# Patient Record
Sex: FEMALE | Race: WHITE | Employment: OTHER | URBAN - METROPOLITAN AREA
[De-identification: names, ages, dates, MRNs, and addresses within clinical notes are randomized per-mention and may not be internally consistent; named-entity substitution may affect disease eponyms.]

---

## 2017-01-04 ENCOUNTER — OFFICE VISIT (OUTPATIENT)
Dept: FAMILY MEDICINE CLINIC | Age: 72
End: 2017-01-04

## 2017-01-04 VITALS
WEIGHT: 127.1 LBS | TEMPERATURE: 97.9 F | HEART RATE: 58 BPM | OXYGEN SATURATION: 95 % | HEIGHT: 59 IN | BODY MASS INDEX: 25.62 KG/M2 | RESPIRATION RATE: 16 BRPM | DIASTOLIC BLOOD PRESSURE: 63 MMHG | SYSTOLIC BLOOD PRESSURE: 128 MMHG

## 2017-01-04 DIAGNOSIS — Z79.01 LONG-TERM (CURRENT) USE OF ANTICOAGULANTS, INR GOAL 2.5-3.5: ICD-10-CM

## 2017-01-04 DIAGNOSIS — G89.29 CHRONIC BACK PAIN, UNSPECIFIED BACK LOCATION, UNSPECIFIED BACK PAIN LATERALITY: ICD-10-CM

## 2017-01-04 DIAGNOSIS — N18.30 CKD (CHRONIC KIDNEY DISEASE) STAGE 3, GFR 30-59 ML/MIN (HCC): ICD-10-CM

## 2017-01-04 DIAGNOSIS — M79.604 CHRONIC PAIN OF BOTH LOWER EXTREMITIES: ICD-10-CM

## 2017-01-04 DIAGNOSIS — K21.9 GASTROESOPHAGEAL REFLUX DISEASE WITHOUT ESOPHAGITIS: ICD-10-CM

## 2017-01-04 DIAGNOSIS — E11.22 CONTROLLED TYPE 2 DIABETES MELLITUS WITH STAGE 3 CHRONIC KIDNEY DISEASE, WITHOUT LONG-TERM CURRENT USE OF INSULIN (HCC): ICD-10-CM

## 2017-01-04 DIAGNOSIS — C85.90 LYMPHOMA, UNSPECIFIED BODY REGION, UNSPECIFIED LYMPHOMA TYPE (HCC): ICD-10-CM

## 2017-01-04 DIAGNOSIS — G89.29 CHRONIC PAIN OF BOTH LOWER EXTREMITIES: ICD-10-CM

## 2017-01-04 DIAGNOSIS — I10 ESSENTIAL HYPERTENSION, BENIGN: Primary | ICD-10-CM

## 2017-01-04 DIAGNOSIS — M54.9 CHRONIC BACK PAIN, UNSPECIFIED BACK LOCATION, UNSPECIFIED BACK PAIN LATERALITY: ICD-10-CM

## 2017-01-04 DIAGNOSIS — M79.605 CHRONIC PAIN OF BOTH LOWER EXTREMITIES: ICD-10-CM

## 2017-01-04 DIAGNOSIS — N18.30 CONTROLLED TYPE 2 DIABETES MELLITUS WITH STAGE 3 CHRONIC KIDNEY DISEASE, WITHOUT LONG-TERM CURRENT USE OF INSULIN (HCC): ICD-10-CM

## 2017-01-04 DIAGNOSIS — G25.81 RLS (RESTLESS LEGS SYNDROME): ICD-10-CM

## 2017-01-04 DIAGNOSIS — F41.1 GENERALIZED ANXIETY DISORDER: ICD-10-CM

## 2017-01-04 DIAGNOSIS — D50.8 IRON DEFICIENCY ANEMIA SECONDARY TO INADEQUATE DIETARY IRON INTAKE: ICD-10-CM

## 2017-01-04 DIAGNOSIS — F51.01 PRIMARY INSOMNIA: ICD-10-CM

## 2017-01-04 DIAGNOSIS — E78.5 HYPERLIPIDEMIA WITH TARGET LDL LESS THAN 100: ICD-10-CM

## 2017-01-04 DIAGNOSIS — J43.8 OTHER EMPHYSEMA (HCC): ICD-10-CM

## 2017-01-04 PROCEDURE — 99214 OFFICE O/P EST MOD 30 MIN: CPT | Performed by: FAMILY MEDICINE

## 2017-01-04 RX ORDER — ACETAMINOPHEN AND CODEINE PHOSPHATE 300; 30 MG/1; MG/1
TABLET ORAL
Qty: 42 TABLET | Refills: 0 | Status: SHIPPED | OUTPATIENT
Start: 2017-01-04 | End: 2017-02-08 | Stop reason: SDUPTHER

## 2017-01-04 RX ORDER — ALPRAZOLAM 0.5 MG/1
TABLET ORAL
Qty: 90 TABLET | Refills: 0 | Status: CANCELLED | OUTPATIENT
Start: 2017-01-04

## 2017-01-04 ASSESSMENT — ENCOUNTER SYMPTOMS
ABDOMINAL PAIN: 0
CHEST TIGHTNESS: 0
SHORTNESS OF BREATH: 0
COUGH: 0
DIARRHEA: 0
CONSTIPATION: 0
ABDOMINAL DISTENTION: 0
BLOOD IN STOOL: 0
CHOKING: 0
BACK PAIN: 1
VOMITING: 0
RECTAL PAIN: 0
APNEA: 0
STRIDOR: 0
WHEEZING: 0
ANAL BLEEDING: 0
NAUSEA: 0

## 2017-01-06 LAB — INR BLD: 3.5

## 2017-01-09 ENCOUNTER — ANTI-COAG VISIT (OUTPATIENT)
Dept: FAMILY MEDICINE CLINIC | Age: 72
End: 2017-01-09

## 2017-01-09 DIAGNOSIS — Z95.2 AORTIC VALVE REPLACED: ICD-10-CM

## 2017-01-20 DIAGNOSIS — F41.9 ANXIETY DISORDER, UNSPECIFIED TYPE: ICD-10-CM

## 2017-01-20 RX ORDER — ALPRAZOLAM 0.5 MG/1
TABLET ORAL
Qty: 90 TABLET | Refills: 0 | Status: SHIPPED | OUTPATIENT
Start: 2017-01-20 | End: 2017-03-06 | Stop reason: SDUPTHER

## 2017-02-01 RX ORDER — WARFARIN SODIUM 1 MG/1
TABLET ORAL
Qty: 60 TABLET | Refills: 0 | Status: SHIPPED | OUTPATIENT
Start: 2017-02-01 | End: 2017-04-05

## 2017-02-06 ENCOUNTER — ANTI-COAG VISIT (OUTPATIENT)
Dept: FAMILY MEDICINE CLINIC | Age: 72
End: 2017-02-06

## 2017-02-06 DIAGNOSIS — Z95.2 AORTIC VALVE REPLACED: ICD-10-CM

## 2017-02-06 LAB — INR BLD: 2.7

## 2017-02-08 DIAGNOSIS — M79.605 CHRONIC PAIN OF BOTH LOWER EXTREMITIES: ICD-10-CM

## 2017-02-08 DIAGNOSIS — M54.9 CHRONIC BACK PAIN, UNSPECIFIED BACK LOCATION, UNSPECIFIED BACK PAIN LATERALITY: ICD-10-CM

## 2017-02-08 DIAGNOSIS — G89.29 CHRONIC BACK PAIN, UNSPECIFIED BACK LOCATION, UNSPECIFIED BACK PAIN LATERALITY: ICD-10-CM

## 2017-02-08 DIAGNOSIS — G89.29 CHRONIC PAIN OF BOTH LOWER EXTREMITIES: ICD-10-CM

## 2017-02-08 DIAGNOSIS — M79.604 CHRONIC PAIN OF BOTH LOWER EXTREMITIES: ICD-10-CM

## 2017-02-08 RX ORDER — ACETAMINOPHEN AND CODEINE PHOSPHATE 300; 30 MG/1; MG/1
TABLET ORAL
Qty: 42 TABLET | Refills: 0 | Status: SHIPPED | OUTPATIENT
Start: 2017-02-08 | End: 2017-03-29 | Stop reason: SDUPTHER

## 2017-02-13 ENCOUNTER — TELEPHONE (OUTPATIENT)
Dept: FAMILY MEDICINE CLINIC | Age: 72
End: 2017-02-13

## 2017-02-14 ENCOUNTER — TELEPHONE (OUTPATIENT)
Dept: FAMILY MEDICINE CLINIC | Age: 72
End: 2017-02-14

## 2017-02-14 ENCOUNTER — ANTI-COAG VISIT (OUTPATIENT)
Dept: FAMILY MEDICINE CLINIC | Age: 72
End: 2017-02-14

## 2017-02-14 DIAGNOSIS — Z95.2 AORTIC VALVE REPLACED: ICD-10-CM

## 2017-02-14 LAB — INR BLD: 8

## 2017-02-17 ENCOUNTER — TELEPHONE (OUTPATIENT)
Dept: FAMILY MEDICINE CLINIC | Age: 72
End: 2017-02-17

## 2017-02-17 ENCOUNTER — ANTI-COAG VISIT (OUTPATIENT)
Dept: FAMILY MEDICINE CLINIC | Age: 72
End: 2017-02-17

## 2017-02-17 DIAGNOSIS — Z95.2 AORTIC VALVE REPLACED: ICD-10-CM

## 2017-02-17 LAB — INR BLD: 2

## 2017-02-20 ENCOUNTER — ANTI-COAG VISIT (OUTPATIENT)
Dept: FAMILY MEDICINE CLINIC | Age: 72
End: 2017-02-20

## 2017-02-20 DIAGNOSIS — Z95.2 AORTIC VALVE REPLACED: ICD-10-CM

## 2017-02-20 LAB — INR BLD: 2.1

## 2017-02-20 RX ORDER — WARFARIN SODIUM 3 MG/1
TABLET ORAL
Qty: 30 TABLET | Refills: 0 | Status: SHIPPED | OUTPATIENT
Start: 2017-02-20 | End: 2017-04-05

## 2017-02-23 LAB
INR BLD: 2.5
INR BLD: 2.6

## 2017-02-27 ENCOUNTER — ANTI-COAG VISIT (OUTPATIENT)
Dept: FAMILY MEDICINE CLINIC | Age: 72
End: 2017-02-27

## 2017-02-27 ENCOUNTER — TELEPHONE (OUTPATIENT)
Dept: CARDIOLOGY CLINIC | Age: 72
End: 2017-02-27

## 2017-02-27 DIAGNOSIS — Z95.2 AORTIC VALVE REPLACED: ICD-10-CM

## 2017-02-28 RX ORDER — WARFARIN SODIUM 1 MG/1
TABLET ORAL
Qty: 60 TABLET | Refills: 0 | Status: SHIPPED | OUTPATIENT
Start: 2017-02-28 | End: 2018-01-04 | Stop reason: SDUPTHER

## 2017-03-06 DIAGNOSIS — F41.9 ANXIETY DISORDER, UNSPECIFIED TYPE: ICD-10-CM

## 2017-03-06 RX ORDER — ALPRAZOLAM 0.5 MG/1
TABLET ORAL
Qty: 90 TABLET | Refills: 0 | Status: SHIPPED | OUTPATIENT
Start: 2017-03-06 | End: 2017-04-05 | Stop reason: SDUPTHER

## 2017-03-13 RX ORDER — LISINOPRIL 40 MG/1
TABLET ORAL
Qty: 90 TABLET | Refills: 0 | Status: SHIPPED | OUTPATIENT
Start: 2017-03-13 | End: 2017-06-17 | Stop reason: SDUPTHER

## 2017-03-24 ENCOUNTER — ANTI-COAG VISIT (OUTPATIENT)
Dept: FAMILY MEDICINE CLINIC | Age: 72
End: 2017-03-24

## 2017-03-24 DIAGNOSIS — Z95.2 AORTIC VALVE REPLACED: ICD-10-CM

## 2017-03-24 LAB — INR BLD: 2.9

## 2017-03-27 ENCOUNTER — TELEPHONE (OUTPATIENT)
Dept: CARDIOLOGY CLINIC | Age: 72
End: 2017-03-27

## 2017-03-29 DIAGNOSIS — M79.605 CHRONIC PAIN OF BOTH LOWER EXTREMITIES: ICD-10-CM

## 2017-03-29 DIAGNOSIS — G89.29 CHRONIC PAIN OF BOTH LOWER EXTREMITIES: ICD-10-CM

## 2017-03-29 DIAGNOSIS — M54.9 CHRONIC BACK PAIN, UNSPECIFIED BACK LOCATION, UNSPECIFIED BACK PAIN LATERALITY: ICD-10-CM

## 2017-03-29 DIAGNOSIS — G89.29 CHRONIC BACK PAIN, UNSPECIFIED BACK LOCATION, UNSPECIFIED BACK PAIN LATERALITY: ICD-10-CM

## 2017-03-29 DIAGNOSIS — M79.604 CHRONIC PAIN OF BOTH LOWER EXTREMITIES: ICD-10-CM

## 2017-03-29 RX ORDER — ACETAMINOPHEN AND CODEINE PHOSPHATE 300; 30 MG/1; MG/1
TABLET ORAL
Qty: 42 TABLET | Refills: 0 | Status: SHIPPED | OUTPATIENT
Start: 2017-03-29 | End: 2017-04-05 | Stop reason: SDUPTHER

## 2017-04-03 DIAGNOSIS — I10 ESSENTIAL HYPERTENSION, BENIGN: ICD-10-CM

## 2017-04-03 RX ORDER — CHLORTHALIDONE 25 MG/1
TABLET ORAL
Qty: 90 TABLET | Refills: 0 | Status: SHIPPED | OUTPATIENT
Start: 2017-04-03 | End: 2017-07-06 | Stop reason: SDUPTHER

## 2017-04-05 ENCOUNTER — OFFICE VISIT (OUTPATIENT)
Dept: FAMILY MEDICINE CLINIC | Age: 72
End: 2017-04-05

## 2017-04-05 VITALS
BODY MASS INDEX: 25.18 KG/M2 | HEART RATE: 71 BPM | TEMPERATURE: 98.6 F | RESPIRATION RATE: 16 BRPM | DIASTOLIC BLOOD PRESSURE: 70 MMHG | OXYGEN SATURATION: 98 % | SYSTOLIC BLOOD PRESSURE: 133 MMHG | WEIGHT: 124.9 LBS | HEIGHT: 59 IN

## 2017-04-05 DIAGNOSIS — E11.22 CONTROLLED TYPE 2 DIABETES MELLITUS WITH STAGE 3 CHRONIC KIDNEY DISEASE, WITHOUT LONG-TERM CURRENT USE OF INSULIN (HCC): Primary | ICD-10-CM

## 2017-04-05 DIAGNOSIS — Z79.01 LONG-TERM (CURRENT) USE OF ANTICOAGULANTS, INR GOAL 2.5-3.5: ICD-10-CM

## 2017-04-05 DIAGNOSIS — N63.10 BREAST MASS, RIGHT: ICD-10-CM

## 2017-04-05 DIAGNOSIS — M54.9 CHRONIC BACK PAIN, UNSPECIFIED BACK LOCATION, UNSPECIFIED BACK PAIN LATERALITY: ICD-10-CM

## 2017-04-05 DIAGNOSIS — M79.605 CHRONIC PAIN OF BOTH LOWER EXTREMITIES: ICD-10-CM

## 2017-04-05 DIAGNOSIS — E78.5 HYPERLIPIDEMIA WITH TARGET LDL LESS THAN 100: ICD-10-CM

## 2017-04-05 DIAGNOSIS — I10 ESSENTIAL HYPERTENSION, BENIGN: ICD-10-CM

## 2017-04-05 DIAGNOSIS — J43.8 OTHER EMPHYSEMA (HCC): ICD-10-CM

## 2017-04-05 DIAGNOSIS — N18.30 CONTROLLED TYPE 2 DIABETES MELLITUS WITH STAGE 3 CHRONIC KIDNEY DISEASE, WITHOUT LONG-TERM CURRENT USE OF INSULIN (HCC): Primary | ICD-10-CM

## 2017-04-05 DIAGNOSIS — G89.29 CHRONIC PAIN OF BOTH LOWER EXTREMITIES: ICD-10-CM

## 2017-04-05 DIAGNOSIS — G89.29 CHRONIC BACK PAIN, UNSPECIFIED BACK LOCATION, UNSPECIFIED BACK PAIN LATERALITY: ICD-10-CM

## 2017-04-05 DIAGNOSIS — F51.01 PRIMARY INSOMNIA: ICD-10-CM

## 2017-04-05 DIAGNOSIS — M79.604 CHRONIC PAIN OF BOTH LOWER EXTREMITIES: ICD-10-CM

## 2017-04-05 DIAGNOSIS — D50.8 OTHER IRON DEFICIENCY ANEMIA: ICD-10-CM

## 2017-04-05 DIAGNOSIS — F41.9 ANXIETY: ICD-10-CM

## 2017-04-05 DIAGNOSIS — C82.80 OTHER TYPE OF FOLLICULAR LYMPHOMA: ICD-10-CM

## 2017-04-05 DIAGNOSIS — K21.9 GASTROESOPHAGEAL REFLUX DISEASE WITHOUT ESOPHAGITIS: ICD-10-CM

## 2017-04-05 DIAGNOSIS — G25.81 RLS (RESTLESS LEGS SYNDROME): ICD-10-CM

## 2017-04-05 PROCEDURE — 1036F TOBACCO NON-USER: CPT | Performed by: FAMILY MEDICINE

## 2017-04-05 PROCEDURE — G8400 PT W/DXA NO RESULTS DOC: HCPCS | Performed by: FAMILY MEDICINE

## 2017-04-05 PROCEDURE — 1123F ACP DISCUSS/DSCN MKR DOCD: CPT | Performed by: FAMILY MEDICINE

## 2017-04-05 PROCEDURE — 4040F PNEUMOC VAC/ADMIN/RCVD: CPT | Performed by: FAMILY MEDICINE

## 2017-04-05 PROCEDURE — 3023F SPIROM DOC REV: CPT | Performed by: FAMILY MEDICINE

## 2017-04-05 PROCEDURE — 1090F PRES/ABSN URINE INCON ASSESS: CPT | Performed by: FAMILY MEDICINE

## 2017-04-05 PROCEDURE — G8926 SPIRO NO PERF OR DOC: HCPCS | Performed by: FAMILY MEDICINE

## 2017-04-05 PROCEDURE — 3017F COLORECTAL CA SCREEN DOC REV: CPT | Performed by: FAMILY MEDICINE

## 2017-04-05 PROCEDURE — G8420 CALC BMI NORM PARAMETERS: HCPCS | Performed by: FAMILY MEDICINE

## 2017-04-05 PROCEDURE — G8427 DOCREV CUR MEDS BY ELIG CLIN: HCPCS | Performed by: FAMILY MEDICINE

## 2017-04-05 PROCEDURE — 99214 OFFICE O/P EST MOD 30 MIN: CPT | Performed by: FAMILY MEDICINE

## 2017-04-05 PROCEDURE — 3014F SCREEN MAMMO DOC REV: CPT | Performed by: FAMILY MEDICINE

## 2017-04-05 PROCEDURE — 3046F HEMOGLOBIN A1C LEVEL >9.0%: CPT | Performed by: FAMILY MEDICINE

## 2017-04-05 RX ORDER — ACETAMINOPHEN AND CODEINE PHOSPHATE 300; 30 MG/1; MG/1
TABLET ORAL
Qty: 42 TABLET | Refills: 0 | Status: SHIPPED | OUTPATIENT
Start: 2017-04-05 | End: 2017-05-15 | Stop reason: SDUPTHER

## 2017-04-05 RX ORDER — ALPRAZOLAM 0.5 MG/1
TABLET ORAL
Qty: 90 TABLET | Refills: 0 | Status: SHIPPED | OUTPATIENT
Start: 2017-04-05 | End: 2017-05-19 | Stop reason: SDUPTHER

## 2017-04-05 RX ORDER — WARFARIN SODIUM 3 MG/1
TABLET ORAL
Qty: 30 TABLET | Refills: 2 | Status: SHIPPED | OUTPATIENT
Start: 2017-04-05 | End: 2017-07-11 | Stop reason: SDUPTHER

## 2017-04-05 ASSESSMENT — ENCOUNTER SYMPTOMS
STRIDOR: 0
COUGH: 0
SHORTNESS OF BREATH: 0
BLOOD IN STOOL: 0
ABDOMINAL PAIN: 0
VOMITING: 0
ABDOMINAL DISTENTION: 0
RECTAL PAIN: 0
DIARRHEA: 0
BACK PAIN: 1
CONSTIPATION: 0
CHOKING: 0
WHEEZING: 0
CHEST TIGHTNESS: 0
NAUSEA: 0
APNEA: 0
ANAL BLEEDING: 0

## 2017-04-07 RX ORDER — WARFARIN SODIUM 1 MG/1
TABLET ORAL
Qty: 60 TABLET | Refills: 0 | Status: SHIPPED | OUTPATIENT
Start: 2017-04-07 | End: 2018-01-04 | Stop reason: SDUPTHER

## 2017-04-24 ENCOUNTER — ANTI-COAG VISIT (OUTPATIENT)
Dept: FAMILY MEDICINE CLINIC | Age: 72
End: 2017-04-24

## 2017-04-24 DIAGNOSIS — Z95.2 AORTIC VALVE REPLACED: ICD-10-CM

## 2017-04-24 LAB — INR BLD: 2.5

## 2017-05-04 ENCOUNTER — PROCEDURE VISIT (OUTPATIENT)
Dept: CARDIOLOGY CLINIC | Age: 72
End: 2017-05-04

## 2017-05-04 DIAGNOSIS — Z95.0 CARDIAC PACEMAKER IN SITU: ICD-10-CM

## 2017-05-04 DIAGNOSIS — R00.1 BRADYCARDIA: ICD-10-CM

## 2017-05-04 PROCEDURE — 93280 PM DEVICE PROGR EVAL DUAL: CPT | Performed by: INTERNAL MEDICINE

## 2017-05-15 DIAGNOSIS — M54.9 CHRONIC BACK PAIN, UNSPECIFIED BACK LOCATION, UNSPECIFIED BACK PAIN LATERALITY: ICD-10-CM

## 2017-05-15 DIAGNOSIS — M79.604 CHRONIC PAIN OF BOTH LOWER EXTREMITIES: ICD-10-CM

## 2017-05-15 DIAGNOSIS — F41.9 ANXIETY: ICD-10-CM

## 2017-05-15 DIAGNOSIS — G89.29 CHRONIC BACK PAIN, UNSPECIFIED BACK LOCATION, UNSPECIFIED BACK PAIN LATERALITY: ICD-10-CM

## 2017-05-15 DIAGNOSIS — M79.605 CHRONIC PAIN OF BOTH LOWER EXTREMITIES: ICD-10-CM

## 2017-05-15 DIAGNOSIS — G89.29 CHRONIC PAIN OF BOTH LOWER EXTREMITIES: ICD-10-CM

## 2017-05-15 RX ORDER — WARFARIN SODIUM 1 MG/1
TABLET ORAL
Qty: 60 TABLET | Refills: 0 | Status: SHIPPED | OUTPATIENT
Start: 2017-05-15 | End: 2018-01-04 | Stop reason: SDUPTHER

## 2017-05-15 RX ORDER — ACETAMINOPHEN AND CODEINE PHOSPHATE 300; 30 MG/1; MG/1
TABLET ORAL
Qty: 42 TABLET | Refills: 0 | Status: SHIPPED | OUTPATIENT
Start: 2017-05-15 | End: 2017-06-26 | Stop reason: SDUPTHER

## 2017-05-19 RX ORDER — ALPRAZOLAM 0.5 MG/1
TABLET ORAL
Qty: 90 TABLET | Refills: 0 | Status: SHIPPED | OUTPATIENT
Start: 2017-05-19 | End: 2017-06-26 | Stop reason: SDUPTHER

## 2017-05-22 LAB — INR BLD: 3.4

## 2017-05-23 ENCOUNTER — ANTI-COAG VISIT (OUTPATIENT)
Dept: FAMILY MEDICINE CLINIC | Age: 72
End: 2017-05-23

## 2017-05-23 DIAGNOSIS — Z95.2 AORTIC VALVE REPLACED: ICD-10-CM

## 2017-06-18 RX ORDER — LISINOPRIL 40 MG/1
TABLET ORAL
Qty: 90 TABLET | Refills: 0 | Status: SHIPPED | OUTPATIENT
Start: 2017-06-18 | End: 2017-09-12 | Stop reason: SDUPTHER

## 2017-06-19 RX ORDER — WARFARIN SODIUM 1 MG/1
TABLET ORAL
Qty: 60 TABLET | Refills: 0 | Status: SHIPPED | OUTPATIENT
Start: 2017-06-19 | End: 2018-01-04 | Stop reason: SDUPTHER

## 2017-06-26 DIAGNOSIS — F41.9 ANXIETY: ICD-10-CM

## 2017-06-26 DIAGNOSIS — M79.604 CHRONIC PAIN OF BOTH LOWER EXTREMITIES: ICD-10-CM

## 2017-06-26 DIAGNOSIS — M79.605 CHRONIC PAIN OF BOTH LOWER EXTREMITIES: ICD-10-CM

## 2017-06-26 DIAGNOSIS — G89.29 CHRONIC BACK PAIN, UNSPECIFIED BACK LOCATION, UNSPECIFIED BACK PAIN LATERALITY: ICD-10-CM

## 2017-06-26 DIAGNOSIS — M54.9 CHRONIC BACK PAIN, UNSPECIFIED BACK LOCATION, UNSPECIFIED BACK PAIN LATERALITY: ICD-10-CM

## 2017-06-26 DIAGNOSIS — G89.29 CHRONIC PAIN OF BOTH LOWER EXTREMITIES: ICD-10-CM

## 2017-06-26 LAB — INR BLD: 3.4

## 2017-06-26 RX ORDER — ACETAMINOPHEN AND CODEINE PHOSPHATE 300; 30 MG/1; MG/1
TABLET ORAL
Qty: 42 TABLET | Refills: 0 | Status: SHIPPED | OUTPATIENT
Start: 2017-06-26 | End: 2017-08-14 | Stop reason: SDUPTHER

## 2017-06-26 RX ORDER — ALPRAZOLAM 0.5 MG/1
TABLET ORAL
Qty: 90 TABLET | Refills: 0 | Status: SHIPPED | OUTPATIENT
Start: 2017-06-26 | End: 2017-08-14 | Stop reason: SDUPTHER

## 2017-06-27 ENCOUNTER — ANTI-COAG VISIT (OUTPATIENT)
Dept: FAMILY MEDICINE CLINIC | Age: 72
End: 2017-06-27

## 2017-06-27 DIAGNOSIS — Z95.2 AORTIC VALVE REPLACED: ICD-10-CM

## 2017-07-06 DIAGNOSIS — I10 ESSENTIAL HYPERTENSION, BENIGN: ICD-10-CM

## 2017-07-06 RX ORDER — CHLORTHALIDONE 25 MG/1
TABLET ORAL
Qty: 90 TABLET | Refills: 0 | Status: SHIPPED | OUTPATIENT
Start: 2017-07-06 | End: 2017-10-01 | Stop reason: SDUPTHER

## 2017-07-11 DIAGNOSIS — Z79.01 LONG-TERM (CURRENT) USE OF ANTICOAGULANTS, INR GOAL 2.5-3.5: ICD-10-CM

## 2017-07-11 RX ORDER — WARFARIN SODIUM 1 MG/1
TABLET ORAL
Qty: 60 TABLET | Refills: 0 | Status: SHIPPED | OUTPATIENT
Start: 2017-07-11 | End: 2017-07-25 | Stop reason: SDUPTHER

## 2017-07-11 RX ORDER — WARFARIN SODIUM 3 MG/1
TABLET ORAL
Qty: 30 TABLET | Refills: 0 | Status: SHIPPED | OUTPATIENT
Start: 2017-07-11 | End: 2017-07-25 | Stop reason: SDUPTHER

## 2017-07-24 ENCOUNTER — ANTI-COAG VISIT (OUTPATIENT)
Dept: FAMILY MEDICINE CLINIC | Age: 72
End: 2017-07-24

## 2017-07-24 DIAGNOSIS — Z79.01 LONG-TERM (CURRENT) USE OF ANTICOAGULANTS, INR GOAL 2.5-3.5: ICD-10-CM

## 2017-07-24 DIAGNOSIS — Z95.2 AORTIC VALVE REPLACED: ICD-10-CM

## 2017-07-24 LAB — INR BLD: 2.7

## 2017-07-25 RX ORDER — WARFARIN SODIUM 1 MG/1
TABLET ORAL
Qty: 60 TABLET | Refills: 2 | Status: SHIPPED | OUTPATIENT
Start: 2017-07-25 | End: 2018-05-11 | Stop reason: SDUPTHER

## 2017-07-25 RX ORDER — WARFARIN SODIUM 3 MG/1
TABLET ORAL
Qty: 30 TABLET | Refills: 2 | Status: SHIPPED | OUTPATIENT
Start: 2017-07-25 | End: 2017-08-14 | Stop reason: SDUPTHER

## 2017-08-01 RX ORDER — PANTOPRAZOLE SODIUM 40 MG/1
TABLET, DELAYED RELEASE ORAL
Qty: 30 TABLET | Refills: 1 | Status: SHIPPED | OUTPATIENT
Start: 2017-08-01 | End: 2017-10-05 | Stop reason: SDUPTHER

## 2017-08-07 DIAGNOSIS — M79.605 CHRONIC PAIN OF BOTH LOWER EXTREMITIES: ICD-10-CM

## 2017-08-07 DIAGNOSIS — F41.9 ANXIETY: ICD-10-CM

## 2017-08-07 DIAGNOSIS — G89.29 CHRONIC BACK PAIN, UNSPECIFIED BACK LOCATION, UNSPECIFIED BACK PAIN LATERALITY: ICD-10-CM

## 2017-08-07 DIAGNOSIS — M54.9 CHRONIC BACK PAIN, UNSPECIFIED BACK LOCATION, UNSPECIFIED BACK PAIN LATERALITY: ICD-10-CM

## 2017-08-07 DIAGNOSIS — G89.29 CHRONIC PAIN OF BOTH LOWER EXTREMITIES: ICD-10-CM

## 2017-08-07 DIAGNOSIS — M79.604 CHRONIC PAIN OF BOTH LOWER EXTREMITIES: ICD-10-CM

## 2017-08-07 RX ORDER — ACETAMINOPHEN AND CODEINE PHOSPHATE 300; 30 MG/1; MG/1
TABLET ORAL
Qty: 42 TABLET | Refills: 0 | OUTPATIENT
Start: 2017-08-07

## 2017-08-07 RX ORDER — ALPRAZOLAM 0.5 MG/1
TABLET ORAL
Qty: 90 TABLET | Refills: 0 | OUTPATIENT
Start: 2017-08-07

## 2017-08-08 ENCOUNTER — TELEPHONE (OUTPATIENT)
Dept: FAMILY MEDICINE CLINIC | Age: 72
End: 2017-08-08

## 2017-08-14 ENCOUNTER — OFFICE VISIT (OUTPATIENT)
Dept: FAMILY MEDICINE CLINIC | Age: 72
End: 2017-08-14

## 2017-08-14 VITALS
DIASTOLIC BLOOD PRESSURE: 80 MMHG | TEMPERATURE: 97.7 F | BODY MASS INDEX: 25.74 KG/M2 | HEIGHT: 59 IN | HEART RATE: 69 BPM | RESPIRATION RATE: 16 BRPM | OXYGEN SATURATION: 99 % | WEIGHT: 127.7 LBS | SYSTOLIC BLOOD PRESSURE: 132 MMHG

## 2017-08-14 DIAGNOSIS — M79.604 CHRONIC PAIN OF BOTH LOWER EXTREMITIES: ICD-10-CM

## 2017-08-14 DIAGNOSIS — E11.22 CONTROLLED TYPE 2 DIABETES MELLITUS WITH STAGE 3 CHRONIC KIDNEY DISEASE, WITHOUT LONG-TERM CURRENT USE OF INSULIN (HCC): ICD-10-CM

## 2017-08-14 DIAGNOSIS — K21.9 GASTROESOPHAGEAL REFLUX DISEASE WITHOUT ESOPHAGITIS: ICD-10-CM

## 2017-08-14 DIAGNOSIS — E78.5 HYPERLIPIDEMIA WITH TARGET LDL LESS THAN 100: ICD-10-CM

## 2017-08-14 DIAGNOSIS — D50.8 IRON DEFICIENCY ANEMIA SECONDARY TO INADEQUATE DIETARY IRON INTAKE: ICD-10-CM

## 2017-08-14 DIAGNOSIS — I10 ESSENTIAL HYPERTENSION, BENIGN: Primary | ICD-10-CM

## 2017-08-14 DIAGNOSIS — C85.90 LYMPHOMA, UNSPECIFIED BODY REGION, UNSPECIFIED LYMPHOMA TYPE (HCC): ICD-10-CM

## 2017-08-14 DIAGNOSIS — J43.8 OTHER EMPHYSEMA (HCC): ICD-10-CM

## 2017-08-14 DIAGNOSIS — F41.9 ANXIETY: ICD-10-CM

## 2017-08-14 DIAGNOSIS — N63.10 BREAST MASS, RIGHT: ICD-10-CM

## 2017-08-14 DIAGNOSIS — M54.9 CHRONIC BACK PAIN, UNSPECIFIED BACK LOCATION, UNSPECIFIED BACK PAIN LATERALITY: ICD-10-CM

## 2017-08-14 DIAGNOSIS — G25.81 RLS (RESTLESS LEGS SYNDROME): ICD-10-CM

## 2017-08-14 DIAGNOSIS — Z79.01 LONG-TERM (CURRENT) USE OF ANTICOAGULANTS, INR GOAL 2.5-3.5: ICD-10-CM

## 2017-08-14 DIAGNOSIS — N18.30 CONTROLLED TYPE 2 DIABETES MELLITUS WITH STAGE 3 CHRONIC KIDNEY DISEASE, WITHOUT LONG-TERM CURRENT USE OF INSULIN (HCC): ICD-10-CM

## 2017-08-14 DIAGNOSIS — N18.30 CKD (CHRONIC KIDNEY DISEASE) STAGE 3, GFR 30-59 ML/MIN (HCC): ICD-10-CM

## 2017-08-14 DIAGNOSIS — G89.29 CHRONIC PAIN OF BOTH LOWER EXTREMITIES: ICD-10-CM

## 2017-08-14 DIAGNOSIS — G89.29 CHRONIC BACK PAIN, UNSPECIFIED BACK LOCATION, UNSPECIFIED BACK PAIN LATERALITY: ICD-10-CM

## 2017-08-14 DIAGNOSIS — F51.01 PRIMARY INSOMNIA: ICD-10-CM

## 2017-08-14 DIAGNOSIS — M79.605 CHRONIC PAIN OF BOTH LOWER EXTREMITIES: ICD-10-CM

## 2017-08-14 PROCEDURE — G8926 SPIRO NO PERF OR DOC: HCPCS | Performed by: FAMILY MEDICINE

## 2017-08-14 PROCEDURE — 4040F PNEUMOC VAC/ADMIN/RCVD: CPT | Performed by: FAMILY MEDICINE

## 2017-08-14 PROCEDURE — G8419 CALC BMI OUT NRM PARAM NOF/U: HCPCS | Performed by: FAMILY MEDICINE

## 2017-08-14 PROCEDURE — 3014F SCREEN MAMMO DOC REV: CPT | Performed by: FAMILY MEDICINE

## 2017-08-14 PROCEDURE — 1036F TOBACCO NON-USER: CPT | Performed by: FAMILY MEDICINE

## 2017-08-14 PROCEDURE — 1090F PRES/ABSN URINE INCON ASSESS: CPT | Performed by: FAMILY MEDICINE

## 2017-08-14 PROCEDURE — 3017F COLORECTAL CA SCREEN DOC REV: CPT | Performed by: FAMILY MEDICINE

## 2017-08-14 PROCEDURE — 1123F ACP DISCUSS/DSCN MKR DOCD: CPT | Performed by: FAMILY MEDICINE

## 2017-08-14 PROCEDURE — 3023F SPIROM DOC REV: CPT | Performed by: FAMILY MEDICINE

## 2017-08-14 PROCEDURE — 3046F HEMOGLOBIN A1C LEVEL >9.0%: CPT | Performed by: FAMILY MEDICINE

## 2017-08-14 PROCEDURE — 99214 OFFICE O/P EST MOD 30 MIN: CPT | Performed by: FAMILY MEDICINE

## 2017-08-14 PROCEDURE — G8427 DOCREV CUR MEDS BY ELIG CLIN: HCPCS | Performed by: FAMILY MEDICINE

## 2017-08-14 PROCEDURE — G8400 PT W/DXA NO RESULTS DOC: HCPCS | Performed by: FAMILY MEDICINE

## 2017-08-14 RX ORDER — ACETAMINOPHEN AND CODEINE PHOSPHATE 300; 30 MG/1; MG/1
TABLET ORAL
Qty: 42 TABLET | Refills: 0 | Status: SHIPPED | OUTPATIENT
Start: 2017-08-14 | End: 2017-09-20 | Stop reason: SDUPTHER

## 2017-08-14 RX ORDER — WARFARIN SODIUM 3 MG/1
TABLET ORAL
Qty: 30 TABLET | Refills: 2 | Status: SHIPPED | OUTPATIENT
Start: 2017-08-14 | End: 2018-03-26 | Stop reason: CLARIF

## 2017-08-14 RX ORDER — ALPRAZOLAM 0.5 MG/1
TABLET ORAL
Qty: 90 TABLET | Refills: 0 | Status: SHIPPED | OUTPATIENT
Start: 2017-08-14 | End: 2017-09-20 | Stop reason: SDUPTHER

## 2017-08-14 ASSESSMENT — ENCOUNTER SYMPTOMS
ABDOMINAL PAIN: 0
VOMITING: 0
COLOR CHANGE: 0
DIARRHEA: 0
CONSTIPATION: 0
CHOKING: 0
APNEA: 0
BACK PAIN: 1
COUGH: 0
ABDOMINAL DISTENTION: 0
RECTAL PAIN: 0
SHORTNESS OF BREATH: 0
WHEEZING: 0
STRIDOR: 0
ANAL BLEEDING: 0
NAUSEA: 0
BLOOD IN STOOL: 0
CHEST TIGHTNESS: 0

## 2017-08-24 ENCOUNTER — ANTI-COAG VISIT (OUTPATIENT)
Dept: FAMILY MEDICINE CLINIC | Age: 72
End: 2017-08-24

## 2017-08-24 DIAGNOSIS — Z95.2 AORTIC VALVE REPLACED: ICD-10-CM

## 2017-08-24 LAB — INR BLD: 2.9

## 2017-09-12 RX ORDER — LISINOPRIL 40 MG/1
TABLET ORAL
Qty: 90 TABLET | Refills: 0 | Status: SHIPPED | OUTPATIENT
Start: 2017-09-12 | End: 2017-12-10 | Stop reason: SDUPTHER

## 2017-09-20 DIAGNOSIS — G89.29 CHRONIC BACK PAIN, UNSPECIFIED BACK LOCATION, UNSPECIFIED BACK PAIN LATERALITY: ICD-10-CM

## 2017-09-20 DIAGNOSIS — F41.9 ANXIETY: ICD-10-CM

## 2017-09-20 DIAGNOSIS — M79.604 CHRONIC PAIN OF BOTH LOWER EXTREMITIES: ICD-10-CM

## 2017-09-20 DIAGNOSIS — G89.29 CHRONIC PAIN OF BOTH LOWER EXTREMITIES: ICD-10-CM

## 2017-09-20 DIAGNOSIS — M54.9 CHRONIC BACK PAIN, UNSPECIFIED BACK LOCATION, UNSPECIFIED BACK PAIN LATERALITY: ICD-10-CM

## 2017-09-20 DIAGNOSIS — M79.605 CHRONIC PAIN OF BOTH LOWER EXTREMITIES: ICD-10-CM

## 2017-09-20 RX ORDER — ACETAMINOPHEN AND CODEINE PHOSPHATE 300; 30 MG/1; MG/1
TABLET ORAL
Qty: 42 TABLET | Refills: 0 | Status: SHIPPED | OUTPATIENT
Start: 2017-09-20 | End: 2017-11-02 | Stop reason: SDUPTHER

## 2017-09-20 RX ORDER — ALPRAZOLAM 0.5 MG/1
TABLET ORAL
Qty: 90 TABLET | Refills: 0 | Status: SHIPPED | OUTPATIENT
Start: 2017-09-20 | End: 2017-11-02 | Stop reason: SDUPTHER

## 2017-09-28 ENCOUNTER — ANTI-COAG VISIT (OUTPATIENT)
Dept: FAMILY MEDICINE CLINIC | Age: 72
End: 2017-09-28

## 2017-09-28 DIAGNOSIS — Z95.2 AORTIC VALVE REPLACED: ICD-10-CM

## 2017-09-28 LAB — INR BLD: 2.9

## 2017-09-28 NOTE — PROGRESS NOTES
Notify pt':  INR is at goal.  Continue same med dose. Next INR in 4weeks. Ok to call in refill if needed.

## 2017-10-01 DIAGNOSIS — I10 ESSENTIAL HYPERTENSION, BENIGN: ICD-10-CM

## 2017-10-01 RX ORDER — CHLORTHALIDONE 25 MG/1
TABLET ORAL
Qty: 90 TABLET | Refills: 0 | Status: SHIPPED | OUTPATIENT
Start: 2017-10-01 | End: 2017-12-30 | Stop reason: SDUPTHER

## 2017-10-13 ENCOUNTER — TELEPHONE (OUTPATIENT)
Dept: FAMILY MEDICINE CLINIC | Age: 72
End: 2017-10-13

## 2017-10-13 NOTE — TELEPHONE ENCOUNTER
Patient is calling stating that her other doctor put her on Tumeric and she wanted to make sure she is allowed to take this with her other meds.   Please advise  Donte Loge [phone]

## 2017-10-30 ENCOUNTER — ANTI-COAG VISIT (OUTPATIENT)
Dept: FAMILY MEDICINE CLINIC | Age: 72
End: 2017-10-30

## 2017-10-30 DIAGNOSIS — Z95.2 AORTIC VALVE REPLACED: ICD-10-CM

## 2017-10-30 LAB — INR BLD: 3.5

## 2017-11-02 DIAGNOSIS — M79.604 CHRONIC PAIN OF BOTH LOWER EXTREMITIES: ICD-10-CM

## 2017-11-02 DIAGNOSIS — G89.29 CHRONIC PAIN OF BOTH LOWER EXTREMITIES: ICD-10-CM

## 2017-11-02 DIAGNOSIS — M79.605 CHRONIC PAIN OF BOTH LOWER EXTREMITIES: ICD-10-CM

## 2017-11-02 DIAGNOSIS — M54.9 CHRONIC BACK PAIN, UNSPECIFIED BACK LOCATION, UNSPECIFIED BACK PAIN LATERALITY: ICD-10-CM

## 2017-11-02 DIAGNOSIS — F41.9 ANXIETY: ICD-10-CM

## 2017-11-02 DIAGNOSIS — G89.29 CHRONIC BACK PAIN, UNSPECIFIED BACK LOCATION, UNSPECIFIED BACK PAIN LATERALITY: ICD-10-CM

## 2017-11-02 RX ORDER — ALPRAZOLAM 0.5 MG/1
TABLET ORAL
Qty: 90 TABLET | Refills: 0 | Status: SHIPPED | OUTPATIENT
Start: 2017-11-02 | End: 2017-12-18 | Stop reason: SDUPTHER

## 2017-11-02 RX ORDER — ACETAMINOPHEN AND CODEINE PHOSPHATE 300; 30 MG/1; MG/1
TABLET ORAL
Qty: 42 TABLET | Refills: 0 | Status: SHIPPED | OUTPATIENT
Start: 2017-11-02 | End: 2017-12-18 | Stop reason: SDUPTHER

## 2017-11-16 ENCOUNTER — OFFICE VISIT (OUTPATIENT)
Dept: FAMILY MEDICINE CLINIC | Age: 72
End: 2017-11-16

## 2017-11-16 VITALS
TEMPERATURE: 98.1 F | OXYGEN SATURATION: 97 % | WEIGHT: 128 LBS | SYSTOLIC BLOOD PRESSURE: 131 MMHG | RESPIRATION RATE: 16 BRPM | HEIGHT: 59 IN | BODY MASS INDEX: 25.8 KG/M2 | DIASTOLIC BLOOD PRESSURE: 73 MMHG | HEART RATE: 70 BPM

## 2017-11-16 DIAGNOSIS — D50.8 IRON DEFICIENCY ANEMIA SECONDARY TO INADEQUATE DIETARY IRON INTAKE: ICD-10-CM

## 2017-11-16 DIAGNOSIS — G89.29 CHRONIC BILATERAL LOW BACK PAIN WITHOUT SCIATICA: ICD-10-CM

## 2017-11-16 DIAGNOSIS — E11.22 CONTROLLED TYPE 2 DIABETES MELLITUS WITH STAGE 3 CHRONIC KIDNEY DISEASE, WITHOUT LONG-TERM CURRENT USE OF INSULIN (HCC): ICD-10-CM

## 2017-11-16 DIAGNOSIS — N18.30 CKD (CHRONIC KIDNEY DISEASE) STAGE 3, GFR 30-59 ML/MIN (HCC): ICD-10-CM

## 2017-11-16 DIAGNOSIS — K21.9 GASTROESOPHAGEAL REFLUX DISEASE WITHOUT ESOPHAGITIS: ICD-10-CM

## 2017-11-16 DIAGNOSIS — F51.01 PRIMARY INSOMNIA: ICD-10-CM

## 2017-11-16 DIAGNOSIS — G25.81 RLS (RESTLESS LEGS SYNDROME): ICD-10-CM

## 2017-11-16 DIAGNOSIS — G89.4 CHRONIC PAIN SYNDROME: ICD-10-CM

## 2017-11-16 DIAGNOSIS — E78.5 HYPERLIPIDEMIA WITH TARGET LDL LESS THAN 100: ICD-10-CM

## 2017-11-16 DIAGNOSIS — M54.50 CHRONIC BILATERAL LOW BACK PAIN WITHOUT SCIATICA: ICD-10-CM

## 2017-11-16 DIAGNOSIS — Z79.01 LONG-TERM (CURRENT) USE OF ANTICOAGULANTS, INR GOAL 2.5-3.5: ICD-10-CM

## 2017-11-16 DIAGNOSIS — J43.8 OTHER EMPHYSEMA (HCC): ICD-10-CM

## 2017-11-16 DIAGNOSIS — I10 ESSENTIAL HYPERTENSION, BENIGN: Primary | ICD-10-CM

## 2017-11-16 DIAGNOSIS — C85.90 LYMPHOMA, UNSPECIFIED BODY REGION, UNSPECIFIED LYMPHOMA TYPE (HCC): ICD-10-CM

## 2017-11-16 DIAGNOSIS — F41.9 ANXIETY: ICD-10-CM

## 2017-11-16 DIAGNOSIS — N18.30 CONTROLLED TYPE 2 DIABETES MELLITUS WITH STAGE 3 CHRONIC KIDNEY DISEASE, WITHOUT LONG-TERM CURRENT USE OF INSULIN (HCC): ICD-10-CM

## 2017-11-16 PROCEDURE — G8482 FLU IMMUNIZE ORDER/ADMIN: HCPCS | Performed by: FAMILY MEDICINE

## 2017-11-16 PROCEDURE — 4040F PNEUMOC VAC/ADMIN/RCVD: CPT | Performed by: FAMILY MEDICINE

## 2017-11-16 PROCEDURE — 1036F TOBACCO NON-USER: CPT | Performed by: FAMILY MEDICINE

## 2017-11-16 PROCEDURE — 3014F SCREEN MAMMO DOC REV: CPT | Performed by: FAMILY MEDICINE

## 2017-11-16 PROCEDURE — 1090F PRES/ABSN URINE INCON ASSESS: CPT | Performed by: FAMILY MEDICINE

## 2017-11-16 PROCEDURE — 3017F COLORECTAL CA SCREEN DOC REV: CPT | Performed by: FAMILY MEDICINE

## 2017-11-16 PROCEDURE — 3023F SPIROM DOC REV: CPT | Performed by: FAMILY MEDICINE

## 2017-11-16 PROCEDURE — G8419 CALC BMI OUT NRM PARAM NOF/U: HCPCS | Performed by: FAMILY MEDICINE

## 2017-11-16 PROCEDURE — 3044F HG A1C LEVEL LT 7.0%: CPT | Performed by: FAMILY MEDICINE

## 2017-11-16 PROCEDURE — G8926 SPIRO NO PERF OR DOC: HCPCS | Performed by: FAMILY MEDICINE

## 2017-11-16 PROCEDURE — G8400 PT W/DXA NO RESULTS DOC: HCPCS | Performed by: FAMILY MEDICINE

## 2017-11-16 PROCEDURE — G8427 DOCREV CUR MEDS BY ELIG CLIN: HCPCS | Performed by: FAMILY MEDICINE

## 2017-11-16 PROCEDURE — 1123F ACP DISCUSS/DSCN MKR DOCD: CPT | Performed by: FAMILY MEDICINE

## 2017-11-16 PROCEDURE — 99214 OFFICE O/P EST MOD 30 MIN: CPT | Performed by: FAMILY MEDICINE

## 2017-11-16 RX ORDER — ATORVASTATIN CALCIUM 10 MG/1
10 TABLET, FILM COATED ORAL DAILY
Qty: 90 TABLET | Refills: 0 | Status: SHIPPED | OUTPATIENT
Start: 2017-11-16 | End: 2018-10-24 | Stop reason: SDUPTHER

## 2017-11-16 ASSESSMENT — ENCOUNTER SYMPTOMS
STRIDOR: 0
WHEEZING: 0
ABDOMINAL PAIN: 0
BLOOD IN STOOL: 0
CHOKING: 0
RECTAL PAIN: 0
NAUSEA: 0
CONSTIPATION: 0
BACK PAIN: 1
ANAL BLEEDING: 0
COUGH: 0
APNEA: 0
CHEST TIGHTNESS: 0
DIARRHEA: 0
SHORTNESS OF BREATH: 0
VOMITING: 0
ABDOMINAL DISTENTION: 0

## 2017-11-16 ASSESSMENT — COPD QUESTIONNAIRES: COPD: 1

## 2017-11-16 NOTE — PATIENT INSTRUCTIONS
Patient Education        High Cholesterol: Care Instructions  Your Care Instructions  Cholesterol is a type of fat in your blood. It is needed for many body functions, such as making new cells. Cholesterol is made by your body. It also comes from food you eat. High cholesterol means that you have too much of the fat in your blood. This raises your risk of a heart attack and stroke. LDL and HDL are part of your total cholesterol. LDL is the \"bad\" cholesterol. High LDL can raise your risk for heart disease, heart attack, and stroke. HDL is the \"good\" cholesterol. It helps clear bad cholesterol from the body. High HDL is linked with a lower risk of heart disease, heart attack, and stroke. Your cholesterol levels help your doctor find out your risk for having a heart attack or stroke. You and your doctor can talk about whether you need to lower your risk and what treatment is best for you. A heart-healthy lifestyle along with medicines can help lower your cholesterol and your risk. The way you choose to lower your risk will depend on how high your risk is for heart attack and stroke. It will also depend on how you feel about taking medicines. Follow-up care is a key part of your treatment and safety. Be sure to make and go to all appointments, and call your doctor if you are having problems. It's also a good idea to know your test results and keep a list of the medicines you take. How can you care for yourself at home? · Eat a variety of foods every day. Good choices include fruits, vegetables, whole grains (like oatmeal), dried beans and peas, nuts and seeds, soy products (like tofu), and fat-free or low-fat dairy products. · Replace butter, margarine, and hydrogenated or partially hydrogenated oils with olive and canola oils. (Canola oil margarine without trans fat is fine.)  · Replace red meat with fish, poultry, and soy protein (like tofu).   · Limit processed and packaged foods like chips, crackers, and

## 2017-11-16 NOTE — PROGRESS NOTES
Subjective:      Patient ID: Walter Whitman is a 71 y.o. female. Hypertension   Pertinent negatives include no chest pain, palpitations or shortness of breath. Diabetes   Hypoglycemia symptoms include nervousness/anxiousness. Pertinent negatives for hypoglycemia include no confusion, dizziness or pallor. Pertinent negatives for diabetes include no chest pain, no fatigue, no polydipsia, no polyphagia, no polyuria and no weakness. COPD   There is no cough, shortness of breath or wheezing. Pertinent negatives include no appetite change, chest pain or fever. Results for Ashly Culp (MRN [id number]) as of 11/16/2017 13:32   Ref.  Range 11/13/2017 11:43   Sodium Latest Ref Range: 135 - 146 mmol/L 137   Potassium Latest Ref Range: 3.5 - 5.3 mmol/L 4.9   Chloride Latest Ref Range: 98 - 110 mmol/L 100   CO2 Latest Ref Range: 20 - 31 mmol/L 26   BUN Latest Ref Range: 7 - 25 mg/dL 18   Creatinine Latest Ref Range: 0.60 - 0.93 mg/dL 1.23 (H)   BUN/Creatinine Ratio Latest Ref Range: 6 - 22 (calc) 15   Est, Glom Filt Rate Latest Ref Range: > OR = 60 mL/min/1.73m2 44 (L)   GFR  Latest Ref Range: > OR = 60 mL/min/1.73m2 51 (L)   Glucose Latest Ref Range: 65 - 99 mg/dL 157 (H)   Calcium Latest Ref Range: 8.6 - 10.4 mg/dL 8.9   Total Protein Latest Ref Range: 6.1 - 8.1 g/dL 6.6   Chol/HDL Ratio Latest Ref Range: <5.0 (calc) 4.2   Cholesterol Latest Ref Range: <130 mg/dL (calc) 128   Cholesterol, Total Latest Ref Range: <200 mg/dL 168   HDL Cholesterol Latest Ref Range: >50 mg/dL 40 (L)   LDL Calculated Latest Units: mg/dL (calc) 102 (H)   Triglycerides Latest Ref Range: <150 mg/dL 157 (H)   Albumin Latest Ref Range: 3.6 - 5.1 g/dL 4.8   Globulin Latest Ref Range: 1.9 - 3.7 g/dL (calc) 1.8 (L)   Albumin/Globulin Ratio Latest Ref Range: 1.0 - 2.5 (calc) 2.7 (H)   Alk Phos Latest Ref Range: 33 - 130 U/L 78   ALT Latest Ref Range: 6 - 29 U/L 12   AST Latest Ref Range: 10 - 35 U/L 30   Bilirubin Latest Ref Range: 0.2 - 1.2 mg/dL 0.8   Hemoglobin A1C Latest Ref Range: <5.7 % of total Hgb 6.0 (H)   Creatinine, Ur Latest Ref Range: 20 - 320 mg/dL 60   Microalb Creat Ratio Latest Ref Range: <30 mcg/mg creat 227 (H)   MICROALBUMIN, RANDOM URINE Latest Ref Range: See Note: mg/dL 13.6     Hyperlipidemia:see labs above. Diet managed. States she has made significant changes with LDL still not at goal.    Diabetes f/u:  Doing well. Preprandial-fasting MO=[id number]. 2hr postprandial BS=has not checked recently. HBA1c:6.0 on 11/13/17. RUY=749 on 11/13/17. No other associated or worsening factors. Eye check:<12mos ago. Denies polyuria/polyphagia/polydipsia/BLE skin lesions/BLE paresthesia. HTN: doing well. Takes medication w/o side effects. No other associated factors. Denies cp/sob/pnd/ankle edema/dizziness    Anxiety/insomnia:doing well. Sleeps well per baseline. Med helps:xanax. Pt' reveals no aberrant drug use behavior. OARRS reports are consistent. Denies SI/HI/hallucinations/illicit drugs/etoh abuse/cold intolerance. Back Pain f/u:  Chronic back pain doing well:controlled well with tylenol 3 prn. Pt' reveals no aberrant drug use behavior. No other associated or worsening factors. Denies BLE bombwmrk-pylqyuyesya-ndqqbgpgk/urinary or bowel control loss or change/saddle anesthesia/gait abnormality. INR management:doing well. Takes coumadin 4mg w/o side effects. Home monitoring system:yes. Lymphoma/anemia:doing well:per specialist:Dr. Brijesh Beltrán. No other new associated concerns. COPD:breathing well. .  Albuterol inhaler use:last used approx 10mos ago. Denies chills/sob/cp/weakness/n-v/abdo pain/HA/dizziness/rash/neck pain-stiffness/photophobia. GERD: doing well. No other new associated/worsening or other improving factors. Denies abdo pain/n-v/diarrhea/melena-blood in stool.     Allergies   Allergen Reactions    Diclofenac Other (See Comments)     Bleed/colitis       Current Outpatient <100). Counseled at this visit on the following: diabetes complication prevention, foot care. Hemoglobin A1C    Microalbumin / Creatinine Urine Ratio    Comprehensive Metabolic Panel   3. Hyperlipidemia with target LDL less than 100  LDL not at goal.  Failed diet management. Start statin. Possible med side effects reviewed. Pt' wishes to proceed w/med. Labs in 2-3mos. Comprehensive Metabolic Panel    Lipid Panel    atorvastatin (LIPITOR) 10 MG tablet   4. Chronic back pain  Stable. DJD related pain. Pt' reveals no aberrant drug use behavior. Continue pain med prn.     5. Primary insomnia  Stable. 6. Anxiety  Stable. Xanax prn. Pt' reveals no aberrant drug use behavior. Controlled substance contract/aggreement completed(see scanned document). 7. Gastroesophageal reflux disease without esophagitis  Stable. 8. Chronic pain syndrome  Stable. 9. CKD (chronic kidney disease) stage 3, GFR 30-59 ml/min  Stable. Comprehensive Metabolic Panel   10. Lymphoma  Stable. Per specialist.     11. Long-term (current) use of anticoagulants, INR goal 2.5-3.5  Stable     12. Anemia:multifactorial  Stable  Per specialist.     13. RLS (restless legs syndrome)  Stable. 14. COPD:stable. Plan:          Obtain labs/diagnostic tests as discussed today & call back for results within 2-7days. Pt' left office in good condition. Advised to go to local ER or call 911 for any worrisome signs/sx.

## 2017-11-29 ENCOUNTER — ANTI-COAG VISIT (OUTPATIENT)
Dept: FAMILY MEDICINE CLINIC | Age: 72
End: 2017-11-29

## 2017-11-29 DIAGNOSIS — Z95.2 AORTIC VALVE REPLACED: ICD-10-CM

## 2017-11-29 LAB — INR BLD: 3.8

## 2017-12-01 ENCOUNTER — OFFICE VISIT (OUTPATIENT)
Dept: FAMILY MEDICINE CLINIC | Age: 72
End: 2017-12-01

## 2017-12-01 VITALS
TEMPERATURE: 98.3 F | HEIGHT: 59 IN | OXYGEN SATURATION: 99 % | DIASTOLIC BLOOD PRESSURE: 80 MMHG | BODY MASS INDEX: 25.44 KG/M2 | RESPIRATION RATE: 16 BRPM | HEART RATE: 61 BPM | SYSTOLIC BLOOD PRESSURE: 131 MMHG | WEIGHT: 126.2 LBS

## 2017-12-01 DIAGNOSIS — J01.00 ACUTE NON-RECURRENT MAXILLARY SINUSITIS: Primary | ICD-10-CM

## 2017-12-01 PROCEDURE — 99214 OFFICE O/P EST MOD 30 MIN: CPT | Performed by: FAMILY MEDICINE

## 2017-12-01 PROCEDURE — 4040F PNEUMOC VAC/ADMIN/RCVD: CPT | Performed by: FAMILY MEDICINE

## 2017-12-01 PROCEDURE — G8400 PT W/DXA NO RESULTS DOC: HCPCS | Performed by: FAMILY MEDICINE

## 2017-12-01 PROCEDURE — 3014F SCREEN MAMMO DOC REV: CPT | Performed by: FAMILY MEDICINE

## 2017-12-01 PROCEDURE — G8427 DOCREV CUR MEDS BY ELIG CLIN: HCPCS | Performed by: FAMILY MEDICINE

## 2017-12-01 PROCEDURE — G8482 FLU IMMUNIZE ORDER/ADMIN: HCPCS | Performed by: FAMILY MEDICINE

## 2017-12-01 PROCEDURE — 1090F PRES/ABSN URINE INCON ASSESS: CPT | Performed by: FAMILY MEDICINE

## 2017-12-01 PROCEDURE — G8419 CALC BMI OUT NRM PARAM NOF/U: HCPCS | Performed by: FAMILY MEDICINE

## 2017-12-01 PROCEDURE — 1123F ACP DISCUSS/DSCN MKR DOCD: CPT | Performed by: FAMILY MEDICINE

## 2017-12-01 PROCEDURE — 3017F COLORECTAL CA SCREEN DOC REV: CPT | Performed by: FAMILY MEDICINE

## 2017-12-01 PROCEDURE — 1036F TOBACCO NON-USER: CPT | Performed by: FAMILY MEDICINE

## 2017-12-01 RX ORDER — AMOXICILLIN AND CLAVULANATE POTASSIUM 875; 125 MG/1; MG/1
1 TABLET, FILM COATED ORAL 2 TIMES DAILY
Qty: 20 TABLET | Refills: 0 | Status: SHIPPED | OUTPATIENT
Start: 2017-12-01 | End: 2017-12-11

## 2017-12-01 ASSESSMENT — ENCOUNTER SYMPTOMS
EYE ITCHING: 0
BLOOD IN STOOL: 0
DIARRHEA: 0
SORE THROAT: 0
WHEEZING: 0
EYE PAIN: 0
RHINORRHEA: 1
COUGH: 1
ABDOMINAL DISTENTION: 0
CONSTIPATION: 0
TROUBLE SWALLOWING: 0
VOICE CHANGE: 0
FACIAL SWELLING: 0
PHOTOPHOBIA: 0
EYE REDNESS: 0
SHORTNESS OF BREATH: 0
ANAL BLEEDING: 0
RECTAL PAIN: 0
VOMITING: 0
APNEA: 0
CHOKING: 0
SINUS PRESSURE: 1
EYE DISCHARGE: 0
SINUS PAIN: 1
ABDOMINAL PAIN: 0
STRIDOR: 0
NAUSEA: 0
CHEST TIGHTNESS: 0

## 2017-12-01 NOTE — PATIENT INSTRUCTIONS
Patient Education        Sinusitis: Care Instructions  Your Care Instructions    Sinusitis is an infection of the lining of the sinus cavities in your head. Sinusitis often follows a cold. It causes pain and pressure in your head and face. In most cases, sinusitis gets better on its own in 1 to 2 weeks. But some mild symptoms may last for several weeks. Sometimes antibiotics are needed. Follow-up care is a key part of your treatment and safety. Be sure to make and go to all appointments, and call your doctor if you are having problems. It's also a good idea to know your test results and keep a list of the medicines you take. How can you care for yourself at home? · Take an over-the-counter pain medicine, such as acetaminophen (Tylenol), ibuprofen (Advil, Motrin), or naproxen (Aleve). Read and follow all instructions on the label. · If the doctor prescribed antibiotics, take them as directed. Do not stop taking them just because you feel better. You need to take the full course of antibiotics. · Be careful when taking over-the-counter cold or flu medicines and Tylenol at the same time. Many of these medicines have acetaminophen, which is Tylenol. Read the labels to make sure that you are not taking more than the recommended dose. Too much acetaminophen (Tylenol) can be harmful. · Breathe warm, moist air from a steamy shower, a hot bath, or a sink filled with hot water. Avoid cold, dry air. Using a humidifier in your home may help. Follow the directions for cleaning the machine. · Use saline (saltwater) nasal washes to help keep your nasal passages open and wash out mucus and bacteria. You can buy saline nose drops at a grocery store or drugstore. Or you can make your own at home by adding 1 teaspoon of salt and 1 teaspoon of baking soda to 2 cups of distilled water. If you make your own, fill a bulb syringe with the solution, insert the tip into your nostril, and squeeze gently. Isadora Wily your nose.   · Put a hot, wet towel or a warm gel pack on your face 3 or 4 times a day for 5 to 10 minutes each time. · Try a decongestant nasal spray like oxymetazoline (Afrin). Do not use it for more than 3 days in a row. Using it for more than 3 days can make your congestion worse. When should you call for help? Call your doctor now or seek immediate medical care if:  · You have new or worse swelling or redness in your face or around your eyes. · You have a new or higher fever. Watch closely for changes in your health, and be sure to contact your doctor if:  · You have new or worse facial pain. · The mucus from your nose becomes thicker (like pus) or has new blood in it. · You are not getting better as expected. Where can you learn more? Go to [email or web address] org and sign in to your [employer] account. Enter B861 in the [employer] box to learn more about \"Sinusitis: Care Instructions. \"     If you do not have an account, please click on the \"Sign Up Now\" link. Current as of: July 29, 2016  Content Version: 11.3  © 6814-0074 [email or web address], "[employer]". Care instructions adapted under license by [facility] ([facility]). If you have questions about a medical condition or this instruction, always ask your healthcare professional. [address]  any warranty or liability for your use of this information.

## 2017-12-01 NOTE — PROGRESS NOTES
Subjective:      Patient ID: Pb Cormier is a 71 y.o. female. HPI  New problem:  Presenting w/mild to moderate sinus pressure x 6days. Associated w/yellow nasal drainage/dry cough secondary to postnasal drainage. COPD:no increased use of albuterol inhaler from baseline. Improves w/otc mucinex. Worsens sinus pressure w/leaning forward. Sick contacts:none recalled. No fever reducing meds today. Denies neuro deficits/rash/neck pain-stiffness-swelling/photophobia/myalgias/dizziness. Denies rash/syncope/pre-syncope/HA. Allergies   Allergen Reactions    Diclofenac Other (See Comments)     Bleed/colitis       Current Outpatient Prescriptions on File Prior to Visit   Medication Sig Dispense Refill    atorvastatin (LIPITOR) 10 MG tablet Take 1 tablet by mouth daily For cholesterol:take in the evening.  90 tablet 0    acetaminophen-codeine (TYLENOL #3) 300-30 MG per tablet TAKE 1 TABLET BY MOUTH THREE TIMES DAILY AS NEEDED FOR PAIN 42 tablet 0    ALPRAZolam (XANAX) 0.5 MG tablet TAKE 1 TABLET BY MOUTH THREE TIMES DAILY AS NEEDED 90 tablet 0    metFORMIN (GLUCOPHAGE) 1000 MG tablet TAKE 1 TABLET BY MOUTH TWICE DAILY 180 tablet 0    pantoprazole (PROTONIX) 40 MG tablet TAKE 1 TABLET BY MOUTH EVERY DAY 30 tablet 1    chlorthalidone (HYGROTON) 25 MG tablet TAKE 1 TABLET BY MOUTH EVERY DAY 90 tablet 0    lisinopril (PRINIVIL;ZESTRIL) 40 MG tablet TAKE 1 TABLET BY MOUTH EVERY DAY 90 tablet 0    warfarin (COUMADIN) 3 MG tablet TAKE AS DIRECTED 30 tablet 2    warfarin (COUMADIN) 1 MG tablet TAKE BY MOUTH AS DIRECTED 60 tablet 2    warfarin (COUMADIN) 1 MG tablet TAKE BY MOUTH AS DIRECTED 60 tablet 0    warfarin (COUMADIN) 1 MG tablet TAKE BY MOUTH AS DIRECTED 60 tablet 0    warfarin (COUMADIN) 1 MG tablet TAKE BY MOUTH AS DIRECTED 60 tablet 0    promethazine (PHENERGAN) 12.5 MG tablet TAKE ONE TABLET BY MOUTH EVERY 6 HOURS AS NEEDED 28 tablet 0    albuterol (PROAIR HFA) 108 (90 BASE) MCG/ACT inhaler Inhale 1-2 puffs into the lungs every 6 hours as needed for Wheezing 1 Inhaler 1    warfarin (COUMADIN) 4 MG tablet TAKE 1 TABLET BY MOUTH DAILY 30 tablet 0    PROAIR  (90 BASE) MCG/ACT inhaler INHALE 2 PUFFS BY MOUTH EVERY 4 HOURS AS NEEDED FOR WHEEZING/ SHORTNESS OF BREATH 8.5 g 0    hydroxychloroquine (PLAQUENIL) 200 MG tablet Take  by mouth daily.  ferrous sulfate 325 (65 FE) MG EC tablet Take 325 mg by mouth 2 times daily.  warfarin (COUMADIN) 1 MG tablet TAKE BY MOUTH AS DIRECTED 60 tablet 0     No current facility-administered medications on file prior to visit. Past Medical History:   Diagnosis Date    [facility]-fib ([facility] [address]     Anemia     multifactorial:under care of heme-onc:Dr. Luis Antonio Rudd    Anemia:multifactorial 2011    as per heme-onc    Anxiety     Cataract     bilateral    Chronic back pain     Under care of ortho spine:Dr. Sophy Milner    CKD (chronic kidney disease) stage 3, GFR 30-59 ml/min 1/2012    Colitis, ischemic ([facility] [address] 6/2012    Under care of Dr. Jeff Edge COPD     Diabetes     Diabetic eye exam ([facility] [address] 1/28/15    CEI    GERD (gastroesophageal reflux disease)     Hyperlipidaemia LDL goal <100     Hypertension     Insomnia     Long-term (current) use of anticoagulants, INR goal 2.5-3.5     Lymphoma:monoclonal B cell 1/2012    Under care of Dr. Enedina Solo abnormal 7/30/15    Right breast mass:benign(?lipoma)per US:repeat testing in 6mos=1/30/16    Nonspecific abnormal results of kidney function study 1/25/2012    Osteoarthritis     Renal cysts, right 1/2/2014    RLS (restless legs syndrome) 10/19/11    Sciatica     Screening colonoscopy 2010;6/19/13    Nml. Next 10yrs:6/2023:Dr. Sourav Ivy.  9/22/16(Dr. Waddell):nml EGD & colonoscopy except mild diverticulosis    Therapeutic drug monitoring 04/10/2015    OARRS report consistent on 4/10/15;7/6/15;10/23/15;2/7/16;5/16/16;8/19/16;11/4/16;3/6/17;6/26/17;9/20/17    Valvular heart disease     s/p aortic and mitral valve repair. Under care of cards:Dr. Nilton Dodd.  Visit for screening mammogram 04/10/2017    negative:see scanned report. Social History   Substance Use Topics    Smoking status: Former Smoker     Packs/day: 1.00     Years: 40.00     Types: Cigarettes     Quit date: 8/2/2007    Smokeless tobacco: Never Used    Alcohol use Yes      Comment: occ     History   Drug Use No           Review of Systems   Constitutional: Negative for activity change, appetite change, chills, diaphoresis, fatigue, fever and unexpected weight change. HENT: Positive for congestion, postnasal drip, rhinorrhea, sinus pain, sinus pressure and sneezing. Negative for dental problem, drooling, ear discharge, ear pain, facial swelling, hearing loss, mouth sores, nosebleeds, sore throat, tinnitus, trouble swallowing and voice change. Eyes: Negative for photophobia, pain, discharge, redness, itching and visual disturbance. Respiratory: Positive for cough. Negative for apnea, choking, chest tightness, shortness of breath, wheezing and stridor. Cardiovascular: Negative for chest pain, palpitations and leg swelling. Gastrointestinal: Negative for abdominal distention, abdominal pain, anal bleeding, blood in stool, constipation, diarrhea, nausea, rectal pain and vomiting. Musculoskeletal: Negative for neck stiffness. Skin: Negative for rash and wound. Neurological: Negative for dizziness, weakness, light-headedness and headaches. Hematological: Negative for adenopathy. Objective:   Physical Exam   Constitutional: She is oriented to person, place, and time. Vital signs are normal. She appears well-developed and well-nourished. She is cooperative. She does not have a sickly appearance. No distress.    HENT:   Right Ear: Hearing, tympanic membrane, external ear and ear canal normal.   Left Ear: Hearing, tympanic membrane, external ear and ear canal normal.   Nose: Right sinus cyanosis. No pallor. Good skin turgor. Capillary refill=2-3 secs. Psychiatric: She has a normal mood and affect. Assessment:      1. Acute non-recurrent maxillary sinusitis  VSS/well appearing. Supportive tx & abx:Warm compresses/steam bowl inhalation/anthistamine prn. .  Nml lung exam:otc mucinex prn cough. amoxicillin-clavulanate (AUGMENTIN) 875-125 MG per tablet           Plan:      Call or return to clinic prn if these symptoms worsen or fail to improve as anticipated. Pt' left office in good condition. Advised to go to local ER or call 911 for any worrisome signs/sx including but not limited to worsening of current complaint or development of resp distress/dysphagia/odynophagia/sob/dizziness/appetite loss/high fever/rash.

## 2017-12-06 ENCOUNTER — TELEPHONE (OUTPATIENT)
Dept: CARDIOLOGY CLINIC | Age: 72
End: 2017-12-06

## 2017-12-07 NOTE — TELEPHONE ENCOUNTER
Dr. Connie Jewell, would you like Demarco Sanches to have an echocardiogram?  It has been some time since she has had an echo.   Thanks

## 2017-12-08 LAB — INR BLD: 2.5

## 2017-12-10 RX ORDER — LISINOPRIL 40 MG/1
TABLET ORAL
Qty: 90 TABLET | Refills: 0 | Status: SHIPPED | OUTPATIENT
Start: 2017-12-10 | End: 2018-03-10 | Stop reason: SDUPTHER

## 2017-12-11 ENCOUNTER — ANTI-COAG VISIT (OUTPATIENT)
Dept: FAMILY MEDICINE CLINIC | Age: 72
End: 2017-12-11

## 2017-12-11 DIAGNOSIS — Z95.2 AORTIC VALVE REPLACED: ICD-10-CM

## 2017-12-13 RX ORDER — PANTOPRAZOLE SODIUM 40 MG/1
TABLET, DELAYED RELEASE ORAL
Qty: 30 TABLET | Refills: 2 | Status: SHIPPED | OUTPATIENT
Start: 2017-12-13 | End: 2018-03-10 | Stop reason: SDUPTHER

## 2017-12-18 DIAGNOSIS — M54.9 CHRONIC BACK PAIN, UNSPECIFIED BACK LOCATION, UNSPECIFIED BACK PAIN LATERALITY: ICD-10-CM

## 2017-12-18 DIAGNOSIS — G89.29 CHRONIC PAIN OF BOTH LOWER EXTREMITIES: ICD-10-CM

## 2017-12-18 DIAGNOSIS — F41.9 ANXIETY: ICD-10-CM

## 2017-12-18 DIAGNOSIS — M79.605 CHRONIC PAIN OF BOTH LOWER EXTREMITIES: ICD-10-CM

## 2017-12-18 DIAGNOSIS — G89.29 CHRONIC BACK PAIN, UNSPECIFIED BACK LOCATION, UNSPECIFIED BACK PAIN LATERALITY: ICD-10-CM

## 2017-12-18 DIAGNOSIS — M79.604 CHRONIC PAIN OF BOTH LOWER EXTREMITIES: ICD-10-CM

## 2017-12-18 RX ORDER — ALPRAZOLAM 0.5 MG/1
TABLET ORAL
Qty: 90 TABLET | Refills: 0 | Status: SHIPPED | OUTPATIENT
Start: 2017-12-18 | End: 2018-01-26 | Stop reason: SDUPTHER

## 2017-12-18 RX ORDER — ACETAMINOPHEN AND CODEINE PHOSPHATE 300; 30 MG/1; MG/1
TABLET ORAL
Qty: 42 TABLET | Refills: 0 | Status: SHIPPED | OUTPATIENT
Start: 2017-12-18 | End: 2018-01-26 | Stop reason: SDUPTHER

## 2017-12-18 NOTE — TELEPHONE ENCOUNTER
Ok to provide scripts. Clinical note:  OARRS report completed today(see scanned report). No inconsistencies noted on report.

## 2017-12-28 LAB — INR BLD: 2.3

## 2017-12-29 ENCOUNTER — ANTI-COAG VISIT (OUTPATIENT)
Dept: FAMILY MEDICINE CLINIC | Age: 72
End: 2017-12-29

## 2017-12-29 DIAGNOSIS — Z95.2 AORTIC VALVE REPLACED: ICD-10-CM

## 2017-12-30 DIAGNOSIS — I10 ESSENTIAL HYPERTENSION, BENIGN: ICD-10-CM

## 2017-12-31 RX ORDER — CHLORTHALIDONE 25 MG/1
TABLET ORAL
Qty: 90 TABLET | Refills: 0 | Status: SHIPPED | OUTPATIENT
Start: 2017-12-31 | End: 2018-04-03 | Stop reason: SDUPTHER

## 2018-01-04 ENCOUNTER — OFFICE VISIT (OUTPATIENT)
Dept: CARDIOLOGY CLINIC | Age: 73
End: 2018-01-04

## 2018-01-04 ENCOUNTER — PROCEDURE VISIT (OUTPATIENT)
Dept: CARDIOLOGY CLINIC | Age: 73
End: 2018-01-04

## 2018-01-04 VITALS
SYSTOLIC BLOOD PRESSURE: 146 MMHG | BODY MASS INDEX: 25.85 KG/M2 | HEART RATE: 64 BPM | WEIGHT: 128 LBS | DIASTOLIC BLOOD PRESSURE: 70 MMHG

## 2018-01-04 DIAGNOSIS — Z95.0 CARDIAC PACEMAKER IN SITU: ICD-10-CM

## 2018-01-04 DIAGNOSIS — I10 ESSENTIAL HYPERTENSION, BENIGN: ICD-10-CM

## 2018-01-04 DIAGNOSIS — R00.1 BRADYCARDIA: ICD-10-CM

## 2018-01-04 DIAGNOSIS — Z95.2 AORTIC VALVE REPLACED: Primary | ICD-10-CM

## 2018-01-04 PROCEDURE — 93280 PM DEVICE PROGR EVAL DUAL: CPT | Performed by: INTERNAL MEDICINE

## 2018-01-04 PROCEDURE — 3014F SCREEN MAMMO DOC REV: CPT | Performed by: INTERNAL MEDICINE

## 2018-01-04 PROCEDURE — G8400 PT W/DXA NO RESULTS DOC: HCPCS | Performed by: INTERNAL MEDICINE

## 2018-01-04 PROCEDURE — G8419 CALC BMI OUT NRM PARAM NOF/U: HCPCS | Performed by: INTERNAL MEDICINE

## 2018-01-04 PROCEDURE — 3017F COLORECTAL CA SCREEN DOC REV: CPT | Performed by: INTERNAL MEDICINE

## 2018-01-04 PROCEDURE — 4040F PNEUMOC VAC/ADMIN/RCVD: CPT | Performed by: INTERNAL MEDICINE

## 2018-01-04 PROCEDURE — G8482 FLU IMMUNIZE ORDER/ADMIN: HCPCS | Performed by: INTERNAL MEDICINE

## 2018-01-04 PROCEDURE — 99213 OFFICE O/P EST LOW 20 MIN: CPT | Performed by: INTERNAL MEDICINE

## 2018-01-04 PROCEDURE — 1123F ACP DISCUSS/DSCN MKR DOCD: CPT | Performed by: INTERNAL MEDICINE

## 2018-01-04 PROCEDURE — 1036F TOBACCO NON-USER: CPT | Performed by: INTERNAL MEDICINE

## 2018-01-04 PROCEDURE — G8427 DOCREV CUR MEDS BY ELIG CLIN: HCPCS | Performed by: INTERNAL MEDICINE

## 2018-01-04 PROCEDURE — 1090F PRES/ABSN URINE INCON ASSESS: CPT | Performed by: INTERNAL MEDICINE

## 2018-01-04 NOTE — PROGRESS NOTES
Interrogation and programming evaluation of the device shows normal function, with stable sensing and pacing thresholds. Battery stable with 2 years remaining. See interrogation for details. Recheck 6 mos at pt request. Told her we would switch to 3 mos between next checks.

## 2018-01-04 NOTE — PROGRESS NOTES
 Former Smoker    Packs/day: 1.00    Years: 40.00    Types: Cigarettes    Quit date: 8/2/2007   Smokeless Tobacco    Never Used     Current Medications:  Current Outpatient Prescriptions on File Prior to Visit   Medication Sig Dispense Refill    chlorthalidone (HYGROTON) 25 MG tablet TAKE 1 TABLET BY MOUTH EVERY DAY 90 tablet 0    ALPRAZolam (XANAX) 0.5 MG tablet TAKE 1 TABLET BY MOUTH THREE TIMES DAILY AS NEEDED 90 tablet 0    acetaminophen-codeine (TYLENOL #3) 300-30 MG per tablet TAKE 1 TABLET BY MOUTH THREE TIMES DAILY AS NEEDED FOR PAIN 42 tablet 0    pantoprazole (PROTONIX) 40 MG tablet TAKE 1 TABLET BY MOUTH EVERY DAY 30 tablet 2    lisinopril (PRINIVIL;ZESTRIL) 40 MG tablet TAKE 1 TABLET BY MOUTH EVERY DAY 90 tablet 0    atorvastatin (LIPITOR) 10 MG tablet Take 1 tablet by mouth daily For cholesterol:take in the evening. 90 tablet 0    metFORMIN (GLUCOPHAGE) 1000 MG tablet TAKE 1 TABLET BY MOUTH TWICE DAILY 180 tablet 0    warfarin (COUMADIN) 3 MG tablet TAKE AS DIRECTED 30 tablet 2    warfarin (COUMADIN) 1 MG tablet TAKE BY MOUTH AS DIRECTED 60 tablet 2    promethazine (PHENERGAN) 12.5 MG tablet TAKE ONE TABLET BY MOUTH EVERY 6 HOURS AS NEEDED 28 tablet 0    albuterol (PROAIR HFA) 108 (90 BASE) MCG/ACT inhaler Inhale 1-2 puffs into the lungs every 6 hours as needed for Wheezing 1 Inhaler 1    warfarin (COUMADIN) 4 MG tablet TAKE 1 TABLET BY MOUTH DAILY 30 tablet 0    PROAIR  (90 BASE) MCG/ACT inhaler INHALE 2 PUFFS BY MOUTH EVERY 4 HOURS AS NEEDED FOR WHEEZING/ SHORTNESS OF BREATH 8.5 g 0    hydroxychloroquine (PLAQUENIL) 200 MG tablet Take  by mouth daily.  ferrous sulfate 325 (65 FE) MG EC tablet Take 325 mg by mouth 2 times daily. No current facility-administered medications on file prior to visit. REVIEW OF SYSTEMS:    CONSTITUTIONAL: No major weight gain or loss, fatigue, weakness, night sweats or fever.   HEENT: No new vision difficulties or ringing in the ears.  RESPIRATORY: No new SOB, PND, orthopnea or cough. CARDIOVASCULAR: See HPI  GI: No nausea, vomiting, diarrhea, constipation, abdominal pain or changes in bowel habits. : No urinary frequency, urgency, incontinence hematuria or dysuria. SKIN: No cyanosis or skin lesions. MUSCULOSKELETAL: No new muscle or joint pain. NEUROLOGICAL: No syncope or TIA-like symptoms. PSYCHIATRIC: No anxiety, pain, insomnia or depression    Objective:   PHYSICAL EXAM:        VITALS:    BP (!) 146/70   Pulse 64   Wt 128 lb (58.1 kg)   BMI 25.85 kg/m²   CONSTITUTIONAL: Cooperative, no apparent distress, and appears well nourished / developed  NEUROLOGIC:  Awake and orientated to person, place and time. PSYCH: Calm affect. SKIN: Warm and dry. HEENT: Sclera non-icteric, normocephalic, neck supple, no elevation of JVP, normal carotid pulses with no bruits and thyroid normal size. LUNGS:  No increased work of breathing and clear to auscultation, no crackles or wheezing  CARDIOVASCULAR:  Regular rate and rhythm with no murmurs, gallops, rubs, or abnormal heart sounds Mechanical sounds are crisp  The carotid upstroke is normal in amplitude and contour without delay or bruit  JVP is not elevated  ABDOMEN:  Normal bowel sounds, non-distended and non-tender to palpation  EXT: No edema, no calf tenderness. Pulses are present bilaterally.     DATA:    Lab Results   Component Value Date    ALT 12 11/13/2017    AST 30 11/13/2017    ALKPHOS 78 11/13/2017    BILITOT 0.8 11/13/2017     Lab Results   Component Value Date    CREATININE 1.23 (H) 11/13/2017    BUN 18 11/13/2017     11/13/2017    K 4.9 11/13/2017     11/13/2017    CO2 26 11/13/2017     No results found for: TSH  Lab Results   Component Value Date    WBC 8.4 10/03/2014    HGB 10.3 (L) 10/03/2014    HCT 29.3 (L) 10/03/2014    MCV 89.9 10/03/2014     10/03/2014     No components found for: CHLPL  Lab Results   Component Value Date    TRIG 157 (H) 11/13/2017

## 2018-01-05 DIAGNOSIS — Z95.2 AORTIC VALVE REPLACED: ICD-10-CM

## 2018-01-05 RX ORDER — AMLODIPINE BESYLATE 5 MG/1
5 TABLET ORAL DAILY
Qty: 30 TABLET | Refills: 5 | Status: SHIPPED | OUTPATIENT
Start: 2018-01-05 | End: 2018-01-05 | Stop reason: SDUPTHER

## 2018-01-05 RX ORDER — AMLODIPINE BESYLATE 5 MG/1
5 TABLET ORAL DAILY
Qty: 90 TABLET | Refills: 3 | Status: SHIPPED | OUTPATIENT
Start: 2018-01-05 | End: 2019-05-01 | Stop reason: SDUPTHER

## 2018-01-08 LAB — INR BLD: 2.8

## 2018-01-09 ENCOUNTER — ANTI-COAG VISIT (OUTPATIENT)
Dept: FAMILY MEDICINE CLINIC | Age: 73
End: 2018-01-09

## 2018-01-09 DIAGNOSIS — Z95.2 AORTIC VALVE REPLACED: ICD-10-CM

## 2018-01-10 ENCOUNTER — HOSPITAL ENCOUNTER (OUTPATIENT)
Dept: NON INVASIVE DIAGNOSTICS | Age: 73
Discharge: HOME | End: 2018-01-10
Attending: INTERNAL MEDICINE | Admitting: INTERNAL MEDICINE

## 2018-01-10 DIAGNOSIS — Z95.2 PRESENCE OF PROSTHETIC HEART VALVE: ICD-10-CM

## 2018-01-10 LAB
LV EF: 58 %
LVEF MODALITY: NORMAL

## 2018-01-26 DIAGNOSIS — M79.605 CHRONIC PAIN OF BOTH LOWER EXTREMITIES: ICD-10-CM

## 2018-01-26 DIAGNOSIS — G89.29 CHRONIC PAIN OF BOTH LOWER EXTREMITIES: ICD-10-CM

## 2018-01-26 DIAGNOSIS — M79.604 CHRONIC PAIN OF BOTH LOWER EXTREMITIES: ICD-10-CM

## 2018-01-26 DIAGNOSIS — G89.29 CHRONIC BACK PAIN, UNSPECIFIED BACK LOCATION, UNSPECIFIED BACK PAIN LATERALITY: ICD-10-CM

## 2018-01-26 DIAGNOSIS — M54.9 CHRONIC BACK PAIN, UNSPECIFIED BACK LOCATION, UNSPECIFIED BACK PAIN LATERALITY: ICD-10-CM

## 2018-01-26 DIAGNOSIS — F41.9 ANXIETY: ICD-10-CM

## 2018-01-26 RX ORDER — ACETAMINOPHEN AND CODEINE PHOSPHATE 300; 30 MG/1; MG/1
TABLET ORAL
Qty: 42 TABLET | Refills: 0 | Status: SHIPPED | OUTPATIENT
Start: 2018-01-26 | End: 2018-03-12 | Stop reason: SDUPTHER

## 2018-01-26 RX ORDER — ALPRAZOLAM 0.5 MG/1
TABLET ORAL
Qty: 90 TABLET | Refills: 0 | Status: SHIPPED | OUTPATIENT
Start: 2018-01-26 | End: 2018-03-12 | Stop reason: SDUPTHER

## 2018-02-24 DIAGNOSIS — Z79.01 LONG-TERM (CURRENT) USE OF ANTICOAGULANTS, INR GOAL 2.5-3.5: ICD-10-CM

## 2018-02-25 RX ORDER — WARFARIN SODIUM 3 MG/1
TABLET ORAL
Qty: 30 TABLET | Refills: 0 | Status: SHIPPED | OUTPATIENT
Start: 2018-02-25 | End: 2018-04-03 | Stop reason: SDUPTHER

## 2018-02-26 ENCOUNTER — ANTI-COAG VISIT (OUTPATIENT)
Dept: FAMILY MEDICINE CLINIC | Age: 73
End: 2018-02-26

## 2018-02-26 DIAGNOSIS — Z95.2 AORTIC VALVE REPLACED: ICD-10-CM

## 2018-02-27 ENCOUNTER — TELEPHONE (OUTPATIENT)
Dept: FAMILY MEDICINE CLINIC | Age: 73
End: 2018-02-27

## 2018-02-27 NOTE — TELEPHONE ENCOUNTER
Patient is requesting another call from [place] only.  Will not go into specific or general idea on reason for call  Tad Hou [phone]

## 2018-03-05 ENCOUNTER — TELEPHONE (OUTPATIENT)
Dept: FAMILY MEDICINE CLINIC | Age: 73
End: 2018-03-05

## 2018-03-07 ENCOUNTER — TELEPHONE (OUTPATIENT)
Dept: FAMILY MEDICINE CLINIC | Age: 73
End: 2018-03-07

## 2018-03-08 ENCOUNTER — TELEPHONE (OUTPATIENT)
Dept: CARDIOLOGY CLINIC | Age: 73
End: 2018-03-08

## 2018-03-08 NOTE — TELEPHONE ENCOUNTER
Patient completed and returned YORDY form. Records will be transferred to [address] [place] [address] in accordance with [facility].

## 2018-03-11 RX ORDER — LISINOPRIL 40 MG/1
TABLET ORAL
Qty: 90 TABLET | Refills: 0 | Status: SHIPPED | OUTPATIENT
Start: 2018-03-11 | End: 2018-06-07 | Stop reason: SDUPTHER

## 2018-03-11 RX ORDER — PANTOPRAZOLE SODIUM 40 MG/1
TABLET, DELAYED RELEASE ORAL
Qty: 30 TABLET | Refills: 0 | Status: SHIPPED | OUTPATIENT
Start: 2018-03-11 | End: 2018-04-08 | Stop reason: SDUPTHER

## 2018-03-12 ENCOUNTER — OFFICE VISIT (OUTPATIENT)
Dept: FAMILY MEDICINE CLINIC | Age: 73
End: 2018-03-12

## 2018-03-12 VITALS
HEIGHT: 59 IN | OXYGEN SATURATION: 96 % | WEIGHT: 124.4 LBS | TEMPERATURE: 97.4 F | BODY MASS INDEX: 25.08 KG/M2 | DIASTOLIC BLOOD PRESSURE: 69 MMHG | HEART RATE: 65 BPM | RESPIRATION RATE: 16 BRPM | SYSTOLIC BLOOD PRESSURE: 131 MMHG

## 2018-03-12 DIAGNOSIS — D64.9 LOW HEMOGLOBIN: ICD-10-CM

## 2018-03-12 DIAGNOSIS — Z79.01 LONG-TERM (CURRENT) USE OF ANTICOAGULANTS, INR GOAL 2.5-3.5: ICD-10-CM

## 2018-03-12 DIAGNOSIS — M79.604 CHRONIC PAIN OF BOTH LOWER EXTREMITIES: ICD-10-CM

## 2018-03-12 DIAGNOSIS — J43.8 OTHER EMPHYSEMA (HCC): ICD-10-CM

## 2018-03-12 DIAGNOSIS — G89.29 CHRONIC PAIN OF BOTH LOWER EXTREMITIES: ICD-10-CM

## 2018-03-12 DIAGNOSIS — I10 ESSENTIAL HYPERTENSION: ICD-10-CM

## 2018-03-12 DIAGNOSIS — M79.605 CHRONIC PAIN OF BOTH LOWER EXTREMITIES: ICD-10-CM

## 2018-03-12 DIAGNOSIS — G89.29 CHRONIC BACK PAIN, UNSPECIFIED BACK LOCATION, UNSPECIFIED BACK PAIN LATERALITY: ICD-10-CM

## 2018-03-12 DIAGNOSIS — E78.5 HYPERLIPIDEMIA WITH TARGET LDL LESS THAN 100: ICD-10-CM

## 2018-03-12 DIAGNOSIS — F41.9 ANXIETY: ICD-10-CM

## 2018-03-12 DIAGNOSIS — R73.9 HYPERGLYCEMIA: ICD-10-CM

## 2018-03-12 DIAGNOSIS — M54.9 CHRONIC BACK PAIN, UNSPECIFIED BACK LOCATION, UNSPECIFIED BACK PAIN LATERALITY: ICD-10-CM

## 2018-03-12 DIAGNOSIS — I10 ESSENTIAL HYPERTENSION, BENIGN: ICD-10-CM

## 2018-03-12 PROCEDURE — 1036F TOBACCO NON-USER: CPT | Performed by: FAMILY MEDICINE

## 2018-03-12 PROCEDURE — G8926 SPIRO NO PERF OR DOC: HCPCS | Performed by: FAMILY MEDICINE

## 2018-03-12 PROCEDURE — 3017F COLORECTAL CA SCREEN DOC REV: CPT | Performed by: FAMILY MEDICINE

## 2018-03-12 PROCEDURE — 99214 OFFICE O/P EST MOD 30 MIN: CPT | Performed by: FAMILY MEDICINE

## 2018-03-12 PROCEDURE — 3014F SCREEN MAMMO DOC REV: CPT | Performed by: FAMILY MEDICINE

## 2018-03-12 PROCEDURE — G8400 PT W/DXA NO RESULTS DOC: HCPCS | Performed by: FAMILY MEDICINE

## 2018-03-12 PROCEDURE — 3023F SPIROM DOC REV: CPT | Performed by: FAMILY MEDICINE

## 2018-03-12 PROCEDURE — G8482 FLU IMMUNIZE ORDER/ADMIN: HCPCS | Performed by: FAMILY MEDICINE

## 2018-03-12 PROCEDURE — G8428 CUR MEDS NOT DOCUMENT: HCPCS | Performed by: FAMILY MEDICINE

## 2018-03-12 PROCEDURE — 3046F HEMOGLOBIN A1C LEVEL >9.0%: CPT | Performed by: FAMILY MEDICINE

## 2018-03-12 PROCEDURE — G8419 CALC BMI OUT NRM PARAM NOF/U: HCPCS | Performed by: FAMILY MEDICINE

## 2018-03-12 PROCEDURE — 4040F PNEUMOC VAC/ADMIN/RCVD: CPT | Performed by: FAMILY MEDICINE

## 2018-03-12 PROCEDURE — 1090F PRES/ABSN URINE INCON ASSESS: CPT | Performed by: FAMILY MEDICINE

## 2018-03-12 PROCEDURE — 1123F ACP DISCUSS/DSCN MKR DOCD: CPT | Performed by: FAMILY MEDICINE

## 2018-03-12 RX ORDER — ALPRAZOLAM 0.5 MG/1
TABLET ORAL
Qty: 90 TABLET | Refills: 0 | Status: SHIPPED | OUTPATIENT
Start: 2018-03-12 | End: 2018-05-28 | Stop reason: SDUPTHER

## 2018-03-12 RX ORDER — GLIPIZIDE 5 MG/1
TABLET ORAL
Qty: 30 TABLET | Refills: 0 | Status: SHIPPED | OUTPATIENT
Start: 2018-03-12 | End: 2018-05-08 | Stop reason: SDUPTHER

## 2018-03-12 RX ORDER — GLIPIZIDE 5 MG/1
5 TABLET ORAL DAILY
Qty: 60 TABLET | Refills: 0 | Status: SHIPPED | OUTPATIENT
Start: 2018-03-12 | End: 2018-03-12 | Stop reason: SDUPTHER

## 2018-03-12 RX ORDER — ACETAMINOPHEN AND CODEINE PHOSPHATE 300; 30 MG/1; MG/1
TABLET ORAL
Qty: 42 TABLET | Refills: 0 | Status: SHIPPED | OUTPATIENT
Start: 2018-03-12 | End: 2018-04-12

## 2018-03-12 ASSESSMENT — ENCOUNTER SYMPTOMS
COUGH: 0
CHOKING: 0
DIARRHEA: 0
ABDOMINAL DISTENTION: 0
RECTAL PAIN: 0
STRIDOR: 0
BLOOD IN STOOL: 0
WHEEZING: 0
CHEST TIGHTNESS: 0
CONSTIPATION: 0
BACK PAIN: 1
VOMITING: 0
ANAL BLEEDING: 0
SHORTNESS OF BREATH: 0
APNEA: 0
NAUSEA: 0
COLOR CHANGE: 0
ABDOMINAL PAIN: 0

## 2018-03-12 ASSESSMENT — COPD QUESTIONNAIRES: COPD: 1

## 2018-03-12 NOTE — PATIENT INSTRUCTIONS
Version: 11.5  © 2961-6760 [employer], [employer]. Care instructions adapted under license by [facility] [place] [place] If you have questions about a medical condition or this instruction, always ask your healthcare professional. [address] any warranty or liability for your use of this information. Patient Education        Learning About Diabetes Food Guidelines  Your Care Instructions    Meal planning is important to manage diabetes. It helps keep your blood sugar at a target level (which you set with your doctor). You don't have to eat special foods. You can eat what your family eats, including sweets once in a while. But you do have to pay attention to how often you eat and how much you eat of certain foods. You may want to work with a dietitian or a certified diabetes educator (CDE) to help you plan meals and snacks. A dietitian or CDE can also help you lose weight if that is one of your goals. What should you know about eating carbs? Managing the amount of carbohydrate (carbs) you eat is an important part of healthy meals when you have diabetes. Carbohydrate is found in many foods. · Learn which foods have carbs. And learn the amounts of carbs in different foods. ¨ Bread, cereal, pasta, and rice have about 15 grams of carbs in a serving. A serving is 1 slice of bread (1 ounce), ½ cup of cooked cereal, or 1/3 cup of cooked pasta or rice. ¨ Fruits have 15 grams of carbs in a serving. A serving is 1 small fresh fruit, such as an apple or orange; ½ of a banana; ½ cup of cooked or canned fruit; ½ cup of fruit juice; 1 cup of melon or raspberries; or 2 tablespoons of dried fruit. ¨ Milk and no-sugar-added yogurt have 15 grams of carbs in a serving. A serving is 1 cup of milk or 2/3 cup of no-sugar-added yogurt. ¨ Starchy vegetables have 15 grams of carbs in a serving.  A serving is ½ cup of mashed potatoes or sweet potato; 1 cup winter squash; ½ of a small baked potato; ½ cup of cooked

## 2018-03-12 NOTE — PROGRESS NOTES
Subjective:      Patient ID: Stas Frost is a 68 y.o. . Hypertension   Pertinent negatives include no chest pain, palpitations or shortness of breath. Diabetes   Hypoglycemia symptoms include nervousness/anxiousness. Pertinent negatives for hypoglycemia include no confusion, dizziness or pallor. Pertinent negatives for diabetes include no chest pain, no fatigue, no polydipsia, no polyphagia, no polyuria and no weakness. COPD   There is no cough, shortness of breath or wheezing. Pertinent negatives include no appetite change, chest pain or fever. Results for Caitlin Jackson (MRN [id number]) as of 3/12/2018 11:40   Ref.  Range 2/26/2018 08:54 3/2/2018 12:34 3/6/2018 17:32 3/6/2018 18:23 3/6/2018 18:24 3/6/2018 18:25 3/6/2018 18:25   Sodium Latest Ref Range: 135 - 146 mmol/L 132 (L)         Potassium Latest Ref Range: 3.5 - 5.3 mmol/L 4.4         Chloride Latest Ref Range: 98 - 110 mmol/L 92 (L)         CO2 Latest Ref Range: 21 - 32 mmol/L 28     26    BUN Latest Ref Range: 7 - 25 mg/dL 22         Creatinine Latest Ref Range: 0.60 - 0.93 mg/dL 1.37 (H)         BUN/Creatinine Ratio Latest Ref Range: 6 - 22 (calc) 16         Calcium, Ion Latest Ref Range: 1.12 - 1.32 mmol/L      1.11 (L)    Est, Glom Filt Rate Latest Ref Range: > OR = 60 mL/min/1.73m2 38 (L)         GFR Non- Latest Ref Range: >60       54 (A)    GFR  Unknown 45 (L)     >60    Lactate, ser/plas Latest Ref Range: 0.40 - 2.00 mmol/L       1.80   Glucose Latest Ref Range: 65 - 99 mg/dL 515 (H)         POC Glucose Latest Ref Range: 70 - 99 mg/dl   393 (H)   349 (H)    Calcium Latest Ref Range: 8.6 - 10.4 mg/dL 9.5         Total Protein Latest Ref Range: 6.1 - 8.1 g/dL 6.7         POC Sodium Latest Ref Range: 136 - 145 mmol/L      129 (L)    POC Potassium Latest Ref Range: 3.5 - 5.1 mmol/L      4.3    POC Anion Gap Latest Ref Range: 10 - 20       12    POC BUN Latest Ref Range: 7 - 18 mg/dL      21 (H)    POC Chloride Latest Ref Range: 99 - 110 mmol/L      91 (L)    POC Creatinine Latest Ref Range: 0.6 - 1.2 mg/dL      1.0    Chol/HDL Ratio Latest Ref Range: <5.0 (calc) 4.6         Cholesterol Latest Ref Range: <130 mg/dL (calc) 133 (H)         Cholesterol, Total Latest Ref Range: <200 mg/dL 170         HDL Cholesterol Latest Ref Range: >50 mg/dL 37 (L)         LDL Calculated Latest Units: mg/dL (calc) 89         Triglycerides Latest Ref Range: <150 mg/dL 328 (H)         Albumin Latest Ref Range: 3.6 - 5.1 g/dL 4.9         Globulin Latest Ref Range: 1.9 - 3.7 g/dL (calc) 1.8 (L)         Albumin/Globulin Ratio Latest Ref Range: 1.0 - 2.5 (calc) 2.7 (H)         Alk Phos Latest Ref Range: 33 - 130 U/L 135 (H)         ALT Latest Ref Range: 6 - 29 U/L 19         AST Latest Ref Range: 10 - 35 U/L 31         Bilirubin Latest Ref Range: 0.2 - 1.2 mg/dL 0.9         Hemoglobin A1C Latest Ref Range: <5.7 % of total Hgb  10.3 (H)        WBC Latest Ref Range: 4.0 - 11.0 K/uL    11.5 (H)      RBC Latest Ref Range: 4.00 - 5.20 M/uL    3.17 (L)      Hemoglobin Quant Latest Ref Range: 12.0 - 16.0 g/dL    10.0 (L)      Hematocrit Latest Ref Range: 36.0 - 48.0 %    28.2 (L)      MCV Latest Ref Range: 80.0 - 100.0 fL    89.1      MCH Latest Ref Range: 26.0 - 34.0 pg    31.5      MCHC Latest Ref Range: 31.0 - 36.0 g/dL    35.4      MPV Latest Ref Range: 5.0 - 10.5 fL    9.9      RDW Latest Ref Range: 12.4 - 15.4 %    13.6      Platelet Count Latest Ref Range: 135 - 450 K/uL    156      Neutrophils % Latest Units: %    48.6      Lymphocyte % Latest Units: %    44.3      Monocytes % Latest Units: %    6.2      Eosinophils % Latest Units: %    0.6      Basophils % Latest Units: %    0.3      Neutrophils # Latest Ref Range: 1.7 - 7.7 K/uL    5.6      Lymphocytes # Latest Ref Range: 1.0 - 5.1 K/uL    5.1      Monocytes # Latest Ref Range: 0.0 - 1.3 K/uL    0.7      Eosinophils # Latest Ref Range: 0.0 - 0.6 K/uL    0.1      Basophils # Latest Ref Range:

## 2018-03-19 ENCOUNTER — TELEPHONE (OUTPATIENT)
Dept: FAMILY MEDICINE CLINIC | Age: 73
End: 2018-03-19

## 2018-03-26 ENCOUNTER — OFFICE VISIT (OUTPATIENT)
Dept: FAMILY MEDICINE CLINIC | Age: 73
End: 2018-03-26

## 2018-03-26 VITALS
RESPIRATION RATE: 16 BRPM | BODY MASS INDEX: 25.6 KG/M2 | WEIGHT: 127 LBS | SYSTOLIC BLOOD PRESSURE: 130 MMHG | TEMPERATURE: 98.6 F | HEIGHT: 59 IN | DIASTOLIC BLOOD PRESSURE: 81 MMHG | HEART RATE: 84 BPM | OXYGEN SATURATION: 97 %

## 2018-03-26 DIAGNOSIS — G89.29 CHRONIC PAIN OF BOTH LOWER EXTREMITIES: ICD-10-CM

## 2018-03-26 DIAGNOSIS — F41.9 ANXIETY: ICD-10-CM

## 2018-03-26 DIAGNOSIS — I10 ESSENTIAL HYPERTENSION, BENIGN: ICD-10-CM

## 2018-03-26 DIAGNOSIS — M79.604 CHRONIC PAIN OF BOTH LOWER EXTREMITIES: ICD-10-CM

## 2018-03-26 DIAGNOSIS — I48.91 A-FIB (HCC): ICD-10-CM

## 2018-03-26 DIAGNOSIS — M54.9 CHRONIC BACK PAIN, UNSPECIFIED BACK LOCATION, UNSPECIFIED BACK PAIN LATERALITY: ICD-10-CM

## 2018-03-26 DIAGNOSIS — K21.9 GASTROESOPHAGEAL REFLUX DISEASE WITHOUT ESOPHAGITIS: ICD-10-CM

## 2018-03-26 DIAGNOSIS — J43.8 OTHER EMPHYSEMA (HCC): ICD-10-CM

## 2018-03-26 DIAGNOSIS — M79.605 CHRONIC PAIN OF BOTH LOWER EXTREMITIES: ICD-10-CM

## 2018-03-26 DIAGNOSIS — Z79.01 LONG-TERM (CURRENT) USE OF ANTICOAGULANTS, INR GOAL 2.5-3.5: ICD-10-CM

## 2018-03-26 DIAGNOSIS — Z79.01 LONG-TERM (CURRENT) USE OF ANTICOAGULANTS, INR GOAL 2.5-3.5: Primary | ICD-10-CM

## 2018-03-26 DIAGNOSIS — E78.5 HYPERLIPIDEMIA WITH TARGET LDL LESS THAN 100: ICD-10-CM

## 2018-03-26 DIAGNOSIS — F51.01 PRIMARY INSOMNIA: ICD-10-CM

## 2018-03-26 DIAGNOSIS — G89.29 CHRONIC BACK PAIN, UNSPECIFIED BACK LOCATION, UNSPECIFIED BACK PAIN LATERALITY: ICD-10-CM

## 2018-03-26 LAB
INR BLD: 2.1 (ref 0.85–1.15)
PROTHROMBIN TIME: 23.7 SEC (ref 9.6–13)

## 2018-03-26 PROCEDURE — G8400 PT W/DXA NO RESULTS DOC: HCPCS | Performed by: FAMILY MEDICINE

## 2018-03-26 PROCEDURE — 1036F TOBACCO NON-USER: CPT | Performed by: FAMILY MEDICINE

## 2018-03-26 PROCEDURE — G8482 FLU IMMUNIZE ORDER/ADMIN: HCPCS | Performed by: FAMILY MEDICINE

## 2018-03-26 PROCEDURE — 3023F SPIROM DOC REV: CPT | Performed by: FAMILY MEDICINE

## 2018-03-26 PROCEDURE — G8926 SPIRO NO PERF OR DOC: HCPCS | Performed by: FAMILY MEDICINE

## 2018-03-26 PROCEDURE — 4040F PNEUMOC VAC/ADMIN/RCVD: CPT | Performed by: FAMILY MEDICINE

## 2018-03-26 PROCEDURE — 3046F HEMOGLOBIN A1C LEVEL >9.0%: CPT | Performed by: FAMILY MEDICINE

## 2018-03-26 PROCEDURE — 3017F COLORECTAL CA SCREEN DOC REV: CPT | Performed by: FAMILY MEDICINE

## 2018-03-26 PROCEDURE — 99214 OFFICE O/P EST MOD 30 MIN: CPT | Performed by: FAMILY MEDICINE

## 2018-03-26 PROCEDURE — 3014F SCREEN MAMMO DOC REV: CPT | Performed by: FAMILY MEDICINE

## 2018-03-26 PROCEDURE — G8427 DOCREV CUR MEDS BY ELIG CLIN: HCPCS | Performed by: FAMILY MEDICINE

## 2018-03-26 PROCEDURE — 1123F ACP DISCUSS/DSCN MKR DOCD: CPT | Performed by: FAMILY MEDICINE

## 2018-03-26 PROCEDURE — 1090F PRES/ABSN URINE INCON ASSESS: CPT | Performed by: FAMILY MEDICINE

## 2018-03-26 PROCEDURE — G8419 CALC BMI OUT NRM PARAM NOF/U: HCPCS | Performed by: FAMILY MEDICINE

## 2018-03-26 ASSESSMENT — ENCOUNTER SYMPTOMS
CHEST TIGHTNESS: 0
WHEEZING: 0
VOMITING: 0
COUGH: 0
CHOKING: 0
SHORTNESS OF BREATH: 0
RECTAL PAIN: 0
NAUSEA: 0
APNEA: 0
ABDOMINAL PAIN: 0
CONSTIPATION: 0
DIARRHEA: 0
ABDOMINAL DISTENTION: 0
STRIDOR: 0
ANAL BLEEDING: 0
BACK PAIN: 1
BLOOD IN STOOL: 0

## 2018-03-26 ASSESSMENT — COPD QUESTIONNAIRES: COPD: 1

## 2018-03-26 NOTE — PROGRESS NOTES
Subjective:      Patient ID: Tamiko Galo is a 68 y.o. . Hypertension   Pertinent negatives include no chest pain, palpitations or shortness of breath. Diabetes   Hypoglycemia symptoms include nervousness/anxiousness. Pertinent negatives for hypoglycemia include no confusion, dizziness or pallor. Pertinent negatives for diabetes include no chest pain, no fatigue, no polydipsia, no polyphagia and no polyuria. COPD   There is no cough, shortness of breath or wheezing. Pertinent negatives include no appetite change, chest pain or fever. HTN: is doing well. Taking medication w/o side effects. No other new associated factors. Denies cp/sob/pnd/ankle edema/dizziness      INR:taking coumadin 4mg w/o side effects. Per pt' INR done 3/23/18 at home monitoring was 4.4 &then started taking 2mg per her own decision. Diabetes f/u:  Doing well. Taking metformin bid w/o side effects. Glipizide 5mg at 1/2 tab qd w/o side effects. Preprandial-fasting TQ=151-738m  2hr postprandial WJ=896-298f. 1x 224. HBA1c:6.0 on 11/13/17. 10.3 on 3/2/18. RJD=064 on 11/13/17. 89 on 2/26/18. No other new associated or worsening factors. Eye check:<12mos ago. Denies polyuria/polyphagia/polydipsia/BLE skin lesions/BLE paresthesia. Hyperlipidemia:   Diet managed. Doing well. No new associated concerns. GERD f/u:Doing well. No new associated/worsening or other improving factors. Denies abdo pain/n-v/diarrhea/melena-blood in stool. Anxiety/insomnia:reports doing well. Sleeps approx 7hrs. Med helps namely,xanax. Pt' reveals no aberrant drug use behavior. OARRS reports is consistent. Denies SI/HI/hallucinations/illicit drugs/etoh abuse/cold intolerance. Back Pain f/u:doing well per baseline. Chronic back pain is controlled well with tylenol 3 prn. Pt' reveals no aberrant drug use behavior. No other new associated or worsening factors.   Denies BLE amfupyvt-jmrnyrkskyb-jfmvwfqal/urinary or bowel Objective:   Physical Exam   Constitutional: She is oriented to person, place, and time. Vital signs are normal. She appears well-developed and well-nourished. She is cooperative. She does not have a sickly appearance. No distress. HENT:   Nose: Nose normal.   Mouth/Throat: Uvula is midline, oropharynx is clear and moist and mucous membranes are normal.   Hearing intact to nml conversation   Eyes: Conjunctivae, EOM and lids are normal. Pupils are equal, round, and reactive to light. No scleral icterus. Neck: Trachea normal and normal range of motion. Neck supple. No JVD present. Carotid bruit is not present. Cardiovascular: Normal rate, regular rhythm, normal heart sounds, intact distal pulses and normal pulses. No bilateral leg-ankle edema. Pulmonary/Chest: Effort normal and breath sounds normal. No respiratory distress. CTAB,good AE bilaterally   Abdominal: Soft. Normal appearance and bowel sounds are normal. She exhibits no distension and no mass. There is no hepatomegaly. There is no tenderness. Musculoskeletal:   Stable back examination:no acute change from prior visit on 3/12/18. Lymphadenopathy:     She has no cervical adenopathy. Neurological: She is alert and oriented to person, place, and time. Skin: Skin is warm, dry and intact. No abrasion and no rash noted. She is not diaphoretic. No cyanosis. No pallor. .Capillary refill=2-3secs. Good skin turgor. Psychiatric: She has a normal mood and affect. Her speech is normal and behavior is normal. Judgment and thought content normal. Her mood appears not anxious. Her affect is not angry, not blunt, not labile and not inappropriate. She is not agitated, not aggressive, not hyperactive, not slowed and not combative. Thought content is not paranoid and not delusional. Cognition and memory are normal. She does not exhibit a depressed mood. She expresses no homicidal and no suicidal ideation. Good eye contact.         Assessment: 1. Uncontrolled type 2 diabetes mellitus with stage 3 chronic kidney disease, without long-term current use of insulin ([address]  VSS/well appearing. Improving home sugars. Continue same med plan. A1c in 6weeks. Recheck in office in 4weeks. Diabetes:Check feet daily. Yrly eye checks advised. Education: Reviewed ABCs of diabetes management (respective goals in parentheses):  A1C (<7), blood pressure (<130/80), and cholesterol (LDL <100). Counseled at this visit on the following: diabetes complication prevention, foot care. 2. Essential hypertension, benign  Stable. 3. Anxiety  Stable. 4. Gastroesophageal reflux disease without esophagitis  Stable. 5. Primary insomnia  Stable. 6. Chronic leg/back pain  Stable. 7. Long-term (current) use of anticoagulants, INR goal 2.5-3.5  4.4 INR:no bleeding. Decrease coumadin from 4mg to 3.5mg. Pt' states she has been taking 2mg based on this result for the past 3days. Recheck INR today. Advised pt' to not adjust coumadin w/o consulting with physician. 8. Chronic back pain  Stable. 9. Hyperlipidemia with target LDL less than 100  Stable. 10. COPD:stable. Plan:          Obtain labs/diagnostic tests as discussed today & call back for results within 2-7days. Pt' left office in good condition. Advised to go to local ER or call 911 for any worrisome signs/sx.

## 2018-03-27 ENCOUNTER — ANTI-COAG VISIT (OUTPATIENT)
Dept: FAMILY MEDICINE CLINIC | Age: 73
End: 2018-03-27

## 2018-03-27 DIAGNOSIS — Z95.2 AORTIC VALVE REPLACED: ICD-10-CM

## 2018-04-02 ENCOUNTER — ANTI-COAG VISIT (OUTPATIENT)
Dept: FAMILY MEDICINE CLINIC | Age: 73
End: 2018-04-02

## 2018-04-02 DIAGNOSIS — Z95.2 AORTIC VALVE REPLACED: ICD-10-CM

## 2018-04-02 LAB — INR BLD: 1.8

## 2018-04-03 DIAGNOSIS — Z79.01 LONG-TERM (CURRENT) USE OF ANTICOAGULANTS, INR GOAL 2.5-3.5: ICD-10-CM

## 2018-04-03 DIAGNOSIS — I10 ESSENTIAL HYPERTENSION, BENIGN: ICD-10-CM

## 2018-04-03 RX ORDER — CHLORTHALIDONE 25 MG/1
TABLET ORAL
Qty: 90 TABLET | Refills: 0 | Status: SHIPPED | OUTPATIENT
Start: 2018-04-03 | End: 2018-07-02 | Stop reason: SDUPTHER

## 2018-04-03 RX ORDER — WARFARIN SODIUM 3 MG/1
TABLET ORAL
Qty: 30 TABLET | Refills: 0 | Status: SHIPPED | OUTPATIENT
Start: 2018-04-03 | End: 2018-05-06 | Stop reason: SDUPTHER

## 2018-04-06 ENCOUNTER — TELEPHONE (OUTPATIENT)
Dept: FAMILY MEDICINE CLINIC | Age: 73
End: 2018-04-06

## 2018-04-06 ENCOUNTER — ANTI-COAG VISIT (OUTPATIENT)
Dept: FAMILY MEDICINE CLINIC | Age: 73
End: 2018-04-06

## 2018-04-06 DIAGNOSIS — Z95.2 AORTIC VALVE REPLACED: ICD-10-CM

## 2018-04-06 LAB — INR BLD: 1.8

## 2018-04-11 ENCOUNTER — TELEPHONE (OUTPATIENT)
Dept: FAMILY MEDICINE CLINIC | Age: 73
End: 2018-04-11

## 2018-04-11 ENCOUNTER — ANTI-COAG VISIT (OUTPATIENT)
Dept: FAMILY MEDICINE CLINIC | Age: 73
End: 2018-04-11

## 2018-04-11 DIAGNOSIS — Z95.2 AORTIC VALVE REPLACED: ICD-10-CM

## 2018-04-11 LAB — INR BLD: 2.5

## 2018-04-24 ENCOUNTER — TELEPHONE (OUTPATIENT)
Dept: FAMILY MEDICINE CLINIC | Age: 73
End: 2018-04-24

## 2018-04-24 DIAGNOSIS — G89.29 CHRONIC PAIN OF BOTH LOWER EXTREMITIES: ICD-10-CM

## 2018-04-24 DIAGNOSIS — M54.9 CHRONIC BACK PAIN, UNSPECIFIED BACK LOCATION, UNSPECIFIED BACK PAIN LATERALITY: ICD-10-CM

## 2018-04-24 DIAGNOSIS — G89.29 CHRONIC BACK PAIN, UNSPECIFIED BACK LOCATION, UNSPECIFIED BACK PAIN LATERALITY: ICD-10-CM

## 2018-04-24 DIAGNOSIS — M79.604 CHRONIC PAIN OF BOTH LOWER EXTREMITIES: ICD-10-CM

## 2018-04-24 DIAGNOSIS — M79.605 CHRONIC PAIN OF BOTH LOWER EXTREMITIES: ICD-10-CM

## 2018-04-24 DIAGNOSIS — F41.9 ANXIETY: ICD-10-CM

## 2018-04-24 RX ORDER — ACETAMINOPHEN AND CODEINE PHOSPHATE 300; 30 MG/1; MG/1
TABLET ORAL
Qty: 42 TABLET | Refills: 0 | Status: SHIPPED | OUTPATIENT
Start: 2018-04-24 | End: 2018-06-07 | Stop reason: SDUPTHER

## 2018-04-25 ENCOUNTER — OFFICE VISIT (OUTPATIENT)
Dept: FAMILY MEDICINE CLINIC | Age: 73
End: 2018-04-25

## 2018-04-25 VITALS
RESPIRATION RATE: 16 BRPM | WEIGHT: 121.2 LBS | BODY MASS INDEX: 24.44 KG/M2 | HEART RATE: 79 BPM | DIASTOLIC BLOOD PRESSURE: 72 MMHG | TEMPERATURE: 98.4 F | SYSTOLIC BLOOD PRESSURE: 131 MMHG | HEIGHT: 59 IN | OXYGEN SATURATION: 96 %

## 2018-04-25 DIAGNOSIS — G89.29 CHRONIC BACK PAIN, UNSPECIFIED BACK LOCATION, UNSPECIFIED BACK PAIN LATERALITY: ICD-10-CM

## 2018-04-25 DIAGNOSIS — F51.01 PRIMARY INSOMNIA: ICD-10-CM

## 2018-04-25 DIAGNOSIS — Z79.01 LONG-TERM (CURRENT) USE OF ANTICOAGULANTS, INR GOAL 2.5-3.5: ICD-10-CM

## 2018-04-25 DIAGNOSIS — M54.9 CHRONIC BACK PAIN, UNSPECIFIED BACK LOCATION, UNSPECIFIED BACK PAIN LATERALITY: ICD-10-CM

## 2018-04-25 DIAGNOSIS — M79.605 CHRONIC PAIN OF BOTH LOWER EXTREMITIES: ICD-10-CM

## 2018-04-25 DIAGNOSIS — E78.5 HYPERLIPIDEMIA WITH TARGET LDL LESS THAN 100: ICD-10-CM

## 2018-04-25 DIAGNOSIS — M79.604 CHRONIC PAIN OF BOTH LOWER EXTREMITIES: ICD-10-CM

## 2018-04-25 DIAGNOSIS — I10 ESSENTIAL HYPERTENSION, BENIGN: Primary | ICD-10-CM

## 2018-04-25 DIAGNOSIS — G89.29 CHRONIC PAIN OF BOTH LOWER EXTREMITIES: ICD-10-CM

## 2018-04-25 DIAGNOSIS — F41.9 ANXIETY: ICD-10-CM

## 2018-04-25 DIAGNOSIS — K21.9 GASTROESOPHAGEAL REFLUX DISEASE WITHOUT ESOPHAGITIS: ICD-10-CM

## 2018-04-25 DIAGNOSIS — J43.8 OTHER EMPHYSEMA (HCC): ICD-10-CM

## 2018-04-25 PROCEDURE — 1123F ACP DISCUSS/DSCN MKR DOCD: CPT | Performed by: FAMILY MEDICINE

## 2018-04-25 PROCEDURE — 4040F PNEUMOC VAC/ADMIN/RCVD: CPT | Performed by: FAMILY MEDICINE

## 2018-04-25 PROCEDURE — 3023F SPIROM DOC REV: CPT | Performed by: FAMILY MEDICINE

## 2018-04-25 PROCEDURE — G8420 CALC BMI NORM PARAMETERS: HCPCS | Performed by: FAMILY MEDICINE

## 2018-04-25 PROCEDURE — 2022F DILAT RTA XM EVC RTNOPTHY: CPT | Performed by: FAMILY MEDICINE

## 2018-04-25 PROCEDURE — 1090F PRES/ABSN URINE INCON ASSESS: CPT | Performed by: FAMILY MEDICINE

## 2018-04-25 PROCEDURE — G8400 PT W/DXA NO RESULTS DOC: HCPCS | Performed by: FAMILY MEDICINE

## 2018-04-25 PROCEDURE — 99214 OFFICE O/P EST MOD 30 MIN: CPT | Performed by: FAMILY MEDICINE

## 2018-04-25 PROCEDURE — G8926 SPIRO NO PERF OR DOC: HCPCS | Performed by: FAMILY MEDICINE

## 2018-04-25 PROCEDURE — 3046F HEMOGLOBIN A1C LEVEL >9.0%: CPT | Performed by: FAMILY MEDICINE

## 2018-04-25 PROCEDURE — G8427 DOCREV CUR MEDS BY ELIG CLIN: HCPCS | Performed by: FAMILY MEDICINE

## 2018-04-25 PROCEDURE — 1036F TOBACCO NON-USER: CPT | Performed by: FAMILY MEDICINE

## 2018-04-25 PROCEDURE — 3017F COLORECTAL CA SCREEN DOC REV: CPT | Performed by: FAMILY MEDICINE

## 2018-04-25 RX ORDER — GLUCOSAMINE HCL/CHONDROITIN SU 500-400 MG
CAPSULE ORAL
Qty: 100 STRIP | Refills: 0 | Status: SHIPPED | OUTPATIENT
Start: 2018-04-25 | End: 2018-05-08 | Stop reason: SDUPTHER

## 2018-04-25 ASSESSMENT — ENCOUNTER SYMPTOMS
ANAL BLEEDING: 0
BACK PAIN: 1
RECTAL PAIN: 0
VOMITING: 0
ABDOMINAL DISTENTION: 0
CONSTIPATION: 0
BLOOD IN STOOL: 0
WHEEZING: 0
SHORTNESS OF BREATH: 0
NAUSEA: 0
CHEST TIGHTNESS: 0
STRIDOR: 0
APNEA: 0
DIARRHEA: 0
ABDOMINAL PAIN: 0
COUGH: 0
CHOKING: 0

## 2018-04-25 ASSESSMENT — COPD QUESTIONNAIRES: COPD: 1

## 2018-04-26 ENCOUNTER — HOSPITAL ENCOUNTER (OUTPATIENT)
Dept: DIABETES SERVICES | Age: 73
Discharge: HOME | End: 2018-04-30
Admitting: FAMILY MEDICINE

## 2018-04-26 DIAGNOSIS — E11.22 TYPE 2 DIABETES MELLITUS WITH DIABETIC CHRONIC KIDNEY DISEASE (HCC): ICD-10-CM

## 2018-04-26 ASSESSMENT — PATIENT HEALTH QUESTIONNAIRE - PHQ9
SUM OF ALL RESPONSES TO PHQ9 QUESTIONS 1 & 2: 0
1. LITTLE INTEREST OR PLEASURE IN DOING THINGS: 0
SUM OF ALL RESPONSES TO PHQ QUESTIONS 1-9: 0
2. FEELING DOWN, DEPRESSED OR HOPELESS: 0

## 2018-05-01 ENCOUNTER — HOSPITAL ENCOUNTER (OUTPATIENT)
Dept: OTHER | Age: 73
Discharge: HOME | End: 2018-05-31
Attending: FAMILY MEDICINE | Admitting: FAMILY MEDICINE

## 2018-05-06 DIAGNOSIS — Z79.01 LONG-TERM (CURRENT) USE OF ANTICOAGULANTS, INR GOAL 2.5-3.5: ICD-10-CM

## 2018-05-06 RX ORDER — WARFARIN SODIUM 3 MG/1
TABLET ORAL
Qty: 90 TABLET | Refills: 0 | Status: SHIPPED | OUTPATIENT
Start: 2018-05-06 | End: 2018-08-01 | Stop reason: SDUPTHER

## 2018-05-08 LAB — INR BLD: 2.5

## 2018-05-08 RX ORDER — CALCIUM CITRATE/VITAMIN D3 200MG-6.25
TABLET ORAL
Qty: 150 STRIP | Refills: 0 | Status: SHIPPED | OUTPATIENT
Start: 2018-05-08 | End: 2018-11-12 | Stop reason: SDUPTHER

## 2018-05-08 RX ORDER — GLIPIZIDE 5 MG/1
TABLET ORAL
Qty: 45 TABLET | Refills: 0 | Status: SHIPPED | OUTPATIENT
Start: 2018-05-08 | End: 2018-08-06 | Stop reason: SDUPTHER

## 2018-05-09 RX ORDER — CALCIUM CITRATE/VITAMIN D3 200MG-6.25
TABLET ORAL
Qty: 150 STRIP | Refills: 0 | Status: SHIPPED | OUTPATIENT
Start: 2018-05-09 | End: 2018-11-08 | Stop reason: SDUPTHER

## 2018-05-10 ENCOUNTER — ANTI-COAG VISIT (OUTPATIENT)
Dept: FAMILY MEDICINE CLINIC | Age: 73
End: 2018-05-10

## 2018-05-10 DIAGNOSIS — Z95.2 AORTIC VALVE REPLACED: ICD-10-CM

## 2018-05-11 ENCOUNTER — TELEPHONE (OUTPATIENT)
Dept: FAMILY MEDICINE CLINIC | Age: 73
End: 2018-05-11

## 2018-05-28 DIAGNOSIS — F41.9 ANXIETY: ICD-10-CM

## 2018-05-29 LAB — INR BLD: 4

## 2018-05-29 RX ORDER — ALPRAZOLAM 0.5 MG/1
TABLET ORAL
Qty: 90 TABLET | Refills: 0 | Status: SHIPPED | OUTPATIENT
Start: 2018-05-29 | End: 2018-08-06 | Stop reason: SDUPTHER

## 2018-05-30 ENCOUNTER — ANTI-COAG VISIT (OUTPATIENT)
Dept: FAMILY MEDICINE CLINIC | Age: 73
End: 2018-05-30

## 2018-05-30 DIAGNOSIS — Z95.2 AORTIC VALVE REPLACED: ICD-10-CM

## 2018-06-02 LAB
A/G RATIO: 3 (CALC) (ref 1–2.5)
ALBUMIN SERPL-MCNC: 4.8 G/DL (ref 3.6–5.1)
ALP BLD-CCNC: 59 U/L (ref 33–130)
ALT SERPL-CCNC: 12 U/L (ref 6–29)
AST SERPL-CCNC: 28 U/L (ref 10–35)
ATYPICAL LYMPHOCYTE RELATIVE PERCENT: ABNORMAL % (ref 0–10)
BAND NEUTROPHILS: ABNORMAL %
BANDED NEUTROPHILS ABSOLUTE COUNT: ABNORMAL CELLS/UL (ref 0–750)
BASOPHILS ABSOLUTE: 29 CELLS/UL (ref 0–200)
BASOPHILS RELATIVE PERCENT: 0.3 %
BILIRUB SERPL-MCNC: 0.7 MG/DL (ref 0.2–1.2)
BLASTS ABSOLUTE COUNT: ABNORMAL CELLS/UL
BLASTS RELATIVE PERCENT: ABNORMAL %
BUN / CREAT RATIO: 20 (CALC) (ref 6–22)
BUN BLDV-MCNC: 21 MG/DL (ref 7–25)
CALCIUM SERPL-MCNC: 9.4 MG/DL (ref 8.6–10.4)
CHLORIDE BLD-SCNC: 94 MMOL/L (ref 98–110)
CHOLESTEROL, TOTAL: 159 MG/DL
CHOLESTEROL/HDL RATIO: 3.7 (CALC)
CHOLESTEROL: 116 MG/DL (CALC)
CO2: 31 MMOL/L (ref 20–31)
COMMENT: ABNORMAL
CREAT SERPL-MCNC: 1.05 MG/DL (ref 0.6–0.93)
EOSINOPHILS ABSOLUTE: 49 CELLS/UL (ref 15–500)
EOSINOPHILS RELATIVE PERCENT: 0.5 %
GFR AFRICAN AMERICAN: 61 ML/MIN/1.73M2
GFR SERPL CREATININE-BSD FRML MDRD: 53 ML/MIN/1.73M2
GLOBULIN: 1.6 G/DL (CALC) (ref 1.9–3.7)
GLUCOSE BLD-MCNC: 122 MG/DL (ref 65–99)
HBA1C MFR BLD: 4.9 % OF TOTAL HGB
HCT VFR BLD CALC: 26.6 % (ref 35–45)
HDLC SERPL-MCNC: 43 MG/DL
HEMOGLOBIN: 9.1 G/DL (ref 11.7–15.5)
LDL CHOLESTEROL CALCULATED: 89 MG/DL (CALC)
LYMPHOCYTES ABSOLUTE: 3298 CELLS/UL (ref 850–3900)
LYMPHOCYTES RELATIVE PERCENT: 34 %
MCH RBC QN AUTO: 31.5 PG (ref 27–33)
MCHC RBC AUTO-ENTMCNC: 34.2 G/DL (ref 32–36)
MCV RBC AUTO: 92 FL (ref 80–100)
METAMYELOCYTES ABSOLUTE COUNT: ABNORMAL CELLS/UL
METAMYELOCYTES RELATIVE PERCENT: ABNORMAL %
MONOCYTES ABSOLUTE: 825 CELLS/UL (ref 200–950)
MONOCYTES RELATIVE PERCENT: 8.5 %
MYELOCYTE PERCENT: ABNORMAL %
MYELOCYTES ABSOLUTE COUNT: ABNORMAL CELLS/UL
NEUTROPHILS ABSOLUTE: 5500 CELLS/UL (ref 1500–7800)
NUCLEATED RED BLOOD CELLS: ABNORMAL /100 WBC
NUCLEATED RED BLOOD CELLS: ABNORMAL CELLS/UL
PDW BLD-RTO: 12.3 % (ref 11–15)
PLATELET # BLD: 198 THOUSAND/UL (ref 140–400)
PMV BLD AUTO: 10.9 FL (ref 7.5–12.5)
POTASSIUM SERPL-SCNC: 4.5 MMOL/L (ref 3.5–5.3)
PROMYELOCYTES ABSOLUTE COUNT: ABNORMAL CELLS/UL
PROMYELOCYTES PERCENT: ABNORMAL %
RBC # BLD: 2.89 MILLION/UL (ref 3.8–5.1)
SEGMENTED NEUTROPHILS RELATIVE PERCENT: 56.7 %
SODIUM BLD-SCNC: 131 MMOL/L (ref 135–146)
TOTAL PROTEIN: 6.4 G/DL (ref 6.1–8.1)
TRIGL SERPL-MCNC: 168 MG/DL
WBC # BLD: 9.7 THOUSAND/UL (ref 3.8–10.8)

## 2018-06-03 DIAGNOSIS — E87.1 HYPONATREMIA: Primary | ICD-10-CM

## 2018-06-04 LAB — INR BLD: 2.1

## 2018-06-05 ENCOUNTER — ANTI-COAG VISIT (OUTPATIENT)
Dept: FAMILY MEDICINE CLINIC | Age: 73
End: 2018-06-05

## 2018-06-05 DIAGNOSIS — Z95.2 AORTIC VALVE REPLACED: ICD-10-CM

## 2018-06-07 ENCOUNTER — TELEPHONE (OUTPATIENT)
Dept: FAMILY MEDICINE CLINIC | Age: 73
End: 2018-06-07

## 2018-06-07 DIAGNOSIS — G89.29 CHRONIC PAIN OF BOTH LOWER EXTREMITIES: ICD-10-CM

## 2018-06-07 DIAGNOSIS — M79.605 CHRONIC PAIN OF BOTH LOWER EXTREMITIES: ICD-10-CM

## 2018-06-07 DIAGNOSIS — M54.9 CHRONIC BACK PAIN, UNSPECIFIED BACK LOCATION, UNSPECIFIED BACK PAIN LATERALITY: ICD-10-CM

## 2018-06-07 DIAGNOSIS — M79.604 CHRONIC PAIN OF BOTH LOWER EXTREMITIES: ICD-10-CM

## 2018-06-07 DIAGNOSIS — G89.29 CHRONIC BACK PAIN, UNSPECIFIED BACK LOCATION, UNSPECIFIED BACK PAIN LATERALITY: ICD-10-CM

## 2018-06-07 DIAGNOSIS — F41.9 ANXIETY: ICD-10-CM

## 2018-06-07 RX ORDER — ACETAMINOPHEN AND CODEINE PHOSPHATE 300; 30 MG/1; MG/1
TABLET ORAL
Qty: 42 TABLET | Refills: 0 | Status: SHIPPED | OUTPATIENT
Start: 2018-06-07 | End: 2018-07-07

## 2018-06-07 RX ORDER — LISINOPRIL 40 MG/1
TABLET ORAL
Qty: 90 TABLET | Refills: 0 | Status: SHIPPED | OUTPATIENT
Start: 2018-06-07 | End: 2018-09-08 | Stop reason: SDUPTHER

## 2018-06-10 LAB — INR BLD: 2.4

## 2018-06-11 ENCOUNTER — ANTI-COAG VISIT (OUTPATIENT)
Dept: FAMILY MEDICINE CLINIC | Age: 73
End: 2018-06-11

## 2018-06-11 DIAGNOSIS — Z95.2 AORTIC VALVE REPLACED: ICD-10-CM

## 2018-07-02 DIAGNOSIS — I10 ESSENTIAL HYPERTENSION, BENIGN: ICD-10-CM

## 2018-07-02 RX ORDER — CHLORTHALIDONE 25 MG/1
TABLET ORAL
Qty: 90 TABLET | Refills: 0 | Status: SHIPPED | OUTPATIENT
Start: 2018-07-02 | End: 2018-10-02 | Stop reason: SDUPTHER

## 2018-07-03 LAB — INR BLD: 2

## 2018-07-05 ENCOUNTER — ANTI-COAG VISIT (OUTPATIENT)
Dept: FAMILY MEDICINE CLINIC | Age: 73
End: 2018-07-05

## 2018-07-05 DIAGNOSIS — Z95.2 AORTIC VALVE REPLACED: ICD-10-CM

## 2018-07-13 ENCOUNTER — ANTI-COAG VISIT (OUTPATIENT)
Dept: FAMILY MEDICINE CLINIC | Age: 73
End: 2018-07-13

## 2018-07-13 ENCOUNTER — TELEPHONE (OUTPATIENT)
Dept: FAMILY MEDICINE CLINIC | Age: 73
End: 2018-07-13

## 2018-07-13 DIAGNOSIS — Z95.2 AORTIC VALVE REPLACED: ICD-10-CM

## 2018-07-13 LAB — INR BLD: 2.5

## 2018-07-17 DIAGNOSIS — M54.50 CHRONIC BILATERAL LOW BACK PAIN WITHOUT SCIATICA: Primary | ICD-10-CM

## 2018-07-17 DIAGNOSIS — G89.29 CHRONIC PAIN OF BOTH LOWER EXTREMITIES: ICD-10-CM

## 2018-07-17 DIAGNOSIS — M79.605 CHRONIC PAIN OF BOTH LOWER EXTREMITIES: ICD-10-CM

## 2018-07-17 DIAGNOSIS — G89.29 CHRONIC BILATERAL LOW BACK PAIN WITHOUT SCIATICA: Primary | ICD-10-CM

## 2018-07-17 DIAGNOSIS — M79.604 CHRONIC PAIN OF BOTH LOWER EXTREMITIES: ICD-10-CM

## 2018-07-17 RX ORDER — ACETAMINOPHEN AND CODEINE PHOSPHATE 300; 30 MG/1; MG/1
TABLET ORAL
Qty: 42 TABLET | Refills: 0 | Status: SHIPPED | OUTPATIENT
Start: 2018-07-17 | End: 2018-08-28 | Stop reason: SDUPTHER

## 2018-07-25 ENCOUNTER — OFFICE VISIT (OUTPATIENT)
Dept: FAMILY MEDICINE CLINIC | Age: 73
End: 2018-07-25

## 2018-07-25 VITALS
HEART RATE: 78 BPM | BODY MASS INDEX: 23.69 KG/M2 | WEIGHT: 117.5 LBS | RESPIRATION RATE: 16 BRPM | SYSTOLIC BLOOD PRESSURE: 129 MMHG | DIASTOLIC BLOOD PRESSURE: 72 MMHG | OXYGEN SATURATION: 98 % | HEIGHT: 59 IN | TEMPERATURE: 98.7 F

## 2018-07-25 DIAGNOSIS — Z12.31 VISIT FOR SCREENING MAMMOGRAM: ICD-10-CM

## 2018-07-25 DIAGNOSIS — E11.22 CONTROLLED TYPE 2 DIABETES MELLITUS WITH STAGE 3 CHRONIC KIDNEY DISEASE, WITHOUT LONG-TERM CURRENT USE OF INSULIN (HCC): Primary | ICD-10-CM

## 2018-07-25 DIAGNOSIS — D50.8 IRON DEFICIENCY ANEMIA SECONDARY TO INADEQUATE DIETARY IRON INTAKE: ICD-10-CM

## 2018-07-25 DIAGNOSIS — E78.5 HYPERLIPIDEMIA WITH TARGET LDL LESS THAN 100: ICD-10-CM

## 2018-07-25 DIAGNOSIS — M79.605 CHRONIC PAIN OF BOTH LOWER EXTREMITIES: ICD-10-CM

## 2018-07-25 DIAGNOSIS — N18.30 STAGE 3 CHRONIC KIDNEY DISEASE (HCC): ICD-10-CM

## 2018-07-25 DIAGNOSIS — M79.604 CHRONIC PAIN OF BOTH LOWER EXTREMITIES: ICD-10-CM

## 2018-07-25 DIAGNOSIS — F41.9 ANXIETY: ICD-10-CM

## 2018-07-25 DIAGNOSIS — G89.29 CHRONIC PAIN OF BOTH LOWER EXTREMITIES: ICD-10-CM

## 2018-07-25 DIAGNOSIS — K21.9 GASTROESOPHAGEAL REFLUX DISEASE WITHOUT ESOPHAGITIS: ICD-10-CM

## 2018-07-25 DIAGNOSIS — Z79.01 LONG-TERM (CURRENT) USE OF ANTICOAGULANTS, INR GOAL 2.5-3.5: ICD-10-CM

## 2018-07-25 DIAGNOSIS — F51.01 PRIMARY INSOMNIA: ICD-10-CM

## 2018-07-25 DIAGNOSIS — N18.30 CONTROLLED TYPE 2 DIABETES MELLITUS WITH STAGE 3 CHRONIC KIDNEY DISEASE, WITHOUT LONG-TERM CURRENT USE OF INSULIN (HCC): Primary | ICD-10-CM

## 2018-07-25 DIAGNOSIS — G89.29 CHRONIC BILATERAL LOW BACK PAIN WITHOUT SCIATICA: ICD-10-CM

## 2018-07-25 DIAGNOSIS — M54.50 CHRONIC BILATERAL LOW BACK PAIN WITHOUT SCIATICA: ICD-10-CM

## 2018-07-25 DIAGNOSIS — I10 ESSENTIAL HYPERTENSION, BENIGN: ICD-10-CM

## 2018-07-25 PROCEDURE — 1123F ACP DISCUSS/DSCN MKR DOCD: CPT | Performed by: FAMILY MEDICINE

## 2018-07-25 PROCEDURE — 1090F PRES/ABSN URINE INCON ASSESS: CPT | Performed by: FAMILY MEDICINE

## 2018-07-25 PROCEDURE — 99214 OFFICE O/P EST MOD 30 MIN: CPT | Performed by: FAMILY MEDICINE

## 2018-07-25 PROCEDURE — 2022F DILAT RTA XM EVC RTNOPTHY: CPT | Performed by: FAMILY MEDICINE

## 2018-07-25 PROCEDURE — G8420 CALC BMI NORM PARAMETERS: HCPCS | Performed by: FAMILY MEDICINE

## 2018-07-25 PROCEDURE — G8427 DOCREV CUR MEDS BY ELIG CLIN: HCPCS | Performed by: FAMILY MEDICINE

## 2018-07-25 PROCEDURE — G8400 PT W/DXA NO RESULTS DOC: HCPCS | Performed by: FAMILY MEDICINE

## 2018-07-25 PROCEDURE — 3017F COLORECTAL CA SCREEN DOC REV: CPT | Performed by: FAMILY MEDICINE

## 2018-07-25 PROCEDURE — 4040F PNEUMOC VAC/ADMIN/RCVD: CPT | Performed by: FAMILY MEDICINE

## 2018-07-25 PROCEDURE — 1036F TOBACCO NON-USER: CPT | Performed by: FAMILY MEDICINE

## 2018-07-25 PROCEDURE — 3044F HG A1C LEVEL LT 7.0%: CPT | Performed by: FAMILY MEDICINE

## 2018-07-25 PROCEDURE — 1101F PT FALLS ASSESS-DOCD LE1/YR: CPT | Performed by: FAMILY MEDICINE

## 2018-07-25 ASSESSMENT — ENCOUNTER SYMPTOMS
SHORTNESS OF BREATH: 0
ABDOMINAL DISTENTION: 0
NAUSEA: 0
ANAL BLEEDING: 0
RECTAL PAIN: 0
CHEST TIGHTNESS: 0
CHOKING: 0
BLOOD IN STOOL: 0
APNEA: 0
BACK PAIN: 1
COUGH: 0
WHEEZING: 0
ABDOMINAL PAIN: 0
CONSTIPATION: 0
DIARRHEA: 0
STRIDOR: 0
VOMITING: 0

## 2018-07-25 ASSESSMENT — PATIENT HEALTH QUESTIONNAIRE - PHQ9
1. LITTLE INTEREST OR PLEASURE IN DOING THINGS: 0
SUM OF ALL RESPONSES TO PHQ QUESTIONS 1-9: 0
SUM OF ALL RESPONSES TO PHQ9 QUESTIONS 1 & 2: 0
2. FEELING DOWN, DEPRESSED OR HOPELESS: 0

## 2018-07-25 ASSESSMENT — COPD QUESTIONNAIRES: COPD: 1

## 2018-07-30 ENCOUNTER — TELEPHONE (OUTPATIENT)
Dept: FAMILY MEDICINE CLINIC | Age: 73
End: 2018-07-30

## 2018-07-30 DIAGNOSIS — Z79.01 LONG-TERM (CURRENT) USE OF ANTICOAGULANTS, INR GOAL 2.5-3.5: Primary | ICD-10-CM

## 2018-08-01 DIAGNOSIS — Z79.01 LONG-TERM (CURRENT) USE OF ANTICOAGULANTS, INR GOAL 2.5-3.5: ICD-10-CM

## 2018-08-01 RX ORDER — WARFARIN SODIUM 3 MG/1
TABLET ORAL
Qty: 90 TABLET | Refills: 0 | Status: SHIPPED | OUTPATIENT
Start: 2018-08-01 | End: 2018-10-28 | Stop reason: SDUPTHER

## 2018-08-01 RX ORDER — WARFARIN SODIUM 1 MG/1
TABLET ORAL
Qty: 60 TABLET | Refills: 0 | Status: SHIPPED | OUTPATIENT
Start: 2018-08-01 | End: 2019-05-02 | Stop reason: SDUPTHER

## 2018-08-06 DIAGNOSIS — F41.9 ANXIETY: ICD-10-CM

## 2018-08-06 RX ORDER — GLIPIZIDE 5 MG/1
TABLET ORAL
Qty: 45 TABLET | Refills: 0 | Status: SHIPPED | OUTPATIENT
Start: 2018-08-06 | End: 2018-11-06 | Stop reason: SDUPTHER

## 2018-08-06 RX ORDER — ALPRAZOLAM 0.5 MG/1
TABLET ORAL
Qty: 90 TABLET | Refills: 0 | Status: SHIPPED | OUTPATIENT
Start: 2018-08-06 | End: 2018-08-09 | Stop reason: SDUPTHER

## 2018-08-08 ENCOUNTER — ANTI-COAG VISIT (OUTPATIENT)
Dept: FAMILY MEDICINE CLINIC | Age: 73
End: 2018-08-08

## 2018-08-08 DIAGNOSIS — Z95.2 AORTIC VALVE REPLACED: ICD-10-CM

## 2018-08-09 ENCOUNTER — TELEPHONE (OUTPATIENT)
Dept: FAMILY MEDICINE CLINIC | Age: 73
End: 2018-08-09

## 2018-08-09 DIAGNOSIS — F41.9 ANXIETY: ICD-10-CM

## 2018-08-09 RX ORDER — ALPRAZOLAM 0.5 MG/1
TABLET ORAL
Qty: 90 TABLET | Refills: 0 | Status: SHIPPED | OUTPATIENT
Start: 2018-08-09 | End: 2018-09-17 | Stop reason: SDUPTHER

## 2018-08-09 NOTE — TELEPHONE ENCOUNTER
Patient is calling to get a script called into her pharmacy         [facility] [address] [place] [address] -  [phone]      Patient is needing   ALPRAZolam (XANAX) 0.5 MG tablet  TAKE 1 TABLET BY MOUTH THREE TIMES DAILY AS NEEDED, Disp-90 tablet, R-0  Print Last Dose:

## 2018-08-10 RX ORDER — PANTOPRAZOLE SODIUM 40 MG/1
TABLET, DELAYED RELEASE ORAL
Qty: 90 TABLET | Refills: 0 | Status: SHIPPED | OUTPATIENT
Start: 2018-08-10 | End: 2018-11-12 | Stop reason: SDUPTHER

## 2018-08-14 ENCOUNTER — ANTI-COAG VISIT (OUTPATIENT)
Dept: FAMILY MEDICINE CLINIC | Age: 73
End: 2018-08-14

## 2018-08-14 DIAGNOSIS — Z95.2 AORTIC VALVE REPLACED: ICD-10-CM

## 2018-08-14 LAB — INR BLD: 3.8

## 2018-08-15 ENCOUNTER — TELEPHONE (OUTPATIENT)
Dept: FAMILY MEDICINE CLINIC | Age: 73
End: 2018-08-15

## 2018-08-16 ENCOUNTER — TELEPHONE (OUTPATIENT)
Dept: FAMILY MEDICINE CLINIC | Age: 73
End: 2018-08-16

## 2018-08-20 ENCOUNTER — TELEPHONE (OUTPATIENT)
Dept: FAMILY MEDICINE CLINIC | Age: 73
End: 2018-08-20

## 2018-08-20 ENCOUNTER — ANTI-COAG VISIT (OUTPATIENT)
Dept: FAMILY MEDICINE CLINIC | Age: 73
End: 2018-08-20

## 2018-08-20 DIAGNOSIS — Z95.2 AORTIC VALVE REPLACED: ICD-10-CM

## 2018-08-20 LAB — INR BLD: 2.8

## 2018-08-28 ENCOUNTER — TELEPHONE (OUTPATIENT)
Dept: FAMILY MEDICINE CLINIC | Age: 73
End: 2018-08-28

## 2018-08-28 DIAGNOSIS — M54.50 CHRONIC BILATERAL LOW BACK PAIN WITHOUT SCIATICA: ICD-10-CM

## 2018-08-28 DIAGNOSIS — G89.29 CHRONIC BILATERAL LOW BACK PAIN WITHOUT SCIATICA: ICD-10-CM

## 2018-08-28 DIAGNOSIS — G89.29 CHRONIC PAIN OF BOTH LOWER EXTREMITIES: ICD-10-CM

## 2018-08-28 DIAGNOSIS — M79.605 CHRONIC PAIN OF BOTH LOWER EXTREMITIES: ICD-10-CM

## 2018-08-28 DIAGNOSIS — M79.604 CHRONIC PAIN OF BOTH LOWER EXTREMITIES: ICD-10-CM

## 2018-08-28 RX ORDER — ACETAMINOPHEN AND CODEINE PHOSPHATE 300; 30 MG/1; MG/1
TABLET ORAL
Qty: 42 TABLET | Refills: 0 | Status: SHIPPED | OUTPATIENT
Start: 2018-08-28 | End: 2018-10-09 | Stop reason: SDUPTHER

## 2018-08-28 NOTE — TELEPHONE ENCOUNTER
Patient is calling requesting a refill of  acetaminophen-codeine (TYLENOL #3) 300-30 MG per tablet  Last ov 7/25/2018  Please advise leave a detailed message on phone  Rickey Beal [phone] (home) [phone] (work)

## 2018-09-09 RX ORDER — LISINOPRIL 40 MG/1
TABLET ORAL
Qty: 90 TABLET | Refills: 0 | Status: SHIPPED | OUTPATIENT
Start: 2018-09-09 | End: 2018-12-03 | Stop reason: SDUPTHER

## 2018-09-11 LAB
A/G RATIO: 2.5 (CALC) (ref 1–2.5)
ALBUMIN SERPL-MCNC: 4.7 G/DL (ref 3.6–5.1)
ALP BLD-CCNC: 67 U/L (ref 33–130)
ALT SERPL-CCNC: 13 U/L (ref 6–29)
AST SERPL-CCNC: 27 U/L (ref 10–35)
BILIRUB SERPL-MCNC: 0.7 MG/DL (ref 0.2–1.2)
BUN / CREAT RATIO: 21 (CALC) (ref 6–22)
BUN BLDV-MCNC: 22 MG/DL (ref 7–25)
CALCIUM SERPL-MCNC: 9.6 MG/DL (ref 8.6–10.4)
CHLORIDE BLD-SCNC: 98 MMOL/L (ref 98–110)
CHOLESTEROL, TOTAL: 202 MG/DL
CHOLESTEROL/HDL RATIO: 4 (CALC)
CHOLESTEROL: 151 MG/DL (CALC)
CO2: 31 MMOL/L (ref 20–32)
CREAT SERPL-MCNC: 1.05 MG/DL (ref 0.6–0.93)
GFR AFRICAN AMERICAN: 61 ML/MIN/1.73M2
GFR SERPL CREATININE-BSD FRML MDRD: 53 ML/MIN/1.73M2
GLOBULIN: 1.9 G/DL (CALC) (ref 1.9–3.7)
GLUCOSE BLD-MCNC: 188 MG/DL (ref 65–99)
HDLC SERPL-MCNC: 51 MG/DL
LDL CHOLESTEROL CALCULATED: 118 MG/DL (CALC)
POTASSIUM SERPL-SCNC: 4.4 MMOL/L (ref 3.5–5.3)
SODIUM BLD-SCNC: 135 MMOL/L (ref 135–146)
TOTAL PROTEIN: 6.6 G/DL (ref 6.1–8.1)
TRIGL SERPL-MCNC: 209 MG/DL

## 2018-09-17 DIAGNOSIS — F41.9 ANXIETY: ICD-10-CM

## 2018-09-17 RX ORDER — ALPRAZOLAM 0.5 MG/1
TABLET ORAL
Qty: 90 TABLET | Refills: 0 | Status: SHIPPED | OUTPATIENT
Start: 2018-09-17 | End: 2018-10-24 | Stop reason: SDUPTHER

## 2018-09-20 ENCOUNTER — TELEPHONE (OUTPATIENT)
Dept: FAMILY MEDICINE CLINIC | Age: 73
End: 2018-09-20

## 2018-09-20 ENCOUNTER — ANTI-COAG VISIT (OUTPATIENT)
Dept: FAMILY MEDICINE CLINIC | Age: 73
End: 2018-09-20

## 2018-09-20 DIAGNOSIS — Z95.2 AORTIC VALVE REPLACED: ICD-10-CM

## 2018-09-20 LAB — INR BLD: 2

## 2018-09-20 NOTE — TELEPHONE ENCOUNTER
Patient is calling to fariha morris INR was 2.0 jordyn states she can leave a detailed message on her answer machine.        Please advice

## 2018-09-28 LAB — INR BLD: 3.3

## 2018-10-01 ENCOUNTER — ANTI-COAG VISIT (OUTPATIENT)
Dept: FAMILY MEDICINE CLINIC | Age: 73
End: 2018-10-01

## 2018-10-01 DIAGNOSIS — Z95.2 AORTIC VALVE REPLACED: ICD-10-CM

## 2018-10-02 DIAGNOSIS — I10 ESSENTIAL HYPERTENSION, BENIGN: ICD-10-CM

## 2018-10-02 RX ORDER — CHLORTHALIDONE 25 MG/1
TABLET ORAL
Qty: 90 TABLET | Refills: 0 | Status: SHIPPED | OUTPATIENT
Start: 2018-10-02 | End: 2018-12-27 | Stop reason: SDUPTHER

## 2018-10-09 ENCOUNTER — TELEPHONE (OUTPATIENT)
Dept: FAMILY MEDICINE CLINIC | Age: 73
End: 2018-10-09

## 2018-10-09 DIAGNOSIS — M54.50 CHRONIC BILATERAL LOW BACK PAIN WITHOUT SCIATICA: ICD-10-CM

## 2018-10-09 DIAGNOSIS — G89.29 CHRONIC BILATERAL LOW BACK PAIN WITHOUT SCIATICA: ICD-10-CM

## 2018-10-09 DIAGNOSIS — M79.604 CHRONIC PAIN OF BOTH LOWER EXTREMITIES: ICD-10-CM

## 2018-10-09 DIAGNOSIS — G89.29 CHRONIC PAIN OF BOTH LOWER EXTREMITIES: ICD-10-CM

## 2018-10-09 DIAGNOSIS — M79.605 CHRONIC PAIN OF BOTH LOWER EXTREMITIES: ICD-10-CM

## 2018-10-09 RX ORDER — ACETAMINOPHEN AND CODEINE PHOSPHATE 300; 30 MG/1; MG/1
1 TABLET ORAL 3 TIMES DAILY PRN
Qty: 42 TABLET | Refills: 0 | Status: SHIPPED | OUTPATIENT
Start: 2018-10-09 | End: 2018-11-19 | Stop reason: SDUPTHER

## 2018-10-24 ENCOUNTER — OFFICE VISIT (OUTPATIENT)
Dept: FAMILY MEDICINE CLINIC | Age: 73
End: 2018-10-24
Payer: MEDICARE

## 2018-10-24 VITALS
TEMPERATURE: 97.7 F | RESPIRATION RATE: 16 BRPM | DIASTOLIC BLOOD PRESSURE: 70 MMHG | HEIGHT: 59 IN | SYSTOLIC BLOOD PRESSURE: 127 MMHG | OXYGEN SATURATION: 98 % | BODY MASS INDEX: 24.52 KG/M2 | WEIGHT: 121.6 LBS | HEART RATE: 64 BPM

## 2018-10-24 DIAGNOSIS — G89.29 CHRONIC BILATERAL LOW BACK PAIN WITHOUT SCIATICA: ICD-10-CM

## 2018-10-24 DIAGNOSIS — M54.50 CHRONIC BILATERAL LOW BACK PAIN WITHOUT SCIATICA: ICD-10-CM

## 2018-10-24 DIAGNOSIS — Z12.31 VISIT FOR SCREENING MAMMOGRAM: ICD-10-CM

## 2018-10-24 DIAGNOSIS — N18.30 CONTROLLED TYPE 2 DIABETES MELLITUS WITH STAGE 3 CHRONIC KIDNEY DISEASE, WITHOUT LONG-TERM CURRENT USE OF INSULIN (HCC): ICD-10-CM

## 2018-10-24 DIAGNOSIS — M79.604 CHRONIC PAIN OF BOTH LOWER EXTREMITIES: ICD-10-CM

## 2018-10-24 DIAGNOSIS — G89.29 CHRONIC PAIN OF BOTH LOWER EXTREMITIES: ICD-10-CM

## 2018-10-24 DIAGNOSIS — D50.8 IRON DEFICIENCY ANEMIA SECONDARY TO INADEQUATE DIETARY IRON INTAKE: ICD-10-CM

## 2018-10-24 DIAGNOSIS — F41.9 ANXIETY: ICD-10-CM

## 2018-10-24 DIAGNOSIS — K21.9 GASTROESOPHAGEAL REFLUX DISEASE WITHOUT ESOPHAGITIS: ICD-10-CM

## 2018-10-24 DIAGNOSIS — E11.22 CONTROLLED TYPE 2 DIABETES MELLITUS WITH STAGE 3 CHRONIC KIDNEY DISEASE, WITHOUT LONG-TERM CURRENT USE OF INSULIN (HCC): ICD-10-CM

## 2018-10-24 DIAGNOSIS — N18.2 CKD (CHRONIC KIDNEY DISEASE), STAGE II: ICD-10-CM

## 2018-10-24 DIAGNOSIS — E78.5 HYPERLIPIDEMIA WITH TARGET LDL LESS THAN 100: ICD-10-CM

## 2018-10-24 DIAGNOSIS — M79.605 CHRONIC PAIN OF BOTH LOWER EXTREMITIES: ICD-10-CM

## 2018-10-24 DIAGNOSIS — I10 ESSENTIAL HYPERTENSION, BENIGN: Primary | ICD-10-CM

## 2018-10-24 DIAGNOSIS — Z79.01 LONG-TERM (CURRENT) USE OF ANTICOAGULANTS, INR GOAL 2.5-3.5: ICD-10-CM

## 2018-10-24 PROCEDURE — 99214 OFFICE O/P EST MOD 30 MIN: CPT | Performed by: FAMILY MEDICINE

## 2018-10-24 RX ORDER — ALENDRONATE SODIUM 35 MG/1
TABLET ORAL
COMMUNITY
Start: 2018-10-08 | End: 2020-09-16

## 2018-10-24 RX ORDER — ACETAMINOPHEN AND CODEINE PHOSPHATE 300; 30 MG/1; MG/1
1 TABLET ORAL 3 TIMES DAILY PRN
Qty: 42 TABLET | Refills: 0 | Status: CANCELLED | OUTPATIENT
Start: 2018-10-24 | End: 2018-11-23

## 2018-10-24 RX ORDER — ALPRAZOLAM 0.5 MG/1
TABLET ORAL
Qty: 90 TABLET | Refills: 0 | Status: SHIPPED | OUTPATIENT
Start: 2018-10-24 | End: 2018-12-12 | Stop reason: SDUPTHER

## 2018-10-24 RX ORDER — ATORVASTATIN CALCIUM 10 MG/1
10 TABLET, FILM COATED ORAL DAILY
Qty: 90 TABLET | Refills: 0 | Status: SHIPPED | OUTPATIENT
Start: 2018-10-24 | End: 2019-01-20 | Stop reason: SDUPTHER

## 2018-10-24 RX ORDER — CEFDINIR 300 MG/1
300 CAPSULE ORAL 2 TIMES DAILY
COMMUNITY
End: 2020-01-29

## 2018-10-24 ASSESSMENT — ENCOUNTER SYMPTOMS
ABDOMINAL PAIN: 0
ABDOMINAL DISTENTION: 0
CHEST TIGHTNESS: 0
BACK PAIN: 1
COUGH: 0
CONSTIPATION: 0
NAUSEA: 0
DIARRHEA: 0
STRIDOR: 0
WHEEZING: 0
SHORTNESS OF BREATH: 0
ANAL BLEEDING: 0
CHOKING: 0
RECTAL PAIN: 0
APNEA: 0
VOMITING: 0
BLOOD IN STOOL: 0

## 2018-10-24 ASSESSMENT — PATIENT HEALTH QUESTIONNAIRE - PHQ9
2. FEELING DOWN, DEPRESSED OR HOPELESS: 0
SUM OF ALL RESPONSES TO PHQ QUESTIONS 1-9: 0
SUM OF ALL RESPONSES TO PHQ9 QUESTIONS 1 & 2: 0
1. LITTLE INTEREST OR PLEASURE IN DOING THINGS: 0
SUM OF ALL RESPONSES TO PHQ QUESTIONS 1-9: 0

## 2018-10-24 ASSESSMENT — COPD QUESTIONNAIRES: COPD: 1

## 2018-10-24 NOTE — PROGRESS NOTES
Subjective:      Patient ID: Uri Dolan is a 68 y.o. . Diabetes   Hypoglycemia symptoms include nervousness/anxiousness. Pertinent negatives for hypoglycemia include no confusion, dizziness, headaches or pallor. Pertinent negatives for diabetes include no chest pain, no fatigue, no polydipsia, no polyphagia and no polyuria. Hypertension   Pertinent negatives include no chest pain, headaches, palpitations or shortness of breath. COPD   There is no cough, shortness of breath or wheezing. Pertinent negatives include no appetite change, chest pain, fever or headaches. Results for Vianey Pugh (MRN [id number]) as of 10/24/2018 13:42   Ref.  Range 9/10/2018 09:13   Sodium Latest Ref Range: 135 - 146 mmol/L 135   Potassium Latest Ref Range: 3.5 - 5.3 mmol/L 4.4   Chloride Latest Ref Range: 98 - 110 mmol/L 98   CO2 Latest Ref Range: 20 - 32 mmol/L 31   BUN Latest Ref Range: 7 - 25 mg/dL 22   Creatinine Latest Ref Range: 0.60 - 1.3mg/dL 1.05    BUN/Creatinine Ratio Latest Ref Range: 6 - 22 (calc) 21   Est, Glom Filt Rate Latest Ref Range: > OR = 60 mL/min/1.73m2 53 (L)   GFR  Latest Ref Range: > OR = 60 mL/min/1.73m2 61   Glucose Latest Ref Range: 65 - 99 mg/dL 188 (H)   Calcium Latest Ref Range: 8.6 - 10.4 mg/dL 9.6   Total Protein Latest Ref Range: 6.1 - 8.1 g/dL 6.6   Chol/HDL Ratio Latest Ref Range: <5.0 (calc) 4.0   Cholesterol Latest Ref Range: <130 mg/dL (calc) 151 (H)   Cholesterol, Total Latest Ref Range: <200 mg/dL 202 (H)   HDL Cholesterol Latest Ref Range: >50 mg/dL 51   LDL Calculated Latest Units: mg/dL (calc) 118 (H)   Triglycerides Latest Ref Range: <150 mg/dL 209 (H)   Albumin Latest Ref Range: 3.6 - 5.1 g/dL 4.7   Globulin Latest Ref Range: 1.9 - 3.7 g/dL (calc) 1.9   Albumin/Globulin Ratio Latest Ref Range: 1.0 - 2.5 (calc) 2.5   Alk Phos Latest Ref Range: 33 - 130 U/L 67   ALT Latest Ref Range: 6 - 29 U/L 13   AST Latest Ref Range: 10 - 35 U/L 27   Bilirubin Latest Ref Range: 0.2 108 (90 BASE) MCG/ACT inhaler Inhale 1-2 puffs into the lungs every 6 hours as needed for Wheezing 1 Inhaler 1    warfarin (COUMADIN) 4 MG tablet TAKE 1 TABLET BY MOUTH DAILY 30 tablet 0    PROAIR  (90 BASE) MCG/ACT inhaler INHALE 2 PUFFS BY MOUTH EVERY 4 HOURS AS NEEDED FOR WHEEZING/ SHORTNESS OF BREATH 8.5 g 0    hydroxychloroquine (PLAQUENIL) 200 MG tablet Take  by mouth daily.  ferrous sulfate 325 (65 FE) MG EC tablet Take 325 mg by mouth 2 times daily. No current facility-administered medications on file prior to visit. Past Medical History:   Diagnosis Date    A-fib ([facility]     Anemia     multifactorial:under care of heme-onc:Dr. Ynes Singleton    Anemia:multifactorial 2011    as per heme-onc    Anxiety     Cataract     bilateral    Chronic back pain     Under care of ortho spine:Dr. Leroy Wang    CKD (chronic kidney disease) stage 3, GFR 30-59 ml/min 1/2012    Colitis, ischemic ([facility] 6/2012    Under care of Dr. Lisa Noriega COPD     Diabetes     Diabetic eye exam ([facility] 1/28/15    CEI    GERD (gastroesophageal reflux disease)     Hyperlipidaemia LDL goal <100     Hypertension     Insomnia     Long-term (current) use of anticoagulants, INR goal 2.5-3.5     Lymphoma:monoclonal B cell 1/2012    Under care of Dr. Mica Durham abnormal 7/30/15    Right breast mass:benign(?lipoma)per US:repeat testing in 6mos=1/30/16    Nonspecific abnormal results of kidney function study 1/25/2012    Osteoarthritis     Renal cysts, right 1/2/2014    RLS (restless legs syndrome) 10/19/11    Sciatica     Screening colonoscopy 2010;6/19/13    Nml. Next 10yrs:6/2023:Dr. Isabel Hansen.  9/22/16(Dr. Waddell):nml EGD & colonoscopy except mild diverticulosis    Therapeutic drug monitoring 04/10/2015    OARRS report consistent on 4/10/15;7/6/15;10/23/15;2/7/16;5/16/16;8/19/16;11/4/16;3/6/17;6/26/17;9/20/17;12/18/17; 12/18/17;3/12/18;5/29/18    Valvular heart

## 2018-10-27 LAB
INR BLD: 5.4
INR BLD: 5.9

## 2018-10-28 ENCOUNTER — ANTI-COAG VISIT (OUTPATIENT)
Dept: FAMILY MEDICINE CLINIC | Age: 73
End: 2018-10-28

## 2018-10-28 DIAGNOSIS — Z79.01 LONG-TERM (CURRENT) USE OF ANTICOAGULANTS, INR GOAL 2.5-3.5: ICD-10-CM

## 2018-10-28 DIAGNOSIS — Z95.2 AORTIC VALVE REPLACED: ICD-10-CM

## 2018-10-28 RX ORDER — WARFARIN SODIUM 3 MG/1
TABLET ORAL
Qty: 90 TABLET | Refills: 0 | Status: SHIPPED | OUTPATIENT
Start: 2018-10-28 | End: 2019-01-24 | Stop reason: SDUPTHER

## 2018-10-29 ENCOUNTER — ANTI-COAG VISIT (OUTPATIENT)
Dept: FAMILY MEDICINE CLINIC | Age: 73
End: 2018-10-29

## 2018-10-29 DIAGNOSIS — Z95.2 AORTIC VALVE REPLACED: ICD-10-CM

## 2018-11-06 RX ORDER — GLIPIZIDE 5 MG/1
TABLET ORAL
Qty: 45 TABLET | Refills: 0 | Status: SHIPPED | OUTPATIENT
Start: 2018-11-06 | End: 2019-02-01 | Stop reason: SDUPTHER

## 2018-11-08 RX ORDER — CALCIUM CITRATE/VITAMIN D3 200MG-6.25
TABLET ORAL
Qty: 150 STRIP | Refills: 0 | Status: SHIPPED | OUTPATIENT
Start: 2018-11-08 | End: 2021-07-29

## 2018-11-10 LAB — INR BLD: 2

## 2018-11-12 ENCOUNTER — ANTI-COAG VISIT (OUTPATIENT)
Dept: FAMILY MEDICINE CLINIC | Age: 73
End: 2018-11-12

## 2018-11-12 ENCOUNTER — TELEPHONE (OUTPATIENT)
Dept: FAMILY MEDICINE CLINIC | Age: 73
End: 2018-11-12

## 2018-11-12 DIAGNOSIS — E11.22 CONTROLLED TYPE 2 DIABETES MELLITUS WITH STAGE 3 CHRONIC KIDNEY DISEASE, WITHOUT LONG-TERM CURRENT USE OF INSULIN (HCC): Primary | ICD-10-CM

## 2018-11-12 DIAGNOSIS — N18.30 CONTROLLED TYPE 2 DIABETES MELLITUS WITH STAGE 3 CHRONIC KIDNEY DISEASE, WITHOUT LONG-TERM CURRENT USE OF INSULIN (HCC): Primary | ICD-10-CM

## 2018-11-12 DIAGNOSIS — Z95.2 AORTIC VALVE REPLACED: ICD-10-CM

## 2018-11-12 RX ORDER — PANTOPRAZOLE SODIUM 40 MG/1
TABLET, DELAYED RELEASE ORAL
Qty: 90 TABLET | Refills: 0 | Status: SHIPPED | OUTPATIENT
Start: 2018-11-12 | End: 2019-02-12 | Stop reason: SDUPTHER

## 2018-11-19 ENCOUNTER — ANTI-COAG VISIT (OUTPATIENT)
Dept: FAMILY MEDICINE CLINIC | Age: 73
End: 2018-11-19

## 2018-11-19 ENCOUNTER — TELEPHONE (OUTPATIENT)
Dept: FAMILY MEDICINE CLINIC | Age: 73
End: 2018-11-19

## 2018-11-19 DIAGNOSIS — G89.29 CHRONIC BILATERAL LOW BACK PAIN WITHOUT SCIATICA: ICD-10-CM

## 2018-11-19 DIAGNOSIS — Z95.2 AORTIC VALVE REPLACED: ICD-10-CM

## 2018-11-19 DIAGNOSIS — G89.29 CHRONIC PAIN OF BOTH LOWER EXTREMITIES: ICD-10-CM

## 2018-11-19 DIAGNOSIS — M79.604 CHRONIC PAIN OF BOTH LOWER EXTREMITIES: ICD-10-CM

## 2018-11-19 DIAGNOSIS — M79.605 CHRONIC PAIN OF BOTH LOWER EXTREMITIES: ICD-10-CM

## 2018-11-19 DIAGNOSIS — M54.50 CHRONIC BILATERAL LOW BACK PAIN WITHOUT SCIATICA: ICD-10-CM

## 2018-11-19 LAB — INR BLD: 3.4

## 2018-11-19 NOTE — TELEPHONE ENCOUNTER
Patient called requesting a call back from [facility]. All patient would say is it involves her INR.    Please advise  Jennie Jarrett [phone] (home) [phone] (work)

## 2018-11-20 RX ORDER — ACETAMINOPHEN AND CODEINE PHOSPHATE 300; 30 MG/1; MG/1
1 TABLET ORAL 3 TIMES DAILY PRN
Qty: 42 TABLET | Refills: 0 | Status: SHIPPED | OUTPATIENT
Start: 2018-11-20 | End: 2019-01-10 | Stop reason: SDUPTHER

## 2018-12-03 RX ORDER — LISINOPRIL 40 MG/1
TABLET ORAL
Qty: 90 TABLET | Refills: 0 | Status: SHIPPED | OUTPATIENT
Start: 2018-12-03 | End: 2019-03-10 | Stop reason: SDUPTHER

## 2018-12-12 DIAGNOSIS — F41.9 ANXIETY: ICD-10-CM

## 2018-12-12 RX ORDER — ALPRAZOLAM 0.5 MG/1
TABLET ORAL
Qty: 90 TABLET | Refills: 0 | Status: SHIPPED | OUTPATIENT
Start: 2018-12-12 | End: 2019-01-16 | Stop reason: SDUPTHER

## 2018-12-20 LAB — INR BLD: 3.2

## 2018-12-24 ENCOUNTER — ANTI-COAG VISIT (OUTPATIENT)
Dept: FAMILY MEDICINE CLINIC | Age: 73
End: 2018-12-24

## 2018-12-24 DIAGNOSIS — Z95.2 AORTIC VALVE REPLACED: ICD-10-CM

## 2018-12-27 DIAGNOSIS — I10 ESSENTIAL HYPERTENSION, BENIGN: ICD-10-CM

## 2018-12-27 RX ORDER — CHLORTHALIDONE 25 MG/1
TABLET ORAL
Qty: 90 TABLET | Refills: 0 | Status: SHIPPED | OUTPATIENT
Start: 2018-12-27 | End: 2019-04-06 | Stop reason: SDUPTHER

## 2019-01-10 DIAGNOSIS — G89.29 CHRONIC PAIN OF BOTH LOWER EXTREMITIES: ICD-10-CM

## 2019-01-10 DIAGNOSIS — M79.604 CHRONIC PAIN OF BOTH LOWER EXTREMITIES: ICD-10-CM

## 2019-01-10 DIAGNOSIS — M54.50 CHRONIC BILATERAL LOW BACK PAIN WITHOUT SCIATICA: ICD-10-CM

## 2019-01-10 DIAGNOSIS — G89.29 CHRONIC BILATERAL LOW BACK PAIN WITHOUT SCIATICA: ICD-10-CM

## 2019-01-10 DIAGNOSIS — M79.605 CHRONIC PAIN OF BOTH LOWER EXTREMITIES: ICD-10-CM

## 2019-01-10 RX ORDER — ACETAMINOPHEN AND CODEINE PHOSPHATE 300; 30 MG/1; MG/1
1 TABLET ORAL 3 TIMES DAILY PRN
Qty: 42 TABLET | Refills: 0 | Status: SHIPPED | OUTPATIENT
Start: 2019-01-10 | End: 2019-02-21 | Stop reason: SDUPTHER

## 2019-01-16 ENCOUNTER — OFFICE VISIT (OUTPATIENT)
Dept: FAMILY MEDICINE CLINIC | Age: 74
End: 2019-01-16
Payer: MEDICARE

## 2019-01-16 VITALS
DIASTOLIC BLOOD PRESSURE: 66 MMHG | WEIGHT: 122 LBS | SYSTOLIC BLOOD PRESSURE: 123 MMHG | HEIGHT: 59 IN | OXYGEN SATURATION: 96 % | TEMPERATURE: 97.7 F | HEART RATE: 84 BPM | RESPIRATION RATE: 16 BRPM | BODY MASS INDEX: 24.6 KG/M2

## 2019-01-16 DIAGNOSIS — M79.604 CHRONIC PAIN OF BOTH LOWER EXTREMITIES: ICD-10-CM

## 2019-01-16 DIAGNOSIS — I10 ESSENTIAL HYPERTENSION, BENIGN: Primary | ICD-10-CM

## 2019-01-16 DIAGNOSIS — J43.8 OTHER EMPHYSEMA (HCC): ICD-10-CM

## 2019-01-16 DIAGNOSIS — F51.01 PRIMARY INSOMNIA: ICD-10-CM

## 2019-01-16 DIAGNOSIS — K21.9 GASTROESOPHAGEAL REFLUX DISEASE WITHOUT ESOPHAGITIS: ICD-10-CM

## 2019-01-16 DIAGNOSIS — G89.29 CHRONIC PAIN OF BOTH LOWER EXTREMITIES: ICD-10-CM

## 2019-01-16 DIAGNOSIS — N18.30 CONTROLLED TYPE 2 DIABETES MELLITUS WITH STAGE 3 CHRONIC KIDNEY DISEASE, WITHOUT LONG-TERM CURRENT USE OF INSULIN (HCC): ICD-10-CM

## 2019-01-16 DIAGNOSIS — F41.9 ANXIETY: ICD-10-CM

## 2019-01-16 DIAGNOSIS — D50.8 IRON DEFICIENCY ANEMIA SECONDARY TO INADEQUATE DIETARY IRON INTAKE: ICD-10-CM

## 2019-01-16 DIAGNOSIS — M54.50 CHRONIC BILATERAL LOW BACK PAIN WITHOUT SCIATICA: ICD-10-CM

## 2019-01-16 DIAGNOSIS — G89.29 CHRONIC BILATERAL LOW BACK PAIN WITHOUT SCIATICA: ICD-10-CM

## 2019-01-16 DIAGNOSIS — M79.605 CHRONIC PAIN OF BOTH LOWER EXTREMITIES: ICD-10-CM

## 2019-01-16 DIAGNOSIS — E11.22 CONTROLLED TYPE 2 DIABETES MELLITUS WITH STAGE 3 CHRONIC KIDNEY DISEASE, WITHOUT LONG-TERM CURRENT USE OF INSULIN (HCC): ICD-10-CM

## 2019-01-16 DIAGNOSIS — E78.5 HYPERLIPIDEMIA WITH TARGET LDL LESS THAN 100: ICD-10-CM

## 2019-01-16 DIAGNOSIS — N18.30 STAGE 3 CHRONIC KIDNEY DISEASE (HCC): ICD-10-CM

## 2019-01-16 PROCEDURE — 3017F COLORECTAL CA SCREEN DOC REV: CPT | Performed by: FAMILY MEDICINE

## 2019-01-16 PROCEDURE — 1101F PT FALLS ASSESS-DOCD LE1/YR: CPT | Performed by: FAMILY MEDICINE

## 2019-01-16 PROCEDURE — G8420 CALC BMI NORM PARAMETERS: HCPCS | Performed by: FAMILY MEDICINE

## 2019-01-16 PROCEDURE — 3023F SPIROM DOC REV: CPT | Performed by: FAMILY MEDICINE

## 2019-01-16 PROCEDURE — 4040F PNEUMOC VAC/ADMIN/RCVD: CPT | Performed by: FAMILY MEDICINE

## 2019-01-16 PROCEDURE — G8427 DOCREV CUR MEDS BY ELIG CLIN: HCPCS | Performed by: FAMILY MEDICINE

## 2019-01-16 PROCEDURE — 1090F PRES/ABSN URINE INCON ASSESS: CPT | Performed by: FAMILY MEDICINE

## 2019-01-16 PROCEDURE — 1123F ACP DISCUSS/DSCN MKR DOCD: CPT | Performed by: FAMILY MEDICINE

## 2019-01-16 PROCEDURE — G8400 PT W/DXA NO RESULTS DOC: HCPCS | Performed by: FAMILY MEDICINE

## 2019-01-16 PROCEDURE — G8926 SPIRO NO PERF OR DOC: HCPCS | Performed by: FAMILY MEDICINE

## 2019-01-16 PROCEDURE — 1036F TOBACCO NON-USER: CPT | Performed by: FAMILY MEDICINE

## 2019-01-16 PROCEDURE — 3044F HG A1C LEVEL LT 7.0%: CPT | Performed by: FAMILY MEDICINE

## 2019-01-16 PROCEDURE — 2022F DILAT RTA XM EVC RTNOPTHY: CPT | Performed by: FAMILY MEDICINE

## 2019-01-16 PROCEDURE — 99214 OFFICE O/P EST MOD 30 MIN: CPT | Performed by: FAMILY MEDICINE

## 2019-01-16 PROCEDURE — G8484 FLU IMMUNIZE NO ADMIN: HCPCS | Performed by: FAMILY MEDICINE

## 2019-01-16 RX ORDER — ALPRAZOLAM 0.5 MG/1
TABLET ORAL
Qty: 90 TABLET | Refills: 0 | Status: SHIPPED | OUTPATIENT
Start: 2019-01-16 | End: 2019-03-10 | Stop reason: SDUPTHER

## 2019-01-16 ASSESSMENT — COPD QUESTIONNAIRES: COPD: 1

## 2019-01-16 ASSESSMENT — ENCOUNTER SYMPTOMS
BLOOD IN STOOL: 0
CONSTIPATION: 0
STRIDOR: 0
RECTAL PAIN: 0
NAUSEA: 0
COUGH: 0
SHORTNESS OF BREATH: 0
ABDOMINAL PAIN: 0
WHEEZING: 0
BACK PAIN: 1
COLOR CHANGE: 0
VOMITING: 0
CHOKING: 0
CHEST TIGHTNESS: 0
ANAL BLEEDING: 0
ABDOMINAL DISTENTION: 0
APNEA: 0
DIARRHEA: 0

## 2019-01-16 ASSESSMENT — PATIENT HEALTH QUESTIONNAIRE - PHQ9
2. FEELING DOWN, DEPRESSED OR HOPELESS: 0
SUM OF ALL RESPONSES TO PHQ9 QUESTIONS 1 & 2: 0
SUM OF ALL RESPONSES TO PHQ QUESTIONS 1-9: 0
1. LITTLE INTEREST OR PLEASURE IN DOING THINGS: 0
SUM OF ALL RESPONSES TO PHQ QUESTIONS 1-9: 0

## 2019-01-20 DIAGNOSIS — E78.5 HYPERLIPIDEMIA WITH TARGET LDL LESS THAN 100: ICD-10-CM

## 2019-01-20 LAB — INR BLD: 3.8

## 2019-01-20 RX ORDER — ATORVASTATIN CALCIUM 10 MG/1
TABLET, FILM COATED ORAL
Qty: 90 TABLET | Refills: 0 | Status: SHIPPED | OUTPATIENT
Start: 2019-01-20 | End: 2019-04-22 | Stop reason: SDUPTHER

## 2019-01-21 ENCOUNTER — ANTI-COAG VISIT (OUTPATIENT)
Dept: FAMILY MEDICINE CLINIC | Age: 74
End: 2019-01-21

## 2019-01-21 DIAGNOSIS — Z95.2 AORTIC VALVE REPLACED: ICD-10-CM

## 2019-01-22 ENCOUNTER — TELEPHONE (OUTPATIENT)
Dept: FAMILY MEDICINE CLINIC | Age: 74
End: 2019-01-22

## 2019-01-24 DIAGNOSIS — Z79.01 LONG-TERM (CURRENT) USE OF ANTICOAGULANTS, INR GOAL 2.5-3.5: ICD-10-CM

## 2019-01-24 RX ORDER — WARFARIN SODIUM 3 MG/1
TABLET ORAL
Qty: 90 TABLET | Refills: 0 | Status: SHIPPED | OUTPATIENT
Start: 2019-01-24 | End: 2019-04-29 | Stop reason: SDUPTHER

## 2019-02-01 ENCOUNTER — TELEPHONE (OUTPATIENT)
Dept: FAMILY MEDICINE CLINIC | Age: 74
End: 2019-02-01

## 2019-02-01 ENCOUNTER — ANTI-COAG VISIT (OUTPATIENT)
Dept: FAMILY MEDICINE CLINIC | Age: 74
End: 2019-02-01

## 2019-02-01 DIAGNOSIS — Z95.2 AORTIC VALVE REPLACED: ICD-10-CM

## 2019-02-01 LAB — INR BLD: 2.3

## 2019-02-01 RX ORDER — GLIPIZIDE 5 MG/1
TABLET ORAL
Qty: 45 TABLET | Refills: 0 | Status: SHIPPED | OUTPATIENT
Start: 2019-02-01 | End: 2019-04-25 | Stop reason: SDUPTHER

## 2019-02-11 ENCOUNTER — ANTI-COAG VISIT (OUTPATIENT)
Dept: FAMILY MEDICINE CLINIC | Age: 74
End: 2019-02-11

## 2019-02-11 DIAGNOSIS — Z95.2 AORTIC VALVE REPLACED: ICD-10-CM

## 2019-02-11 LAB — INR BLD: 2.6

## 2019-02-12 RX ORDER — PANTOPRAZOLE SODIUM 40 MG/1
TABLET, DELAYED RELEASE ORAL
Qty: 90 TABLET | Refills: 0 | Status: SHIPPED | OUTPATIENT
Start: 2019-02-12 | End: 2019-05-23 | Stop reason: SDUPTHER

## 2019-02-21 DIAGNOSIS — M79.605 CHRONIC PAIN OF BOTH LOWER EXTREMITIES: ICD-10-CM

## 2019-02-21 DIAGNOSIS — G89.29 CHRONIC PAIN OF BOTH LOWER EXTREMITIES: ICD-10-CM

## 2019-02-21 DIAGNOSIS — M79.604 CHRONIC PAIN OF BOTH LOWER EXTREMITIES: ICD-10-CM

## 2019-02-21 DIAGNOSIS — M54.50 CHRONIC BILATERAL LOW BACK PAIN WITHOUT SCIATICA: ICD-10-CM

## 2019-02-21 DIAGNOSIS — G89.29 CHRONIC BILATERAL LOW BACK PAIN WITHOUT SCIATICA: ICD-10-CM

## 2019-02-21 RX ORDER — ACETAMINOPHEN AND CODEINE PHOSPHATE 300; 30 MG/1; MG/1
1 TABLET ORAL 3 TIMES DAILY PRN
Qty: 42 TABLET | Refills: 0 | Status: SHIPPED | OUTPATIENT
Start: 2019-02-21 | End: 2019-04-09 | Stop reason: SDUPTHER

## 2019-03-10 DIAGNOSIS — F41.9 ANXIETY: ICD-10-CM

## 2019-03-10 RX ORDER — LISINOPRIL 40 MG/1
TABLET ORAL
Qty: 90 TABLET | Refills: 0 | Status: SHIPPED | OUTPATIENT
Start: 2019-03-10 | End: 2019-06-05 | Stop reason: SDUPTHER

## 2019-03-11 RX ORDER — ALPRAZOLAM 0.5 MG/1
TABLET ORAL
Qty: 90 TABLET | Refills: 0 | Status: SHIPPED | OUTPATIENT
Start: 2019-03-11 | End: 2019-04-17 | Stop reason: SDUPTHER

## 2019-03-18 LAB — INR BLD: 2.7

## 2019-03-19 ENCOUNTER — ANTI-COAG VISIT (OUTPATIENT)
Dept: FAMILY MEDICINE CLINIC | Age: 74
End: 2019-03-19

## 2019-03-19 DIAGNOSIS — Z95.2 AORTIC VALVE REPLACED: ICD-10-CM

## 2019-04-06 DIAGNOSIS — I10 ESSENTIAL HYPERTENSION, BENIGN: ICD-10-CM

## 2019-04-07 RX ORDER — CHLORTHALIDONE 25 MG/1
TABLET ORAL
Qty: 90 TABLET | Refills: 0 | Status: SHIPPED | OUTPATIENT
Start: 2019-04-07 | End: 2019-04-08 | Stop reason: SDUPTHER

## 2019-04-08 ENCOUNTER — TELEPHONE (OUTPATIENT)
Dept: FAMILY MEDICINE CLINIC | Age: 74
End: 2019-04-08

## 2019-04-08 DIAGNOSIS — I10 ESSENTIAL HYPERTENSION, BENIGN: ICD-10-CM

## 2019-04-08 RX ORDER — CHLORTHALIDONE 25 MG/1
TABLET ORAL
Qty: 90 TABLET | Refills: 0 | Status: SHIPPED | OUTPATIENT
Start: 2019-04-08 | End: 2019-09-20 | Stop reason: SDUPTHER

## 2019-04-09 ENCOUNTER — TELEPHONE (OUTPATIENT)
Dept: FAMILY MEDICINE CLINIC | Age: 74
End: 2019-04-09

## 2019-04-09 DIAGNOSIS — M54.50 CHRONIC BILATERAL LOW BACK PAIN WITHOUT SCIATICA: ICD-10-CM

## 2019-04-09 DIAGNOSIS — G89.29 CHRONIC BILATERAL LOW BACK PAIN WITHOUT SCIATICA: ICD-10-CM

## 2019-04-09 DIAGNOSIS — M79.605 CHRONIC PAIN OF BOTH LOWER EXTREMITIES: ICD-10-CM

## 2019-04-09 DIAGNOSIS — G89.29 CHRONIC PAIN OF BOTH LOWER EXTREMITIES: ICD-10-CM

## 2019-04-09 DIAGNOSIS — M79.604 CHRONIC PAIN OF BOTH LOWER EXTREMITIES: ICD-10-CM

## 2019-04-09 RX ORDER — ACETAMINOPHEN AND CODEINE PHOSPHATE 300; 30 MG/1; MG/1
1 TABLET ORAL 3 TIMES DAILY PRN
Qty: 42 TABLET | Refills: 0 | Status: SHIPPED | OUTPATIENT
Start: 2019-04-09 | End: 2019-05-20 | Stop reason: SDUPTHER

## 2019-04-09 NOTE — TELEPHONE ENCOUNTER
Controlled Medication Prescription is ready for pt  at the  [facility] Ronnell informing pt.   Close Encounter

## 2019-04-17 ENCOUNTER — OFFICE VISIT (OUTPATIENT)
Dept: FAMILY MEDICINE CLINIC | Age: 74
End: 2019-04-17
Payer: MEDICARE

## 2019-04-17 VITALS
HEART RATE: 85 BPM | WEIGHT: 122.6 LBS | OXYGEN SATURATION: 97 % | HEIGHT: 59 IN | DIASTOLIC BLOOD PRESSURE: 63 MMHG | RESPIRATION RATE: 16 BRPM | TEMPERATURE: 97.4 F | SYSTOLIC BLOOD PRESSURE: 130 MMHG | BODY MASS INDEX: 24.72 KG/M2

## 2019-04-17 DIAGNOSIS — E11.22 CONTROLLED TYPE 2 DIABETES MELLITUS WITH STAGE 3 CHRONIC KIDNEY DISEASE, WITHOUT LONG-TERM CURRENT USE OF INSULIN (HCC): ICD-10-CM

## 2019-04-17 DIAGNOSIS — M54.50 CHRONIC BILATERAL LOW BACK PAIN WITHOUT SCIATICA: ICD-10-CM

## 2019-04-17 DIAGNOSIS — Z12.31 VISIT FOR SCREENING MAMMOGRAM: ICD-10-CM

## 2019-04-17 DIAGNOSIS — F41.9 ANXIETY: ICD-10-CM

## 2019-04-17 DIAGNOSIS — C85.90 LYMPHOMA, UNSPECIFIED BODY REGION, UNSPECIFIED LYMPHOMA TYPE (HCC): ICD-10-CM

## 2019-04-17 DIAGNOSIS — E78.5 HYPERLIPIDEMIA WITH TARGET LDL LESS THAN 100: ICD-10-CM

## 2019-04-17 DIAGNOSIS — K21.9 GASTROESOPHAGEAL REFLUX DISEASE WITHOUT ESOPHAGITIS: ICD-10-CM

## 2019-04-17 DIAGNOSIS — I10 ESSENTIAL HYPERTENSION, BENIGN: Primary | ICD-10-CM

## 2019-04-17 DIAGNOSIS — Z79.01 LONG-TERM (CURRENT) USE OF ANTICOAGULANTS, INR GOAL 2.5-3.5: ICD-10-CM

## 2019-04-17 DIAGNOSIS — N18.30 CONTROLLED TYPE 2 DIABETES MELLITUS WITH STAGE 3 CHRONIC KIDNEY DISEASE, WITHOUT LONG-TERM CURRENT USE OF INSULIN (HCC): ICD-10-CM

## 2019-04-17 DIAGNOSIS — J43.8 OTHER EMPHYSEMA (HCC): ICD-10-CM

## 2019-04-17 DIAGNOSIS — N18.2 CKD (CHRONIC KIDNEY DISEASE), STAGE II: ICD-10-CM

## 2019-04-17 DIAGNOSIS — G89.29 CHRONIC BILATERAL LOW BACK PAIN WITHOUT SCIATICA: ICD-10-CM

## 2019-04-17 DIAGNOSIS — F51.01 PRIMARY INSOMNIA: ICD-10-CM

## 2019-04-17 PROCEDURE — G8427 DOCREV CUR MEDS BY ELIG CLIN: HCPCS | Performed by: FAMILY MEDICINE

## 2019-04-17 PROCEDURE — 3017F COLORECTAL CA SCREEN DOC REV: CPT | Performed by: FAMILY MEDICINE

## 2019-04-17 PROCEDURE — 4040F PNEUMOC VAC/ADMIN/RCVD: CPT | Performed by: FAMILY MEDICINE

## 2019-04-17 PROCEDURE — 99214 OFFICE O/P EST MOD 30 MIN: CPT | Performed by: FAMILY MEDICINE

## 2019-04-17 PROCEDURE — 2022F DILAT RTA XM EVC RTNOPTHY: CPT | Performed by: FAMILY MEDICINE

## 2019-04-17 PROCEDURE — G8420 CALC BMI NORM PARAMETERS: HCPCS | Performed by: FAMILY MEDICINE

## 2019-04-17 PROCEDURE — 1090F PRES/ABSN URINE INCON ASSESS: CPT | Performed by: FAMILY MEDICINE

## 2019-04-17 PROCEDURE — 3023F SPIROM DOC REV: CPT | Performed by: FAMILY MEDICINE

## 2019-04-17 PROCEDURE — G8926 SPIRO NO PERF OR DOC: HCPCS | Performed by: FAMILY MEDICINE

## 2019-04-17 PROCEDURE — 3044F HG A1C LEVEL LT 7.0%: CPT | Performed by: FAMILY MEDICINE

## 2019-04-17 PROCEDURE — G8400 PT W/DXA NO RESULTS DOC: HCPCS | Performed by: FAMILY MEDICINE

## 2019-04-17 PROCEDURE — 1036F TOBACCO NON-USER: CPT | Performed by: FAMILY MEDICINE

## 2019-04-17 PROCEDURE — 1123F ACP DISCUSS/DSCN MKR DOCD: CPT | Performed by: FAMILY MEDICINE

## 2019-04-17 RX ORDER — ALPRAZOLAM 0.5 MG/1
TABLET ORAL
Qty: 90 TABLET | Refills: 0 | Status: SHIPPED | OUTPATIENT
Start: 2019-04-17 | End: 2019-06-05 | Stop reason: SDUPTHER

## 2019-04-17 ASSESSMENT — ENCOUNTER SYMPTOMS
CONSTIPATION: 0
COLOR CHANGE: 0
STRIDOR: 0
NAUSEA: 0
RECTAL PAIN: 0
ABDOMINAL PAIN: 0
COUGH: 0
ANAL BLEEDING: 0
BLOOD IN STOOL: 0
WHEEZING: 0
VOMITING: 0
SHORTNESS OF BREATH: 0
CHEST TIGHTNESS: 0
BACK PAIN: 1
ABDOMINAL DISTENTION: 0
CHOKING: 0
APNEA: 0
DIARRHEA: 0

## 2019-04-17 ASSESSMENT — PATIENT HEALTH QUESTIONNAIRE - PHQ9
SUM OF ALL RESPONSES TO PHQ QUESTIONS 1-9: 0
1. LITTLE INTEREST OR PLEASURE IN DOING THINGS: 0
2. FEELING DOWN, DEPRESSED OR HOPELESS: 0
SUM OF ALL RESPONSES TO PHQ9 QUESTIONS 1 & 2: 0
SUM OF ALL RESPONSES TO PHQ QUESTIONS 1-9: 0

## 2019-04-17 ASSESSMENT — COPD QUESTIONNAIRES: COPD: 1

## 2019-04-17 NOTE — PROGRESS NOTES
compSubjective:      Patient ID: Luisana Livingston is a 69 y.o. . Diabetes   Hypoglycemia symptoms include nervousness/anxiousness. Pertinent negatives for hypoglycemia include no confusion, dizziness, headaches or pallor. Pertinent negatives for diabetes include no chest pain, no fatigue, no polydipsia, no polyphagia and no polyuria. Hypertension   Pertinent negatives include no chest pain, headaches, palpitations or shortness of breath. COPD   There is no cough, shortness of breath or wheezing. Pertinent negatives include no appetite change, chest pain, fever or headaches. Results for Trey Catherine (MRN [id number]) as of 4/17/2019 13:50   Ref.  Range 3/18/2019 09:25   Sodium Latest Ref Range: 135 - 146 mmol/L 133 (L)   Potassium Latest Ref Range: 3.5 - 5.3 mmol/L 4.5   Chloride Latest Ref Range: 98 - 110 mmol/L 96 (L)   CO2 Latest Ref Range: 20 - 32 mmol/L 27   BUN Latest Ref Range: 7 - 25 mg/dL 26 (H)   Creatinine Latest Ref Range: 0.60 - 0.93 mg/dL 1.23 (H)   BUN/Creatinine Ratio Latest Ref Range: 6 - 22 (calc) 21   GFR Est Latest Ref Range: > OR = 60 mL/min/1.73m2 43 (L)   GFR  Latest Ref Range: > OR = 60 mL/min/1.73m2 50 (L)   Glucose Latest Ref Range: 65 - 99 mg/dL 139 (H)   Calcium Latest Ref Range: 8.6 - 10.4 mg/dL 9.3   Total Protein Latest Ref Range: 6.1 - 8.1 g/dL 6.4   Chol/HDL Ratio Latest Ref Range: <5.0 (calc) 2.6   Cholesterol Latest Ref Range: <130 mg/dL (calc) 83   Cholesterol, Total Latest Ref Range: <200 mg/dL 135   HDL Cholesterol Latest Ref Range: >50 mg/dL 52   LDL Calculated Latest Units: mg/dL (calc) 63   Triglycerides Latest Ref Range: <150 mg/dL 121   Albumin Latest Ref Range: 3.6 - 5.1 g/dL 4.7   Globulin Latest Ref Range: 1.9 - 3.7 g/dL (calc) 1.7 (L)   Albumin/Globulin Ratio Latest Ref Range: 1.0 - 2.5 (calc) 2.8 (H)   Alk Phos Latest Ref Range: 33 - 130 U/L 60   ALT Latest Ref Range: 6 - 29 U/L 15   AST Latest Ref Range: 10 - 35 U/L 30   Bilirubin Latest Ref Allergen Reactions    Diclofenac Other (See Comments)     Bleed/colitis    Nsaids Other (See Comments)     CANNOT TAKE D/T GI BLEEDING AND USE OF COUMADIN    Hydrocodone-Acetaminophen Anxiety       Current Outpatient Medications on File Prior to Visit   Medication Sig Dispense Refill    acetaminophen-codeine (TYLENOL #3) 300-30 MG per tablet Take 1 tablet by mouth 3 times daily as needed for Pain for up to 30 days. TAKE 1 TABLET BY MOUTH THREE TIMES DAILY AS NEEDED FOR PAIN. May cause drowsiness. May impair ability to operate vehicles or machinery.  Do not use in combination with alcohol 42 tablet 0    chlorthalidone (HYGROTON) 25 MG tablet TAKE 1 TABLET BY MOUTH EVERY DAY 90 tablet 0    metFORMIN (GLUCOPHAGE) 1000 MG tablet TAKE 1 TABLET BY MOUTH TWICE DAILY 180 tablet 0    ALPRAZolam (XANAX) 0.5 MG tablet TAKE 1 TABLET BY MOUTH THREE TIMES DAILY AS NEEDED 90 tablet 0    lisinopril (PRINIVIL;ZESTRIL) 40 MG tablet TAKE 1 TABLET BY MOUTH EVERY DAY 90 tablet 0    pantoprazole (PROTONIX) 40 MG tablet TAKE 1 TABLET BY MOUTH EVERY DAY 90 tablet 0    glipiZIDE (GLUCOTROL) 5 MG tablet TAKE 1/2(ONE-HALF) TABLET BY MOUTH EVERY DAY WITH FOOD 45 tablet 0    warfarin (COUMADIN) 3 MG tablet TAKE BY MOUTH AS DIRECTED 90 tablet 0    atorvastatin (LIPITOR) 10 MG tablet TAKE 1 TABLET BY MOUTH DAILY IN THE EVENING FOR CHOLESTEROL 90 tablet 0    blood glucose test strips (TRUE METRIX BLOOD GLUCOSE TEST) strip USE TO TEST TWICE DAILY AS DIRECTED 180 strip 0    TRUE METRIX BLOOD GLUCOSE TEST strip USE TO TEST TWICE DAILY AS DIRECTED 150 strip 0    alendronate (FOSAMAX) 35 MG tablet TAKE ONE TABLET BY MOUTH ONCE WEEKLY( EVERY 7 DAYS)      Fcsqwmkls-Veriqaap-NC (NPOZLQCD-FZ-LTJRAJCLG PO) Take by mouth daily      cefdinir (OMNICEF) 300 MG capsule Take 300 mg by mouth 2 times daily      warfarin (COUMADIN) 1 MG tablet TAKE BY MOUTH AS DIRECTED 60 tablet 0    amLODIPine (NORVASC) 5 MG tablet Take 1 tablet by mouth daily 90 tablet 3    promethazine (PHENERGAN) 12.5 MG tablet TAKE ONE TABLET BY MOUTH EVERY 6 HOURS AS NEEDED 28 tablet 0    albuterol (PROAIR HFA) 108 (90 BASE) MCG/ACT inhaler Inhale 1-2 puffs into the lungs every 6 hours as needed for Wheezing 1 Inhaler 1    warfarin (COUMADIN) 4 MG tablet TAKE 1 TABLET BY MOUTH DAILY 30 tablet 0    PROAIR  (90 BASE) MCG/ACT inhaler INHALE 2 PUFFS BY MOUTH EVERY 4 HOURS AS NEEDED FOR WHEEZING/ SHORTNESS OF BREATH 8.5 g 0    hydroxychloroquine (PLAQUENIL) 200 MG tablet Take  by mouth daily.  ferrous sulfate 325 (65 FE) MG EC tablet Take 325 mg by mouth 2 times daily. No current facility-administered medications on file prior to visit. Past Medical History:   Diagnosis Date    A-fib ([facility] [address]     Anemia     multifactorial:under care of heme-onc:Dr. Martir Jiang Anemia:multifactorial 2011    as per heme-onc    Anxiety     Cataract     bilateral    Chronic back pain     Under care of ortho spine:Dr. Stuart Gonzalez    CKD (chronic kidney disease) stage 3, GFR 30-59 ml/min ([facility] [address] 1/2012    Colitis, ischemic ([facility] [address] 6/2012    Under care of Dr. Terri Fried COPD     Diabetes     Diabetic eye exam ([facility] [address] 1/28/15    CEI    GERD (gastroesophageal reflux disease)     Hx of mammogram 05/23/2018    negative:see scanned report.  Hyperlipidaemia LDL goal <100     Hypertension     Insomnia     Long-term (current) use of anticoagulants, INR goal 2.5-3.5     Lymphoma:monoclonal B cell 1/2012    Under care of Dr. Dayne Qiu abnormal 7/30/15    Right breast mass:benign(?lipoma)per US:repeat testing in 6mos=1/30/16    Nonspecific abnormal results of kidney function study 1/25/2012    Osteoarthritis     Renal cysts, right 1/2/2014    RLS (restless legs syndrome) 10/19/11    Sciatica     Screening colonoscopy 2010;6/19/13    Nml. Next 10yrs:6/2023:Dr. Pena Chol.  9/22/16(Dr. Waddell):nml EGD & colonoscopy except mild diverticulosis    Therapeutic drug monitoring 04/10/2015    [facility] report consistent on 4/10/15;7/6/15;10/23/15;16;16;16;16;3/6/17;17;17;17; 17;3/12/18;18    Valvular heart disease     s/p aortic and mitral valve repair. Under care of cards:Dr. Elvia Olivarez.  Visit for screening mammogram 04/10/2017    negative:see scanned report. Social History     Tobacco Use    Smoking status: Former Smoker     Packs/day: 1.00     Years: 40.00     Pack years: 40.00     Types: Cigarettes     Last attempt to quit: 2007     Years since quittin.7    Smokeless tobacco: Never Used   Substance Use Topics    Alcohol use: Yes     Alcohol/week: 0.6 oz     Types: 1 Cans of beer per week     Comment: occ    Drug use: No     Social History     Substance and Sexual Activity   Drug Use No                       Review of Systems   Constitutional: Negative for activity change, appetite change, chills, diaphoresis, fatigue, fever and unexpected weight change. Eyes: Negative for visual disturbance. Respiratory: Negative for apnea, cough, choking, chest tightness, shortness of breath, wheezing and stridor. Cardiovascular: Negative for chest pain, palpitations and leg swelling. Gastrointestinal: Negative for abdominal distention, abdominal pain, anal bleeding, blood in stool, constipation, diarrhea, nausea, rectal pain and vomiting. Endocrine: Negative for cold intolerance, heat intolerance, polydipsia, polyphagia and polyuria. Genitourinary: Negative for difficulty urinating. Musculoskeletal: Positive for back pain. Skin: Negative for color change, pallor, rash and wound. Neurological: Negative for dizziness, light-headedness and headaches. Hematological: Negative for adenopathy. Psychiatric/Behavioral: Positive for sleep disturbance.  Negative for agitation, behavioral problems, confusion, decreased concentration, dysphoric mood, hallucinations, self-injury and suicidal ideas. The patient is nervous/anxious. The patient is not hyperactive. Objective:   Physical Exam   Constitutional: She is oriented to person, place, and time. Vital signs are normal. She appears well-developed and well-nourished. She is cooperative. She does not have a sickly appearance. No distress. HENT:   Nose: Nose normal.   Mouth/Throat: Uvula is midline, oropharynx is clear and moist and mucous membranes are normal.   Hearing intact to nml conversation   Eyes: Pupils are equal, round, and reactive to light. Conjunctivae, EOM and lids are normal. No scleral icterus. Neck: Trachea normal and normal range of motion. Neck supple. No JVD present. Carotid bruit is not present. Cardiovascular: Normal rate, regular rhythm, normal heart sounds, intact distal pulses and normal pulses. No bilateral leg-ankle edema noted. Pulmonary/Chest: Effort normal and breath sounds normal. No respiratory distress. CTAB,good AE bilaterally   Abdominal: Soft. Normal appearance and bowel sounds are normal. She exhibits no distension and no mass. There is no hepatomegaly. There is no tenderness. Musculoskeletal:   Stable/baseline back exam:no acute changes from previous exam on 1/16/19. Lymphadenopathy:     She has no cervical adenopathy. Neurological: She is alert and oriented to person, place, and time. Skin: Skin is warm, dry and intact. No abrasion and no rash noted. She is not diaphoretic. No cyanosis. No pallor. .Capillary refill=2-3secs. Good skin turgor. Psychiatric: She has a normal mood and affect. Her behavior is normal. Judgment and thought content normal. Her mood appears not anxious. Her affect is not angry, not blunt, not labile and not inappropriate. Her speech is not rapid and/or pressured. She is not agitated, not aggressive, not hyperactive, not slowed, not actively hallucinating and not combative.  Thought content is not paranoid and not delusional. Cognition and memory are normal. She does not exhibit a depressed mood. She expresses no homicidal and no suicidal ideation. Good eye contact. She is attentive. Assessment:      Diagnosis Orders   1. Essential hypertension, benign  VSS/well appearing. Stable. Comprehensive Metabolic Panel   2. Controlled type 2 diabetes mellitus with stage 3 chronic kidney disease, without long-term current use of insulin (HCC)  Stable. A1c due 7/2019. Diabetes:Check feet daily. Yrly eye checks advised. Education: Reviewed ABCs of diabetes management (respective goals in parentheses):  A1C (<7), blood pressure (<130/80), and cholesterol (LDL <100). Counseled at this visit on the following: diabetes complication prevention, foot care. Comprehensive Metabolic Panel   3. Anxiety  Stable. Pt' reveals no aberrant drug use behavior. Ok to continue [employer] refills w/q3mos office appts. ALPRAZolam (XANAX) 0.5 MG tablet   4. Hyperlipidemia with target LDL less than 100  Stable/controlled. Labs in 4-5mos. Comprehensive Metabolic Panel    Lipid Panel   5. Gastroesophageal reflux disease without esophagitis  Stable. 6. Primary insomnia  Stable. 7. COPD  Stable. 8. CKD (chronic kidney disease), stage II  Stable. 9. Chronic back pain   Stable. Pt' reveals no aberrant drug use behavior. Controlled with pain medication. 10. Lymphoma  Per specialist.     11. Long-term (current) use of anticoagulants, INR goal 2.5-3.5  INR monthly checks. 12. Visit for screening mammogram  SAUL DIGITAL SCREEN W OR WO CAD BILATERAL         Plan:          Obtain labs/diagnostic tests as discussed today & call back for results within 2-7days. Advised to go to local ER or call 911 for any worrisome signs/sx. Pt' left office in good condition.

## 2019-04-19 ENCOUNTER — ANTI-COAG VISIT (OUTPATIENT)
Dept: FAMILY MEDICINE CLINIC | Age: 74
End: 2019-04-19

## 2019-04-19 ENCOUNTER — TELEPHONE (OUTPATIENT)
Dept: FAMILY MEDICINE CLINIC | Age: 74
End: 2019-04-19

## 2019-04-19 DIAGNOSIS — Z95.2 AORTIC VALVE REPLACED: ICD-10-CM

## 2019-04-19 LAB
INR BLD: 2
INR BLD: 2

## 2019-04-22 DIAGNOSIS — E78.5 HYPERLIPIDEMIA WITH TARGET LDL LESS THAN 100: ICD-10-CM

## 2019-04-22 RX ORDER — ATORVASTATIN CALCIUM 10 MG/1
TABLET, FILM COATED ORAL
Qty: 90 TABLET | Refills: 0 | Status: SHIPPED | OUTPATIENT
Start: 2019-04-22 | End: 2019-07-21 | Stop reason: SDUPTHER

## 2019-04-25 RX ORDER — GLIPIZIDE 5 MG/1
TABLET ORAL
Qty: 45 TABLET | Refills: 0 | Status: SHIPPED | OUTPATIENT
Start: 2019-04-25 | End: 2019-07-21 | Stop reason: SDUPTHER

## 2019-04-26 ENCOUNTER — ANTI-COAG VISIT (OUTPATIENT)
Dept: FAMILY MEDICINE CLINIC | Age: 74
End: 2019-04-26

## 2019-04-26 DIAGNOSIS — Z95.2 AORTIC VALVE REPLACED: ICD-10-CM

## 2019-04-26 LAB — INR BLD: 2.2

## 2019-04-29 DIAGNOSIS — Z79.01 LONG-TERM (CURRENT) USE OF ANTICOAGULANTS, INR GOAL 2.5-3.5: ICD-10-CM

## 2019-04-29 RX ORDER — WARFARIN SODIUM 3 MG/1
TABLET ORAL
Qty: 90 TABLET | Refills: 0 | Status: SHIPPED | OUTPATIENT
Start: 2019-04-29 | End: 2019-05-02 | Stop reason: SDUPTHER

## 2019-05-01 DIAGNOSIS — Z95.2 AORTIC VALVE REPLACED: ICD-10-CM

## 2019-05-01 RX ORDER — AMLODIPINE BESYLATE 5 MG/1
5 TABLET ORAL DAILY
Qty: 90 TABLET | Refills: 0 | Status: SHIPPED | OUTPATIENT
Start: 2019-05-01 | End: 2019-08-11 | Stop reason: SDUPTHER

## 2019-05-02 DIAGNOSIS — Z79.01 LONG-TERM (CURRENT) USE OF ANTICOAGULANTS, INR GOAL 2.5-3.5: ICD-10-CM

## 2019-05-02 RX ORDER — WARFARIN SODIUM 1 MG/1
TABLET ORAL
Qty: 60 TABLET | Refills: 0 | Status: SHIPPED | OUTPATIENT
Start: 2019-05-02 | End: 2019-06-05 | Stop reason: SDUPTHER

## 2019-05-02 RX ORDER — WARFARIN SODIUM 3 MG/1
TABLET ORAL
Qty: 90 TABLET | Refills: 0 | Status: SHIPPED | OUTPATIENT
Start: 2019-05-02 | End: 2020-01-12

## 2019-05-03 ENCOUNTER — ANTI-COAG VISIT (OUTPATIENT)
Dept: FAMILY MEDICINE CLINIC | Age: 74
End: 2019-05-03

## 2019-05-03 DIAGNOSIS — Z95.2 AORTIC VALVE REPLACED: ICD-10-CM

## 2019-05-03 LAB — INR BLD: 3.9

## 2019-05-17 LAB — INR BLD: 3.9

## 2019-05-20 ENCOUNTER — ANTI-COAG VISIT (OUTPATIENT)
Dept: FAMILY MEDICINE CLINIC | Age: 74
End: 2019-05-20

## 2019-05-20 DIAGNOSIS — G89.29 CHRONIC PAIN OF BOTH LOWER EXTREMITIES: ICD-10-CM

## 2019-05-20 DIAGNOSIS — Z95.2 AORTIC VALVE REPLACED: ICD-10-CM

## 2019-05-20 DIAGNOSIS — M79.605 CHRONIC PAIN OF BOTH LOWER EXTREMITIES: ICD-10-CM

## 2019-05-20 DIAGNOSIS — M54.50 CHRONIC BILATERAL LOW BACK PAIN WITHOUT SCIATICA: ICD-10-CM

## 2019-05-20 DIAGNOSIS — M79.604 CHRONIC PAIN OF BOTH LOWER EXTREMITIES: ICD-10-CM

## 2019-05-20 DIAGNOSIS — G89.29 CHRONIC BILATERAL LOW BACK PAIN WITHOUT SCIATICA: ICD-10-CM

## 2019-05-20 RX ORDER — ACETAMINOPHEN AND CODEINE PHOSPHATE 300; 30 MG/1; MG/1
1 TABLET ORAL 3 TIMES DAILY PRN
Qty: 42 TABLET | Refills: 0 | Status: SHIPPED | OUTPATIENT
Start: 2019-05-20 | End: 2019-07-01 | Stop reason: SDUPTHER

## 2019-05-20 NOTE — PROGRESS NOTES
Pt states that she is unsure what happened with the dose, but she has been taking 4mg daily.    Please advise

## 2019-05-20 NOTE — TELEPHONE ENCOUNTER
Pt aware. Pt will  rx on Wed. Controlled Medication Prescription is ready for pt  at the  [facility].    Close Encounter

## 2019-05-23 RX ORDER — PANTOPRAZOLE SODIUM 40 MG/1
TABLET, DELAYED RELEASE ORAL
Qty: 90 TABLET | Refills: 0 | Status: SHIPPED | OUTPATIENT
Start: 2019-05-23 | End: 2019-09-01 | Stop reason: SDUPTHER

## 2019-06-03 ENCOUNTER — TELEPHONE (OUTPATIENT)
Dept: FAMILY MEDICINE CLINIC | Age: 74
End: 2019-06-03

## 2019-06-03 ENCOUNTER — ANTI-COAG VISIT (OUTPATIENT)
Dept: FAMILY MEDICINE CLINIC | Age: 74
End: 2019-06-03

## 2019-06-03 DIAGNOSIS — Z95.2 AORTIC VALVE REPLACED: ICD-10-CM

## 2019-06-03 LAB — INR BLD: 3.4

## 2019-06-05 DIAGNOSIS — F41.9 ANXIETY: ICD-10-CM

## 2019-06-05 RX ORDER — WARFARIN SODIUM 1 MG/1
TABLET ORAL
Qty: 60 TABLET | Refills: 0 | Status: SHIPPED | OUTPATIENT
Start: 2019-06-05 | End: 2019-07-05 | Stop reason: SDUPTHER

## 2019-06-05 RX ORDER — LISINOPRIL 40 MG/1
TABLET ORAL
Qty: 90 TABLET | Refills: 0 | Status: SHIPPED | OUTPATIENT
Start: 2019-06-05 | End: 2019-09-01 | Stop reason: SDUPTHER

## 2019-06-05 RX ORDER — ALPRAZOLAM 0.5 MG/1
TABLET ORAL
Qty: 90 TABLET | Refills: 0 | Status: SHIPPED | OUTPATIENT
Start: 2019-06-05 | End: 2019-07-17 | Stop reason: SDUPTHER

## 2019-06-19 NOTE — PROGRESS NOTES
Discussed with patient [place] eMERGENCY dEPARTMENT encounter:    [facility]  [address]  [place] [address]  [phone]    CHIEF COMPLAINT:    Chief Complaint   Patient presents with   • Rash       HPI:    This is a 23 year old female who presented to the ED with the complaint of rash to her fingers that she noticed 2-3days ago.  Pt has had a separate rash to her left palm over the thumb x 3months which she recently saw her PMD for and was dx'ed with tinea.  She was started on ketoconazole cream for this. However, she hasn't used the ketoconazole cream since the new rash developed.  She also notes itching to her fingers.  Pt is a  and wears gloves quite often to change diapers.    ALLERGIES:    ALLERGIES:   Allergen Reactions   • Codeine ANAPHYLAXIS       CURRENT MEDICATIONS:    No current facility-administered medications for this encounter.      Current Outpatient Medications   Medication Sig Dispense Refill   • triamcinolone (KENALOG) 0.5 % ointment Apply topically 2 times daily. 30 g 0   • methylPREDNISolone (MEDROL DOSEPAK) 4 MG tablet follow package directions 21 tablet 0   • norethindrone-ethinyl estradiol-iron (ESTROSTEP FE) 1-20/1-30/1-35 MG-MCG Tab Take by mouth daily.     • diphenhydrAMINE (BENADRYL) 25 MG capsule Take 1 capsule by mouth every 6 hours as needed for Allergies. 30 capsule 0       PAST MEDICAL HISTORY:    Past Medical History:   Diagnosis Date   • Anxiety    • Substance abuse (CMS/[facility])        SURGICAL HISTORY:    History reviewed. No pertinent surgical history.    SOCIAL HISTORY:    Social History     Tobacco Use   • Smoking status: Current Every Day Smoker     Packs/day: 0.50     Years: 5.00     Pack years: 2.50     Types: Cigarettes   • Smokeless tobacco: Never Used   Substance Use Topics   • Alcohol use: Yes   • Drug use: No     Comment: [place]       FAMILY HISTORY:    No family history on file.    REVIEW OF SYSTEMS:    As above per the  HPI.  All other systems reviewed and otherwise negative.  Constitutional: Negative for fever and chills.   Skin: As per history of present illness  HEENT: Negative for ear pain or sore throat.  Respiratory: Negative cough or shortness of breath.    Cardiovascular: Negative for chest pain or chest pressure.   Gastrointestinal: Negative for nausea, vomiting, diarrhea or abdominal pain.   Extremities:  Negative for joint swelling or joint pain.  CNS: Negative for headache, dizziness, blurry vision.      PHYSICAL EXAM:   ED Triage Vitals [06/19/19 0936]   /90   Pulse 83   Resp 16   Temp 98.6 °F (37 °C)   SpO2 100 %      Pulse Ox Interpretation: Within normal limits.  General: Alert, cooperative, conversive. No acute distress.  Skin:  Warm and dry. There are small skin colored papules to the distal fingertips, dorsal aspect.  Skin to the fingertips is starting to slough. No vesicles.  There is an annular scaly patch noted to dorsal surface of left hand extending into the left thumb.  Head:  Normocephalic-atraumatic.   Neck:  Trachea is midline. No adenopathy.     Eye:  Normal conjunctiva and sclera.     EENT: Mucous membranes are moist.  Cardiovascular: Symmetrical pulses.  RRR without murmur.  Respiratory:  Normal respiratory effort. CTA.  Musculoskeletal:  No deformity or edema.   Neuro: Orientated x 4.   Psych: Normal affect      ED Course/ MDM:  This pt presents with rash to fingers x couple days.  Exam c/w dyshidrotic eczema. Patient advised to continue previously prescribed ketoconazole cream on her long-standing rash. I am going to give her prescription for triamcinolone ointment to use on the fingertips. She is advised to follow-up with dermatology as she was referred to by her PMD.    Diagnosis  The encounter diagnosis was Dyshidrotic eczema.    Follow Up:  Dermatology    Schedule an appointment as soon as possible for a visit  for recheck of symptoms         ED Medications   ED Medication Orders (From  admission, onward)    None            Discharge Medication List as of 6/19/2019  9:58 AM      New Prescriptions    Details   triamcinolone (KENALOG) 0.5 % ointment Apply topically 2 times daily.Disp-30 g, R-0, Eprescribe             Disposition:  Discharge 6/19/2019  9:55 AM  Gayathri Ayala discharge to home/self care.         Bonita Granados PA-C  06/19/19 1014       Bonita KWASI Granados PA-C  06/19/19 1014

## 2019-06-26 ENCOUNTER — TELEPHONE (OUTPATIENT)
Dept: FAMILY MEDICINE CLINIC | Age: 74
End: 2019-06-26

## 2019-06-26 DIAGNOSIS — Z01.818 PREOP EXAMINATION: ICD-10-CM

## 2019-06-26 DIAGNOSIS — Z95.2 AORTIC VALVE REPLACED: ICD-10-CM

## 2019-06-26 DIAGNOSIS — Z79.01 LONG-TERM (CURRENT) USE OF ANTICOAGULANTS, INR GOAL 2.5-3.5: ICD-10-CM

## 2019-06-26 NOTE — TELEPHONE ENCOUNTER
Spoke with pt. Pt states that Dr. Nano Novoa is the one who is requesting for this to be done and coumadin to be held 5 days prior to the bx. Lm for triage nurse Tiffany Young at Dr. Shahram Adorno office. Requesting for verbal or written request from their office and how long to hold coumadin for. Phone number left and back fax number.

## 2019-06-26 NOTE — TELEPHONE ENCOUNTER
[address]    PT is requesting script for directions for Lovenox shots. She needs to stop taking her warfarin 5 days before her bone biopsy.     [facility] [address] [place] [phone]    Last seen 4/17/2019

## 2019-06-27 ENCOUNTER — TELEPHONE (OUTPATIENT)
Dept: FAMILY MEDICINE CLINIC | Age: 74
End: 2019-06-27

## 2019-06-27 ENCOUNTER — ANTI-COAG VISIT (OUTPATIENT)
Dept: FAMILY MEDICINE CLINIC | Age: 74
End: 2019-06-27

## 2019-06-27 DIAGNOSIS — Z95.2 AORTIC VALVE REPLACED: ICD-10-CM

## 2019-06-27 LAB — INR BLD: 4

## 2019-06-27 NOTE — TELEPHONE ENCOUNTER
Attempted to call pt on cell phone. Her  answered, then phone disconnected. Attempted to call home phone number and went to  but unable to leave a message due to phone cutting out.

## 2019-06-27 NOTE — TELEPHONE ENCOUNTER
Notify pt':  Med e-scribed. Follow instructions for coumadin/lovenox management per her hematologist:pls scan to chart.

## 2019-06-28 NOTE — PROGRESS NOTES
Pt called back. She states that she was currently taking 3.5 mg.  Per Dr. Geri Aceves: ok to take 3 mg and recheck in 7 days. Pt informed and expressed understanding. Note changed in anti-coag encounter that pt is taking 3 mg.   Close Encounter

## 2019-07-01 ENCOUNTER — TELEPHONE (OUTPATIENT)
Dept: FAMILY MEDICINE CLINIC | Age: 74
End: 2019-07-01

## 2019-07-01 DIAGNOSIS — M79.604 CHRONIC PAIN OF BOTH LOWER EXTREMITIES: ICD-10-CM

## 2019-07-01 DIAGNOSIS — M54.50 CHRONIC BILATERAL LOW BACK PAIN WITHOUT SCIATICA: ICD-10-CM

## 2019-07-01 DIAGNOSIS — M79.605 CHRONIC PAIN OF BOTH LOWER EXTREMITIES: ICD-10-CM

## 2019-07-01 DIAGNOSIS — G89.29 CHRONIC PAIN OF BOTH LOWER EXTREMITIES: ICD-10-CM

## 2019-07-01 DIAGNOSIS — G89.29 CHRONIC BILATERAL LOW BACK PAIN WITHOUT SCIATICA: ICD-10-CM

## 2019-07-01 RX ORDER — ACETAMINOPHEN AND CODEINE PHOSPHATE 300; 30 MG/1; MG/1
1 TABLET ORAL 3 TIMES DAILY PRN
Qty: 42 TABLET | Refills: 0 | Status: SHIPPED | OUTPATIENT
Start: 2019-07-01 | End: 2019-08-12 | Stop reason: SDUPTHER

## 2019-07-01 NOTE — TELEPHONE ENCOUNTER
Controlled Medication Prescription is ready for pt  at the  [facility].    Pt informed and agreed to keep her appt on 7/17  Close Encounter

## 2019-07-05 LAB — INR BLD: 1.8

## 2019-07-06 RX ORDER — WARFARIN SODIUM 1 MG/1
TABLET ORAL
Qty: 60 TABLET | Refills: 0 | Status: SHIPPED | OUTPATIENT
Start: 2019-07-06 | End: 2019-09-19 | Stop reason: SDUPTHER

## 2019-07-08 ENCOUNTER — TELEPHONE (OUTPATIENT)
Dept: FAMILY MEDICINE CLINIC | Age: 74
End: 2019-07-08

## 2019-07-08 ENCOUNTER — ANTI-COAG VISIT (OUTPATIENT)
Dept: FAMILY MEDICINE CLINIC | Age: 74
End: 2019-07-08

## 2019-07-08 DIAGNOSIS — Z95.2 AORTIC VALVE REPLACED: ICD-10-CM

## 2019-07-15 ENCOUNTER — ANTI-COAG VISIT (OUTPATIENT)
Dept: FAMILY MEDICINE CLINIC | Age: 74
End: 2019-07-15

## 2019-07-15 DIAGNOSIS — Z95.2 AORTIC VALVE REPLACED: ICD-10-CM

## 2019-07-15 LAB — INR BLD: 1.2

## 2019-07-17 ENCOUNTER — OFFICE VISIT (OUTPATIENT)
Dept: FAMILY MEDICINE CLINIC | Age: 74
End: 2019-07-17
Payer: MEDICARE

## 2019-07-17 VITALS
OXYGEN SATURATION: 97 % | DIASTOLIC BLOOD PRESSURE: 65 MMHG | TEMPERATURE: 97.9 F | BODY MASS INDEX: 25 KG/M2 | HEART RATE: 82 BPM | SYSTOLIC BLOOD PRESSURE: 126 MMHG | HEIGHT: 59 IN | WEIGHT: 124 LBS | RESPIRATION RATE: 16 BRPM

## 2019-07-17 DIAGNOSIS — K21.9 GASTROESOPHAGEAL REFLUX DISEASE WITHOUT ESOPHAGITIS: ICD-10-CM

## 2019-07-17 DIAGNOSIS — G89.29 CHRONIC BILATERAL LOW BACK PAIN WITHOUT SCIATICA: ICD-10-CM

## 2019-07-17 DIAGNOSIS — F41.9 ANXIETY: ICD-10-CM

## 2019-07-17 DIAGNOSIS — I10 ESSENTIAL HYPERTENSION, BENIGN: Primary | ICD-10-CM

## 2019-07-17 DIAGNOSIS — E11.22 CONTROLLED TYPE 2 DIABETES MELLITUS WITH STAGE 3 CHRONIC KIDNEY DISEASE, WITHOUT LONG-TERM CURRENT USE OF INSULIN (HCC): ICD-10-CM

## 2019-07-17 DIAGNOSIS — D50.8 IRON DEFICIENCY ANEMIA SECONDARY TO INADEQUATE DIETARY IRON INTAKE: ICD-10-CM

## 2019-07-17 DIAGNOSIS — M54.50 CHRONIC BILATERAL LOW BACK PAIN WITHOUT SCIATICA: ICD-10-CM

## 2019-07-17 DIAGNOSIS — N18.30 CONTROLLED TYPE 2 DIABETES MELLITUS WITH STAGE 3 CHRONIC KIDNEY DISEASE, WITHOUT LONG-TERM CURRENT USE OF INSULIN (HCC): ICD-10-CM

## 2019-07-17 DIAGNOSIS — F51.01 PRIMARY INSOMNIA: ICD-10-CM

## 2019-07-17 DIAGNOSIS — J43.8 OTHER EMPHYSEMA (HCC): ICD-10-CM

## 2019-07-17 DIAGNOSIS — Z79.01 LONG-TERM (CURRENT) USE OF ANTICOAGULANTS, INR GOAL 2.5-3.5: ICD-10-CM

## 2019-07-17 DIAGNOSIS — N18.2 CKD (CHRONIC KIDNEY DISEASE), STAGE II: ICD-10-CM

## 2019-07-17 DIAGNOSIS — C85.90 LYMPHOMA, UNSPECIFIED BODY REGION, UNSPECIFIED LYMPHOMA TYPE (HCC): ICD-10-CM

## 2019-07-17 PROCEDURE — 1090F PRES/ABSN URINE INCON ASSESS: CPT | Performed by: FAMILY MEDICINE

## 2019-07-17 PROCEDURE — 2022F DILAT RTA XM EVC RTNOPTHY: CPT | Performed by: FAMILY MEDICINE

## 2019-07-17 PROCEDURE — G8926 SPIRO NO PERF OR DOC: HCPCS | Performed by: FAMILY MEDICINE

## 2019-07-17 PROCEDURE — 99214 OFFICE O/P EST MOD 30 MIN: CPT | Performed by: FAMILY MEDICINE

## 2019-07-17 PROCEDURE — 3044F HG A1C LEVEL LT 7.0%: CPT | Performed by: FAMILY MEDICINE

## 2019-07-17 PROCEDURE — 4040F PNEUMOC VAC/ADMIN/RCVD: CPT | Performed by: FAMILY MEDICINE

## 2019-07-17 PROCEDURE — 3017F COLORECTAL CA SCREEN DOC REV: CPT | Performed by: FAMILY MEDICINE

## 2019-07-17 PROCEDURE — 1123F ACP DISCUSS/DSCN MKR DOCD: CPT | Performed by: FAMILY MEDICINE

## 2019-07-17 PROCEDURE — G8400 PT W/DXA NO RESULTS DOC: HCPCS | Performed by: FAMILY MEDICINE

## 2019-07-17 PROCEDURE — G8427 DOCREV CUR MEDS BY ELIG CLIN: HCPCS | Performed by: FAMILY MEDICINE

## 2019-07-17 PROCEDURE — 3023F SPIROM DOC REV: CPT | Performed by: FAMILY MEDICINE

## 2019-07-17 PROCEDURE — 1036F TOBACCO NON-USER: CPT | Performed by: FAMILY MEDICINE

## 2019-07-17 PROCEDURE — G8419 CALC BMI OUT NRM PARAM NOF/U: HCPCS | Performed by: FAMILY MEDICINE

## 2019-07-17 RX ORDER — ALPRAZOLAM 0.5 MG/1
TABLET ORAL
Qty: 90 TABLET | Refills: 0 | Status: SHIPPED | OUTPATIENT
Start: 2019-07-17 | End: 2019-08-22 | Stop reason: SDUPTHER

## 2019-07-17 ASSESSMENT — ENCOUNTER SYMPTOMS
APNEA: 0
STRIDOR: 0
BLOOD IN STOOL: 0
WHEEZING: 0
COUGH: 0
NAUSEA: 0
DIARRHEA: 0
RECTAL PAIN: 0
ABDOMINAL PAIN: 0
ANAL BLEEDING: 0
ABDOMINAL DISTENTION: 0
SHORTNESS OF BREATH: 0
VOMITING: 0
CHOKING: 0
CHEST TIGHTNESS: 0
CONSTIPATION: 0
BACK PAIN: 1

## 2019-07-17 ASSESSMENT — PATIENT HEALTH QUESTIONNAIRE - PHQ9
SUM OF ALL RESPONSES TO PHQ9 QUESTIONS 1 & 2: 0
1. LITTLE INTEREST OR PLEASURE IN DOING THINGS: 0
2. FEELING DOWN, DEPRESSED OR HOPELESS: 0
SUM OF ALL RESPONSES TO PHQ QUESTIONS 1-9: 0
SUM OF ALL RESPONSES TO PHQ QUESTIONS 1-9: 0

## 2019-07-17 ASSESSMENT — COPD QUESTIONNAIRES: COPD: 1

## 2019-07-17 NOTE — PROGRESS NOTES
disturbance. Respiratory: Negative for apnea, cough, choking, chest tightness, shortness of breath, wheezing and stridor. Cardiovascular: Negative for chest pain, palpitations and leg swelling. Gastrointestinal: Negative for abdominal distention, abdominal pain, anal bleeding, blood in stool, constipation, diarrhea, nausea, rectal pain and vomiting. Endocrine: Negative for cold intolerance, heat intolerance, polydipsia, polyphagia and polyuria. Musculoskeletal: Positive for back pain. Skin: Negative for pallor, rash and wound. Neurological: Negative for dizziness, light-headedness and headaches. Hematological: Negative for adenopathy. Psychiatric/Behavioral: Positive for sleep disturbance. Negative for agitation, behavioral problems, confusion, decreased concentration, dysphoric mood, hallucinations, self-injury and suicidal ideas. The patient is nervous/anxious. The patient is not hyperactive. Objective:   Physical Exam   Constitutional: She is oriented to person, place, and time. Vital signs are normal. She appears well-developed and well-nourished. She is cooperative. She does not have a sickly appearance. No distress. HENT:   Nose: Nose normal.   Mouth/Throat: Uvula is midline, oropharynx is clear and moist and mucous membranes are normal.   Hearing intact to nml conversation   Eyes: Pupils are equal, round, and reactive to light. Conjunctivae, EOM and lids are normal. No scleral icterus. Neck: Trachea normal and normal range of motion. Neck supple. No JVD present. Carotid bruit is not present. Cardiovascular: Normal rate, regular rhythm, normal heart sounds, intact distal pulses and normal pulses. No bilateral leg-ankle edema. Pulmonary/Chest: Effort normal and breath sounds normal. No respiratory distress. CTAB,good AE bilaterally   Abdominal: Soft. Normal appearance and bowel sounds are normal. She exhibits no distension and no mass. There is no hepatomegaly.  There is no tenderness. Musculoskeletal:   Stable/baseline back exam:no acute change since prior examination on 4/17/19. Lymphadenopathy:     She has no cervical adenopathy. Neurological: She is alert and oriented to person, place, and time. Skin: Skin is warm, dry and intact. Capillary refill takes less than 2 seconds. No abrasion and no rash noted. She is not diaphoretic. No cyanosis. No pallor. Good skin turgor. Psychiatric: She has a normal mood and affect. Her speech is normal and behavior is normal. Judgment and thought content normal. Her mood appears not anxious. Her affect is not angry, not blunt, not labile and not inappropriate. She is not agitated, not aggressive, not hyperactive, not slowed, not actively hallucinating and not combative. Thought content is not paranoid and not delusional. Cognition and memory are normal. She does not exhibit a depressed mood. She expresses no homicidal and no suicidal ideation. Good eye contact. Polite. She is attentive. Assessment:      Diagnosis Orders   1. Essential hypertension, benign  VSS/well appearing. Stable. 2. Controlled type 2 diabetes mellitus with stage 3 chronic kidney disease, without long-term current use of insulin (HCC)  Stable. A1c in 6mos. Diabetes:Check feet daily. Yrly eye checks advised. Education: Reviewed ABCs of diabetes management (respective goals in parentheses):  A1C (<7), blood pressure (<130/80), and cholesterol (LDL <100). Counseled at this visit on the following: diabetes complication prevention, foot care. Hemoglobin A1C   3. Anxiety  Stable. Pt' reveals no aberrant drug use behavior. Ok to continue [employer] refills w/q3mos office appts. ALPRAZolam (XANAX) 0.5 MG tablet   4. Chronic back pain   Stable;controlled well with pain medication. Pt' reveals no aberrant drug use behavior. Ok to continue [employer] refills w/q3mos office appts. 5. Gastroesophageal reflux disease without esophagitis  Stable.      6. COPD  Stable. 7. Primary insomnia  Stable. 8. Lymphoma  Per heme-onc. 9. CKD (chronic kidney disease), stage II  Stable. 10. Anemia:multifactorial  Stable. Per specialist.          11. Long-term (current) use of anticoagulants, INR goal 2.5-3.5  F/u INR in 1day. Plan:          Obtain labs/diagnostic tests as discussed today & call back for results within 2-7days. Pt' left office in good condition. Advised to go to local ER or call 911 for any worrisome signs/sx.

## 2019-07-18 ENCOUNTER — ANTI-COAG VISIT (OUTPATIENT)
Dept: FAMILY MEDICINE CLINIC | Age: 74
End: 2019-07-18

## 2019-07-18 ENCOUNTER — TELEPHONE (OUTPATIENT)
Dept: FAMILY MEDICINE CLINIC | Age: 74
End: 2019-07-18

## 2019-07-18 DIAGNOSIS — Z95.2 AORTIC VALVE REPLACED: ICD-10-CM

## 2019-07-18 LAB — INR BLD: 2.1

## 2019-07-21 DIAGNOSIS — E78.5 HYPERLIPIDEMIA WITH TARGET LDL LESS THAN 100: ICD-10-CM

## 2019-07-21 RX ORDER — GLIPIZIDE 5 MG/1
TABLET ORAL
Qty: 45 TABLET | Refills: 0 | Status: SHIPPED | OUTPATIENT
Start: 2019-07-21 | End: 2019-10-08 | Stop reason: SDUPTHER

## 2019-07-21 RX ORDER — ATORVASTATIN CALCIUM 10 MG/1
TABLET, FILM COATED ORAL
Qty: 90 TABLET | Refills: 0 | Status: SHIPPED | OUTPATIENT
Start: 2019-07-21 | End: 2019-10-30 | Stop reason: SDUPTHER

## 2019-07-29 ENCOUNTER — ANTI-COAG VISIT (OUTPATIENT)
Dept: FAMILY MEDICINE CLINIC | Age: 74
End: 2019-07-29

## 2019-07-29 ENCOUNTER — TELEPHONE (OUTPATIENT)
Dept: FAMILY MEDICINE CLINIC | Age: 74
End: 2019-07-29

## 2019-07-29 DIAGNOSIS — Z95.2 AORTIC VALVE REPLACED: ICD-10-CM

## 2019-07-29 LAB — INR BLD: 3.7

## 2019-08-07 ENCOUNTER — ANTI-COAG VISIT (OUTPATIENT)
Dept: FAMILY MEDICINE CLINIC | Age: 74
End: 2019-08-07

## 2019-08-07 ENCOUNTER — TELEPHONE (OUTPATIENT)
Dept: FAMILY MEDICINE CLINIC | Age: 74
End: 2019-08-07

## 2019-08-07 DIAGNOSIS — Z95.2 AORTIC VALVE REPLACED: ICD-10-CM

## 2019-08-07 LAB — INR BLD: 3.3

## 2019-08-08 ENCOUNTER — TELEPHONE (OUTPATIENT)
Dept: FAMILY MEDICINE CLINIC | Age: 74
End: 2019-08-08

## 2019-08-08 DIAGNOSIS — G89.29 CHRONIC BILATERAL LOW BACK PAIN WITHOUT SCIATICA: ICD-10-CM

## 2019-08-08 DIAGNOSIS — G89.29 CHRONIC PAIN OF BOTH LOWER EXTREMITIES: ICD-10-CM

## 2019-08-08 DIAGNOSIS — M79.605 CHRONIC PAIN OF BOTH LOWER EXTREMITIES: ICD-10-CM

## 2019-08-08 DIAGNOSIS — M54.50 CHRONIC BILATERAL LOW BACK PAIN WITHOUT SCIATICA: ICD-10-CM

## 2019-08-08 DIAGNOSIS — M79.604 CHRONIC PAIN OF BOTH LOWER EXTREMITIES: ICD-10-CM

## 2019-08-08 RX ORDER — ACETAMINOPHEN AND CODEINE PHOSPHATE 300; 30 MG/1; MG/1
1 TABLET ORAL 3 TIMES DAILY PRN
Qty: 42 TABLET | Refills: 0 | Status: CANCELLED | OUTPATIENT
Start: 2019-08-08 | End: 2019-09-07

## 2019-08-11 DIAGNOSIS — Z95.2 AORTIC VALVE REPLACED: ICD-10-CM

## 2019-08-11 RX ORDER — AMLODIPINE BESYLATE 5 MG/1
5 TABLET ORAL DAILY
Qty: 90 TABLET | Refills: 0 | Status: SHIPPED | OUTPATIENT
Start: 2019-08-11 | End: 2019-08-22 | Stop reason: SDUPTHER

## 2019-08-12 RX ORDER — ACETAMINOPHEN AND CODEINE PHOSPHATE 300; 30 MG/1; MG/1
1 TABLET ORAL 3 TIMES DAILY PRN
Qty: 42 TABLET | Refills: 0 | Status: SHIPPED | OUTPATIENT
Start: 2019-08-12 | End: 2019-09-23 | Stop reason: SDUPTHER

## 2019-08-15 ENCOUNTER — TELEPHONE (OUTPATIENT)
Dept: FAMILY MEDICINE CLINIC | Age: 74
End: 2019-08-15

## 2019-08-22 ENCOUNTER — OFFICE VISIT (OUTPATIENT)
Dept: FAMILY MEDICINE CLINIC | Age: 74
End: 2019-08-22
Payer: MEDICARE

## 2019-08-22 VITALS
DIASTOLIC BLOOD PRESSURE: 65 MMHG | BODY MASS INDEX: 25.36 KG/M2 | SYSTOLIC BLOOD PRESSURE: 150 MMHG | WEIGHT: 125.8 LBS | TEMPERATURE: 97.8 F | HEART RATE: 74 BPM | RESPIRATION RATE: 16 BRPM | HEIGHT: 59 IN | OXYGEN SATURATION: 98 %

## 2019-08-22 DIAGNOSIS — C85.90 LYMPHOMA, UNSPECIFIED BODY REGION, UNSPECIFIED LYMPHOMA TYPE (HCC): ICD-10-CM

## 2019-08-22 DIAGNOSIS — K21.9 GASTROESOPHAGEAL REFLUX DISEASE WITHOUT ESOPHAGITIS: ICD-10-CM

## 2019-08-22 DIAGNOSIS — J43.8 OTHER EMPHYSEMA (HCC): ICD-10-CM

## 2019-08-22 DIAGNOSIS — E11.22 CONTROLLED TYPE 2 DIABETES MELLITUS WITH STAGE 3 CHRONIC KIDNEY DISEASE, WITHOUT LONG-TERM CURRENT USE OF INSULIN (HCC): ICD-10-CM

## 2019-08-22 DIAGNOSIS — E78.5 HYPERLIPIDEMIA WITH TARGET LDL LESS THAN 100: ICD-10-CM

## 2019-08-22 DIAGNOSIS — I10 ESSENTIAL HYPERTENSION, BENIGN: Primary | ICD-10-CM

## 2019-08-22 DIAGNOSIS — G89.29 CHRONIC BILATERAL LOW BACK PAIN WITHOUT SCIATICA: ICD-10-CM

## 2019-08-22 DIAGNOSIS — Z79.01 LONG-TERM (CURRENT) USE OF ANTICOAGULANTS, INR GOAL 2.5-3.5: ICD-10-CM

## 2019-08-22 DIAGNOSIS — M54.50 CHRONIC BILATERAL LOW BACK PAIN WITHOUT SCIATICA: ICD-10-CM

## 2019-08-22 DIAGNOSIS — N18.30 CONTROLLED TYPE 2 DIABETES MELLITUS WITH STAGE 3 CHRONIC KIDNEY DISEASE, WITHOUT LONG-TERM CURRENT USE OF INSULIN (HCC): ICD-10-CM

## 2019-08-22 DIAGNOSIS — F51.01 PRIMARY INSOMNIA: ICD-10-CM

## 2019-08-22 DIAGNOSIS — F41.9 ANXIETY: ICD-10-CM

## 2019-08-22 DIAGNOSIS — D50.8 IRON DEFICIENCY ANEMIA SECONDARY TO INADEQUATE DIETARY IRON INTAKE: ICD-10-CM

## 2019-08-22 DIAGNOSIS — Z95.2 AORTIC VALVE REPLACED: ICD-10-CM

## 2019-08-22 PROCEDURE — 3044F HG A1C LEVEL LT 7.0%: CPT | Performed by: FAMILY MEDICINE

## 2019-08-22 PROCEDURE — 1123F ACP DISCUSS/DSCN MKR DOCD: CPT | Performed by: FAMILY MEDICINE

## 2019-08-22 PROCEDURE — 1090F PRES/ABSN URINE INCON ASSESS: CPT | Performed by: FAMILY MEDICINE

## 2019-08-22 PROCEDURE — 99214 OFFICE O/P EST MOD 30 MIN: CPT | Performed by: FAMILY MEDICINE

## 2019-08-22 PROCEDURE — 1036F TOBACCO NON-USER: CPT | Performed by: FAMILY MEDICINE

## 2019-08-22 PROCEDURE — G8926 SPIRO NO PERF OR DOC: HCPCS | Performed by: FAMILY MEDICINE

## 2019-08-22 PROCEDURE — G8419 CALC BMI OUT NRM PARAM NOF/U: HCPCS | Performed by: FAMILY MEDICINE

## 2019-08-22 PROCEDURE — G8427 DOCREV CUR MEDS BY ELIG CLIN: HCPCS | Performed by: FAMILY MEDICINE

## 2019-08-22 PROCEDURE — 2022F DILAT RTA XM EVC RTNOPTHY: CPT | Performed by: FAMILY MEDICINE

## 2019-08-22 PROCEDURE — G8400 PT W/DXA NO RESULTS DOC: HCPCS | Performed by: FAMILY MEDICINE

## 2019-08-22 PROCEDURE — 3023F SPIROM DOC REV: CPT | Performed by: FAMILY MEDICINE

## 2019-08-22 PROCEDURE — 4040F PNEUMOC VAC/ADMIN/RCVD: CPT | Performed by: FAMILY MEDICINE

## 2019-08-22 PROCEDURE — 3017F COLORECTAL CA SCREEN DOC REV: CPT | Performed by: FAMILY MEDICINE

## 2019-08-22 RX ORDER — ALPRAZOLAM 0.5 MG/1
TABLET ORAL
Qty: 90 TABLET | Refills: 0 | Status: SHIPPED | OUTPATIENT
Start: 2019-08-26 | End: 2019-09-23 | Stop reason: SDUPTHER

## 2019-08-22 RX ORDER — AMLODIPINE BESYLATE 10 MG/1
10 TABLET ORAL DAILY
Qty: 90 TABLET | Refills: 0 | Status: SHIPPED | OUTPATIENT
Start: 2019-08-22 | End: 2020-01-03

## 2019-08-22 ASSESSMENT — ENCOUNTER SYMPTOMS
WHEEZING: 0
BLOOD IN STOOL: 0
NAUSEA: 0
STRIDOR: 0
DIARRHEA: 0
CHEST TIGHTNESS: 0
BACK PAIN: 1
COUGH: 0
RECTAL PAIN: 0
SHORTNESS OF BREATH: 0
ANAL BLEEDING: 0
VOMITING: 0
CHOKING: 0
CONSTIPATION: 0
APNEA: 0
ABDOMINAL PAIN: 0
ABDOMINAL DISTENTION: 0

## 2019-08-22 ASSESSMENT — PATIENT HEALTH QUESTIONNAIRE - PHQ9
1. LITTLE INTEREST OR PLEASURE IN DOING THINGS: 0
SUM OF ALL RESPONSES TO PHQ QUESTIONS 1-9: 0
2. FEELING DOWN, DEPRESSED OR HOPELESS: 0
SUM OF ALL RESPONSES TO PHQ QUESTIONS 1-9: 0
SUM OF ALL RESPONSES TO PHQ9 QUESTIONS 1 & 2: 0

## 2019-08-22 ASSESSMENT — COPD QUESTIONNAIRES: COPD: 1

## 2019-08-22 NOTE — PROGRESS NOTES
tablet TAKE BY MOUTH AS DIRECTED 90 tablet 0    chlorthalidone (HYGROTON) 25 MG tablet TAKE 1 TABLET BY MOUTH EVERY DAY 90 tablet 0    blood glucose test strips (TRUE METRIX BLOOD GLUCOSE TEST) strip USE TO TEST TWICE DAILY AS DIRECTED 180 strip 0    TRUE METRIX BLOOD GLUCOSE TEST strip USE TO TEST TWICE DAILY AS DIRECTED 150 strip 0    alendronate (FOSAMAX) 35 MG tablet TAKE ONE TABLET BY MOUTH ONCE WEEKLY( EVERY 7 DAYS)      Vbdzpjqyv-Fbqdhbum-ZV (YDXKCEQI-LC-SLGAQEMUK PO) Take by mouth daily      cefdinir (OMNICEF) 300 MG capsule Take 300 mg by mouth 2 times daily      promethazine (PHENERGAN) 12.5 MG tablet TAKE ONE TABLET BY MOUTH EVERY 6 HOURS AS NEEDED 28 tablet 0    albuterol (PROAIR HFA) 108 (90 BASE) MCG/ACT inhaler Inhale 1-2 puffs into the lungs every 6 hours as needed for Wheezing 1 Inhaler 1    warfarin (COUMADIN) 4 MG tablet TAKE 1 TABLET BY MOUTH DAILY 30 tablet 0    PROAIR  (90 BASE) MCG/ACT inhaler INHALE 2 PUFFS BY MOUTH EVERY 4 HOURS AS NEEDED FOR WHEEZING/ SHORTNESS OF BREATH 8.5 g 0    hydroxychloroquine (PLAQUENIL) 200 MG tablet Take  by mouth daily.  ferrous sulfate 325 (65 FE) MG EC tablet Take 325 mg by mouth 2 times daily. No current facility-administered medications on file prior to visit. Past Medical History:   Diagnosis Date    [facility]     Anemia     multifactorial:under care of heme-onc:Dr. Carol Shannon Anemia:multifactorial 2011    as per heme-onc    Anxiety     Cataract     bilateral    Chronic back pain     Under care of ortho spine:Dr. Davidson Dc    CKD (chronic kidney disease) stage 3, GFR 30-59 ml/min ([facility] 1/2012    Colitis, ischemic ([facility] 6/2012    Under care of Dr. Elias Ross COPD     Diabetes     Di[facility] ([facility] 1/28/15    CEI    GERD (gastroesophageal reflux disease)     Hx of mammogram 05/23/2018    negative:see scanned report.     Hyperlipidaemia LDL goal <100     Hypertension     Insomnia     Long-term

## 2019-09-02 RX ORDER — PANTOPRAZOLE SODIUM 40 MG/1
TABLET, DELAYED RELEASE ORAL
Qty: 90 TABLET | Refills: 0 | Status: SHIPPED | OUTPATIENT
Start: 2019-09-02 | End: 2019-12-02 | Stop reason: SDUPTHER

## 2019-09-02 RX ORDER — LISINOPRIL 40 MG/1
TABLET ORAL
Qty: 90 TABLET | Refills: 0 | Status: SHIPPED | OUTPATIENT
Start: 2019-09-02 | End: 2019-12-02 | Stop reason: SDUPTHER

## 2019-09-09 ENCOUNTER — TELEPHONE (OUTPATIENT)
Dept: FAMILY MEDICINE CLINIC | Age: 74
End: 2019-09-09

## 2019-09-09 ENCOUNTER — ANTI-COAG VISIT (OUTPATIENT)
Dept: FAMILY MEDICINE CLINIC | Age: 74
End: 2019-09-09

## 2019-09-09 DIAGNOSIS — Z95.2 AORTIC VALVE REPLACED: ICD-10-CM

## 2019-09-09 LAB — INR BLD: 6.7

## 2019-09-11 ENCOUNTER — ANTI-COAG VISIT (OUTPATIENT)
Dept: FAMILY MEDICINE CLINIC | Age: 74
End: 2019-09-11

## 2019-09-11 ENCOUNTER — TELEPHONE (OUTPATIENT)
Dept: FAMILY MEDICINE CLINIC | Age: 74
End: 2019-09-11

## 2019-09-11 DIAGNOSIS — Z95.2 AORTIC VALVE REPLACED: ICD-10-CM

## 2019-09-11 LAB — INR BLD: 4

## 2019-09-11 NOTE — PROGRESS NOTES
Notify pt':  INR is improving but still elevated. Decrease coumadin from 3.5mg to 3mg daily. INR check in 6days:Monday:9/16.

## 2019-09-11 NOTE — TELEPHONE ENCOUNTER
Please return call for the patient to confirm medication that is needed in regards to the patient's INR    ph: [phone]  ov: 8/22/19    Please advise.

## 2019-09-16 ENCOUNTER — ANTI-COAG VISIT (OUTPATIENT)
Dept: FAMILY MEDICINE CLINIC | Age: 74
End: 2019-09-16

## 2019-09-16 ENCOUNTER — TELEPHONE (OUTPATIENT)
Dept: FAMILY MEDICINE CLINIC | Age: 74
End: 2019-09-16

## 2019-09-16 DIAGNOSIS — Z95.2 AORTIC VALVE REPLACED: ICD-10-CM

## 2019-09-16 LAB — INR BLD: 5

## 2019-09-16 NOTE — PROGRESS NOTES
Notify pt':  INR is too high at 5.0. Hold coumadin dose tonight. Verify she has been taking 3mg coumadin. Restart coumadin tomorrow at lower dose 2.5mg:next INR in 3days. Let me know if she has any bleeding/severe bruising.

## 2019-09-19 ENCOUNTER — TELEPHONE (OUTPATIENT)
Dept: FAMILY MEDICINE CLINIC | Age: 74
End: 2019-09-19

## 2019-09-19 ENCOUNTER — ANTI-COAG VISIT (OUTPATIENT)
Dept: FAMILY MEDICINE CLINIC | Age: 74
End: 2019-09-19

## 2019-09-19 DIAGNOSIS — Z95.2 AORTIC VALVE REPLACED: ICD-10-CM

## 2019-09-19 LAB — INR BLD: 2.8

## 2019-09-19 RX ORDER — WARFARIN SODIUM 1 MG/1
TABLET ORAL
Qty: 60 TABLET | Refills: 2 | Status: SHIPPED | OUTPATIENT
Start: 2019-09-19 | End: 2019-09-20 | Stop reason: SDUPTHER

## 2019-09-20 DIAGNOSIS — I10 ESSENTIAL HYPERTENSION, BENIGN: ICD-10-CM

## 2019-09-20 RX ORDER — WARFARIN SODIUM 1 MG/1
TABLET ORAL
Qty: 90 TABLET | Refills: 0 | Status: SHIPPED | OUTPATIENT
Start: 2019-09-20 | End: 2019-10-16 | Stop reason: SDUPTHER

## 2019-09-20 RX ORDER — CHLORTHALIDONE 25 MG/1
TABLET ORAL
Qty: 90 TABLET | Refills: 0 | Status: SHIPPED | OUTPATIENT
Start: 2019-09-20 | End: 2019-09-23 | Stop reason: SDUPTHER

## 2019-09-23 ENCOUNTER — OFFICE VISIT (OUTPATIENT)
Dept: FAMILY MEDICINE CLINIC | Age: 74
End: 2019-09-23
Payer: MEDICARE

## 2019-09-23 VITALS
SYSTOLIC BLOOD PRESSURE: 128 MMHG | TEMPERATURE: 98.4 F | HEART RATE: 80 BPM | OXYGEN SATURATION: 97 % | RESPIRATION RATE: 16 BRPM | HEIGHT: 59 IN | WEIGHT: 126 LBS | DIASTOLIC BLOOD PRESSURE: 72 MMHG | BODY MASS INDEX: 25.4 KG/M2

## 2019-09-23 DIAGNOSIS — E78.5 HYPERLIPIDEMIA WITH TARGET LDL LESS THAN 100: ICD-10-CM

## 2019-09-23 DIAGNOSIS — E11.22 CONTROLLED TYPE 2 DIABETES MELLITUS WITH STAGE 3 CHRONIC KIDNEY DISEASE, WITHOUT LONG-TERM CURRENT USE OF INSULIN (HCC): ICD-10-CM

## 2019-09-23 DIAGNOSIS — Z79.01 LONG-TERM (CURRENT) USE OF ANTICOAGULANTS, INR GOAL 2.5-3.5: ICD-10-CM

## 2019-09-23 DIAGNOSIS — C85.90 LYMPHOMA, UNSPECIFIED BODY REGION, UNSPECIFIED LYMPHOMA TYPE (HCC): ICD-10-CM

## 2019-09-23 DIAGNOSIS — K21.9 GASTROESOPHAGEAL REFLUX DISEASE WITHOUT ESOPHAGITIS: ICD-10-CM

## 2019-09-23 DIAGNOSIS — N18.30 STAGE 3 CHRONIC KIDNEY DISEASE (HCC): ICD-10-CM

## 2019-09-23 DIAGNOSIS — G89.29 CHRONIC PAIN OF BOTH LOWER EXTREMITIES: ICD-10-CM

## 2019-09-23 DIAGNOSIS — M79.605 CHRONIC PAIN OF BOTH LOWER EXTREMITIES: ICD-10-CM

## 2019-09-23 DIAGNOSIS — M79.604 CHRONIC PAIN OF BOTH LOWER EXTREMITIES: ICD-10-CM

## 2019-09-23 DIAGNOSIS — I10 ESSENTIAL HYPERTENSION, BENIGN: ICD-10-CM

## 2019-09-23 DIAGNOSIS — G89.29 CHRONIC BILATERAL LOW BACK PAIN WITHOUT SCIATICA: ICD-10-CM

## 2019-09-23 DIAGNOSIS — F41.9 ANXIETY: Primary | ICD-10-CM

## 2019-09-23 DIAGNOSIS — J43.8 OTHER EMPHYSEMA (HCC): ICD-10-CM

## 2019-09-23 DIAGNOSIS — F51.01 PRIMARY INSOMNIA: ICD-10-CM

## 2019-09-23 DIAGNOSIS — M54.50 CHRONIC BILATERAL LOW BACK PAIN WITHOUT SCIATICA: ICD-10-CM

## 2019-09-23 DIAGNOSIS — N18.30 CONTROLLED TYPE 2 DIABETES MELLITUS WITH STAGE 3 CHRONIC KIDNEY DISEASE, WITHOUT LONG-TERM CURRENT USE OF INSULIN (HCC): ICD-10-CM

## 2019-09-23 DIAGNOSIS — D50.8 IRON DEFICIENCY ANEMIA SECONDARY TO INADEQUATE DIETARY IRON INTAKE: ICD-10-CM

## 2019-09-23 PROCEDURE — 99214 OFFICE O/P EST MOD 30 MIN: CPT | Performed by: FAMILY MEDICINE

## 2019-09-23 PROCEDURE — G8400 PT W/DXA NO RESULTS DOC: HCPCS | Performed by: FAMILY MEDICINE

## 2019-09-23 PROCEDURE — 3017F COLORECTAL CA SCREEN DOC REV: CPT | Performed by: FAMILY MEDICINE

## 2019-09-23 PROCEDURE — 4040F PNEUMOC VAC/ADMIN/RCVD: CPT | Performed by: FAMILY MEDICINE

## 2019-09-23 PROCEDURE — 1036F TOBACCO NON-USER: CPT | Performed by: FAMILY MEDICINE

## 2019-09-23 PROCEDURE — 3044F HG A1C LEVEL LT 7.0%: CPT | Performed by: FAMILY MEDICINE

## 2019-09-23 PROCEDURE — G8926 SPIRO NO PERF OR DOC: HCPCS | Performed by: FAMILY MEDICINE

## 2019-09-23 PROCEDURE — 3023F SPIROM DOC REV: CPT | Performed by: FAMILY MEDICINE

## 2019-09-23 PROCEDURE — G8419 CALC BMI OUT NRM PARAM NOF/U: HCPCS | Performed by: FAMILY MEDICINE

## 2019-09-23 PROCEDURE — 1090F PRES/ABSN URINE INCON ASSESS: CPT | Performed by: FAMILY MEDICINE

## 2019-09-23 PROCEDURE — G8427 DOCREV CUR MEDS BY ELIG CLIN: HCPCS | Performed by: FAMILY MEDICINE

## 2019-09-23 PROCEDURE — 2022F DILAT RTA XM EVC RTNOPTHY: CPT | Performed by: FAMILY MEDICINE

## 2019-09-23 PROCEDURE — 1123F ACP DISCUSS/DSCN MKR DOCD: CPT | Performed by: FAMILY MEDICINE

## 2019-09-23 RX ORDER — ACETAMINOPHEN AND CODEINE PHOSPHATE 300; 30 MG/1; MG/1
1 TABLET ORAL 3 TIMES DAILY PRN
Qty: 42 TABLET | Refills: 0 | Status: SHIPPED | OUTPATIENT
Start: 2019-09-23 | End: 2019-11-01 | Stop reason: SDUPTHER

## 2019-09-23 RX ORDER — ALPRAZOLAM 0.5 MG/1
TABLET ORAL
Qty: 90 TABLET | Refills: 0 | Status: SHIPPED | OUTPATIENT
Start: 2019-09-25 | End: 2019-11-22 | Stop reason: SDUPTHER

## 2019-09-23 RX ORDER — METHYLPREDNISOLONE 4 MG/1
TABLET ORAL
COMMUNITY
Start: 2019-09-13 | End: 2020-01-29

## 2019-09-23 RX ORDER — CHLORTHALIDONE 50 MG/1
TABLET ORAL
Qty: 90 TABLET | Refills: 0 | Status: SHIPPED | OUTPATIENT
Start: 2019-09-23 | End: 2019-12-28

## 2019-09-23 ASSESSMENT — ENCOUNTER SYMPTOMS
CHEST TIGHTNESS: 0
CHOKING: 0
COUGH: 0
VOMITING: 0
COLOR CHANGE: 0
NAUSEA: 0
SHORTNESS OF BREATH: 0
APNEA: 0
WHEEZING: 0
BACK PAIN: 1
ABDOMINAL DISTENTION: 0
BLOOD IN STOOL: 0
ANAL BLEEDING: 0
ABDOMINAL PAIN: 0
CONSTIPATION: 0
RECTAL PAIN: 0
DIARRHEA: 0
STRIDOR: 0

## 2019-09-23 ASSESSMENT — COPD QUESTIONNAIRES: COPD: 1

## 2019-09-23 ASSESSMENT — PATIENT HEALTH QUESTIONNAIRE - PHQ9
SUM OF ALL RESPONSES TO PHQ QUESTIONS 1-9: 0
1. LITTLE INTEREST OR PLEASURE IN DOING THINGS: 0
SUM OF ALL RESPONSES TO PHQ QUESTIONS 1-9: 0
SUM OF ALL RESPONSES TO PHQ9 QUESTIONS 1 & 2: 0
2. FEELING DOWN, DEPRESSED OR HOPELESS: 0

## 2019-09-23 NOTE — PROGRESS NOTES
results of kidney function study 2012    Osteoarthritis     Renal cysts, right 2014    RLS (restless legs syndrome) 10/19/11    Sciatica     Screening colonoscopy ;13    Nml. Next 10yrs:2023:Dr. Ricardo Garrison. 16(Dr. Waddell):nml EGD & colonoscopy except mild diverticulosis    Therapeutic drug monitoring 04/10/2015    OARRS report consistent on 4/10/15;7/6/15;10/23/15;16;16;16;16;3/6/17;17;17;17; 17;3/12/18;18    Valvular heart disease     s/p aortic and mitral valve repair. Under care of cards:Dr. Ruddy Hess.  Visit for screening mammogram 04/10/2017    negative:see scanned report. Social History     Tobacco Use    Smoking status: Former Smoker     Packs/day: 1.00     Years: 40.00     Pack years: 40.00     Types: Cigarettes     Last attempt to quit: 2007     Years since quittin.1    Smokeless tobacco: Never Used   Substance Use Topics    Alcohol use: Yes     Alcohol/week: 1.0 standard drinks     Types: 1 Cans of beer per week     Comment: occ    Drug use: No     Social History     Substance and Sexual Activity   Drug Use No                       Review of Systems   Constitutional: Negative for activity change, appetite change, chills, diaphoresis, fatigue, fever and unexpected weight change. Eyes: Negative for visual disturbance. Respiratory: Negative for apnea, cough, choking, chest tightness, shortness of breath, wheezing and stridor. Cardiovascular: Negative for chest pain, palpitations and leg swelling. Gastrointestinal: Negative for abdominal distention, abdominal pain, anal bleeding, blood in stool, constipation, diarrhea, nausea, rectal pain and vomiting. Endocrine: Negative for cold intolerance, heat intolerance, polydipsia, polyphagia and polyuria. Musculoskeletal: Positive for back pain. Skin: Negative for color change, pallor, rash and wound.    Neurological: Negative for dizziness, facial not anxious. Her affect is not angry, not blunt, not labile and not inappropriate. She is not agitated, not aggressive, not hyperactive, not slowed, not withdrawn, not actively hallucinating and not combative. Thought content is not paranoid and not delusional. Cognition and memory are normal. She does not exhibit a depressed mood. She expresses no homicidal and no suicidal ideation. Good eye contact. Polite. She is attentive. Assessment:      Diagnosis Orders   1. Essential hypertension, benign  VSS/well appearing. Stable/ctontrolled. Meds continued. chlorthalidone (HYGROTEN) 50 MG tablet   2. Anxiety  Stable/controlled. Pt' reveals no aberrant drug use behavior. Ok to continue [employer] refills w/q3mos office appts. ALPRAZolam (XANAX) 0.5 MG tablet   3. Controlled type 2 diabetes mellitus with stage 3 chronic kidney disease, without long-term current use of insulin (HCC)  Stable. Ok to take 5mg of glipizide if sugars increase to >250s;pt' aware. Currently, takes 1/2tab glipizide. Diabetes:Check feet daily. Yrly eye checks advised. Education: Reviewed ABCs of diabetes management (respective goals in parentheses):  A1C (<7), blood pressure (<130/80), and cholesterol (LDL <100). Counseled at this visit on the following: diabetes complication prevention, foot care. 4. Chronic back pain   Stable. Pt' reveals no aberrant drug use behavior. Ok to continue [employer] refills w/q3mos office appts. 5. COPD  Stable. 6. Gastroesophageal reflux disease without esophagitis  Stable. 7. Primary insomnia  Stable. 8. Anemia:multifactorial  Stable. Per specialist     9. Lymphoma  Per specialist.     10. Hyperlipidemia with target LDL less than 100  Stable. 11. Long-term (current) use of anticoagulants, INR goal 2.5-3.5  Stable. 12. Stage 3 chronic kidney disease (HCC)  Stable. Plan:      Advised to go to local ER or call 911 for any worrisome signs/sx.       Obtain labs/diagnostic tests as discussed today & call back for results within 2-7days. Pt' left office in good condition.

## 2019-10-08 RX ORDER — GLIPIZIDE 5 MG/1
TABLET ORAL
Qty: 45 TABLET | Refills: 2 | Status: SHIPPED | OUTPATIENT
Start: 2019-10-08 | End: 2020-06-08

## 2019-10-09 ENCOUNTER — TELEPHONE (OUTPATIENT)
Dept: FAMILY MEDICINE CLINIC | Age: 74
End: 2019-10-09

## 2019-10-16 ENCOUNTER — OFFICE VISIT (OUTPATIENT)
Dept: FAMILY MEDICINE CLINIC | Age: 74
End: 2019-10-16
Payer: MEDICARE

## 2019-10-16 VITALS
BODY MASS INDEX: 24.84 KG/M2 | RESPIRATION RATE: 16 BRPM | SYSTOLIC BLOOD PRESSURE: 126 MMHG | HEART RATE: 78 BPM | OXYGEN SATURATION: 97 % | DIASTOLIC BLOOD PRESSURE: 70 MMHG | WEIGHT: 123.2 LBS | HEIGHT: 59 IN | TEMPERATURE: 97.3 F

## 2019-10-16 DIAGNOSIS — Z13.820 SCREENING FOR OSTEOPOROSIS: ICD-10-CM

## 2019-10-16 DIAGNOSIS — D50.8 IRON DEFICIENCY ANEMIA SECONDARY TO INADEQUATE DIETARY IRON INTAKE: ICD-10-CM

## 2019-10-16 DIAGNOSIS — E78.5 HYPERLIPIDEMIA WITH TARGET LDL LESS THAN 100: ICD-10-CM

## 2019-10-16 DIAGNOSIS — Z87.891 PERSONAL HISTORY OF TOBACCO USE: ICD-10-CM

## 2019-10-16 DIAGNOSIS — Z12.11 COLON CANCER SCREENING: ICD-10-CM

## 2019-10-16 DIAGNOSIS — J43.8 OTHER EMPHYSEMA (HCC): ICD-10-CM

## 2019-10-16 DIAGNOSIS — I10 ESSENTIAL HYPERTENSION, BENIGN: ICD-10-CM

## 2019-10-16 DIAGNOSIS — Z72.89 OTHER PROBLEMS RELATED TO LIFESTYLE: ICD-10-CM

## 2019-10-16 DIAGNOSIS — Z12.31 VISIT FOR SCREENING MAMMOGRAM: ICD-10-CM

## 2019-10-16 DIAGNOSIS — J01.00 ACUTE NON-RECURRENT MAXILLARY SINUSITIS: ICD-10-CM

## 2019-10-16 DIAGNOSIS — Z87.891 FORMER SMOKER: ICD-10-CM

## 2019-10-16 DIAGNOSIS — C85.90 LYMPHOMA, UNSPECIFIED BODY REGION, UNSPECIFIED LYMPHOMA TYPE (HCC): ICD-10-CM

## 2019-10-16 DIAGNOSIS — Z79.01 LONG-TERM (CURRENT) USE OF ANTICOAGULANTS, INR GOAL 2.5-3.5: ICD-10-CM

## 2019-10-16 DIAGNOSIS — E11.22 CONTROLLED TYPE 2 DIABETES MELLITUS WITH STAGE 3 CHRONIC KIDNEY DISEASE, WITHOUT LONG-TERM CURRENT USE OF INSULIN (HCC): ICD-10-CM

## 2019-10-16 DIAGNOSIS — K21.9 GASTROESOPHAGEAL REFLUX DISEASE WITHOUT ESOPHAGITIS: ICD-10-CM

## 2019-10-16 DIAGNOSIS — N18.30 STAGE 3 CHRONIC KIDNEY DISEASE (HCC): ICD-10-CM

## 2019-10-16 DIAGNOSIS — F41.9 ANXIETY: ICD-10-CM

## 2019-10-16 DIAGNOSIS — Z12.2 ENCOUNTER FOR SCREENING FOR LUNG CANCER: ICD-10-CM

## 2019-10-16 DIAGNOSIS — Z11.59 NEED FOR HEPATITIS C SCREENING TEST: ICD-10-CM

## 2019-10-16 DIAGNOSIS — F51.01 PRIMARY INSOMNIA: ICD-10-CM

## 2019-10-16 DIAGNOSIS — M54.50 CHRONIC BILATERAL LOW BACK PAIN WITHOUT SCIATICA: ICD-10-CM

## 2019-10-16 DIAGNOSIS — N18.30 CONTROLLED TYPE 2 DIABETES MELLITUS WITH STAGE 3 CHRONIC KIDNEY DISEASE, WITHOUT LONG-TERM CURRENT USE OF INSULIN (HCC): ICD-10-CM

## 2019-10-16 DIAGNOSIS — G89.29 CHRONIC BILATERAL LOW BACK PAIN WITHOUT SCIATICA: ICD-10-CM

## 2019-10-16 DIAGNOSIS — E66.3 OVERWEIGHT (BMI 25.0-29.9): ICD-10-CM

## 2019-10-16 DIAGNOSIS — Z00.00 ENCOUNTER FOR MEDICARE ANNUAL WELLNESS EXAM: Primary | ICD-10-CM

## 2019-10-16 PROCEDURE — G0439 PPPS, SUBSEQ VISIT: HCPCS | Performed by: FAMILY MEDICINE

## 2019-10-16 PROCEDURE — 1090F PRES/ABSN URINE INCON ASSESS: CPT | Performed by: FAMILY MEDICINE

## 2019-10-16 PROCEDURE — G8420 CALC BMI NORM PARAMETERS: HCPCS | Performed by: FAMILY MEDICINE

## 2019-10-16 PROCEDURE — G8482 FLU IMMUNIZE ORDER/ADMIN: HCPCS | Performed by: FAMILY MEDICINE

## 2019-10-16 PROCEDURE — G0296 VISIT TO DETERM LDCT ELIG: HCPCS | Performed by: FAMILY MEDICINE

## 2019-10-16 PROCEDURE — 1123F ACP DISCUSS/DSCN MKR DOCD: CPT | Performed by: FAMILY MEDICINE

## 2019-10-16 PROCEDURE — G8926 SPIRO NO PERF OR DOC: HCPCS | Performed by: FAMILY MEDICINE

## 2019-10-16 PROCEDURE — G8427 DOCREV CUR MEDS BY ELIG CLIN: HCPCS | Performed by: FAMILY MEDICINE

## 2019-10-16 PROCEDURE — 2022F DILAT RTA XM EVC RTNOPTHY: CPT | Performed by: FAMILY MEDICINE

## 2019-10-16 PROCEDURE — 4040F PNEUMOC VAC/ADMIN/RCVD: CPT | Performed by: FAMILY MEDICINE

## 2019-10-16 PROCEDURE — 3044F HG A1C LEVEL LT 7.0%: CPT | Performed by: FAMILY MEDICINE

## 2019-10-16 PROCEDURE — G8400 PT W/DXA NO RESULTS DOC: HCPCS | Performed by: FAMILY MEDICINE

## 2019-10-16 PROCEDURE — 3017F COLORECTAL CA SCREEN DOC REV: CPT | Performed by: FAMILY MEDICINE

## 2019-10-16 PROCEDURE — 3023F SPIROM DOC REV: CPT | Performed by: FAMILY MEDICINE

## 2019-10-16 PROCEDURE — 1036F TOBACCO NON-USER: CPT | Performed by: FAMILY MEDICINE

## 2019-10-16 PROCEDURE — 99214 OFFICE O/P EST MOD 30 MIN: CPT | Performed by: FAMILY MEDICINE

## 2019-10-16 RX ORDER — SULFASALAZINE 500 MG/1
500 TABLET ORAL 4 TIMES DAILY
COMMUNITY
End: 2020-09-16

## 2019-10-16 RX ORDER — ALBUTEROL SULFATE 90 UG/1
1 AEROSOL, METERED RESPIRATORY (INHALATION) EVERY 6 HOURS PRN
Qty: 1 INHALER | Refills: 1 | Status: SHIPPED | OUTPATIENT
Start: 2019-10-16 | End: 2020-01-29

## 2019-10-16 RX ORDER — AMOXICILLIN AND CLAVULANATE POTASSIUM 875; 125 MG/1; MG/1
1 TABLET, FILM COATED ORAL 2 TIMES DAILY
Qty: 20 TABLET | Refills: 0 | Status: SHIPPED | OUTPATIENT
Start: 2019-10-16 | End: 2019-10-26

## 2019-10-16 RX ORDER — WARFARIN SODIUM 1 MG/1
TABLET ORAL
Qty: 90 TABLET | Refills: 0 | Status: SHIPPED | OUTPATIENT
Start: 2019-10-16 | End: 2020-04-12

## 2019-10-16 ASSESSMENT — PATIENT HEALTH QUESTIONNAIRE - PHQ9
1. LITTLE INTEREST OR PLEASURE IN DOING THINGS: 0
SUM OF ALL RESPONSES TO PHQ QUESTIONS 1-9: 0
SUM OF ALL RESPONSES TO PHQ QUESTIONS 1-9: 0
SUM OF ALL RESPONSES TO PHQ9 QUESTIONS 1 & 2: 0
2. FEELING DOWN, DEPRESSED OR HOPELESS: 0

## 2019-10-16 ASSESSMENT — ANXIETY QUESTIONNAIRES: GAD7 TOTAL SCORE: 0

## 2019-10-28 ENCOUNTER — TELEPHONE (OUTPATIENT)
Dept: FAMILY MEDICINE CLINIC | Age: 74
End: 2019-10-28

## 2019-10-28 ENCOUNTER — ANTI-COAG VISIT (OUTPATIENT)
Dept: FAMILY MEDICINE CLINIC | Age: 74
End: 2019-10-28

## 2019-10-28 DIAGNOSIS — Z95.2 AORTIC VALVE REPLACED: ICD-10-CM

## 2019-10-28 LAB — INR BLD: 1.1

## 2019-10-30 DIAGNOSIS — E78.5 HYPERLIPIDEMIA WITH TARGET LDL LESS THAN 100: ICD-10-CM

## 2019-10-30 RX ORDER — ATORVASTATIN CALCIUM 10 MG/1
TABLET, FILM COATED ORAL
Qty: 90 TABLET | Refills: 0 | Status: SHIPPED | OUTPATIENT
Start: 2019-10-30 | End: 2020-01-29

## 2019-11-01 ENCOUNTER — TELEPHONE (OUTPATIENT)
Dept: FAMILY MEDICINE CLINIC | Age: 74
End: 2019-11-01

## 2019-11-01 ENCOUNTER — ANTI-COAG VISIT (OUTPATIENT)
Dept: FAMILY MEDICINE CLINIC | Age: 74
End: 2019-11-01

## 2019-11-01 DIAGNOSIS — M79.605 CHRONIC PAIN OF BOTH LOWER EXTREMITIES: ICD-10-CM

## 2019-11-01 DIAGNOSIS — M54.50 CHRONIC BILATERAL LOW BACK PAIN WITHOUT SCIATICA: ICD-10-CM

## 2019-11-01 DIAGNOSIS — Z95.2 AORTIC VALVE REPLACED: ICD-10-CM

## 2019-11-01 DIAGNOSIS — M79.604 CHRONIC PAIN OF BOTH LOWER EXTREMITIES: ICD-10-CM

## 2019-11-01 DIAGNOSIS — G89.29 CHRONIC PAIN OF BOTH LOWER EXTREMITIES: ICD-10-CM

## 2019-11-01 DIAGNOSIS — G89.29 CHRONIC BILATERAL LOW BACK PAIN WITHOUT SCIATICA: ICD-10-CM

## 2019-11-01 LAB — INR BLD: 1.8

## 2019-11-01 RX ORDER — ACETAMINOPHEN AND CODEINE PHOSPHATE 300; 30 MG/1; MG/1
1 TABLET ORAL 3 TIMES DAILY PRN
Qty: 42 TABLET | Refills: 0 | Status: SHIPPED | OUTPATIENT
Start: 2019-11-01 | End: 2019-12-11 | Stop reason: SDUPTHER

## 2019-11-08 ENCOUNTER — TELEPHONE (OUTPATIENT)
Dept: FAMILY MEDICINE CLINIC | Age: 74
End: 2019-11-08

## 2019-11-08 ENCOUNTER — ANTI-COAG VISIT (OUTPATIENT)
Dept: FAMILY MEDICINE CLINIC | Age: 74
End: 2019-11-08

## 2019-11-08 DIAGNOSIS — Z95.2 AORTIC VALVE REPLACED: ICD-10-CM

## 2019-11-08 LAB — INR BLD: 4.5

## 2019-11-11 ENCOUNTER — ANTI-COAG VISIT (OUTPATIENT)
Dept: FAMILY MEDICINE CLINIC | Age: 74
End: 2019-11-11

## 2019-11-11 ENCOUNTER — TELEPHONE (OUTPATIENT)
Dept: FAMILY MEDICINE CLINIC | Age: 74
End: 2019-11-11

## 2019-11-11 DIAGNOSIS — Z95.2 AORTIC VALVE REPLACED: ICD-10-CM

## 2019-11-11 LAB — INR BLD: 2.9

## 2019-11-19 LAB
A/G RATIO: 2.8 (CALC) (ref 1–2.5)
ALBUMIN SERPL-MCNC: 4.7 G/DL (ref 3.6–5.1)
ALP BLD-CCNC: 66 U/L (ref 33–130)
ALT SERPL-CCNC: 28 U/L (ref 6–29)
AST SERPL-CCNC: 39 U/L (ref 10–35)
BILIRUB SERPL-MCNC: 0.6 MG/DL (ref 0.2–1.2)
BUN / CREAT RATIO: 14 (CALC) (ref 6–22)
BUN BLDV-MCNC: 17 MG/DL (ref 7–25)
CALCIUM SERPL-MCNC: 8.8 MG/DL (ref 8.6–10.4)
CHLORIDE BLD-SCNC: 95 MMOL/L (ref 98–110)
CHOLESTEROL, TOTAL: 154 MG/DL
CHOLESTEROL/HDL RATIO: 3.7 (CALC)
CHOLESTEROL: 112 MG/DL (CALC)
CO2: 27 MMOL/L (ref 20–32)
CREAT SERPL-MCNC: 1.21 MG/DL (ref 0.6–0.93)
GFR AFRICAN AMERICAN: 51 ML/MIN/1.73M2
GFR, ESTIMATED: 44 ML/MIN/1.73M2
GLOBULIN: 1.7 G/DL (CALC) (ref 1.9–3.7)
GLUCOSE BLD-MCNC: 125 MG/DL (ref 65–99)
HDLC SERPL-MCNC: 42 MG/DL
LDL CHOLESTEROL CALCULATED: 85 MG/DL (CALC)
POTASSIUM SERPL-SCNC: 4.3 MMOL/L (ref 3.5–5.3)
SODIUM BLD-SCNC: 133 MMOL/L (ref 135–146)
TOTAL PROTEIN: 6.4 G/DL (ref 6.1–8.1)
TRIGL SERPL-MCNC: 178 MG/DL

## 2019-11-20 ENCOUNTER — ANTI-COAG VISIT (OUTPATIENT)
Dept: FAMILY MEDICINE CLINIC | Age: 74
End: 2019-11-20

## 2019-11-20 ENCOUNTER — TELEPHONE (OUTPATIENT)
Dept: FAMILY MEDICINE CLINIC | Age: 74
End: 2019-11-20

## 2019-11-20 DIAGNOSIS — Z95.2 AORTIC VALVE REPLACED: ICD-10-CM

## 2019-11-20 LAB — INR BLD: 1.8

## 2019-11-22 DIAGNOSIS — F41.9 ANXIETY: ICD-10-CM

## 2019-11-22 RX ORDER — ALPRAZOLAM 0.5 MG/1
TABLET ORAL
Qty: 90 TABLET | Refills: 0 | Status: SHIPPED | OUTPATIENT
Start: 2019-11-22 | End: 2020-01-03

## 2019-11-22 RX ORDER — ALPRAZOLAM 0.5 MG/1
TABLET ORAL
Qty: 90 TABLET | Refills: 0 | Status: SHIPPED | OUTPATIENT
Start: 2019-11-22 | End: 2020-01-29

## 2019-11-27 ENCOUNTER — ANTI-COAG VISIT (OUTPATIENT)
Dept: FAMILY MEDICINE CLINIC | Age: 74
End: 2019-11-27

## 2019-11-27 ENCOUNTER — TELEPHONE (OUTPATIENT)
Dept: FAMILY MEDICINE CLINIC | Age: 74
End: 2019-11-27

## 2019-11-27 DIAGNOSIS — Z95.2 AORTIC VALVE REPLACED: ICD-10-CM

## 2019-11-27 LAB — INR BLD: 2.9

## 2019-12-02 RX ORDER — LISINOPRIL 40 MG/1
TABLET ORAL
Qty: 90 TABLET | Refills: 0 | Status: SHIPPED | OUTPATIENT
Start: 2019-12-02 | End: 2020-02-26

## 2019-12-02 RX ORDER — PANTOPRAZOLE SODIUM 40 MG/1
TABLET, DELAYED RELEASE ORAL
Qty: 90 TABLET | Refills: 0 | Status: SHIPPED | OUTPATIENT
Start: 2019-12-02 | End: 2020-02-26

## 2019-12-11 ENCOUNTER — TELEPHONE (OUTPATIENT)
Dept: FAMILY MEDICINE CLINIC | Age: 74
End: 2019-12-11

## 2019-12-11 DIAGNOSIS — M54.50 CHRONIC BILATERAL LOW BACK PAIN WITHOUT SCIATICA: ICD-10-CM

## 2019-12-11 DIAGNOSIS — G89.29 CHRONIC PAIN OF BOTH LOWER EXTREMITIES: ICD-10-CM

## 2019-12-11 DIAGNOSIS — M79.604 CHRONIC PAIN OF BOTH LOWER EXTREMITIES: ICD-10-CM

## 2019-12-11 DIAGNOSIS — G89.29 CHRONIC BILATERAL LOW BACK PAIN WITHOUT SCIATICA: ICD-10-CM

## 2019-12-11 DIAGNOSIS — M79.605 CHRONIC PAIN OF BOTH LOWER EXTREMITIES: ICD-10-CM

## 2019-12-11 RX ORDER — ACETAMINOPHEN AND CODEINE PHOSPHATE 300; 30 MG/1; MG/1
1 TABLET ORAL 3 TIMES DAILY PRN
Qty: 42 TABLET | Refills: 0 | Status: SHIPPED | OUTPATIENT
Start: 2019-12-11 | End: 2020-01-29 | Stop reason: SDUPTHER

## 2019-12-27 ENCOUNTER — ANTI-COAG VISIT (OUTPATIENT)
Dept: FAMILY MEDICINE CLINIC | Age: 74
End: 2019-12-27

## 2019-12-27 DIAGNOSIS — I10 ESSENTIAL HYPERTENSION, BENIGN: ICD-10-CM

## 2019-12-27 DIAGNOSIS — Z95.2 AORTIC VALVE REPLACED: ICD-10-CM

## 2019-12-27 LAB — INR BLD: 4

## 2019-12-28 RX ORDER — CHLORTHALIDONE 50 MG/1
TABLET ORAL
Qty: 90 TABLET | Refills: 0 | Status: SHIPPED | OUTPATIENT
Start: 2019-12-28 | End: 2020-03-16

## 2020-01-01 LAB — INR BLD: 4.8

## 2020-01-03 RX ORDER — AMLODIPINE BESYLATE 10 MG/1
10 TABLET ORAL DAILY
Qty: 90 TABLET | Refills: 0 | Status: SHIPPED | OUTPATIENT
Start: 2020-01-03 | End: 2020-03-30

## 2020-01-03 RX ORDER — ALPRAZOLAM 0.5 MG/1
TABLET ORAL
Qty: 90 TABLET | Refills: 0 | Status: SHIPPED | OUTPATIENT
Start: 2020-01-03 | End: 2020-02-06

## 2020-01-03 NOTE — TELEPHONE ENCOUNTER
Script completed & placed on your desk. Pls notify pt' & is due for med check appt in approx 3weeks.

## 2020-01-12 RX ORDER — WARFARIN SODIUM 3 MG/1
TABLET ORAL
Qty: 90 TABLET | Refills: 0 | Status: SHIPPED | OUTPATIENT
Start: 2020-01-12 | End: 2020-04-12

## 2020-01-28 ENCOUNTER — TELEPHONE (OUTPATIENT)
Dept: FAMILY MEDICINE CLINIC | Age: 75
End: 2020-01-28

## 2020-01-28 NOTE — TELEPHONE ENCOUNTER
[phone] (M)  OV: 12/27/2019    Patient has an appt scheduled tomorrow and would also like to  some Tylenol 3 while she is here. Please advise.

## 2020-01-29 ENCOUNTER — OFFICE VISIT (OUTPATIENT)
Dept: FAMILY MEDICINE CLINIC | Age: 75
End: 2020-01-29
Payer: MEDICARE

## 2020-01-29 VITALS
WEIGHT: 117.9 LBS | DIASTOLIC BLOOD PRESSURE: 55 MMHG | TEMPERATURE: 98.3 F | HEIGHT: 59 IN | BODY MASS INDEX: 23.77 KG/M2 | HEART RATE: 80 BPM | SYSTOLIC BLOOD PRESSURE: 132 MMHG | RESPIRATION RATE: 16 BRPM | OXYGEN SATURATION: 96 %

## 2020-01-29 PROCEDURE — G8420 CALC BMI NORM PARAMETERS: HCPCS | Performed by: FAMILY MEDICINE

## 2020-01-29 PROCEDURE — 1090F PRES/ABSN URINE INCON ASSESS: CPT | Performed by: FAMILY MEDICINE

## 2020-01-29 PROCEDURE — G8482 FLU IMMUNIZE ORDER/ADMIN: HCPCS | Performed by: FAMILY MEDICINE

## 2020-01-29 PROCEDURE — 1123F ACP DISCUSS/DSCN MKR DOCD: CPT | Performed by: FAMILY MEDICINE

## 2020-01-29 PROCEDURE — 3046F HEMOGLOBIN A1C LEVEL >9.0%: CPT | Performed by: FAMILY MEDICINE

## 2020-01-29 PROCEDURE — 4040F PNEUMOC VAC/ADMIN/RCVD: CPT | Performed by: FAMILY MEDICINE

## 2020-01-29 PROCEDURE — 2022F DILAT RTA XM EVC RTNOPTHY: CPT | Performed by: FAMILY MEDICINE

## 2020-01-29 PROCEDURE — 3017F COLORECTAL CA SCREEN DOC REV: CPT | Performed by: FAMILY MEDICINE

## 2020-01-29 PROCEDURE — 3023F SPIROM DOC REV: CPT | Performed by: FAMILY MEDICINE

## 2020-01-29 PROCEDURE — G8400 PT W/DXA NO RESULTS DOC: HCPCS | Performed by: FAMILY MEDICINE

## 2020-01-29 PROCEDURE — 1036F TOBACCO NON-USER: CPT | Performed by: FAMILY MEDICINE

## 2020-01-29 PROCEDURE — 99214 OFFICE O/P EST MOD 30 MIN: CPT | Performed by: FAMILY MEDICINE

## 2020-01-29 PROCEDURE — G8926 SPIRO NO PERF OR DOC: HCPCS | Performed by: FAMILY MEDICINE

## 2020-01-29 PROCEDURE — G8427 DOCREV CUR MEDS BY ELIG CLIN: HCPCS | Performed by: FAMILY MEDICINE

## 2020-01-29 RX ORDER — ATORVASTATIN CALCIUM 10 MG/1
TABLET, FILM COATED ORAL
Qty: 90 TABLET | Refills: 0 | Status: SHIPPED | OUTPATIENT
Start: 2020-01-29 | End: 2020-06-11

## 2020-01-29 RX ORDER — ACETAMINOPHEN AND CODEINE PHOSPHATE 300; 30 MG/1; MG/1
1 TABLET ORAL 3 TIMES DAILY PRN
Qty: 42 TABLET | Refills: 0 | Status: SHIPPED | OUTPATIENT
Start: 2020-01-29 | End: 2020-03-09 | Stop reason: SDUPTHER

## 2020-01-29 ASSESSMENT — ENCOUNTER SYMPTOMS
CHEST TIGHTNESS: 0
STRIDOR: 0
CONSTIPATION: 0
SHORTNESS OF BREATH: 0
COUGH: 0
APNEA: 0
BACK PAIN: 1
BLOOD IN STOOL: 0
ABDOMINAL DISTENTION: 0
NAUSEA: 0
ANAL BLEEDING: 0
CHOKING: 0
VOMITING: 0
WHEEZING: 0
DIARRHEA: 0
RECTAL PAIN: 0
ABDOMINAL PAIN: 0

## 2020-01-29 ASSESSMENT — PATIENT HEALTH QUESTIONNAIRE - PHQ9
SUM OF ALL RESPONSES TO PHQ QUESTIONS 1-9: 0
1. LITTLE INTEREST OR PLEASURE IN DOING THINGS: 0
2. FEELING DOWN, DEPRESSED OR HOPELESS: 0
SUM OF ALL RESPONSES TO PHQ QUESTIONS 1-9: 0
SUM OF ALL RESPONSES TO PHQ9 QUESTIONS 1 & 2: 0

## 2020-01-29 NOTE — PROGRESS NOTES
compSubjective:      Patient ID: Pancho Garrett is a 77 y.o. . Results for Akanksha Sherman (MRN [id number]) as of 1/29/2020 14:23   Ref. Range 11/18/2019 10:31   Sodium Latest Ref Range: 135 - 146 mmol/L 133 (L)   Potassium Latest Ref Range: 3.5 - 5.3 mmol/L 4.3   Chloride Latest Ref Range: 98 - 110 mmol/L 95 (L)   CO2 Latest Ref Range: 20 - 32 mmol/L 27   BUN Latest Ref Range: 7 - 25 mg/dL 17   Creatinine Latest Ref Range: 0.60 - 0.93 mg/dL 1.21 (H)   BUN/Creatinine Ratio Latest Ref Range: 6 - 22 (calc) 14   GFR Est Latest Ref Range: > OR = 60 mL/min/1.73m2 44 (L)   GFR  Latest Ref Range: > OR = 60 mL/min/1.73m2 51 (L)   Glucose Latest Ref Range: 65 - 99 mg/dL 125 (H)   Calcium Latest Ref Range: 8.6 - 10.4 mg/dL 8.8   Total Protein Latest Ref Range: 6.1 - 8.1 g/dL 6.4   Chol/HDL Ratio Latest Ref Range: <5.0 (calc) 3.7   Cholesterol Latest Ref Range: <130 mg/dL (calc) 112   Cholesterol, Total Latest Ref Range: <200 mg/dL 154   HDL Cholesterol Latest Ref Range: >50 mg/dL 42 (L)   LDL Calculated Latest Units: mg/dL (calc) 85   Triglycerides Latest Ref Range: <150 mg/dL 178 (H)   Albumin Latest Ref Range: 3.6 - 5.1 g/dL 4.7   Globulin Latest Ref Range: 1.9 - 3.7 g/dL (calc) 1.7 (L)   Albumin/Globulin Ratio Latest Ref Range: 1.0 - 2.5 (calc) 2.8 (H)   Alk Phos Latest Ref Range: 33 - 130 U/L 66   ALT Latest Ref Range: 6 - 29 U/L 28   AST Latest Ref Range: 10 - 35 U/L 39 (H)   Bilirubin Latest Ref Range: 0.2 - 1.2 mg/dL 0.6       Diabetes:doing well. Takes medication w/o side effects. Preprandial-fasting KQ=[id number]. 2hr postprandial GH=[id number]. HBA1c:6.0 on 11/13/17. 10.3 on 3/2/18. 4.9 on 6/1/18. 5.2 on 1/4/19. 6.0 on 7/15/19. A1c due. EBF=079 on 11/13/17. 89 on 2/26/18. 89 on 6/1/18. 118 on 9/10/18. 73 on 1/4/19. 63 on 3/18/19. 86 on 11/18/19. No other new associated or worsening factors. Eye check:yes:<12mos ago. Denies polyuria/polyphagia/polydipsia/BLE skin lesions/BLE paresthesia.      HTN f/u:doing well. Takes medications w/o side effects. No other associated factors. Additional table salt added to food:no. Denies cp/sob/pnd/ankle edema/dizziness      INR:takes 3.5mg coumadin w/o side effects. INR=4 on 12/27/19. Next INR is due. Anemia/lymphoma:managed by Hematology/oncology. Reports doing well. No other associated concerns. Psoriatric arthritis/seronegative arthritis:per rheumatology. Seen by specialist on 12/27/19:office note via EPIC reviewed today. Hyperlipidemia: doing well. Lipitor & diet managed. Labs see above. No other associated concerns. Back Pain:doing well per her baseline. No changes since previous visit  Chronic back pain:is well controlled with tylenol#3 prn. Needs refill. Pt' reveals no aberrant drug use behavior. No other associated or worsening factors. Denies BLE fwgiufwn-vcrwonzlayy-layufpdfa/urinary or bowel control loss or change/saddle anesthesia/gait abnormality. Anxiety/insomnia: reports doing well. Sleeping approx 7hrs. Medication helps:xanax. Pt' reveals no aberrant drug use behavior. OARRS reports:are consistent. Denies SI/HI/hallucinations/illicit drugs/etoh abuse/cold intolerance. COPD:doing well per her baseline. No other other associated concerns. Albuterol inhaler use:last used >15mos ago. Former smoker. Denies chills/sob/cp/weakness/n-v/abdo pain/HA/dizziness/rash/neck pain-stiffness/photophobia. [address] well. No other new associated/worsening or other improving factors. Denies abdo pain/n-v/diarrhea/melena-blood in stool.             Allergies   Allergen Reactions    Diclofenac Other (See Comments)     Bleed/colitis    Nsaids Other (See Comments)     CANNOT TAKE D/T GI BLEEDING AND USE OF COUMADIN    Hydrocodone-Acetaminophen Anxiety       Current Outpatient Medications on File Prior to Visit   Medication Sig Dispense Refill    warfarin (COUMADIN) 3 MG tablet TAKE BY MOUTH AS DIRECTED 90 tablet 0    ALPRAZolam (XANAX) 0.5 MG tablet TAKE 1 TABLET BY MOUTH THREE TIMES DAILY AS NEEDED 90 tablet 0    amLODIPine (NORVASC) 10 MG tablet TAKE 1 TABLET BY MOUTH DAILY 90 tablet 0    chlorthalidone (HYGROTEN) 50 MG tablet TAKE 1 TABLET BY MOUTH EVERY DAY 90 tablet 0    metFORMIN (GLUCOPHAGE) 1000 MG tablet TAKE 1 TABLET BY MOUTH TWICE DAILY 180 tablet 0    lisinopril (PRINIVIL;ZESTRIL) 40 MG tablet TAKE 1 TABLET BY MOUTH EVERY DAY 90 tablet 0    pantoprazole (PROTONIX) 40 MG tablet TAKE 1 TABLET BY MOUTH EVERY DAY 90 tablet 0    atorvastatin (LIPITOR) 10 MG tablet TAKE 1 TABLET BY MOUTH DAILY IN THE EVENING FOR CHOLESTEROL 90 tablet 0    sulfaSALAzine (AZULFIDINE) 500 MG tablet Take 500 mg by mouth 4 times daily      warfarin (COUMADIN) 1 MG tablet TAKE BY MOUTH AS DIRECTED 90 tablet 0    albuterol sulfate  (90 Base) MCG/ACT inhaler Inhale 1 puff into the lungs every 6 hours as needed for Wheezing 1 Inhaler 1    glipiZIDE (GLUCOTROL) 5 MG tablet TAKE 1/2(ONE-HALF) TABLET BY MOUTH EVERY DAY WITH FOOD 45 tablet 2    blood glucose test strips (TRUE METRIX BLOOD GLUCOSE TEST) strip USE TO TEST TWICE DAILY AS DIRECTED 180 strip 0    TRUE METRIX BLOOD GLUCOSE TEST strip USE TO TEST TWICE DAILY AS DIRECTED 150 strip 0    alendronate (FOSAMAX) 35 MG tablet TAKE ONE TABLET BY MOUTH ONCE WEEKLY( EVERY 7 DAYS)      Fqbuqjyvm-Sqrnyonj-NR (GQHWKYDV-QV-XJDKROFYJ PO) Take by mouth daily      cefdinir (OMNICEF) 300 MG capsule Take 300 mg by mouth 2 times daily      promethazine (PHENERGAN) 12.5 MG tablet TAKE ONE TABLET BY MOUTH EVERY 6 HOURS AS NEEDED 28 tablet 0    warfarin (COUMADIN) 4 MG tablet TAKE 1 TABLET BY MOUTH DAILY 30 tablet 0    PROAIR  (90 BASE) MCG/ACT inhaler INHALE 2 PUFFS BY MOUTH EVERY 4 HOURS AS NEEDED FOR WHEEZING/ SHORTNESS OF BREATH 8.5 g 0    hydroxychloroquine (PLAQUENIL) 200 MG tablet Take  by mouth daily.       ferrous sulfate 325 (65 FE) MG EC tablet Take 325 mg by mouth Comments: Good eye contact. Assessment:      Diagnosis Orders   1. Essential hypertension, benign  VSS/well appearing. Stable. Low salt diet advised. Comprehensive Metabolic Panel   2. Hyperlipidemia with target LDL less than 100  Stable/at goal except TG:The patient is asked to make an attempt to improve diet and exercise patterns to aid in medical management of this problem. Labs in 3mos. Comprehensive Metabolic Panel    Lipid Panel   3. Chronic back pain   See plan. acetaminophen-codeine (TYLENOL #3) 300-30 MG per tablet   4. Controlled type 2 diabetes mellitus with stage 3 chronic kidney disease, without long-term current use of insulin (HCC)  Stable. A1c due. Comprehensive Metabolic Panel   5. COPD  Stable. 6. Gastroesophageal reflux disease without esophagitis  Stable. 7. Primary insomnia  Stable. 8. Anemia:multifactorial  Stable. Per hematologist.     9. Lymphoma  Stable. Per specialist.     10. Stage 3 chronic kidney disease ([address]  Stable. Comprehensive Metabolic Panel   11. Chronic pain of both lower extremities  acetaminophen-codeine (TYLENOL #3) 300-30 MG per tablet   12. Visit for screening mammogram  Next due 5/2020. 13. Seronegative arthritis  Per specialist.           Plan:      Back pain:Stable. Pain is controlled with medication. Pt' reveals no aberrant drug use behavior. Ok to continue [employer] refills w/q3mos office appts. Advised to go to local ER or call 911 for any worrisome signs/sx. Pt' left office in good condition. Obtain labs/diagnostic tests as discussed today & call back for results within 2-7days.

## 2020-01-30 LAB
A/G RATIO: 2.7 (ref 1.1–2.2)
ALBUMIN SERPL-MCNC: 4.9 G/DL (ref 3.4–5)
ALP BLD-CCNC: 64 U/L (ref 40–129)
ALT SERPL-CCNC: 10 U/L (ref 10–40)
ANION GAP SERPL CALCULATED.3IONS-SCNC: 19 MMOL/L (ref 3–16)
AST SERPL-CCNC: 31 U/L (ref 15–37)
BILIRUB SERPL-MCNC: 0.6 MG/DL (ref 0–1)
BUN BLDV-MCNC: 12 MG/DL (ref 7–20)
CALCIUM SERPL-MCNC: 9.5 MG/DL (ref 8.3–10.6)
CHLORIDE BLD-SCNC: 94 MMOL/L (ref 99–110)
CHOLESTEROL, TOTAL: 142 MG/DL (ref 0–199)
CO2: 23 MMOL/L (ref 21–32)
CREAT SERPL-MCNC: 1 MG/DL (ref 0.6–1.2)
GFR AFRICAN AMERICAN: >60
GFR NON-AFRICAN AMERICAN: 54
GLOBULIN: 1.8 G/DL
GLUCOSE BLD-MCNC: 131 MG/DL (ref 70–99)
HDLC SERPL-MCNC: 46 MG/DL (ref 40–60)
LDL CHOLESTEROL CALCULATED: 44 MG/DL
POTASSIUM SERPL-SCNC: 4.9 MMOL/L (ref 3.5–5.1)
SODIUM BLD-SCNC: 136 MMOL/L (ref 136–145)
TOTAL PROTEIN: 6.7 G/DL (ref 6.4–8.2)
TRIGL SERPL-MCNC: 259 MG/DL (ref 0–150)
VLDLC SERPL CALC-MCNC: 52 MG/DL

## 2020-01-31 ENCOUNTER — ANTI-COAG VISIT (OUTPATIENT)
Dept: FAMILY MEDICINE CLINIC | Age: 75
End: 2020-01-31

## 2020-01-31 ENCOUNTER — TELEPHONE (OUTPATIENT)
Dept: FAMILY MEDICINE CLINIC | Age: 75
End: 2020-01-31

## 2020-01-31 LAB — INR BLD: 4.2

## 2020-01-31 NOTE — PROGRESS NOTES
Notify pt':  INR is too high. Change from current dosing to 3mg coumadin daily. INR recheck in 7days.

## 2020-02-10 ENCOUNTER — TELEPHONE (OUTPATIENT)
Dept: FAMILY MEDICINE CLINIC | Age: 75
End: 2020-02-10

## 2020-02-10 ENCOUNTER — ANTI-COAG VISIT (OUTPATIENT)
Dept: FAMILY MEDICINE CLINIC | Age: 75
End: 2020-02-10

## 2020-02-10 LAB — INR BLD: 1.8

## 2020-02-17 ENCOUNTER — TELEPHONE (OUTPATIENT)
Dept: FAMILY MEDICINE CLINIC | Age: 75
End: 2020-02-17

## 2020-02-17 ENCOUNTER — ANTI-COAG VISIT (OUTPATIENT)
Dept: FAMILY MEDICINE CLINIC | Age: 75
End: 2020-02-17

## 2020-02-17 LAB — INR BLD: 1.6

## 2020-02-24 ENCOUNTER — TELEPHONE (OUTPATIENT)
Dept: FAMILY MEDICINE CLINIC | Age: 75
End: 2020-02-24

## 2020-02-24 ENCOUNTER — ANTI-COAG VISIT (OUTPATIENT)
Dept: FAMILY MEDICINE CLINIC | Age: 75
End: 2020-02-24

## 2020-02-24 LAB — INR BLD: 1.9

## 2020-02-26 RX ORDER — PANTOPRAZOLE SODIUM 40 MG/1
TABLET, DELAYED RELEASE ORAL
Qty: 90 TABLET | Refills: 0 | Status: SHIPPED | OUTPATIENT
Start: 2020-02-26 | End: 2020-03-16

## 2020-02-26 RX ORDER — LISINOPRIL 40 MG/1
TABLET ORAL
Qty: 90 TABLET | Refills: 0 | Status: SHIPPED | OUTPATIENT
Start: 2020-02-26 | End: 2020-05-29

## 2020-03-09 ENCOUNTER — ANTI-COAG VISIT (OUTPATIENT)
Dept: FAMILY MEDICINE CLINIC | Age: 75
End: 2020-03-09

## 2020-03-09 ENCOUNTER — TELEPHONE (OUTPATIENT)
Dept: FAMILY MEDICINE CLINIC | Age: 75
End: 2020-03-09

## 2020-03-09 LAB — INR BLD: 1.9

## 2020-03-09 RX ORDER — ACETAMINOPHEN AND CODEINE PHOSPHATE 300; 30 MG/1; MG/1
1 TABLET ORAL 3 TIMES DAILY PRN
Qty: 42 TABLET | Refills: 0 | Status: SHIPPED | OUTPATIENT
Start: 2020-03-09 | End: 2020-04-22 | Stop reason: SDUPTHER

## 2020-03-09 NOTE — PROGRESS NOTES
Notify pt':  INR is not at goal.  New dosing as follows: Take coumadin 3.5mg daily. Recheck INR in 7days.

## 2020-03-16 RX ORDER — PANTOPRAZOLE SODIUM 40 MG/1
TABLET, DELAYED RELEASE ORAL
Qty: 90 TABLET | Refills: 0 | Status: SHIPPED | OUTPATIENT
Start: 2020-03-16 | End: 2020-09-08

## 2020-03-16 RX ORDER — CHLORTHALIDONE 50 MG/1
TABLET ORAL
Qty: 90 TABLET | Refills: 0 | Status: SHIPPED | OUTPATIENT
Start: 2020-03-16 | End: 2020-08-31

## 2020-03-16 RX ORDER — ALPRAZOLAM 0.5 MG/1
TABLET ORAL
Qty: 90 TABLET | Refills: 0 | Status: SHIPPED | OUTPATIENT
Start: 2020-03-16 | End: 2020-05-27

## 2020-03-23 RX ORDER — CHLORTHALIDONE 25 MG/1
TABLET ORAL
Qty: 90 TABLET | Refills: 0 | Status: SHIPPED | OUTPATIENT
Start: 2020-03-23 | End: 2020-09-22

## 2020-03-30 RX ORDER — AMLODIPINE BESYLATE 10 MG/1
10 TABLET ORAL DAILY
Qty: 90 TABLET | Refills: 0 | Status: SHIPPED | OUTPATIENT
Start: 2020-03-30 | End: 2020-06-29

## 2020-03-31 ENCOUNTER — ANTI-COAG VISIT (OUTPATIENT)
Dept: FAMILY MEDICINE CLINIC | Age: 75
End: 2020-03-31

## 2020-03-31 LAB — INR BLD: 2.7

## 2020-04-12 RX ORDER — WARFARIN SODIUM 3 MG/1
TABLET ORAL
Qty: 90 TABLET | Refills: 0 | Status: SHIPPED | OUTPATIENT
Start: 2020-04-12 | End: 2020-07-20

## 2020-04-12 RX ORDER — WARFARIN SODIUM 1 MG/1
TABLET ORAL
Qty: 90 TABLET | Refills: 0 | Status: SHIPPED | OUTPATIENT
Start: 2020-04-12 | End: 2020-07-20

## 2020-04-22 RX ORDER — ACETAMINOPHEN AND CODEINE PHOSPHATE 300; 30 MG/1; MG/1
1 TABLET ORAL 3 TIMES DAILY PRN
Qty: 10 TABLET | Refills: 0 | Status: SHIPPED | OUTPATIENT
Start: 2020-04-22 | End: 2020-04-29 | Stop reason: SDUPTHER

## 2020-04-22 NOTE — TELEPHONE ENCOUNTER
Script completed for 10tabs only & placed on your desk. Pls notify pt' who is currently outside office for script . Advise to keep 4/29 appt via VV for med check & further refills.

## 2020-04-28 RX ORDER — ALPRAZOLAM 0.5 MG/1
TABLET ORAL
Qty: 90 TABLET | OUTPATIENT
Start: 2020-04-28

## 2020-04-29 ENCOUNTER — VIRTUAL VISIT (OUTPATIENT)
Dept: FAMILY MEDICINE CLINIC | Age: 75
End: 2020-04-29
Payer: MEDICARE

## 2020-04-29 VITALS — WEIGHT: 117 LBS | HEIGHT: 59 IN | BODY MASS INDEX: 23.59 KG/M2

## 2020-04-29 PROCEDURE — 2022F DILAT RTA XM EVC RTNOPTHY: CPT | Performed by: FAMILY MEDICINE

## 2020-04-29 PROCEDURE — 4040F PNEUMOC VAC/ADMIN/RCVD: CPT | Performed by: FAMILY MEDICINE

## 2020-04-29 PROCEDURE — G8427 DOCREV CUR MEDS BY ELIG CLIN: HCPCS | Performed by: FAMILY MEDICINE

## 2020-04-29 PROCEDURE — 1090F PRES/ABSN URINE INCON ASSESS: CPT | Performed by: FAMILY MEDICINE

## 2020-04-29 PROCEDURE — 3017F COLORECTAL CA SCREEN DOC REV: CPT | Performed by: FAMILY MEDICINE

## 2020-04-29 PROCEDURE — 3046F HEMOGLOBIN A1C LEVEL >9.0%: CPT | Performed by: FAMILY MEDICINE

## 2020-04-29 PROCEDURE — G8400 PT W/DXA NO RESULTS DOC: HCPCS | Performed by: FAMILY MEDICINE

## 2020-04-29 PROCEDURE — 99214 OFFICE O/P EST MOD 30 MIN: CPT | Performed by: FAMILY MEDICINE

## 2020-04-29 PROCEDURE — 1123F ACP DISCUSS/DSCN MKR DOCD: CPT | Performed by: FAMILY MEDICINE

## 2020-04-29 RX ORDER — WARFARIN SODIUM 3 MG/1
TABLET ORAL
Qty: 90 TABLET | Refills: 0 | Status: CANCELLED | OUTPATIENT
Start: 2020-04-29

## 2020-04-29 RX ORDER — ACETAMINOPHEN AND CODEINE PHOSPHATE 300; 30 MG/1; MG/1
1 TABLET ORAL 3 TIMES DAILY PRN
Qty: 42 TABLET | Refills: 0 | Status: SHIPPED | OUTPATIENT
Start: 2020-04-29 | End: 2020-06-15 | Stop reason: SDUPTHER

## 2020-04-29 RX ORDER — ALPRAZOLAM 0.5 MG/1
TABLET ORAL
Qty: 90 TABLET | Refills: 0 | Status: SHIPPED | OUTPATIENT
Start: 2020-04-29 | End: 2020-06-08

## 2020-04-29 ASSESSMENT — ENCOUNTER SYMPTOMS
BLOOD IN STOOL: 0
BACK PAIN: 1
COUGH: 0
ABDOMINAL PAIN: 0
SHORTNESS OF BREATH: 0
ANAL BLEEDING: 0
WHEEZING: 0
ABDOMINAL DISTENTION: 0
STRIDOR: 0
VOMITING: 0
CHOKING: 0
APNEA: 0
CHEST TIGHTNESS: 0
DIARRHEA: 0
CONSTIPATION: 0
RECTAL PAIN: 0
NAUSEA: 0

## 2020-04-29 NOTE — PROGRESS NOTES
effects. INR=2.70 on 3/31/20. Anemia/lymphoma:managed by Hematology/oncology. Is doing well. No other new associated concerns. Psoriatric arthritis/seronegative arthritis:under care of rheumatology. No new associated concerns. Hyperlipidemia: doing well. Lipitor & diet managed. Labs are due. No other new associated concerns. Back Pain:doing well per her baseline. No changes since previous visit  Chronic back pain:is well controlled with tylenol#3 prn. Needs refill. Pt' reveals no aberrant drug use behavior. No other associated or worsening factors. Denies BLE texczkrd-iqbmbelxzey-dhejgvakg/urinary or bowel control loss or change/saddle anesthesia/gait abnormality. Anxiety/insomnia: reports doing well. Sleeping approx 7hrs. Medication helps:xanax. Pt' reveals no aberrant drug use behavior. OARRS reports:are consistent. Denies SI/HI/hallucinations/illicit drugs/etoh abuse/cold intolerance. COPD:doing well per baseline. No other other associated concerns. Albuterol inhaler use:last used >16mos ago. Former smoker:yes. Denies chills/sob/cp/weakness/n-v/abdo pain/HA/dizziness/rash/neck pain-stiffness/photophobia. [address] well. No other new associated/worsening or other improving factors. Denies abdo pain/n-v/diarrhea/melena-blood in stool.         Allergies   Allergen Reactions    Diclofenac Other (See Comments)     Bleed/colitis    Nsaids Other (See Comments)     CANNOT TAKE D/T GI BLEEDING AND USE OF COUMADIN    Hydrocodone-Acetaminophen Anxiety       Current Outpatient Medications on File Prior to Visit   Medication Sig Dispense Refill    warfarin (COUMADIN) 3 MG tablet TAKE 1 TABLET BY MOUTH AS DIRECTED 90 tablet 0    warfarin (COUMADIN) 1 MG tablet TAKE AS DIRECTED 90 tablet 0    amLODIPine (NORVASC) 10 MG tablet TAKE 1 TABLET BY MOUTH DAILY 90 tablet 0    metFORMIN (GLUCOPHAGE) 1000 MG tablet TAKE 1 TABLET BY MOUTH TWICE DAILY 180 tablet 0     GERD (gastroesophageal reflux disease)     Hx of mammogram 2018    negative:see scanned report.  Hyperlipidaemia LDL goal <100     Hypertension     Insomnia     Long-term (current) use of anticoagulants, INR goal 2.5-3.5     Lymphoma:monoclonal B cell 2012    Under care of Dr. Radha Waddell abnormal 7/30/15    Right breast mass:benign(?lipoma)per US:repeat testing in 6mos=16    Nonspecific abnormal results of kidney function study 2012    Osteoarthritis     Renal cysts, right 2014    RLS (restless legs syndrome) 10/19/11    Sciatica     Screening colonoscopy ;13    Nml. Next 10yrs:2023:Dr. Evan Canas. 16(Dr. Waddell):nml EGD & colonoscopy except mild diverticulosis    Therapeutic drug monitoring 04/10/2015    OARRS report consistent on 4/10/15;7/6/15;10/23/15;16;16;16;16;3/6/17;17;17;17; 17;3/12/18;18    Valvular heart disease     s/p aortic and mitral valve repair. Under care of cards:Dr. Jose Flores.  Visit for screening mammogram 04/10/2017    negative:see scanned report. Social History     Tobacco Use    Smoking status: Former Smoker     Packs/day: 1.00     Years: 40.00     Pack years: 40.00     Types: Cigarettes     Last attempt to quit: 2007     Years since quittin.7    Smokeless tobacco: Never Used   Substance Use Topics    Alcohol use: Yes     Alcohol/week: 1.0 standard drinks     Types: 1 Cans of beer per week     Comment: occ    Drug use: No     Social History     Substance and Sexual Activity   Drug Use No           Review of Systems   Constitutional: Negative for activity change, appetite change, chills, diaphoresis, fatigue, fever and unexpected weight change. Eyes: Negative for visual disturbance. Respiratory: Negative for apnea, cough, choking, chest tightness, shortness of breath, wheezing and stridor.     Cardiovascular: Negative for chest

## 2020-05-04 ENCOUNTER — ANTI-COAG VISIT (OUTPATIENT)
Dept: FAMILY MEDICINE CLINIC | Age: 75
End: 2020-05-04

## 2020-05-04 ENCOUNTER — TELEPHONE (OUTPATIENT)
Dept: FAMILY MEDICINE CLINIC | Age: 75
End: 2020-05-04

## 2020-05-04 LAB
INR BLD: 3.7
INR BLD: 3.8

## 2020-05-04 NOTE — PROGRESS NOTES
Notify pt':  INR is very slightly elevated:advise recheck INR today & verify she has no severe bruising/bleeding.

## 2020-05-12 ENCOUNTER — ANTI-COAG VISIT (OUTPATIENT)
Dept: FAMILY MEDICINE CLINIC | Age: 75
End: 2020-05-12

## 2020-05-12 ENCOUNTER — TELEPHONE (OUTPATIENT)
Dept: FAMILY MEDICINE CLINIC | Age: 75
End: 2020-05-12

## 2020-05-12 LAB — INR BLD: 2

## 2020-05-18 ENCOUNTER — ANTI-COAG VISIT (OUTPATIENT)
Dept: FAMILY MEDICINE CLINIC | Age: 75
End: 2020-05-18

## 2020-05-18 LAB — INR BLD: 3.9

## 2020-05-18 NOTE — PROGRESS NOTES
Notify pt':  INR is too high. New dosing as follows for coumadin:  Ujygyn-Gnjagarij-Reianb-Saturday=3mg. Remaining days=3.5mg. Recheck INR in 4days.

## 2020-05-22 ENCOUNTER — TELEPHONE (OUTPATIENT)
Dept: FAMILY MEDICINE CLINIC | Age: 75
End: 2020-05-22

## 2020-05-22 ENCOUNTER — ANTI-COAG VISIT (OUTPATIENT)
Dept: FAMILY MEDICINE CLINIC | Age: 75
End: 2020-05-22

## 2020-05-22 LAB — INR BLD: 2

## 2020-05-22 NOTE — TELEPHONE ENCOUNTER
Patient is calling stating that she just did her onr and it was 2.0. please advise patient  Josey Led [phone]

## 2020-05-27 ENCOUNTER — VIRTUAL VISIT (OUTPATIENT)
Dept: FAMILY MEDICINE CLINIC | Age: 75
End: 2020-05-27
Payer: MEDICARE

## 2020-05-27 VITALS — BODY MASS INDEX: 23.18 KG/M2 | WEIGHT: 115 LBS | HEIGHT: 59 IN | RESPIRATION RATE: 16 BRPM

## 2020-05-27 PROCEDURE — G8427 DOCREV CUR MEDS BY ELIG CLIN: HCPCS | Performed by: FAMILY MEDICINE

## 2020-05-27 PROCEDURE — G8400 PT W/DXA NO RESULTS DOC: HCPCS | Performed by: FAMILY MEDICINE

## 2020-05-27 PROCEDURE — 1090F PRES/ABSN URINE INCON ASSESS: CPT | Performed by: FAMILY MEDICINE

## 2020-05-27 PROCEDURE — 1123F ACP DISCUSS/DSCN MKR DOCD: CPT | Performed by: FAMILY MEDICINE

## 2020-05-27 PROCEDURE — 4040F PNEUMOC VAC/ADMIN/RCVD: CPT | Performed by: FAMILY MEDICINE

## 2020-05-27 PROCEDURE — 99214 OFFICE O/P EST MOD 30 MIN: CPT | Performed by: FAMILY MEDICINE

## 2020-05-27 PROCEDURE — 3046F HEMOGLOBIN A1C LEVEL >9.0%: CPT | Performed by: FAMILY MEDICINE

## 2020-05-27 PROCEDURE — 2022F DILAT RTA XM EVC RTNOPTHY: CPT | Performed by: FAMILY MEDICINE

## 2020-05-27 PROCEDURE — 3017F COLORECTAL CA SCREEN DOC REV: CPT | Performed by: FAMILY MEDICINE

## 2020-05-27 ASSESSMENT — ENCOUNTER SYMPTOMS
BACK PAIN: 1
CHEST TIGHTNESS: 0
COUGH: 0
SHORTNESS OF BREATH: 0
NAUSEA: 0
STRIDOR: 0
APNEA: 0
DIARRHEA: 0
WHEEZING: 0
ANAL BLEEDING: 0
CHOKING: 0
BLOOD IN STOOL: 0
ABDOMINAL PAIN: 0
CONSTIPATION: 0
VOMITING: 0
ABDOMINAL DISTENTION: 0
RECTAL PAIN: 0

## 2020-05-27 NOTE — PROGRESS NOTES
Subjective:      Patient ID: Vashti Chacon is a 77 y.o. female. HPI  Patient is  being evaluated by a Virtual Visit (video visit) encounter to address concerns as mentioned above. A caregiver was present when appropriate. Due to this being a TeleHealth encounter (During [facility]- public health emergency), evaluation of the following organ systems was limited: Vitals/Constitutional/EENT/Resp/CV/GI//MS/Neuro/Skin/Heme-Lymph-Imm. Pursuant to the emergency declaration under the [address] [place] [address], [address] [place] [address] authority and the [employer] Act, this Virtual Visit was conducted with patient's (and/or legal guardian's) consent, to reduce the patient's risk of exposure to COVID-19 and provide necessary medical care. The patient (and/or legal guardian) has also been advised to contact this office for worsening conditions or problems, and seek emergency medical treatment and/or call 911 if deemed necessary. Services were provided through a video synchronous discussion virtually to substitute for in-person clinic visit. Patient and provider were located at their individual homes. \"THIS VISIT WAS COMPLETED VIRTUALLY USING [employer] ME\"    Allergies   Allergen Reactions    Diclofenac Other (See Comments)     Bleed/colitis    Nsaids Other (See Comments)     CANNOT TAKE D/T GI BLEEDING AND USE OF COUMADIN    Hydrocodone-Acetaminophen Anxiety       Current Outpatient Medications on File Prior to Visit   Medication Sig Dispense Refill    acetaminophen-codeine (TYLENOL #3) 300-30 MG per tablet Take 1 tablet by mouth 3 times daily as needed for Pain for up to 30 days. TAKE 1 TABLET BY MOUTH THREE TIMES DAILY AS NEEDED FOR PAIN. May cause drowsiness. May impair ability to operate vehicles or machinery.  Do not use in combination with alcohol 42 tablet 0    ALPRAZolam (XANAX) 0.5 MG tablet TAKE 1 TABLET BY MOUTH THREE TIMES DAILY AS NEEDED 90 Review of Systems    Objective:   Physical Exam    Assessment:      ***      Plan:      ***        Belén Ingram MD

## 2020-05-27 NOTE — PATIENT INSTRUCTIONS
if:  · You have signs of infection, such as:  ? Increased pain, swelling, warmth, or redness. ? Red streaks leading from the site. ? Pus draining from the site. ? A fever. · You have new or increased toe pain. Watch closely for changes in your health, and be sure to contact your doctor if:  · You do not get better as expected. Where can you learn more? Go to [email or web address] and sign in to your [employer] account. Enter D202 in the [employer] box to learn more about \"Toenail Fungus: Care Instructions. \"     If you do not have an account, please click on the \"Sign Up Now\" link. Current as of: October 31, 2019               Content Version: 12.5  © 0419-6246 [employer]. Care instructions adapted under license by [facility] ([facility]). If you have questions about a medical condition or this instruction, always ask your healthcare professional. [place] [address] any warranty or liability for your use of this information.

## 2020-05-27 NOTE — PROGRESS NOTES
Subjective:      Patient ID: Cathy Sunshine is a 77 y.o. female. HPI    Patient is  being evaluated by a Virtual Visit (video visit) encounter to address concerns as mentioned above. A caregiver was present when appropriate. Due to this being a TeleHealth encounter (During [facility] public health emergency), evaluation of the following organ systems was limited: Vitals/Constitutional/EENT/Resp/CV/GI//MS/Neuro/Skin/Heme-Lymph-Imm. Pursuant to the emergency declaration under the [address] [place] [address], [address] [place] [address] authority and the [employer] Act, this Virtual Visit was conducted with patient's (and/or legal guardian's) consent, to reduce the patient's risk of exposure to COVID-19 and provide necessary medical care. The patient (and/or legal guardian) has also been advised to contact this office for worsening conditions or problems, and seek emergency medical treatment and/or call 911 if deemed necessary. Services were provided through a video synchronous discussion virtually to substitute for in-person clinic visit. Patient and provider were located at their individual homes. \"THIS VISIT WAS COMPLETED VIRTUALLY USING DOXY. ME\"    New problem:  Presenting w/mild left big toe nail & left 2nd toe toe nail discolored(yellow-light green) x 3months. Not worsening or improving. Preceding injury:none recalled. Associated w/\"crumbling\"/thick nails. Antalgic Gait:no. Worsened by nothing. Improves w/nothing. Hx of gout:no. No hx of tinea pedis. Denies foot pain or other skin lesions/BLE piwzvdzg-hjylaafgh-ovzestormgh/ulcers/fever/chills/gait abnormality/open wounds          Diabetes:doing well. Takes medication w/o side effects. Preprandial-fasting DH=[id number]. .   2hr postprandial LM=[id number]. HBA1c:6.0 on 11/13/17. 10.3 on 3/2/18. 4.9 on 6/1/18. 5.2 on 1/4/19. Was 6.0 on 7/15/19. A1c overdue.   JYU=[id number] on 11/13/17. 89 on

## 2020-05-29 RX ORDER — LISINOPRIL 40 MG/1
TABLET ORAL
Qty: 90 TABLET | Refills: 0 | Status: SHIPPED | OUTPATIENT
Start: 2020-05-29 | End: 2020-08-24

## 2020-06-02 ENCOUNTER — ANTI-COAG VISIT (OUTPATIENT)
Dept: FAMILY MEDICINE CLINIC | Age: 75
End: 2020-06-02

## 2020-06-02 ENCOUNTER — TELEPHONE (OUTPATIENT)
Dept: FAMILY MEDICINE CLINIC | Age: 75
End: 2020-06-02

## 2020-06-02 LAB — INR BLD: 2.4

## 2020-06-08 RX ORDER — GLIPIZIDE 5 MG/1
TABLET ORAL
Qty: 45 TABLET | Refills: 2 | Status: SHIPPED | OUTPATIENT
Start: 2020-06-08 | End: 2021-01-28

## 2020-06-08 RX ORDER — ALPRAZOLAM 0.5 MG/1
TABLET ORAL
Qty: 90 TABLET | Refills: 0 | Status: SHIPPED | OUTPATIENT
Start: 2020-06-08 | End: 2020-07-20

## 2020-06-11 RX ORDER — ATORVASTATIN CALCIUM 10 MG/1
TABLET, FILM COATED ORAL
Qty: 90 TABLET | Refills: 0 | Status: SHIPPED | OUTPATIENT
Start: 2020-06-11 | End: 2020-09-18

## 2020-06-12 ENCOUNTER — TELEPHONE (OUTPATIENT)
Dept: FAMILY MEDICINE CLINIC | Age: 75
End: 2020-06-12

## 2020-06-12 ENCOUNTER — ANTI-COAG VISIT (OUTPATIENT)
Dept: FAMILY MEDICINE CLINIC | Age: 75
End: 2020-06-12

## 2020-06-12 LAB — INR BLD: 3.3

## 2020-06-15 ENCOUNTER — TELEPHONE (OUTPATIENT)
Dept: FAMILY MEDICINE CLINIC | Age: 75
End: 2020-06-15

## 2020-06-15 RX ORDER — ACETAMINOPHEN AND CODEINE PHOSPHATE 300; 30 MG/1; MG/1
1 TABLET ORAL 3 TIMES DAILY PRN
Qty: 42 TABLET | Refills: 0 | Status: SHIPPED | OUTPATIENT
Start: 2020-06-15 | End: 2020-07-30 | Stop reason: SDUPTHER

## 2020-06-19 ENCOUNTER — ANTI-COAG VISIT (OUTPATIENT)
Dept: FAMILY MEDICINE CLINIC | Age: 75
End: 2020-06-19

## 2020-06-19 ENCOUNTER — TELEPHONE (OUTPATIENT)
Dept: FAMILY MEDICINE CLINIC | Age: 75
End: 2020-06-19

## 2020-06-19 LAB — INR BLD: 3.8

## 2020-06-20 ENCOUNTER — TELEPHONE (OUTPATIENT)
Dept: FAMILY MEDICINE CLINIC | Age: 75
End: 2020-06-20

## 2020-06-20 LAB — INR BLD: 3.9

## 2020-06-22 ENCOUNTER — ANTI-COAG VISIT (OUTPATIENT)
Dept: FAMILY MEDICINE CLINIC | Age: 75
End: 2020-06-22

## 2020-06-22 NOTE — PROGRESS NOTES
Pls verify details from Dr. Phillip Valenzuela with pt'. Reported INR is too high at 3.9. Coumadin 3mg daily. Recheck in 6-7days from day of new dosing.

## 2020-06-25 ENCOUNTER — ANTI-COAG VISIT (OUTPATIENT)
Dept: FAMILY MEDICINE CLINIC | Age: 75
End: 2020-06-25
Payer: MEDICARE

## 2020-06-25 LAB — INTERNATIONAL NORMALIZATION RATIO, POC: 3

## 2020-06-25 PROCEDURE — 85610 PROTHROMBIN TIME: CPT | Performed by: FAMILY MEDICINE

## 2020-06-29 RX ORDER — AMLODIPINE BESYLATE 10 MG/1
10 TABLET ORAL DAILY
Qty: 90 TABLET | Refills: 0 | Status: SHIPPED | OUTPATIENT
Start: 2020-06-29 | End: 2021-08-24 | Stop reason: SDUPTHER

## 2020-07-20 RX ORDER — ALPRAZOLAM 0.5 MG/1
TABLET ORAL
Qty: 90 TABLET | Refills: 0 | Status: SHIPPED | OUTPATIENT
Start: 2020-07-20 | End: 2020-08-27 | Stop reason: SDUPTHER

## 2020-07-20 RX ORDER — WARFARIN SODIUM 1 MG/1
TABLET ORAL
Qty: 90 TABLET | Refills: 0 | Status: SHIPPED | OUTPATIENT
Start: 2020-07-20 | End: 2021-05-28

## 2020-07-20 RX ORDER — WARFARIN SODIUM 3 MG/1
TABLET ORAL
Qty: 90 TABLET | Refills: 0 | Status: SHIPPED | OUTPATIENT
Start: 2020-07-20 | End: 2020-12-17

## 2020-07-24 ENCOUNTER — ANTI-COAG VISIT (OUTPATIENT)
Dept: FAMILY MEDICINE CLINIC | Age: 75
End: 2020-07-24

## 2020-07-24 LAB — INR BLD: 3.4

## 2020-07-30 RX ORDER — ACETAMINOPHEN AND CODEINE PHOSPHATE 300; 30 MG/1; MG/1
1 TABLET ORAL 3 TIMES DAILY PRN
Qty: 42 TABLET | Refills: 0 | Status: SHIPPED | OUTPATIENT
Start: 2020-07-30 | End: 2020-09-08 | Stop reason: SDUPTHER

## 2020-07-30 NOTE — TELEPHONE ENCOUNTER
Pt called needing rx refilled. Med pended. Pt's  will be in the area around noon to .   Please advise

## 2020-08-11 ENCOUNTER — TELEPHONE (OUTPATIENT)
Dept: FAMILY MEDICINE CLINIC | Age: 75
End: 2020-08-11

## 2020-08-11 ENCOUNTER — ANTI-COAG VISIT (OUTPATIENT)
Dept: FAMILY MEDICINE CLINIC | Age: 75
End: 2020-08-11

## 2020-08-11 LAB — INR BLD: 4.4

## 2020-08-11 NOTE — TELEPHONE ENCOUNTER
Patient is calling her INR was 4.4  Please advise  Jerod Bell [phone] (home) [phone] (work)

## 2020-08-24 ENCOUNTER — ANTI-COAG VISIT (OUTPATIENT)
Dept: FAMILY MEDICINE CLINIC | Age: 75
End: 2020-08-24

## 2020-08-24 LAB — INR BLD: 3.2

## 2020-08-24 RX ORDER — LISINOPRIL 40 MG/1
TABLET ORAL
Qty: 90 TABLET | Refills: 0 | Status: SHIPPED | OUTPATIENT
Start: 2020-08-24 | End: 2020-11-18

## 2020-08-25 RX ORDER — ALPRAZOLAM 0.5 MG/1
TABLET ORAL
Qty: 90 TABLET | OUTPATIENT
Start: 2020-08-25

## 2020-08-27 RX ORDER — ALPRAZOLAM 0.5 MG/1
TABLET ORAL
Qty: 9 TABLET | Refills: 0 | Status: SHIPPED | OUTPATIENT
Start: 2020-08-27 | End: 2020-08-31 | Stop reason: SDUPTHER

## 2020-08-27 NOTE — TELEPHONE ENCOUNTER
Script completed for 3days & placed on your desk. Pls notify pt' & keep f/u appt  For med check next week.

## 2020-08-27 NOTE — TELEPHONE ENCOUNTER
Spoke with pt. Pt states she will need 3 days of medication sent in until her appt on 8/31. Pt will not be home and unable to do VV tomorrow.   Please advise

## 2020-08-31 ENCOUNTER — VIRTUAL VISIT (OUTPATIENT)
Dept: FAMILY MEDICINE CLINIC | Age: 75
End: 2020-08-31
Payer: MEDICARE

## 2020-08-31 PROCEDURE — 3044F HG A1C LEVEL LT 7.0%: CPT | Performed by: FAMILY MEDICINE

## 2020-08-31 PROCEDURE — 2022F DILAT RTA XM EVC RTNOPTHY: CPT | Performed by: FAMILY MEDICINE

## 2020-08-31 PROCEDURE — G8427 DOCREV CUR MEDS BY ELIG CLIN: HCPCS | Performed by: FAMILY MEDICINE

## 2020-08-31 PROCEDURE — 1123F ACP DISCUSS/DSCN MKR DOCD: CPT | Performed by: FAMILY MEDICINE

## 2020-08-31 PROCEDURE — 1090F PRES/ABSN URINE INCON ASSESS: CPT | Performed by: FAMILY MEDICINE

## 2020-08-31 PROCEDURE — 4040F PNEUMOC VAC/ADMIN/RCVD: CPT | Performed by: FAMILY MEDICINE

## 2020-08-31 PROCEDURE — 99214 OFFICE O/P EST MOD 30 MIN: CPT | Performed by: FAMILY MEDICINE

## 2020-08-31 PROCEDURE — 3017F COLORECTAL CA SCREEN DOC REV: CPT | Performed by: FAMILY MEDICINE

## 2020-08-31 PROCEDURE — G8400 PT W/DXA NO RESULTS DOC: HCPCS | Performed by: FAMILY MEDICINE

## 2020-08-31 RX ORDER — ALPRAZOLAM 0.5 MG/1
TABLET ORAL
Qty: 90 TABLET | Refills: 0 | Status: SHIPPED | OUTPATIENT
Start: 2020-08-31 | End: 2020-10-05

## 2020-08-31 ASSESSMENT — ENCOUNTER SYMPTOMS
ANAL BLEEDING: 0
NAUSEA: 0
SHORTNESS OF BREATH: 0
ABDOMINAL PAIN: 0
BACK PAIN: 1
WHEEZING: 0
APNEA: 0
RECTAL PAIN: 0
CHEST TIGHTNESS: 0
VOMITING: 0
STRIDOR: 0
ABDOMINAL DISTENTION: 0
CHOKING: 0
BLOOD IN STOOL: 0
DIARRHEA: 0
CONSTIPATION: 0
COUGH: 0

## 2020-08-31 NOTE — PROGRESS NOTES
8/24/20::office notes reviewed today. INR:takes coumadin w/o side effects. INR=3.2 on 8/24/20. Anemia/lymphoma:sees Hematology/oncology. Reports doing well. No other new associated concerns. Psoriatric arthritis/seronegative arthritis:under care of rheumatology. No new associated concerns. Hyperlipidemia: doing well. Lipitor & diet managed. No other associated concerns. Anxiety/insomnia:  doing well. Sleeping around 7hrs. Med helps:xanax. Needs med refill. Pt' reveals no aberrant drug use behavior. OARRS reports:are consistent. Denies SI/HI/hallucinations/illicit drugs/etoh abuse/cold intolerance. COPD:doing well. No other new associated concerns. Albuterol inhaler use:last used >17mos ago. Former smoker:yes. Denies chills/sob/cp/weakness/n-v/abdo pain/HA/dizziness/rash/neck pain-stiffness/photophobia. [address] well. No other new associated/worsening or other improving factors. Denies abdo pain/n-v/diarrhea/melena-blood in stool. Back Pain:doing well per  checkbaseline. No changes since prior visit per pt'  Chronic back pain:is well controlled with tylenol#3 prn. Pt' reveals no aberrant drug use behavior. No other associated or worsening factors. Denies BLE twsszzaa-hxqrzfwfrtz-eligiecur/urinary or bowel control loss or change/saddle anesthesia/gait abnormality.       Allergies   Allergen Reactions    Diclofenac Other (See Comments)     Bleed/colitis    Nsaids Other (See Comments)     CANNOT TAKE D/T GI BLEEDING AND USE OF COUMADIN    Hydrocodone-Acetaminophen Anxiety       Current Outpatient Medications on File Prior to Visit   Medication Sig Dispense Refill    ALPRAZolam (XANAX) 0.5 MG tablet TAKE 1 TABLET BY MOUTH THREE TIMES DAILY AS NEEDED 9 tablet 0    lisinopril (PRINIVIL;ZESTRIL) 40 MG tablet TAKE 1 TABLET BY MOUTH EVERY DAY 90 tablet 0    warfarin (COUMADIN) 3 MG tablet TAKE 1 TABLET BY MOUTH AS DIRECTED 90 tablet 0    warfarin (COUMADIN) 1 MG tablet TAKE AS DIRECTED 90 tablet 0    amLODIPine (NORVASC) 10 MG tablet TAKE 1 TABLET BY MOUTH DAILY 90 tablet 0    metFORMIN (GLUCOPHAGE) 1000 MG tablet TAKE 1 TABLET BY MOUTH TWICE DAILY 180 tablet 0    atorvastatin (LIPITOR) 10 MG tablet TAKE 1 TABLET BY MOUTH DAILY IN THE EVENING FOR CHOLESTEROL 90 tablet 0    glipiZIDE (GLUCOTROL) 5 MG tablet TAKE 1/2(ONE-HALF) TABLET BY MOUTH EVERY DAY WITH FOOD 45 tablet 2    ciclopirox (PENLAC) 8 % solution Apply topically nightly. 1 Bottle 1    chlorthalidone (HYGROTON) 25 MG tablet TAKE 1 TABLET BY MOUTH EVERY DAY (Patient not taking: Reported on 5/27/2020) 90 tablet 0    chlorthalidone (HYGROTEN) 50 MG tablet TAKE 1 TABLET BY MOUTH EVERY DAY 90 tablet 0    pantoprazole (PROTONIX) 40 MG tablet TAKE 1 TABLET BY MOUTH EVERY DAY 90 tablet 0    sulfaSALAzine (AZULFIDINE) 500 MG tablet Take 500 mg by mouth 4 times daily      blood glucose test strips (TRUE METRIX BLOOD GLUCOSE TEST) strip USE TO TEST TWICE DAILY AS DIRECTED 180 strip 0    TRUE METRIX BLOOD GLUCOSE TEST strip USE TO TEST TWICE DAILY AS DIRECTED 150 strip 0    alendronate (FOSAMAX) 35 MG tablet TAKE ONE TABLET BY MOUTH ONCE WEEKLY( EVERY 7 DAYS)      promethazine (PHENERGAN) 12.5 MG tablet TAKE ONE TABLET BY MOUTH EVERY 6 HOURS AS NEEDED 28 tablet 0    warfarin (COUMADIN) 4 MG tablet TAKE 1 TABLET BY MOUTH DAILY 30 tablet 0    hydroxychloroquine (PLAQUENIL) 200 MG tablet Take  by mouth daily.  ferrous sulfate 325 (65 FE) MG EC tablet Take 325 mg by mouth 2 times daily. No current facility-administered medications on file prior to visit.         Past Medical History:   Diagnosis Date    A-fib ([facility]     Anemia     multifactorial:under care of heme-onc:Dr. Elisha Bledsoe Anemia:multifactorial 2011    as per heme-onc    Anxiety     Cataract     bilateral    Chronic back pain     Under care of ortho spine:Dr. Rosana White    CKD (chronic kidney disease) stage 3, GFR 30-59 ml/min ([facility] 2012    Colitis, ischemic ([facility] [facility] 2012    Under care of Dr. Guerita Santos    COPD     Diabetes     Diabetic eye exam ([facility] [facility] 1/28/15    CEI    GERD (gastroesophageal reflux disease)     Hx of mammogram 2018    negative:see scanned report.  Hyperlipidaemia LDL goal <100     Hypertension     Insomnia     Long-term (current) use of anticoagulants, INR goal 2.5-3.5     Lymphoma:monoclonal B cell 2012    Under care of Dr. Meet Cabrera abnormal 7/30/15    Right breast mass:benign(?lipoma)per US:repeat testing in 6mos=16    Nonspecific abnormal results of kidney function study 2012    Osteoarthritis     Renal cysts, right 2014    RLS (restless legs syndrome) 10/19/11    Sciatica     Screening colonoscopy ;13    Nml. Next 10yrs:2023:Dr. Esvin Nelson. 16(Dr. Waddell):nml EGD & colonoscopy except mild diverticulosis    Therapeutic drug monitoring 04/10/2015    OARRS report consistent on 4/10/15;7/6/15;10/23/15;16;16;16;16;3/6/17;17;17;17; 17;3/12/18;18    Valvular heart disease     s/p aortic and mitral valve repair. Under care of cards:Dr. Ottoniel Zimmer.  Visit for screening mammogram 04/10/2017    negative:see scanned report. Social History     Tobacco Use    Smoking status: Former Smoker     Packs/day: 1.00     Years: 40.00     Pack years: 40.00     Types: Cigarettes     Last attempt to quit: 2007     Years since quittin.0    Smokeless tobacco: Never Used   Substance Use Topics    Alcohol use: Yes     Alcohol/week: 1.0 standard drinks     Types: 1 Cans of beer per week     Comment: occ    Drug use: No     Social History     Substance and Sexual Activity   Drug Use No           Review of Systems   Constitutional: Negative for activity change, appetite change, chills, diaphoresis, fatigue, fever and unexpected weight change.    Eyes: Negative for visual disturbance. Respiratory: Negative for apnea, cough, choking, chest tightness, shortness of breath, wheezing and stridor. Cardiovascular: Negative for chest pain, palpitations and leg swelling. Gastrointestinal: Negative for abdominal distention, abdominal pain, anal bleeding, blood in stool, constipation, diarrhea, nausea, rectal pain and vomiting. Endocrine: Negative for cold intolerance, heat intolerance, polydipsia, polyphagia and polyuria. Genitourinary: Negative for difficulty urinating. Musculoskeletal: Positive for arthralgias and back pain. Skin: Negative for rash and wound. Neurological: Negative for dizziness and light-headedness. Hematological: Negative for adenopathy. Psychiatric/Behavioral: Negative for agitation, behavioral problems, confusion, decreased concentration, dysphoric mood, hallucinations, self-injury, sleep disturbance and suicidal ideas. The patient is not nervous/anxious and is not hyperactive. Objective:HR=84;RR=17. Physical Exam  Constitutional:       Appearance: She is well-developed. She is not toxic-appearing. Comments: Note:exam was conducted with pt' either self-palpating or visually indicating via their device camera. HENT:      Head:      Comments: Nml external ear & nose exams. Mouth/Throat:      Mouth: Mucous membranes are moist.      Pharynx: Oropharynx is clear. Uvula midline. Eyes:      General: No scleral icterus. Conjunctiva/sclera: Conjunctivae normal.   Neck:      Comments: No neck LAD per self-palpation. Cardiovascular:      Comments: No ankle edema. Pulmonary:      Effort: Pulmonary effort is normal.      Comments: No audible wheezing/cough/sob. Abdominal:      Comments: Abdo exam:S,Non-tender per pt' self-palpation. Musculoskeletal:      Comments: No back pain per pt' self-palpation. Skin:     Coloration: Skin is not cyanotic. Neurological:      Mental Status: She is alert.    Psychiatric:         Attention and Perception: Attention and perception normal. She is attentive. She does not perceive auditory or visual hallucinations. Mood and Affect: Mood and affect normal. Mood is not anxious, depressed or elated. Affect is not labile, blunt, flat, angry, tearful or inappropriate. Speech: Speech normal.         Behavior: Behavior normal. Behavior is not agitated, slowed, aggressive, withdrawn, hyperactive or combative. Behavior is cooperative. Thought Content: Thought content normal. Thought content is not paranoid or delusional. Thought content does not include homicidal or suicidal ideation. Cognition and Memory: Cognition and memory normal.      Comments: Good eye contact. Assessment:       Diagnosis Orders   1. Controlled type 2 diabetes mellitus with stage 3 chronic kidney disease, without long-term current use of insulin (HCC)  VSS per limited vitals obtainable via virtual visit(VV)/well appearing. Stable/controlled. A1c in 12/2020  Hemoglobin A1C    Comprehensive Metabolic Panel   2. Anxiety  Stable. Pt' reveals no aberrant drug use behavior. Ok to continue [employer] refills w/q3mos office appts. ALPRAZolam (XANAX) 0.5 MG tablet   3. Essential hypertension, benign  Stable. Comprehensive Metabolic Panel   4. Hyperlipidemia with target LDL less than 100  Stable. Labs due next month. Lipid Panel    Comprehensive Metabolic Panel   5. COPD  Stable. 6. Gastroesophageal reflux disease without esophagitis  Stable. 7. Primary insomnia  Stable. 8. Chronic back pain   Stable. 9. Long-term (current) use of anticoagulants, INR goal 2.5-3.5  Stable. 10. Stage 3 chronic kidney disease ([address]  Per specialist.  Comprehensive Metabolic Panel   11. Anemia:multifactorial  Per specialist.     12. Lymphoma  Per specialist.     13. Lung nodules  F/u CT 3mos from 6/2020. CT CHEST WO CONTRAST    scattered bilateral pulmonary nodules, the largest within the left upper lobe. Current   guidelines suggest CT follow-up in 3-6 months to evaluate stability. 15. Former smoker  CT CHEST WO CONTRAST             Plan:        Pt' ended call in good condition. Obtain labs/diagnostic tests as discussed today & call back for results within 2-7days. Advised to go to local ER or call 911 for any worrisome signs/sx.           Hermann Borjas MD

## 2020-09-08 ENCOUNTER — TELEPHONE (OUTPATIENT)
Dept: FAMILY MEDICINE CLINIC | Age: 75
End: 2020-09-08

## 2020-09-08 RX ORDER — ACETAMINOPHEN AND CODEINE PHOSPHATE 300; 30 MG/1; MG/1
1 TABLET ORAL 3 TIMES DAILY PRN
Qty: 42 TABLET | Refills: 0 | Status: SHIPPED | OUTPATIENT
Start: 2020-09-08 | End: 2020-10-20 | Stop reason: SDUPTHER

## 2020-09-08 RX ORDER — PANTOPRAZOLE SODIUM 40 MG/1
TABLET, DELAYED RELEASE ORAL
Qty: 90 TABLET | Refills: 0 | Status: SHIPPED | OUTPATIENT
Start: 2020-09-08 | End: 2020-12-04

## 2020-09-08 NOTE — TELEPHONE ENCOUNTER
[phone] (H)  OV: 8/31/2020    Patient called to ask if her  could come and  her Tylenol 3 tomorrow. Patient states to call her back and let her know. Please advise.

## 2020-09-15 ENCOUNTER — TELEPHONE (OUTPATIENT)
Dept: FAMILY MEDICINE CLINIC | Age: 75
End: 2020-09-15

## 2020-09-16 ENCOUNTER — VIRTUAL VISIT (OUTPATIENT)
Dept: FAMILY MEDICINE CLINIC | Age: 75
End: 2020-09-16
Payer: MEDICARE

## 2020-09-16 PROCEDURE — 2022F DILAT RTA XM EVC RTNOPTHY: CPT | Performed by: FAMILY MEDICINE

## 2020-09-16 PROCEDURE — 1090F PRES/ABSN URINE INCON ASSESS: CPT | Performed by: FAMILY MEDICINE

## 2020-09-16 PROCEDURE — 4040F PNEUMOC VAC/ADMIN/RCVD: CPT | Performed by: FAMILY MEDICINE

## 2020-09-16 PROCEDURE — G8427 DOCREV CUR MEDS BY ELIG CLIN: HCPCS | Performed by: FAMILY MEDICINE

## 2020-09-16 PROCEDURE — 99214 OFFICE O/P EST MOD 30 MIN: CPT | Performed by: FAMILY MEDICINE

## 2020-09-16 PROCEDURE — 3017F COLORECTAL CA SCREEN DOC REV: CPT | Performed by: FAMILY MEDICINE

## 2020-09-16 PROCEDURE — 1123F ACP DISCUSS/DSCN MKR DOCD: CPT | Performed by: FAMILY MEDICINE

## 2020-09-16 PROCEDURE — 3044F HG A1C LEVEL LT 7.0%: CPT | Performed by: FAMILY MEDICINE

## 2020-09-16 PROCEDURE — G8400 PT W/DXA NO RESULTS DOC: HCPCS | Performed by: FAMILY MEDICINE

## 2020-09-16 RX ORDER — BLOOD PRESSURE TEST KIT
KIT MISCELLANEOUS
Qty: 1 KIT | Refills: 0 | Status: SHIPPED | OUTPATIENT
Start: 2020-09-16 | End: 2021-11-01

## 2020-09-16 ASSESSMENT — ENCOUNTER SYMPTOMS
BACK PAIN: 1
STRIDOR: 0
CHOKING: 0
WHEEZING: 0
COUGH: 0
ANAL BLEEDING: 0
ABDOMINAL DISTENTION: 0
NAUSEA: 0
DIARRHEA: 0
SHORTNESS OF BREATH: 0
RECTAL PAIN: 0
APNEA: 0
CONSTIPATION: 0
CHEST TIGHTNESS: 0
VOMITING: 0
BLOOD IN STOOL: 0
ABDOMINAL PAIN: 0

## 2020-09-16 NOTE — PROGRESS NOTES
Subjective:      Patient ID: Geraldine Jolley is a 77 y.o. female. HPI    Patient is  being evaluated by a Virtual Visit (video visit) encounter to address concerns as mentioned above. A caregiver was present when appropriate. Due to this being a TeleHealth encounter (During FICKU-26 public health emergency), evaluation of the following organ systems was limited: Vitals/Constitutional/EENT/Resp/CV/GI//MS/Neuro/Skin/Heme-Lymph-Imm. Pursuant to the emergency declaration under the [address] [place] [address] authority and the [employer] Act, this Virtual Visit was conducted with patient's (and/or legal guardian's) consent, to reduce the patient's risk of exposure to COVID-19 and provide necessary medical care. The patient (and/or legal guardian) has also been advised to contact this office for worsening conditions or problems, and seek emergency medical treatment and/or call 911 if deemed necessary. Services were provided through a video synchronous discussion virtually to substitute for in-person clinic visit. Patient and provider were located at their individual homes. \"THIS VISIT WAS COMPLETED VIRTUALLY TODAY USING DOXY. ME\"    Presenting w/new complaint of bilateral mild to moderate ankle edema x 1month:is intermittent & not worsening. Seen by rheumatology on 9/14/20 regarding seronegative arthritis. Cortisone injection was advised but postponed due to feet swelling concern & possible fluid in lungs per pt'. Office notes from Dr. Cleo Child reviewed:see scanned notes. Feet-ankle swelling possibly related to uncontrolled HTN & nephropathy. Risk of pulm edema per notes. BP at office visit there on 9/14/20=140/56. Associated w/nothing. Worsened(aggravated) by standing. Worse by the end of the day. Pt' states swelling this AM is better but develops more through the day. Improves by elevating legs.    Self tx:None. Denies cp/pnd/sob/dizziness/cough/no prolonged bed rest or travel. Diabetes check:doing well. Taking medication w/o side effects. Preprandial-fasting BT=[id number]. .   2hr postprandial JS=[phone]. HBA1c:6.0 on 11/13/17. 10.3 on 3/2/18. 4.9 on 6/1/18. 5.2 on 1/4/19. 6.0 on 7/15/19. 5.1 on 6/16/20. OUB=558 on 11/13/17. 89 on 2/26/18. 89 on 6/1/18. 118 on 9/10/18. 73 on 1/4/19. 63 on 3/18/19. 86 on 11/18/19. 44 on 1/30/20. No other new associated or worsening factors. Eye check:yes:<12mos ago. Denies polyuria/polyphagia/polydipsia/BLE skin lesions/BLE paresthesia. HTN:doing well. Takes medications w/o side effects. No other associated factors. Additional table salt added to food:no    Denies cp/sob/pnd/ankle edema/dizziness    Cards appt:Seen 8/24/20::office notes reviewed today. INR:takes coumadin w/o side effects. INR=3.2 on 8/24/20. 3.2 on 8/24/20. Anemia/lymphoma:sees Hematology/oncology. Doing well. No other new associated concerns. Hyperlipidemia: doing well. Lipitor & diet managed. Labs due. No other associated concerns. Anxiety/insomnia:  doing well. Sleeping around 7hrs. Med helps:xanax. Pt' reveals no aberrant drug use behavior. OARRS reports:are consistent. Denies SI/HI/hallucinations/illicit drugs/etoh abuse/cold intolerance. COPD:reports breathing well per baseline  No other new associated concerns. Albuterol inhaler use:last used >17mos ago. Former smoker:yes. Denies chills/sob/cp/weakness/n-v/abdo pain/HA/dizziness/rash/neck pain-stiffness/photophobia. GERD:mild:reports doing well. No other associated/worsening or other improving factors. Denies abdo pain/n-v/diarrhea/melena-blood in stool. Back Pain:doing well per baseline. No changes since previousvisit per pt'  Chronic back pain:well controlled with tylenol#3 prn. Pt' reveals no aberrant drug use behavior. No other associated or worsening factors.   Denies BLE bfoegwyi-eukqgkngvcm-kwtftxykp/urinary or bowel control loss or change/saddle anesthesia/gait abnormality. Allergies   Allergen Reactions    Diclofenac Other (See Comments)     Bleed/colitis    Nsaids Other (See Comments)     CANNOT TAKE D/T GI BLEEDING AND USE OF COUMADIN    Hydrocodone-Acetaminophen Anxiety       Current Outpatient Medications on File Prior to Visit   Medication Sig Dispense Refill    acetaminophen-codeine (TYLENOL #3) 300-30 MG per tablet Take 1 tablet by mouth 3 times daily as needed for Pain for up to 30 days. TAKE 1 TABLET BY MOUTH THREE TIMES DAILY AS NEEDED FOR PAIN. May cause drowsiness. May impair ability to operate vehicles or machinery. Do not use in combination with alcohol 42 tablet 0    pantoprazole (PROTONIX) 40 MG tablet TAKE 1 TABLET BY MOUTH EVERY DAY 90 tablet 0    ALPRAZolam (XANAX) 0.5 MG tablet TAKE 1 TABLET BY MOUTH THREE TIMES DAILY AS NEEDED 90 tablet 0    lisinopril (PRINIVIL;ZESTRIL) 40 MG tablet TAKE 1 TABLET BY MOUTH EVERY DAY 90 tablet 0    warfarin (COUMADIN) 3 MG tablet TAKE 1 TABLET BY MOUTH AS DIRECTED 90 tablet 0    warfarin (COUMADIN) 1 MG tablet TAKE AS DIRECTED 90 tablet 0    amLODIPine (NORVASC) 10 MG tablet TAKE 1 TABLET BY MOUTH DAILY 90 tablet 0    metFORMIN (GLUCOPHAGE) 1000 MG tablet TAKE 1 TABLET BY MOUTH TWICE DAILY 180 tablet 0    atorvastatin (LIPITOR) 10 MG tablet TAKE 1 TABLET BY MOUTH DAILY IN THE EVENING FOR CHOLESTEROL 90 tablet 0    glipiZIDE (GLUCOTROL) 5 MG tablet TAKE 1/2(ONE-HALF) TABLET BY MOUTH EVERY DAY WITH FOOD 45 tablet 2    ciclopirox (PENLAC) 8 % solution Apply topically nightly.  1 Bottle 1    chlorthalidone (HYGROTON) 25 MG tablet TAKE 1 TABLET BY MOUTH EVERY DAY 90 tablet 0    sulfaSALAzine (AZULFIDINE) 500 MG tablet Take 500 mg by mouth 4 times daily      blood glucose test strips (TRUE METRIX BLOOD GLUCOSE TEST) strip USE TO TEST TWICE DAILY AS DIRECTED 180 strip 0    TRUE METRIX BLOOD GLUCOSE TEST strip USE TO TEST TWICE DAILY AS DIRECTED 150 strip 0    alendronate (FOSAMAX) 35 MG tablet TAKE ONE TABLET BY MOUTH ONCE WEEKLY( EVERY 7 DAYS)      promethazine (PHENERGAN) 12.5 MG tablet TAKE ONE TABLET BY MOUTH EVERY 6 HOURS AS NEEDED 28 tablet 0    warfarin (COUMADIN) 4 MG tablet TAKE 1 TABLET BY MOUTH DAILY 30 tablet 0    hydroxychloroquine (PLAQUENIL) 200 MG tablet Take  by mouth daily.  ferrous sulfate 325 (65 FE) MG EC tablet Take 325 mg by mouth 2 times daily. No current facility-administered medications on file prior to visit. Past Medical History:   Diagnosis Date    A-fib ([facility]     Anemia     multifactorial:under care of heme-onc:Dr. Tiara Parnell Anemia:multifactorial 2011    as per heme-onc    Anxiety     Cataract     bilateral    Chronic back pain     Under care of ortho spine:Dr. Josefina Maguire    CKD (chronic kidney disease) stage 3, GFR 30-59 ml/min ([facility] 1/2012    Colitis, ischemic ([facility] 6/2012    Under care of Dr. Jaqui Vargas COPD     Diabetes     [facility] 1/28/15    CEI    GERD (gastroesophageal reflux disease)     Hx of mammogram 05/23/2018    negative:see scanned report.  Hyperlipidaemia LDL goal <100     Hypertension     Insomnia     Long-term (current) use of anticoagulants, INR goal 2.5-3.5     Lymphoma:monoclonal B cell 1/2012    Under care of Dr. Prashanth Solomon abnormal 7/30/15    Right breast mass:benign(?lipoma)per US:repeat testing in 6mos=1/30/16    Nonspecific abnormal results of kidney function study 1/25/2012    Osteoarthritis     Renal cysts, right 1/2/2014    RLS (restless legs syndrome) 10/19/11    Sciatica     Screening colonoscopy 2010;6/19/13    Nml. Next 10yrs:6/2023:Dr. Alisha Bartlett.  9/22/16(Dr. Waddell):nml EGD & colonoscopy except mild diverticulosis    Therapeutic drug monitoring 04/10/2015    OARRS report consistent on 4/10/15;7/6/15;10/23/15;16;16;16;16;3/6/17;17;17;17; 17;3/12/18;18    Valvular heart disease     s/p aortic and mitral valve repair. Under care of cards:Dr. Jackson Bowman.  Visit for screening mammogram 04/10/2017    negative:see scanned report. Social History     Tobacco Use    Smoking status: Former Smoker     Packs/day: 1.00     Years: 40.00     Pack years: 40.00     Types: Cigarettes     Last attempt to quit: 2007     Years since quittin.1    Smokeless tobacco: Never Used   Substance Use Topics    Alcohol use: Yes     Alcohol/week: 1.0 standard drinks     Types: 1 Cans of beer per week     Comment: occ    Drug use: No     Social History     Substance and Sexual Activity   Drug Use No           Review of Systems   Constitutional: Negative for activity change, appetite change, chills, diaphoresis, fatigue, fever and unexpected weight change. Eyes: Negative for visual disturbance. Respiratory: Negative for apnea, cough, choking, chest tightness, shortness of breath, wheezing and stridor. Cardiovascular: Negative for chest pain, palpitations and leg swelling. Gastrointestinal: Negative for abdominal distention, abdominal pain, anal bleeding, blood in stool, constipation, diarrhea, nausea, rectal pain and vomiting. Endocrine: Negative for cold intolerance, heat intolerance, polydipsia, polyphagia and polyuria. Genitourinary: Negative for difficulty urinating. Musculoskeletal: Positive for arthralgias and back pain. Skin: Negative for rash and wound. Neurological: Negative for dizziness and light-headedness. Hematological: Negative for adenopathy. Psychiatric/Behavioral: Negative for agitation, behavioral problems, confusion, decreased concentration, dysphoric mood, hallucinations, self-injury, sleep disturbance and suicidal ideas. The patient is not nervous/anxious and is not hyperactive. Objective:HR=80;RR=16.    Physical Exam  Constitutional:       Appearance: She is well-developed. She is not toxic-appearing. Comments: Note:exam was conducted with pt' either self-palpating or visually indicating via their device camera. HENT:      Head:      Comments: Nml external ear & nose exams. Mouth/Throat:      Mouth: Mucous membranes are moist.      Pharynx: Oropharynx is clear. Uvula midline. Eyes:      General: No scleral icterus. Conjunctiva/sclera: Conjunctivae normal.   Neck:      Comments: No neck LAD per self-palpation. Cardiovascular:      Comments:   Bilateral feet-ankle minimal(<1+)non-pitting edema noted. No other lower leg swelling. Pt' states swelling this AM is better but develops more through the day. No leg skin lesions. Pulmonary:      Effort: Pulmonary effort is normal.      Comments: No audible wheezing/cough/sob. Abdominal:      Comments: Abdo exam:S,Non-tender per pt' self-palpation. Musculoskeletal:      Comments: No back pain per pt' self-palpation today. Skin:     Coloration: Skin is not cyanotic. Neurological:      Mental Status: She is alert. Psychiatric:         Attention and Perception: Attention and perception normal. She is attentive. She does not perceive auditory or visual hallucinations. Mood and Affect: Mood and affect normal. Mood is not anxious, depressed or elated. Affect is not labile, blunt, flat, angry, tearful or inappropriate. Speech: Speech normal.         Behavior: Behavior normal. Behavior is not agitated, slowed, aggressive, withdrawn, hyperactive or combative. Behavior is cooperative. Thought Content: Thought content normal. Thought content is not paranoid or delusional. Thought content does not include homicidal or suicidal ideation. Cognition and Memory: Cognition and memory normal.      Comments: Good eye contact. Assessment:       Diagnosis Orders   1.  Bilateral leg edema  VSS per limited vitals obtainable via virtual visit(VV)/well appearing. ?HTN/nephropathy related. Check CXR/echo. Control bp well:bp on 9/14/20 at rheumatology office was 140/56. Pt's bp machine may not be accurate:new bp machine e-scribed. Advised pt' to monitor bp at home & if >140s/80s then call back for appt. F/u with her nephrologist also advised. BP check in 1mo in-person visit. ECHO Complete 2D W Doppler W Color    XR CHEST (2 VW)   2. Essential hypertension, benign  Stable bp at rheumatology office. Will follow. 3. COPD  Stable. 4. Controlled type 2 diabetes mellitus with stage 3 chronic kidney disease, without long-term current use of insulin (HCC)  Stable. A1c due. 5. Anxiety  Stable. 6. Gastroesophageal reflux disease without esophagitis  Stable. 7. Seronegative arthritis  Per rheumatology. 8. Primary insomnia  Stable. 9. Chronic pain of both lower extremities  Stable. Per specialist.     10. Stage 3 chronic kidney disease ([address]  Stable. Per nephro. F/u appt advised. 11. Chronic back pain   Stable. Pt' reveals no aberrant drug use behavior. 12. Long-term (current) use of anticoagulants, INR goal 2.5-3.5  Stable. 13. Anemia:multifactorial  Stable. Per specialist.     14. Lymphoma  Per specialist.             Plan:     Call or return to clinic prn if these symptoms worsen or fail to improve as anticipated. Pt' ended call in good condition. Obtain labs/diagnostic tests as discussed today & call back for results within 2-7days. Advised to go to local ER or call 911 for any worrisome signs/sx including but not limited to worsening of current complaint or development of pnd/sob/cp/resp distress.             Ameena Delaney MD

## 2020-09-18 RX ORDER — ATORVASTATIN CALCIUM 10 MG/1
TABLET, FILM COATED ORAL
Qty: 90 TABLET | Refills: 0 | Status: SHIPPED | OUTPATIENT
Start: 2020-09-18 | End: 2020-12-28

## 2020-09-22 RX ORDER — CHLORTHALIDONE 25 MG/1
TABLET ORAL
Qty: 90 TABLET | Refills: 0 | Status: SHIPPED | OUTPATIENT
Start: 2020-09-22 | End: 2020-12-21

## 2020-09-23 ENCOUNTER — ANTI-COAG VISIT (OUTPATIENT)
Dept: FAMILY MEDICINE CLINIC | Age: 75
End: 2020-09-23

## 2020-09-23 ENCOUNTER — TELEPHONE (OUTPATIENT)
Dept: FAMILY MEDICINE CLINIC | Age: 75
End: 2020-09-23

## 2020-09-23 LAB — INR BLD: 1.4

## 2020-10-05 ENCOUNTER — ANTI-COAG VISIT (OUTPATIENT)
Dept: FAMILY MEDICINE CLINIC | Age: 75
End: 2020-10-05

## 2020-10-05 ENCOUNTER — TELEPHONE (OUTPATIENT)
Dept: FAMILY MEDICINE CLINIC | Age: 75
End: 2020-10-05

## 2020-10-05 ENCOUNTER — HOSPITAL ENCOUNTER (OUTPATIENT)
Dept: NON INVASIVE DIAGNOSTICS | Age: 75
Discharge: HOME | End: 2020-10-05
Payer: MEDICARE

## 2020-10-05 ENCOUNTER — HOSPITAL ENCOUNTER (OUTPATIENT)
Dept: CT IMAGING | Age: 75
Discharge: HOME | End: 2020-10-05
Payer: MEDICARE

## 2020-10-05 LAB — INR BLD: 3.3

## 2020-10-05 PROCEDURE — 93325 DOPPLER ECHO COLOR FLOW MAPG: CPT

## 2020-10-05 PROCEDURE — 71250 CT THORAX DX C-: CPT

## 2020-10-05 PROCEDURE — 93308 TTE F-UP OR LMTD: CPT

## 2020-10-05 PROCEDURE — 93320 DOPPLER ECHO COMPLETE: CPT

## 2020-10-05 RX ORDER — ALPRAZOLAM 0.5 MG/1
TABLET ORAL
Qty: 90 TABLET | Refills: 0 | Status: SHIPPED | OUTPATIENT
Start: 2020-10-05 | End: 2020-11-13 | Stop reason: SDUPTHER

## 2020-10-05 NOTE — TELEPHONE ENCOUNTER
Patient is calling with her INR.   It was  3.3      Please advise  Jhon Friedman [phone] (home) [phone] (work)

## 2020-10-05 NOTE — TELEPHONE ENCOUNTER
Spoke with ECHO dept. She is needing to know what the doctor would like looked at under limited. Pt is currently there now. Per Dr. Martha Benitez- valves of the EF and size of the atrial/ventrical.  She has been informed and will let the pt know the details on coverage of limited ECHO vs full ECHO within 6 months.

## 2020-10-05 NOTE — TELEPHONE ENCOUNTER
[facility] ECHO Dept  [place]: [phone]    Patient had a full ECHO four months and are usually not dont that close together. Does PCP want a limited ECHO? Insurance may not cover a full one. Please advise.

## 2020-10-07 ENCOUNTER — OFFICE VISIT (OUTPATIENT)
Dept: FAMILY MEDICINE CLINIC | Age: 75
End: 2020-10-07
Payer: MEDICARE

## 2020-10-07 VITALS
BODY MASS INDEX: 22.98 KG/M2 | DIASTOLIC BLOOD PRESSURE: 69 MMHG | HEIGHT: 59 IN | SYSTOLIC BLOOD PRESSURE: 160 MMHG | TEMPERATURE: 98.6 F | WEIGHT: 114 LBS | HEART RATE: 87 BPM

## 2020-10-07 PROCEDURE — 1090F PRES/ABSN URINE INCON ASSESS: CPT | Performed by: FAMILY MEDICINE

## 2020-10-07 PROCEDURE — G8400 PT W/DXA NO RESULTS DOC: HCPCS | Performed by: FAMILY MEDICINE

## 2020-10-07 PROCEDURE — 3017F COLORECTAL CA SCREEN DOC REV: CPT | Performed by: FAMILY MEDICINE

## 2020-10-07 PROCEDURE — 4040F PNEUMOC VAC/ADMIN/RCVD: CPT | Performed by: FAMILY MEDICINE

## 2020-10-07 PROCEDURE — 99214 OFFICE O/P EST MOD 30 MIN: CPT | Performed by: FAMILY MEDICINE

## 2020-10-07 PROCEDURE — 2022F DILAT RTA XM EVC RTNOPTHY: CPT | Performed by: FAMILY MEDICINE

## 2020-10-07 PROCEDURE — G8427 DOCREV CUR MEDS BY ELIG CLIN: HCPCS | Performed by: FAMILY MEDICINE

## 2020-10-07 PROCEDURE — 3044F HG A1C LEVEL LT 7.0%: CPT | Performed by: FAMILY MEDICINE

## 2020-10-07 PROCEDURE — 1123F ACP DISCUSS/DSCN MKR DOCD: CPT | Performed by: FAMILY MEDICINE

## 2020-10-07 ASSESSMENT — ENCOUNTER SYMPTOMS
WHEEZING: 0
NAUSEA: 0
STRIDOR: 0
COUGH: 0
RECTAL PAIN: 0
SHORTNESS OF BREATH: 0
DIARRHEA: 0
CONSTIPATION: 0
ABDOMINAL PAIN: 0
CHOKING: 0
APNEA: 0
VOMITING: 0
ANAL BLEEDING: 0
BACK PAIN: 1
BLOOD IN STOOL: 0
CHEST TIGHTNESS: 0
ABDOMINAL DISTENTION: 0

## 2020-10-07 NOTE — PROGRESS NOTES
Subjective:      Patient ID: Patricia Oconnell is a 77 y.o. female. Patient is  being evaluated by a Virtual Visit (video visit) encounter to address concerns as mentioned above. A caregiver was present when appropriate. Due to this being a TeleHealth encounter (During VIYDI-69 public health emergency), evaluation of the following organ systems was limited: Vitals/Constitutional/EENT/Resp/CV/GI//MS/Neuro/Skin/Heme-Lymph-Imm. Pursuant to the emergency declaration under the [address] [place] [address] authority and the [employer] Act, this Virtual Visit was conducted with patient's (and/or legal guardian's) consent, to reduce the patient's risk of exposure to COVID-19 and provide necessary medical care. The patient (and/or legal guardian) has also been advised to contact this office for worsening conditions or problems, and seek emergency medical treatment and/or call 911 if deemed necessary. Services were provided through a video synchronous discussion virtually to substitute for in-person clinic visit. Patient and provider were located at their individual homes. \"THIS VISIT WAS COMPLETED VIRTUALLY TODAY USING DOXY. ME\"  Radiation Dose Estimates     No radiation information found for this patient    Narrative    CT of chest without contrast    HISTORY: Pulmonary nodules    COMPARISON: Outside study 6/15/2020    CONTRAST:  none      TECHNIQUE: Individualized dose optimization technique was used in order to meet ALARA standards for radiation dose reduction. In addition to vendor specific dose reduction algorithms, the dose reduction techniques vary based on the specific scanner    utilized but frequently include automated exposure control, adjustment of the mA and/or kV according to patient size, and use of iterative reconstruction technique.         COMMENTS:         10 mm left upper renal cyst noted.  13 x 11 mm lymph node superior to the pancreatic body on image [id number] was not seen on the prior study. There is extensive arterial calcification. Aortic and mitral valve replacements are noted. Heart is normal in size. 2.4 x 1.5 cm precarinal lymph node on image 601-31, previously measuring 1.6 x 1.0 cm. No hilar adenopathy within the limitations of a noncontrast CT. New small bilateral pleural effusions. There is interlobular septal thickening in the lower lobes most    compatible with mild pulmonary edema. Patchy areas of groundglass opacity are seen throughout the lungs.         There is increased size of subpleural nodule in the posterior aspect of the left upper lobe on image [id number] measuring 11 x 8 mm, previously 8 x 6 mm. Additional subcentimeter pulmonary nodules are stable.              Impression         Increased size of subpleural nodule left upper lobe. This can be further evaluated with PET/CT.         New thoracic and upper aortic lymphadenopathy of unclear significance. Further evaluation is warranted.         Mild pulmonary edema. Mild small bilateral pleural effusions.                             BP: 132/55 mmHg      Conclusions      Summary   Limited echo. Left ventricular cavity size is normal. Overall left   ventricular systolic function appears normal with an estimated ejection   fraction with an estimated ejection fraction of 55-60%. No regional wall   motion abnormalities are noted. A mechanical mitral valve is well seated   with peak velocity of 2.50m/s, a mean gradient of 5mmHg and a PHT of 70ms. A   mechanical artificial aortic valve appears well seated with a maximum   velocity of 3.81m/s and a mean gradient of 37mmHg. Moderate tricuspid   regurgitation. Estimated pulmonary artery systolic pressure is at 45 mmHg   assuming a right atrial pressure of 3 mmHg. The left atrium is moderately   dilated.       Signature      ------------------------------------------------------------------   Electronically signed by Tiffany Palacios MD   (Interpreting physician) on 10/05/2020 at 12:39 PM      New problem:Abnml CT chest:see above. F/u PET-CT ordered. Test date pending:pt' will f/u with scheduling dept. F/u to:bilateral mild to moderate ankle edema :doing better since elevating & using compressive stockings. Associated w/nothing. Worsened(aggravated) by nothing new. Improves by elevating legs. Denies cp/pnd/sob/dizziness/cough/no prolonged bed rest or travel. Diabetes:doing well. Taking medication w/o side effects. Preprandial-fasting BR=[id number]. .   2hr postprandial WJ=[id number]. HBA1c:6.0 on 11/13/17. 10.3 on 3/2/18. 4.9 on 6/1/18. 5.2 on 1/4/19. 6.0 on 7/15/19. Was 5.1 on 6/16/20. XAO=192 on 11/13/17. 89 on 2/26/18. 89 on 6/1/18. 118 on 9/10/18. 73 on 1/4/19. 63 on 3/18/19. 86 on 11/18/19. Was 40 on 1/30/20. No other new associated or worsening factors. Eye check:yes:<12mos ago. Denies polyuria/polyphagia/polydipsia/BLE skin lesions/BLE paresthesia. HTN check up:doing well. Taking medications w/o side effects. No other associated factors. Additional table salt added to food:no    Denies cp/sob/pnd/ankle edema/dizziness         Anemia/lymphoma:sees Hematology/oncology. Doing well. Plans to f/u with her specialist regarding CT chest findings above. No other new associated concerns. Hyperlipidemia: doing well. Lipitor & diet managed. No other associated concerns. Anxiety/insomnia:  doing well. Sleeping around 7hrs. Med helps:xanax. Pt' reveals no aberrant drug use behavior. OARRS reports:are consistent. Denies SI/HI/hallucinations/illicit drugs/etoh abuse/cold intolerance. COPD: breathing well   No other associated concerns. Albuterol inhaler use:last used >17mos ago. Former smoker:yes. Denies chills/sob/cp/weakness/n-v/abdo pain/HA/dizziness/rash/neck pain-stiffness/photophobia. GERD:mild: doing well.   No other new associated/worsening or other improving factors. Denies abdo pain/n-v/diarrhea/melena-blood in stool. Back Pain:reports doing well per baseline. No changes from prior visit per pt'  Chronic back pain:states is well controlled with tylenol#3 prn. Pt' reveals no aberrant drug use behavior. No other associated or worsening factors. Denies BLE klpxignu-ecfnuhfoxka-urzhcsmbt/urinary or bowel control loss or change/saddle anesthesia/gait abnormality. Allergies   Allergen Reactions    Diclofenac Other (See Comments)     Bleed/colitis    Nsaids Other (See Comments)     CANNOT TAKE D/T GI BLEEDING AND USE OF COUMADIN    Hydrocodone-Acetaminophen Anxiety       Current Outpatient Medications on File Prior to Visit   Medication Sig Dispense Refill    ALPRAZolam (XANAX) 0.5 MG tablet TAKE 1 TABLET BY MOUTH THREE TIMES DAILY AS NEEDED 90 tablet 0    chlorthalidone (HYGROTON) 25 MG tablet TAKE 1 TABLET BY MOUTH EVERY DAY 90 tablet 0    atorvastatin (LIPITOR) 10 MG tablet TAKE 1 TABLET BY MOUTH DAILY IN THE EVENING FOR CHOLESTEROL 90 tablet 0    metFORMIN (GLUCOPHAGE) 1000 MG tablet TAKE 1 TABLET BY MOUTH TWICE DAILY 180 tablet 0    Blood Pressure KIT Dispense bp machine x 1 1 kit 0    acetaminophen-codeine (TYLENOL #3) 300-30 MG per tablet Take 1 tablet by mouth 3 times daily as needed for Pain for up to 30 days. TAKE 1 TABLET BY MOUTH THREE TIMES DAILY AS NEEDED FOR PAIN. May cause drowsiness. May impair ability to operate vehicles or machinery.  Do not use in combination with alcohol 42 tablet 0    pantoprazole (PROTONIX) 40 MG tablet TAKE 1 TABLET BY MOUTH EVERY DAY 90 tablet 0    lisinopril (PRINIVIL;ZESTRIL) 40 MG tablet TAKE 1 TABLET BY MOUTH EVERY DAY 90 tablet 0    warfarin (COUMADIN) 3 MG tablet TAKE 1 TABLET BY MOUTH AS DIRECTED 90 tablet 0    warfarin (COUMADIN) 1 MG tablet TAKE AS DIRECTED 90 tablet 0    amLODIPine (NORVASC) 10 MG tablet TAKE 1 TABLET BY MOUTH DAILY 90 tablet 0    glipiZIDE (GLUCOTROL) 5 MG tablet TAKE 1/2(ONE-HALF) TABLET BY MOUTH EVERY DAY WITH FOOD 45 tablet 2    ciclopirox (PENLAC) 8 % solution Apply topically nightly. 1 Bottle 1    blood glucose test strips (TRUE METRIX BLOOD GLUCOSE TEST) strip USE TO TEST TWICE DAILY AS DIRECTED 180 strip 0    TRUE METRIX BLOOD GLUCOSE TEST strip USE TO TEST TWICE DAILY AS DIRECTED 150 strip 0    promethazine (PHENERGAN) 12.5 MG tablet TAKE ONE TABLET BY MOUTH EVERY 6 HOURS AS NEEDED 28 tablet 0    warfarin (COUMADIN) 4 MG tablet TAKE 1 TABLET BY MOUTH DAILY 30 tablet 0    hydroxychloroquine (PLAQUENIL) 200 MG tablet Take  by mouth daily.  ferrous sulfate 325 (65 FE) MG EC tablet Take 325 mg by mouth 2 times daily. No current facility-administered medications on file prior to visit. Past Medical History:   Diagnosis Date    [facility]fib [facility] [address]     Anemia     multifactorial:under care of heme-onc:Dr. Chery Franks Anemia:multifactorial 2011    as per heme-onc    Anxiety     Cataract     bilateral    Chronic back pain     Under care of ortho spine:Dr. Mir Dumont    CKD (chronic kidney disease) stage 3, GFR 30-59 ml/min ([facility] [address] 1/2012    Colitis, ischemic ([facility] [address] 6/2012    Under care of Dr. Truman Main COPD     Diabetes     Diabetic eye exam ([facility] [address] 1/28/15    CEI    GERD (gastroesophageal reflux disease)     Hx of mammogram 05/23/2018    negative:see scanned report.  Hyperlipidaemia LDL goal <100     Hypertension     Insomnia     Long-term (current) use of anticoagulants, INR goal 2.5-3.5     Lymphoma:monoclonal B cell 1/2012    Under care of Dr. Anai Lerma abnormal 7/30/15    Right breast mass:benign(?lipoma)per US:repeat testing in 6mos=1/30/16    Nonspecific abnormal results of kidney function study 1/25/2012    Osteoarthritis     Renal cysts, right 1/2/2014    RLS (restless legs syndrome) 10/19/11    Sciatica     Screening colonoscopy 2010;6/19/13    Nml. Next 10yrs:6/2023:Dr. José Luis Rogers. 16(Dr. Waddell):nml EGD & colonoscopy except mild diverticulosis    Therapeutic drug monitoring 04/10/2015    OARRS report consistent on 4/10/15;7/6/15;10/23/15;16;16;16;16;3/6/17;17;17;17; 17;3/12/18;18    Valvular heart disease     s/p aortic and mitral valve repair. Under care of cards:Dr. Juan José Rushing.  Visit for screening mammogram 04/10/2017    negative:see scanned report. Social History     Tobacco Use    Smoking status: Former Smoker     Packs/day: 1.00     Years: 40.00     Pack years: 40.00     Types: Cigarettes     Last attempt to quit: 2007     Years since quittin.1    Smokeless tobacco: Never Used   Substance Use Topics    Alcohol use: Yes     Alcohol/week: 1.0 standard drinks     Types: 1 Cans of beer per week     Comment: occ    Drug use: No     Social History     Substance and Sexual Activity   Drug Use No           Review of Systems   Constitutional: Negative for activity change, appetite change, chills, diaphoresis, fatigue, fever and unexpected weight change. Eyes: Negative for visual disturbance. Respiratory: Negative for apnea, cough, choking, chest tightness, shortness of breath, wheezing and stridor. Cardiovascular: Negative for chest pain, palpitations and leg swelling. Gastrointestinal: Negative for abdominal distention, abdominal pain, anal bleeding, blood in stool, constipation, diarrhea, nausea, rectal pain and vomiting. Endocrine: Negative for cold intolerance, heat intolerance, polydipsia, polyphagia and polyuria. Genitourinary: Negative for difficulty urinating. Musculoskeletal: Positive for arthralgias and back pain. Skin: Negative for pallor, rash and wound. Neurological: Negative for dizziness, weakness and light-headedness. Hematological: Negative for adenopathy.    Psychiatric/Behavioral: Negative for agitation, behavioral problems, confusion, decreased concentration, dysphoric mood, hallucinations, self-injury, sleep disturbance and suicidal ideas. The patient is not nervous/anxious and is not hyperactive. Objective:RR=17. Repeat sk=572/81   Physical Exam  Constitutional:       Appearance: She is well-developed. She is not toxic-appearing. Comments: Note:exam was conducted with pt' either self-palpating or visually indicating via their device camera. HENT:      Head:      Comments: Nml external ear & nose exams. Mouth/Throat:      Mouth: Mucous membranes are moist.      Pharynx: Oropharynx is clear. Uvula midline. Eyes:      General: No scleral icterus. Conjunctiva/sclera: Conjunctivae normal.   Neck:      Comments: No neck LAD per self-palpation. Cardiovascular:      Comments:   No bilateral feet-ankle edema. Pulmonary:      Effort: Pulmonary effort is normal.      Comments: No audible wheezing/cough/sob noted. Abdominal:      Comments: Abdo exam:S,Non-tender per pt' self-palpation today. Musculoskeletal:      Comments: No back pain reported by pt's self-palpation today. Skin:     Coloration: Skin is not cyanotic. Neurological:      Mental Status: She is alert. Psychiatric:         Attention and Perception: Attention and perception normal. She is attentive. She does not perceive auditory or visual hallucinations. Mood and Affect: Mood and affect normal. Mood is not anxious, depressed or elated. Affect is not labile, blunt, flat, angry, tearful or inappropriate. Speech: Speech normal.         Behavior: Behavior normal. Behavior is not agitated, slowed, aggressive, withdrawn, hyperactive or combative. Behavior is cooperative. Thought Content: Thought content normal. Thought content is not paranoid or delusional. Thought content does not include homicidal or suicidal ideation. Cognition and Memory: Cognition and memory normal.      Comments: Good eye contact. Polite. Assessment:       Diagnosis Orders   1. Lymphadenopathy  VSS per limited vitals obtainable via virtual visit(VV)/well appearing. Per CT chest LAD & increasing lung nodule. Check CT-PET scan. Has multiple risk factors:lymphoma hx;former smoker. Advised to also f/u with her heme-onc specialist:Dr. Russ Chávez. 2. Controlled type 2 diabetes mellitus with stage 3 chronic kidney disease, without long-term current use of insulin (HCC)  Stable. 3. Anxiety  Stable. 4. Lung nodules  See note#1. 5. Lymphoma  Per specialist.     6. Essential hypertension, benign  Stable. 7. Hyperlipidemia with target LDL less than 100  Stable. 8. Bilateral leg edema  Stable/controlled with otc comp hosiery. 9. COPD  Stable. 8. Former smoker  Counseled. 11. Gastroesophageal reflux disease without esophagitis  Stable. 12. Seronegative arthritis  Per specialist.     13. Primary insomnia  Stable. 14. CKD  Stable. Avoid NSAIDs. 15. Anemia:multifactorial  Per specialist.     16. Aortic valve replaced  Per cards. Advised pt' to f/u with cards along with review of recent echo. 17. Long-term (current) use of anticoagulants, INR goal 2.5-3.5  Stable. 18. Chronic back pain   Stable. Plan:         Pt' ended call in good condition. Obtain labs/diagnostic tests as discussed today & call back for results within 2-7days. Advised to go to local ER or call 911 for any worrisome signs/sx including but not limited to worsening of current complaint or development of pnd/sob/cp/resp distress.             Tyree Moon MD

## 2020-10-12 ENCOUNTER — TELEPHONE (OUTPATIENT)
Dept: FAMILY MEDICINE CLINIC | Age: 75
End: 2020-10-12

## 2020-10-12 NOTE — TELEPHONE ENCOUNTER
Patient attempted to schedule a CT Scan at [facility] but has not been successful. Patient was informed that PCP would be able to schedule appt for the patient if she needed help. Patient would like to be scheduled on Friday, 10/16/2020. OV: 10/7/2020  [phone] (M)    Please advise.

## 2020-10-13 NOTE — TELEPHONE ENCOUNTER
Pt is already scheduled for 10/16  Lm for pt to cb if she had any questions, also giving scheduling dept number.

## 2020-10-16 ENCOUNTER — HOSPITAL ENCOUNTER (OUTPATIENT)
Dept: CT IMAGING | Age: 75
Discharge: HOME | End: 2020-10-16
Payer: MEDICARE

## 2020-10-16 ENCOUNTER — TELEPHONE (OUTPATIENT)
Dept: CASE MANAGEMENT | Age: 75
End: 2020-10-16

## 2020-10-16 PROCEDURE — G0297 LDCT FOR LUNG CA SCREEN: HCPCS

## 2020-10-20 ENCOUNTER — TELEPHONE (OUTPATIENT)
Dept: FAMILY MEDICINE CLINIC | Age: 75
End: 2020-10-20

## 2020-10-20 RX ORDER — ACETAMINOPHEN AND CODEINE PHOSPHATE 300; 30 MG/1; MG/1
1 TABLET ORAL 3 TIMES DAILY PRN
Qty: 42 TABLET | Refills: 0 | Status: SHIPPED | OUTPATIENT
Start: 2020-10-20 | End: 2020-12-01 | Stop reason: SDUPTHER

## 2020-10-20 NOTE — TELEPHONE ENCOUNTER
Patient is calling needing refill of tylenol 3 and would like to  tomorrow and would like to know if her test results are back from her scan yesterday.   Please advise  Jeremiah Hester [phone] (home) [phone] (work)

## 2020-10-20 NOTE — TELEPHONE ENCOUNTER
Script completed & placed on your desk. Pls clarify:? PET-CT scan results:not seen in chart. Pls notify pt'.

## 2020-10-20 NOTE — TELEPHONE ENCOUNTER
Pls notify pt':  CT-PET scan shows the lung nodule is reported as favoring being benign. F/u CT chest scan in 6mos advised. Advise pt' to also f/u with her heme-onc:Dr. Katiana Lovelace regarding this. Ok to to fax report to them. Pls advise f/u appt to review/address results w/me within 4weeks via VV. Clinical note:  Report received via & forwarded to Dr. Fransisco Mai by MA today.

## 2020-11-03 ENCOUNTER — TELEPHONE (OUTPATIENT)
Dept: FAMILY MEDICINE CLINIC | Age: 75
End: 2020-11-03

## 2020-11-03 NOTE — TELEPHONE ENCOUNTER
----- Message from [facility] sent at 11/3/2020 11:17 AM EST -----  Subject: Message to Provider    QUESTIONS  Information for Provider? mbr is calling to speak with wil mbr   states she is calling to give INR.   ---------------------------------------------------------------------------  --------------  CALL BACK INFO  What is the best way for the office to contact you? OK to leave message on   voicemail  Preferred Call Back Phone Number? [phone]  ---------------------------------------------------------------------------  --------------  SCRIPT ANSWERS  Relationship to Patient?  Self

## 2020-11-11 ENCOUNTER — OFFICE VISIT (OUTPATIENT)
Dept: PULMONOLOGY | Age: 75
End: 2020-11-11
Payer: MEDICARE

## 2020-11-11 VITALS
HEIGHT: 59 IN | HEART RATE: 69 BPM | TEMPERATURE: 97 F | WEIGHT: 113 LBS | OXYGEN SATURATION: 99 % | BODY MASS INDEX: 22.78 KG/M2

## 2020-11-11 PROCEDURE — G8420 CALC BMI NORM PARAMETERS: HCPCS | Performed by: INTERNAL MEDICINE

## 2020-11-11 PROCEDURE — G8400 PT W/DXA NO RESULTS DOC: HCPCS | Performed by: INTERNAL MEDICINE

## 2020-11-11 PROCEDURE — 99204 OFFICE O/P NEW MOD 45 MIN: CPT | Performed by: INTERNAL MEDICINE

## 2020-11-11 PROCEDURE — 3017F COLORECTAL CA SCREEN DOC REV: CPT | Performed by: INTERNAL MEDICINE

## 2020-11-11 PROCEDURE — G8484 FLU IMMUNIZE NO ADMIN: HCPCS | Performed by: INTERNAL MEDICINE

## 2020-11-11 PROCEDURE — 1036F TOBACCO NON-USER: CPT | Performed by: INTERNAL MEDICINE

## 2020-11-11 PROCEDURE — G8427 DOCREV CUR MEDS BY ELIG CLIN: HCPCS | Performed by: INTERNAL MEDICINE

## 2020-11-11 PROCEDURE — 1090F PRES/ABSN URINE INCON ASSESS: CPT | Performed by: INTERNAL MEDICINE

## 2020-11-11 PROCEDURE — 1123F ACP DISCUSS/DSCN MKR DOCD: CPT | Performed by: INTERNAL MEDICINE

## 2020-11-11 PROCEDURE — 4040F PNEUMOC VAC/ADMIN/RCVD: CPT | Performed by: INTERNAL MEDICINE

## 2020-11-12 ENCOUNTER — TELEPHONE (OUTPATIENT)
Dept: FAMILY MEDICINE CLINIC | Age: 75
End: 2020-11-12

## 2020-11-12 NOTE — PROGRESS NOTES
[facility] Pulmonary and Critical Care    Outpatient Initial Note    Subjective:   CHIEF COMPLAINT / HPI:     The patient is 77 y.o. female who presents today for a new patient visit for evaluation of multiple pulmonary nodules and precarinal lymphadenopathy. The largest nodule has had a small increase since previous imaging and now measures approximately 11 mm. Her precarinal lymphadenopathy has mildly enlarged as well. She has a history of COPD but no PFTs on file. She states that it is mild and she has a rescue albuterol that she rarely ever uses. She does have a 20-pack-year history but quit smoking 16 years ago. She has a remote history of B-cell lymphoma and is followed by Dr. Edwin Root. Patient feels well and denies any fevers, chills, night sweats, anorexia, weight loss, dyspnea, cough, wheezing, or chest tightness    Past Medical History:    Past Medical History:   Diagnosis Date    A-fib ([facility]     Anemia     multifactorial:under care of heme-onc:Dr. Cathryn Cisneros Anemia:multifactorial 2011    as per heme-onc    Anxiety     Cataract     bilateral    Chronic back pain     Under care of ortho spine:Dr. Swati Roman    CKD (chronic kidney disease) stage 3, GFR 30-59 ml/min 1/2012    Colitis, ischemic ([facility] 6/2012    Under care of Dr. Thomas Tuttle    COPD     Diabetes     Diabetic eye exam ([facility] 1/28/15    CEI    GERD (gastroesophageal reflux disease)     Hx of mammogram 05/23/2018    negative:see scanned report.     Hyperlipidaemia LDL goal <100     Hypertension     Insomnia     Long-term (current) use of anticoagulants, INR goal 2.5-3.5     Lymphoma:monoclonal B cell 1/2012    Under care of Dr. Jerad Reyes abnormal 7/30/15    Right breast mass:benign(?lipoma)per US:repeat testing in 6mos=1/30/16    Nonspecific abnormal results of kidney function study 1/25/2012    Osteoarthritis     Renal cysts, right 1/2/2014    RLS (restless legs syndrome) 10/19/11    Sciatica     Screening colonoscopy 2010;6/19/13    Nml. Next 10yrs:6/2023:Dr. Ese Berry. 9/22/16(Dr. Waddell):nml EGD & colonoscopy except mild diverticulosis    Therapeutic drug monitoring 04/10/2015    OARRS report consistent on 4/10/15;7/6/15;10/23/15;2/7/16;5/16/16;8/19/16;11/4/16;3/6/17;6/26/17;9/20/17;12/18/17; 12/18/17;3/12/18;5/29/18    Valvular heart disease     s/p aortic and mitral valve repair. Under care of cards:Dr. Margoth Shah.  Visit for screening mammogram 04/10/2017    negative:see scanned report. Social History:    Patient recently retired. She was working part-time at [employer]. She is . She smoked half a pack of cigarettes a day for 40 years but quit 16 years ago    Family History:  Lung cancer  Diabetes    Current Medications:  Current Outpatient Medications on File Prior to Visit   Medication Sig Dispense Refill    acetaminophen-codeine (TYLENOL #3) 300-30 MG per tablet Take 1 tablet by mouth 3 times daily as needed for Pain for up to 30 days. TAKE 1 TABLET BY MOUTH THREE TIMES DAILY AS NEEDED FOR PAIN. May cause drowsiness. May impair ability to operate vehicles or machinery.  Do not use in combination with alcohol 42 tablet 0    metoprolol tartrate (LOPRESSOR) 25 MG tablet Take 25 mg by mouth 2 times daily 0.5 tab BID      chlorthalidone (HYGROTON) 25 MG tablet TAKE 1 TABLET BY MOUTH EVERY DAY 90 tablet 0    atorvastatin (LIPITOR) 10 MG tablet TAKE 1 TABLET BY MOUTH DAILY IN THE EVENING FOR CHOLESTEROL 90 tablet 0    metFORMIN (GLUCOPHAGE) 1000 MG tablet TAKE 1 TABLET BY MOUTH TWICE DAILY 180 tablet 0    Blood Pressure KIT Dispense bp machine x 1 1 kit 0    pantoprazole (PROTONIX) 40 MG tablet TAKE 1 TABLET BY MOUTH EVERY DAY 90 tablet 0    lisinopril (PRINIVIL;ZESTRIL) 40 MG tablet TAKE 1 TABLET BY MOUTH EVERY DAY 90 tablet 0    warfarin (COUMADIN) 3 MG tablet TAKE 1 TABLET BY MOUTH AS DIRECTED 90 tablet 0    warfarin (COUMADIN) 1 MG tablet TAKE AS DIRECTED 90 tablet 0    amLODIPine (NORVASC) 10 MG tablet TAKE 1 TABLET BY MOUTH DAILY 90 tablet 0    glipiZIDE (GLUCOTROL) 5 MG tablet TAKE 1/2(ONE-HALF) TABLET BY MOUTH EVERY DAY WITH FOOD 45 tablet 2    ciclopirox (PENLAC) 8 % solution Apply topically nightly. 1 Bottle 1    blood glucose test strips (TRUE METRIX BLOOD GLUCOSE TEST) strip USE TO TEST TWICE DAILY AS DIRECTED 180 strip 0    TRUE METRIX BLOOD GLUCOSE TEST strip USE TO TEST TWICE DAILY AS DIRECTED 150 strip 0    promethazine (PHENERGAN) 12.5 MG tablet TAKE ONE TABLET BY MOUTH EVERY 6 HOURS AS NEEDED 28 tablet 0    warfarin (COUMADIN) 4 MG tablet TAKE 1 TABLET BY MOUTH DAILY 30 tablet 0    hydroxychloroquine (PLAQUENIL) 200 MG tablet Take  by mouth daily.  ferrous sulfate 325 (65 FE) MG EC tablet Take 325 mg by mouth 2 times daily.  ALPRAZolam (XANAX) 0.5 MG tablet TAKE 1 TABLET BY MOUTH THREE TIMES DAILY AS NEEDED 90 tablet 0     No current facility-administered medications on file prior to visit.       REVIEW OF SYSTEMS:    CONSTITUTIONAL: Negative for fevers and chills  HEENT: Negative for oropharyngeal exudate, post nasal drip, sinus pain / pressure, nasal congestion, ear pain  RESPIRATORY:  See HPI  CARDIOVASCULAR: Negative for chest pain, palpitations, edema  GASTROINTESTINAL: Negative for nausea, vomiting, diarrhea, constipation and abdominal pain  GENITOURINARY: Negative for dysuria, urinary frequency, urinary hesitancy  HEMATOLOGICAL: Negative for adenopathy  SKIN: Negative for clubbing, cyanosis, skin lesions  ENDOCRINE: Negative for polyuria, polydipsia, heat intolerance, cold intolerance   EXTREMITIES: Negative for weakness or decreased ROM in all extremities  NEUROLOGICAL: Negative for unilateral weakness, speech or gait abnormalities    Objective:   PHYSICAL EXAM:        VITALS:  Pulse 69   Temp 97 °F (36.1 °C) (Infrared)   Ht 4' 11\" (1.499 m)   Wt 113 lb (51.3 kg)   SpO2 99%   BMI 22.82 kg/m²   On room air  CONSTITUTIONAL:  Awake, alert, cooperative, no apparent distress, and appears stated age  [de-identified]: No oropharyngeal exudate, PERRL, no cervical adenopathy, no tracheal deviation, thyroid size normal  LUNGS:  No increased work of breathing and clear to auscultation, no crackles or wheezing  CARDIOVASCULAR:  normal S1 and S2 and no JVD  ABDOMEN:  Normal bowel sounds, non-distended and non-tender to palpation  EXT: No edema, no calf tenderness. Pulses are present bilaterally. NEUROLOGIC:  Mental Status Exam:  Level of Alertness:   awake  Orientation:   person, place, time. SKIN:  normal skin color, texture, turgor, no redness, warmth, or swelling     DATA:      Radiology Review:  Pertinent images / reports were reviewed as a part of this visit. CT Chest 10/16/2020 reveals the following:  CT lung screen         HISTORY: 40 pack year history of cigarette smoking; pulmonary nodule         Thin section scans through the chest without IV contrast. Up-to-date CT equipment with radiation dose reduction techniques. Comparison with 6/15/2020 and 10/5/2020         Lung screening specific (lung RADS)-positive.  Multiple pulmonary nodules, including-         3 mm nodule right upper lobe series 4 image 30-stable         11 mm nodule left upper lobe images 34-35 (previously 8 mm)         3 mm subpleural nodule right upper lobe image 45, stable         2 separate 3 mm nodules right upper lobe contiguous with the major fissure images 46 and 47, stable         2 adjacent nodules superior segment right lower lobe measuring 2 and 5 mm, respectively, image 53, stable         Potentially significant incidental findings (lung RADS category S)-enlarged precarinal lymph node measuring 17, unchanged         Other incidental findings-aortic arch calcification; valvular prosthetic device              Impression         Lung RADS category 4A with dominant 11 mm pulmonary nodule left upper lobe with increase in size since June 2020.      Lung RADS category S-stable precarinal lymph node, mildly enlarged by short axis      PET scan 63/70/4818  No hypermetabolic uptake in any nodule or the precarinal lymphadenopathy. See media tab for full report    CTA chest Nisa 15, 2020  Chest    1.  No findings to suggest aortic dissection. 2.  Scattered bilateral pulmonary nodules, the largest within the left upper lobe. Current    guidelines suggest CT follow-up in 3-6 months to evaluate stability. 3.  Cardiomegaly in the presence of changes related to aortic and mitral valve prostheses. Last PFTs:  None on file    Assessment:      Diagnosis Orders   1. Pulmonary nodules  CT CHEST WO CONTRAST       Plan: On review of CT chest her left upper lobe pulmonary nodule is mildly increased. It has more of a groundglass appearance to me then definitively solid. PET scan shows no hypermetabolic uptake which is reassuring although does not definitively rule out malignancy as the left upper lobe nodule could be a lipidic adenocarcinoma. There is no evidence that there is a recurrence of her lymphoma    At this time I think there are several options. One is bronchoscopy with endobronchial ultrasound and biopsy of the precarinal lymph node as this is technically quite feasible. Biopsy of the left upper lobe pulmonary nodule could be done as well but technically may be more difficult. The other option is to repeat CT chest in 3 months for surveillance and if areas of concern are increasing then pursue biopsy then.   Per discussion Víctor Flores would like to pursue repeat CT chest in 3 months from previous imaging which would place the CAT scan approximately mid January    She will follow up with me in mid January after CT chest

## 2020-11-12 NOTE — TELEPHONE ENCOUNTER
Patient requesting a medication refill.   Medication: Alprazolam  Pharmacy: [facility]  1 pill left

## 2020-11-13 ENCOUNTER — ANTI-COAG VISIT (OUTPATIENT)
Dept: FAMILY MEDICINE CLINIC | Age: 75
End: 2020-11-13

## 2020-11-13 ENCOUNTER — TELEPHONE (OUTPATIENT)
Dept: FAMILY MEDICINE CLINIC | Age: 75
End: 2020-11-13

## 2020-11-13 LAB — INR BLD: 2.8

## 2020-11-13 RX ORDER — ALPRAZOLAM 0.5 MG/1
TABLET ORAL
Qty: 90 TABLET | Refills: 0 | Status: SHIPPED | OUTPATIENT
Start: 2020-11-13 | End: 2020-12-17

## 2020-11-13 NOTE — TELEPHONE ENCOUNTER
Patient called in with an INR of: 2.8    Patient is also requesting a refill;    ALPRAZolam (XANAX) 0.5 MG tablet     Please advise.      OV: 10/7/2020

## 2020-11-18 ENCOUNTER — TELEPHONE (OUTPATIENT)
Dept: PULMONOLOGY | Age: 75
End: 2020-11-18

## 2020-11-18 RX ORDER — LISINOPRIL 40 MG/1
TABLET ORAL
Qty: 90 TABLET | Refills: 0 | Status: SHIPPED | OUTPATIENT
Start: 2020-11-18 | End: 2021-02-15

## 2020-11-18 NOTE — TELEPHONE ENCOUNTER
Left message on vm. Courtesy call placed to patient. Patient notified on 11/18/2020 of someone testing positive with COVID on the day they were here.

## 2020-12-01 RX ORDER — ACETAMINOPHEN AND CODEINE PHOSPHATE 300; 30 MG/1; MG/1
1 TABLET ORAL 3 TIMES DAILY PRN
Qty: 42 TABLET | Refills: 0 | Status: SHIPPED | OUTPATIENT
Start: 2020-12-01 | End: 2021-01-19 | Stop reason: SDUPTHER

## 2020-12-04 RX ORDER — PANTOPRAZOLE SODIUM 40 MG/1
TABLET, DELAYED RELEASE ORAL
Qty: 90 TABLET | Refills: 0 | Status: SHIPPED | OUTPATIENT
Start: 2020-12-04 | End: 2021-03-08

## 2020-12-17 RX ORDER — ALPRAZOLAM 0.5 MG/1
TABLET ORAL
Qty: 90 TABLET | Refills: 0 | Status: SHIPPED | OUTPATIENT
Start: 2020-12-17 | End: 2021-01-28

## 2020-12-17 RX ORDER — WARFARIN SODIUM 3 MG/1
TABLET ORAL
Qty: 90 TABLET | Refills: 0 | Status: SHIPPED | OUTPATIENT
Start: 2020-12-17 | End: 2021-03-16

## 2020-12-18 ENCOUNTER — ANTI-COAG VISIT (OUTPATIENT)
Dept: FAMILY MEDICINE CLINIC | Age: 75
End: 2020-12-18

## 2020-12-18 ENCOUNTER — TELEPHONE (OUTPATIENT)
Dept: FAMILY MEDICINE CLINIC | Age: 75
End: 2020-12-18

## 2020-12-18 LAB — INR BLD: 2.1

## 2020-12-18 NOTE — PROGRESS NOTES
Pt has been informed. Pt states she was taking 3mg  Per Dr. Littlejohn Dial: take 3.5 mg and recheck in 7 days. Pt informed.

## 2020-12-21 ENCOUNTER — TELEPHONE (OUTPATIENT)
Dept: FAMILY MEDICINE CLINIC | Age: 75
End: 2020-12-21

## 2020-12-21 RX ORDER — CHLORTHALIDONE 25 MG/1
TABLET ORAL
Qty: 90 TABLET | Refills: 0 | Status: SHIPPED | OUTPATIENT
Start: 2020-12-21 | End: 2021-03-31

## 2020-12-21 NOTE — TELEPHONE ENCOUNTER
Pt states testing was done at a local urgent care. She is feeling ok right now.  Will call back if need to set up VV  Close Encounter

## 2020-12-21 NOTE — TELEPHONE ENCOUNTER
Appt via Vv advised. If sick then go to local urgent care for eval today. Quarantine advised for minimum 11days from onset of sx.

## 2020-12-28 RX ORDER — ATORVASTATIN CALCIUM 10 MG/1
TABLET, FILM COATED ORAL
Qty: 90 TABLET | Refills: 0 | Status: SHIPPED | OUTPATIENT
Start: 2020-12-28 | End: 2021-03-31

## 2021-01-04 ENCOUNTER — ANTI-COAG VISIT (OUTPATIENT)
Dept: FAMILY MEDICINE CLINIC | Age: 76
End: 2021-01-04

## 2021-01-04 ENCOUNTER — TELEPHONE (OUTPATIENT)
Dept: FAMILY MEDICINE CLINIC | Age: 76
End: 2021-01-04

## 2021-01-04 DIAGNOSIS — Z95.2 AORTIC VALVE REPLACED: ICD-10-CM

## 2021-01-04 LAB — INR BLD: 2.8

## 2021-01-04 NOTE — PROGRESS NOTES
Notify pt' INR is at goal with 3mg coumadin dose. Recheck INR in 1week & if at goal will return to [facility] checks. Lennie Apodaca

## 2021-01-06 ENCOUNTER — HOSPITAL ENCOUNTER (OUTPATIENT)
Dept: CT IMAGING | Age: 76
Discharge: HOME | End: 2021-01-06
Payer: MEDICARE

## 2021-01-06 DIAGNOSIS — R91.8 PULMONARY NODULES: ICD-10-CM

## 2021-01-06 PROCEDURE — 71250 CT THORAX DX C-: CPT

## 2021-01-12 ENCOUNTER — ANTI-COAG VISIT (OUTPATIENT)
Dept: FAMILY MEDICINE CLINIC | Age: 76
End: 2021-01-12

## 2021-01-12 DIAGNOSIS — G89.29 CHRONIC BILATERAL LOW BACK PAIN WITHOUT SCIATICA: ICD-10-CM

## 2021-01-12 DIAGNOSIS — M54.50 CHRONIC BILATERAL LOW BACK PAIN WITHOUT SCIATICA: ICD-10-CM

## 2021-01-12 DIAGNOSIS — Z95.2 AORTIC VALVE REPLACED: ICD-10-CM

## 2021-01-12 DIAGNOSIS — M79.604 CHRONIC PAIN OF BOTH LOWER EXTREMITIES: ICD-10-CM

## 2021-01-12 DIAGNOSIS — G89.29 CHRONIC PAIN OF BOTH LOWER EXTREMITIES: ICD-10-CM

## 2021-01-12 DIAGNOSIS — M79.605 CHRONIC PAIN OF BOTH LOWER EXTREMITIES: ICD-10-CM

## 2021-01-12 LAB — INR BLD: 2.2

## 2021-01-12 NOTE — PROGRESS NOTES
Pt aware. Pt aware that tylenol 3 rx request requires Dr. Wyatt Santillan approval, she will wait for a call back when rx is ready.

## 2021-01-13 ENCOUNTER — TELEPHONE (OUTPATIENT)
Dept: FAMILY MEDICINE CLINIC | Age: 76
End: 2021-01-13

## 2021-01-13 DIAGNOSIS — G89.29 CHRONIC PAIN OF BOTH LOWER EXTREMITIES: ICD-10-CM

## 2021-01-13 DIAGNOSIS — M79.605 CHRONIC PAIN OF BOTH LOWER EXTREMITIES: ICD-10-CM

## 2021-01-13 DIAGNOSIS — M79.604 CHRONIC PAIN OF BOTH LOWER EXTREMITIES: ICD-10-CM

## 2021-01-13 DIAGNOSIS — G89.29 CHRONIC BILATERAL LOW BACK PAIN WITHOUT SCIATICA: ICD-10-CM

## 2021-01-13 DIAGNOSIS — M54.50 CHRONIC BILATERAL LOW BACK PAIN WITHOUT SCIATICA: ICD-10-CM

## 2021-01-13 RX ORDER — ACETAMINOPHEN AND CODEINE PHOSPHATE 300; 30 MG/1; MG/1
1 TABLET ORAL 3 TIMES DAILY PRN
Qty: 42 TABLET | Refills: 0 | Status: CANCELLED | OUTPATIENT
Start: 2021-01-13 | End: 2021-02-12

## 2021-01-13 NOTE — TELEPHONE ENCOUNTER
Patient called to see if she could  her medication, Tylenol 3, today, since her  will be in the area. Please advise.      OV: 10/7/20

## 2021-01-14 RX ORDER — ACETAMINOPHEN AND CODEINE PHOSPHATE 300; 30 MG/1; MG/1
1 TABLET ORAL 3 TIMES DAILY PRN
Qty: 42 TABLET | Refills: 0 | OUTPATIENT
Start: 2021-01-14 | End: 2021-02-13

## 2021-01-19 ENCOUNTER — VIRTUAL VISIT (OUTPATIENT)
Dept: FAMILY MEDICINE CLINIC | Age: 76
End: 2021-01-19
Payer: MEDICARE

## 2021-01-19 DIAGNOSIS — N18.30 CONTROLLED TYPE 2 DIABETES MELLITUS WITH STAGE 3 CHRONIC KIDNEY DISEASE, WITHOUT LONG-TERM CURRENT USE OF INSULIN (HCC): ICD-10-CM

## 2021-01-19 DIAGNOSIS — F41.9 ANXIETY: ICD-10-CM

## 2021-01-19 DIAGNOSIS — G89.29 CHRONIC BILATERAL LOW BACK PAIN WITHOUT SCIATICA: ICD-10-CM

## 2021-01-19 DIAGNOSIS — C85.90 LYMPHOMA, UNSPECIFIED BODY REGION, UNSPECIFIED LYMPHOMA TYPE (HCC): ICD-10-CM

## 2021-01-19 DIAGNOSIS — R91.8 LUNG NODULES: ICD-10-CM

## 2021-01-19 DIAGNOSIS — D50.8 IRON DEFICIENCY ANEMIA SECONDARY TO INADEQUATE DIETARY IRON INTAKE: ICD-10-CM

## 2021-01-19 DIAGNOSIS — Z79.01 LONG-TERM (CURRENT) USE OF ANTICOAGULANTS, INR GOAL 2.5-3.5: ICD-10-CM

## 2021-01-19 DIAGNOSIS — M79.604 CHRONIC PAIN OF BOTH LOWER EXTREMITIES: ICD-10-CM

## 2021-01-19 DIAGNOSIS — G89.29 CHRONIC PAIN OF BOTH LOWER EXTREMITIES: ICD-10-CM

## 2021-01-19 DIAGNOSIS — K21.9 GASTROESOPHAGEAL REFLUX DISEASE WITHOUT ESOPHAGITIS: ICD-10-CM

## 2021-01-19 DIAGNOSIS — E78.5 HYPERLIPIDEMIA WITH TARGET LDL LESS THAN 100: ICD-10-CM

## 2021-01-19 DIAGNOSIS — N18.31 STAGE 3A CHRONIC KIDNEY DISEASE (HCC): ICD-10-CM

## 2021-01-19 DIAGNOSIS — M79.605 CHRONIC PAIN OF BOTH LOWER EXTREMITIES: ICD-10-CM

## 2021-01-19 DIAGNOSIS — E11.22 CONTROLLED TYPE 2 DIABETES MELLITUS WITH STAGE 3 CHRONIC KIDNEY DISEASE, WITHOUT LONG-TERM CURRENT USE OF INSULIN (HCC): ICD-10-CM

## 2021-01-19 DIAGNOSIS — J43.8 OTHER EMPHYSEMA (HCC): ICD-10-CM

## 2021-01-19 DIAGNOSIS — M54.50 CHRONIC BILATERAL LOW BACK PAIN WITHOUT SCIATICA: ICD-10-CM

## 2021-01-19 DIAGNOSIS — I10 ESSENTIAL HYPERTENSION, BENIGN: Primary | ICD-10-CM

## 2021-01-19 PROCEDURE — 1123F ACP DISCUSS/DSCN MKR DOCD: CPT | Performed by: FAMILY MEDICINE

## 2021-01-19 PROCEDURE — 2022F DILAT RTA XM EVC RTNOPTHY: CPT | Performed by: FAMILY MEDICINE

## 2021-01-19 PROCEDURE — 4040F PNEUMOC VAC/ADMIN/RCVD: CPT | Performed by: FAMILY MEDICINE

## 2021-01-19 PROCEDURE — 3046F HEMOGLOBIN A1C LEVEL >9.0%: CPT | Performed by: FAMILY MEDICINE

## 2021-01-19 PROCEDURE — 99214 OFFICE O/P EST MOD 30 MIN: CPT | Performed by: FAMILY MEDICINE

## 2021-01-19 PROCEDURE — 3017F COLORECTAL CA SCREEN DOC REV: CPT | Performed by: FAMILY MEDICINE

## 2021-01-19 PROCEDURE — 1090F PRES/ABSN URINE INCON ASSESS: CPT | Performed by: FAMILY MEDICINE

## 2021-01-19 PROCEDURE — G8427 DOCREV CUR MEDS BY ELIG CLIN: HCPCS | Performed by: FAMILY MEDICINE

## 2021-01-19 PROCEDURE — G8400 PT W/DXA NO RESULTS DOC: HCPCS | Performed by: FAMILY MEDICINE

## 2021-01-19 RX ORDER — ACETAMINOPHEN AND CODEINE PHOSPHATE 300; 30 MG/1; MG/1
1 TABLET ORAL 3 TIMES DAILY PRN
Qty: 42 TABLET | Refills: 0 | Status: SHIPPED | OUTPATIENT
Start: 2021-01-19 | End: 2021-03-01 | Stop reason: SDUPTHER

## 2021-01-19 ASSESSMENT — ENCOUNTER SYMPTOMS
COLOR CHANGE: 0
BACK PAIN: 1
VOMITING: 0
CHEST TIGHTNESS: 0
CONSTIPATION: 0
RECTAL PAIN: 0
BLOOD IN STOOL: 0
STRIDOR: 0
DIARRHEA: 0
WHEEZING: 0
COUGH: 0
ABDOMINAL PAIN: 0
CHOKING: 0
SHORTNESS OF BREATH: 0
ABDOMINAL DISTENTION: 0
APNEA: 0
ANAL BLEEDING: 0
NAUSEA: 0

## 2021-01-19 ASSESSMENT — PATIENT HEALTH QUESTIONNAIRE - PHQ9
1. LITTLE INTEREST OR PLEASURE IN DOING THINGS: 0
SUM OF ALL RESPONSES TO PHQ QUESTIONS 1-9: 0
SUM OF ALL RESPONSES TO PHQ QUESTIONS 1-9: 0

## 2021-01-19 NOTE — PROGRESS NOTES
Subjective:      Patient ID: Jil Rogel is a 77 y.o. female. Patient is  being evaluated by a Virtual Visit (video visit) encounter to address concerns as mentioned above. A caregiver was present when appropriate. Due to this being a TeleHealth encounter (During BZGZI-23 public health emergency), evaluation of the following organ systems was limited: Vitals/Constitutional/EENT/Resp/CV/GI//MS/Neuro/Skin/Heme-Lymph-Imm. Pursuant to the emergency declaration under the [address] [place] [address], [address] [place] [address] authority and the [employer] Act, this Virtual Visit was conducted with patient's (and/or legal guardian's) consent, to reduce the patient's risk of exposure to COVID-19 and provide necessary medical care. The patient (and/or legal guardian) has also been advised to contact this office for worsening conditions or problems, and seek emergency medical treatment and/or call 911 if deemed necessary. Services were provided through a video synchronous discussion virtually to substitute for in-person clinic visit. Patient and provider were located at their individual homes. \"THIS VISIT WAS COMPLETED VIRTUALLY TODAY USING DOXY. ME\"    Diabetes check:doing well. Takes medication w/o side effects. Preprandial-fasting OB=[id number]. .   2hr postprandial [id number]. HBA1c:6.0 on 11/13/17. 10.3 on 3/2/18. 4.9 on 6/1/18. 5.2 on 1/4/19. 6.0 on 7/15/19. 5.1 on 6/16/20. VMG=047 on 11/13/17. 89 on 2/26/18. 89 on 6/1/18. 118 on 9/10/18. 73 on 1/4/19. 63 on 3/18/19. 86 on 11/18/19. 44 on 1/30/20. No other associated or worsening factors. Eye check:yes:<12mos ago. Denies polyuria/polyphagia/polydipsia/BLE skin lesions/BLE paresthesia. Abnml CT chest/PET-CT/lung nodules;under care of pulmo:recent CT chest 1/6/21 pe rpulmo:plans ot f/u with pulmo. Test date pending:pt' will f/u with scheduling dept. HTN check up:doing well. Taking medications w/o side effects. No other associated factors. Additional table salt added to food:no    Denies cp/sob/pnd/ankle edema/dizziness         Anemia/lymphoma:per Hematology/oncology. Doing well. No other new associated concerns. Hyperlipidemia: doing well. Lipitor & diet managed. Labs are due. No other associated concerns. Anxiety/insomnia:feels well. Sleeps [id number]. Med helps:xanax. Pt' reveals no aberrant drug use behavior. OARRS reports:are consistent. Denies SI/HI/hallucinations/illicit drugs/etoh abuse/cold intolerance. COPD: breathing well per baseline. No other new associated concerns. Albuterol inhaler use:last used >18mos ago. Former smoker:yes. Denies chills/sob/cp/weakness/n-v/abdo pain/HA/dizziness/rash/neck pain-stiffness/photophobia. GERD:mild: reports doing well. No other  associated/worsening or other improving factors. Denies abdo pain/n-v/diarrhea/melena-blood in stool. Back Pain: doing well perher baseline. No changes from previous visit per pt'  Chronic back pain:well controlled with tylenol#3 prn. Needs refill. Pt' reveals no aberrant drug use behavior. No other associated or worsening factors. Denies BLE itweonpc-tkxxrqtsldu-wonrmhryy/urinary or bowel control loss or change/saddle anesthesia/gait abnormality.       Allergies   Allergen Reactions    Diclofenac Other (See Comments)     Bleed/colitis    Nsaids Other (See Comments)     CANNOT TAKE D/T GI BLEEDING AND USE OF COUMADIN    Hydrocodone-Acetaminophen Anxiety       Current Outpatient Medications on File Prior to Visit   Medication Sig Dispense Refill    metFORMIN (GLUCOPHAGE) 1000 MG tablet TAKE 1 TABLET BY MOUTH TWICE DAILY 180 tablet 0    atorvastatin (LIPITOR) 10 MG tablet TAKE 1 TABLET BY MOUTH DAILY IN THE EVENING FOR CHOLESTEROL 90 tablet 0    chlorthalidone (HYGROTON) 25 MG tablet TAKE 1 TABLET BY MOUTH EVERY DAY 90 tablet 0    ALPRAZolam (XANAX) 0.5 MG tablet TAKE 1 TABLET BY MOUTH THREE TIMES DAILY AS NEEDED 90 tablet 0    warfarin (COUMADIN) 3 MG tablet TAKE 1 TABLET BY MOUTH AS DIRECTED 90 tablet 0    pantoprazole (PROTONIX) 40 MG tablet TAKE 1 TABLET BY MOUTH EVERY DAY 90 tablet 0    lisinopril (PRINIVIL;ZESTRIL) 40 MG tablet TAKE 1 TABLET BY MOUTH EVERY DAY 90 tablet 0    metoprolol tartrate (LOPRESSOR) 25 MG tablet Take 25 mg by mouth 2 times daily 0.5 tab BID      Blood Pressure KIT Dispense bp machine x 1 1 kit 0    warfarin (COUMADIN) 1 MG tablet TAKE AS DIRECTED 90 tablet 0    amLODIPine (NORVASC) 10 MG tablet TAKE 1 TABLET BY MOUTH DAILY 90 tablet 0    glipiZIDE (GLUCOTROL) 5 MG tablet TAKE 1/2(ONE-HALF) TABLET BY MOUTH EVERY DAY WITH FOOD 45 tablet 2    ciclopirox (PENLAC) 8 % solution Apply topically nightly. 1 Bottle 1    blood glucose test strips (TRUE METRIX BLOOD GLUCOSE TEST) strip USE TO TEST TWICE DAILY AS DIRECTED 180 strip 0    TRUE METRIX BLOOD GLUCOSE TEST strip USE TO TEST TWICE DAILY AS DIRECTED 150 strip 0    promethazine (PHENERGAN) 12.5 MG tablet TAKE ONE TABLET BY MOUTH EVERY 6 HOURS AS NEEDED 28 tablet 0    warfarin (COUMADIN) 4 MG tablet TAKE 1 TABLET BY MOUTH DAILY 30 tablet 0    hydroxychloroquine (PLAQUENIL) 200 MG tablet Take  by mouth daily.  ferrous sulfate 325 (65 FE) MG EC tablet Take 325 mg by mouth 2 times daily. No current facility-administered medications on file prior to visit.         Past Medical History:   Diagnosis Date    A[facility]fib ([facility]     Anemia     multifactorial:under care of heme-onc:Dr. Tim Parker Anemia:multifactorial 2011    as per heme-onc    Anxiety     Cataract     bilateral    Chronic back pain     Under care of ortho spine:Dr. Matias Ladd    CKD (chronic kidney disease) stage 3, GFR 30-59 ml/min 1/2012    Colitis, ischemic ([facility] 6/2012    Under care of Dr. Farrukh Guzman COPD     Diabetes     Diabetic eye exam ([facility] 1/28/15    CEI    GERD (gastroesophageal reflux disease)  Hx of mammogram 2018    negative:see scanned report.  Hyperlipidaemia LDL goal <100     Hypertension     Insomnia     Long-term (current) use of anticoagulants, INR goal 2.5-3.5     Lymphoma:monoclonal B cell 2012    Under care of Dr. Guadalupe Arita abnormal 7/30/15    Right breast mass:benign(?lipoma)per US:repeat testing in 6mos=16    Nonspecific abnormal results of kidney function study 2012    Osteoarthritis     Renal cysts, right 2014    RLS (restless legs syndrome) 10/19/11    Sciatica     Screening colonoscopy ;13    Nml. Next 10yrs:2023:Dr. Mark Cadet. 16(Dr. Waddell):nml EGD & colonoscopy except mild diverticulosis    Therapeutic drug monitoring 04/10/2015    OARRS report consistent on 4/10/15;7/6/15;10/23/15;16;16;16;16;3/6/17;17;17;17; 17;3/12/18;18    Valvular heart disease     s/p aortic and mitral valve repair. Under care of cards:Dr. Esvin Waters.  Visit for screening mammogram 04/10/2017    negative:see scanned report. Social History     Tobacco Use    Smoking status: Former Smoker     Packs/day: 1.00     Years: 40.00     Pack years: 40.00     Types: Cigarettes     Quit date: 2007     Years since quittin.4    Smokeless tobacco: Never Used   Substance Use Topics    Alcohol use: Yes     Alcohol/week: 1.0 standard drinks     Types: 1 Cans of beer per week     Comment: occ    Drug use: No     Social History     Substance and Sexual Activity   Drug Use No           Review of Systems   Constitutional: Negative for activity change, appetite change, chills, diaphoresis, fatigue, fever and unexpected weight change. Eyes: Negative for visual disturbance. Respiratory: Negative for apnea, cough, choking, chest tightness, shortness of breath, wheezing and stridor. Cardiovascular: Negative for chest pain, palpitations and leg swelling. Gastrointestinal: Negative for abdominal distention, abdominal pain, anal bleeding, blood in stool, constipation, diarrhea, nausea, rectal pain and vomiting. Endocrine: Negative for cold intolerance, heat intolerance, polydipsia, polyphagia and polyuria. Genitourinary: Negative for difficulty urinating. Musculoskeletal: Positive for arthralgias and back pain. Skin: Negative for color change, pallor, rash and wound. Neurological: Negative for dizziness, weakness and light-headedness. Hematological: Negative for adenopathy. Psychiatric/Behavioral: Negative for agitation, behavioral problems, confusion, decreased concentration, dysphoric mood, hallucinations, self-injury, sleep disturbance and suicidal ideas. The patient is not nervous/anxious and is not hyperactive. Objective:RR=16. Repeat kl=636/61   Physical Exam  Constitutional:       Appearance: She is well-developed. She is not toxic-appearing. Comments: Note:exam was conducted with pt' either self-palpating or visually indicating via their device camera. HENT:      Head:      Comments: Nml external ear & nose exams. Mouth/Throat:      Mouth: Mucous membranes are moist.      Pharynx: Oropharynx is clear. Uvula midline. Eyes:      General: No scleral icterus. Conjunctiva/sclera: Conjunctivae normal.   Neck:      Comments: No neck LAD per self-palpation. Cardiovascular:      Comments:   No bilateral feet-ankle edema. Pulmonary:      Effort: Pulmonary effort is normal.      Comments: No audible wheezing/cough/sob noted. Abdominal:      Comments: Abdo exam:S,Non-tender per pt' self-palpation. Musculoskeletal:      Comments: No back pain per pt's self-palpation today. Skin:     Coloration: Skin is not cyanotic. Neurological:      Mental Status: She is alert. Psychiatric:         Attention and Perception: Attention and perception normal. She is attentive. She does not perceive auditory or visual hallucinations. Mood and Affect: Mood and affect normal. Mood is not anxious, depressed or elated. Affect is not labile, blunt, flat, angry, tearful or inappropriate. Speech: Speech normal. She is communicative. Speech is not rapid and pressured, delayed, slurred or tangential.         Behavior: Behavior normal. Behavior is not agitated, slowed, aggressive, withdrawn, hyperactive or combative. Behavior is cooperative. Thought Content: Thought content normal. Thought content is not paranoid or delusional. Thought content does not include homicidal or suicidal ideation. Cognition and Memory: Cognition and memory normal.         Judgment: Judgment normal.      Comments: Good eye contact. Assessment:      Diagnosis Orders   1. Essential hypertension, benign  VSS per limited vitals obtainable via virtual visit(VV)/well appearing. Stable. 2. Lung nodules  Per pulmo:has pulmo appt next week. 3. Controlled type 2 diabetes mellitus with stage 3 chronic kidney disease, without long-term current use of insulin (HCC)  Stable. 4. Anxiety  Stable. Pt' reveals no aberrant drug use behavior. 5. Lymphoma  Stable. per specialist.     6. Anemia:multifactorial  Stable. Per specialist.     7. Stage 3a chronic kidney disease  Stable. 8. Gastroesophageal reflux disease without esophagitis  Stable. 9. COPD  Stable. Per pulmo. 10. Hyperlipidemia with target LDL less than 100  Labs due. 11. Chronic back pain   Stable/controlled. Pt' reveals no aberrant drug use behavior. Ok to continue [employer] refills w/q3mos office appts. acetaminophen-codeine (TYLENOL #3) 300-30 MG per tablet   12. Long-term (current) use of anticoagulants, INR goal 2.5-3.5  INR due. 13. Chronic pain of both lower extremities  Stable. acetaminophen-codeine (TYLENOL #3) 300-30 MG per tablet               Plan:           Obtain labs/diagnostic tests as discussed today & call back for results within 2-7days.

## 2021-01-27 ENCOUNTER — OFFICE VISIT (OUTPATIENT)
Dept: PULMONOLOGY | Age: 76
End: 2021-01-27
Payer: MEDICARE

## 2021-01-27 VITALS — TEMPERATURE: 96.6 F | HEIGHT: 59 IN | BODY MASS INDEX: 23.18 KG/M2 | WEIGHT: 115 LBS

## 2021-01-27 DIAGNOSIS — J44.9 COPD, MILD (HCC): ICD-10-CM

## 2021-01-27 DIAGNOSIS — R91.8 PULMONARY NODULES: Primary | ICD-10-CM

## 2021-01-27 DIAGNOSIS — Z87.891 HISTORY OF TOBACCO USE: ICD-10-CM

## 2021-01-27 PROCEDURE — 3017F COLORECTAL CA SCREEN DOC REV: CPT | Performed by: INTERNAL MEDICINE

## 2021-01-27 PROCEDURE — G8420 CALC BMI NORM PARAMETERS: HCPCS | Performed by: INTERNAL MEDICINE

## 2021-01-27 PROCEDURE — 1123F ACP DISCUSS/DSCN MKR DOCD: CPT | Performed by: INTERNAL MEDICINE

## 2021-01-27 PROCEDURE — G8484 FLU IMMUNIZE NO ADMIN: HCPCS | Performed by: INTERNAL MEDICINE

## 2021-01-27 PROCEDURE — G8400 PT W/DXA NO RESULTS DOC: HCPCS | Performed by: INTERNAL MEDICINE

## 2021-01-27 PROCEDURE — 99214 OFFICE O/P EST MOD 30 MIN: CPT | Performed by: INTERNAL MEDICINE

## 2021-01-27 PROCEDURE — G8427 DOCREV CUR MEDS BY ELIG CLIN: HCPCS | Performed by: INTERNAL MEDICINE

## 2021-01-27 PROCEDURE — 4040F PNEUMOC VAC/ADMIN/RCVD: CPT | Performed by: INTERNAL MEDICINE

## 2021-01-27 PROCEDURE — 1036F TOBACCO NON-USER: CPT | Performed by: INTERNAL MEDICINE

## 2021-01-27 PROCEDURE — 3023F SPIROM DOC REV: CPT | Performed by: INTERNAL MEDICINE

## 2021-01-27 PROCEDURE — G8926 SPIRO NO PERF OR DOC: HCPCS | Performed by: INTERNAL MEDICINE

## 2021-01-27 PROCEDURE — 1090F PRES/ABSN URINE INCON ASSESS: CPT | Performed by: INTERNAL MEDICINE

## 2021-01-27 NOTE — PROGRESS NOTES
1/2012    Under care of Dr. Miguel Angel Rutledge abnormal 7/30/15    Right breast mass:benign(?lipoma)per US:repeat testing in 6mos=1/30/16    Nonspecific abnormal results of kidney function study 1/25/2012    Osteoarthritis     Renal cysts, right 1/2/2014    RLS (restless legs syndrome) 10/19/11    Sciatica     Screening colonoscopy 2010;6/19/13    Nml. Next 10yrs:6/2023:Dr. Karyle Patricia. 9/22/16(Dr. Waddell):nml EGD & colonoscopy except mild diverticulosis    Therapeutic drug monitoring 04/10/2015    OARRS report consistent on 4/10/15;7/6/15;10/23/15;2/7/16;5/16/16;8/19/16;11/4/16;3/6/17;6/26/17;9/20/17;12/18/17; 12/18/17;3/12/18;5/29/18    Valvular heart disease     s/p aortic and mitral valve repair. Under care of cards:Dr. Arthur Soto.  Visit for screening mammogram 04/10/2017    negative:see scanned report. Social History:    Patient recently retired. She was working part-time at [employer]. She is . She smoked half a pack of cigarettes a day for 40 years but quit 16 years ago    Family History:  Lung cancer  Diabetes    Current Medications:  Current Outpatient Medications on File Prior to Visit   Medication Sig Dispense Refill    acetaminophen-codeine (TYLENOL #3) 300-30 MG per tablet Take 1 tablet by mouth 3 times daily as needed for Pain for up to 30 days. TAKE 1 TABLET BY MOUTH THREE TIMES DAILY AS NEEDED FOR PAIN. May cause drowsiness. May impair ability to operate vehicles or machinery.  Do not use in combination with alcohol 42 tablet 0    metFORMIN (GLUCOPHAGE) 1000 MG tablet TAKE 1 TABLET BY MOUTH TWICE DAILY 180 tablet 0    atorvastatin (LIPITOR) 10 MG tablet TAKE 1 TABLET BY MOUTH DAILY IN THE EVENING FOR CHOLESTEROL 90 tablet 0    chlorthalidone (HYGROTON) 25 MG tablet TAKE 1 TABLET BY MOUTH EVERY DAY 90 tablet 0    warfarin (COUMADIN) 3 MG tablet TAKE 1 TABLET BY MOUTH AS DIRECTED 90 tablet 0    pantoprazole (PROTONIX) 40 MG tablet TAKE 1 TABLET BY MOUTH EVERY DAY 90 tablet 0    lisinopril (PRINIVIL;ZESTRIL) 40 MG tablet TAKE 1 TABLET BY MOUTH EVERY DAY 90 tablet 0    metoprolol tartrate (LOPRESSOR) 25 MG tablet Take 12.5 mg by mouth 2 times daily 0.5 tab BID       Blood Pressure KIT Dispense bp machine x 1 1 kit 0    warfarin (COUMADIN) 1 MG tablet TAKE AS DIRECTED 90 tablet 0    amLODIPine (NORVASC) 10 MG tablet TAKE 1 TABLET BY MOUTH DAILY 90 tablet 0    glipiZIDE (GLUCOTROL) 5 MG tablet TAKE 1/2(ONE-HALF) TABLET BY MOUTH EVERY DAY WITH FOOD 45 tablet 2    ciclopirox (PENLAC) 8 % solution Apply topically nightly. 1 Bottle 1    blood glucose test strips (TRUE METRIX BLOOD GLUCOSE TEST) strip USE TO TEST TWICE DAILY AS DIRECTED 180 strip 0    TRUE METRIX BLOOD GLUCOSE TEST strip USE TO TEST TWICE DAILY AS DIRECTED 150 strip 0    promethazine (PHENERGAN) 12.5 MG tablet TAKE ONE TABLET BY MOUTH EVERY 6 HOURS AS NEEDED 28 tablet 0    warfarin (COUMADIN) 4 MG tablet TAKE 1 TABLET BY MOUTH DAILY 30 tablet 0    hydroxychloroquine (PLAQUENIL) 200 MG tablet Take  by mouth daily.  ferrous sulfate 325 (65 FE) MG EC tablet Take 325 mg by mouth 2 times daily.  ALPRAZolam (XANAX) 0.5 MG tablet TAKE 1 TABLET BY MOUTH THREE TIMES DAILY AS NEEDED 90 tablet 0     No current facility-administered medications on file prior to visit.       REVIEW OF SYSTEMS:    CONSTITUTIONAL: Negative for fevers and chills  HEENT: Negative for oropharyngeal exudate, post nasal drip, sinus pain / pressure, nasal congestion, ear pain  RESPIRATORY:  See HPI  CARDIOVASCULAR: Negative for chest pain, palpitations, edema  GASTROINTESTINAL: Negative for nausea, vomiting, diarrhea, constipation and abdominal pain  GENITOURINARY: Negative for dysuria, urinary frequency, urinary hesitancy  HEMATOLOGICAL: Negative for adenopathy  SKIN: Negative for clubbing, cyanosis, skin lesions  ENDOCRINE: Negative for polyuria, polydipsia, heat intolerance, cold intolerance   EXTREMITIES: Negative for weakness or decreased ROM in all extremities  NEUROLOGICAL: Negative for unilateral weakness, speech or gait abnormalities    Objective:   PHYSICAL EXAM:        VITALS:  Temp 96.6 °F (35.9 °C) (Infrared)   Ht 4' 11\" (1.499 m)   Wt 115 lb (52.2 kg)   BMI 23.23 kg/m²   On room air  CONSTITUTIONAL:  Awake, alert, cooperative, no apparent distress, and appears stated age  HEENT: No oropharyngeal exudate, PERRL, no cervical adenopathy, no tracheal deviation, thyroid size normal  LUNGS:  No increased work of breathing and clear to auscultation, no crackles or wheezing  CARDIOVASCULAR:  normal S1 and S2 and no JVD  ABDOMEN:  Normal bowel sounds, non-distended and non-tender to palpation  EXT: No edema, no calf tenderness. Pulses are present bilaterally. NEUROLOGIC:  Mental Status Exam:  Level of Alertness:   awake  Orientation:   person, place, time. SKIN:  normal skin color, texture, turgor, no redness, warmth, or swelling     DATA:      Radiology Review:  Pertinent images / reports were reviewed as a part of this visit. CT chest 1/06/2021  EXAM: CT CHEST WO CONTRAST       INDICATION: Pulmonary nodules, follow-up       COMPARISON: October 2020 and prior       TECHNIQUE: Axial CT imaging of the chest was performed.  Axial images, multiplanar reformatted images and axial maximum intensity projection were reviewed.   Individualized dose optimization technique was used in order to meet ALARA standards for    radiation dose reduction.  In addition to vendor specific dose reduction algorithms, the dose reduction techniques vary based on the specific scanner utilized but frequently include automated exposure control, adjustment of the mA and/or kV according to    patient size, and use of iterative reconstruction technique.       CONTRAST: None       FINDINGS:       LUNGS AND AIRWAYS: Airways are patent.  Subpleural nodular density posterior left upper lobe is decreased in size from prior study of October 2020, appearing smaller and subsolid, approximately 4 x 8 mm, series 4, image 25.       *  3 mm nodule right apex, series 4, image 18, unchanged. *  2 mm nodule posterior right upper lobe, image 36, unchanged. *  4 mm groundglass density superior right lower lobe, image 41, unchanged. *  Perihilar groundglass densities, image 51, 5 and 6 mm   *  4 mm groundglass density posterior right lower lobe, image 63.       *  Mild atelectasis/groundglass density in the posterior lung bases.           PLEURA: No pleural effusions or significant pleural thickening.       HEART / GREAT VESSELS: Heart is enlarged. Artifact from valve replacement. Dense arterial calcification.       LYMPH NODES: Precarinal node is 13 x 20 mm, previously reported as 15 x 24.       CHEST WALL / LOWER NECK: No significant abnormality.       UPPER ABDOMEN: No significant abnormality.       BONES: No acute abnormality.           Impression       1. Subpleural nodule posterior left upper lobe is decreased in size and subsolid. 2. Other scattered pulmonary nodules unchanged. 3. Small groundglass densities, right perihilar and posterior lung bases. Recommend follow-up study in 3-6 months. 4. Enlarged precarinal lymph node, slightly decreased in size. 5. Atherosclerotic disease. CT Chest 10/16/2020 reveals the following:  CT lung screen         HISTORY: 40 pack year history of cigarette smoking; pulmonary nodule         Thin section scans through the chest without IV contrast. Up-to-date CT equipment with radiation dose reduction techniques. Comparison with 6/15/2020 and 10/5/2020         Lung screening specific (lung RADS)-positive.  Multiple pulmonary nodules, including-         3 mm nodule right upper lobe series 4 image 30-stable         11 mm nodule left upper lobe images 34-35 (previously 8 mm)         3 mm subpleural nodule right upper lobe image 45, stable         2 separate 3 mm nodules right upper lobe contiguous with the major

## 2021-01-28 DIAGNOSIS — F41.9 ANXIETY: ICD-10-CM

## 2021-01-28 RX ORDER — ALPRAZOLAM 0.5 MG/1
TABLET ORAL
Qty: 90 TABLET | Refills: 0 | Status: SHIPPED | OUTPATIENT
Start: 2021-01-28 | End: 2021-03-08

## 2021-01-28 RX ORDER — GLIPIZIDE 5 MG/1
TABLET ORAL
Qty: 45 TABLET | Refills: 2 | Status: SHIPPED | OUTPATIENT
Start: 2021-01-28 | End: 2021-10-04

## 2021-02-02 ENCOUNTER — ANTI-COAG VISIT (OUTPATIENT)
Dept: FAMILY MEDICINE CLINIC | Age: 76
End: 2021-02-02

## 2021-02-02 DIAGNOSIS — Z95.2 AORTIC VALVE REPLACED: ICD-10-CM

## 2021-02-02 LAB — INR BLD: 1.7

## 2021-02-08 ENCOUNTER — ANTI-COAG VISIT (OUTPATIENT)
Dept: FAMILY MEDICINE CLINIC | Age: 76
End: 2021-02-08

## 2021-02-08 ENCOUNTER — TELEPHONE (OUTPATIENT)
Dept: FAMILY MEDICINE CLINIC | Age: 76
End: 2021-02-08

## 2021-02-08 DIAGNOSIS — Z95.2 AORTIC VALVE REPLACED: ICD-10-CM

## 2021-02-08 LAB — INR BLD: 2.2

## 2021-02-08 NOTE — PROGRESS NOTES
Notify pt':  INR is improving but still low. Increase coumadin from 3.5mg to 4mg qd. INR recheck in 6-7days.

## 2021-02-15 RX ORDER — LISINOPRIL 40 MG/1
TABLET ORAL
Qty: 90 TABLET | Refills: 0 | Status: SHIPPED | OUTPATIENT
Start: 2021-02-15 | End: 2021-05-24

## 2021-02-16 LAB — INR BLD: 2.9

## 2021-02-17 ENCOUNTER — ANTI-COAG VISIT (OUTPATIENT)
Dept: FAMILY MEDICINE CLINIC | Age: 76
End: 2021-02-17

## 2021-02-17 DIAGNOSIS — Z95.2 AORTIC VALVE REPLACED: ICD-10-CM

## 2021-03-01 ENCOUNTER — TELEPHONE (OUTPATIENT)
Dept: FAMILY MEDICINE CLINIC | Age: 76
End: 2021-03-01

## 2021-03-01 DIAGNOSIS — G89.29 CHRONIC PAIN OF BOTH LOWER EXTREMITIES: ICD-10-CM

## 2021-03-01 DIAGNOSIS — M79.604 CHRONIC PAIN OF BOTH LOWER EXTREMITIES: ICD-10-CM

## 2021-03-01 DIAGNOSIS — M79.605 CHRONIC PAIN OF BOTH LOWER EXTREMITIES: ICD-10-CM

## 2021-03-01 DIAGNOSIS — G89.29 CHRONIC BILATERAL LOW BACK PAIN WITHOUT SCIATICA: ICD-10-CM

## 2021-03-01 DIAGNOSIS — M54.50 CHRONIC BILATERAL LOW BACK PAIN WITHOUT SCIATICA: ICD-10-CM

## 2021-03-01 RX ORDER — ACETAMINOPHEN AND CODEINE PHOSPHATE 300; 30 MG/1; MG/1
1 TABLET ORAL 3 TIMES DAILY PRN
Qty: 42 TABLET | Refills: 0 | Status: SHIPPED | OUTPATIENT
Start: 2021-03-01 | End: 2021-04-13 | Stop reason: SDUPTHER

## 2021-03-01 NOTE — TELEPHONE ENCOUNTER
[phone] (M)     Patient wanted to p/u med (Tylenol 3) on Wednesday. OV: 1/19/21    Please advise.

## 2021-03-07 DIAGNOSIS — F41.9 ANXIETY: ICD-10-CM

## 2021-03-08 RX ORDER — PANTOPRAZOLE SODIUM 40 MG/1
TABLET, DELAYED RELEASE ORAL
Qty: 90 TABLET | Refills: 0 | Status: SHIPPED | OUTPATIENT
Start: 2021-03-08 | End: 2021-06-03

## 2021-03-08 RX ORDER — ALPRAZOLAM 0.5 MG/1
TABLET ORAL
Qty: 90 TABLET | Refills: 0 | Status: SHIPPED | OUTPATIENT
Start: 2021-03-08 | End: 2021-04-13 | Stop reason: SDUPTHER

## 2021-03-16 DIAGNOSIS — Z79.01 LONG-TERM (CURRENT) USE OF ANTICOAGULANTS, INR GOAL 2.5-3.5: ICD-10-CM

## 2021-03-16 LAB — INR BLD: 2.5

## 2021-03-16 RX ORDER — WARFARIN SODIUM 3 MG/1
TABLET ORAL
Qty: 90 TABLET | Refills: 0 | Status: SHIPPED | OUTPATIENT
Start: 2021-03-16 | End: 2021-06-14

## 2021-03-17 ENCOUNTER — ANTI-COAG VISIT (OUTPATIENT)
Dept: FAMILY MEDICINE CLINIC | Age: 76
End: 2021-03-17

## 2021-03-17 DIAGNOSIS — Z95.2 AORTIC VALVE REPLACED: ICD-10-CM

## 2021-03-19 ENCOUNTER — TELEPHONE (OUTPATIENT)
Dept: FAMILY MEDICINE CLINIC | Age: 76
End: 2021-03-19

## 2021-03-30 ENCOUNTER — ANTI-COAG VISIT (OUTPATIENT)
Dept: FAMILY MEDICINE CLINIC | Age: 76
End: 2021-03-30

## 2021-03-30 ENCOUNTER — TELEPHONE (OUTPATIENT)
Dept: FAMILY MEDICINE CLINIC | Age: 76
End: 2021-03-30

## 2021-03-30 DIAGNOSIS — Z95.2 AORTIC VALVE REPLACED: ICD-10-CM

## 2021-03-30 LAB — INR BLD: 1.2

## 2021-03-30 NOTE — PROGRESS NOTES
Pt states that she restarted 4mg dose on Saturday.  Per Dr. Shanan Gasca: increase to 4.5 mg and recheck on Friday   Pt agreed and expressed understanding  Close Encounter

## 2021-03-30 NOTE — PROGRESS NOTES
If she has just restarted coumadin 4mg today & INR is 1.2 today then repeat INR in 2days. Let me know.

## 2021-03-31 DIAGNOSIS — I10 ESSENTIAL HYPERTENSION, BENIGN: ICD-10-CM

## 2021-03-31 DIAGNOSIS — E78.5 HYPERLIPIDEMIA WITH TARGET LDL LESS THAN 100: ICD-10-CM

## 2021-03-31 RX ORDER — ATORVASTATIN CALCIUM 10 MG/1
TABLET, FILM COATED ORAL
Qty: 90 TABLET | Refills: 0 | Status: SHIPPED | OUTPATIENT
Start: 2021-03-31 | End: 2021-06-30

## 2021-03-31 RX ORDER — CHLORTHALIDONE 25 MG/1
TABLET ORAL
Qty: 90 TABLET | Refills: 0 | Status: SHIPPED | OUTPATIENT
Start: 2021-03-31 | End: 2021-06-30

## 2021-04-02 ENCOUNTER — ANTI-COAG VISIT (OUTPATIENT)
Dept: FAMILY MEDICINE CLINIC | Age: 76
End: 2021-04-02

## 2021-04-02 DIAGNOSIS — Z95.2 AORTIC VALVE REPLACED: ICD-10-CM

## 2021-04-02 LAB — INR BLD: 1.6

## 2021-04-02 NOTE — PROGRESS NOTES
Pt called with INR of 1.6 today and taking 4.5 mg. Pt informed to take 5mg and recheck on Tuesday. Pt aware if she has more bruising than usual, check INR and call on call doctor over the weekend if >3.5.   Verbal instructions/recommendations have been given per Dr. Callum Nunez

## 2021-04-06 ENCOUNTER — ANTI-COAG VISIT (OUTPATIENT)
Dept: FAMILY MEDICINE CLINIC | Age: 76
End: 2021-04-06

## 2021-04-06 DIAGNOSIS — Z95.2 AORTIC VALVE REPLACED: ICD-10-CM

## 2021-04-06 LAB — INR BLD: 2.6

## 2021-04-12 ENCOUNTER — TELEPHONE (OUTPATIENT)
Dept: FAMILY MEDICINE CLINIC | Age: 76
End: 2021-04-12

## 2021-04-12 DIAGNOSIS — G89.29 CHRONIC BILATERAL LOW BACK PAIN WITHOUT SCIATICA: ICD-10-CM

## 2021-04-12 DIAGNOSIS — M54.50 CHRONIC BILATERAL LOW BACK PAIN WITHOUT SCIATICA: ICD-10-CM

## 2021-04-12 DIAGNOSIS — M79.605 CHRONIC PAIN OF BOTH LOWER EXTREMITIES: ICD-10-CM

## 2021-04-12 DIAGNOSIS — G89.29 CHRONIC PAIN OF BOTH LOWER EXTREMITIES: ICD-10-CM

## 2021-04-12 DIAGNOSIS — M79.604 CHRONIC PAIN OF BOTH LOWER EXTREMITIES: ICD-10-CM

## 2021-04-12 RX ORDER — ACETAMINOPHEN AND CODEINE PHOSPHATE 300; 30 MG/1; MG/1
1 TABLET ORAL 3 TIMES DAILY PRN
Qty: 42 TABLET | Refills: 0 | OUTPATIENT
Start: 2021-04-12 | End: 2021-05-12

## 2021-04-12 NOTE — TELEPHONE ENCOUNTER
[phone] Gregg Elizalde   [phone] (H) , able to leave message    ov 4/6/21    Can patient stop by tomorrow for her Tylenol 3's? Please advise.

## 2021-04-13 ENCOUNTER — VIRTUAL VISIT (OUTPATIENT)
Dept: FAMILY MEDICINE CLINIC | Age: 76
End: 2021-04-13
Payer: MEDICARE

## 2021-04-13 DIAGNOSIS — N18.30 CONTROLLED TYPE 2 DIABETES MELLITUS WITH STAGE 3 CHRONIC KIDNEY DISEASE, WITHOUT LONG-TERM CURRENT USE OF INSULIN (HCC): ICD-10-CM

## 2021-04-13 DIAGNOSIS — K21.9 GASTROESOPHAGEAL REFLUX DISEASE WITHOUT ESOPHAGITIS: ICD-10-CM

## 2021-04-13 DIAGNOSIS — F41.9 ANXIETY: ICD-10-CM

## 2021-04-13 DIAGNOSIS — E78.5 HYPERLIPIDEMIA WITH TARGET LDL LESS THAN 100: ICD-10-CM

## 2021-04-13 DIAGNOSIS — M79.604 CHRONIC PAIN OF BOTH LOWER EXTREMITIES: ICD-10-CM

## 2021-04-13 DIAGNOSIS — M54.50 CHRONIC BILATERAL LOW BACK PAIN WITHOUT SCIATICA: Primary | ICD-10-CM

## 2021-04-13 DIAGNOSIS — G89.29 CHRONIC BILATERAL LOW BACK PAIN WITHOUT SCIATICA: Primary | ICD-10-CM

## 2021-04-13 DIAGNOSIS — E11.22 CONTROLLED TYPE 2 DIABETES MELLITUS WITH STAGE 3 CHRONIC KIDNEY DISEASE, WITHOUT LONG-TERM CURRENT USE OF INSULIN (HCC): ICD-10-CM

## 2021-04-13 DIAGNOSIS — N18.31 STAGE 3A CHRONIC KIDNEY DISEASE (HCC): ICD-10-CM

## 2021-04-13 DIAGNOSIS — I10 ESSENTIAL HYPERTENSION, BENIGN: ICD-10-CM

## 2021-04-13 DIAGNOSIS — J43.8 OTHER EMPHYSEMA (HCC): ICD-10-CM

## 2021-04-13 DIAGNOSIS — M79.605 CHRONIC PAIN OF BOTH LOWER EXTREMITIES: ICD-10-CM

## 2021-04-13 DIAGNOSIS — G89.29 CHRONIC PAIN OF BOTH LOWER EXTREMITIES: ICD-10-CM

## 2021-04-13 DIAGNOSIS — Z79.01 LONG-TERM (CURRENT) USE OF ANTICOAGULANTS, INR GOAL 2.5-3.5: ICD-10-CM

## 2021-04-13 DIAGNOSIS — C85.90 LYMPHOMA, UNSPECIFIED BODY REGION, UNSPECIFIED LYMPHOMA TYPE (HCC): ICD-10-CM

## 2021-04-13 DIAGNOSIS — D50.8 IRON DEFICIENCY ANEMIA SECONDARY TO INADEQUATE DIETARY IRON INTAKE: ICD-10-CM

## 2021-04-13 PROCEDURE — G8400 PT W/DXA NO RESULTS DOC: HCPCS | Performed by: FAMILY MEDICINE

## 2021-04-13 PROCEDURE — 1123F ACP DISCUSS/DSCN MKR DOCD: CPT | Performed by: FAMILY MEDICINE

## 2021-04-13 PROCEDURE — 2022F DILAT RTA XM EVC RTNOPTHY: CPT | Performed by: FAMILY MEDICINE

## 2021-04-13 PROCEDURE — 99214 OFFICE O/P EST MOD 30 MIN: CPT | Performed by: FAMILY MEDICINE

## 2021-04-13 PROCEDURE — 3046F HEMOGLOBIN A1C LEVEL >9.0%: CPT | Performed by: FAMILY MEDICINE

## 2021-04-13 PROCEDURE — 1090F PRES/ABSN URINE INCON ASSESS: CPT | Performed by: FAMILY MEDICINE

## 2021-04-13 PROCEDURE — 4040F PNEUMOC VAC/ADMIN/RCVD: CPT | Performed by: FAMILY MEDICINE

## 2021-04-13 PROCEDURE — G8427 DOCREV CUR MEDS BY ELIG CLIN: HCPCS | Performed by: FAMILY MEDICINE

## 2021-04-13 PROCEDURE — 3017F COLORECTAL CA SCREEN DOC REV: CPT | Performed by: FAMILY MEDICINE

## 2021-04-13 RX ORDER — ACETAMINOPHEN AND CODEINE PHOSPHATE 300; 30 MG/1; MG/1
1 TABLET ORAL EVERY 4 HOURS PRN
COMMUNITY
End: 2021-07-08 | Stop reason: SDUPTHER

## 2021-04-13 RX ORDER — ACETAMINOPHEN AND CODEINE PHOSPHATE 300; 30 MG/1; MG/1
1 TABLET ORAL 3 TIMES DAILY PRN
Qty: 42 TABLET | Refills: 0 | Status: SHIPPED | OUTPATIENT
Start: 2021-04-13 | End: 2021-05-19 | Stop reason: SDUPTHER

## 2021-04-13 RX ORDER — ALPRAZOLAM 0.5 MG/1
TABLET ORAL
Qty: 90 TABLET | Refills: 0 | Status: SHIPPED | OUTPATIENT
Start: 2021-04-13 | End: 2021-05-19 | Stop reason: SDUPTHER

## 2021-04-13 ASSESSMENT — ENCOUNTER SYMPTOMS
BLOOD IN STOOL: 0
DIARRHEA: 0
CHOKING: 0
ABDOMINAL PAIN: 0
STRIDOR: 0
VOMITING: 0
CHEST TIGHTNESS: 0
COUGH: 0
APNEA: 0
ANAL BLEEDING: 0
ABDOMINAL DISTENTION: 0
NAUSEA: 0
CONSTIPATION: 0
RECTAL PAIN: 0
WHEEZING: 0
SHORTNESS OF BREATH: 0
BACK PAIN: 1

## 2021-04-13 NOTE — PROGRESS NOTES
Specialist office bp at Dr. Sibley  office was 112/60. No other associated factors. Additional table salt added to food:no does not. Denies cp/sob/pnd/ankle edema/dizziness         Anemia/lymphoma:per Hematology/oncology. Reports doing well. No other new associated concerns. Hyperlipidemia: is doing well. Lipitor & diet managed. Recent labs are due. No other associated concerns. Anxiety/insomnia:doing well. Sleeping around 7hrs. Med helps:xanax. Pt' reveals no aberrant drug use behavior. OARRS reports:consistent. Denies SI/HI/hallucinations/illicit drugs/etoh abuse/cold intolerance. COPD: breathing well . No other associated concerns. Albuterol inhaler use:last used >19mos ago. Former smoker:yes. Denies chills/sob/cp/weakness/n-v/abdo pain/HA/dizziness/rash/neck pain-stiffness/photophobia. GERD f/u:mild:doing well. No other  associated/worsening or other improving factors. Denies abdo pain/n-v/diarrhea/melena-blood in stool. Back Pain: reports doing well. No changes since prior visit on 1/19/21 per pt'  Chronic back pain:well controlled with tylenol#3 prn. Requests refill. Pt' reveals no aberrant drug use behavior. No other associated or worsening factors. Denies BLE ovbsifmf-zgaejqurgls-caokrmkyo/urinary or bowel control loss or change/saddle anesthesia/gait abnormality.       Allergies   Allergen Reactions    Diclofenac Other (See Comments)     Bleed/colitis    Nsaids Other (See Comments)     CANNOT TAKE D/T GI BLEEDING AND USE OF COUMADIN    Hydrocodone-Acetaminophen Anxiety       Current Outpatient Medications on File Prior to Visit   Medication Sig Dispense Refill    atorvastatin (LIPITOR) 10 MG tablet TAKE 1 TABLET BY MOUTH DAILY IN THE EVENING FOR CHOLESTEROL 90 tablet 0    metFORMIN (GLUCOPHAGE) 1000 MG tablet TAKE 1 TABLET BY MOUTH TWICE DAILY 180 tablet 0    chlorthalidone (HYGROTON) 25 MG tablet TAKE 1 TABLET BY MOUTH EVERY DAY 90 tablet 0    warfarin (COUMADIN) 3 MG tablet TAKE 1 TABLET BY MOUTH AS DIRECTED 90 tablet 0    ALPRAZolam (XANAX) 0.5 MG tablet TAKE 1 TABLET BY MOUTH THREE TIMES DAILY AS NEEDED 90 tablet 0    pantoprazole (PROTONIX) 40 MG tablet TAKE 1 TABLET BY MOUTH EVERY DAY 90 tablet 0    lisinopril (PRINIVIL;ZESTRIL) 40 MG tablet TAKE 1 TABLET BY MOUTH EVERY DAY 90 tablet 0    glipiZIDE (GLUCOTROL) 5 MG tablet TAKE 1/2 TABLET BY MOUTH EVERY DAY WITH FOOD 45 tablet 2    metoprolol tartrate (LOPRESSOR) 25 MG tablet Take 12.5 mg by mouth 2 times daily 0.5 tab BID       Blood Pressure KIT Dispense bp machine x 1 1 kit 0    warfarin (COUMADIN) 1 MG tablet TAKE AS DIRECTED 90 tablet 0    amLODIPine (NORVASC) 10 MG tablet TAKE 1 TABLET BY MOUTH DAILY 90 tablet 0    ciclopirox (PENLAC) 8 % solution Apply topically nightly. 1 Bottle 1    blood glucose test strips (TRUE METRIX BLOOD GLUCOSE TEST) strip USE TO TEST TWICE DAILY AS DIRECTED 180 strip 0    TRUE METRIX BLOOD GLUCOSE TEST strip USE TO TEST TWICE DAILY AS DIRECTED 150 strip 0    promethazine (PHENERGAN) 12.5 MG tablet TAKE ONE TABLET BY MOUTH EVERY 6 HOURS AS NEEDED 28 tablet 0    warfarin (COUMADIN) 4 MG tablet TAKE 1 TABLET BY MOUTH DAILY 30 tablet 0    hydroxychloroquine (PLAQUENIL) 200 MG tablet Take  by mouth daily.  ferrous sulfate 325 (65 FE) MG EC tablet Take 325 mg by mouth 2 times daily. No current facility-administered medications on file prior to visit.         Past Medical History:   Diagnosis Date    A-fib ([facility]     Anemia     multifactorial:under care of heme-onc:Dr. Ac Salazar Anemia:multifactorial 2011    as per heme-onc    Anxiety     Cataract     bilateral    Chronic back pain     Under care of ortho spine:Dr. Maverick Gary    CKD (chronic kidney disease) stage 3, GFR 30-59 ml/min 1/2012    Colitis, ischemic ([facility] 6/2012    Under care of Dr. Nasreen Jacob    COPD     Diabetes     Diabetic eye exam ([facility] 1/28/15    CEI    GERD and leg swelling. Gastrointestinal: Negative for abdominal distention, abdominal pain, anal bleeding, blood in stool, constipation, diarrhea, nausea, rectal pain and vomiting. Endocrine: Negative for cold intolerance, heat intolerance, polydipsia, polyphagia and polyuria. Genitourinary: Negative for difficulty urinating. Musculoskeletal: Positive for arthralgias and back pain. Skin: Negative for pallor, rash and wound. Neurological: Negative for dizziness, weakness and light-headedness. Hematological: Negative for adenopathy. Psychiatric/Behavioral: Negative for agitation, behavioral problems, confusion, decreased concentration, dysphoric mood, hallucinations, self-injury, sleep disturbance and suicidal ideas. The patient is not nervous/anxious and is not hyperactive. Objective:RR=17. Physical Exam  Constitutional:       Appearance: She is well-developed. She is not toxic-appearing. Comments: Note:exam was conducted with pt' either self-palpating or visually indicating via their device camera. HENT:      Head:      Comments: Nml external ear & nose exams. Mouth/Throat:      Mouth: Mucous membranes are moist.      Pharynx: Oropharynx is clear. Uvula midline. Eyes:      General: No scleral icterus. Conjunctiva/sclera: Conjunctivae normal.   Neck:      Comments: No neck LAD per self-palpation. Cardiovascular:      Comments:   No bilateral feet-ankle edema. Pulmonary:      Effort: Pulmonary effort is normal.      Comments: No audible wheezing/cough/sob noted. Abdominal:      Comments: Abdo exam:S,Non-tender per pt' self-palpation today. Musculoskeletal:      Comments: No back pain per pt's self-palpation today or changes since prior visit on 1/19/21. Skin:     Coloration: Skin is not cyanotic. Neurological:      Mental Status: She is alert. Psychiatric:         Attention and Perception: Attention and perception normal. She is attentive.  She does not perceive auditory or visual hallucinations. Mood and Affect: Mood and affect normal. Mood is not anxious, depressed or elated. Affect is not labile, blunt, flat, angry, tearful or inappropriate. Speech: Speech normal. She is communicative. Speech is not rapid and pressured, delayed, slurred or tangential.         Behavior: Behavior normal. Behavior is not agitated, slowed, aggressive, withdrawn, hyperactive or combative. Behavior is cooperative. Thought Content: Thought content normal. Thought content is not paranoid or delusional. Thought content does not include homicidal or suicidal ideation. Cognition and Memory: Cognition and memory normal.         Judgment: Judgment normal.      Comments: Good eye contact. Polite. Assessment:        Diagnosis Orders   1. Chronic back pain   VSS per limited vitals obtainable via virtual visit(VV)/well appearing. Stable/controlled with pain medication. Pt' reveals no aberrant drug use behavior. Ok to continue [employer] refills w/q3mos office appts. acetaminophen-codeine (TYLENOL #3) 300-30 MG per tablet   2. Anxiety  Stable/controlled. Pt' reveals no aberrant drug use behavior. Ok to continue [employer] refills w/q3mos office appts. ALPRAZolam (XANAX) 0.5 MG tablet   3. Controlled type 2 diabetes mellitus with stage 3 chronic kidney disease, without long-term current use of insulin (HCC)  Stable  A1c due. Diabetes:Check feet daily. Yrly eye checks advised. Education: Reviewed ABCs of diabetes management (respective goals in parentheses):  A1C (<7), blood pressure (<130/80), and cholesterol (LDL <100). Counseled at this visit on the following: diabetes complication prevention, foot care. 4. Essential hypertension, benign  Stable. 5. Chronic pain of both lower extremities  Stable/controlled. Pt' reveals no aberrant drug use behavior. Ok to continue [employer] refills w/q3mos office appts.   acetaminophen-codeine (TYLENOL #3) 300-30 MG per tablet   6. Gastroesophageal reflux disease without esophagitis  Stable. 7. COPD  Stable. COPD/lung nodules:per pulmo. 8. Hyperlipidemia with target LDL less than 100  Stable. Labs due. 9. Lymphoma  Stable. Per specialist.     10. Stage 3a chronic kidney disease  Stable. Per specialist.     11. Long-term (current) use of anticoagulants, INR goal 2.5-3.5  Stable. 12. Anemia:multifactorial  Stable. Per specialist             Plan:           Pt' ended call in good condition. Obtain labs/diagnostic tests as discussed today & call back for results within 2-7days. Advised to go to local ER or call 911 for any worrisome signs/sx.                  Sara Braxton MD

## 2021-05-07 ENCOUNTER — ANTI-COAG VISIT (OUTPATIENT)
Dept: FAMILY MEDICINE CLINIC | Age: 76
End: 2021-05-07
Payer: MEDICARE

## 2021-05-07 DIAGNOSIS — Z95.2 AORTIC VALVE REPLACED: ICD-10-CM

## 2021-05-07 LAB — INTERNATIONAL NORMALIZATION RATIO, POC: 4.2

## 2021-05-07 PROCEDURE — 85610 PROTHROMBIN TIME: CPT | Performed by: FAMILY MEDICINE

## 2021-05-18 ENCOUNTER — ANTI-COAG VISIT (OUTPATIENT)
Dept: FAMILY MEDICINE CLINIC | Age: 76
End: 2021-05-18

## 2021-05-18 DIAGNOSIS — Z95.2 AORTIC VALVE REPLACED: Primary | ICD-10-CM

## 2021-05-18 LAB — INR BLD: 2.7

## 2021-05-19 DIAGNOSIS — G89.29 CHRONIC BILATERAL LOW BACK PAIN WITHOUT SCIATICA: ICD-10-CM

## 2021-05-19 DIAGNOSIS — M79.605 CHRONIC PAIN OF BOTH LOWER EXTREMITIES: ICD-10-CM

## 2021-05-19 DIAGNOSIS — F41.9 ANXIETY: ICD-10-CM

## 2021-05-19 DIAGNOSIS — G89.29 CHRONIC PAIN OF BOTH LOWER EXTREMITIES: ICD-10-CM

## 2021-05-19 DIAGNOSIS — M54.50 CHRONIC BILATERAL LOW BACK PAIN WITHOUT SCIATICA: ICD-10-CM

## 2021-05-19 DIAGNOSIS — M79.604 CHRONIC PAIN OF BOTH LOWER EXTREMITIES: ICD-10-CM

## 2021-05-19 RX ORDER — ALPRAZOLAM 0.5 MG/1
TABLET ORAL
Qty: 90 TABLET | Refills: 0 | Status: SHIPPED | OUTPATIENT
Start: 2021-05-19 | End: 2021-06-28

## 2021-05-19 RX ORDER — ACETAMINOPHEN AND CODEINE PHOSPHATE 300; 30 MG/1; MG/1
1 TABLET ORAL 3 TIMES DAILY PRN
Qty: 42 TABLET | Refills: 0 | Status: SHIPPED | OUTPATIENT
Start: 2021-05-19 | End: 2021-06-18

## 2021-05-19 NOTE — TELEPHONE ENCOUNTER
rx transmitted electronically to the pharmacy via [employer]   Let patient know and verify pharmacy       instruct caution with driving or handling of heavy machinery while taking  this medication as it  can cause drowsiness,

## 2021-05-24 RX ORDER — LISINOPRIL 40 MG/1
TABLET ORAL
Qty: 90 TABLET | Refills: 0 | Status: ON HOLD | OUTPATIENT
Start: 2021-05-24 | End: 2021-08-01 | Stop reason: SDUPTHER

## 2021-05-27 DIAGNOSIS — Z79.01 LONG-TERM (CURRENT) USE OF ANTICOAGULANTS, INR GOAL 2.5-3.5: ICD-10-CM

## 2021-05-28 DIAGNOSIS — Z79.01 LONG-TERM (CURRENT) USE OF ANTICOAGULANTS, INR GOAL 2.5-3.5: ICD-10-CM

## 2021-05-28 RX ORDER — WARFARIN SODIUM 1 MG/1
TABLET ORAL
Qty: 270 TABLET | Refills: 1 | Status: SHIPPED | OUTPATIENT
Start: 2021-05-28 | End: 2021-12-07

## 2021-05-28 RX ORDER — WARFARIN SODIUM 1 MG/1
TABLET ORAL
Qty: 90 TABLET | Refills: 0 | Status: SHIPPED | OUTPATIENT
Start: 2021-05-28 | End: 2021-05-28

## 2021-06-02 NOTE — TELEPHONE ENCOUNTER
Medication:   Requested Prescriptions     Pending Prescriptions Disp Refills    pantoprazole (PROTONIX) 40 MG tablet [Pharmacy Med Name: PANTOPRAZOLE 40MG TABLETS] 90 tablet 0     Sig: TAKE 1 TABLET BY MOUTH EVERY DAY        Last Filled:  3/8/2021 90 tabs 0 refill    Patient Phone Number: [phone] (home) [phone] (work)    Last appt: 4/13/2021   Next appt: Visit date not found    Last OARRS:   RX Monitoring 3/8/2021   Attestation -   Periodic Controlled Substance Monitoring Possible medication side effects, risk of tolerance/dependence & alternative treatments discussed. ;No signs of potential drug abuse or diversion identified.

## 2021-06-03 RX ORDER — PANTOPRAZOLE SODIUM 40 MG/1
TABLET, DELAYED RELEASE ORAL
Qty: 90 TABLET | Refills: 0 | Status: SHIPPED | OUTPATIENT
Start: 2021-06-03 | End: 2021-09-10

## 2021-06-18 ENCOUNTER — TELEPHONE (OUTPATIENT)
Dept: FAMILY MEDICINE CLINIC | Age: 76
End: 2021-06-18

## 2021-06-18 LAB — INR BLD: 4.8

## 2021-06-18 NOTE — TELEPHONE ENCOUNTER
Pt called in to let the doctor  Know what her I&r was, it was a 4.8 and she has been taking 4 mg of Warfrin.

## 2021-06-21 ENCOUNTER — ANTI-COAG VISIT (OUTPATIENT)
Dept: FAMILY MEDICINE CLINIC | Age: 76
End: 2021-06-21

## 2021-06-21 DIAGNOSIS — Z95.2 AORTIC VALVE REPLACED: Primary | ICD-10-CM

## 2021-06-28 DIAGNOSIS — F41.9 ANXIETY: ICD-10-CM

## 2021-06-28 RX ORDER — ALPRAZOLAM 0.5 MG/1
TABLET ORAL
Qty: 90 TABLET | Refills: 0 | Status: SHIPPED | OUTPATIENT
Start: 2021-06-28 | End: 2021-08-05

## 2021-06-30 ENCOUNTER — ANTI-COAG VISIT (OUTPATIENT)
Dept: FAMILY MEDICINE CLINIC | Age: 76
End: 2021-06-30

## 2021-06-30 DIAGNOSIS — E78.5 HYPERLIPIDEMIA WITH TARGET LDL LESS THAN 100: ICD-10-CM

## 2021-06-30 DIAGNOSIS — Z95.2 AORTIC VALVE REPLACED: Primary | ICD-10-CM

## 2021-06-30 DIAGNOSIS — I10 ESSENTIAL HYPERTENSION, BENIGN: ICD-10-CM

## 2021-06-30 LAB — INR BLD: 1.8

## 2021-06-30 RX ORDER — CHLORTHALIDONE 25 MG/1
TABLET ORAL
Qty: 90 TABLET | Refills: 0 | Status: ON HOLD | OUTPATIENT
Start: 2021-06-30 | End: 2021-08-01 | Stop reason: SDUPTHER

## 2021-06-30 RX ORDER — ATORVASTATIN CALCIUM 10 MG/1
TABLET, FILM COATED ORAL
Qty: 90 TABLET | Refills: 0 | Status: SHIPPED | OUTPATIENT
Start: 2021-06-30 | End: 2021-10-07

## 2021-07-06 ENCOUNTER — TELEPHONE (OUTPATIENT)
Dept: FAMILY MEDICINE CLINIC | Age: 76
End: 2021-07-06

## 2021-07-06 RX ORDER — ACETAMINOPHEN AND CODEINE PHOSPHATE 300; 30 MG/1; MG/1
TABLET ORAL
Qty: 42 TABLET | OUTPATIENT
Start: 2021-07-06

## 2021-07-06 NOTE — TELEPHONE ENCOUNTER
Dr. Christin Mondragon patient last OV: 4/13/2021  Pt taking controlled medications: Tylenol with Codeine #3 and Xanax     ----- Message from Rosario Schwab sent at 7/6/2021 10:52 AM EDT -----  Subject: Appointment Request    Reason for Call: Routine Existing Condition Follow Up    QUESTIONS  Type of Appointment? Established Patient  Reason for appointment request? Requested Provider unavailable - Dr. Mariane Mohs  Additional Information for Provider? Patient would like appointment with   Dr Mariane Mohs since pcp is not available and is accepting Dr. Estell Claude patients.  ---------------------------------------------------------------------------  --------------  Christin CAMPUZANO  What is the best way for the office to contact you? OK to leave message on   voicemail  Preferred Call Back Phone Number? [phone]  ---------------------------------------------------------------------------  --------------  SCRIPT ANSWERS  Relationship to Patient? Self  Appointment reason? Well Care/Follow Ups  Select a Well Care/Follow Ups appointment reason? Adult Existing Condition   Follow Up [Diabetes, CHF, COPD, Hypertension/Blood Pressure Check]  (Is the patient requesting to be seen urgently for their symptoms?)? No  Is this follow up request related to routine Diabetes Management? No  Are you having any new concerns about your existing condition? No  Have you been diagnosed with, awaiting test results for, or told that you   are suspected of having COVID-19 (Coronavirus)? (If patient has tested   negative or was tested as a requirement for work, school, or travel and   not based on symptoms, answer no)? No  Do you currently have flu-like symptoms including fever or chills, cough,   shortness of breath, difficulty breathing, or new loss of taste or smell? No  Have you had close contact with someone with COVID-19 in the last 14 days? No  (Service Expert - click yes below to proceed with [employer] As Usual   Scheduling)?  Yes

## 2021-07-06 NOTE — TELEPHONE ENCOUNTER
Medication:   Requested Prescriptions     Pending Prescriptions Disp Refills    acetaminophen-codeine (TYLENOL #3) 300-30 MG per tablet [Pharmacy Med Name: ACETAMINOPHEN/COD #3 (300/30MG) TAB] 42 tablet      Sig: TAKE ONE TABLET BY MOUTH THREE TIMES DAILY AS NEEDED FOR PAIN FOR UP TO 30 DAYS.             Patient Phone Number: [phone] (home) [phone] (work)    Last appt: 4/13/2021

## 2021-07-08 ENCOUNTER — VIRTUAL VISIT (OUTPATIENT)
Dept: FAMILY MEDICINE CLINIC | Age: 76
End: 2021-07-08
Payer: MEDICARE

## 2021-07-08 DIAGNOSIS — M54.50 CHRONIC BILATERAL LOW BACK PAIN WITHOUT SCIATICA: Primary | ICD-10-CM

## 2021-07-08 DIAGNOSIS — G89.29 CHRONIC BILATERAL LOW BACK PAIN WITHOUT SCIATICA: Primary | ICD-10-CM

## 2021-07-08 PROCEDURE — 99442 PR PHYS/QHP TELEPHONE EVALUATION 11-20 MIN: CPT | Performed by: FAMILY MEDICINE

## 2021-07-08 RX ORDER — ACETAMINOPHEN AND CODEINE PHOSPHATE 300; 30 MG/1; MG/1
1 TABLET ORAL EVERY 4 HOURS PRN
OUTPATIENT
Start: 2021-07-08

## 2021-07-08 RX ORDER — ACETAMINOPHEN AND CODEINE PHOSPHATE 300; 30 MG/1; MG/1
1 TABLET ORAL NIGHTLY PRN
Qty: 90 TABLET | Refills: 0 | Status: SHIPPED | OUTPATIENT
Start: 2021-07-08 | End: 2021-10-08

## 2021-07-08 SDOH — ECONOMIC STABILITY: FOOD INSECURITY: WITHIN THE PAST 12 MONTHS, THE FOOD YOU BOUGHT JUST DIDN'T LAST AND YOU DIDN'T HAVE MONEY TO GET MORE.: NEVER TRUE

## 2021-07-08 SDOH — ECONOMIC STABILITY: FOOD INSECURITY: WITHIN THE PAST 12 MONTHS, YOU WORRIED THAT YOUR FOOD WOULD RUN OUT BEFORE YOU GOT MONEY TO BUY MORE.: NEVER TRUE

## 2021-07-08 ASSESSMENT — ENCOUNTER SYMPTOMS
CHEST TIGHTNESS: 0
SHORTNESS OF BREATH: 0
BACK PAIN: 1

## 2021-07-08 ASSESSMENT — SOCIAL DETERMINANTS OF HEALTH (SDOH): HOW HARD IS IT FOR YOU TO PAY FOR THE VERY BASICS LIKE FOOD, HOUSING, MEDICAL CARE, AND HEATING?: NOT HARD AT ALL

## 2021-07-08 NOTE — TELEPHONE ENCOUNTER
----- Message from Sherron Cabrera 12 sent at 7/7/2021  4:14 PM EDT -----  Subject: Message to Provider    QUESTIONS  Information for Provider? Patient is calling to see if her medication has   been sent over to the pharmacy yet, the name of the medication is   acetaminophen-codeine(Tylenol #3). Please call patient back tomorrow   ---------------------------------------------------------------------------  --------------  Bushra Elias INFO  What is the best way for the office to contact you? OK to leave message on   voicemail  Preferred Call Back Phone Number? [phone]  ---------------------------------------------------------------------------  --------------  SCRIPT ANSWERS  Relationship to Patient?  Self

## 2021-07-08 NOTE — TELEPHONE ENCOUNTER
Medication:   Requested Prescriptions     Pending Prescriptions Disp Refills    acetaminophen-codeine (TYLENOL #3) 300-30 MG per tablet       Sig: Take 1 tablet by mouth every 4 hours as needed. Refused Prescriptions Disp Refills    acetaminophen-codeine (TYLENOL #3) 300-30 MG per tablet [Pharmacy Med Name: ACETAMINOPHEN/COD #3 (300/30MG) TAB] 42 tablet      Sig: TAKE ONE TABLET BY MOUTH THREE TIMES DAILY AS NEEDED FOR PAIN FOR UP TO 30 DAYS.      Refused By: Sylvie Cancino     Reason for Refusal: Refill not appropriate            Patient Phone Number: [phone] (home) [phone] (work)    Last appt: 4/13/2021   Next appt: 7/16/2021

## 2021-07-08 NOTE — PROGRESS NOTES
Subjective:        Naga Santa is a 77 y.o. female evaluated via telephone on 7/8/2021. Consent:  She and/or health care decision maker is aware that that she may receive a bill for this telephone service, depending on her insurance coverage, and has provided verbal consent to proceed: Yes      Documentation:  I communicated with the patient and/or health care decision maker about cc. Details of this discussion including any medical advice provided: yes      I affirm this is a Patient Initiated Episode with a Patient who has not had a related appointment within my department in the past 7 days or scheduled within the next 24 hours. Patient identification was verified at the start of the visit: Yes    Total Time: minutes: 13 minutes    The visit was conducted pursuant to the emergency declaration under the [address] [place] [address], [address] [place] [address] authority and the [employer] Act. Patient identification was verified, and a caregiver was present when appropriate. The patient was located in a state where the provider was credentialed to provide care. Note: not billable if this call serves to triage the patient into an appointment for the relevant concern      Shanna Meraz MD       Patient ID: Naga Santa is a 77 y.o. female. Back Pain  This is a chronic problem. The current episode started more than 1 year ago. The problem occurs constantly. The problem is unchanged. The pain is present in the lumbar spine. The quality of the pain is described as aching. The pain does not radiate. The pain is severe. The pain is worse during the night. The symptoms are aggravated by standing and lying down. Stiffness is present at night. Associated symptoms include leg pain and weakness. Pertinent negatives include no perianal numbness. Risk factors include menopause, poor posture and history of osteoporosis.  Treatments tried: tylenol # 3 hs The treatment provided moderate relief. tried PT and epidurals w/mild relief last injection 5 years ago       Review of Systems   Constitutional: Negative for activity change and appetite change. Respiratory: Negative for chest tightness and shortness of breath. Cardiovascular: Negative for leg swelling. Musculoskeletal: Positive for back pain and gait problem. Neurological: Positive for weakness. Objective:     Physical Exam  Constitutional:       General: She is not in acute distress. HENT:      Head:      Comments: Hearing intact to nml conversation     Neurological:      Mental Status: She is alert and oriented to person, place, and time. Psychiatric:         Mood and Affect: Mood normal.         Thought Content: Thought content normal.         Assessment:       Diagnosis Orders   1. Chronic bilateral low back pain without sciatica  acetaminophen-codeine (TYLENOL #3) 300-30 MG per tablet           Plan:      Controlled Substances Monitoring: Attestation: The Prescription Monitoring Report for this patient was reviewed today. Patrick Coronel MD)  Documentation: No signs of potential drug abuse or diversion identified. Patrick Coronel MD)    Continue same medications no changes needed at this time .  patient has been instructed caution with driving or handling of heavy machinery while taking  this medication as it  can cause drowsiness,  patient agrees and verbalizes understanding     Refills  Fu with pcp   patient agrees and verbalizes understanding            Apurva Holedn MD

## 2021-07-13 ENCOUNTER — NURSE TRIAGE (OUTPATIENT)
Dept: OTHER | Facility: CLINIC | Age: 76
End: 2021-07-13

## 2021-07-13 ENCOUNTER — TELEPHONE (OUTPATIENT)
Dept: FAMILY MEDICINE CLINIC | Age: 76
End: 2021-07-13

## 2021-07-13 NOTE — TELEPHONE ENCOUNTER
valerie from nurse triage called in to infor pt inr was a 8 and blood sugar over 400 recommend pt go to er since pcp is closed

## 2021-07-13 NOTE — TELEPHONE ENCOUNTER
Reason for Disposition   Blood glucose > 400 mg/dL (22.2 mmol/L)    Answer Assessment - Initial Assessment Questions  1. BLOOD GLUCOSE: \"What is your blood glucose level? \"       439     2. ONSET: \"When did you check the blood glucose? \"      A few minutes ago. 3. USUAL RANGE: \"What is your glucose level usually? \" (e.g., usual fasting morning value, usual evening value)  Pt stated its usually 130-140. 4. KETONES: \"Do you check for ketones (urine or blood test strips)? \" If yes, ask: \"What does the test show now? \"   Denies. 5. TYPE 1 or 2:  \"Do you know what type of diabetes you have? \"  (e.g., Type 1, Type 2, Gestational; doesn't know)   Type 2.        6. INSULIN: \"Do you take insulin? \" \"What type of insulin(s) do you use? What is the mode of delivery? (syringe, pen; injection or pump)? \"   Denies. 7. DIABETES PILLS: \"Do you take any pills for your diabetes? \" If yes, ask: \"Have you missed taking any pills recently? \"      Yes, and has not missed any pills. 8. OTHER SYMPTOMS: \"Do you have any symptoms? \" (e.g., fever, frequent urination, difficulty breathing, dizziness, weakness, vomiting)  Denies. 9. PREGNANCY: \"Is there any chance you are pregnant? \" \"When was your last menstrual period? \"      N/A    Protocols used: DIABETES - HIGH BLOOD SUGAR-ADULT-OH    Received call from Chantell Co at [facility] with Red Flag Complaint. Brief description of triage: High blood sugar, see above. Writer called office to discuss with PCP and get callback. Writer was told by Nadine Salazar, that all providers have left for the day and there are no available appointments. Writer left message with officer requesting a callback by provider and that provider review encouter. Writer disositioned up as Pt has a BG of 439 and an INR of 8. Triage indicates for patient to go to [facility] today.      Care advice provided, patient verbalizes understanding; denies any other questions or concerns; instructed to call back for any new or worsening symptoms. Attention Provider: Thank you for allowing me to participate in the care of your patient. The patient was connected to triage in response to information provided to the ECC. Please do not respond through this encounter as the response is not directed to a shared pool.

## 2021-07-14 ENCOUNTER — TELEPHONE (OUTPATIENT)
Dept: FAMILY MEDICINE CLINIC | Age: 76
End: 2021-07-14

## 2021-07-14 NOTE — TELEPHONE ENCOUNTER
Call patient and get an update of what the instructions from urgent care in where in regards to her INR

## 2021-07-14 NOTE — TELEPHONE ENCOUNTER
Pt called to give update from urgent care. They informed her to continue the abx and steroid, but to take a full glipizide instead of half tab. Pt states Dr. Orlando Mahan had her do this in the past while on abx/steroids. INR instructions: Hold Coumadin last night and today. Recheck INR on Thursday. Pt was taking 3 mg on Monday and Friday and 2.5 all other days as instructed by Dr. Lauren Ibrahim.      Sugar this morning was 268    FYI

## 2021-07-15 ENCOUNTER — ANTI-COAG VISIT (OUTPATIENT)
Dept: FAMILY MEDICINE CLINIC | Age: 76
End: 2021-07-15

## 2021-07-15 DIAGNOSIS — Z95.2 AORTIC VALVE REPLACED: Primary | ICD-10-CM

## 2021-07-15 LAB — INR BLD: 4.3

## 2021-07-15 NOTE — PROGRESS NOTES
Pt has been informed. Pt states that her INR today was 4.3. She has not has any warfrin for 2 days. (Tuesday & Wednesday)    Pt takes 3 mg on Wed and Fri. 2.5 mg for the remaining days. Per Dr. Felicita Cortes, hold warfarin today. Start taking 2.5 mg tomorrow. Pt will start taking 2.5 mg everyday. Recheck in one week. Pt has been informed. Pt states that she's nervous about not taking med for another day because she has two machanical heart valves, but she will do as Dr. Felicita Cortes recommends.     ALEJANDRINA

## 2021-07-15 NOTE — PROGRESS NOTES
Pt was taking 3 mg on Monday and Friday and 2.5 all other days. Pt last took Coumadin on 7/12/2021 due to going to urgent care and INR being 8.0. pt was instructed by urgent care to hold Coumadin 7/13 and 7/14 and recheck INR today.     Please advise   Last OV:  7/8/2021

## 2021-07-21 ENCOUNTER — TELEPHONE (OUTPATIENT)
Dept: CT IMAGING | Age: 76
End: 2021-07-21

## 2021-07-21 NOTE — TELEPHONE ENCOUNTER
Called patient to help schedule f/up CT prior to 08/17/2021 Pulmonology appointment-patient unavailable-left callback info with .

## 2021-07-22 ENCOUNTER — TELEPHONE (OUTPATIENT)
Dept: CT IMAGING | Age: 76
End: 2021-07-22

## 2021-07-22 NOTE — TELEPHONE ENCOUNTER
Received return call from patient. Patient requested Navigator assist with scheduling f/up CT. Patient scheduled for f/up CT 08/17/2021 at 10am prior to Pulmonology appointment at 11:40am. Patient verbalized understanding of appointment, date, time, and location and denies any questions.     Sumit MCPHERSON, RN  [facility]  Lung Nodule Navigator  [phone]

## 2021-07-23 ENCOUNTER — ANTI-COAG VISIT (OUTPATIENT)
Dept: FAMILY MEDICINE CLINIC | Age: 76
End: 2021-07-23

## 2021-07-23 DIAGNOSIS — Z95.2 AORTIC VALVE REPLACED: Primary | ICD-10-CM

## 2021-07-23 LAB — INR BLD: 3.8

## 2021-07-29 ENCOUNTER — APPOINTMENT (OUTPATIENT)
Dept: GENERAL RADIOLOGY | Age: 76
DRG: 291 | End: 2021-07-29
Payer: MEDICARE

## 2021-07-29 ENCOUNTER — HOSPITAL ENCOUNTER (INPATIENT)
Age: 76
LOS: 3 days | Discharge: HOME | DRG: 291 | End: 2021-08-01
Attending: EMERGENCY MEDICINE
Payer: MEDICARE

## 2021-07-29 DIAGNOSIS — I10 ESSENTIAL HYPERTENSION, BENIGN: ICD-10-CM

## 2021-07-29 DIAGNOSIS — I50.9 ACUTE CONGESTIVE HEART FAILURE, UNSPECIFIED HEART FAILURE TYPE (HCC): Primary | ICD-10-CM

## 2021-07-29 DIAGNOSIS — D64.9 ANEMIA, UNSPECIFIED TYPE: ICD-10-CM

## 2021-07-29 PROBLEM — I50.33 ACUTE ON CHRONIC DIASTOLIC HF (HEART FAILURE) (HCC): Status: ACTIVE | Noted: 2021-07-29

## 2021-07-29 LAB
A/G RATIO: 1.7 (ref 1.1–2.2)
ABO/RH: NORMAL
ALBUMIN SERPL-MCNC: 3.5 G/DL (ref 3.4–5)
ALP BLD-CCNC: 94 U/L (ref 40–129)
ALT SERPL-CCNC: 26 U/L (ref 10–40)
AMORPHOUS: NORMAL /HPF
ANION GAP SERPL CALCULATED.3IONS-SCNC: 10 MMOL/L (ref 3–16)
ANTIBODY SCREEN: NORMAL
AST SERPL-CCNC: 72 U/L (ref 15–37)
BASOPHILS ABSOLUTE: 0 K/UL (ref 0–0.2)
BASOPHILS ABSOLUTE: 0 K/UL (ref 0–0.2)
BASOPHILS RELATIVE PERCENT: 0.4 %
BASOPHILS RELATIVE PERCENT: 0.5 %
BILIRUB SERPL-MCNC: 1 MG/DL (ref 0–1)
BILIRUBIN URINE: NEGATIVE
BLOOD BANK DISPENSE STATUS: NORMAL
BLOOD BANK PRODUCT CODE: NORMAL
BLOOD, URINE: ABNORMAL
BPU ID: NORMAL
BUN BLDV-MCNC: 22 MG/DL (ref 7–20)
CALCIUM SERPL-MCNC: 7.6 MG/DL (ref 8.3–10.6)
CHLORIDE BLD-SCNC: 96 MMOL/L (ref 99–110)
CHP ED QC CHECK: YES
CLARITY: CLEAR
CO2: 25 MMOL/L (ref 21–32)
COLOR: YELLOW
CREAT SERPL-MCNC: 1.4 MG/DL (ref 0.6–1.2)
DESCRIPTION BLOOD BANK: NORMAL
EKG ATRIAL RATE: 82 BPM
EKG DIAGNOSIS: NORMAL
EKG P AXIS: 101 DEGREES
EKG P-R INTERVAL: 262 MS
EKG Q-T INTERVAL: 420 MS
EKG QRS DURATION: 90 MS
EKG QTC CALCULATION (BAZETT): 490 MS
EKG R AXIS: 92 DEGREES
EKG T AXIS: 72 DEGREES
EKG VENTRICULAR RATE: 82 BPM
EOSINOPHILS ABSOLUTE: 0 K/UL (ref 0–0.6)
EOSINOPHILS ABSOLUTE: 0 K/UL (ref 0–0.6)
EOSINOPHILS RELATIVE PERCENT: 0.4 %
EOSINOPHILS RELATIVE PERCENT: 0.4 %
EPITHELIAL CELLS, UA: NORMAL /HPF (ref 0–5)
GFR AFRICAN AMERICAN: 44
GFR NON-AFRICAN AMERICAN: 37
GLOBULIN: 2.1 G/DL
GLUCOSE BLD-MCNC: 164 MG/DL (ref 70–99)
GLUCOSE BLD-MCNC: 340 MG/DL (ref 70–99)
GLUCOSE URINE: 500 MG/DL
HCT VFR BLD CALC: 17.4 % (ref 36–48)
HCT VFR BLD CALC: 18.1 % (ref 36–48)
HCT VFR BLD CALC: 25.6 % (ref 36–48)
HEMOCCULT STL QL: NEGATIVE
HEMOGLOBIN: 6.1 G/DL (ref 12–16)
HEMOGLOBIN: 6.4 G/DL (ref 12–16)
HEMOGLOBIN: 8.9 G/DL (ref 12–16)
INR BLD: 2.68 (ref 0.88–1.12)
INR BLD: 3.02 (ref 0.88–1.12)
KETONES, URINE: NEGATIVE MG/DL
LEUKOCYTE ESTERASE, URINE: NEGATIVE
LV EF: 58 %
LVEF MODALITY: NORMAL
LYMPHOCYTES ABSOLUTE: 1.5 K/UL (ref 1–5.1)
LYMPHOCYTES ABSOLUTE: 1.5 K/UL (ref 1–5.1)
LYMPHOCYTES RELATIVE PERCENT: 17.7 %
LYMPHOCYTES RELATIVE PERCENT: 19.5 %
MCH RBC QN AUTO: 33.3 PG (ref 26–34)
MCH RBC QN AUTO: 33.6 PG (ref 26–34)
MCHC RBC AUTO-ENTMCNC: 35.3 G/DL (ref 31–36)
MCHC RBC AUTO-ENTMCNC: 35.4 G/DL (ref 31–36)
MCV RBC AUTO: 94.1 FL (ref 80–100)
MCV RBC AUTO: 95.5 FL (ref 80–100)
MICROSCOPIC EXAMINATION: YES
MONOCYTES ABSOLUTE: 0.6 K/UL (ref 0–1.3)
MONOCYTES ABSOLUTE: 0.8 K/UL (ref 0–1.3)
MONOCYTES RELATIVE PERCENT: 8.4 %
MONOCYTES RELATIVE PERCENT: 9.3 %
NEUTROPHILS ABSOLUTE: 5.5 K/UL (ref 1.7–7.7)
NEUTROPHILS ABSOLUTE: 6.1 K/UL (ref 1.7–7.7)
NEUTROPHILS RELATIVE PERCENT: 71.2 %
NEUTROPHILS RELATIVE PERCENT: 72.2 %
NITRITE, URINE: NEGATIVE
PDW BLD-RTO: 22.7 % (ref 12.4–15.4)
PDW BLD-RTO: 23 % (ref 12.4–15.4)
PERFORMED ON: ABNORMAL
PH UA: 7 (ref 5–8)
PLATELET # BLD: 116 K/UL (ref 135–450)
PLATELET # BLD: 127 K/UL (ref 135–450)
PMV BLD AUTO: 10.3 FL (ref 5–10.5)
PMV BLD AUTO: 11.3 FL (ref 5–10.5)
POC OCCULT BLOOD STOOL: NEGATIVE
POTASSIUM REFLEX MAGNESIUM: 4.1 MMOL/L (ref 3.5–5.1)
PRO-BNP: 6117 PG/ML (ref 0–449)
PROTEIN UA: 100 MG/DL
PROTHROMBIN TIME: 31.5 SEC (ref 9.9–12.7)
PROTHROMBIN TIME: 35.7 SEC (ref 9.9–12.7)
RBC # BLD: 1.85 M/UL (ref 4–5.2)
RBC # BLD: 1.93 M/UL (ref 4–5.2)
RBC UA: NORMAL /HPF (ref 0–4)
RENAL EPITHELIAL, UA: NORMAL /HPF (ref 0–1)
SODIUM BLD-SCNC: 131 MMOL/L (ref 136–145)
SPECIFIC GRAVITY UA: 1.01 (ref 1–1.03)
TOTAL PROTEIN: 5.6 G/DL (ref 6.4–8.2)
TROPONIN: <0.01 NG/ML
URINE TYPE: ABNORMAL
UROBILINOGEN, URINE: 0.2 E.U./DL
WBC # BLD: 7.7 K/UL (ref 4–11)
WBC # BLD: 8.5 K/UL (ref 4–11)
WBC UA: NORMAL /HPF (ref 0–5)

## 2021-07-29 PROCEDURE — 86901 BLOOD TYPING SEROLOGIC RH(D): CPT

## 2021-07-29 PROCEDURE — 6360000002 HC RX W HCPCS: Performed by: PHYSICIAN ASSISTANT

## 2021-07-29 PROCEDURE — 86900 BLOOD TYPING SEROLOGIC ABO: CPT

## 2021-07-29 PROCEDURE — 86923 COMPATIBILITY TEST ELECTRIC: CPT

## 2021-07-29 PROCEDURE — 6370000000 HC RX 637 (ALT 250 FOR IP): Performed by: INTERNAL MEDICINE

## 2021-07-29 PROCEDURE — 1200000000 HC SEMI PRIVATE

## 2021-07-29 PROCEDURE — 85610 PROTHROMBIN TIME: CPT

## 2021-07-29 PROCEDURE — 83880 ASSAY OF NATRIURETIC PEPTIDE: CPT

## 2021-07-29 PROCEDURE — 71045 X-RAY EXAM CHEST 1 VIEW: CPT

## 2021-07-29 PROCEDURE — 80053 COMPREHEN METABOLIC PANEL: CPT

## 2021-07-29 PROCEDURE — 2580000003 HC RX 258: Performed by: INTERNAL MEDICINE

## 2021-07-29 PROCEDURE — P9016 RBC LEUKOCYTES REDUCED: HCPCS

## 2021-07-29 PROCEDURE — 84484 ASSAY OF TROPONIN QUANT: CPT

## 2021-07-29 PROCEDURE — 81001 URINALYSIS AUTO W/SCOPE: CPT

## 2021-07-29 PROCEDURE — 99283 EMERGENCY DEPT VISIT LOW MDM: CPT

## 2021-07-29 PROCEDURE — 93306 TTE W/DOPPLER COMPLETE: CPT

## 2021-07-29 PROCEDURE — 86850 RBC ANTIBODY SCREEN: CPT

## 2021-07-29 PROCEDURE — 93005 ELECTROCARDIOGRAM TRACING: CPT | Performed by: PHYSICIAN ASSISTANT

## 2021-07-29 PROCEDURE — 6360000002 HC RX W HCPCS: Performed by: INTERNAL MEDICINE

## 2021-07-29 PROCEDURE — 85014 HEMATOCRIT: CPT

## 2021-07-29 PROCEDURE — 82272 OCCULT BLD FECES 1-3 TESTS: CPT

## 2021-07-29 PROCEDURE — 85025 COMPLETE CBC W/AUTO DIFF WBC: CPT

## 2021-07-29 PROCEDURE — 96374 THER/PROPH/DIAG INJ IV PUSH: CPT

## 2021-07-29 PROCEDURE — 85018 HEMOGLOBIN: CPT

## 2021-07-29 PROCEDURE — 36415 COLL VENOUS BLD VENIPUNCTURE: CPT

## 2021-07-29 PROCEDURE — 36430 TRANSFUSION BLD/BLD COMPNT: CPT

## 2021-07-29 RX ORDER — HYDRALAZINE HYDROCHLORIDE 20 MG/ML
5 INJECTION INTRAMUSCULAR; INTRAVENOUS EVERY 4 HOURS PRN
Status: DISCONTINUED | OUTPATIENT
Start: 2021-07-29 | End: 2021-08-01 | Stop reason: HOSPADM

## 2021-07-29 RX ORDER — SODIUM CHLORIDE 9 MG/ML
INJECTION, SOLUTION INTRAVENOUS PRN
Status: DISCONTINUED | OUTPATIENT
Start: 2021-07-29 | End: 2021-08-01 | Stop reason: HOSPADM

## 2021-07-29 RX ORDER — CALCIUM CITRATE/VITAMIN D3 200MG-6.25
TABLET ORAL
Qty: 100 STRIP | Refills: 2 | Status: SHIPPED | OUTPATIENT
Start: 2021-07-29

## 2021-07-29 RX ORDER — ONDANSETRON 2 MG/ML
4 INJECTION INTRAMUSCULAR; INTRAVENOUS EVERY 6 HOURS PRN
Status: DISCONTINUED | OUTPATIENT
Start: 2021-07-29 | End: 2021-07-29

## 2021-07-29 RX ORDER — HYDROXYCHLOROQUINE SULFATE 200 MG/1
200 TABLET, FILM COATED ORAL DAILY
Status: DISCONTINUED | OUTPATIENT
Start: 2021-07-29 | End: 2021-07-29

## 2021-07-29 RX ORDER — PROCHLORPERAZINE EDISYLATE 5 MG/ML
10 INJECTION INTRAMUSCULAR; INTRAVENOUS EVERY 6 HOURS PRN
Status: DISCONTINUED | OUTPATIENT
Start: 2021-07-29 | End: 2021-08-01 | Stop reason: HOSPADM

## 2021-07-29 RX ORDER — SODIUM CHLORIDE 0.9 % (FLUSH) 0.9 %
10 SYRINGE (ML) INJECTION EVERY 12 HOURS SCHEDULED
Status: DISCONTINUED | OUTPATIENT
Start: 2021-07-29 | End: 2021-08-01 | Stop reason: HOSPADM

## 2021-07-29 RX ORDER — ACETAMINOPHEN 650 MG/1
650 SUPPOSITORY RECTAL EVERY 6 HOURS PRN
Status: DISCONTINUED | OUTPATIENT
Start: 2021-07-29 | End: 2021-08-01 | Stop reason: HOSPADM

## 2021-07-29 RX ORDER — ONDANSETRON 4 MG/1
4 TABLET, ORALLY DISINTEGRATING ORAL EVERY 8 HOURS PRN
Status: DISCONTINUED | OUTPATIENT
Start: 2021-07-29 | End: 2021-07-29

## 2021-07-29 RX ORDER — PROMETHAZINE HYDROCHLORIDE 12.5 MG/1
12.5 TABLET ORAL EVERY 6 HOURS PRN
Status: DISCONTINUED | OUTPATIENT
Start: 2021-07-29 | End: 2021-08-01 | Stop reason: HOSPADM

## 2021-07-29 RX ORDER — ACETAMINOPHEN 325 MG/1
650 TABLET ORAL EVERY 6 HOURS PRN
Status: DISCONTINUED | OUTPATIENT
Start: 2021-07-29 | End: 2021-08-01 | Stop reason: HOSPADM

## 2021-07-29 RX ORDER — ATORVASTATIN CALCIUM 20 MG/1
10 TABLET, FILM COATED ORAL NIGHTLY
Status: DISCONTINUED | OUTPATIENT
Start: 2021-07-29 | End: 2021-08-01 | Stop reason: HOSPADM

## 2021-07-29 RX ORDER — SODIUM CHLORIDE 9 MG/ML
25 INJECTION, SOLUTION INTRAVENOUS PRN
Status: DISCONTINUED | OUTPATIENT
Start: 2021-07-29 | End: 2021-08-01 | Stop reason: HOSPADM

## 2021-07-29 RX ORDER — LISINOPRIL 40 MG/1
40 TABLET ORAL DAILY
Status: DISCONTINUED | OUTPATIENT
Start: 2021-07-29 | End: 2021-08-01 | Stop reason: HOSPADM

## 2021-07-29 RX ORDER — PANTOPRAZOLE SODIUM 40 MG/1
40 TABLET, DELAYED RELEASE ORAL DAILY
Status: DISCONTINUED | OUTPATIENT
Start: 2021-07-29 | End: 2021-08-01 | Stop reason: HOSPADM

## 2021-07-29 RX ORDER — GLUCOSAMINE/D3/BOSWELLIA SERRA 1500MG-400
TABLET ORAL
COMMUNITY
End: 2022-03-03

## 2021-07-29 RX ORDER — SODIUM CHLORIDE 0.9 % (FLUSH) 0.9 %
10 SYRINGE (ML) INJECTION PRN
Status: DISCONTINUED | OUTPATIENT
Start: 2021-07-29 | End: 2021-08-01 | Stop reason: HOSPADM

## 2021-07-29 RX ORDER — ALPRAZOLAM 0.5 MG/1
0.5 TABLET ORAL 3 TIMES DAILY PRN
Status: DISCONTINUED | OUTPATIENT
Start: 2021-07-29 | End: 2021-08-01 | Stop reason: HOSPADM

## 2021-07-29 RX ORDER — AMLODIPINE BESYLATE 5 MG/1
5 TABLET ORAL DAILY
Status: DISCONTINUED | OUTPATIENT
Start: 2021-07-29 | End: 2021-08-01 | Stop reason: HOSPADM

## 2021-07-29 RX ORDER — ATORVASTATIN CALCIUM 20 MG/1
10 TABLET, FILM COATED ORAL ONCE
Status: DISCONTINUED | OUTPATIENT
Start: 2021-07-29 | End: 2021-07-29

## 2021-07-29 RX ORDER — ZINC GLUCONATE 50 MG
50 TABLET ORAL DAILY
COMMUNITY
End: 2021-11-01

## 2021-07-29 RX ORDER — FUROSEMIDE 10 MG/ML
40 INJECTION INTRAMUSCULAR; INTRAVENOUS ONCE
Status: COMPLETED | OUTPATIENT
Start: 2021-07-29 | End: 2021-07-29

## 2021-07-29 RX ORDER — SENNA AND DOCUSATE SODIUM 50; 8.6 MG/1; MG/1
1 TABLET, FILM COATED ORAL 2 TIMES DAILY
Status: DISCONTINUED | OUTPATIENT
Start: 2021-07-29 | End: 2021-08-01 | Stop reason: HOSPADM

## 2021-07-29 RX ORDER — VIT C/B6/B5/MAGNESIUM/HERB 173 50-5-6-5MG
CAPSULE ORAL
COMMUNITY
End: 2021-11-01

## 2021-07-29 RX ADMIN — HYDRALAZINE HYDROCHLORIDE 5 MG: 20 INJECTION INTRAMUSCULAR; INTRAVENOUS at 20:59

## 2021-07-29 RX ADMIN — ACETAMINOPHEN 650 MG: 325 TABLET ORAL at 17:56

## 2021-07-29 RX ADMIN — ALPRAZOLAM 0.5 MG: 0.5 TABLET ORAL at 21:04

## 2021-07-29 RX ADMIN — AMLODIPINE BESYLATE 5 MG: 5 TABLET ORAL at 23:30

## 2021-07-29 RX ADMIN — FUROSEMIDE 40 MG: 10 INJECTION, SOLUTION INTRAMUSCULAR; INTRAVENOUS at 16:02

## 2021-07-29 RX ADMIN — Medication 10 ML: at 20:03

## 2021-07-29 RX ADMIN — METOPROLOL TARTRATE 12.5 MG: 25 TABLET, FILM COATED ORAL at 20:59

## 2021-07-29 ASSESSMENT — PAIN SCALES - GENERAL
PAINLEVEL_OUTOF10: 0

## 2021-07-29 ASSESSMENT — ENCOUNTER SYMPTOMS
DIARRHEA: 0
VOMITING: 0
NAUSEA: 0
ABDOMINAL PAIN: 0
SHORTNESS OF BREATH: 1
COUGH: 1
EYE REDNESS: 0

## 2021-07-29 NOTE — ED PROVIDER NOTES
ED Attending Attestation Note     Date of evaluation: 7/29/2021    This patient was seen by the advance practice provider. I have seen and examined the patient, agree with the workup, evaluation, management and diagnosis. The care plan has been discussed. I have reviewed the ECG and concur with the CLIFF's interpretation. Patient is a pleasant 32-year-old woman with complex cardiac history including multiple valve replacements 20 years ago now presenting to the emergency department with leg swelling and orthopnea. Patient states that over the last few months she has been having increased swelling in her lower extremities as well as a need for sleeping on multiple pillows at night when previously she used only one. She denies any chest pain. She does report intermittent shortness of breath particularly with exertion. She denies any changes in her medications. She denies any change in bladder or bowel function. No fevers. On examination I find a pleasant female, speaking in complete sentences. Mild crackles in the bases bilaterally. She has 2+ pitting edema symmetric in her lower extremities to her tibial plateau. Concern for heart failure exacerbation. Patient states that the symptoms are similar to what she was experiencing prior to her valve replacement 20 years ago. We will proceed with laboratory work-up, EKG and chest x-ray. We will plan to have a radiology perform echocardiogram performed to evaluate valve function and plan for likely admission. Critical Care:  Due to the immediate potential for life-threatening deterioration due to heart failure exacerbation, anemia requiring transfusion, I spent 35 minutes providing critical care. This time excludes time spent performing procedures but includes time spent on direct patient care, history retrieval, review of the chart, and discussions with patient, family, and consultant(s).     Gonzalo Pineda MD MPH   Physician     Royce Moreland, MD  07/29/21 7477

## 2021-07-29 NOTE — FLOWSHEET NOTE
07/29/21 1145   Encounter Summary   Services provided to: Patient   Referral/Consult From: [place]   Continue Visiting   (7/29/21, HARPAL. )   Complexity of Encounter Moderate   Length of Encounter 15 minutes   Routine   Type Initial   Assessment Calm; Approachable   Intervention Nurtured hope   Outcome Expressed gratitude

## 2021-07-29 NOTE — TELEPHONE ENCOUNTER
Medication:   Requested Prescriptions     Pending Prescriptions Disp Refills    TRUE METRIX BLOOD GLUCOSE TEST strip [Pharmacy Med Name: TRUE METRIX BLOOD GLUCOSE TEST STRP] 100 strip      Sig: USE TO TEST BLOOD SUGAR TWICE DAILY        Patient Phone Number: [phone] (home) [phone] (work)    Last appt: 7/8/2021   Next appt: Visit date not found    Last OARRS:   RX Monitoring 3/8/2021   Attestation -   Periodic Controlled Substance Monitoring Possible medication side effects, risk of tolerance/dependence & alternative treatments discussed. ;No signs of potential drug abuse or diversion identified.

## 2021-07-29 NOTE — ED PROVIDER NOTES
[address] [facility]          PHYSICIAN ASSISTANT NOTE       Date of evaluation: 7/29/2021    Chief Complaint     Leg Swelling      History of Present Illness     Princess Guerra is a 77 y.o. female with PMH of Diabetes, CKD, HTN, HLD, prior hx of aortic and mitral valve replacements on Coumadin, s/p pacemaker who presents with lateral lower extremity swelling. Patient states that this has been worsening in the past couple of weeks. Patient states that 3 weeks ago, she developed a cough and was seen in urgent care. She was prescribed antibiotics, steroids and cough suppressant. She then returned there a week later and was prescribed another round of antibiotics and steroids. She finished the steroids approximately 1 week ago. She states that overall her cough has improved but has not resolved. She does note that she has been sleeping with an extra pillow at night due to her coughing and somewhat shortness of breath. She denies any fevers or chills, chest pain, shortness of breath at rest, nausea or vomiting, abdominal pain, change in bowel or urinary habits. Review of Systems     Review of Systems   Constitutional: Negative for chills and fever. HENT: Negative for congestion. Eyes: Negative for redness. Respiratory: Positive for cough and shortness of breath. Cardiovascular: Positive for leg swelling. Negative for chest pain. Gastrointestinal: Negative for abdominal pain, diarrhea, nausea and vomiting. Genitourinary: Positive for frequency. Negative for difficulty urinating. Musculoskeletal: Negative for gait problem. Skin: Negative for wound. Neurological: Positive for weakness. Negative for dizziness. Psychiatric/Behavioral: The patient is not nervous/anxious.         Past Medical, Surgical, Family, and Social History     She has a past medical history of A-fib ([facility] [address] Anemia, Anemia:multifactorial, Anxiety, Cataract, Chronic back pain, CKD (chronic kidney disease) stage 3, GFR 30-59 ml/min, Colitis, ischemic (HCC), COPD, Diabetes, Diabetic eye exam ([facility] [address] GERD (gastroesophageal reflux disease), Hx of mammogram, Hyperlipidaemia LDL goal <100, Hypertension, Insomnia, Long-term (current) use of anticoagulants, INR goal 2.5-3.5, Lymphoma:monoclonal B cell, Mammogram abnormal, Nonspecific abnormal results of kidney function study, Osteoarthritis, Renal cysts, right, RLS (restless legs syndrome), Sciatica, Screening colonoscopy, Therapeutic drug monitoring, Valvular heart disease, and Visit for screening mammogram.  She has a past surgical history that includes Aortic valve replacement; Mitral valve replacement; Hysterectomy; Cataract removal (12/11/13;1/8/14); Colonoscopy; pacemaker placement (8/07); Tunneled venous port placement (Right, 10/3/2014); and laparoscopy (2/3/15). Her family history includes Diabetes in her brother; Rita Hu in her mother. She reports that she quit smoking about 14 years ago. Her smoking use included cigarettes. She has a 40.00 pack-year smoking history. She has never used smokeless tobacco. She reports current alcohol use of about 1.0 standard drinks of alcohol per week. She reports that she does not use drugs. Medications     Previous Medications    ACETAMINOPHEN-CODEINE (TYLENOL #3) 300-30 MG PER TABLET    Take 1 tablet by mouth nightly as needed for Pain for up to 90 days. ALPRAZOLAM (XANAX) 0.5 MG TABLET    TAKE 1 TABLET BY MOUTH THREE TIMES DAILY AS NEEDED    AMLODIPINE (NORVASC) 10 MG TABLET    TAKE 1 TABLET BY MOUTH DAILY    ATORVASTATIN (LIPITOR) 10 MG TABLET    TAKE 1 TABLET BY MOUTH DAILY IN THE EVENING FOR CHOLESTEROL    BLOOD GLUCOSE TEST STRIPS (TRUE METRIX BLOOD GLUCOSE TEST) STRIP    USE TO TEST TWICE DAILY AS DIRECTED    BLOOD PRESSURE KIT    Dispense bp machine x 1    CHLORTHALIDONE (HYGROTON) 25 MG TABLET    TAKE 1 TABLET BY MOUTH EVERY DAY    CICLOPIROX (PENLAC) 8 % SOLUTION    Apply topically nightly. GLIPIZIDE (GLUCOTROL) 5 MG TABLET    TAKE 1/2 TABLET BY MOUTH EVERY DAY WITH FOOD    HYDROXYCHLOROQUINE (PLAQUENIL) 200 MG TABLET    Take  by mouth daily. LISINOPRIL (PRINIVIL;ZESTRIL) 40 MG TABLET    TAKE 1 TABLET BY MOUTH EVERY DAY    METFORMIN (GLUCOPHAGE) 1000 MG TABLET    TAKE 1 TABLET BY MOUTH TWICE DAILY    METOPROLOL TARTRATE (LOPRESSOR) 25 MG TABLET    Take 12.5 mg by mouth 2 times daily 0.5 tab BID     PANTOPRAZOLE (PROTONIX) 40 MG TABLET    TAKE 1 TABLET BY MOUTH EVERY DAY    PROMETHAZINE (PHENERGAN) 12.5 MG TABLET    TAKE ONE TABLET BY MOUTH EVERY 6 HOURS AS NEEDED    WARFARIN (COUMADIN) 1 MG TABLET    TAKE AS DIRECTED    WARFARIN (COUMADIN) 3 MG TABLET    TAKE 1 TABLET BY MOUTH AS DIRECTED    WARFARIN (COUMADIN) 4 MG TABLET    TAKE 1 TABLET BY MOUTH DAILY       Allergies     She is allergic to diclofenac, nsaids, and hydrocodone-acetaminophen. Physical Exam     INITIAL VITALS: BP: (!) 194/46,Temp: 98.5 °F (36.9 °C), Pulse: 67, Resp: 20, SpO2: 100 %   Physical Exam  Vitals and nursing note reviewed. Constitutional:       General: She is not in acute distress. HENT:      Head: Normocephalic and atraumatic. Eyes:      Extraocular Movements: Extraocular movements intact. Conjunctiva/sclera: Conjunctivae normal.   Cardiovascular:      Rate and Rhythm: Normal rate and regular rhythm. Pulmonary:      Effort: Pulmonary effort is normal. No respiratory distress. Breath sounds: Normal breath sounds. No wheezing, rhonchi or rales. Abdominal:      General: Bowel sounds are normal. There is no distension. Palpations: Abdomen is soft. Tenderness: There is no abdominal tenderness. There is no guarding or rebound. Musculoskeletal:      Cervical back: Neck supple. Comments: 2+ pitting edema to the lower extremities bilaterally   Skin:     General: Skin is warm and dry. Neurological:      Mental Status: She is alert and oriented to person, place, and time.    Psychiatric: Mood and Affect: Mood normal.         Behavior: Behavior normal.         Diagnostic Results     EKG   Interpreted in conjunction with emergencydepartment physician Ruddy Goddard MD  Rhythm: paced  Rate: normal  Axis: right  Ectopy: none  Conduction: normal  ST Segments: no acute change  T Waves:no acute change  Q Waves: none  Clinical Impression: no acute changes  Comparison:  06/08/2016    RADIOLOGY:  XR CHEST PORTABLE   Final Result      Cardiomegaly. No acute pulmonary disease.           LABS:   Results for orders placed or performed during the hospital encounter of 07/29/21   CBC Auto Differential   Result Value Ref Range    WBC 8.5 4.0 - 11.0 K/uL    RBC 1.93 (L) 4.00 - 5.20 M/uL    Hemoglobin 6.4 (LL) 12.0 - 16.0 g/dL    Hematocrit 18.1 (LL) 36.0 - 48.0 %    MCV 94.1 80.0 - 100.0 fL    MCH 33.3 26.0 - 34.0 pg    MCHC 35.4 31.0 - 36.0 g/dL    RDW 23.0 (H) 12.4 - 15.4 %    Platelets 772 (L) 240 - 450 K/uL    MPV 10.3 5.0 - 10.5 fL    Neutrophils % 72.2 %    Lymphocytes % 17.7 %    Monocytes % 9.3 %    Eosinophils % 0.4 %    Basophils % 0.4 %    Neutrophils Absolute 6.1 1.7 - 7.7 K/uL    Lymphocytes Absolute 1.5 1.0 - 5.1 K/uL    Monocytes Absolute 0.8 0.0 - 1.3 K/uL    Eosinophils Absolute 0.0 0.0 - 0.6 K/uL    Basophils Absolute 0.0 0.0 - 0.2 K/uL   Comprehensive Metabolic Panel w/ Reflex to MG   Result Value Ref Range    Sodium 131 (L) 136 - 145 mmol/L    Potassium reflex Magnesium 4.1 3.5 - 5.1 mmol/L    Chloride 96 (L) 99 - 110 mmol/L    CO2 25 21 - 32 mmol/L    Anion Gap 10 3 - 16    Glucose 340 (H) 70 - 99 mg/dL    BUN 22 (H) 7 - 20 mg/dL    CREATININE 1.4 (H) 0.6 - 1.2 mg/dL    GFR Non- 37 (A) >60    GFR  44 (A) >60    Calcium 7.6 (L) 8.3 - 10.6 mg/dL    Total Protein 5.6 (L) 6.4 - 8.2 g/dL    Albumin 3.5 3.4 - 5.0 g/dL    Albumin/Globulin Ratio 1.7 1.1 - 2.2    Total Bilirubin 1.0 0.0 - 1.0 mg/dL    Alkaline Phosphatase 94 40 - 129 U/L    ALT 26 10 - 40 U/L    AST 72 (H) 15 - 37 U/L    Globulin 2.1 g/dL   Brain Natriuretic Peptide   Result Value Ref Range    Pro-BNP 6,117 (H) 0 - 449 pg/mL   Urinalysis, reflex to microscopic   Result Value Ref Range    Color, UA Yellow Straw/Yellow    Clarity, UA Clear Clear    Glucose, Ur 500 (A) Negative mg/dL    Bilirubin Urine Negative Negative    Ketones, Urine Negative Negative mg/dL    Specific Gravity, UA 1.010 1.005 - 1.030    Blood, Urine LARGE (A) Negative    pH, UA 7.0 5.0 - 8.0    Protein,  (A) Negative mg/dL    Urobilinogen, Urine 0.2 <2.0 E.U./dL    Nitrite, Urine Negative Negative    Leukocyte Esterase, Urine Negative Negative    Microscopic Examination YES     Urine Type NotGiven    Troponin   Result Value Ref Range    Troponin <0.01 <0.01 ng/mL   PT - INR   Result Value Ref Range    Protime 35.7 (H) 9.9 - 12.7 sec    INR 3.02 (H) 0.88 - 1.12   Microscopic Urinalysis   Result Value Ref Range    WBC, UA 0-2 0 - 5 /HPF    RBC, UA 3-4 0 - 4 /HPF    Epithelial Cells, UA 0-1 0 - 5 /HPF    Renal Epithelial, UA 0-1 0 - 1 /HPF    Amorphous, UA 2+ /HPF   CBC Auto Differential   Result Value Ref Range    WBC 7.7 4.0 - 11.0 K/uL    RBC 1.85 (L) 4.00 - 5.20 M/uL    Hemoglobin 6.1 (LL) 12.0 - 16.0 g/dL    Hematocrit 17.4 (LL) 36.0 - 48.0 %    MCV 95.5 80.0 - 100.0 fL    MCH 33.6 26.0 - 34.0 pg    MCHC 35.3 31.0 - 36.0 g/dL    RDW 22.7 (H) 12.4 - 15.4 %    Platelets 127 (L) 297 - 450 K/uL    MPV 11.3 (H) 5.0 - 10.5 fL    Neutrophils % 71.2 %    Lymphocytes % 19.5 %    Monocytes % 8.4 %    Eosinophils % 0.4 %    Basophils % 0.5 %    Neutrophils Absolute 5.5 1.7 - 7.7 K/uL    Lymphocytes Absolute 1.5 1.0 - 5.1 K/uL    Monocytes Absolute 0.6 0.0 - 1.3 K/uL    Eosinophils Absolute 0.0 0.0 - 0.6 K/uL    Basophils Absolute 0.0 0.0 - 0.2 K/uL   POCT BLOOD OCCULT   Result Value Ref Range    OCCULT BLOOD FECAL negative     QC OK?  yes    POCT Blood Occult   Result Value Ref Range    POC Occult Blood Stool Negative Negative   EKG 12 Lead   Result Value Ref Range    Ventricular Rate 82 BPM    Atrial Rate 82 BPM    P-R Interval 262 ms    QRS Duration 90 ms    Q-T Interval 420 ms    QTc Calculation (Bazett) 490 ms    P Axis 101 degrees    R Axis 92 degrees    T Axis 72 degrees    Diagnosis       EKG performed in ER and to be interpreted by ER physician. Confirmed by MD, ER ([id number]),  Bucky Carrillo ([id number]) on 7/29/2021 11:38:35 AM   TYPE AND SCREEN   Result Value Ref Range    ABO/Rh B NEG     Antibody Screen NEG    PREPARE RBC (CROSSMATCH), 1 Units   Result Value Ref Range    Product Code Blood Bank [id number]     Description Blood Bank Red Blood Cells, Apheresis, Leuko-reduced     Unit Number [id number]     Dispense Status Blood Bank selected        RECENT VITALS:  BP: (!) 195/43, Temp: 98.3 °F (36.8 °C), Pulse: 63,Resp: 16, SpO2: 98 %     Procedures         ED Course     Nursing Notes, Past Medical Hx, Past Surgical Hx, Social Hx, Allergies, and Family Hx were reviewed. The patient was given the followingmedications:  Orders Placed This Encounter   Medications    0.9 % sodium chloride infusion       CONSULTS:  None    MEDICAL DECISION MAKING / ASSESSMENT / Claudy Lind is a 77 y.o. female with PMH of CKD, HTN, HLD, prior hx of aortic and mitral valve replacements on Coumadin, s/p pacemaker who presents with lateral lower extremity swelling for the past couple of weeks. Patient also notes that she has been sleeping with an extra pillow at night due to cough and shortness of breath. She recently completed 2 rounds of antibiotics and steroids but still has a persistent cough although it is improved. She denies any fevers, chest pain, nausea vomiting diarrhea, abdominal pain, change in urinary habits. EKG obtained revealed atrial paced rhythm, rightward axis, prolonged QTC at 490, no acute ST wave changes.   Labs revealed white blood cell count of 7.7, hemoglobin of 6.4 (repeated to check for accuracy as lab recommended is 6.1), platelets 182, creatinine 1.4, glucose 340, normal anion gap, sodium 131, potassium 4.1, negative troponin, BNP 6117, INR 3.0. Hemoccult stool negative. Chest x-ray revealed cardiomegaly. Formal echocardiogram revealed normal systolic function with an ejection fraction of 55%. Diastolic filling parameters suggest grade 3 diastolic dysfunction. Mechanical aortic and mitral valves are well seated. There is mitral regurgitation present. Pulmonary arterial hypertension present. Suspect CHF as etiology of patient's leg swelling and orthopnea. She has elevated BNP and cardiomegaly on her chest x-ray. For patient's anemia, she was ordered 1 unit of blood to be infused slowly given concern for fluid overload. We will also plan to provide 40 mg IV Lasix and admit to medicine at this time. Patient will be cared for in the ED prior to a bed becoming available upstairs. This patient was also evaluated by the attending physician. All care plans were discussed and agreed upon. Clinical Impression     1. Acute congestive heart failure, unspecified heart failure type ([address]    2. Anemia, unspecified type        Disposition     DISPOSITION  Admit.        Nikole Finley PA-C  07/29/21 9583

## 2021-07-30 LAB
ANION GAP SERPL CALCULATED.3IONS-SCNC: 10 MMOL/L (ref 3–16)
BUN BLDV-MCNC: 17 MG/DL (ref 7–20)
CALCIUM SERPL-MCNC: 7.9 MG/DL (ref 8.3–10.6)
CHLORIDE BLD-SCNC: 96 MMOL/L (ref 99–110)
CO2: 29 MMOL/L (ref 21–32)
CREAT SERPL-MCNC: 1.1 MG/DL (ref 0.6–1.2)
GFR AFRICAN AMERICAN: 59
GFR NON-AFRICAN AMERICAN: 48
GLUCOSE BLD-MCNC: 176 MG/DL (ref 70–99)
GLUCOSE BLD-MCNC: 265 MG/DL (ref 70–99)
GLUCOSE BLD-MCNC: 282 MG/DL (ref 70–99)
GLUCOSE BLD-MCNC: 369 MG/DL (ref 70–99)
HCT VFR BLD CALC: 23 % (ref 36–48)
HEMOGLOBIN: 8.2 G/DL (ref 12–16)
INR BLD: 2.63 (ref 0.88–1.12)
MAGNESIUM: 1.1 MG/DL (ref 1.8–2.4)
MCH RBC QN AUTO: 32.1 PG (ref 26–34)
MCHC RBC AUTO-ENTMCNC: 35.7 G/DL (ref 31–36)
MCV RBC AUTO: 89.9 FL (ref 80–100)
PDW BLD-RTO: 23.6 % (ref 12.4–15.4)
PERFORMED ON: ABNORMAL
PLATELET # BLD: 123 K/UL (ref 135–450)
PMV BLD AUTO: 10.3 FL (ref 5–10.5)
POTASSIUM REFLEX MAGNESIUM: 3.3 MMOL/L (ref 3.5–5.1)
PROTHROMBIN TIME: 31 SEC (ref 9.9–12.7)
RBC # BLD: 2.56 M/UL (ref 4–5.2)
SODIUM BLD-SCNC: 135 MMOL/L (ref 136–145)
WBC # BLD: 7.6 K/UL (ref 4–11)

## 2021-07-30 PROCEDURE — 6370000000 HC RX 637 (ALT 250 FOR IP): Performed by: INTERNAL MEDICINE

## 2021-07-30 PROCEDURE — 83735 ASSAY OF MAGNESIUM: CPT

## 2021-07-30 PROCEDURE — 2580000003 HC RX 258: Performed by: INTERNAL MEDICINE

## 2021-07-30 PROCEDURE — 97116 GAIT TRAINING THERAPY: CPT

## 2021-07-30 PROCEDURE — 97161 PT EVAL LOW COMPLEX 20 MIN: CPT

## 2021-07-30 PROCEDURE — 97530 THERAPEUTIC ACTIVITIES: CPT

## 2021-07-30 PROCEDURE — 1200000000 HC SEMI PRIVATE

## 2021-07-30 PROCEDURE — 97110 THERAPEUTIC EXERCISES: CPT

## 2021-07-30 PROCEDURE — 6360000002 HC RX W HCPCS: Performed by: INTERNAL MEDICINE

## 2021-07-30 PROCEDURE — 36415 COLL VENOUS BLD VENIPUNCTURE: CPT

## 2021-07-30 PROCEDURE — 99223 1ST HOSP IP/OBS HIGH 75: CPT | Performed by: INTERNAL MEDICINE

## 2021-07-30 PROCEDURE — 80048 BASIC METABOLIC PNL TOTAL CA: CPT

## 2021-07-30 PROCEDURE — 85027 COMPLETE CBC AUTOMATED: CPT

## 2021-07-30 PROCEDURE — 85610 PROTHROMBIN TIME: CPT

## 2021-07-30 RX ORDER — AMLODIPINE BESYLATE 10 MG/1
10 TABLET ORAL DAILY
Status: DISCONTINUED | OUTPATIENT
Start: 2021-07-31 | End: 2021-08-01 | Stop reason: HOSPADM

## 2021-07-30 RX ORDER — METOPROLOL SUCCINATE 25 MG/1
25 TABLET, EXTENDED RELEASE ORAL DAILY
Status: DISCONTINUED | OUTPATIENT
Start: 2021-07-31 | End: 2021-08-01 | Stop reason: HOSPADM

## 2021-07-30 RX ORDER — INSULIN LISPRO 100 [IU]/ML
0-6 INJECTION, SOLUTION INTRAVENOUS; SUBCUTANEOUS
Status: DISCONTINUED | OUTPATIENT
Start: 2021-07-30 | End: 2021-07-31

## 2021-07-30 RX ORDER — AMLODIPINE BESYLATE 5 MG/1
5 TABLET ORAL ONCE
Status: COMPLETED | OUTPATIENT
Start: 2021-07-30 | End: 2021-07-30

## 2021-07-30 RX ORDER — INSULIN LISPRO 100 [IU]/ML
0-3 INJECTION, SOLUTION INTRAVENOUS; SUBCUTANEOUS NIGHTLY
Status: DISCONTINUED | OUTPATIENT
Start: 2021-07-30 | End: 2021-07-31

## 2021-07-30 RX ORDER — DEXTROSE MONOHYDRATE 50 MG/ML
100 INJECTION, SOLUTION INTRAVENOUS PRN
Status: DISCONTINUED | OUTPATIENT
Start: 2021-07-30 | End: 2021-08-01 | Stop reason: HOSPADM

## 2021-07-30 RX ORDER — NICOTINE POLACRILEX 4 MG
15 LOZENGE BUCCAL PRN
Status: DISCONTINUED | OUTPATIENT
Start: 2021-07-30 | End: 2021-08-01 | Stop reason: HOSPADM

## 2021-07-30 RX ORDER — DEXTROSE MONOHYDRATE 25 G/50ML
12.5 INJECTION, SOLUTION INTRAVENOUS PRN
Status: DISCONTINUED | OUTPATIENT
Start: 2021-07-30 | End: 2021-08-01 | Stop reason: HOSPADM

## 2021-07-30 RX ORDER — FUROSEMIDE 10 MG/ML
20 INJECTION INTRAMUSCULAR; INTRAVENOUS 2 TIMES DAILY
Status: DISCONTINUED | OUTPATIENT
Start: 2021-07-30 | End: 2021-08-01 | Stop reason: HOSPADM

## 2021-07-30 RX ADMIN — FUROSEMIDE 20 MG: 10 INJECTION, SOLUTION INTRAMUSCULAR; INTRAVENOUS at 17:45

## 2021-07-30 RX ADMIN — INSULIN LISPRO 3 UNITS: 100 INJECTION, SOLUTION INTRAVENOUS; SUBCUTANEOUS at 18:16

## 2021-07-30 RX ADMIN — WARFARIN SODIUM 3 MG: 2 TABLET ORAL at 17:45

## 2021-07-30 RX ADMIN — LISINOPRIL 40 MG: 40 TABLET ORAL at 09:35

## 2021-07-30 RX ADMIN — AMLODIPINE BESYLATE 5 MG: 5 TABLET ORAL at 09:35

## 2021-07-30 RX ADMIN — FUROSEMIDE 20 MG: 10 INJECTION, SOLUTION INTRAMUSCULAR; INTRAVENOUS at 11:53

## 2021-07-30 RX ADMIN — METOPROLOL TARTRATE 12.5 MG: 25 TABLET, FILM COATED ORAL at 09:35

## 2021-07-30 RX ADMIN — METOPROLOL TARTRATE 12.5 MG: 25 TABLET, FILM COATED ORAL at 20:55

## 2021-07-30 RX ADMIN — AMLODIPINE BESYLATE 5 MG: 5 TABLET ORAL at 11:53

## 2021-07-30 RX ADMIN — Medication 10 ML: at 09:35

## 2021-07-30 RX ADMIN — Medication 10 ML: at 20:56

## 2021-07-30 RX ADMIN — PANTOPRAZOLE SODIUM 40 MG: 40 TABLET, DELAYED RELEASE ORAL at 05:12

## 2021-07-30 RX ADMIN — ACETAMINOPHEN 650 MG: 325 TABLET ORAL at 02:07

## 2021-07-30 RX ADMIN — ATORVASTATIN CALCIUM 10 MG: 20 TABLET, FILM COATED ORAL at 20:55

## 2021-07-30 RX ADMIN — HYDRALAZINE HYDROCHLORIDE 5 MG: 20 INJECTION INTRAMUSCULAR; INTRAVENOUS at 02:07

## 2021-07-30 RX ADMIN — INSULIN LISPRO 3 UNITS: 100 INJECTION, SOLUTION INTRAVENOUS; SUBCUTANEOUS at 20:58

## 2021-07-30 RX ADMIN — ALPRAZOLAM 0.5 MG: 0.5 TABLET ORAL at 20:47

## 2021-07-30 RX ADMIN — DOCUSATE SODIUM 50 MG AND SENNOSIDES 8.6 MG 1 TABLET: 8.6; 5 TABLET, FILM COATED ORAL at 09:35

## 2021-07-30 RX ADMIN — HYDRALAZINE HYDROCHLORIDE 5 MG: 20 INJECTION INTRAMUSCULAR; INTRAVENOUS at 20:57

## 2021-07-30 ASSESSMENT — PAIN SCALES - GENERAL
PAINLEVEL_OUTOF10: 0
PAINLEVEL_OUTOF10: 3
PAINLEVEL_OUTOF10: 0

## 2021-07-30 NOTE — PROGRESS NOTES
Hospitalist Progress Note      PCP: Doc Delacruz MD    Date of Admission: 7/29/2021    Chief Complaint: Leg swelling    Hospital Course: Admitted for acute on chronic diastolic heart failure. Continue IV diuresis. Cardiology consulted. Subjective: Patient states that she is feeling well. Mentioning that she feels numb in her tongue. States that this feeling comes and go previously but this time it is staying. Denies any other complaints. Medications:  Reviewed    Infusion Medications    sodium chloride      sodium chloride       Scheduled Medications    atorvastatin  10 mg Oral Nightly    lisinopril  40 mg Oral Daily    metoprolol tartrate  12.5 mg Oral BID    pantoprazole  40 mg Oral Daily    sodium chloride flush  10 mL Intravenous 2 times per day    sennosides-docusate sodium  1 tablet Oral BID    amLODIPine  5 mg Oral Daily     PRN Meds: sodium chloride, ALPRAZolam, hydrALAZINE, sodium chloride flush, sodium chloride, magnesium hydroxide, acetaminophen **OR** acetaminophen, promethazine, prochlorperazine      Intake/Output Summary (Last 24 hours) at 7/30/2021 0926  Last data filed at 7/29/2021 1830  Gross per 24 hour   Intake 240 ml   Output --   Net 240 ml       Physical Exam Performed:    BP (!) 179/68   Pulse 80   Temp 97.5 °F (36.4 °C) (Oral)   Resp 18   Ht 4' 11\" (1.499 m)   Wt 109 lb 9.6 oz (49.7 kg)   SpO2 99%   BMI 22.14 kg/m²     General appearance: No apparent distress, appears stated age and cooperative. HEENT: Pupils equal, round, and reactive to light. Conjunctivae/corneas clear. Tongue appears smooth without papilla  Neck: Supple, with full range of motion. No jugular venous distention. Trachea midline. Respiratory:  Normal respiratory effort. Clear to auscultation, bilaterally without Rales/Wheezes/Rhonchi. Cardiovascular: Regular rate and rhythm with normal S1/S2 without murmurs, rubs or gallops.   Abdomen: Soft, non-tender, non-distended with normal bowel sounds. Musculoskeletal: Bilateral lower extremity 2+ edema  Skin: Skin color, texture, turgor normal.  No rashes or lesions. Neurologic:  Neurovascularly intact without any focal sensory/motor deficits. Cranial nerves: II-XII intact, grossly non-focal.  Psychiatric: Alert and oriented, thought content appropriate, normal insight  Capillary Refill: Brisk,< 3 seconds   Peripheral Pulses: +2 palpable, equal bilaterally       Labs:   Recent Labs     07/29/21  1116 07/29/21  1116 07/29/21  1217 07/29/21  2118 07/30/21  0518   WBC 8.5  --  7.7  --  7.6   HGB 6.4*   < > 6.1* 8.9* 8.2*   HCT 18.1*   < > 17.4* 25.6* 23.0*   *  --  127*  --  123*    < > = values in this interval not displayed. Recent Labs     07/29/21  1116 07/30/21  0518   * 135*   K 4.1 3.3*   CL 96* 96*   CO2 25 29   BUN 22* 17   CREATININE 1.4* 1.1   CALCIUM 7.6* 7.9*     Recent Labs     07/29/21  1116   AST 72*   ALT 26   BILITOT 1.0   ALKPHOS 94     Recent Labs     07/29/21  1749 07/29/21  1802   INR 2.68* 3.02*     Recent Labs     07/29/21  1116   TROPONINI <0.01       Urinalysis:      Lab Results   Component Value Date    NITRU Negative 07/29/2021    WBCUA 0-2 07/29/2021    BACTERIA 2+ 03/06/2018    RBCUA 3-4 07/29/2021    BLOODU LARGE 07/29/2021    SPECGRAV 1.010 07/29/2021    GLUCOSEU 500 07/29/2021    GLUCOSEU TRACE 01/30/2012       Radiology:  XR CHEST PORTABLE   Final Result      Cardiomegaly. No acute pulmonary disease. Assessment/Plan:    # AOC diastolic HF  -Pt notes that the R leg is always slightly more swollen than left, multiple negative ultrasounds  -Echo showed an EF of 55 to 60% with grade 3 diastolic dysfunction  -Status post IV Lasix 40 mg in the ED x1  -Continue IV Lasix 20 mg twice daily  -Monitor strict I&O, daily weight  -Continue telemetry  -Cardiology following    # Lymphoma  # AOC anemia  -Requires intermittent transfusions at baseline  -Hgb <7 on admission.  S/p pRBC transfusion 07/29/21  -Monitor hemoglobin, transfuse if hemoglobin less than 7 g/dL    #Atrophic glossitis   -Patient complains of tongue numbness  -Tongue appears to be smooth without papilla on examination   -Likely secondary to anemia  -Continue to monitor    # Atrial fibrillation  # H/o aortic and mitral valve replacement  -On home warfarin, pacemaker in place  -Warfarin resumed  -Continue metoprolol     # HTN urgency/potential  -Poorly controlled  -Continue amlodipine and lisinopril  -IV PRNs, adjust PO regimen as indicated     # HLD  -Continue statin     # T2DM  -Hold PO meds  -Continue carb controlled diet  -Continue mealtime/correctional insulin     # COPD  -Continue breathing treatments as needed     # RLS  # Anxiety  -Continue xanax     DVT Prophylaxis:  Warfarin  Diet: ADULT DIET; Regular; 4 carb choices (60 gm/meal);  Low Sodium (2 gm)  Code Status: Full Code    PT/OT Eval Status: Ordered    Dispo -pending diuresis, cardiology Gaston Newberry MD

## 2021-07-30 NOTE — CARE COORDINATION
Case Management Assessment           Initial Evaluation                Date / Time of Evaluation: 7/30/2021 3:01 PM                 Assessment Completed by: Homero Cee RN    Patient Name: Princess Guerra     YOB: 1945  Diagnosis: Acute on chronic diastolic HF (heart failure) ([facility] [address] [I50.33]     Date / Time: 7/29/2021 10:30 AM    Patient Admission Status: Inpatient    If patient is discharged prior to next notation, then this note serves as note for discharge by case management. Current PCP: Radha High MD  Clinic Patient: No    Chart Reviewed: Yes  Patient/ Family Interviewed: Yes    Initial assessment completed at bedside with: pt     Hospitalization in the last 30 days: No    Emergency Contacts:  Extended Emergency Contact Information  Primary Emergency Contact: Henry Alexander  Address: [address] [place] [address] Phone: [phone]  Work Phone: [phone]  Relation: Spouse    Advance Directives:   Code Status: Full Code  Financial  Payor: Delwyn Osler / Plan: MEDICARE PART A AND B / Product Type: *No Product type* /     Pre-cert required for SNF: No    Pharmacy    [facility] [id number] [facility], [address] [place] [address]  Phone: [phone] Fax: [phone]      Potential assistance Purchasing Medications: Potential Assistance Purchasing Medications: No  Does Patient want to participate in local refill/ meds to beds program?: No    Meds To Beds General Rules:  1. Can ONLY be done Monday- Friday between 8:30am-5pm  2. Prescription(s) must be in pharmacy by 3pm to be filled same day  3. Copy of patient's insurance/ prescription drug card and patient face sheet must be sent along with the prescription(s)  4. Cost of Rx cannot be added to hospital bill.  If financial assistance is needed, please contact unit  or ;  or  CANNOT provide pharmacy voucher for patients co-pays  5.  Patients can then  the prescription on their way out of the hospital at discharge, or pharmacy can deliver to the bedside if staff is available. (payment due at time of pick-up or delivery - cash, check, or card accepted)     Able to afford home medications/ co-pay costs: Yes    ADLS  Support Systems: Children, Family Members, Spouse/Significant Other    PT AM-PAC:   OT AM-PAC:       [place]: * home w/ spouse     Lives With: Spouse  Type of Home: House  Home Layout: Laundry in basement, Able to Live on Main level with bedroom/bathroom, Performs ADL's on one level, Two level  Home Access: Stairs to enter with rails  Entrance Stairs - Number of Steps: 3 in the front entrance or a flight to enter from other entrance  [address] [facility]:  (One side)  Bathroom Shower/Tub: Walk-in shower  Bathroom Toilet: Standard  Bathroom Equipment:  (Has shower chair - doesn't use)  Home Equipment: [address]: [address]: Independent  Homemaking Responsibilities: Yes  Ambulation Assistance: Independent  Transfer Assistance: Independent  Steps: 3 at entrance    Plans to RETURN to current housing: Yes  Barriers to RETURNING to current housin [address]  Currently ACTIVE with  [address]: No  Home Care Agency: Not Applicable    Currently ACTIVE with [place] on Aging: No  Passport/ Waiver: No  Passport/ Waiver Services: Not Applicable    Durable Medical Equipment  DME Provider: NA  Equipment: bath bench and reacher    Home Oxygen and [address] [place] prior to admission: No  [address]: Not Applicable  Other Respiratory Equipment: NA  Dialysis  Active with HD/PD prior to admission: No  Nephrologist: NA    HD Center:  Not Applicable    DISCHARGE PLAN:  Disposition: Home- No Services Needed    Transportation PLAN for discharge: family     Factors facilitating achievement of predicted outcomes: Family support    Barriers to discharge: Medical cardiology cleareance    Additional Case Management Notes:     CM  Met with pt  Who states she live at home w/   And plans to return with  to assist prn :  , No DME other than a reacher  Grabber:  Indep  At baseline    CHF : presented  W/  BLLE swelling  Cardiology consultd  :  Echo pending . Pharmacy consulted  For  Coumadin dosing ,. The Plan for Transition of Care is related to the following treatment goals of Acute on chronic diastolic HF (heart failure) ([address] [I50.33]    The Patient and/or patient representative Adela Ramirez and her family were provided with a choice of provider and agrees with the discharge plan Yes    Freedom of choice list was provided with basic dialogue that supports the patient's individualized plan of care/goals and shares the quality data associated with the providers.  Yes    Care Transition patient: No    Karley Jackson RN  [facility]  Case Management Department  Ph: [phone]

## 2021-07-30 NOTE — CONSULTS
Cardiology Consultation                                                                    Pt Name: Yohan Laguna  Age: 77 y.o. Sex: female  : 1945  Location: 6301/6301-01    Referring Physician: Adali Montano MD  Primary cardiologist: Dr Hakan Pope at [facility]      Reason for Consult:     Reason for Consultation/Chief Complaint: AHF, HTN urgency    HPI:      Yohan Laguna is a 77 y.o. female with a past medical history of NHL with ACD s/p transfusions in the past, HTN, HLP, DM, COPD, PAF, s/p mechanical AVR and MVR, s/p pacer. Echo 2020: Normal LV size, EF 60%, cannot determine diastolic function, moderate AS by Dopplers (mechanical aortic valve could not be well visualized), normal mechanical MVR position and function. Nuclear 2020: Small area of apical ischemia, normal LVEF. Patient presented to the emergency room on  with progressively worse shortness of breath, orthopnea, lower extremity edema over the last few days. Patient apparently had a respiratory infection couple of weeks ago and was treated with antibiotics and steroids until last week. Patient has history of anemia of chronic disease and receives occasional blood transfusions. Ferritin 2021 2300, multiple negative FOBT in the past.  Patient had blood pressure of 200-220/50s at the emergency room, creatinine 1.4, hemoglobin 6.1. Patient was admitted for acute on chronic HFpEF, hypertensive urgency, FOX, severe anemia. She received IV Lasix 40 mg x 1 yesterday and 1 unit of packed RBC. Cardiology was consulted for further evaluation. Patient admits to excess salt in her diet, is compliant with medications. Today patient reports some improvement with her symptoms. Warfarin was held in view of her severe anemia. BP today remains severely elevated (SBP 170s). Hemoglobin is up at 8.2 after the blood transfusion and creatinine has improved now down at 1.1. Patient remains on room air with adequate sats.     ECG consistent Provider, MD   Biotin 69469 MCG TABS Take by mouth   Yes Historical Provider, MD   Turmeric 500 MG CAPS Take by mouth   Yes Historical Provider, MD   ELDERBERRY PO Take by mouth   Yes Historical Provider, MD   zinc gluconate 50 MG tablet Take 50 mg by mouth daily   Yes Historical Provider, MD   acetaminophen-codeine (TYLENOL #3) 300-30 MG per tablet Take 1 tablet by mouth nightly as needed for Pain for up to 90 days. 7/8/21 10/6/21 Yes Reinaldo Espinoza MD   chlorthalidone (HYGROTON) 25 MG tablet TAKE 1 TABLET BY MOUTH EVERY DAY 6/30/21  Yes Reinaldo Espinoza MD   metFORMIN (GLUCOPHAGE) 1000 MG tablet TAKE 1 TABLET BY MOUTH TWICE DAILY 6/30/21  Yes Reinaldo Espinoza MD   atorvastatin (LIPITOR) 10 MG tablet TAKE 1 TABLET BY MOUTH DAILY IN THE EVENING FOR CHOLESTEROL 6/30/21  Yes Reinaldo Espinoza MD   ALPRAZolam Unice Nigh) 0.5 MG tablet TAKE 1 TABLET BY MOUTH THREE TIMES DAILY AS NEEDED 6/28/21 8/28/21 Yes Reinaldo Espinoza MD   pantoprazole (PROTONIX) 40 MG tablet TAKE 1 TABLET BY MOUTH EVERY DAY 6/3/21  Yes Reinaldo Espinoza MD   warfarin (COUMADIN) 1 MG tablet TAKE AS DIRECTED  Patient taking differently: daily TAKE   2.5 mg on Sundays and Tuesday  2 mg on Monday, Wednesday, Thursday, Friday and Saturday 5/28/21  Yes Reinaldo Espinoza MD   lisinopril (PRINIVIL;ZESTRIL) 40 MG tablet TAKE 1 TABLET BY MOUTH EVERY DAY 5/24/21  Yes Reinaldo Espinoza MD   glipiZIDE (GLUCOTROL) 5 MG tablet TAKE 1/2 TABLET BY MOUTH EVERY DAY WITH FOOD 1/28/21  Yes Rosa Castro MD   metoprolol tartrate (LOPRESSOR) 25 MG tablet Take 12.5 mg by mouth 2 times daily 0.5 tab BID    Yes Historical Provider, MD   amLODIPine (NORVASC) 10 MG tablet TAKE 1 TABLET BY MOUTH DAILY 6/29/20  Yes Rosa Castro MD   ciclopirox (PENLAC) 8 % solution Apply topically nightly.  5/27/20  Yes Rosa Castro MD   promethazine (PHENERGAN) 12.5 MG tablet TAKE ONE TABLET BY MOUTH EVERY 6 HOURS AS NEEDED 11/20/15  Yes Rosa Castro MD   TRUE METRIX BLOOD GLUCOSE TEST strip USE TO TEST BLOOD SUGAR TWICE DAILY 7/29/21   Jose Tucker MD   warfarin (COUMADIN) 3 MG tablet TAKE 1 TABLET BY MOUTH AS DIRECTED 6/14/21   Jose Tucker MD   Blood Pressure KIT Dispense bp machine x 1 9/16/20   Tyree Moon MD   blood glucose test strips (TRUE METRIX BLOOD GLUCOSE TEST) strip USE TO TEST TWICE DAILY AS DIRECTED 11/12/18   Tyree Moon MD   warfarin (COUMADIN) 4 MG tablet TAKE 1 TABLET BY MOUTH DAILY 1/14/15   Tyree Moon MD   hydroxychloroquine (PLAQUENIL) 200 MG tablet Take  by mouth daily. Historical Provider, MD          Inpatient Medications:  Rao Leaks ON 7/31/2021] amLODIPine  10 mg Oral Daily    metoprolol tartrate  12.5 mg Oral BID    [START ON 7/31/2021] metoprolol succinate  25 mg Oral Daily    furosemide  20 mg Intravenous BID    atorvastatin  10 mg Oral Nightly    lisinopril  40 mg Oral Daily    pantoprazole  40 mg Oral Daily    sodium chloride flush  10 mL Intravenous 2 times per day    sennosides-docusate sodium  1 tablet Oral BID    amLODIPine  5 mg Oral Daily       IV drips:   sodium chloride      sodium chloride         PRN:  sodium chloride, ALPRAZolam, hydrALAZINE, sodium chloride flush, sodium chloride, magnesium hydroxide, acetaminophen **OR** acetaminophen, promethazine, prochlorperazine    Allergy:     Diclofenac, Nsaids, and Hydrocodone-acetaminophen       Review of Systems:     All 12 point review of symptoms completed. Pertinent positives identified in the HPI, all other review of symptoms negative as below. CONSTITUTIONAL: No fatigue  SKIN: No rash or pruritis. EYES: No visual changes or diplopia. No scleral icterus. ENT: No Headaches, hearing loss or vertigo. No mouth sores or sore throat. CARDIOVASCULAR: No chest pain/chest pressure/chest discomfort. No palpitations. + edema. RESPIRATORY: + dyspnea. No cough or wheezing, no sputum production. GASTROINTESTINAL: No N/V/D. No abdominal pain, appetite loss, blood in stools.   GENITOURINARY: No dysuria, trouble voiding, or hematuria. MUSCULOSKELETAL:  No gait disturbance, weakness or joint complaints. NEUROLOGICAL: No headache, diplopia, change in muscle strength, numbness or tingling. No change in gait, balance, coordination, mood, affect, memory, mentation, behavior. ENDOCRINE: No excessive thirst, fluid intake, or urination. No tremor. HEMATOLOGIC: No abnormal bruising or bleeding. ALLERGY: No nasal congestion or hives. Physical Examination:     Vitals:    07/30/21 0259 07/30/21 0346 07/30/21 0918 07/30/21 1148   BP: (!) 183/58  (!) 179/68 (!) 146/51   Pulse: 64  80 70   Resp: 16  18 18   Temp: 98.3 °F (36.8 °C)  97.5 °F (36.4 °C) 97.6 °F (36.4 °C)   TempSrc: Oral  Oral Oral   SpO2: 100%  99% 100%   Weight:  109 lb 9.6 oz (49.7 kg)     Height:           Wt Readings from Last 3 Encounters:   07/30/21 109 lb 9.6 oz (49.7 kg)   01/27/21 115 lb (52.2 kg)   11/11/20 113 lb (51.3 kg)         General Appearance:  Alert, cooperative, no distress, appears stated age Appropriate weight   Head:  Normocephalic, without obvious abnormality, atraumatic   Eyes:  PERRL, conjunctiva/corneas clear EOM intact  Ears normal   Throat no lesions       Nose: Nares normal, no drainage or sinus tenderness   Throat: Lips, mucosa, and tongue normal   Neck: Supple, symmetrical, trachea midline, no adenopathy, thyroid: not enlarged, symmetric, no tenderness/mass/nodules, no carotid bruit. Lungs:   Respirations unlabored, basilar bilateral ronchi. Chest Wall:  No tenderness or deformity   Heart:  Regular rhythm, rate is controlled, S1, S2 normal, there is II/VI RUSB systolic murmur, there is no rub or gallop, no jvd, 1+ bilateral lower extremity edema   Abdomen:   Soft, non-tender, bowel sounds active all four quadrants,  no masses, no organomegaly       Extremities: Extremities normal, atraumatic, no cyanosis.     Pulses: 2+ and symmetric   Skin: Skin color, texture, turgor normal, no rashes or lesions   Pysch: Normal mood and affect   Neurologic: Normal gross motor and sensory exam.  Cranial nerves intact        Labs:     Recent Labs     07/29/21  1116 07/30/21  0518   * 135*   K 4.1 3.3*   BUN 22* 17   CREATININE 1.4* 1.1   CL 96* 96*   CO2 25 29   GLUCOSE 340* 176*   CALCIUM 7.6* 7.9*   MG  --  1.10*     Recent Labs     07/29/21  1116 07/29/21  1116 07/29/21  1217 07/29/21  1217 07/29/21  2118 07/30/21  0518   WBC 8.5  --  7.7  --   --  7.6   HGB 6.4*  --  6.1*  --  8.9* 8.2*   HCT 18.1*  --  17.4*  --  25.6* 23.0*   *  --  127*  --   --  123*   MCV 94.1   < > 95.5   < >  --  89.9    < > = values in this interval not displayed. No results for input(s): CHOLTOT, TRIG, HDL in the last 72 hours. Invalid input(s): LIPIDCOMM, CHOLHDL, VLDCHOL, LDL  Recent Labs     07/29/21  1749 07/29/21  1802   INR 2.68* 3.02*     Recent Labs     07/29/21  1116   TROPONINI <0.01     No results for input(s): BNP in the last 72 hours. No results for input(s): TSH in the last 72 hours. No results for input(s): CHOL, HDL, LDLCALC, TRIG in the last 72 hours.]    Lab Results   Component Value Date    TROPONINI <0.01 07/29/2021         Imaging:     Telemetry personally reviewed:  APVS      Assessment / Plan:     1. Acute on chronic HFpEF:  It is most likely due to excess salt in patient's diet with subsequent severe hypertension and volume overload.    -I have counseled patient to follow a low-salt diet  -Will resume IV Lasix as 20 mg twice a day  - Strict I's and O's every shift and standing weights if possible, low-salt diet and daily BMP with reflex to Mg, wean supplemental oxygen to off for sats greater than 94%. -Control BP    2.   Hypertensive urgency:  Patient's blood pressure on admission was severely elevated and associated with volume overload and acute heart failure.    -We will resume patient's home medications, that is amlodipine 10 mg daily, Lopressor 12.5 twice daily (will change to Toprol 25 daily tomorrow a.m.), lisinopril 40 mg daily  -Low-salt diet    3. Mechanical AVR and MVR:  Per echo this admission, normal AVR and MVR position and function.    -Patient will need to resume warfarin with goal INR 3.0 (2.5 - 3.5) in view of [facility] MVR and PAF. 4.  Anemia of chronic disease:  Per review of care everywhere records patient's anemia is consistent with anemia of chronic disease rather than GI bleeding.    -Agree with blood transfusion to keep hemoglobin greater than 7.0  -I will continue warfarin uninterruptedly unless FOBT positive and need for endoscopy. 5.  PAF:  Patient is currently atrial paced. There is no evidence of AF.    -Continue with beta-blocker and warfarin    6. S/p pacemaker:  Telemetry and ECG suggest normal pacer function. I have personally reviewed the reports and images of labs, radiological studies, cardiac studies including ECG's and telemetry, current and old medical records. The note was completed using EMR and Dragon dictation system. Every effort was made to ensure accuracy; however, inadvertent computerized transcription errors may be present. All questions and concerns were addressed to the patient/family. Alternatives to my treatment were discussed. I would like to thank you for providing me the opportunity to participate in the care of your patient. If you have any questions, please do not hesitate to contact me.      Nelida Clark MD, [facility] [place], [address]  The [address]  [address] [place] [address] Alexa Mendez [address]  Ph: [phone]  Fax: [phone]

## 2021-07-30 NOTE — PROGRESS NOTES
Clinical Pharmacy Consult Note    Admit date: 7/29/2021    Subjective/Objective:  78 yo female with PMHx significant for NHL with anemia of chronic disease s/p transfusions in the past, HTN, DM, CKD, Afib and mechanical AVR and MVR who is admitted with acute/chronic HFpEF, hypertensive urgency, FOX and severe anemia (warfarin initially held). Patient s/p transfusion on admission. Team will continue warfarin in the interim unless FOBT positive and need for endoscopy. Pt with recent respiratory infection (s/p ABX and corticosteroids regimen until last week). Pharmacy is consulted to dose warfarin per Dr. Marivel Kendrick anticoagulation regimen: Pt notes that her anticoagulation is managed by Dr. Amirah Zamorano. Utilizes a POC INR testing device at home, the results of which are reported to her physician for adjustments to her warfarin regimen. Pt notes home regimen recently changed 2/2 respiratory infection (ABX + CSs). Confirmed most recent regimen with patient and in [address] progress note from Dr. [facility] office:  · Warfarin 2.5 mg on Sunday and Tuesday  · Warfarin 2 mg all other days of the week      Date INR Warfarin Dose   7/29 3.02 Held   7/30 2.63        Recent Labs     07/29/21  1116 07/30/21  0518   * 135*   K 4.1 3.3*   CL 96* 96*   CO2 25 29   BUN 22* 17   CREATININE 1.4* 1.1   GLUCOSE 340* 176*       CrCl cannot be calculated (Unknown ideal weight.). Lab Results   Component Value Date    WBC 7.6 07/30/2021    HGB 8.2 (L) 07/30/2021    HCT 23.0 (L) 07/30/2021    MCV 89.9 07/30/2021     (L) 07/30/2021       Lab Results   Component Value Date    PROTIME 35.7 (H) 07/29/2021    INR 3.02 (H) 07/29/2021       Height:  4' 11\" (149.9 cm)  Weight:  109 lb 9.6 oz (49.7 kg)    Assessment/Plan:  1. Anticoagulation:  · Goal INR: 3.0 (2.5 - 3.5) per Cardiology in light of PAF and mechanical MVR  · Dose:  Will give one time \"booster\" dose of 3 mg today, given that patient did not receive yesterday's dose. · Drug-Drug Interactions: Medication profile reviewed for potential drug interactions. No significant drug interactions with warfarin noted. · Daily INR will be monitored and dose adjustments made as needed. Thank you for allowing us to participate in the care of this patient. Please reach out with any questions.     Danuta Mallory PharmD, BCPS  7/30/2021   Wireless: [phone]

## 2021-07-30 NOTE — PROGRESS NOTES
Anemia:multifactorial, Anxiety, Cataract, Chronic back pain, CKD (chronic kidney disease) stage 3, GFR 30-59 ml/min, Colitis, ischemic (HCC), COPD, Diabetes, Diabetic eye exam ([facility] [address] GERD (gastroesophageal reflux disease), Hx of mammogram, Hyperlipidaemia LDL goal <100, Hypertension, Insomnia, Long-term (current) use of anticoagulants, INR goal 2.5-3.5, Lymphoma:monoclonal B cell, Mammogram abnormal, Nonspecific abnormal results of kidney function study, Osteoarthritis, Renal cysts, right, RLS (restless legs syndrome), Sciatica, Screening colonoscopy, Therapeutic drug monitoring, Valvular heart disease, and Visit for screening mammogram.   has a past surgical history that includes Aortic valve replacement; Mitral valve replacement; Hysterectomy; Cataract removal (12/11/13;1/8/14); Colonoscopy; pacemaker placement (8/07); Tunneled venous port placement (Right, 10/3/2014); and laparoscopy (2/3/15). Restrictions  Position Activity Restriction  Other position/activity restrictions: up as tolerated  Vision/Hearing  Vision: Impaired  Vision Exceptions: Wears glasses for reading  Hearing: Within functional limits     Subjective  General  Chart Reviewed: Yes  Patient assessed for rehabilitation services?: Yes  Additional Pertinent Hx: Pt presented to ED on 7/29 w/ B LE swelling, SOB, orthopnea, and a cough. CXR: cardiomegaly and no acute findings; Echocardiogram: grade 3 diastolic dysfunction, mitral regurgitation, pulmonary artery HTN. PMHx includes DM, CKD, HTN, HLD, aortic & mitral valve replacements, and lymphoma. Family / Caregiver Present: No  Referring Practitioner: Yash Rothman MD  Referral Date : 07/29/21  Diagnosis: Acute on chronic diastolic HF  Follows Commands: Within Functional Limits  Subjective  Subjective: Pt was found supine in bed. Pt agreeable to PT. States she has been getting up and going to the bathroom independently. Bed alarm not on.   Pain Screening   Patient Currently in Pain: Denies Orientation  Orientation  Overall Orientation Status: Within Normal Limits     Social/Functional History  Social/Functional History  Lives With: Spouse  Type of Home: House  Home Layout: Laundry in basement, Able to Live on Main level with bedroom/bathroom, Performs ADL's on one level, Two level  Home Access: Stairs to enter with rails  Entrance Stairs - Number of Steps: 3 in the front entrance or a flight to enter from other entrance  Entrance Stairs - Rails:  (One side)  Bathroom Shower/Tub: Walk-in shower  Bathroom Toilet: Standard  Bathroom Equipment:  (Has shower chair - doesn't use)  Home Equipment: Reacher  ADL Assistance: Independent  Homemaking Assistance: Independent  Homemaking Responsibilities: Yes  Ambulation Assistance: Independent  Transfer Assistance: Independent  Active : Yes  Mode of Transportation: Car  Occupation: Retired  Type of occupation: [employer]  Leisure & Hobbies: camping and agustin  Additional Comments: Denies falls in the last year. Goes to grocery store and doctors appts independently.     Objective        AROM RLE (degrees)  RLE AROM: WFL  AROM LLE (degrees)  LLE AROM : WFL  Strength RLE  Strength RLE: WFL  Comment: hip flexion 4/5; hip abduction/adduction grossly 5/5; knee extension 3+/5; knee flexion 5/5; ankle DF 5/5; ankle PF 5/5  Strength LLE  Strength LLE: WFL  Comment: hip flexion 4/5; hip abduction/adduction grossly 5/5; knee extension 3+/5; knee flexion 5/5; ankle DF 5/5; ankle PF 5/5        Bed mobility  Supine to Sit: Independent (1st trial HOB elevated; 2nd trial HOB flat)  Sit to Supine: Independent (HOB elevated)  Scooting: Independent (Forward on EOB and back in chair)  Transfers  Sit to Stand: Stand by assistance;Supervision (SBA from EOB, SBA from toliet, and supervision from EOB 5 times as exercises)  Stand to sit: Stand by assistance;Supervision (SBA to toliet, SBA to EOB, and supervision to chair)  Ambulation  Ambulation?: Yes  Ambulation 1  Surface: level tile  Device: No Device  Assistance: Stand by assistance;Supervision (SBA initally then progressed to supervision)  Quality of Gait: Toño a wide CHANTE with ambulation and slightly guarded initially. Overall steady on her feet. Says she is at her normal but is just feeling weak this morning d/t lack of sleep and being in bed. Distance: 15 ft to bathroom; 15 ft from bathroom to EOB; 250' in hallway  Stairs/Curb  Stairs?: Yes  Stairs  # Steps : 10  Rails: Left ascending  Assistance: Stand by assistance  Comment: Pt appears steady on stairs with use of railing. Demos slow ascent of stairs. States ascending is more difficult. Balance  Sitting - Static:  (Supervision)  Sitting - Dynamic:  (SBA with LE MMT)  Standing - Static:  (Supervision)  Standing - Dynamic:  (SBA to supervision)  Comments: Supervision when pt was putting on shoes seated at EOB; supervision when pt standing washing her hands at sink; supervision when pt picking up an item off the floor. Demos steady balance with all activities. Exercises  Comments: Sit <> stand exercise 5 times with supervision. PT demo'd 4 exercises for quad strengthening HEP (LAQ, seated & standing marches with UE support, sit<>stands, SLR in supine) and provided handout of HEP-pt verbalized understanding and denies questions regarding HEP. Pt reports she used to do exercise videos but not lately. PT encouraged regular exercises at home. Treatment included functional mobility training. Plan   Safety Devices  Type of devices: Left in chair, Call light within reach, Gait belt, Nurse notified (RN denies chair alarm. )    AM-PAC Score  AM-PAC Inpatient Mobility Raw Score : 22 (07/30/21 0948)  AM-PAC Inpatient T-Scale Score : 53.28 (07/30/21 0948)  Mobility Inpatient CMS 0-100% Score: 20.91 (07/30/21 0948)  Mobility Inpatient CMS G-Code Modifier : CJ (07/30/21 2917)        Therapy Time   Individual Concurrent Group Co-treatment   Time In [facility]         Time Out 0909 Minutes 70               Timed Code Treatment Minutes:  55     Total Treatment Minutes:  70     If patient is discharged prior to next treatment, this note will serve as the discharge summary. Mariya Hull, NATAN Brock [facility], [place] [address]  Therapist was present, directed the patient's care, made skilled judgement, and was responsible for assessment and treatment of the patient.

## 2021-07-31 LAB
ANION GAP SERPL CALCULATED.3IONS-SCNC: 12 MMOL/L (ref 3–16)
BUN BLDV-MCNC: 23 MG/DL (ref 7–20)
CALCIUM SERPL-MCNC: 7.7 MG/DL (ref 8.3–10.6)
CHLORIDE BLD-SCNC: 93 MMOL/L (ref 99–110)
CO2: 28 MMOL/L (ref 21–32)
CREAT SERPL-MCNC: 1.7 MG/DL (ref 0.6–1.2)
GFR AFRICAN AMERICAN: 35
GFR NON-AFRICAN AMERICAN: 29
GLUCOSE BLD-MCNC: 167 MG/DL (ref 70–99)
GLUCOSE BLD-MCNC: 200 MG/DL (ref 70–99)
GLUCOSE BLD-MCNC: 232 MG/DL (ref 70–99)
GLUCOSE BLD-MCNC: 309 MG/DL (ref 70–99)
GLUCOSE BLD-MCNC: 358 MG/DL (ref 70–99)
HCT VFR BLD CALC: 24.7 % (ref 36–48)
HEMOGLOBIN: 8.7 G/DL (ref 12–16)
INR BLD: 2.56 (ref 0.88–1.12)
MAGNESIUM: 1.2 MG/DL (ref 1.8–2.4)
MCH RBC QN AUTO: 31.6 PG (ref 26–34)
MCHC RBC AUTO-ENTMCNC: 35.3 G/DL (ref 31–36)
MCV RBC AUTO: 89.6 FL (ref 80–100)
PDW BLD-RTO: 23.6 % (ref 12.4–15.4)
PERFORMED ON: ABNORMAL
PLATELET # BLD: 144 K/UL (ref 135–450)
PMV BLD AUTO: 10.1 FL (ref 5–10.5)
POTASSIUM REFLEX MAGNESIUM: 3.3 MMOL/L (ref 3.5–5.1)
PROTHROMBIN TIME: 30.1 SEC (ref 9.9–12.7)
RBC # BLD: 2.76 M/UL (ref 4–5.2)
SODIUM BLD-SCNC: 133 MMOL/L (ref 136–145)
WBC # BLD: 8.1 K/UL (ref 4–11)

## 2021-07-31 PROCEDURE — 80048 BASIC METABOLIC PNL TOTAL CA: CPT

## 2021-07-31 PROCEDURE — 83735 ASSAY OF MAGNESIUM: CPT

## 2021-07-31 PROCEDURE — 6370000000 HC RX 637 (ALT 250 FOR IP): Performed by: INTERNAL MEDICINE

## 2021-07-31 PROCEDURE — 85027 COMPLETE CBC AUTOMATED: CPT

## 2021-07-31 PROCEDURE — 6360000002 HC RX W HCPCS: Performed by: INTERNAL MEDICINE

## 2021-07-31 PROCEDURE — 99233 SBSQ HOSP IP/OBS HIGH 50: CPT | Performed by: INTERNAL MEDICINE

## 2021-07-31 PROCEDURE — 2580000003 HC RX 258: Performed by: INTERNAL MEDICINE

## 2021-07-31 PROCEDURE — 36415 COLL VENOUS BLD VENIPUNCTURE: CPT

## 2021-07-31 PROCEDURE — 1200000000 HC SEMI PRIVATE

## 2021-07-31 PROCEDURE — 85610 PROTHROMBIN TIME: CPT

## 2021-07-31 RX ORDER — INSULIN LISPRO 100 [IU]/ML
0-18 INJECTION, SOLUTION INTRAVENOUS; SUBCUTANEOUS
Status: DISCONTINUED | OUTPATIENT
Start: 2021-07-31 | End: 2021-08-01 | Stop reason: HOSPADM

## 2021-07-31 RX ORDER — ACETAMINOPHEN AND CODEINE PHOSPHATE 300; 30 MG/1; MG/1
1 TABLET ORAL NIGHTLY PRN
Status: DISCONTINUED | OUTPATIENT
Start: 2021-07-31 | End: 2021-08-01 | Stop reason: HOSPADM

## 2021-07-31 RX ORDER — POTASSIUM CHLORIDE 20 MEQ/1
40 TABLET, EXTENDED RELEASE ORAL ONCE
Status: COMPLETED | OUTPATIENT
Start: 2021-07-31 | End: 2021-07-31

## 2021-07-31 RX ORDER — MAGNESIUM SULFATE IN WATER 40 MG/ML
4000 INJECTION, SOLUTION INTRAVENOUS ONCE
Status: COMPLETED | OUTPATIENT
Start: 2021-07-31 | End: 2021-07-31

## 2021-07-31 RX ORDER — WARFARIN SODIUM 2 MG/1
2 TABLET ORAL
Status: DISCONTINUED | OUTPATIENT
Start: 2021-07-31 | End: 2021-08-01 | Stop reason: HOSPADM

## 2021-07-31 RX ORDER — INSULIN LISPRO 100 [IU]/ML
0-9 INJECTION, SOLUTION INTRAVENOUS; SUBCUTANEOUS NIGHTLY
Status: DISCONTINUED | OUTPATIENT
Start: 2021-07-31 | End: 2021-08-01 | Stop reason: HOSPADM

## 2021-07-31 RX ORDER — WARFARIN SODIUM 2.5 MG/1
2.5 TABLET ORAL
Status: DISCONTINUED | OUTPATIENT
Start: 2021-08-01 | End: 2021-08-01 | Stop reason: HOSPADM

## 2021-07-31 RX ADMIN — INSULIN LISPRO 2 UNITS: 100 INJECTION, SOLUTION INTRAVENOUS; SUBCUTANEOUS at 07:48

## 2021-07-31 RX ADMIN — SODIUM CHLORIDE 25 ML: 9 INJECTION, SOLUTION INTRAVENOUS at 11:06

## 2021-07-31 RX ADMIN — DOCUSATE SODIUM 50 MG AND SENNOSIDES 8.6 MG 1 TABLET: 8.6; 5 TABLET, FILM COATED ORAL at 21:07

## 2021-07-31 RX ADMIN — Medication 10 ML: at 09:15

## 2021-07-31 RX ADMIN — INSULIN LISPRO 3 UNITS: 100 INJECTION, SOLUTION INTRAVENOUS; SUBCUTANEOUS at 16:48

## 2021-07-31 RX ADMIN — WARFARIN SODIUM 2 MG: 2 TABLET ORAL at 18:50

## 2021-07-31 RX ADMIN — FUROSEMIDE 20 MG: 10 INJECTION, SOLUTION INTRAMUSCULAR; INTRAVENOUS at 09:10

## 2021-07-31 RX ADMIN — Medication 10 ML: at 21:08

## 2021-07-31 RX ADMIN — ATORVASTATIN CALCIUM 10 MG: 20 TABLET, FILM COATED ORAL at 21:06

## 2021-07-31 RX ADMIN — INSULIN LISPRO 12 UNITS: 100 INJECTION, SOLUTION INTRAVENOUS; SUBCUTANEOUS at 12:26

## 2021-07-31 RX ADMIN — ALPRAZOLAM 0.5 MG: 0.5 TABLET ORAL at 21:06

## 2021-07-31 RX ADMIN — PANTOPRAZOLE SODIUM 40 MG: 40 TABLET, DELAYED RELEASE ORAL at 05:03

## 2021-07-31 RX ADMIN — MAGNESIUM SULFATE HEPTAHYDRATE 4000 MG: 4 INJECTION, SOLUTION INTRAVENOUS at 11:06

## 2021-07-31 RX ADMIN — POTASSIUM CHLORIDE 40 MEQ: 1500 TABLET, EXTENDED RELEASE ORAL at 09:09

## 2021-07-31 RX ADMIN — ACETAMINOPHEN AND CODEINE PHOSPHATE 1 TABLET: 300; 30 TABLET ORAL at 21:07

## 2021-07-31 RX ADMIN — INSULIN LISPRO 7 UNITS: 100 INJECTION, SOLUTION INTRAVENOUS; SUBCUTANEOUS at 21:08

## 2021-07-31 RX ADMIN — ALPRAZOLAM 0.5 MG: 0.5 TABLET ORAL at 16:47

## 2021-07-31 RX ADMIN — METOPROLOL SUCCINATE 25 MG: 25 TABLET, EXTENDED RELEASE ORAL at 11:05

## 2021-07-31 ASSESSMENT — PAIN DESCRIPTION - LOCATION: LOCATION: BACK

## 2021-07-31 ASSESSMENT — PAIN SCALES - GENERAL
PAINLEVEL_OUTOF10: 0
PAINLEVEL_OUTOF10: 7

## 2021-07-31 ASSESSMENT — PAIN DESCRIPTION - PROGRESSION: CLINICAL_PROGRESSION: GRADUALLY WORSENING

## 2021-07-31 ASSESSMENT — PAIN DESCRIPTION - ONSET: ONSET: UNABLE TO TELL

## 2021-07-31 ASSESSMENT — PAIN - FUNCTIONAL ASSESSMENT: PAIN_FUNCTIONAL_ASSESSMENT: ACTIVITIES ARE NOT PREVENTED

## 2021-07-31 ASSESSMENT — PAIN DESCRIPTION - FREQUENCY: FREQUENCY: INTERMITTENT

## 2021-07-31 ASSESSMENT — PAIN DESCRIPTION - DESCRIPTORS: DESCRIPTORS: ACHING

## 2021-07-31 ASSESSMENT — PAIN DESCRIPTION - ORIENTATION: ORIENTATION: MID

## 2021-07-31 NOTE — PROGRESS NOTES
Pt brought from home tylenol #3 prn nightly 1 tab as needed for pain. RN walked down to pharmacy.  Med counted with pharmacist and kept in pharmacy

## 2021-07-31 NOTE — PROGRESS NOTES
APTT: No results for input(s): APTT in the last 72 hours. BNP:  No results for input(s): BNP in the last 72 hours.   IMAGING: as noted    Assessment:  Patient Active Problem List    Diagnosis Date Noted    Acute on chronic diastolic HF (heart failure) ([facility] [address] 07/29/2021    Primary insomnia 04/05/2017    Controlled type 2 diabetes mellitus with stage 3 chronic kidney disease, without long-term current use of insulin ([facility] [address] 01/04/2017    Left forehead cyst 06/07/2016    Neck mass:Right 06/07/2016    Abnormal mammogram 08/27/2015    Anxiety 02/27/2015    GERD (gastroesophageal reflux disease) 02/27/2015    Renal cysts, right 01/02/2014    Lumbar strain 11/01/2013    Right flank pain 11/01/2013    Back pain:Right sided(lower) 11/01/2013    Renal lesion 11/01/2013    Abnormal CT of the abdomen/pelvis 11/01/2013    Cardiac pacemaker in situ 06/28/2013    Fitting and adjustment of cardiac pacemaker 06/28/2013    Bradycardia 12/10/2012    Osteoarthritis:hands/knees 12/05/2012    Occult GI bleeding 10/05/2012    Insomnia 07/18/2012    Hyperlipidemia with target LDL less than 100 07/18/2012    Renal cyst:Right 05/03/2012    Wheezing symptom 03/03/2012    Acute bronchitis 03/03/2012    Lymphoma:monoclonal B cell 02/24/2012    Chronic back pain 02/24/2012    Lumbar disc herniation 02/24/2012    Low hemoglobin 02/24/2012    Nonspecific abnormal results of kidney function study 01/25/2012    Chronic leg/back pain 01/25/2012    Anemia:multifactorial 12/28/2011    Abnormal blood smear 11/02/2011    Abnormal CBC 11/02/2011    Family history of lung cancer 11/02/2011    RLS (restless legs syndrome) 10/19/2011    Leg pain, bilateral 10/19/2011    Essential hypertension, benign 09/28/2011    Long-term (current) use of anticoagulants, INR goal 2.5-3.5 08/24/2011    Anxiety disorder 08/24/2011    Lumbar disc disease 04/06/2011    Leg pain, left 03/31/2011    Dyslipidemia 07/22/2010    Aortic

## 2021-07-31 NOTE — PROGRESS NOTES
Pt awake and A/Ox4 in the chair watching tv. Pt bp 100/60 hr 79. Labs Na 133, K 3.3, Mag 1.2. Provider notified via perfect serve. Verbal order to hold norvasc, and lisinopril, okay to give lasix and order PO potassium 40mg, and iv mag 4g.

## 2021-07-31 NOTE — PROGRESS NOTES
Hospitalist Progress Note      PCP: Ana Laura Silverio MD    Date of Admission: 7/29/2021    Chief Complaint: Leg swelling    Hospital Course: Admitted for acute on chronic diastolic heart failure. Status post IV diuresis. Creatinine elevated. Holding IV Lasix. Cardiology following    Subjective: Patient states that she is feeling well. Her leg edema is improving. Denies any other complaints. Medications:  Reviewed    Infusion Medications    dextrose      sodium chloride      sodium chloride 25 mL (07/31/21 1106)     Scheduled Medications    magnesium sulfate  4,000 mg Intravenous Once    insulin lispro  0-18 Units Subcutaneous TID WC    insulin lispro  0-9 Units Subcutaneous Nightly    amLODIPine  10 mg Oral Daily    metoprolol succinate  25 mg Oral Daily    [Held by provider] furosemide  20 mg Intravenous BID    warfarin (COUMADIN) daily dosing (placeholder)   Other RX Placeholder    atorvastatin  10 mg Oral Nightly    lisinopril  40 mg Oral Daily    pantoprazole  40 mg Oral Daily    sodium chloride flush  10 mL Intravenous 2 times per day    sennosides-docusate sodium  1 tablet Oral BID    amLODIPine  5 mg Oral Daily     PRN Meds: glucose, dextrose, glucagon (rDNA), dextrose, sodium chloride, ALPRAZolam, hydrALAZINE, sodium chloride flush, sodium chloride, magnesium hydroxide, acetaminophen **OR** acetaminophen, promethazine, prochlorperazine      Intake/Output Summary (Last 24 hours) at 7/31/2021 1345  Last data filed at 7/31/2021 1339  Gross per 24 hour   Intake 840 ml   Output 2650 ml   Net -1810 ml       Physical Exam Performed:    BP (!) 132/53   Pulse 80   Temp 97.8 °F (36.6 °C) (Oral)   Resp 16   Ht 4' 11\" (1.499 m)   Wt 107 lb (48.5 kg)   SpO2 99%   BMI 21.61 kg/m²     General appearance: No apparent distress, appears stated age and cooperative. HEENT: Pupils equal, round, and reactive to light. Conjunctivae/corneas clear.  Tongue appears smooth without papilla  Neck: Supple, with full range of motion. No jugular venous distention. Trachea midline. Respiratory:  Normal respiratory effort. Clear to auscultation, bilaterally without Rales/Wheezes/Rhonchi. Cardiovascular: Regular rate and rhythm with normal S1/S2 without murmurs, rubs or gallops. Abdomen: Soft, non-tender, non-distended with normal bowel sounds. Musculoskeletal: Bilateral lower extremity 2+ edema, improving  Skin: Skin color, texture, turgor normal.  No rashes or lesions. Neurologic:  Neurovascularly intact without any focal sensory/motor deficits. Cranial nerves: II-XII intact, grossly non-focal.  Psychiatric: Alert and oriented, thought content appropriate, normal insight  Capillary Refill: Brisk,< 3 seconds   Peripheral Pulses: +2 palpable, equal bilaterally       Labs:   Recent Labs     07/29/21  1217 07/29/21  1217 07/29/21  2118 07/30/21  0518 07/31/21  0506   WBC 7.7  --   --  7.6 8.1   HGB 6.1*   < > 8.9* 8.2* 8.7*   HCT 17.4*   < > 25.6* 23.0* 24.7*   *  --   --  123* 144    < > = values in this interval not displayed. Recent Labs     07/29/21  1116 07/30/21  0518 07/31/21  0506   * 135* 133*   K 4.1 3.3* 3.3*   CL 96* 96* 93*   CO2 25 29 28   BUN 22* 17 23*   CREATININE 1.4* 1.1 1.7*   CALCIUM 7.6* 7.9* 7.7*     Recent Labs     07/29/21  1116   AST 72*   ALT 26   BILITOT 1.0   ALKPHOS 94     Recent Labs     07/29/21  1802 07/30/21  1625 07/31/21  0506   INR 3.02* 2.63* 2.56*     Recent Labs     07/29/21  1116   TROPONINI <0.01       Urinalysis:      Lab Results   Component Value Date    NITRU Negative 07/29/2021    WBCUA 0-2 07/29/2021    BACTERIA 2+ 03/06/2018    RBCUA 3-4 07/29/2021    BLOODU LARGE 07/29/2021    SPECGRAV 1.010 07/29/2021    GLUCOSEU 500 07/29/2021    GLUCOSEU TRACE 01/30/2012       Radiology:  XR CHEST PORTABLE   Final Result      Cardiomegaly. No acute pulmonary disease.               Assessment/Plan:    # AOC diastolic HF  -Pt notes that the R leg is always

## 2021-07-31 NOTE — PROGRESS NOTES
Clinical Pharmacy Consult Note    Admit date: 7/29/2021    Subjective/Objective:  78 yo female with PMHx significant for NHL with anemia of chronic disease s/p transfusions in the past, HTN, DM, CKD, Afib and mechanical AVR and MVR who is admitted with acute/chronic HFpEF, hypertensive urgency, FOX and severe anemia (warfarin initially held). Patient s/p transfusion on admission. Team will continue warfarin in the interim unless FOBT positive and need for endoscopy. Pt with recent respiratory infection (s/p ABX and corticosteroids regimen until last week). Interval Update: SCr elevated today after IV diuresis; holding furosemide. Pharmacy is consulted to dose warfarin per Dr. Lit Cameron anticoagulation regimen: Pt notes that her anticoagulation is managed by Dr. Sapphire Sheehan. Utilizes a POC INR testing device at home, the results of which are reported to her physician for adjustments to her warfarin regimen. Pt notes home regimen recently changed 2/2 respiratory infection (ABX + CSs). Confirmed most recent regimen with patient and in [address] progress note from Dr. Paco Fontenot office:  · Warfarin 2.5 mg on Sunday and Tuesday  · Warfarin 2 mg all other days of the week      Date INR Warfarin Dose   7/29 3.02 Held   7/30 2.63 3 mg   7/31 2.56 2 mg       Recent Labs     07/30/21  0518 07/31/21  0506   * 133*   K 3.3* 3.3*   CL 96* 93*   CO2 29 28   BUN 17 23*   CREATININE 1.1 1.7*   GLUCOSE 176* 200*       CrCl cannot be calculated (Unknown ideal weight.). Lab Results   Component Value Date    WBC 8.1 07/31/2021    HGB 8.7 (L) 07/31/2021    HCT 24.7 (L) 07/31/2021    MCV 89.6 07/31/2021     07/31/2021       Lab Results   Component Value Date    PROTIME 30.1 (H) 07/31/2021    INR 2.56 (H) 07/31/2021       Height:  4' 11\" (149.9 cm)  Weight:  107 lb (48.5 kg)    Assessment/Plan:  1.  Anticoagulation:  · Goal INR: 3.0 (2.5 - 3.5) for PAF and mechanical MVR  · INR today = 2.56; anticipate that this will begin to increase tomorrow after bolus dose of warfarin was given yesterday. · Dose: Will resume home regimen of warfarin 2.5 mg on Sun/Tues and 2mg all other days of the week. Warfarin 2mg due to be given today. · Drug-Drug Interactions: Medication profile reviewed for potential drug interactions. No significant drug interactions with warfarin noted. · Daily INR will be monitored and dose adjustments made as needed. Thank you for allowing us to participate in the care of this patient. Please reach out with any questions.     Armstead Siemens, PharmD, BCCCP  Wireless: [phone]  7/31/2021 2:13 PM

## 2021-08-01 VITALS
OXYGEN SATURATION: 100 % | HEIGHT: 59 IN | HEART RATE: 65 BPM | TEMPERATURE: 97.9 F | DIASTOLIC BLOOD PRESSURE: 49 MMHG | BODY MASS INDEX: 21.57 KG/M2 | WEIGHT: 107 LBS | RESPIRATION RATE: 16 BRPM | SYSTOLIC BLOOD PRESSURE: 156 MMHG

## 2021-08-01 LAB
ANION GAP SERPL CALCULATED.3IONS-SCNC: 12 MMOL/L (ref 3–16)
BUN BLDV-MCNC: 25 MG/DL (ref 7–20)
CALCIUM SERPL-MCNC: 7.7 MG/DL (ref 8.3–10.6)
CHLORIDE BLD-SCNC: 93 MMOL/L (ref 99–110)
CO2: 26 MMOL/L (ref 21–32)
CREAT SERPL-MCNC: 1.4 MG/DL (ref 0.6–1.2)
GFR AFRICAN AMERICAN: 44
GFR NON-AFRICAN AMERICAN: 37
GLUCOSE BLD-MCNC: 156 MG/DL (ref 70–99)
GLUCOSE BLD-MCNC: 204 MG/DL (ref 70–99)
HCT VFR BLD CALC: 22.9 % (ref 36–48)
HEMOGLOBIN: 8.2 G/DL (ref 12–16)
INR BLD: 2.38 (ref 0.88–1.12)
MCH RBC QN AUTO: 32.1 PG (ref 26–34)
MCHC RBC AUTO-ENTMCNC: 35.8 G/DL (ref 31–36)
MCV RBC AUTO: 89.5 FL (ref 80–100)
PDW BLD-RTO: 23.8 % (ref 12.4–15.4)
PERFORMED ON: ABNORMAL
PLATELET # BLD: 134 K/UL (ref 135–450)
PMV BLD AUTO: 10 FL (ref 5–10.5)
POTASSIUM REFLEX MAGNESIUM: 3.8 MMOL/L (ref 3.5–5.1)
PROTHROMBIN TIME: 27.9 SEC (ref 9.9–12.7)
RBC # BLD: 2.55 M/UL (ref 4–5.2)
SODIUM BLD-SCNC: 131 MMOL/L (ref 136–145)
WBC # BLD: 7.4 K/UL (ref 4–11)

## 2021-08-01 PROCEDURE — 6370000000 HC RX 637 (ALT 250 FOR IP): Performed by: INTERNAL MEDICINE

## 2021-08-01 PROCEDURE — 99233 SBSQ HOSP IP/OBS HIGH 50: CPT | Performed by: INTERNAL MEDICINE

## 2021-08-01 PROCEDURE — 80048 BASIC METABOLIC PNL TOTAL CA: CPT

## 2021-08-01 PROCEDURE — 2580000003 HC RX 258: Performed by: INTERNAL MEDICINE

## 2021-08-01 PROCEDURE — 85610 PROTHROMBIN TIME: CPT

## 2021-08-01 PROCEDURE — 6360000002 HC RX W HCPCS: Performed by: INTERNAL MEDICINE

## 2021-08-01 PROCEDURE — 36592 COLLECT BLOOD FROM PICC: CPT

## 2021-08-01 PROCEDURE — 85027 COMPLETE CBC AUTOMATED: CPT

## 2021-08-01 RX ORDER — CHLORTHALIDONE 25 MG/1
25 TABLET ORAL DAILY
Qty: 90 TABLET | Refills: 0 | Status: SHIPPED | OUTPATIENT
Start: 2021-08-02 | End: 2021-08-24 | Stop reason: ALTCHOICE

## 2021-08-01 RX ORDER — HEPARIN SODIUM (PORCINE) LOCK FLUSH IV SOLN 100 UNIT/ML 100 UNIT/ML
100 SOLUTION INTRAVENOUS PRN
Status: DISCONTINUED | OUTPATIENT
Start: 2021-08-01 | End: 2021-08-01 | Stop reason: HOSPADM

## 2021-08-01 RX ORDER — LISINOPRIL 40 MG/1
40 TABLET ORAL DAILY
Qty: 90 TABLET | Refills: 0 | Status: SHIPPED | OUTPATIENT
Start: 2021-08-02 | End: 2021-12-15

## 2021-08-01 RX ADMIN — HEPARIN SODIUM (PORCINE) LOCK FLUSH IV SOLN 100 UNIT/ML 100 UNITS: 100 SOLUTION at 11:09

## 2021-08-01 RX ADMIN — METOPROLOL SUCCINATE 25 MG: 25 TABLET, EXTENDED RELEASE ORAL at 08:25

## 2021-08-01 RX ADMIN — Medication 10 ML: at 09:00

## 2021-08-01 RX ADMIN — AMLODIPINE BESYLATE 10 MG: 10 TABLET ORAL at 08:24

## 2021-08-01 RX ADMIN — AMLODIPINE BESYLATE 5 MG: 5 TABLET ORAL at 08:24

## 2021-08-01 RX ADMIN — INSULIN LISPRO 3 UNITS: 100 INJECTION, SOLUTION INTRAVENOUS; SUBCUTANEOUS at 08:26

## 2021-08-01 RX ADMIN — PANTOPRAZOLE SODIUM 40 MG: 40 TABLET, DELAYED RELEASE ORAL at 06:49

## 2021-08-01 RX ADMIN — ACETAMINOPHEN 650 MG: 325 TABLET ORAL at 03:20

## 2021-08-01 ASSESSMENT — PAIN SCALES - GENERAL
PAINLEVEL_OUTOF10: 5
PAINLEVEL_OUTOF10: 0
PAINLEVEL_OUTOF10: 0

## 2021-08-01 ASSESSMENT — PAIN DESCRIPTION - LOCATION: LOCATION: HEAD

## 2021-08-01 ASSESSMENT — PAIN DESCRIPTION - PROGRESSION: CLINICAL_PROGRESSION: GRADUALLY WORSENING

## 2021-08-01 ASSESSMENT — PAIN DESCRIPTION - DESCRIPTORS: DESCRIPTORS: ACHING

## 2021-08-01 ASSESSMENT — PAIN - FUNCTIONAL ASSESSMENT: PAIN_FUNCTIONAL_ASSESSMENT: ACTIVITIES ARE NOT PREVENTED

## 2021-08-01 ASSESSMENT — PAIN DESCRIPTION - ORIENTATION: ORIENTATION: MID

## 2021-08-01 ASSESSMENT — PAIN DESCRIPTION - FREQUENCY: FREQUENCY: INTERMITTENT

## 2021-08-01 NOTE — PROGRESS NOTES
Discharge instructions reviewed with pt pt verbalized understanding. Port de-accessed successfully. Pt transported via wheelchair to 's car.

## 2021-08-01 NOTE — CARE COORDINATION
Case Management Assessment            Discharge Note                    Date / Time of Note: 8/1/2021 10:07 AM                  Discharge Note Completed by: MY Hernandez, MIKW    Patient Name: Terry Broussard   YOB: 1945  Diagnosis: Acute on chronic diastolic HF (heart failure) ([facility] [address] [I50.33]   Date / Time: 7/29/2021 10:30 AM    Current PCP: Maria Alejandra Ibrahim MD  Clinic patient: No    Hospitalization in the last 30 days: No    Advance Directives:  Code Status: Full Code  [place] DNR form completed and on chart: No    Financial:  Payor: Viki Meyer / Plan: MEDICARE PART A AND B / Product Type: *No Product type* /      Pharmacy:    [facility] #[id number] [address]  Phone: [phone] Fax: [phone]      Assistance purchasing medications?: Potential Assistance Purchasing Medications: No  Assistance provided by Case Management: None at this time    Does patient want to participate in local refill/ meds to beds program?: No    Meds To Beds General Rules:  1. Can ONLY be done Monday- Friday between 8:30am-5pm  2. Prescription(s) must be in pharmacy by 3pm to be filled same day  3. Copy of patient's insurance/ prescription drug card and patient face sheet must be sent along with the prescription(s)  4. Cost of Rx cannot be added to hospital bill. If financial assistance is needed, please contact unit  or ;  or  CANNOT provide pharmacy voucher for patients co-pays  5.  Patients can then  the prescription on their way out of the hospital at discharge, or pharmacy can deliver to the bedside if staff is available. (payment due at time of pick-up or delivery - cash, check, or card accepted)     Able to afford home medications/ co-pay costs: Yes    ADLS:  Current PT AM-PAC Score: 22 /24  Current OT AM-PAC Score: /24      DISCHARGE Disposition: Home- No Services Needed    LOC at discharge: Not Applicable  RENEE Completed: Yes    Notification completed in HENS/PAS?:  Not Applicable    IMM Completed:   Not Indicated    Transportation:  Transportation PLAN for discharge: family   Mode of Transport: [address] ordered at discharge: No  [address]: Not Applicable  Orders faxed: No    Durable Medical Equipment:  DME Provider: None  Equipment obtained during hospitalization:     Home Oxygen and Respiratory Equipment:  Oxygen needed at discharge?: Not [address]: Not Applicable  Portable tank available for discharge?: Not Indicated    Dialysis:  Dialysis patient: No    Dialysis Center:  Not Applicable    Additional CM Notes: Pt from home w/, Pt to return home at DC with no needs, Pt's family will transport home. The Plan for Transition of Care is related to the following treatment goals of Acute on chronic diastolic HF (heart failure) ([facility] [address] [I50.33]    The Patient and/or patient representative Alphonse Angelucci and her family were provided with a choice of provider and agrees with the discharge plan Yes    Freedom of choice list was provided with basic dialogue that supports the patient's individualized plan of care/goals and shares the quality data associated with the providers.  Not Indicated    Care Transitions patient: No    MY Avilez, [facility]  Case Management Department  Ph: [phone]  Fax: [phone]

## 2021-08-01 NOTE — PROGRESS NOTES
The [address] [place] [address]  Cardiology Daily Progress Note  8/1/2021,12:33 PM      [facility]  Petr Cruz MD  Primary cardiologist: Dr. Mahesh Ghosh Date:  7/29/2021  Hospital Day: Hospital Day: 4  Subjective:  Ms. Nicol Rodney is awake and alert and feeling much better today. Breathing well wants to go home. Rrom Dr. Blaine Sue progress note Stormy Mckeon is a 77 y.o. female with a past medical history of NHL with ACD s/p transfusions in the past, HTN, HLP, DM, COPD, PAF, s/p mechanical AVR and MVR, s/p pacer    Objective:   BP (!) 156/49   Pulse 65   Temp 97.9 °F (36.6 °C) (Oral)   Resp 16   Ht 4' 11\" (1.499 m)   Wt 107 lb (48.5 kg)   SpO2 100%   BMI 21.61 kg/m²       Intake/Output Summary (Last 24 hours) at 8/1/2021 1233  Last data filed at 7/31/2021 2323  Gross per 24 hour   Intake 440 ml   Output 525 ml   Net -85 ml       TELEMETRY: Sinus 80     Physical Examination:    Vitals:    07/31/21 1614 07/31/21 2012 08/01/21 0322 08/01/21 0721   BP: (!) 155/57 (!) 141/59 (!) 169/70 (!) 156/49   Pulse: 78 80 67 65   Resp: 16 16 16 16   Temp: 98 °F (36.7 °C) 98.1 °F (36.7 °C) 97.6 °F (36.4 °C) 97.9 °F (36.6 °C)   TempSrc: Oral Oral Oral Oral   SpO2: 99% 98% 99% 100%   Weight:       Height:            Constitutional and General Appearance:  Healthy. And alert . HEENT: eyes and ears intact. No nasal masses  THYROID: not enlarged  LUNGS:  · Clear to auscultation and percussion  HEART and VASCULAR:  · The apical impulses not displaced  · Heart tones are crisp and normal  · Cervical veins are not engorged  · The carotid upstroke is normal in amplitude and contour without delay or bruit  · Peripheral pulses are symmetrical and full  · There is no clubbing, cyanosis of the extremities.   · No peripheral edema  · Femoral Arteries: 2+ and equal  · Pedal Pulses: 2+ and equal   ABDOMEN[de-identified]  · No masses or tenderness  · Liver/Spleen: No Abnormalities Noted  NEUROLOGICAL:  . Moves all extremities to command. Cranial nerves 2-12 are in tact. PSYCHIATRIC: alert and lucid and oriented and appropriate  SKIN: No lesions or rashes  LYMPH NODES: none enlarged      Medications:    insulin lispro  0-18 Units Subcutaneous TID WC    insulin lispro  0-9 Units Subcutaneous Nightly    warfarin  2.5 mg Oral Once per day on Sun Tue    warfarin  2 mg Oral Once per day on Mon Wed Thu Fri Sat    amLODIPine  10 mg Oral Daily    metoprolol succinate  25 mg Oral Daily    [Held by provider] furosemide  20 mg Intravenous BID    atorvastatin  10 mg Oral Nightly    [Held by provider] lisinopril  40 mg Oral Daily    pantoprazole  40 mg Oral Daily    sodium chloride flush  10 mL Intravenous 2 times per day    sennosides-docusate sodium  1 tablet Oral BID    amLODIPine  5 mg Oral Daily      dextrose      sodium chloride      sodium chloride 25 mL (07/31/21 1106)     heparin flush, acetaminophen-codeine, glucose, dextrose, glucagon (rDNA), dextrose, sodium chloride, ALPRAZolam, hydrALAZINE, sodium chloride flush, sodium chloride, magnesium hydroxide, acetaminophen **OR** acetaminophen, promethazine, prochlorperazine    Lab Data:  CBC:   Recent Labs     07/30/21  0518 07/31/21  0506 08/01/21  0532   WBC 7.6 8.1 7.4   HGB 8.2* 8.7* 8.2*   HCT 23.0* 24.7* 22.9*   MCV 89.9 89.6 89.5   * 144 134*     BMP:   Recent Labs     07/30/21  0518 07/31/21  0506 08/01/21  0532   * 133* 131*   K 3.3* 3.3* 3.8   CL 96* 93* 93*   CO2 29 28 26   BUN 17 23* 25*   CREATININE 1.1 1.7* 1.4*     Troponin:Troponin:   Lab Results   Component Value Date    TROPONINI <0.01 07/29/2021     LIVER PROFILE:   No results for input(s): AST, ALT, LIPASE, BILIDIR, BILITOT, ALKPHOS in the last 72 hours. Invalid input(s): AMYLASE,  ALB  PT/INR:   Recent Labs     07/30/21  1625 07/31/21  0506 08/01/21  0532   PROTIME 31.0* 30.1* 27.9*   INR 2.63* 2.56* 2.38*     APTT: No results for input(s):  APTT in the last 72 hours.  BNP:  No results for input(s): BNP in the last 72 hours. IMAGING: as noted   Summary 7/29/2021   Left ventricular cavity size is normal. There is mild concentric left   ventricular hypertrophy. Overall left ventricular systolic function appears   normal with an estimated ejection fraction of 55-60%. No regional wall   motion abnormalities are noted. Diastolic filling parameters suggests grade III diastolic dysfunction. A mechanical artificial aortic valve appears well seated with a maximum   velocity of 4.21m/s and a mean gradient of 40mmHg. A mechanical mitral valve is well seated with peak velocity of 2.42m/s, a   mean gradient of 7mmHg and a PHT of 64 ms. There is mitral regurgitation present. Mild tricuspid and pulmonic regurgitation. The right ventricle is normal in size and function. ICD wire is visualized in the right ventricle. Estimated pulmonary artery systolic pressure is at 41 mmHg assuming a right   atrial pressure of 3 mmHg.        Assessment:  Patient Active Problem List    Diagnosis Date Noted    Acute on chronic diastolic HF (heart failure) ([facility] [address] [facility] 07/29/2021    Primary insomnia 04/05/2017    Controlled type 2 diabetes mellitus with stage 3 chronic kidney disease, without long-term current use of insulin ([facility] [address] 01/04/2017    Left forehead cyst 06/07/2016    Neck mass:Right 06/07/2016    Abnormal mammogram 08/27/2015    Anxiety 02/27/2015    GERD (gastroesophageal reflux disease) 02/27/2015    Renal cysts, right 01/02/2014    Lumbar strain 11/01/2013    Right flank pain 11/01/2013    Back pain:Right sided(lower) 11/01/2013    Renal lesion 11/01/2013    Abnormal CT of the abdomen/pelvis 11/01/2013    Cardiac pacemaker in situ 06/28/2013    Fitting and adjustment of cardiac pacemaker 06/28/2013    Bradycardia 12/10/2012    Osteoarthritis:hands/knees 12/05/2012    Occult GI bleeding 10/05/2012    Insomnia 07/18/2012    Hyperlipidemia with target LDL less than 100 07/18/2012    Renal cyst:Right 05/03/2012    Wheezing symptom 03/03/2012    Acute bronchitis 03/03/2012    Lymphoma:monoclonal B cell 02/24/2012    Chronic back pain 02/24/2012    Lumbar disc herniation 02/24/2012    Low hemoglobin 02/24/2012    Nonspecific abnormal results of kidney function study 01/25/2012    Chronic leg/back pain 01/25/2012    Anemia:multifactorial 12/28/2011    Abnormal blood smear 11/02/2011    Abnormal CBC 11/02/2011    Family history of lung cancer 11/02/2011    RLS (restless legs syndrome) 10/19/2011    Leg pain, bilateral 10/19/2011    Essential hypertension, benign 09/28/2011    Long-term (current) use of anticoagulants, INR goal 2.5-3.5 08/24/2011    Anxiety disorder 08/24/2011    Lumbar disc disease 04/06/2011    Leg pain, left 03/31/2011    Dyslipidemia 07/22/2010    Aortic valve replaced 05/12/2010       Plan:   Continue current medications. Core Measures:  · Discharge instructions:   · LVEF documented:   · ACEI for LV dysfunction:   ·    Have a good diuresis. Clinically improved. Wants to go home. I see no contraindication to doing so. Discussed with Dr. Ynes Hampton. To return to her usual doses of diuresis but I did recommend she see her doctor within 7 days of discharge. Thanks for allowing me  the opportunity to participate in the evaluation and care of your patient.  If there are questions please call me [phone]    This note was likely completed using voice recognition technology and may contain unintended grammatical, phraseology and/or punctuation  errors      Lolita Jamison MD, M.D.,[facility]  8/1/2021 12:33 PM

## 2021-08-01 NOTE — DISCHARGE SUMMARY
Hospital Medicine Discharge Summary    Patient ID: Jai Prajapati      Patient's PCP: Charlie Kirkpatrick MD    Admit Date: 7/29/2021     Discharge Date: 8/1/2021 08/01/21    Admitting Physician: No admitting provider for patient encounter. Discharge Physician: Bib Hagen MD     Discharge Diagnoses: Active Hospital Problems    Diagnosis Date Noted    Acute on chronic diastolic HF (heart failure) ([facility] [address] [facility] [I50.33] 07/29/2021       The patient was seen and examined on day of discharge and this discharge summary is in conjunction with any daily progress note from day of discharge. Hospital Course:   Patient was admitted and treated for following:    # AOC diastolic HF  Pt mentioned that the R leg is always slightly more swollen than left, has had multiple negative ultrasounds. Patient was given IV Lasix. Intake output was monitored. Patient was placed on telemetry. Echo was ordered. Cardiology was consulted. Echo showed an EF of 55 to 60% with grade 3 diastolic dysfunction. Patient diuresed well. IV Lasix was discontinued. Patient to resume home chlorthalidone on 08/02/2021 and follow-up with cardiology.     #FOX on CKD 3:  Baseline creatinine around 1.1-1.2. Creatinine was 1.4 on admission. Lisinopril was held. Patient was given IV Lasix. Creatinine increased to 1.7. Lasix was discontinued. Creatinine improved to 1.4. Patient to resume chlorthalidone/lisinopril on 08/02/2021 and follow-up with PCP     # Lymphoma  # AOC anemia  Requires intermittent transfusions at baseline. Hemoglobin was noted to be low and patient received pRBC transfusion 07/29/21. Hemoglobin remained stable. Patient to follow-up with PCP/oncology     #Atrophic glossitis   Patient complained of tongue numbness. Tongue appears to be smooth without papilla on examination. Likely secondary to anemia.   Patient to follow-up with PCP    # Atrial fibrillation  # H/o aortic and mitral valve replacement  Patient had pacemaker in place. Metoprolol and warfarin was continued.     # HTN urgency/hypertension  Poorly controlled. Amlodipine and metoprolol was continued. Patient received IV Lasix. Lisinopril has helped with concern for FOX. Patient blood pressure normalized. Patient to resume lisinopril on 08/02/2021.     # HLD  Statin was continued     # T2DM  Home medication were held. Carb controlled diet with sliding scale insulin was continued. Patient resume home meds on discharge.     # COPD  Breathing treatments as needed were continued     # RLS  # Anxiety  Xanax discontinued    Physical Exam Performed:     BP (!) 156/49   Pulse 65   Temp 97.9 °F (36.6 °C) (Oral)   Resp 16   Ht 4' 11\" (1.499 m)   Wt 107 lb (48.5 kg)   SpO2 100%   BMI 21.61 kg/m²     General appearance: No apparent distress, appears stated age and cooperative. HEENT: Pupils equal, round, and reactive to light. Conjunctivae/corneas clear. Tongue appears smooth without papilla  Neck: Supple, with full range of motion. No jugular venous distention. Trachea midline. Respiratory:  Normal respiratory effort. Clear to auscultation, bilaterally without Rales/Wheezes/Rhonchi. Cardiovascular: Regular rate and rhythm with normal S1/S2 without murmurs, rubs or gallops. Abdomen: Soft, non-tender, non-distended with normal bowel sounds. Musculoskeletal: Bilateral lower extremity 2+ edema, improving  Skin: Skin color, texture, turgor normal.  No rashes or lesions. Neurologic:  Neurovascularly intact without any focal sensory/motor deficits. Cranial nerves: II-XII intact, grossly non-focal.  Psychiatric: Alert and oriented, thought content appropriate, normal insight  Capillary Refill: Brisk,< 3 seconds   Peripheral Pulses: +2 palpable, equal bilaterally        Labs:  For convenience and continuity at follow-up the following most recent labs are provided:      CBC:    Lab Results   Component Value Date    WBC 7.4 08/01/2021    HGB 8.2 08/01/2021    HCT 22.9 08/01/2021     08/01/2021       Renal:    Lab Results   Component Value Date     08/01/2021    K 3.8 08/01/2021    CL 93 08/01/2021    CO2 26 08/01/2021    BUN 25 08/01/2021    CREATININE 1.4 08/01/2021    CALCIUM 7.7 08/01/2021         Significant Diagnostic Studies    Radiology:   XR CHEST PORTABLE   Final Result      Cardiomegaly. No acute pulmonary disease. Consults:     IP CONSULT TO HOSPITALIST  IP CONSULT TO CARDIOLOGY  IP CONSULT TO PHARMACY    Disposition:  Home    Condition at Discharge: Stable    Discharge Instructions/Follow-up:  Monitor blood pressure and weight  Do not take lisinopril or chlorthalidone till tomorrow. Resume 08/02/21  Follow up with cardiology  Follow up with PCP    Code Status:  Full Code     Activity: activity as tolerated    Diet: Cardiac/diabetic diet      Discharge Medications:     Discharge Medication List as of 8/1/2021 11:55 AM           Details   lisinopril (PRINIVIL;ZESTRIL) 40 MG tablet Take 1 tablet by mouth daily, Disp-90 tablet, R-0Normal      chlorthalidone (HYGROTON) 25 MG tablet Take 1 tablet by mouth daily, Disp-90 tablet, R-0Normal              Details   !! TRUE METRIX BLOOD GLUCOSE TEST strip USE TO TEST BLOOD SUGAR TWICE DAILY, Disp-100 strip, R-2Normal      Multiple Vitamins-Minerals (CENTRUM SILVER 50+WOMEN) TABS Take by mouthHistorical Med      Biotin 03906 MCG TABS Take by mouthHistorical Med      Turmeric 500 MG CAPS Take by mouthHistorical Med      ELDERBERRY PO Take by mouthHistorical Med      zinc gluconate 50 MG tablet Take 50 mg by mouth dailyHistorical Med      acetaminophen-codeine (TYLENOL #3) 300-30 MG per tablet Take 1 tablet by mouth nightly as needed for Pain for up to 90 days. , Disp-90 tablet, R-0Normal      metFORMIN (GLUCOPHAGE) 1000 MG tablet TAKE 1 TABLET BY MOUTH TWICE DAILY, Disp-180 tablet, R-0Normal      atorvastatin (LIPITOR) 10 MG tablet TAKE 1 TABLET BY MOUTH DAILY IN THE EVENING FOR CHOLESTEROL, Disp-90 tablet, R-0Normal      ALPRAZolam (XANAX) 0.5 MG tablet TAKE 1 TABLET BY MOUTH THREE TIMES DAILY AS NEEDED, Disp-90 tablet, R-0Normal      !! warfarin (COUMADIN) 3 MG tablet TAKE 1 TABLET BY MOUTH AS DIRECTED, Disp-90 tablet, R-1Normal      pantoprazole (PROTONIX) 40 MG tablet TAKE 1 TABLET BY MOUTH EVERY DAY, Disp-90 tablet, R-0Normal      !! warfarin (COUMADIN) 1 MG tablet TAKE AS DIRECTED, Disp-270 tablet, R-1**Patient requests 90 days supply**Normal      glipiZIDE (GLUCOTROL) 5 MG tablet TAKE 1/2 TABLET BY MOUTH EVERY DAY WITH FOOD, Disp-45 tablet, R-2Normal      metoprolol tartrate (LOPRESSOR) 25 MG tablet Take 12.5 mg by mouth 2 times daily 0.5 tab BID Historical Med      Blood Pressure KIT Disp-1 kit,R-0, NormalDispense bp machine x 1      amLODIPine (NORVASC) 10 MG tablet TAKE 1 TABLET BY MOUTH DAILY, Disp-90 tablet,R-0Normal      ciclopirox (PENLAC) 8 % solution Apply topically nightly., Disp-1 Bottle, R-1, Normal      !! blood glucose test strips (TRUE METRIX BLOOD GLUCOSE TEST) strip Disp-180 strip, R-0, NormalUSE TO TEST TWICE DAILY AS DIRECTED      promethazine (PHENERGAN) 12.5 MG tablet TAKE ONE TABLET BY MOUTH EVERY 6 HOURS AS NEEDED, Disp-28 tablet, R-0      !! warfarin (COUMADIN) 4 MG tablet TAKE 1 TABLET BY MOUTH DAILY, Disp-30 tablet, R-0Take as directed. hydroxychloroquine (PLAQUENIL) 200 MG tablet Take  by mouth daily. !! - Potential duplicate medications found. Please discuss with provider. Time Spent on discharge is more than 30 minutes in the examination, evaluation, counseling and review of medications and discharge plan. Signed:    Guilherme Massey MD   8/1/2021      Thank you Marquise Nunez MD for the opportunity to be involved in this patient's care. If you have any questions or concerns please feel free to contact me at [phone].

## 2021-08-02 ENCOUNTER — TELEPHONE (OUTPATIENT)
Dept: FAMILY MEDICINE CLINIC | Age: 76
End: 2021-08-02

## 2021-08-02 ENCOUNTER — NURSE TRIAGE (OUTPATIENT)
Dept: OTHER | Facility: CLINIC | Age: 76
End: 2021-08-02

## 2021-08-02 ENCOUNTER — CARE COORDINATION (OUTPATIENT)
Dept: CASE MANAGEMENT | Age: 76
End: 2021-08-02

## 2021-08-02 NOTE — CARE COORDINATION
Date/Time:  8/2/2021 1:25 PM  Attempted to reach patient by telephone. Call within 2 business days of discharge: Yes, Left HIPPA compliant message requesting a return call. Will attempt to reach patient again.     Thank Alejandra Frias, RN  Care Transition Coordinator  Contact [phone]

## 2021-08-02 NOTE — TELEPHONE ENCOUNTER
Received call from [address] at [facility] with Red Flag Complaint. Brief description of triage: hospital follow up with leg swelling and CHF she is holding fluid around heart and short of breath this is not new and not worsened per patient     Triage indicates for patient to this week if anything worsens to call back     Care advice provided, patient verbalizes understanding; denies any other questions or concerns; instructed to call back for any new or worsening symptoms. Writer provided warm transfer to Carlo Piper at [facility] for appointment scheduling. Attention Provider: Thank you for allowing me to participate in the care of your patient. The patient was connected to triage in response to information provided to the [facility]. Please do not respond through this encounter as the response is not directed to a shared pool.     Reminders:     Call [address] Provider/Office    Care Advice - both instruction and documentation    Routing - PCP (and NP for 2nd level triage)    PLEASE DELETE ALL RED TEXT PRIOR TO SIGNING NOTE        Reason for Disposition   Caller has already spoken with the PCP (or office), and has no further questions    Protocols used: NO CONTACT OR DUPLICATE CONTACT CALL-ADULT-[place]

## 2021-08-02 NOTE — CARE COORDINATION
Patient contacted regarding COVID-19 risk. Care Transition Nurse contacted the patient by telephone to perform post discharge assessment. Call within 2 business days of discharge: Yes. Verified name and  with patient as identifiers. Provided introduction to self, and explanation of the CTN/ACM role, and reason for call due to risk factors for infection and/or exposure to COVID-19. Symptoms reviewed with patient who verbalized the following symptoms: fatigue. Due to no new or worsening symptoms encounter was not routed to provider for escalation. Discussed follow-up appointments. If no appointment was previously scheduled, appointment scheduling offered: Yes. [facility] follow up appointment(s):   Future Appointments   Date Time Provider Gilmar Broderick   2021 10:30 AM ALONSO De Souza - CNP [facility] [address] [facility] - [facility]   2021 10:00 AM [facility] CT RM 1 [facility] CT [facility]   2021 11:40 AM MD Farrah Young P/CC [facility]       Non-face-to-face services provided:  Obtained and reviewed discharge summary and/or continuity of care documents  Education of patient/family/caregiver/guardian to support self-management-. Assessment and support for treatment adherence and medication management-. Advance Care Planning:   Does patient have an Advance Directive:  not on file. Educated patient about risk for severe COVID-19 due to risk factors according to CDC guidelines. CTN reviewed discharge instructions, medical action plan and red flag symptoms with the patient who verbalized understanding. Discussed COVID vaccination status: Yes. Education provided on COVID-19 vaccination as appropriate. Discussed exposure protocols and quarantine with CDC Guidelines. Patient was given an opportunity to verbalize any questions and concerns and agrees to contact CTN or health care provider for questions related to their healthcare.     Reviewed and educated patient on any new and changed medications related to discharge diagnosis     Was patient discharged with a pulse oximeter? No Discussed and confirmed pulse oximeter discharge instructions and when to notify provider or seek emergency care. CTN spoke with patient this afternoon for initial 24 hour discharge follow up COVID -19 call. Patient states she is doing well, just really tired today. No reports of any fever, chills, nausea, vomiting, chest pain, SOB or cough. No difficulty with urination, BM's or appetite. Patient out picking up food and medications, at time of CTN call. Patient with questions regarding carbs for her diabetic diet, patient is agreeable to having dietician reach out and go over diet. No other issues or concerns at this time. CTN provided contact information. Plan for follow up call in 7-14 days based on severity of symptoms and risk factors.     Thank Mary Kay Jaeger RN  Care Transition Coordinator  Contact EMELY:[phone]

## 2021-08-02 NOTE — TELEPHONE ENCOUNTER
Last OV:  7/8/2021    ----- Message from Angela Mccarthy sent at 8/2/2021  9:16 AM EDT -----  Subject: Appointment Request    Reason for Call: Semi-Routine Return from RN Triage    QUESTIONS  Type of Appointment? Established Patient  Reason for appointment request? No appointments available during search  Additional Information for Provider? Patient was released from the ER   diagnosed with congestive heart failure, leg swelling and shortness of   breath.  ---------------------------------------------------------------------------  --------------  CALL BACK INFO  What is the best way for the office to contact you? OK to leave message on   voicemail  Preferred Call Back Phone Number? [phone]  ---------------------------------------------------------------------------  --------------  SCRIPT ANSWERS  Patient needs to be seen within 5 days? Yes  Nurse Name? Dulce  Have you been diagnosed with, awaiting test results for, or told that you   are suspected of having COVID-19 (Coronavirus)? (If patient has tested   negative or was tested as a requirement for work, school, or travel and   not based on symptoms, answer no)? No  Do you currently have flu-like symptoms including fever or chills, cough,   shortness of breath, difficulty breathing, or new loss of taste or smell? No  Have you had close contact with someone with COVID-19 in the last 14 days? No  (Service Expert - click yes below to proceed with [employer] As Usual   Scheduling)?  Yes

## 2021-08-04 ENCOUNTER — CARE COORDINATION (OUTPATIENT)
Dept: CARE COORDINATION | Age: 76
End: 2021-08-04

## 2021-08-04 DIAGNOSIS — F41.9 ANXIETY: ICD-10-CM

## 2021-08-04 NOTE — CARE COORDINATION
[address] [facility] regarding Dietitian referral. Pt's  answered, RD explained reason for call and role in care. Pt's  states patient is not home at time of call. RD will contact pt in one week and follow up as appropriate.          [address] [facility], [address] [place] [address]

## 2021-08-05 ENCOUNTER — OFFICE VISIT (OUTPATIENT)
Dept: INTERNAL MEDICINE CLINIC | Age: 76
End: 2021-08-05
Payer: MEDICARE

## 2021-08-05 VITALS
WEIGHT: 112 LBS | OXYGEN SATURATION: 63 % | BODY MASS INDEX: 22.62 KG/M2 | DIASTOLIC BLOOD PRESSURE: 56 MMHG | SYSTOLIC BLOOD PRESSURE: 130 MMHG | HEART RATE: 81 BPM

## 2021-08-05 DIAGNOSIS — C82.80 OTHER TYPE OF FOLLICULAR LYMPHOMA, UNSPECIFIED BODY REGION (HCC): ICD-10-CM

## 2021-08-05 DIAGNOSIS — E11.22 CONTROLLED TYPE 2 DIABETES MELLITUS WITH STAGE 3 CHRONIC KIDNEY DISEASE, WITHOUT LONG-TERM CURRENT USE OF INSULIN (HCC): ICD-10-CM

## 2021-08-05 DIAGNOSIS — N17.9 AKI (ACUTE KIDNEY INJURY) (HCC): Primary | ICD-10-CM

## 2021-08-05 DIAGNOSIS — Z00.00 ENCOUNTER FOR ANNUAL WELLNESS VISIT (AWV) IN MEDICARE PATIENT: ICD-10-CM

## 2021-08-05 DIAGNOSIS — E78.5 HYPERLIPIDEMIA WITH TARGET LDL LESS THAN 100: ICD-10-CM

## 2021-08-05 DIAGNOSIS — N18.30 CONTROLLED TYPE 2 DIABETES MELLITUS WITH STAGE 3 CHRONIC KIDNEY DISEASE, WITHOUT LONG-TERM CURRENT USE OF INSULIN (HCC): ICD-10-CM

## 2021-08-05 DIAGNOSIS — Z00.00 ROUTINE GENERAL MEDICAL EXAMINATION AT A HEALTH CARE FACILITY: ICD-10-CM

## 2021-08-05 DIAGNOSIS — I10 ESSENTIAL HYPERTENSION, BENIGN: ICD-10-CM

## 2021-08-05 DIAGNOSIS — F41.1 GENERALIZED ANXIETY DISORDER: ICD-10-CM

## 2021-08-05 DIAGNOSIS — I50.33 ACUTE ON CHRONIC DIASTOLIC HF (HEART FAILURE) (HCC): ICD-10-CM

## 2021-08-05 PROCEDURE — G0439 PPPS, SUBSEQ VISIT: HCPCS | Performed by: NURSE PRACTITIONER

## 2021-08-05 PROCEDURE — G8428 CUR MEDS NOT DOCUMENT: HCPCS | Performed by: NURSE PRACTITIONER

## 2021-08-05 PROCEDURE — 1111F DSCHRG MED/CURRENT MED MERGE: CPT | Performed by: NURSE PRACTITIONER

## 2021-08-05 PROCEDURE — G8400 PT W/DXA NO RESULTS DOC: HCPCS | Performed by: NURSE PRACTITIONER

## 2021-08-05 PROCEDURE — 1123F ACP DISCUSS/DSCN MKR DOCD: CPT | Performed by: NURSE PRACTITIONER

## 2021-08-05 PROCEDURE — 3046F HEMOGLOBIN A1C LEVEL >9.0%: CPT | Performed by: NURSE PRACTITIONER

## 2021-08-05 PROCEDURE — 4040F PNEUMOC VAC/ADMIN/RCVD: CPT | Performed by: NURSE PRACTITIONER

## 2021-08-05 PROCEDURE — 1036F TOBACCO NON-USER: CPT | Performed by: NURSE PRACTITIONER

## 2021-08-05 PROCEDURE — G8420 CALC BMI NORM PARAMETERS: HCPCS | Performed by: NURSE PRACTITIONER

## 2021-08-05 PROCEDURE — 3017F COLORECTAL CA SCREEN DOC REV: CPT | Performed by: NURSE PRACTITIONER

## 2021-08-05 PROCEDURE — 2022F DILAT RTA XM EVC RTNOPTHY: CPT | Performed by: NURSE PRACTITIONER

## 2021-08-05 PROCEDURE — 99214 OFFICE O/P EST MOD 30 MIN: CPT | Performed by: NURSE PRACTITIONER

## 2021-08-05 PROCEDURE — 1090F PRES/ABSN URINE INCON ASSESS: CPT | Performed by: NURSE PRACTITIONER

## 2021-08-05 RX ORDER — ALPRAZOLAM 0.5 MG/1
TABLET ORAL
Qty: 90 TABLET | Refills: 1 | Status: SHIPPED | OUTPATIENT
Start: 2021-08-05 | End: 2021-10-12 | Stop reason: SDUPTHER

## 2021-08-05 ASSESSMENT — PATIENT HEALTH QUESTIONNAIRE - PHQ9
1. LITTLE INTEREST OR PLEASURE IN DOING THINGS: 0
SUM OF ALL RESPONSES TO PHQ QUESTIONS 1-9: 0
2. FEELING DOWN, DEPRESSED OR HOPELESS: 0
DEPRESSION UNABLE TO ASSESS: FUNCTIONAL CAPACITY MOTIVATION LIMITS ACCURACY
SUM OF ALL RESPONSES TO PHQ QUESTIONS 1-9: 0
SUM OF ALL RESPONSES TO PHQ9 QUESTIONS 1 & 2: 0
DEPRESSION UNABLE TO ASSESS: FUNCTIONAL CAPACITY MOTIVATION LIMITS ACCURACY
2. FEELING DOWN, DEPRESSED OR HOPELESS: 0
1. LITTLE INTEREST OR PLEASURE IN DOING THINGS: 0
SUM OF ALL RESPONSES TO PHQ9 QUESTIONS 1 & 2: 0

## 2021-08-05 ASSESSMENT — LIFESTYLE VARIABLES: HOW OFTEN DO YOU HAVE A DRINK CONTAINING ALCOHOL: 0

## 2021-08-05 NOTE — ASSESSMENT & PLAN NOTE
Status post 1 unit PRBCs transfusion last week. Hemoglobin at discharge appeared stable repeat CBC.   Continue to monitor and follow with hematology

## 2021-08-05 NOTE — PROGRESS NOTES
Medicare Annual Wellness Visit  Name: Rosa Kern Date: 2021   MRN: [id number] Sex: Female   Age: 77 y.o. Ethnicity: Non- / Non    : 1945 Race: White (non-)      Hetal Ferrari is here for Annual Exam  In hospital follow-up. She is a former patient of Dr. Kati Jean. She presented to the hospital on  with bilateral lower extremity swelling. States that this is been worsening over the past several weeks prior to admission to hospital.  She reports that several weeks prior she developed a cough and was seen in urgent care and given antibiotics and steroids with a cough suppressant. She then returned a week later was given another round of antibiotics and steroids. She admitted to steroids about a week ago and overall her cough has improved but not resolved. She has a history of chronic kidney disease, hypertension, and diabetes. She is on chronic Coumadin with history of aortic and mitral valve replacement status post pacemaker. She notices that she has been sleeping with an extra pillow at night due to the shortness of breath and cough. EKG showed paced rhythm given pacemaker. Echocardiogram showed normal systolic function with EF of 55% but did suggest a grade 3 diastolic dysfunction. She had an elevated BNP and cardiomegaly on chest x-ray. She did have anemia on admission was given a unit of blood slowly with 40 mg of IV Lasix due to concern of fluid overload. W while admitted to the hospital she was diuresed appropriately and discharged to continue p.o. chlorthalidone and follow-up with cardiology. While inpatient she did have acute kidney injury but improved prior to discharge. She has a history of lymphoma and followed with oncology. After transfusion hemoglobin appeared back at baseline. She states she is feeling well without any concerns. Her weights have been stable at home. She has follow-up coming up with cardiology.   Screenings for behavioral, psychosocial and functional/safety risks, and cognitive dysfunction are all negative except as indicated below. These results, as well as other patient data from the [address] form, are documented in Flowsheets linked to this Encounter. Allergies   Allergen Reactions    Diclofenac Other (See Comments)     Bleed/colitis    Nsaids Other (See Comments)     CANNOT TAKE D/T GI BLEEDING AND USE OF COUMADIN    Hydrocodone-Acetaminophen Anxiety         Prior to Visit Medications    Medication Sig Taking? Authorizing Provider   lisinopril (PRINIVIL;ZESTRIL) 40 MG tablet Take 1 tablet by mouth daily Yes Joie Lara MD   TRUE METRIX BLOOD GLUCOSE TEST strip USE TO TEST BLOOD SUGAR TWICE DAILY Yes Sharilyn Sicard, MD   Multiple Vitamins-Minerals (CENTRUM SILVER 50+WOMEN) TABS Take by mouth Yes Historical Provider, MD   Biotin 95713 MCG TABS Take by mouth Yes Historical Provider, MD   Turmeric 500 MG CAPS Take by mouth Yes Historical Provider, MD   ELDERBERRY PO Take by mouth Yes Historical Provider, MD   zinc gluconate 50 MG tablet Take 50 mg by mouth daily Yes Historical Provider, MD   acetaminophen-codeine (TYLENOL #3) 300-30 MG per tablet Take 1 tablet by mouth nightly as needed for Pain for up to 90 days.  Yes Sharilyn Sicard, MD   metFORMIN (GLUCOPHAGE) 1000 MG tablet TAKE 1 TABLET BY MOUTH TWICE DAILY Yes Sharilyn Sicard, MD   atorvastatin (LIPITOR) 10 MG tablet TAKE 1 TABLET BY MOUTH DAILY IN THE EVENING FOR CHOLESTEROL Yes Sharilyn Sicard, MD   warfarin (COUMADIN) 3 MG tablet TAKE 1 TABLET BY MOUTH AS DIRECTED Yes Sharilyn Sicard, MD   pantoprazole (PROTONIX) 40 MG tablet TAKE 1 TABLET BY MOUTH EVERY DAY Yes Sharilyn Sicard, MD   warfarin (COUMADIN) 1 MG tablet TAKE AS DIRECTED  Patient taking differently: daily TAKE   2.5 mg on Sundays and Tuesday  2 mg on Monday, Wednesday, Thursday, Friday and Saturday Yes Sharilyn Sicard, MD   glipiZIDE (GLUCOTROL) 5 MG tablet TAKE 1/2 TABLET BY MOUTH EVERY DAY WITH FOOD Yes Bridget Lima MD   metoprolol tartrate (LOPRESSOR) 25 MG tablet Take 12.5 mg by mouth 2 times daily 0.5 tab BID  Yes Historical Provider, MD   Blood Pressure KIT Dispense bp machine x 1 Yes Jed Jon MD   amLODIPine (NORVASC) 10 MG tablet TAKE 1 TABLET BY MOUTH DAILY Yes Jed Jon MD   ciclopirox (PENLAC) 8 % solution Apply topically nightly. Yes Akanksha Jon MD   blood glucose test strips (TRUE METRIX BLOOD GLUCOSE TEST) strip USE TO TEST TWICE DAILY AS DIRECTED Yes Jed Jon MD   promethazine (PHENERGAN) 12.5 MG tablet TAKE ONE TABLET BY MOUTH EVERY 6 HOURS AS NEEDED Yes Jed Jon MD   warfarin (COUMADIN) 4 MG tablet TAKE 1 TABLET BY MOUTH DAILY Yes Jed Jon MD   hydroxychloroquine (PLAQUENIL) 200 MG tablet Take  by mouth daily. Yes Historical Provider, MD   ALPRAZolam (XANAX) 0.5 MG tablet TAKE 1 TABLET BY MOUTH THREE TIMES DAILY AS NEEDED  Sandra White MD   chlorthalidone (HYGROTON) 25 MG tablet Take 1 tablet by mouth daily  Zoya Garcia MD         Past Medical History:   Diagnosis Date    A-fib [facility])     Anemia     multifactorial:under care of heme-onc:Dr. Jaun Bae Anemia:multifactorial 2011    as per heme-onc    Anxiety     Cataract     bilateral    Chronic back pain     Under care of ortho spine:Dr. Mildred Pa    CKD (chronic kidney disease) stage 3, GFR 30-59 ml/min 1/2012    Colitis, ischemic ([facility] [address] 6/2012    Under care of Dr. Diana Michelle    COPD     Diabetes     Diabetic eye exam ([facility] [address] 1/28/15    CEI    GERD (gastroesophageal reflux disease)     Hx of mammogram 05/23/2018    negative:see scanned report.     Hyperlipidaemia LDL goal <100     Hypertension     Insomnia     Long-term (current) use of anticoagulants, INR goal 2.5-3.5     Lymphoma:monoclonal B cell 1/2012    Under care of Dr. Joelle Mills abnormal 7/30/15    Right breast mass:benign(?lipoma)per US:repeat testing in 6mos=1/30/16    Nonspecific abnormal results of kidney function study 1/25/2012    Osteoarthritis     Renal cysts, right 1/2/2014    RLS (restless legs syndrome) 10/19/11    Sciatica     Screening colonoscopy 2010;6/19/13    Nml. Next 10yrs:6/2023:Dr. Linda Padilla. 9/22/16(Dr. Waddell):nml EGD & colonoscopy except mild diverticulosis    Therapeutic drug monitoring 04/10/2015    OARRS report consistent on 4/10/15;7/6/15;10/23/15;2/7/16;5/16/16;8/19/16;11/4/16;3/6/17;6/26/17;9/20/17;12/18/17; 12/18/17;3/12/18;5/29/18    Valvular heart disease     s/p aortic and mitral valve repair. Under care of cards:Dr. Elsy Gamino.  Visit for screening mammogram 04/10/2017    negative:see scanned report.        Past Surgical History:   Procedure Laterality Date    AORTIC VALVE REPLACEMENT      CATARACT REMOVAL  12/11/13;1/8/14    Right;left respectively    COLONOSCOPY      \"twilight sleep didnt work\"    HYSTERECTOMY      Fibroids    LAPAROSCOPY  2/3/15    SBO:converted to open for lysis of adhesions    MITRAL VALVE REPLACEMENT      PACEMAKER PLACEMENT  8/07    for bradycardia, sympony    TUNNELED VENOUS PORT PLACEMENT Right 10/3/2014    ATTEMPTED RIGHT SUBCLAVIEN VEIN, PLACED RIGHT INTERNAL         Family History   Problem Relation Age of Onset    Diabetes Brother     Lung Cancer Mother         Smoker       CareTeam (Including outside providers/suppliers regularly involved in providing care):   Patient Care Team:  Mk Cohen MD as PCP - General  Mk Cohen MD as PCP - [facility] Empaneled Provider  Marie Shafer RD, LD as Diabetic Educator (Dietitian)  Rekha Carlson MD as Consulting Physician (Pulmonology)  Zuleyma Woods RN as Care Transitions Nurse    Wt Readings from Last 3 Encounters:   08/05/21 112 lb (50.8 kg)   07/31/21 107 lb (48.5 kg)   01/27/21 115 lb (52.2 kg)     Vitals:    08/05/21 1039   BP: (!) 130/56   Site: Left Upper Arm   Position: Sitting   Cuff Size: Medium Adult   Pulse: 81   SpO2: (!) 63%   Weight: 112 lb (50.8 kg) Body mass index is 22.62 kg/m². Based upon direct observation of the patient, evaluation of cognition reveals recent and remote memory intact. Review of Systems    General Appearance: alert and oriented to person, place and time, well developed and well- nourished, in no acute distress  Skin: warm and dry, no rash or erythema  Head: normocephalic and atraumatic  Eyes: pupils equal, round, and reactive to light, extraocular eye movements intact, conjunctivae normal  ENT: tympanic membrane, external ear and ear canal normal bilaterally, nose without deformity, nasal mucosa and turbinates normal without polyps  Neck: supple and non-tender without mass, no thyromegaly or thyroid nodules, no cervical lymphadenopathy  Pulmonary/Chest: clear to auscultation bilaterally- no wheezes, rales or rhonchi, normal air movement, no respiratory distress  Cardiovascular: normal rate, regular rhythm, normal S1 and S2, no murmurs, rubs, clicks, or gallops, distal pulses intact, no carotid bruits  Abdomen: soft, non-tender, non-distended, normal bowel sounds, no masses or organomegaly  Extremities: no cyanosis, clubbing or edema  Musculoskeletal: normal range of motion, no joint swelling, deformity or tenderness  Neurologic: reflexes normal and symmetric, no cranial nerve deficit, gait, coordination and speech normal    Patient's complete Health Risk Assessment and screening values have been reviewed and are found in Flowsheets. The following problems were reviewed today and where indicated follow up appointments were made and/or referrals ordered.     Positive Risk Factor Screenings with Interventions:       Depression:  Depression Unable to Assess: Functional capacity motivation limits accuracy  PHQ-2 Score: 0  PHQ-9 Total Score: 0    Severity:1-4 = minimal depression, 5-9 = mild depression, 10-14 = moderate depression, 15-19 = moderately severe depression, 20-27 = severe depression        General Health and ACP:  General  In general, how would you say your health is?: Fair  In the past 7 days, have you experienced any of the following?  New or Increased Pain, New or Increased Fatigue, Loneliness, Social Isolation, Stress or Anger?: None of These  Do you get the social and emotional support that you need?: Yes  Do you have a Living Will?: Yes  Advance Directives     Power of  Living Will ACP-Advance Directive ACP-Power of     Not on File Not on File Not on File Not on File      General Health Risk Interventions:  · No concerns    Health Habits/Nutrition:  Health Habits/Nutrition  Do you exercise for at least 20 minutes 2-3 times per week?: (!) No  Have you lost any weight without trying in the past 3 months?: No  Do you eat only one meal per day?: No  Have you seen the dentist within the past year?: Yes     Health Habits/Nutrition Interventions:  · Inadequate physical activity:  patient agrees to exercise for at least 150 minutes/week       Personalized Preventive Plan   Current Health Maintenance Status  Immunization History   Administered Date(s) Administered    COVID-19, Quijano Peter, PF, 30mcg/0.3mL 01/14/2021, 02/04/2021    Influenza Nasal 09/04/2010    Influenza Vaccine, unspecified formulation 09/21/2015    Influenza Virus Vaccine 11/10/2007, 08/16/2011, 09/10/2013, 10/16/2017, 10/10/2018    Influenza, High Dose (Fluzone 65 yrs and older) 10/11/2016, 10/02/2019    Influenza, High-dose, Quadv, 65 yrs +, IM (Fluzone) 10/06/2020    Influenza, Live, Intranasal, Quadv, (Flumist 2-49 yrs) 09/04/2010    Influenza, Triv, inactivated, subunit, adjuvanted, IM (Fluad 65 yrs and older) 09/26/2017, 10/02/2018, 09/25/2019    Pneumococcal Conjugate 13-valent (Oydlbur27) 09/21/2015    Pneumococcal Polysaccharide ([id number]) 12/28/2011        Health Maintenance   Topic Date Due    Hepatitis C screen  Never done    DTaP/Tdap/Td vaccine (1 - Tdap) Never done    Diabetic foot exam  01/30/2016   [facility] Annual Wellness Visit (AWV) Never done    Lipid screen  01/29/2021    A1C test (Diabetic or Prediabetic)  06/16/2021    Flu vaccine (1) 09/01/2021    Low dose CT lung screening  01/06/2022    Diabetic retinal exam  03/15/2022    Potassium monitoring  08/01/2022    Creatinine monitoring  08/01/2022    Colon cancer screen colonoscopy  09/22/2026    DEXA (modify frequency per FRAX score)  Completed    Shingles Vaccine  Completed    Pneumococcal 65+ yrs at Risk Vaccine  Completed    COVID-19 Vaccine  Completed    Hepatitis A vaccine  Aged Out    Hib vaccine  Aged Out    Meningococcal (ACWY) vaccine  Aged Out     Recommendations for [employer] Due: see orders and patient instructions/AVS.  . Recommended screening schedule for the next 5-10 years is provided to the patient in written form: see Patient Instructions/AVS.    Encounter for annual wellness visit (AWV) in Medicare patient   Well exam in office today labs ordered. She is up-to-date on other health maintenance metrics. Hyperlipidemia with target LDL less than 100   Check labs at this time continue Lipitor continue to monitor. Increase medication as indicated per labs. Lymphoma:monoclonal B cell   Status post 1 unit PRBCs transfusion last week. Hemoglobin at discharge appeared stable repeat CBC. Continue to monitor and follow with hematology    Essential hypertension, benign   Her blood pressure remained stable no concerns continue current treatment plan. Anxiety disorder   71 well controlled on current medication she is requesting refill of Xanax. Controlled type 2 diabetes mellitus with stage 3 chronic kidney disease, without long-term current use of insulin ([address]   Due for A1c this is included labs at this time continue current treatment plan without any changes. Following with labs for kidney function as well.     Acute on chronic diastolic HF (heart failure) ([address]   Weights have remained stable she is doing well on current treatment plan no shortness of breath or increased leg swelling continue to monitor along with cardiology.

## 2021-08-05 NOTE — ASSESSMENT & PLAN NOTE
Due for A1c this is included labs at this time continue current treatment plan without any changes. Following with labs for kidney function as well.

## 2021-08-05 NOTE — PATIENT INSTRUCTIONS
Personalized Preventive Plan for Bry Frost - 8/5/2021  Medicare offers a range of preventive health benefits. Some of the tests and screenings are paid in full while other may be subject to a deductible, co-insurance, and/or copay. Some of these benefits include a comprehensive review of your medical history including lifestyle, illnesses that may run in your family, and various assessments and screenings as appropriate. After reviewing your medical record and screening and assessments performed today your provider may have ordered immunizations, labs, imaging, and/or referrals for you. A list of these orders (if applicable) as well as your Preventive Care list are included within your After Visit Summary for your review. Other Preventive Recommendations:    · A preventive eye exam performed by an eye specialist is recommended every 1-2 years to screen for glaucoma; cataracts, macular degeneration, and other eye disorders. · A preventive dental visit is recommended every 6 months. · Try to get at least 150 minutes of exercise per week or 10,000 steps per day on a pedometer . · Order or download the FREE \"Exercise & Physical Activity: Your Everyday Guide\" from [employer]. Call 6-539.155.8702 or search [employer] online. · You need 2282-3565 mg of calcium and 8760-6492 IU of vitamin D per day. It is possible to meet your calcium requirement with diet alone, but a vitamin D supplement is usually necessary to meet this goal.  · When exposed to the sun, use a sunscreen that protects against both UVA and UVB radiation with an SPF of 30 or greater. Reapply every 2 to 3 hours or after sweating, drying off with a towel, or swimming. · Always wear a seat belt when traveling in a car. Always wear a helmet when riding a bicycle or motorcycle.

## 2021-08-05 NOTE — ASSESSMENT & PLAN NOTE
Weights have remained stable she is doing well on current treatment plan no shortness of breath or increased leg swelling continue to monitor along with cardiology.

## 2021-08-05 NOTE — ASSESSMENT & PLAN NOTE
Check labs at this time continue Lipitor continue to monitor. Increase medication as indicated per labs.

## 2021-08-06 DIAGNOSIS — N17.9 AKI (ACUTE KIDNEY INJURY) (HCC): Primary | ICD-10-CM

## 2021-08-09 ENCOUNTER — CARE COORDINATION (OUTPATIENT)
Dept: CARE COORDINATION | Age: 76
End: 2021-08-09

## 2021-08-09 NOTE — CARE COORDINATION
[facility] regarding Dietitian referral. Pt answered, RD explained reason for call and role in care. Pt requested RD call back on a different day- pt prefers a call tomorrow 8/10. Will contact pt at this time and follow up as appropriate.          [address] [facility], [address] [place] [address]

## 2021-08-10 NOTE — CARE COORDINATION
Contacted Nedra Perez and left voicemail regarding Dietitian referral. Left call back number. RD outreached 8/4, 8/9 and today 8/10. RD will notify CTN, Umang Stanley. RD will continue to follow/assist with patient return call.        [address] [facility], [address] [place] [address]

## 2021-08-13 ENCOUNTER — ANTI-COAG VISIT (OUTPATIENT)
Dept: FAMILY MEDICINE CLINIC | Age: 76
End: 2021-08-13

## 2021-08-13 DIAGNOSIS — Z95.2 AORTIC VALVE REPLACED: Primary | ICD-10-CM

## 2021-08-13 LAB — INR BLD: 1.8

## 2021-08-16 ENCOUNTER — TELEPHONE (OUTPATIENT)
Dept: FAMILY MEDICINE CLINIC | Age: 76
End: 2021-08-16

## 2021-08-16 NOTE — TELEPHONE ENCOUNTER
----- Message from Mike Hu sent at 8/16/2021 10:13 AM EDT -----  Subject: Message to Provider    QUESTIONS  Information for Provider? Patient INR was low and was instructed by   someone in Dr. Buddy Cortez office the dosage to take for Friday, Saturday and   Sunday, she was told to call back and check up on Monday to see what her   dosage is for the rest of the week.   ---------------------------------------------------------------------------  --------------  [address]  What is the best way for the office to contact you? OK to leave message on   voicemail  Preferred Call Back Phone Number? [phone]  ---------------------------------------------------------------------------  --------------  SCRIPT ANSWERS  Relationship to Patient?  Self

## 2021-08-17 ENCOUNTER — OFFICE VISIT (OUTPATIENT)
Dept: PULMONOLOGY | Age: 76
End: 2021-08-17
Payer: MEDICARE

## 2021-08-17 ENCOUNTER — HOSPITAL ENCOUNTER (OUTPATIENT)
Dept: CT IMAGING | Age: 76
Discharge: HOME | End: 2021-08-17
Payer: MEDICARE

## 2021-08-17 VITALS
HEART RATE: 72 BPM | BODY MASS INDEX: 22.98 KG/M2 | DIASTOLIC BLOOD PRESSURE: 64 MMHG | TEMPERATURE: 96.8 F | SYSTOLIC BLOOD PRESSURE: 122 MMHG | OXYGEN SATURATION: 100 % | WEIGHT: 114 LBS | HEIGHT: 59 IN

## 2021-08-17 DIAGNOSIS — R91.8 PULMONARY NODULES: Primary | ICD-10-CM

## 2021-08-17 DIAGNOSIS — Z87.891 HISTORY OF TOBACCO USE: ICD-10-CM

## 2021-08-17 DIAGNOSIS — J44.9 COPD, MILD (HCC): ICD-10-CM

## 2021-08-17 DIAGNOSIS — R91.8 PULMONARY NODULES: ICD-10-CM

## 2021-08-17 PROCEDURE — 1123F ACP DISCUSS/DSCN MKR DOCD: CPT | Performed by: INTERNAL MEDICINE

## 2021-08-17 PROCEDURE — 1036F TOBACCO NON-USER: CPT | Performed by: INTERNAL MEDICINE

## 2021-08-17 PROCEDURE — G8420 CALC BMI NORM PARAMETERS: HCPCS | Performed by: INTERNAL MEDICINE

## 2021-08-17 PROCEDURE — 71250 CT THORAX DX C-: CPT

## 2021-08-17 PROCEDURE — 4040F PNEUMOC VAC/ADMIN/RCVD: CPT | Performed by: INTERNAL MEDICINE

## 2021-08-17 PROCEDURE — G8926 SPIRO NO PERF OR DOC: HCPCS | Performed by: INTERNAL MEDICINE

## 2021-08-17 PROCEDURE — 99214 OFFICE O/P EST MOD 30 MIN: CPT | Performed by: INTERNAL MEDICINE

## 2021-08-17 PROCEDURE — G8400 PT W/DXA NO RESULTS DOC: HCPCS | Performed by: INTERNAL MEDICINE

## 2021-08-17 PROCEDURE — 3023F SPIROM DOC REV: CPT | Performed by: INTERNAL MEDICINE

## 2021-08-17 PROCEDURE — G8427 DOCREV CUR MEDS BY ELIG CLIN: HCPCS | Performed by: INTERNAL MEDICINE

## 2021-08-17 PROCEDURE — 1111F DSCHRG MED/CURRENT MED MERGE: CPT | Performed by: INTERNAL MEDICINE

## 2021-08-17 PROCEDURE — 1090F PRES/ABSN URINE INCON ASSESS: CPT | Performed by: INTERNAL MEDICINE

## 2021-08-17 PROCEDURE — 3017F COLORECTAL CA SCREEN DOC REV: CPT | Performed by: INTERNAL MEDICINE

## 2021-08-17 NOTE — PROGRESS NOTES
[facility] Pulmonary and Critical Care    Outpatient Follow Up Note    Subjective:   CHIEF COMPLAINT / HPI:     The patient is 77 y.o. female who is here for 6-month follow-up of multiple pulmonary nodules, mediastinal adenopathy, and mild COPD. Since last visit she has been diagnosed with CKD and heart failure. She states that she is not had any breathing problems and denies any cough. She had a CT chest earlier today    1/27/2021  Aureliano Amaro presents today for follow-up of multiple pulmonary nodules, mediastinal lymphadenopathy, and mild COPD. She recently had a CT chest for follow-up of her abnormalities. Patient feels well and denies any fevers, chills, night sweats, anorexia, weight loss, dyspnea, cough, wheezing, or chest tightness    11/11/2020  Aureliano Amaro presents today for a new patient visit for evaluation of multiple pulmonary nodules and precarinal lymphadenopathy. The largest nodule has had a small increase since previous imaging and now measures approximately 11 mm. Her precarinal lymphadenopathy has mildly enlarged as well. She has a history of COPD but no PFTs on file. She states that it is mild and she has a rescue albuterol that she rarely ever uses. She does have a 20-pack-year history but quit smoking 16 years ago. She has a remote history of B-cell lymphoma and is followed by Dr. Carolina Clark.     Patient feels well and denies any fevers, chills, night sweats, anorexia, weight loss, dyspnea, cough, wheezing, or chest tightness    Past Medical History:    Past Medical History:   Diagnosis Date    A-fib ([facility] [address]     Anemia     multifactorial:under care of heme-onc:Dr. Harsha Mullen Anemia:multifactorial 2011    as per heme-onc    Anxiety     Cataract     bilateral    Chronic back pain     Under care of ortho spine:Dr. Mellissa Garay    CKD (chronic kidney disease) stage 3, GFR 30-59 ml/min ([address] 1/2012    Colitis, ischemic ([facility] [address] 6/2012    Under care of Dr. Esha FRASER     Diabetes  Diabetic eye exam ([facility] [address] 1/28/15    CEI    GERD (gastroesophageal reflux disease)     Hx of mammogram 05/23/2018    negative:see scanned report.  Hyperlipidaemia LDL goal <100     Hypertension     Insomnia     Long-term (current) use of anticoagulants, INR goal 2.5-3.5     Lymphoma:monoclonal B cell 1/2012    Under care of Dr. Toby Ramos abnormal 7/30/15    Right breast mass:benign(?lipoma)per US:repeat testing in 6mos=1/30/16    Nonspecific abnormal results of kidney function study 1/25/2012    Osteoarthritis     Renal cysts, right 1/2/2014    RLS (restless legs syndrome) 10/19/11    Sciatica     Screening colonoscopy 2010;6/19/13    Nml. Next 10yrs:6/2023:Dr. Jimi Cheema. 9/22/16(Dr. Waddell):nml EGD & colonoscopy except mild diverticulosis    Therapeutic drug monitoring 04/10/2015    OARRS report consistent on 4/10/15;7/6/15;10/23/15;2/7/16;5/16/16;8/19/16;11/4/16;3/6/17;6/26/17;9/20/17;12/18/17; 12/18/17;3/12/18;5/29/18    Valvular heart disease     s/p aortic and mitral valve repair. Under care of cards:Dr. Hakan Pope.  Visit for screening mammogram 04/10/2017    negative:see scanned report. Social History:    Patient recently retired. She was working part-time at [employer]. She is .   She smoked half a pack of cigarettes a day for 40 years but quit 16 years ago    Family History:  Lung cancer  Diabetes    Current Medications:  Current Outpatient Medications on File Prior to Visit   Medication Sig Dispense Refill    ALPRAZolam (XANAX) 0.5 MG tablet TAKE 1 TABLET BY MOUTH THREE TIMES DAILY AS NEEDED 90 tablet 1    lisinopril (PRINIVIL;ZESTRIL) 40 MG tablet Take 1 tablet by mouth daily 90 tablet 0    chlorthalidone (HYGROTON) 25 MG tablet Take 1 tablet by mouth daily 90 tablet 0    TRUE METRIX BLOOD GLUCOSE TEST strip USE TO TEST BLOOD SUGAR TWICE DAILY 100 strip 2    Multiple Vitamins-Minerals (CENTRUM SILVER 50+WOMEN) TABS Take by mouth  Biotin 44319 MCG TABS Take by mouth      Turmeric 500 MG CAPS Take by mouth      ELDERBERRY PO Take by mouth      zinc gluconate 50 MG tablet Take 50 mg by mouth daily      acetaminophen-codeine (TYLENOL #3) 300-30 MG per tablet Take 1 tablet by mouth nightly as needed for Pain for up to 90 days. 90 tablet 0    metFORMIN (GLUCOPHAGE) 1000 MG tablet TAKE 1 TABLET BY MOUTH TWICE DAILY 180 tablet 0    atorvastatin (LIPITOR) 10 MG tablet TAKE 1 TABLET BY MOUTH DAILY IN THE EVENING FOR CHOLESTEROL 90 tablet 0    pantoprazole (PROTONIX) 40 MG tablet TAKE 1 TABLET BY MOUTH EVERY DAY 90 tablet 0    warfarin (COUMADIN) 1 MG tablet TAKE AS DIRECTED (Patient taking differently: daily TAKE   2.5 mg on Sundays and Tuesday  2 mg on Monday, Wednesday, Thursday, Friday and Saturday) 270 tablet 1    glipiZIDE (GLUCOTROL) 5 MG tablet TAKE 1/2 TABLET BY MOUTH EVERY DAY WITH FOOD 45 tablet 2    metoprolol tartrate (LOPRESSOR) 25 MG tablet Take 12.5 mg by mouth 2 times daily 0.5 tab BID       Blood Pressure KIT Dispense bp machine x 1 1 kit 0    amLODIPine (NORVASC) 10 MG tablet TAKE 1 TABLET BY MOUTH DAILY 90 tablet 0    ciclopirox (PENLAC) 8 % solution Apply topically nightly. 1 Bottle 1    blood glucose test strips (TRUE METRIX BLOOD GLUCOSE TEST) strip USE TO TEST TWICE DAILY AS DIRECTED 180 strip 0    promethazine (PHENERGAN) 12.5 MG tablet TAKE ONE TABLET BY MOUTH EVERY 6 HOURS AS NEEDED 28 tablet 0    warfarin (COUMADIN) 4 MG tablet TAKE 1 TABLET BY MOUTH DAILY 30 tablet 0    hydroxychloroquine (PLAQUENIL) 200 MG tablet Take  by mouth daily.  warfarin (COUMADIN) 3 MG tablet TAKE 1 TABLET BY MOUTH AS DIRECTED (Patient not taking: Reported on 8/17/2021) 90 tablet 1     No current facility-administered medications on file prior to visit.      REVIEW OF SYSTEMS:    CONSTITUTIONAL: Negative for fevers and chills  HEENT: Negative for oropharyngeal exudate, post nasal drip, sinus pain / pressure, nasal congestion, ear pain  RESPIRATORY:  See HPI  CARDIOVASCULAR: Negative for chest pain, palpitations, edema  GASTROINTESTINAL: Negative for nausea, vomiting, diarrhea, constipation and abdominal pain  GENITOURINARY: Negative for dysuria, urinary frequency, urinary hesitancy  HEMATOLOGICAL: Negative for adenopathy  SKIN: Negative for clubbing, cyanosis, skin lesions  ENDOCRINE: Negative for polyuria, polydipsia, heat intolerance, cold intolerance   EXTREMITIES: Negative for weakness or decreased ROM in all extremities  NEUROLOGICAL: Negative for unilateral weakness, speech or gait abnormalities    Objective:   PHYSICAL EXAM:        VITALS:  /64 (Site: Left Upper Arm, Position: Sitting, Cuff Size: Medium Adult)   Pulse 72   Temp 96.8 °F (36 °C) (Infrared)   Ht 4' 11\" (1.499 m)   Wt 114 lb (51.7 kg)   SpO2 100%   Breastfeeding No   BMI 23.03 kg/m²   On room air  CONSTITUTIONAL:  Awake, alert, cooperative, no apparent distress, and appears stated age  HEENT: No oropharyngeal exudate, PERRL, no cervical adenopathy, no tracheal deviation, thyroid size normal  LUNGS:  No increased work of breathing and clear to auscultation, no crackles or wheezing  CARDIOVASCULAR:  normal S1 and S2 and no JVD  ABDOMEN:  Normal bowel sounds, non-distended and non-tender to palpation  EXT: No edema, no calf tenderness. Pulses are present bilaterally. NEUROLOGIC:  Mental Status Exam:  Level of Alertness:   awake  Orientation:   person, place, time. SKIN:  normal skin color, texture, turgor, no redness, warmth, or swelling     DATA:      Radiology Review:  Pertinent images / reports were reviewed as a part of this visit. CT chest August 17, 2021  Impression   1. Stable benign-appearing pulmonary nodules right lung. Follow-up in 12 months recommended. .   2. Resolution of ground glass airspace disease in the right upper lobe   3. Stable enlarged pretracheal lymph node.    4. Stable left breast outer tail asymmetric fibroglandular elements which are unchanged from mammography of 3/21/2012, benign         CT chest 1/06/2021  EXAM: CT CHEST WO CONTRAST       INDICATION: Pulmonary nodules, follow-up       COMPARISON: October 2020 and prior       TECHNIQUE: Axial CT imaging of the chest was performed. Axial images, multiplanar reformatted images and axial maximum intensity projection were reviewed.   Individualized dose optimization technique was used in order to meet ALARA standards for    radiation dose reduction.  In addition to vendor specific dose reduction algorithms, the dose reduction techniques vary based on the specific scanner utilized but frequently include automated exposure control, adjustment of the mA and/or kV according to    patient size, and use of iterative reconstruction technique.       CONTRAST: None       FINDINGS:       LUNGS AND AIRWAYS: Airways are patent.  Subpleural nodular density posterior left upper lobe is decreased in size from prior study of October 2020, appearing smaller and subsolid, approximately 4 x 8 mm, series 4, image 25.       *  3 mm nodule right apex, series 4, image 18, unchanged. *  2 mm nodule posterior right upper lobe, image 36, unchanged. *  4 mm groundglass density superior right lower lobe, image 41, unchanged. *  Perihilar groundglass densities, image 51, 5 and 6 mm   *  4 mm groundglass density posterior right lower lobe, image 63.       *  Mild atelectasis/groundglass density in the posterior lung bases.           PLEURA: No pleural effusions or significant pleural thickening.       HEART / GREAT VESSELS: Heart is enlarged. Artifact from valve replacement. Dense arterial calcification.       LYMPH NODES: Precarinal node is 13 x 20 mm, previously reported as 15 x 24.       CHEST WALL / LOWER NECK: No significant abnormality.       UPPER ABDOMEN: No significant abnormality.       BONES: No acute abnormality.           Impression       1.  Subpleural nodule posterior left upper lobe is decreased in size and subsolid. 2. Other scattered pulmonary nodules unchanged. 3. Small groundglass densities, right perihilar and posterior lung bases. Recommend follow-up study in 3-6 months. 4. Enlarged precarinal lymph node, slightly decreased in size. 5. Atherosclerotic disease. CT Chest 10/16/2020 reveals the following:  CT lung screen         HISTORY: 40 pack year history of cigarette smoking; pulmonary nodule         Thin section scans through the chest without IV contrast. Up-to-date CT equipment with radiation dose reduction techniques. Comparison with 6/15/2020 and 10/5/2020         Lung screening specific (lung RADS)-positive. Multiple pulmonary nodules, including-         3 mm nodule right upper lobe series 4 image 30-stable         11 mm nodule left upper lobe images 34-35 (previously 8 mm)         3 mm subpleural nodule right upper lobe image 45, stable         2 separate 3 mm nodules right upper lobe contiguous with the major fissure images 46 and 47, stable         2 adjacent nodules superior segment right lower lobe measuring 2 and 5 mm, respectively, image 53, stable         Potentially significant incidental findings (lung RADS category S)-enlarged precarinal lymph node measuring 17, unchanged         Other incidental findings-aortic arch calcification; valvular prosthetic device              Impression         Lung RADS category 4A with dominant 11 mm pulmonary nodule left upper lobe with increase in size since June 2020.         Lung RADS category S-stable precarinal lymph node, mildly enlarged by short axis      PET scan 27/82/1824  No hypermetabolic uptake in any nodule or the precarinal lymphadenopathy. See media tab for full report    CTA chest Nisa 15, 2020  Chest    1.  No findings to suggest aortic dissection. 2.  Scattered bilateral pulmonary nodules, the largest within the left upper lobe.  Current    guidelines suggest CT follow-up in 3-6 months to evaluate stability. 3.  Cardiomegaly in the presence of changes related to aortic and mitral valve prostheses. Last PFTs:  None on file    Assessment:      Diagnosis Orders   1. Pulmonary nodules  CT CHEST WO CONTRAST   2. COPD, mild ([address]     3. History of tobacco use         Plan:     1. Overall her CT shows unchanged multiple small pulmonary nodules. Some her groundglass opacities have actually decreased in size. Her lymphadenopathy is unchanged. Repeat CT in 12 months  2. COPD -asymptomatic. No treatment needed  3. Patient currently is not smoking  4. Follow-up in 12 months    On this date 8/17/2021 I have spent 32 minutes reviewing previous notes, test results and face to face with the patient discussing the diagnosis and importance of compliance with the treatment plan as well as documenting on the day of the visit.

## 2021-08-18 ENCOUNTER — CARE COORDINATION (OUTPATIENT)
Dept: CASE MANAGEMENT | Age: 76
End: 2021-08-18

## 2021-08-18 NOTE — CARE COORDINATION
You Patient resolved from the Care Transitions episode on 08/18/2021    Patient/family has been provided the following resources and education related to COVID-19:                         Signs, symptoms and red flags related to COVID-19            CDC exposure and quarantine guidelines            [employer] exposure contact - [phone]            Contact for their local Department of Health                 Patient currently reports that the following symptoms have improved:  no new/worsening symptoms. Patient states she is doing well, reporting no complaints of any fever, chills, nausea, vomiting, chest pain, SOB or cough. No other issues or concerns at this time. No further outreach scheduled with this CTN/ACM. Episode of Care resolved. Patient has this CTN/ACM contact information if future needs arise.     Thank Azeb Tirado RN  Care Transition Coordinator  Contact JOSE:[phone]

## 2021-09-07 ENCOUNTER — TELEPHONE (OUTPATIENT)
Dept: FAMILY MEDICINE CLINIC | Age: 76
End: 2021-09-07

## 2021-09-07 ENCOUNTER — ANTI-COAG VISIT (OUTPATIENT)
Dept: FAMILY MEDICINE CLINIC | Age: 76
End: 2021-09-07

## 2021-09-07 DIAGNOSIS — Z95.2 AORTIC VALVE REPLACED: Primary | ICD-10-CM

## 2021-09-07 LAB — INR BLD: 2

## 2021-09-10 RX ORDER — PANTOPRAZOLE SODIUM 40 MG/1
TABLET, DELAYED RELEASE ORAL
Qty: 90 TABLET | Refills: 0 | Status: SHIPPED | OUTPATIENT
Start: 2021-09-10 | End: 2021-12-07

## 2021-09-22 ENCOUNTER — ANTI-COAG VISIT (OUTPATIENT)
Dept: FAMILY MEDICINE CLINIC | Age: 76
End: 2021-09-22

## 2021-09-22 DIAGNOSIS — Z95.2 AORTIC VALVE REPLACED: Primary | ICD-10-CM

## 2021-09-22 LAB — INR BLD: 3.3

## 2021-09-29 ENCOUNTER — TELEPHONE (OUTPATIENT)
Dept: FAMILY MEDICINE CLINIC | Age: 76
End: 2021-09-29

## 2021-09-29 DIAGNOSIS — E78.5 HYPERLIPIDEMIA WITH TARGET LDL LESS THAN 100: ICD-10-CM

## 2021-09-29 DIAGNOSIS — N18.30 CONTROLLED TYPE 2 DIABETES MELLITUS WITH STAGE 3 CHRONIC KIDNEY DISEASE, WITHOUT LONG-TERM CURRENT USE OF INSULIN (HCC): Primary | ICD-10-CM

## 2021-09-29 DIAGNOSIS — E11.22 CONTROLLED TYPE 2 DIABETES MELLITUS WITH STAGE 3 CHRONIC KIDNEY DISEASE, WITHOUT LONG-TERM CURRENT USE OF INSULIN (HCC): Primary | ICD-10-CM

## 2021-09-29 DIAGNOSIS — D50.8 IRON DEFICIENCY ANEMIA SECONDARY TO INADEQUATE DIETARY IRON INTAKE: ICD-10-CM

## 2021-09-29 NOTE — TELEPHONE ENCOUNTER
----- Message from Rodrigue Zevlesly sent at 9/29/2021  3:46 PM EDT -----  Subject: Referral Request    QUESTIONS   Reason for referral request? PT needs labs ordered for A1C. Has the physician seen you for this condition before? Yes  Select a date? 2021-04-13  Select the Provider the patient wants to be referred to, if known (PCP or   Specialist)? Erika Jon   Preferred Specialist (if applicable)? Do you already have an appointment scheduled? No  Additional Information for Provider?   ---------------------------------------------------------------------------  --------------  CALL BACK INFO  What is the best way for the office to contact you? OK to leave message on   voicemail  Preferred Call Back Phone Number?  [phone]

## 2021-09-30 NOTE — TELEPHONE ENCOUNTER
----- Message from Candice Olga sent at 9/29/2021  4:13 PM EDT -----  Subject: Message to Provider    QUESTIONS  Information for Provider? patient is reporting information for the lab   department for the PCP to send lab orders. [facility] labs address ? [address] [place] [id number] . phone? [phone] fax? [phone]  ---------------------------------------------------------------------------  --------------  Flora CAMPUZANO  What is the best way for the office to contact you? OK to leave message on   voicemail  Preferred Call Back Phone Number? [phone]  ---------------------------------------------------------------------------  --------------  SCRIPT ANSWERS  Relationship to Patient?  Self

## 2021-10-04 RX ORDER — GLIPIZIDE 5 MG/1
TABLET ORAL
Qty: 45 TABLET | Refills: 2 | Status: SHIPPED | OUTPATIENT
Start: 2021-10-04 | End: 2022-06-16

## 2021-10-04 NOTE — TELEPHONE ENCOUNTER
Medication:   Requested Prescriptions     Pending Prescriptions Disp Refills    glipiZIDE (GLUCOTROL) 5 MG tablet [Pharmacy Med Name: GLIPIZIDE 5MG TABLETS] 45 tablet 2     Sig: TAKE 1/2 TABLET BY MOUTH EVERY DAY WITH FOOD        Last Filled:      Patient Phone Number: [phone] (home) [phone] (work)    Last appt: 7/8/2021   Next appt: Visit date not found    Last OARRS:   RX Monitoring 3/8/2021   Attestation -   Periodic Controlled Substance Monitoring Possible medication side effects, risk of tolerance/dependence & alternative treatments discussed. ;No signs of potential drug abuse or diversion identified.

## 2021-10-06 DIAGNOSIS — E78.5 HYPERLIPIDEMIA WITH TARGET LDL LESS THAN 100: ICD-10-CM

## 2021-10-06 NOTE — TELEPHONE ENCOUNTER
Medication:   Requested Prescriptions     Pending Prescriptions Disp Refills    atorvastatin (LIPITOR) 10 MG tablet [Pharmacy Med Name: ATORVASTATIN 10MG TABLETS] 90 tablet 0     Sig: TAKE 1 TABLET BY MOUTH DAILY IN THE EVENING FOR CHOLESTEROL        Last Filled:      Patient Phone Number: [phone] (home) [phone] (work)    Last appt: 8/5/21  Next appt: Visit date not found    Last OARRS:   RX Monitoring 3/8/2021   Attestation -   Periodic Controlled Substance Monitoring Possible medication side effects, risk of tolerance/dependence & alternative treatments discussed. ;No signs of potential drug abuse or diversion identified.

## 2021-10-07 RX ORDER — ATORVASTATIN CALCIUM 10 MG/1
TABLET, FILM COATED ORAL
Qty: 90 TABLET | Refills: 0 | Status: SHIPPED | OUTPATIENT
Start: 2021-10-07 | End: 2022-01-03

## 2021-10-08 DIAGNOSIS — G89.29 CHRONIC BILATERAL LOW BACK PAIN WITHOUT SCIATICA: ICD-10-CM

## 2021-10-08 DIAGNOSIS — M54.50 CHRONIC BILATERAL LOW BACK PAIN WITHOUT SCIATICA: ICD-10-CM

## 2021-10-08 RX ORDER — ACETAMINOPHEN AND CODEINE PHOSPHATE 300; 30 MG/1; MG/1
TABLET ORAL
Qty: 7 TABLET | Refills: 0 | Status: SHIPPED | OUTPATIENT
Start: 2021-10-08 | End: 2021-10-12 | Stop reason: SDUPTHER

## 2021-10-08 NOTE — TELEPHONE ENCOUNTER
Medication:   Requested Prescriptions     Pending Prescriptions Disp Refills    acetaminophen-codeine (TYLENOL #3) 300-30 MG per tablet [Pharmacy Med Name: ACETAMINOPHEN/COD #3 (300/30MG) TAB] 90 tablet      Sig: TAKE 1 TABLET BY MOUTH EVERY NIGHT AS NEEDED FOR PAIN        Last Filled:      Patient Phone Number: [phone] (home) [phone] (work)    Last appt: 7/8/2021   Next appt: Visit date not found    Last OARRS:   RX Monitoring 3/8/2021   Attestation -   Periodic Controlled Substance Monitoring Possible medication side effects, risk of tolerance/dependence & alternative treatments discussed. ;No signs of potential drug abuse or diversion identified.

## 2021-10-12 ENCOUNTER — TELEMEDICINE (OUTPATIENT)
Dept: FAMILY MEDICINE CLINIC | Age: 76
End: 2021-10-12
Payer: MEDICARE

## 2021-10-12 ENCOUNTER — TELEPHONE (OUTPATIENT)
Dept: FAMILY MEDICINE CLINIC | Age: 76
End: 2021-10-12

## 2021-10-12 DIAGNOSIS — E78.5 HYPERLIPIDEMIA WITH TARGET LDL LESS THAN 100: ICD-10-CM

## 2021-10-12 DIAGNOSIS — M54.50 CHRONIC BILATERAL LOW BACK PAIN WITHOUT SCIATICA: ICD-10-CM

## 2021-10-12 DIAGNOSIS — F41.9 ANXIETY: ICD-10-CM

## 2021-10-12 DIAGNOSIS — N18.30 CONTROLLED TYPE 2 DIABETES MELLITUS WITH STAGE 3 CHRONIC KIDNEY DISEASE, WITHOUT LONG-TERM CURRENT USE OF INSULIN (HCC): Primary | ICD-10-CM

## 2021-10-12 DIAGNOSIS — R79.89 ABNORMAL LFTS: Primary | ICD-10-CM

## 2021-10-12 DIAGNOSIS — E11.22 CONTROLLED TYPE 2 DIABETES MELLITUS WITH STAGE 3 CHRONIC KIDNEY DISEASE, WITHOUT LONG-TERM CURRENT USE OF INSULIN (HCC): Primary | ICD-10-CM

## 2021-10-12 DIAGNOSIS — G89.29 CHRONIC BILATERAL LOW BACK PAIN WITHOUT SCIATICA: ICD-10-CM

## 2021-10-12 LAB
ATYPICAL LYMPHOCYTE RELATIVE PERCENT: ABNORMAL % (ref 0–10)
BAND NEUTROPHILS: ABNORMAL %
BANDED NEUTROPHILS ABSOLUTE COUNT: ABNORMAL CELLS/UL (ref 0–750)
BASOPHILS ABSOLUTE: 41 CELLS/UL (ref 0–200)
BASOPHILS RELATIVE PERCENT: 0.5 %
BLASTS ABSOLUTE COUNT: ABNORMAL CELLS/UL
BLASTS RELATIVE PERCENT: ABNORMAL %
CHOLESTEROL, TOTAL: 144 MG/DL
CHOLESTEROL/HDL RATIO: 3.8 (CALC)
COMMENT: ABNORMAL
EOSINOPHILS ABSOLUTE: 113 CELLS/UL (ref 15–500)
EOSINOPHILS RELATIVE PERCENT: 1.4 %
HBA1C MFR BLD: 5.1 % OF TOTAL HGB
HCT VFR BLD CALC: 37.6 % (ref 35–45)
HDLC SERPL-MCNC: 38 MG/DL
HEMOGLOBIN: 12.2 G/DL (ref 11.7–15.5)
LDL CHOLESTEROL CALCULATED: 73 MG/DL (CALC)
LYMPHOCYTES ABSOLUTE: 1474 CELLS/UL (ref 850–3900)
LYMPHOCYTES RELATIVE PERCENT: 18.2 %
MCH RBC QN AUTO: 29.9 PG (ref 27–33)
MCHC RBC AUTO-ENTMCNC: 32.4 G/DL (ref 32–36)
MCV RBC AUTO: 92.2 FL (ref 80–100)
METAMYELOCYTES ABSOLUTE COUNT: ABNORMAL CELLS/UL
METAMYELOCYTES RELATIVE PERCENT: ABNORMAL %
MONOCYTES ABSOLUTE: 567 CELLS/UL (ref 200–950)
MONOCYTES RELATIVE PERCENT: 7 %
MYELOCYTE PERCENT: ABNORMAL %
MYELOCYTES ABSOLUTE COUNT: ABNORMAL CELLS/UL
NEUTROPHILS ABSOLUTE: 5905 CELLS/UL (ref 1500–7800)
NONHDLC SERPL-MCNC: 106 MG/DL (CALC)
NUCLEATED RED BLOOD CELLS: ABNORMAL /100 WBC
NUCLEATED RED BLOOD CELLS: ABNORMAL CELLS/UL
PDW BLD-RTO: 14.5 % (ref 11–15)
PLATELET # BLD: 210 THOUSAND/UL (ref 140–400)
PMV BLD AUTO: 12.9 FL (ref 7.5–12.5)
PROMYELOCYTES ABSOLUTE COUNT: ABNORMAL CELLS/UL
PROMYELOCYTES PERCENT: ABNORMAL %
RBC # BLD: 4.08 MILLION/UL (ref 3.8–5.1)
SEGMENTED NEUTROPHILS RELATIVE PERCENT: 72.9 %
TRIGL SERPL-MCNC: 241 MG/DL
WBC # BLD: 8.1 THOUSAND/UL (ref 3.8–10.8)

## 2021-10-12 PROCEDURE — 99443 PR PHYS/QHP TELEPHONE EVALUATION 21-30 MIN: CPT | Performed by: FAMILY MEDICINE

## 2021-10-12 RX ORDER — ACETAMINOPHEN AND CODEINE PHOSPHATE 300; 30 MG/1; MG/1
1 TABLET ORAL NIGHTLY PRN
Qty: 90 TABLET | Refills: 0 | Status: SHIPPED | OUTPATIENT
Start: 2021-10-12 | End: 2022-01-18

## 2021-10-12 RX ORDER — LIDOCAINE 50 MG/G
1 PATCH TOPICAL DAILY
Qty: 10 PATCH | Refills: 3 | Status: SHIPPED | OUTPATIENT
Start: 2021-10-12 | End: 2021-11-01

## 2021-10-12 RX ORDER — ALPRAZOLAM 0.5 MG/1
TABLET ORAL
Qty: 90 TABLET | Refills: 0 | Status: SHIPPED | OUTPATIENT
Start: 2021-10-12 | End: 2021-11-29

## 2021-10-12 ASSESSMENT — ENCOUNTER SYMPTOMS: BLURRED VISION: 0

## 2021-10-12 NOTE — PROGRESS NOTES
Subjective:        . Perez Dodd is a 77 y.o. female evaluated via telephone on 10/12/2021. Consent:  She and/or health care decision maker is aware that that she may receive a bill for this telephone service, depending on her insurance coverage, and has provided verbal consent to proceed: Yes      Documentation:  I communicated with the patient and/or health care decision maker about cc. Details of this discussion including any medical advice provided: yes      I affirm this is a Patient Initiated Episode with a Patient who has not had a related appointment within my department in the past 7 days or scheduled within the next 24 hours. Patient identification was verified at the start of the visit: Yes    Total Time: minutes: 21-30 minutes    The visit was conducted pursuant to the emergency declaration under the [address] [place] [address], [address] [place] [address] authority and the [employer] and [employer] General Act. Patient identification was verified, and a caregiver was present when appropriate. The patient was located in a state where the provider was credentialed to provide care. Note: not billable if this call serves to triage the patient into an appointment for the relevant concern      Edward Malik MD       Patient ID: Perze Dodd is a 77 y.o. female. Diabetes  She presents for her follow-up diabetic visit. She has type 2 diabetes mellitus. Her disease course has been stable. Pertinent negatives for hypoglycemia include no confusion, dizziness, headaches, hunger, mood changes, nervousness/anxiousness, pallor, seizures, sleepiness, speech difficulty, sweats or tremors. Pertinent negatives for diabetes include no blurred vision, no chest pain, no fatigue, no foot paresthesias, no foot ulcerations, no polydipsia, no polyphagia, no polyuria, no weakness and no weight loss. There are no hypoglycemic complications.  Current diabetic treatment includes oral agent (monotherapy). She is compliant with treatment all of the time. Her weight is stable. She is following a generally healthy diet. When asked about meal planning, she reported none. She has not had a previous visit with a dietitian. She never participates in exercise. Her breakfast blood glucose is taken between 7-8 am. Her breakfast blood glucose range is generally 110-130 mg/dl. An ACE inhibitor/angiotensin II receptor blocker is being taken. She does not see a podiatrist.Eye exam is current. Hyperlipidemia  This is a chronic problem. The current episode started more than 1 year ago. Recent lipid tests were reviewed and are high. Exacerbating diseases include diabetes. Pertinent negatives include no chest pain or leg pain. Current antihyperlipidemic treatment includes statins. The current treatment provides moderate improvement of lipids. Compliance problems include adherence to diet. Back Pain  This is a chronic problem. The current episode started more than 1 year ago. The problem occurs intermittently. The problem is unchanged. The pain is present in the lumbar spine. The quality of the pain is described as aching and cramping. The pain does not radiate. The pain is severe. The pain is worse during the night. Pertinent negatives include no abdominal pain, bladder incontinence, bowel incontinence, chest pain, dysuria, fever, headaches, leg pain, numbness, paresis, paresthesias, pelvic pain, perianal numbness, tingling, weakness or weight loss. Risk factors include menopause. Treatments tried: tylenol # 3. Other  This is a chronic (anxiety) problem. The current episode started more than 1 year ago. The problem occurs constantly. The problem has been waxing and waning. Pertinent negatives include no abdominal pain, chest pain, fatigue, fever, headaches, numbness or weakness. Nothing aggravates the symptoms. Treatments tried: xanax prn  The treatment provided significant relief.      CKD  Pt denies urinary sx, no nsaids  Followed by nephrology      Review of Systems   Constitutional: Negative for fatigue, fever and weight loss. Eyes: Negative for blurred vision. Cardiovascular: Negative for chest pain. Gastrointestinal: Negative for abdominal pain and bowel incontinence. Endocrine: Negative for polydipsia, polyphagia and polyuria. Genitourinary: Negative for bladder incontinence, dysuria and pelvic pain. Musculoskeletal: Positive for back pain. Skin: Negative for pallor. Neurological: Negative for dizziness, tingling, tremors, seizures, speech difficulty, weakness, numbness, headaches and paresthesias. Psychiatric/Behavioral: Negative for confusion. The patient is not nervous/anxious. Objective:  not currently breastfeeding. Physical Exam  Constitutional:       General: She is not in acute distress. HENT:      Head:      Comments: Hearing intact to nml conversation     Neurological:      Mental Status: She is alert and oriented to person, place, and time. Psychiatric:         Mood and Affect: Mood normal.       Results for Lee Liu (MRN [id number]) as of 10/13/2021 10:01   Ref. Range 10/11/2021 09:15   Chol/HDL Ratio Latest Ref Range: <5.0 (calc) 3.8   Cholesterol, Total Latest Ref Range: <200 mg/dL 144   HDL Cholesterol Latest Ref Range: > OR = 50 mg/dL 38 (L)   LDL Calculated Latest Units: mg/dL (calc) 73   Triglycerides Latest Ref Range: <150 mg/dL 241 (H)   Hemoglobin A1C Latest Ref Range: <5.7 % of total Hgb 5.1     Assessment:            Diagnosis Orders   1. Controlled type 2 diabetes mellitus with stage 3 chronic kidney disease, without long-term current use of insulin ([facility] [address]     2. Hyperlipidemia with target LDL less than 100     3. Chronic bilateral low back pain without sciatica  acetaminophen-codeine (TYLENOL #3) 300-30 MG per tablet   4.  Anxiety  ALPRAZolam (XANAX) 0.5 MG tablet          Plan:      1. a1c at goal  No hx of hypoglycemia  Repeat a1c in 3 mo    2. Still trig high  encourage low fat diet   reduce: red meats, pork,  processed foods, oils, dolan, cheese   Continue statin     3. Unchanged  Failed epidural injections  Can not afford lidoderm   Refill tylenol # 3   Controlled Substances Monitoring: Attestation: The Prescription Monitoring Report for this patient was reviewed today. Silverio Mas MD)  Documentation: No signs of potential drug abuse or diversion identified. Silverio Mas MD)      4. Stable  Refills  Interaction with tylenol # 3 discussed , serious interaction including respiratory depression/death a possibility, she is aware of this and does not take med together   Fu in 3 mo with pcp.              Kenney Wells MD

## 2021-10-12 NOTE — TELEPHONE ENCOUNTER
----- Message from Carolin Noguera sent at 10/12/2021  3:37 PM EDT -----  Subject: Message to Provider    QUESTIONS  Information for Provider? Cannot add liver panel because original testing   for CBC /LIPID /A1C was done at [address]   ---------------------------------------------------------------------------  --------------  [address]  What is the best way for the office to contact you? Do not leave any   message, patient will call back for answer  Preferred Call Back Phone Number? [phone]  ---------------------------------------------------------------------------  --------------  SCRIPT ANSWERS  Relationship to Patient?  Third Party  Representative Name? [id number] [place] [address],[address] [place] [address]

## 2021-10-13 ASSESSMENT — ENCOUNTER SYMPTOMS
BOWEL INCONTINENCE: 0
BACK PAIN: 1
ABDOMINAL PAIN: 0

## 2021-10-15 DIAGNOSIS — Z79.899 LONG-TERM CURRENT USE OF HIGH RISK MEDICATION OTHER THAN ANTICOAGULANT: Primary | ICD-10-CM

## 2021-10-25 ENCOUNTER — ANTI-COAG VISIT (OUTPATIENT)
Dept: FAMILY MEDICINE CLINIC | Age: 76
End: 2021-10-25

## 2021-10-25 DIAGNOSIS — Z95.2 AORTIC VALVE REPLACED: Primary | ICD-10-CM

## 2021-10-25 LAB — INR BLD: 2.5

## 2021-10-25 NOTE — TELEPHONE ENCOUNTER
See anti coag encounter. Medication:   Requested Prescriptions     Pending Prescriptions Disp Refills    metFORMIN (GLUCOPHAGE) 1000 MG tablet [Pharmacy Med Name: METFORMIN 1000MG TABLETS] 180 tablet 0     Sig: TAKE 1 TABLET BY MOUTH TWICE DAILY        Last Filled:      Patient Phone Number: [phone] (home) [phone] (work)    Last appt: 10/12/2021   Next appt: 11/1/2021    Last OARRS:   RX Monitoring 3/8/2021   Attestation -   Periodic Controlled Substance Monitoring Possible medication side effects, risk of tolerance/dependence & alternative treatments discussed. ;No signs of potential drug abuse or diversion identified.

## 2021-10-28 NOTE — H&P
Hospital Medicine History & Physical      PCP: Angela Hess MD    Date of Admission: 7/29/2021    Date of Service: 7/29/2021    Pt seen/examined on 7/29/2021    Admitted to Inpatient with expected LOS greater than two midnights due to medical therapy. Chief Complaint:     Chief Complaint   Patient presents with    Leg Swelling       History Of Present Illness:      77 y.o. female who presented to [facility], [facility]. with lower extremity edema that began 2 days ago, progressively worse, a/w orthopnea. H/o diastolic HF, not on any lasix at home but previously did. Usually sleeps w/ 1 pillow, but has recently been using 2. States RLE is always slightly more swollen than the left and that she has had several ultrasounds to rule out clots all of which have been negative. Does have lymphoma and h/o AV/MV replacement and is on warfarin which is therapeutic. She does require pRBC transfusions every couple months at baseline. Denies any bleeding, no dark/tarry stools. Has also had a mild productive cough x2 weeks for which she has been treated with 2 courses of ABX and steroids, this has resovled. Past Medical History:      Reviewed and non-contributory except as noted below      Diagnosis Date    A-fib ([facility] [address]     Anemia     multifactorial:under care of heme-onc:Dr. Kel Mccann Anemia:multifactorial 2011    as per heme-onc    Anxiety     Cataract     bilateral    Chronic back pain     Under care of ortho spine:Dr. Benjamin Stallworth    CKD (chronic kidney disease) stage 3, GFR 30-59 ml/min 1/2012    Colitis, ischemic ([facility] [address] 6/2012    Under care of Dr. Blane Meckel COPD     Diabetes     Diabetic eye exam ([facility] [address] 1/28/15    CEI    GERD (gastroesophageal reflux disease)     Hx of mammogram 05/23/2018    negative:see scanned report.     Hyperlipidaemia LDL goal <100     Hypertension     Insomnia     Long-term (current) use of anticoagulants, INR goal 2.5-3.5     Lymphoma:monoclonal B cell 1/2012    Under care of Dr. Bermudez:oncologist/hematologist    Mammogram abnormal 7/30/15    Right breast mass:benign(?lipoma)per US:repeat testing in 6mos=1/30/16    Nonspecific abnormal results of kidney function study 1/25/2012    Osteoarthritis     Renal cysts, right 1/2/2014    RLS (restless legs syndrome) 10/19/11    Sciatica     Screening colonoscopy 2010;6/19/13    Nml. Next 10yrs:6/2023:Dr. Daysi Mata. 9/22/16(Dr. Waddell):nml EGD & colonoscopy except mild diverticulosis    Therapeutic drug monitoring 04/10/2015    OARRS report consistent on 4/10/15;7/6/15;10/23/15;2/7/16;5/16/16;8/19/16;11/4/16;3/6/17;6/26/17;9/20/17;12/18/17; 12/18/17;3/12/18;5/29/18    Valvular heart disease     s/p aortic and mitral valve repair. Under care of cards:Dr. Rafael Fontenot.  Visit for screening mammogram 04/10/2017    negative:see scanned report. Past Surgical History:      Reviewed and non-contributory except as noted below      Procedure Laterality Date    AORTIC VALVE REPLACEMENT      CATARACT REMOVAL  12/11/13;1/8/14    Right;left respectively    COLONOSCOPY      \"twilight sleep didnt work\"    HYSTERECTOMY      Fibroids    LAPAROSCOPY  2/3/15    SBO:converted to open for lysis of adhesions    MITRAL VALVE REPLACEMENT      PACEMAKER PLACEMENT  8/07    for bradycardia, sympony    TUNNELED VENOUS PORT PLACEMENT Right 10/3/2014    ATTEMPTED RIGHT SUBCLAVIEN VEIN, PLACED RIGHT INTERNAL       Medications Prior to Admission:      Reviewed and non-contributory except as noted below  Prior to Admission medications    Medication Sig Start Date End Date Taking? Authorizing Provider   TRUE METRIX BLOOD GLUCOSE TEST strip USE TO TEST BLOOD SUGAR TWICE DAILY 7/29/21   Nereida Bhatia MD   acetaminophen-codeine (TYLENOL #3) 300-30 MG per tablet Take 1 tablet by mouth nightly as needed for Pain for up to 90 days.  7/8/21 10/6/21  Nereida Bhatia MD   chlorthalidone (HYGROTON) 25 MG tablet TAKE 1 TABLET BY MOUTH EVERY DAY 6/30/21 Reinaldo Espinoza MD   metFORMIN (GLUCOPHAGE) 1000 MG tablet TAKE 1 TABLET BY MOUTH TWICE DAILY 6/30/21   Reinaldo Espinoza MD   atorvastatin (LIPITOR) 10 MG tablet TAKE 1 TABLET BY MOUTH DAILY IN THE EVENING FOR CHOLESTEROL 6/30/21   Reinaldo Espinoza MD   ALPRAZolam Unice Nigh) 0.5 MG tablet TAKE 1 TABLET BY MOUTH THREE TIMES DAILY AS NEEDED 6/28/21 8/28/21  Reinaldo Espinoza MD   warfarin (COUMADIN) 3 MG tablet TAKE 1 TABLET BY MOUTH AS DIRECTED 6/14/21   Reinaldo Espinoza MD   pantoprazole (PROTONIX) 40 MG tablet TAKE 1 TABLET BY MOUTH EVERY DAY 6/3/21   Reinaldo Espinoza MD   warfarin (COUMADIN) 1 MG tablet TAKE AS DIRECTED 5/28/21   Reinaldo Espinoza MD   lisinopril (PRINIVIL;ZESTRIL) 40 MG tablet TAKE 1 TABLET BY MOUTH EVERY DAY 5/24/21   Reinaldo Espinoza MD   glipiZIDE (GLUCOTROL) 5 MG tablet TAKE 1/2 TABLET BY MOUTH EVERY DAY WITH FOOD 1/28/21   Rosa Castro MD   metoprolol tartrate (LOPRESSOR) 25 MG tablet Take 12.5 mg by mouth 2 times daily 0.5 tab BID     Historical Provider, MD   Blood Pressure KIT Dispense bp machine x 1 9/16/20   Rosa Castro MD   amLODIPine (NORVASC) 10 MG tablet TAKE 1 TABLET BY MOUTH DAILY 6/29/20   Rosa Castro MD   ciclopirox (PENLAC) 8 % solution Apply topically nightly. 5/27/20   Rosa Castro MD   blood glucose test strips (TRUE METRIX BLOOD GLUCOSE TEST) strip USE TO TEST TWICE DAILY AS DIRECTED 11/12/18   Rosa Castro MD   promethazine (PHENERGAN) 12.5 MG tablet TAKE ONE TABLET BY MOUTH EVERY 6 HOURS AS NEEDED 11/20/15   Rosa Castro MD   warfarin (COUMADIN) 4 MG tablet TAKE 1 TABLET BY MOUTH DAILY 1/14/15   Rosa Castro MD   hydroxychloroquine (PLAQUENIL) 200 MG tablet Take  by mouth daily.     Historical Provider, MD       Allergies:     Reviewed and non-contributory except as noted below   Diclofenac, Nsaids, and Hydrocodone-acetaminophen    Social History:      Reviewed and non-contributory except as noted below    TOBACCO:   reports that she quit smoking about 14 years No

## 2021-11-01 ENCOUNTER — OFFICE VISIT (OUTPATIENT)
Dept: FAMILY MEDICINE CLINIC | Age: 76
End: 2021-11-01
Payer: MEDICARE

## 2021-11-01 VITALS
HEART RATE: 89 BPM | TEMPERATURE: 97 F | WEIGHT: 111 LBS | RESPIRATION RATE: 16 BRPM | DIASTOLIC BLOOD PRESSURE: 70 MMHG | SYSTOLIC BLOOD PRESSURE: 138 MMHG | OXYGEN SATURATION: 98 % | HEIGHT: 59 IN | BODY MASS INDEX: 22.38 KG/M2

## 2021-11-01 DIAGNOSIS — E78.5 HYPERLIPIDEMIA WITH TARGET LDL LESS THAN 100: ICD-10-CM

## 2021-11-01 DIAGNOSIS — G89.29 CHRONIC BILATERAL LOW BACK PAIN WITHOUT SCIATICA: ICD-10-CM

## 2021-11-01 DIAGNOSIS — F41.9 ANXIETY: ICD-10-CM

## 2021-11-01 DIAGNOSIS — Z79.899 LONG-TERM CURRENT USE OF HIGH RISK MEDICATION OTHER THAN ANTICOAGULANT: ICD-10-CM

## 2021-11-01 DIAGNOSIS — N18.30 CONTROLLED TYPE 2 DIABETES MELLITUS WITH STAGE 3 CHRONIC KIDNEY DISEASE, WITHOUT LONG-TERM CURRENT USE OF INSULIN (HCC): Primary | ICD-10-CM

## 2021-11-01 DIAGNOSIS — Z95.2 AORTIC VALVE REPLACED: ICD-10-CM

## 2021-11-01 DIAGNOSIS — M54.50 CHRONIC BILATERAL LOW BACK PAIN WITHOUT SCIATICA: ICD-10-CM

## 2021-11-01 DIAGNOSIS — I10 BENIGN ESSENTIAL HTN: ICD-10-CM

## 2021-11-01 DIAGNOSIS — E11.22 CONTROLLED TYPE 2 DIABETES MELLITUS WITH STAGE 3 CHRONIC KIDNEY DISEASE, WITHOUT LONG-TERM CURRENT USE OF INSULIN (HCC): Primary | ICD-10-CM

## 2021-11-01 PROCEDURE — 99214 OFFICE O/P EST MOD 30 MIN: CPT | Performed by: FAMILY MEDICINE

## 2021-11-01 PROCEDURE — 1123F ACP DISCUSS/DSCN MKR DOCD: CPT | Performed by: FAMILY MEDICINE

## 2021-11-01 PROCEDURE — 1090F PRES/ABSN URINE INCON ASSESS: CPT | Performed by: FAMILY MEDICINE

## 2021-11-01 PROCEDURE — G8420 CALC BMI NORM PARAMETERS: HCPCS | Performed by: FAMILY MEDICINE

## 2021-11-01 PROCEDURE — G8400 PT W/DXA NO RESULTS DOC: HCPCS | Performed by: FAMILY MEDICINE

## 2021-11-01 PROCEDURE — G8484 FLU IMMUNIZE NO ADMIN: HCPCS | Performed by: FAMILY MEDICINE

## 2021-11-01 PROCEDURE — 2022F DILAT RTA XM EVC RTNOPTHY: CPT | Performed by: FAMILY MEDICINE

## 2021-11-01 PROCEDURE — 3044F HG A1C LEVEL LT 7.0%: CPT | Performed by: FAMILY MEDICINE

## 2021-11-01 PROCEDURE — 4040F PNEUMOC VAC/ADMIN/RCVD: CPT | Performed by: FAMILY MEDICINE

## 2021-11-01 PROCEDURE — G8427 DOCREV CUR MEDS BY ELIG CLIN: HCPCS | Performed by: FAMILY MEDICINE

## 2021-11-01 PROCEDURE — 1036F TOBACCO NON-USER: CPT | Performed by: FAMILY MEDICINE

## 2021-11-01 PROCEDURE — 3017F COLORECTAL CA SCREEN DOC REV: CPT | Performed by: FAMILY MEDICINE

## 2021-11-01 RX ORDER — TURMERIC/TURMERIC EXT/PEPR EXT 900-100 MG
CAPSULE ORAL
COMMUNITY
End: 2022-09-01

## 2021-11-01 ASSESSMENT — PATIENT HEALTH QUESTIONNAIRE - PHQ9
SUM OF ALL RESPONSES TO PHQ9 QUESTIONS 1 & 2: 0
SUM OF ALL RESPONSES TO PHQ QUESTIONS 1-9: 0
1. LITTLE INTEREST OR PLEASURE IN DOING THINGS: 0
SUM OF ALL RESPONSES TO PHQ QUESTIONS 1-9: 0
2. FEELING DOWN, DEPRESSED OR HOPELESS: 0
SUM OF ALL RESPONSES TO PHQ QUESTIONS 1-9: 0

## 2021-11-01 NOTE — PROGRESS NOTES
Subjective:      Patient ID: Son Lam is a 77 y.o. female. HPI  Treatment Adherence:   Medication compliance:  compliant all of the time  Diet compliance:  compliant all of the time  Weight trend: stable  Current exercise: no regular exercise  Barriers: none     Diabetes Mellitus Type 2: Current symptoms/problems include none. Home blood sugar records: fasting range: 187 today, usually in 140's  Any episodes of hypoglycemia? yes - occasionally, usually less 90,   Eye exam current (within one year): yes  Tobacco history: She  reports that she quit smoking about 14 years ago. Her smoking use included cigarettes. She has a 40.00 pack-year smoking history. She has never used smokeless tobacco.   Daily Aspirin? No: anticoagulated   Tx: glipizide /metformin    Hypertension:  Home blood pressure monitoring: Yes - \"high some time\" . She is adherent to a low sodium diet. Patient denies lightheadedness, blurred vision, peripheral edema and palpitations. Antihypertensive medication side effects: no medication side effects noted. Use of agents associated with hypertension: none. Tx: amlodipine 5 mg , lisinopril 40 mg , furosemide 20 mg     Hyperlipidemia:  No new myalgias or GI upset on atorvastatin (Lipitor). JUAN: tx, alprazolam that usually helps. Ao valve replacement, anticoagulated, follows with cardiology . Lab Results   Component Value Date    LABA1C 5.1 10/11/2021    LABA1C 5.1 06/16/2020    LABA1C 6.0 (H) 07/15/2019     Lab Results   Component Value Date    LABMICR YES 07/29/2021    CREATININE 1.3 (H) 08/05/2021     Lab Results   Component Value Date    ALT 22 08/05/2021    AST 74 (H) 08/05/2021     Lab Results   Component Value Date    CHOL 144 10/11/2021    TRIG 241 (H) 10/11/2021    HDL 38 (L) 10/11/2021    LDLCALC 73 10/11/2021        Review of Systems  Above  is allergic to diclofenac, nsaids, and hydrocodone-acetaminophen.       Current Outpatient Medications:   [facility] Pepper-Turmeric (TURMERIC CURCUMIN) 5-1000 MG CAPS, Take by mouth, Disp: , Rfl:     metFORMIN (GLUCOPHAGE) 1000 MG tablet, TAKE 1 TABLET BY MOUTH TWICE DAILY, Disp: 180 tablet, Rfl: 0    acetaminophen-codeine (TYLENOL #3) 300-30 MG per tablet, Take 1 tablet by mouth nightly as needed for Pain for up to 90 days. , Disp: 90 tablet, Rfl: 0    ALPRAZolam (XANAX) 0.5 MG tablet, Take one tab po qam, Disp: 90 tablet, Rfl: 0    atorvastatin (LIPITOR) 10 MG tablet, TAKE 1 TABLET BY MOUTH DAILY IN THE EVENING FOR CHOLESTEROL, Disp: 90 tablet, Rfl: 0    amLODIPine (NORVASC) 5 MG tablet, Take 1 tablet by mouth daily, Disp: 30 tablet, Rfl: 5    glipiZIDE (GLUCOTROL) 5 MG tablet, TAKE 1/2 TABLET BY MOUTH EVERY DAY WITH FOOD, Disp: 45 tablet, Rfl: 2    pantoprazole (PROTONIX) 40 MG tablet, TAKE 1 TABLET BY MOUTH EVERY DAY, Disp: 90 tablet, Rfl: 0    furosemide (LASIX) 20 MG tablet, Take 1 tablet by mouth daily, Disp: 90 tablet, Rfl: 2    lisinopril (PRINIVIL;ZESTRIL) 40 MG tablet, Take 1 tablet by mouth daily, Disp: 90 tablet, Rfl: 0    TRUE METRIX BLOOD GLUCOSE TEST strip, USE TO TEST BLOOD SUGAR TWICE DAILY, Disp: 100 strip, Rfl: 2    Biotin 23459 MCG TABS, Take by mouth, Disp: , Rfl:     ELDERBERRY PO, Take by mouth, Disp: , Rfl:     warfarin (COUMADIN) 3 MG tablet, TAKE 1 TABLET BY MOUTH AS DIRECTED, Disp: 90 tablet, Rfl: 1    warfarin (COUMADIN) 1 MG tablet, TAKE AS DIRECTED (Patient taking differently: daily TAKE  2.5 mg on Sundays and Tuesday 2 mg on Monday, Wednesday, Thursday, Friday and Saturday), Disp: 270 tablet, Rfl: 1    metoprolol tartrate (LOPRESSOR) 25 MG tablet, Take 12.5 mg by mouth 2 times daily 0.5 tab BID , Disp: , Rfl:      has a past medical history of A-fib ([address] Anemia, Anemia:multifactorial, Anxiety, Cataract, Chronic back pain, CKD (chronic kidney disease) stage 3, GFR 30-59 ml/min (HCC), Colitis, ischemic (HCC), COPD, Diabetes, Diabetic eye exam ([facility] [address] GERD (gastroesophageal reflux disease), Hx of mammogram, Hyperlipidaemia LDL goal <100, Hypertension, Insomnia, Long-term (current) use of anticoagulants, INR goal 2.5-3.5, Lymphoma:monoclonal B cell, Mammogram abnormal, Nonspecific abnormal results of kidney function study, Osteoarthritis, Renal cysts, right, RLS (restless legs syndrome), Sciatica, Screening colonoscopy, Therapeutic drug monitoring, Valvular heart disease, and Visit for screening mammogram.    Past Surgical History:   Procedure Laterality Date    AORTIC VALVE REPLACEMENT      CATARACT REMOVAL  12/11/13;1/8/14    Right;left respectively    COLONOSCOPY      \"twilight sleep didnt work\"    HYSTERECTOMY      Fibroids    LAPAROSCOPY  2/3/15    SBO:converted to open for lysis of adhesions    MITRAL VALVE REPLACEMENT      PACEMAKER PLACEMENT  8/07    for bradycardia, sympony    TUNNELED VENOUS PORT PLACEMENT Right 10/3/2014    ATTEMPTED RIGHT SUBCLAVIEN VEIN, PLACED RIGHT INTERNAL        reports that she quit smoking about 14 years ago. Her smoking use included cigarettes. She has a 40.00 pack-year smoking history. She has never used smokeless tobacco. She reports current alcohol use of about 1.0 standard drinks of alcohol per week. She reports that she does not use drugs. family history includes Diabetes in her brother; Aldair Nixon in her mother. Objective:  Blood pressure 138/70, pulse 89, temperature 97 °F (36.1 °C), temperature source Tympanic, resp. rate 16, height 4' 11\" (1.499 m), weight 111 lb (50.3 kg), SpO2 98 %, not currently breastfeeding. Physical Exam  Vitals and nursing note reviewed. Constitutional:       General: She is not in acute distress. Appearance: Normal appearance. She is well-developed and normal weight. She is not ill-appearing, toxic-appearing or diaphoretic. HENT:      Head: Normocephalic and atraumatic.       Right Ear: External ear normal.      Left Ear: External ear normal.      Nose: Nose normal.      Mouth/Throat: Pharynx: No oropharyngeal exudate. Eyes:      General: No scleral icterus. Right eye: No discharge. Left eye: No discharge. Conjunctiva/sclera: Conjunctivae normal.      Pupils: Pupils are equal, round, and reactive to light. Neck:      Thyroid: No thyromegaly. Vascular: No JVD. Trachea: No tracheal deviation. Cardiovascular:      Rate and Rhythm: Normal rate and regular rhythm. Pulses: Normal pulses. Heart sounds: Normal heart sounds. No murmur heard. No friction rub. No gallop. Pulmonary:      Effort: Pulmonary effort is normal. No respiratory distress. Breath sounds: Normal breath sounds. No wheezing or rales. Chest:      Chest wall: No tenderness. Abdominal:      General: Bowel sounds are normal. There is no distension. Palpations: Abdomen is soft. There is no mass. Tenderness: There is no abdominal tenderness. There is no guarding or rebound. Musculoskeletal:      Cervical back: Normal range of motion and neck supple. Thoracic back: Normal.      Lumbar back: Spasms and tenderness present. Decreased range of motion. Lymphadenopathy:      Cervical: No cervical adenopathy. Skin:     General: Skin is warm. Coloration: Skin is not pale. Findings: No erythema or rash. Neurological:      Mental Status: She is alert and oriented to person, place, and time. Cranial Nerves: No cranial nerve deficit. Motor: No abnormal muscle tone. Coordination: Coordination normal.      Deep Tendon Reflexes: Reflexes are normal and symmetric. Psychiatric:         Behavior: Behavior normal.         Thought Content: Thought content normal.         Judgment: Judgment normal.       Blood work reviewed and interpreted by me for today's visit     Assessment:       Diagnosis Orders   1. Controlled type 2 diabetes mellitus with stage 3 chronic kidney disease, without long-term current use of insulin ([facility] [address]     2.  Benign essential HTN

## 2021-11-22 ENCOUNTER — TELEPHONE (OUTPATIENT)
Dept: FAMILY MEDICINE CLINIC | Age: 76
End: 2021-11-22

## 2021-11-22 NOTE — TELEPHONE ENCOUNTER
Last seen 11/1/21 with you. Pt's  is a pt of yours. Ok to schedule pt with you again? Are you willing to be pt's pcp?

## 2021-11-23 ENCOUNTER — ANTI-COAG VISIT (OUTPATIENT)
Dept: FAMILY MEDICINE CLINIC | Age: 76
End: 2021-11-23

## 2021-11-23 DIAGNOSIS — Z95.2 AORTIC VALVE REPLACED: Primary | ICD-10-CM

## 2021-11-23 LAB — INR BLD: 2

## 2021-12-02 ENCOUNTER — TELEPHONE (OUTPATIENT)
Dept: FAMILY MEDICINE CLINIC | Age: 76
End: 2021-12-02

## 2021-12-02 DIAGNOSIS — F41.9 ANXIETY: ICD-10-CM

## 2021-12-02 NOTE — TELEPHONE ENCOUNTER
Pt need Linda to call in regards to the way directions are written on bottle of ALPRAZolam (XANAX) 0.5 MG tablet     Pharmacy will not refill

## 2021-12-03 DIAGNOSIS — F41.9 ANXIETY: ICD-10-CM

## 2021-12-03 RX ORDER — ALPRAZOLAM 0.5 MG/1
TABLET ORAL
Qty: 90 TABLET | Refills: 1 | OUTPATIENT
Start: 2021-12-03 | End: 2022-02-09

## 2021-12-03 RX ORDER — ALPRAZOLAM 0.5 MG/1
TABLET ORAL
Qty: 90 TABLET | Refills: 0 | OUTPATIENT
Start: 2021-12-03 | End: 2022-01-07

## 2021-12-03 RX ORDER — ALPRAZOLAM 0.5 MG/1
TABLET ORAL
Qty: 90 TABLET | Refills: 0 | Status: SHIPPED | OUTPATIENT
Start: 2021-12-03 | End: 2021-12-03 | Stop reason: SDUPTHER

## 2021-12-03 RX ORDER — ALPRAZOLAM 0.5 MG/1
TABLET ORAL
Qty: 90 TABLET | Refills: 1 | Status: CANCELLED | OUTPATIENT
Start: 2021-12-03 | End: 2022-02-09

## 2021-12-03 NOTE — TELEPHONE ENCOUNTER
Pt states that insurance will not cover 1 tab po daily for 90 days. Needs rx to change to 1 tab po tid prn. Ok to give verbal ok per Dr. Alyse Palacio:    Med has been phoned in. Rx pending to sign.

## 2021-12-03 NOTE — TELEPHONE ENCOUNTER
Spoke with pt. Pt states the insurance needs rx to be written as 1 tablet TID PRN. Med pended.  Pt will be out by tomorrow   Last OV:  11/1/2021

## 2021-12-06 NOTE — TELEPHONE ENCOUNTER
Medication:   Requested Prescriptions     Pending Prescriptions Disp Refills    pantoprazole (PROTONIX) 40 MG tablet [Pharmacy Med Name: PANTOPRAZOLE 40MG TABLETS] 90 tablet 0     Sig: TAKE 1 TABLET BY Clint Hess          Patient Phone Number: [phone] (home) [phone] (work)    Last appt: 11/1/2021   Next appt: 12/8/2021

## 2021-12-07 DIAGNOSIS — Z79.01 LONG-TERM (CURRENT) USE OF ANTICOAGULANTS, INR GOAL 2.5-3.5: ICD-10-CM

## 2021-12-07 RX ORDER — WARFARIN SODIUM 1 MG/1
TABLET ORAL
Qty: 90 TABLET | Refills: 3 | Status: SHIPPED | OUTPATIENT
Start: 2021-12-07 | End: 2021-12-07

## 2021-12-07 RX ORDER — PANTOPRAZOLE SODIUM 40 MG/1
TABLET, DELAYED RELEASE ORAL
Qty: 90 TABLET | Refills: 0 | Status: SHIPPED | OUTPATIENT
Start: 2021-12-07 | End: 2022-03-14

## 2021-12-07 RX ORDER — WARFARIN SODIUM 1 MG/1
TABLET ORAL
Qty: 180 TABLET | Refills: 2 | Status: SHIPPED | OUTPATIENT
Start: 2021-12-07 | End: 2021-12-08

## 2021-12-07 NOTE — TELEPHONE ENCOUNTER
Medication:   Requested Prescriptions     Pending Prescriptions Disp Refills    warfarin (COUMADIN) 1 MG tablet [Pharmacy Med Name: WARFARIN SOD 1MG TABLETS (PINK)] 180 tablet      Sig: TAKE 2 AND 1/2(TWO AND ONE-HALF) TABLETS BY MOUTH ON SUNDAYS AND TUESDAY, AND 2 TABLETS BY MOUTH ALL OTHER DAYS        Last Filled:      Patient Phone Number: [phone] (home) [phone] (work)    Last appt: 11/1/2021   Next appt: 12/8/2021    Last OARRS:   RX Monitoring 3/8/2021   Attestation -   Periodic Controlled Substance Monitoring Possible medication side effects, risk of tolerance/dependence & alternative treatments discussed. ;No signs of potential drug abuse or diversion identified.

## 2021-12-07 NOTE — TELEPHONE ENCOUNTER
Medication:   Requested Prescriptions     Pending Prescriptions Disp Refills    warfarin (COUMADIN) 1 MG tablet [Pharmacy Med Name: WARFARIN SOD 1MG TABLETS (PINK)] 270 tablet 1     Sig: TAKE AS DIRECTED        Last Filled:      Patient Phone Number: [phone] (home) [phone] (work)    Last appt: 11/1/2021   Next appt: 12/8/2021    Last OARRS:   RX Monitoring 3/8/2021   Attestation -   Periodic Controlled Substance Monitoring Possible medication side effects, risk of tolerance/dependence & alternative treatments discussed. ;No signs of potential drug abuse or diversion identified.

## 2021-12-08 RX ORDER — WARFARIN SODIUM 1 MG/1
TABLET ORAL
Qty: 192 TABLET | Refills: 3 | Status: SHIPPED | OUTPATIENT
Start: 2021-12-08

## 2021-12-08 NOTE — TELEPHONE ENCOUNTER
Medication:   Requested Prescriptions     Pending Prescriptions Disp Refills    warfarin (COUMADIN) 1 MG tablet [Pharmacy Med Name: Lucy Bryan 1MG TABLETS (PINK)] 192 tablet      Sig: TAKE TWO AND ONE-HALF TABLETS BY MOUTH ON SUNDAYS AND TUESDAY AND TWO TABLETS BY MOUTH ALL OTHER DAYS        Last Filled:      Patient Phone Number: [phone] (home) [phone] (work)    Last appt: 11/1/2021   Next appt: 12/8/2021    Last OARRS:   RX Monitoring 3/8/2021   Attestation -   Periodic Controlled Substance Monitoring Possible medication side effects, risk of tolerance/dependence & alternative treatments discussed. ;No signs of potential drug abuse or diversion identified.

## 2021-12-22 ENCOUNTER — OFFICE VISIT (OUTPATIENT)
Dept: FAMILY MEDICINE CLINIC | Age: 76
End: 2021-12-22
Payer: MEDICARE

## 2021-12-22 VITALS
DIASTOLIC BLOOD PRESSURE: 68 MMHG | BODY MASS INDEX: 22.62 KG/M2 | HEIGHT: 59 IN | RESPIRATION RATE: 16 BRPM | SYSTOLIC BLOOD PRESSURE: 174 MMHG | WEIGHT: 112.2 LBS | TEMPERATURE: 97.9 F

## 2021-12-22 DIAGNOSIS — Z00.00 ROUTINE GENERAL MEDICAL EXAMINATION AT A HEALTH CARE FACILITY: Primary | ICD-10-CM

## 2021-12-22 PROCEDURE — 1123F ACP DISCUSS/DSCN MKR DOCD: CPT | Performed by: NURSE PRACTITIONER

## 2021-12-22 PROCEDURE — 3017F COLORECTAL CA SCREEN DOC REV: CPT | Performed by: NURSE PRACTITIONER

## 2021-12-22 PROCEDURE — 4040F PNEUMOC VAC/ADMIN/RCVD: CPT | Performed by: NURSE PRACTITIONER

## 2021-12-22 PROCEDURE — G0439 PPPS, SUBSEQ VISIT: HCPCS | Performed by: NURSE PRACTITIONER

## 2021-12-22 PROCEDURE — G8484 FLU IMMUNIZE NO ADMIN: HCPCS | Performed by: NURSE PRACTITIONER

## 2021-12-22 ASSESSMENT — LIFESTYLE VARIABLES
HOW OFTEN DURING THE LAST YEAR HAVE YOU FAILED TO DO WHAT WAS NORMALLY EXPECTED FROM YOU BECAUSE OF DRINKING: 0
HOW OFTEN DURING THE LAST YEAR HAVE YOU NEEDED AN ALCOHOLIC DRINK FIRST THING IN THE MORNING TO GET YOURSELF GOING AFTER A NIGHT OF HEAVY DRINKING: 0
HAS A RELATIVE, FRIEND, DOCTOR, OR ANOTHER HEALTH PROFESSIONAL EXPRESSED CONCERN ABOUT YOUR DRINKING OR SUGGESTED YOU CUT DOWN: 0
HOW OFTEN DURING THE LAST YEAR HAVE YOU BEEN UNABLE TO REMEMBER WHAT HAPPENED THE NIGHT BEFORE BECAUSE YOU HAD BEEN DRINKING: 0
HOW OFTEN DO YOU HAVE A DRINK CONTAINING ALCOHOL: 1
AUDIT TOTAL SCORE: 1
AUDIT-C TOTAL SCORE: 1
HOW MANY STANDARD DRINKS CONTAINING ALCOHOL DO YOU HAVE ON A TYPICAL DAY: 0
HAVE YOU OR SOMEONE ELSE BEEN INJURED AS A RESULT OF YOUR DRINKING: 0
HOW OFTEN DURING THE LAST YEAR HAVE YOU FOUND THAT YOU WERE NOT ABLE TO STOP DRINKING ONCE YOU HAD STARTED: 0
HOW OFTEN DURING THE LAST YEAR HAVE YOU HAD A FEELING OF GUILT OR REMORSE AFTER DRINKING: 0
HOW OFTEN DO YOU HAVE SIX OR MORE DRINKS ON ONE OCCASION: 0

## 2021-12-22 ASSESSMENT — PATIENT HEALTH QUESTIONNAIRE - PHQ9
SUM OF ALL RESPONSES TO PHQ9 QUESTIONS 1 & 2: 0
2. FEELING DOWN, DEPRESSED OR HOPELESS: 0
SUM OF ALL RESPONSES TO PHQ QUESTIONS 1-9: 0
SUM OF ALL RESPONSES TO PHQ QUESTIONS 1-9: 0
1. LITTLE INTEREST OR PLEASURE IN DOING THINGS: 0
SUM OF ALL RESPONSES TO PHQ QUESTIONS 1-9: 0

## 2021-12-22 NOTE — PATIENT INSTRUCTIONS
Personalized Preventive Plan for Cinthya Liang - 12/22/2021  Medicare offers a range of preventive health benefits. Some of the tests and screenings are paid in full while other may be subject to a deductible, co-insurance, and/or copay. Some of these benefits include a comprehensive review of your medical history including lifestyle, illnesses that may run in your family, and various assessments and screenings as appropriate. After reviewing your medical record and screening and assessments performed today your provider may have ordered immunizations, labs, imaging, and/or referrals for you. A list of these orders (if applicable) as well as your Preventive Care list are included within your After Visit Summary for your review. Other Preventive Recommendations:    · A preventive eye exam performed by an eye specialist is recommended every 1-2 years to screen for glaucoma; cataracts, macular degeneration, and other eye disorders. · A preventive dental visit is recommended every 6 months. · Try to get at least 150 minutes of exercise per week or 10,000 steps per day on a pedometer . · Order or download the FREE \"Exercise & Physical Activity: Your Everyday Guide\" from [employer]. Call 1-424.708.7445 or search [employer] online. · You need 9118-2366 mg of calcium and 0539-9544 IU of vitamin D per day. It is possible to meet your calcium requirement with diet alone, but a vitamin D supplement is usually necessary to meet this goal.  · When exposed to the sun, use a sunscreen that protects against both UVA and UVB radiation with an SPF of 30 or greater. Reapply every 2 to 3 hours or after sweating, drying off with a towel, or swimming. · Always wear a seat belt when traveling in a car. Always wear a helmet when riding a bicycle or motorcycle. Personalized Preventive Plan for Cinthya Liang - 12/22/2021  Medicare offers a range of preventive health benefits.  Some of the tests and screenings are paid in full while other may be subject to a deductible, co-insurance, and/or copay. Some of these benefits include a comprehensive review of your medical history including lifestyle, illnesses that may run in your family, and various assessments and screenings as appropriate. After reviewing your medical record and screening and assessments performed today your provider may have ordered immunizations, labs, imaging, and/or referrals for you. A list of these orders (if applicable) as well as your Preventive Care list are included within your After Visit Summary for your review. Other Preventive Recommendations:    A preventive eye exam performed by an eye specialist is recommended every 1-2 years to screen for glaucoma; cataracts, macular degeneration, and other eye disorders. A preventive dental visit is recommended every 6 months. Try to get at least 150 minutes of exercise per week or 10,000 steps per day on a pedometer . Order or download the FREE \"Exercise & Physical Activity: Your Everyday Guide\" from [employer]. Call 4-493.167.8732 or search [employer] online. You need 9995-3100 mg of calcium and 1187-4711 IU of vitamin D per day. It is possible to meet your calcium requirement with diet alone, but a vitamin D supplement is usually necessary to meet this goal.  When exposed to the sun, use a sunscreen that protects against both UVA and UVB radiation with an SPF of 30 or greater. Reapply every 2 to 3 hours or after sweating, drying off with a towel, or swimming. Always wear a seat belt when traveling in a car. Always wear a helmet when riding a bicycle or motorcycle.

## 2021-12-22 NOTE — PROGRESS NOTES
Medicare Annual Wellness Visit  Name: Erika Jolley Date: 2021   MRN: [id number] Sex: Female   Age: 77 y.o. Ethnicity: Non- / Non    : 1945 Race: White (non-)      Lesley Vargas is here for Medicare AWV    Screenings for behavioral, psychosocial and functional/safety risks, and cognitive dysfunction are all negative except as indicated below. These results, as well as other patient data from the [address] form, are documented in Flowsheets linked to this Encounter. Allergies   Allergen Reactions    Diclofenac Other (See Comments)     Bleed/colitis    Nsaids Other (See Comments)     CANNOT TAKE D/T GI BLEEDING AND USE OF COUMADIN    Hydrocodone-Acetaminophen Anxiety         Prior to Visit Medications    Medication Sig Taking? Authorizing Provider   lisinopril (PRINIVIL;ZESTRIL) 40 MG tablet TAKE 1 TABLET BY MOUTH DAILY Yes Kylee Camp MD   warfarin (COUMADIN) 1 MG tablet TAKE TWO AND ONE-HALF TABLETS BY MOUTH ON SUNDAYS AND TUESDAY AND TWO TABLETS BY MOUTH ALL OTHER DAYS Yes Kylee Camp MD   pantoprazole (PROTONIX) 40 MG tablet TAKE 1 TABLET BY MOUTH EVERY DAY Yes Kylee Camp MD   ALPRAZolam (XANAX) 0.5 MG tablet TAKE 1 TABLET BY MOUTH TID PRN Yes Kylee Camp MD   Black Pepper-Turmeric (TURMERIC CURCUMIN) 5-1000 MG CAPS Take by mouth Yes Kassidy Adler MD   metFORMIN (GLUCOPHAGE) 1000 MG tablet TAKE 1 TABLET BY MOUTH TWICE DAILY Yes ALONSO Suarez   acetaminophen-codeine (TYLENOL #3) 300-30 MG per tablet Take 1 tablet by mouth nightly as needed for Pain for up to 90 days.  Yes Kylee Camp MD   atorvastatin (LIPITOR) 10 MG tablet TAKE 1 TABLET BY MOUTH DAILY IN THE EVENING FOR CHOLESTEROL Yes ALONSO Suarez   amLODIPine (NORVASC) 5 MG tablet Take 1 tablet by mouth daily Yes Lorenzo Howard MD   glipiZIDE (GLUCOTROL) 5 MG tablet TAKE 1/2 TABLET BY MOUTH EVERY DAY WITH FOOD Yes Cinthia PollackzarineALONSO   furosemide (LASIX) 20 MG tablet Take 1 tablet by mouth daily Yes Nikki Frias MD   TRUE METRIX BLOOD GLUCOSE TEST strip USE TO TEST BLOOD SUGAR TWICE DAILY Yes Loretta Porras MD   Biotin 58432 MCG TABS Take by mouth Yes Historical Provider, MD GOTTI PO Take by mouth Yes Historical Provider, MD   warfarin (COUMADIN) 3 MG tablet TAKE 1 TABLET BY MOUTH AS DIRECTED Yes Loretta Porras MD   metoprolol tartrate (LOPRESSOR) 25 MG tablet Take 12.5 mg by mouth 2 times daily 0.5 tab BID  Yes Historical Provider, MD         Past Medical History:   Diagnosis Date    A-fib ([facility]     Anemia     multifactorial:under care of heme-onc:Dr. Jose Wilkins Anemia:multifactorial 2011    as per heme-onc    Anxiety     Cataract     bilateral    Chronic back pain     Under care of ortho spine:Dr. Jero Rodriguez    CKD (chronic kidney disease) stage 3, GFR 30-59 ml/min ([facility] 1/2012    Colitis, ischemic ([facility] 6/2012    Under care of Dr. Meir Mercado    [facility]     Diabetes     Diabetic eye exam ([facility] 1/28/15    CEI    GERD (gastroesophageal reflux disease)     Hx of mammogram 05/23/2018    negative:see scanned report.  Hyperlipidaemia LDL goal <100     Hypertension     Insomnia     Long-term (current) use of anticoagulants, INR goal 2.5-3.5     Lymphoma:monoclonal B cell 1/2012    Under care of Dr. Esmer Newby abnormal 7/30/15    Right breast mass:benign(?lipoma)per US:repeat testing in 6mos=1/30/16    Nonspecific abnormal results of kidney function study 1/25/2012    Osteoarthritis     Renal cysts, right 1/2/2014    RLS (restless legs syndrome) 10/19/11    Sciatica     Screening colonoscopy 2010;6/19/13    Nml. Next 10yrs:6/2023:Dr. Agnes Ormond.  9/22/16(Dr. Waddell):nml EGD & colonoscopy except mild diverticulosis    Therapeutic drug monitoring 04/10/2015    OARRS report consistent on 4/10/15;7/6/15;10/23/15;2/7/16;5/16/16;8/19/16;11/4/16;3/6/17;6/26/17;9/20/17;12/18/17; 12/18/17;3/12/18;5/29/18    Increased Fatigue, Loneliness, Social Isolation, Stress or Anger?: None of These  Do you get the social and emotional support that you need?: Yes  Do you have a Living Will?: (!) No  Advance Directives     Power of [address] Will ACP-Advance Directive ACP-Power of     Not on File Not on File Not on File Not on File      General Health Risk Interventions:  · No Living Will: Patient declines ACP discussion/assistance      Safety:  Safety  Do you have working smoke detectors?: Yes  Have all throw rugs been removed or fastened?: (!) No  Do you have non-slip mats or surfaces in all bathtubs/showers?: (!) No  Do all of your stairways have a railing or banister?: Yes  Are your doorways, halls and stairs free of clutter?: Yes  Do you always fasten your seatbelt when you are in a car?: Yes  Safety Interventions:  · Home safety tips provided       Personalized Preventive Plan   Current Health Maintenance Status  Immunization History   Administered Date(s) Administered    COVID-19, Pfizer, PF, 30mcg/0.3mL 01/14/2021, 02/04/2021    Influenza Nasal 09/04/2010    Influenza Vaccine, unspecified formulation 09/21/2015    Influenza Virus Vaccine 11/10/2007, 08/16/2011, 09/10/2013, 10/16/2017, 10/10/2018    Influenza, High Dose (Fluzone 65 yrs and older) 10/11/2016, 10/02/2019    Influenza, High-dose, Quadv, 65 yrs +, IM (Fluzone) 10/06/2020    Influenza, Live, Intranasal, Quadv, (Flumist 2-49 yrs) 09/04/2010    Influenza, Triv, inactivated, subunit, adjuvanted, IM (Fluad 65 yrs and older) 09/26/2017, 10/02/2018, 09/25/2019    Pneumococcal Conjugate 13-valent ([id number]) 09/21/2015    Pneumococcal Polysaccharide ([id number]) 12/28/2011        Health Maintenance   Topic Date Due    DTaP/Tdap/Td vaccine (1 - Tdap) Never done    COVID-19 Vaccine (3 - Pfizer risk 4-dose series) 03/04/2021    Diabetic foot exam  11/01/2022 (Originally 1/30/2016)    Diabetic retinal exam  03/15/2022    Potassium monitoring 08/05/2022    Creatinine monitoring  08/05/2022    Annual Wellness Visit (AWV)  08/06/2022    Low dose CT lung screening  08/17/2022    A1C test (Diabetic or Prediabetic)  10/11/2022    Lipid screen  10/11/2022    Colon cancer screen colonoscopy  09/22/2026    DEXA (modify frequency per FRAX score)  Completed    Flu vaccine  Completed    Shingles Vaccine  Completed    Pneumococcal 65+ yrs at Risk Vaccine  Completed    Hepatitis A vaccine  Aged Out    Hib vaccine  Aged Out    Meningococcal (ACWY) vaccine  Aged Out    Hepatitis C screen  Discontinued     Recommendations for Preventive Services Due: see orders and patient instructions/AVS.  . Recommended screening schedule for the next 5-10 years is provided to the patient in written form: see Patient Instructions/AVS.    ASSESSMENT/PLAN:  1. Routine general medical examination at a health care facility  Blood pressure is slightly elevated today, however,  is currently going through new lung cancer treatment and she is having a large gathering at her home in two days for [place] and she is feeling stressed from this. Encouraged patient to follow up with Dr. Sacha Molina in the next few weeks for recheck.

## 2021-12-27 ENCOUNTER — ANTI-COAG VISIT (OUTPATIENT)
Dept: FAMILY MEDICINE CLINIC | Age: 76
End: 2021-12-27

## 2021-12-27 DIAGNOSIS — Z95.2 AORTIC VALVE REPLACED: Primary | ICD-10-CM

## 2021-12-27 LAB — INR BLD: 1.5

## 2021-12-27 NOTE — PROGRESS NOTES
INR goal:  2.5-3.5   Have patient take 3mg on Tuesday and Friday this week and same next week. 2.5 on remainder of days. Recheck in two weeks.

## 2022-01-03 DIAGNOSIS — E78.5 HYPERLIPIDEMIA WITH TARGET LDL LESS THAN 100: ICD-10-CM

## 2022-01-03 RX ORDER — ATORVASTATIN CALCIUM 10 MG/1
TABLET, FILM COATED ORAL
Qty: 90 TABLET | Refills: 0 | Status: SHIPPED | OUTPATIENT
Start: 2022-01-03 | End: 2022-04-05

## 2022-01-03 NOTE — TELEPHONE ENCOUNTER
Medication:   Requested Prescriptions     Pending Prescriptions Disp Refills    atorvastatin (LIPITOR) 10 MG tablet [Pharmacy Med Name: ATORVASTATIN 10MG TABLETS] 90 tablet 0     Sig: TAKE 1 TABLET BY MOUTH DAILY IN THE EVENING FOR CHOLESTEROL        Last Filled:      Patient Phone Number: [phone] (home) [phone] (work)    Last appt: 12/22/2021   Next appt: Visit date not found    Last OARRS:   RX Monitoring 3/8/2021   Attestation -   Periodic Controlled Substance Monitoring Possible medication side effects, risk of tolerance/dependence & alternative treatments discussed. ;No signs of potential drug abuse or diversion identified.

## 2022-01-07 DIAGNOSIS — F41.9 ANXIETY: ICD-10-CM

## 2022-01-07 RX ORDER — ALPRAZOLAM 0.5 MG/1
TABLET ORAL
Qty: 90 TABLET | Refills: 0 | Status: SHIPPED | OUTPATIENT
Start: 2022-01-07 | End: 2022-02-21

## 2022-01-07 NOTE — TELEPHONE ENCOUNTER
Medication:   Requested Prescriptions     Pending Prescriptions Disp Refills    ALPRAZolam (XANAX) 0.5 MG tablet [Pharmacy Med Name: ALPRAZOLAM 0.5MG TABLETS] 90 tablet      Sig: TAKE 1 TABLET BY MOUTH THREE TIMES DAILY AS NEEDED        Last Filled:      Patient Phone Number: [phone] (home) [phone] (work)    Last appt: 12/22/2021   Next appt: Visit date not found    Last OARRS:   RX Monitoring 3/8/2021   Attestation -   Periodic Controlled Substance Monitoring Possible medication side effects, risk of tolerance/dependence & alternative treatments discussed. ;No signs of potential drug abuse or diversion identified.

## 2022-01-17 DIAGNOSIS — G89.29 CHRONIC BILATERAL LOW BACK PAIN WITHOUT SCIATICA: ICD-10-CM

## 2022-01-17 DIAGNOSIS — M54.50 CHRONIC BILATERAL LOW BACK PAIN WITHOUT SCIATICA: ICD-10-CM

## 2022-01-17 NOTE — TELEPHONE ENCOUNTER
Medication:   Requested Prescriptions     Pending Prescriptions Disp Refills    acetaminophen-codeine (TYLENOL #3) 300-30 MG per tablet [Pharmacy Med Name: ACETAMINOPHEN/COD #3 (300/30MG) TAB] 90 tablet      Sig: TAKE 1 TABLET BY MOUTH EVERY NIGHT AS NEEDED FOR PAIN        Last Filled:      Patient Phone Number: [phone] (home) [phone] (work)    Last appt: 12/22/2021   Next appt: Visit date not found    Last OARRS:   RX Monitoring 3/8/2021   Attestation -   Periodic Controlled Substance Monitoring Possible medication side effects, risk of tolerance/dependence & alternative treatments discussed. ;No signs of potential drug abuse or diversion identified.

## 2022-01-18 RX ORDER — ACETAMINOPHEN AND CODEINE PHOSPHATE 300; 30 MG/1; MG/1
TABLET ORAL
Qty: 90 TABLET | Refills: 0 | Status: SHIPPED | OUTPATIENT
Start: 2022-01-18 | End: 2022-04-27

## 2022-01-21 ENCOUNTER — TELEPHONE (OUTPATIENT)
Dept: FAMILY MEDICINE CLINIC | Age: 77
End: 2022-01-21

## 2022-01-21 NOTE — TELEPHONE ENCOUNTER
Pt already takes 3 mg on Tuesday. Per Dr. Bertha Corona  Pt has been advised to take 3 mg on Frid, Tues, Sun. 2.5 on remaining days.

## 2022-01-21 NOTE — TELEPHONE ENCOUNTER
Pt called in with her INR    INR-1.8    Current dose is 2.5 mg on M-W-Th, Sat, Sun    And 3mg on Tues and Fri    Please advise    Last OV:  12-22-21

## 2022-02-14 ENCOUNTER — ANTI-COAG VISIT (OUTPATIENT)
Dept: FAMILY MEDICINE CLINIC | Age: 77
End: 2022-02-14

## 2022-02-14 LAB — INR BLD: 2

## 2022-02-17 DIAGNOSIS — F41.9 ANXIETY: ICD-10-CM

## 2022-02-17 NOTE — TELEPHONE ENCOUNTER
Medication:   Requested Prescriptions     Pending Prescriptions Disp Refills    ALPRAZolam (XANAX) 0.5 MG tablet [Pharmacy Med Name: ALPRAZOLAM 0.5MG TABLETS] 90 tablet      Sig: TAKE 1 TABLET BY MOUTH THREE TIMES DAILY AS NEEDED          Patient Phone Number: [phone] (home) [phone] (work)    Last appt: 12/22/2021

## 2022-02-21 RX ORDER — ALPRAZOLAM 0.5 MG/1
TABLET ORAL
Qty: 90 TABLET | Refills: 0 | Status: SHIPPED | OUTPATIENT
Start: 2022-02-21 | End: 2022-03-03 | Stop reason: SDUPTHER

## 2022-02-21 NOTE — TELEPHONE ENCOUNTER
Last refill needs follow up appointment Complex Repair And Rhombic Flap Text: The defect edges were debeveled with a #15 scalpel blade.  The primary defect was closed partially with a complex linear closure.  Given the location of the remaining defect, shape of the defect and the proximity to free margins a rhombic flap was deemed most appropriate for complete closure of the defect.  Using a sterile surgical marker, an appropriate advancement flap was drawn incorporating the defect and placing the expected incisions within the relaxed skin tension lines where possible.    The area thus outlined was incised deep to adipose tissue with a #15 scalpel blade.  The skin margins were undermined to an appropriate distance in all directions utilizing iris scissors.

## 2022-02-21 NOTE — TELEPHONE ENCOUNTER
Pt called checking on the status of her refill. She states she is completely out and would like to go get this rx today.    Please advise  Last OV:  12/22/2021

## 2022-03-01 ENCOUNTER — TELEPHONE (OUTPATIENT)
Dept: FAMILY MEDICINE CLINIC | Age: 77
End: 2022-03-01

## 2022-03-01 NOTE — TELEPHONE ENCOUNTER
Results from [facility]      Was she in the hospital?  Needs f/u apt. Pt states that she just went in for blood work. Pt has been scheduled on 3/3/22 at 3 pm for vv to discuss results.

## 2022-03-03 ENCOUNTER — TELEMEDICINE (OUTPATIENT)
Dept: FAMILY MEDICINE CLINIC | Age: 77
End: 2022-03-03
Payer: MEDICARE

## 2022-03-03 DIAGNOSIS — J44.9 COPD, MILD (HCC): ICD-10-CM

## 2022-03-03 DIAGNOSIS — N18.32 TYPE 2 DIABETES MELLITUS WITH STAGE 3B CHRONIC KIDNEY DISEASE, WITHOUT LONG-TERM CURRENT USE OF INSULIN (HCC): ICD-10-CM

## 2022-03-03 DIAGNOSIS — E11.22 TYPE 2 DIABETES MELLITUS WITH STAGE 3B CHRONIC KIDNEY DISEASE, WITHOUT LONG-TERM CURRENT USE OF INSULIN (HCC): ICD-10-CM

## 2022-03-03 DIAGNOSIS — N18.30 STAGE 3 CHRONIC KIDNEY DISEASE, UNSPECIFIED WHETHER STAGE 3A OR 3B CKD (HCC): ICD-10-CM

## 2022-03-03 DIAGNOSIS — I50.9 CONGESTIVE HEART FAILURE, UNSPECIFIED HF CHRONICITY, UNSPECIFIED HEART FAILURE TYPE (HCC): ICD-10-CM

## 2022-03-03 DIAGNOSIS — E83.51 HYPOCALCEMIA: Primary | ICD-10-CM

## 2022-03-03 DIAGNOSIS — C82.80 OTHER TYPE OF FOLLICULAR LYMPHOMA, UNSPECIFIED BODY REGION (HCC): ICD-10-CM

## 2022-03-03 DIAGNOSIS — F41.9 ANXIETY: ICD-10-CM

## 2022-03-03 PROCEDURE — 99443 PR PHYS/QHP TELEPHONE EVALUATION 21-30 MIN: CPT | Performed by: FAMILY MEDICINE

## 2022-03-03 RX ORDER — ALPRAZOLAM 0.5 MG/1
TABLET ORAL
Qty: 90 TABLET | Refills: 2 | Status: SHIPPED | OUTPATIENT
Start: 2022-03-03 | End: 2022-06-03 | Stop reason: SDUPTHER

## 2022-03-03 ASSESSMENT — ENCOUNTER SYMPTOMS
BLURRED VISION: 0
VISUAL CHANGE: 0

## 2022-03-03 NOTE — PROGRESS NOTES
Subjective:     Lico Ghosh is a 69 y.o. female evaluated via telephone on 3/3/2022. Consent:  She and/or health care decision maker is aware that that she may receive a bill for this telephone service, which includes applicable co-pays, depending on her insurance coverage, and has provided verbal consent to proceed. Documentation:  I communicated with the patient and/or health care decision maker about yes. Details of this discussion including any medical advice provided: yes      I affirm this is a Patient Initiated Episode with a Patient who has not had a related appointment within my department in the past 7 days or scheduled within the next 24 hours. Patient identification was verified at the start of the visit: Yes    Total Time: minutes: 21   minutes    Lico Ghosh was evaluated through a synchronous (real-time) audio encounter. The patient was located at home in a state where the provider was licensed to provide care. Note: not billable if this call serves to triage the patient into an appointment for the relevant concern      Elma Yancey MD       Patient ID: Lico Ghosh is a 69 y.o. female. Other  This is a recurrent (discussed results done at [facility] , hypocalcemia) problem. The current episode started 1 to 4 weeks ago. The problem occurs constantly. The problem has been unchanged. Associated symptoms include fatigue. Pertinent negatives include no chest pain, visual change or weakness. Nothing aggravates the symptoms. She has tried nothing for the symptoms. Diabetes  She presents for her follow-up diabetic visit. She has type 2 diabetes mellitus. Her disease course has been fluctuating. Associated symptoms include fatigue. Pertinent negatives for diabetes include no blurred vision, no chest pain, no foot paresthesias, no foot ulcerations, no polydipsia, no polyphagia, no polyuria, no visual change, no weakness and no weight loss. Symptoms are stable.  Current diabetic treatments: metformin. Her weight is stable. She is following a generally unhealthy diet. When asked about meal planning, she reported none. An ACE inhibitor/angiotensin II receptor blocker is being taken. Back pain   This is a chronic problem. The current episode started more than 1 year ago. The problem occurs intermittently. The problem is unchanged. The pain is present in the lumbar spine. The quality of the pain is described as aching and cramping. The pain does not radiate. The pain is severe. The pain is worse during the night. Pertinent negatives include no abdominal pain, bladder incontinence, bowel incontinence, chest pain, dysuria, fever, headaches, leg pain, numbness, paresis, paresthesias, pelvic pain, perianal numbness, tingling, weakness or weight loss. Risk factors include menopause. Treatments tried: tylenol # 3. Anxiety:   Sx are stable with current med (Xanax ) that she takes prn       COPD   Stable   Denies sob, cough  Or wheezing     Lymphoma: follows with hematology     CHF  Denies c/p, sob, palpitation or edema  Compliant with med    CKD   Followed by nephrology        Review of Systems   Constitutional: Positive for fatigue. Negative for weight loss. Eyes: Negative for blurred vision. Cardiovascular: Negative for chest pain. Endocrine: Negative for polydipsia, polyphagia and polyuria. Neurological: Negative for weakness. Objective:   Physical Exam  Vitals reviewed. Constitutional:       General: She is not in acute distress. HENT:      Head:      Comments: Hearing intact to nml conversation     Neurological:      Mental Status: She is alert and oriented to person, place, and time. Psychiatric:         Mood and Affect: Mood normal.         Assessment:       Diagnosis Orders   1. Hypocalcemia  Basic Metabolic Panel   2. COPD, mild ([address]     3. Other type of follicular lymphoma, unspecified body region ([address]     4.  Congestive heart failure, unspecified HF chronicity, unspecified heart failure type ([facility]     5. Type 2 diabetes mellitus with stage 3b chronic kidney disease, without long-term current use of insulin (H[facility])     6. Stage 3 chronic kidney disease, unspecified whether stage 3a or 3b CKD ([facility]     7. Anxiety  ALPRAZolam (XANAX) 0.5 MG tablet           Plan:      1. NOS  Asymptomatic   Check fu lab in 1 week    2. Stable   Continue to monitor    3. Stable   Continue follow up with hematology/onc    4.encourage compliance w med. 5. With hyperglycemia in her most recent results (discussed with patient today)  Poor compliance with diet  Encourage compliance with medication, life style changes    6. Increased creatinine but GFR stable  Avoid nsaids , continue fu with nephrology    7. Stable  Controlled Substances Monitoring: Attestation: The Prescription Monitoring Report for this patient was reviewed today. Yessica Medina MD)  Documentation: No signs of potential drug abuse or diversion identified.  Yessica Medina MD)  Park Reddy MD

## 2022-03-14 RX ORDER — PANTOPRAZOLE SODIUM 40 MG/1
TABLET, DELAYED RELEASE ORAL
Qty: 90 TABLET | Refills: 0 | Status: SHIPPED | OUTPATIENT
Start: 2022-03-14 | End: 2022-06-16

## 2022-03-14 NOTE — TELEPHONE ENCOUNTER
Medication:   Requested Prescriptions     Pending Prescriptions Disp Refills    pantoprazole (PROTONIX) 40 MG tablet [Pharmacy Med Name: PANTOPRAZOLE 40MG TABLETS] 90 tablet 0     Sig: TAKE 1 TABLET BY MOUTH EVERY DAY        Last Filled:      Patient Phone Number: [phone] (home) [phone] (work)    Last appt: 3/3/2022   Next appt: Visit date not found    Last OARRS:   RX Monitoring 3/8/2021   Attestation -   Periodic Controlled Substance Monitoring Possible medication side effects, risk of tolerance/dependence & alternative treatments discussed. ;No signs of potential drug abuse or diversion identified.

## 2022-03-29 ENCOUNTER — ANTI-COAG VISIT (OUTPATIENT)
Dept: FAMILY MEDICINE CLINIC | Age: 77
End: 2022-03-29
Payer: MEDICARE

## 2022-03-29 DIAGNOSIS — Z95.2 AORTIC VALVE REPLACED: Primary | ICD-10-CM

## 2022-03-29 LAB — INTERNATIONAL NORMALIZATION RATIO, POC: 2.1

## 2022-03-29 PROCEDURE — 85610 PROTHROMBIN TIME: CPT | Performed by: FAMILY MEDICINE

## 2022-03-29 NOTE — PROGRESS NOTES
HC orders ok'd & call to Lelia SAPP with [facility].  Marivel Klein RN   Pt is taking 3mg on Sunday Tuesday and Friday 2.5 on remainder of days

## 2022-03-31 RX ORDER — GLUCOSAMINE HCL/CHONDROITIN SU 500-400 MG
CAPSULE ORAL
Qty: 100 STRIP | Refills: 3 | Status: SHIPPED | OUTPATIENT
Start: 2022-03-31

## 2022-03-31 NOTE — TELEPHONE ENCOUNTER
Medication:   Requested Prescriptions     Pending Prescriptions Disp Refills    blood glucose monitor strips 100 strip 3     Sig: Test 2 times a day & as needed for symptoms of irregular blood glucose. Ok to dispense what is covered by insurance. Patient has been using [employer] test strips        Last Filled:      Patient Phone Number: [phone] (home) [phone] (work)    Last appt: 3/3/2022   Next appt: 6/3/2022    Last OARRS:   RX Monitoring 3/8/2021   Attestation -   Periodic Controlled Substance Monitoring Possible medication side effects, risk of tolerance/dependence & alternative treatments discussed. ;No signs of potential drug abuse or diversion identified.

## 2022-04-03 DIAGNOSIS — E78.5 HYPERLIPIDEMIA WITH TARGET LDL LESS THAN 100: ICD-10-CM

## 2022-04-04 NOTE — TELEPHONE ENCOUNTER
Medication:   Requested Prescriptions     Pending Prescriptions Disp Refills    atorvastatin (LIPITOR) 10 MG tablet [Pharmacy Med Name: ATORVASTATIN 10MG TABLETS] 90 tablet 0     Sig: TAKE 1 TABLET BY MOUTH DAILY IN THE EVENING FOR CHOLESTEROL            Patient Phone Number: [phone] (home) [phone] (work)    Last appt: 3/3/2022   Next appt: 6/3/2022

## 2022-04-05 RX ORDER — ATORVASTATIN CALCIUM 10 MG/1
TABLET, FILM COATED ORAL
Qty: 90 TABLET | Refills: 3 | Status: SHIPPED | OUTPATIENT
Start: 2022-04-05

## 2022-04-25 DIAGNOSIS — G89.29 CHRONIC BILATERAL LOW BACK PAIN WITHOUT SCIATICA: ICD-10-CM

## 2022-04-25 DIAGNOSIS — M54.50 CHRONIC BILATERAL LOW BACK PAIN WITHOUT SCIATICA: ICD-10-CM

## 2022-04-25 NOTE — TELEPHONE ENCOUNTER
Medication:   Requested Prescriptions     Pending Prescriptions Disp Refills    acetaminophen-codeine (TYLENOL #3) 300-30 MG per tablet [Pharmacy Med Name: ACETAMINOPHEN/COD #3 (300/30MG) TAB] 90 tablet      Sig: TAKE 1 TABLET BY MOUTH EVERY NIGHT AS NEEDED FOR PAIN        Last Filled:      Patient Phone Number: [phone] (home) [phone] (work)    Last appt: 3/3/2022   Next appt: 6/3/2022    Last OARRS:   RX Monitoring 3/8/2021   Attestation -   Periodic Controlled Substance Monitoring Possible medication side effects, risk of tolerance/dependence & alternative treatments discussed. ;No signs of potential drug abuse or diversion identified.

## 2022-04-27 RX ORDER — ACETAMINOPHEN AND CODEINE PHOSPHATE 300; 30 MG/1; MG/1
TABLET ORAL
Qty: 60 TABLET | Refills: 0 | Status: SHIPPED | OUTPATIENT
Start: 2022-04-27 | End: 2022-06-03 | Stop reason: SDUPTHER

## 2022-05-03 ENCOUNTER — ANTI-COAG VISIT (OUTPATIENT)
Dept: FAMILY MEDICINE CLINIC | Age: 77
End: 2022-05-03

## 2022-05-03 DIAGNOSIS — Z95.2 AORTIC VALVE REPLACED: Primary | ICD-10-CM

## 2022-05-03 LAB — INR BLD: 1.5

## 2022-05-03 NOTE — PROGRESS NOTES
Pt is taking 3mg on Sunday Tuesday and Friday 2.5 on remainder of days     Discussed with patient  Patient expressed understanding

## 2022-05-13 RX ORDER — FUROSEMIDE 20 MG/1
40 TABLET ORAL DAILY
Qty: 180 TABLET | Refills: 2 | Status: SHIPPED | OUTPATIENT
Start: 2022-05-13

## 2022-05-24 ENCOUNTER — ANTI-COAG VISIT (OUTPATIENT)
Dept: FAMILY MEDICINE CLINIC | Age: 77
End: 2022-05-24

## 2022-05-24 DIAGNOSIS — Z95.2 AORTIC VALVE REPLACED: Primary | ICD-10-CM

## 2022-05-24 LAB — INR BLD: 2.1

## 2022-06-03 ENCOUNTER — OFFICE VISIT (OUTPATIENT)
Dept: FAMILY MEDICINE CLINIC | Age: 77
End: 2022-06-03
Payer: MEDICARE

## 2022-06-03 VITALS
WEIGHT: 111.1 LBS | HEART RATE: 71 BPM | OXYGEN SATURATION: 98 % | DIASTOLIC BLOOD PRESSURE: 62 MMHG | SYSTOLIC BLOOD PRESSURE: 126 MMHG | TEMPERATURE: 98 F | BODY MASS INDEX: 22.4 KG/M2 | RESPIRATION RATE: 16 BRPM | HEIGHT: 59 IN

## 2022-06-03 DIAGNOSIS — E11.22 TYPE 2 DIABETES MELLITUS WITH STAGE 3B CHRONIC KIDNEY DISEASE, WITHOUT LONG-TERM CURRENT USE OF INSULIN (HCC): Primary | ICD-10-CM

## 2022-06-03 DIAGNOSIS — F41.9 ANXIETY: ICD-10-CM

## 2022-06-03 DIAGNOSIS — M54.50 CHRONIC BILATERAL LOW BACK PAIN WITHOUT SCIATICA: ICD-10-CM

## 2022-06-03 DIAGNOSIS — Z51.81 MEDICATION MONITORING ENCOUNTER: ICD-10-CM

## 2022-06-03 DIAGNOSIS — N18.32 TYPE 2 DIABETES MELLITUS WITH STAGE 3B CHRONIC KIDNEY DISEASE, WITHOUT LONG-TERM CURRENT USE OF INSULIN (HCC): Primary | ICD-10-CM

## 2022-06-03 DIAGNOSIS — Z79.01 ANTICOAGULATED: ICD-10-CM

## 2022-06-03 DIAGNOSIS — E78.5 HYPERLIPIDEMIA, UNSPECIFIED HYPERLIPIDEMIA TYPE: ICD-10-CM

## 2022-06-03 DIAGNOSIS — G89.29 CHRONIC BILATERAL LOW BACK PAIN WITHOUT SCIATICA: ICD-10-CM

## 2022-06-03 DIAGNOSIS — Z51.81 MEDICATION MONITORING ENCOUNTER: Primary | ICD-10-CM

## 2022-06-03 DIAGNOSIS — R79.89 ABNORMAL LFTS: ICD-10-CM

## 2022-06-03 PROCEDURE — G8427 DOCREV CUR MEDS BY ELIG CLIN: HCPCS | Performed by: FAMILY MEDICINE

## 2022-06-03 PROCEDURE — 1036F TOBACCO NON-USER: CPT | Performed by: FAMILY MEDICINE

## 2022-06-03 PROCEDURE — 1090F PRES/ABSN URINE INCON ASSESS: CPT | Performed by: FAMILY MEDICINE

## 2022-06-03 PROCEDURE — G8420 CALC BMI NORM PARAMETERS: HCPCS | Performed by: FAMILY MEDICINE

## 2022-06-03 PROCEDURE — G8400 PT W/DXA NO RESULTS DOC: HCPCS | Performed by: FAMILY MEDICINE

## 2022-06-03 PROCEDURE — 36415 COLL VENOUS BLD VENIPUNCTURE: CPT | Performed by: FAMILY MEDICINE

## 2022-06-03 PROCEDURE — 3044F HG A1C LEVEL LT 7.0%: CPT | Performed by: FAMILY MEDICINE

## 2022-06-03 PROCEDURE — 99214 OFFICE O/P EST MOD 30 MIN: CPT | Performed by: FAMILY MEDICINE

## 2022-06-03 PROCEDURE — 1123F ACP DISCUSS/DSCN MKR DOCD: CPT | Performed by: FAMILY MEDICINE

## 2022-06-03 RX ORDER — ALPRAZOLAM 0.5 MG/1
TABLET ORAL
Qty: 60 TABLET | Refills: 2 | Status: SHIPPED | OUTPATIENT
Start: 2022-06-03 | End: 2022-10-04

## 2022-06-03 RX ORDER — ACETAMINOPHEN AND CODEINE PHOSPHATE 300; 30 MG/1; MG/1
1 TABLET ORAL NIGHTLY PRN
Qty: 90 TABLET | Refills: 0 | Status: SHIPPED | OUTPATIENT
Start: 2022-06-03 | End: 2022-10-05 | Stop reason: SDUPTHER

## 2022-06-03 ASSESSMENT — PATIENT HEALTH QUESTIONNAIRE - PHQ9
SUM OF ALL RESPONSES TO PHQ QUESTIONS 1-9: 0
SUM OF ALL RESPONSES TO PHQ QUESTIONS 1-9: 0
SUM OF ALL RESPONSES TO PHQ9 QUESTIONS 1 & 2: 0
1. LITTLE INTEREST OR PLEASURE IN DOING THINGS: 0
2. FEELING DOWN, DEPRESSED OR HOPELESS: 0
SUM OF ALL RESPONSES TO PHQ QUESTIONS 1-9: 0
SUM OF ALL RESPONSES TO PHQ QUESTIONS 1-9: 0

## 2022-06-03 NOTE — PROGRESS NOTES
equal, round, and reactive to light. Neck:      Thyroid: No thyromegaly. Vascular: No carotid bruit or JVD. Comments: No carotid bruits  Cardiovascular:      Rate and Rhythm: Normal rate and regular rhythm. Pulses: Normal pulses. Carotid pulses are 2+ on the right side and 2+ on the left side. Heart sounds: Normal heart sounds. No murmur heard. No friction rub. No gallop. Comments: No edema    Pulmonary:      Effort: Pulmonary effort is normal. No respiratory distress. Breath sounds: Normal breath sounds. No stridor. No wheezing, rhonchi or rales. Chest:      Chest wall: No tenderness. Musculoskeletal:      Cervical back: Normal, normal range of motion and neck supple. Lumbar back: Spasms and tenderness present. Decreased range of motion. Right lower leg: No edema. Left lower leg: No edema. Lymphadenopathy:      Cervical: No cervical adenopathy. Skin:     General: Skin is warm. Coloration: Skin is not pale. Neurological:      General: No focal deficit present. Mental Status: She is alert and oriented to person, place, and time. Mental status is at baseline. Cranial Nerves: No cranial nerve deficit. Motor: No weakness or abnormal muscle tone. Gait: Gait normal.      Deep Tendon Reflexes:      Reflex Scores:       Patellar reflexes are 2+ on the right side and 2+ on the left side. Psychiatric:         Mood and Affect: Mood normal.         Thought Content: Thought content normal.         Judgment: Judgment normal.         Assessment:       Diagnosis Orders   1. Type 2 diabetes mellitus with stage 3b chronic kidney disease, without long-term current use of insulin ([facility])  Hemoglobin A1C   2. Hyperlipidemia, unspecified hyperlipidemia type     3. Chronic bilateral low back pain without sciatica  acetaminophen-codeine (TYLENOL #3) 300-30 MG per tablet   4. Abnormal LFTs  Ethanol    Hepatic Function Panel   5.  Anxiety  ALPRAZolam (XANAX) 0.5 MG tablet   6. Anticoagulated     7. Medication monitoring encounter  Urine Drug Screen, Comprehensive           Plan:      1. Unchanged  Continue same medications no changes needed at this time   Encourage compliance with medication, life style changes  Check fu a1c    2. Continue statin     3. Refills  Controlled Substances Monitoring: Attestation: The Prescription Monitoring Report for this patient was reviewed today. Morelia Vance MD)  Documentation: No signs of potential drug abuse or diversion identified. Morelia Vance MD)  Check uds    4.  Stable  Refills  Interaction with tylenol # 3 discussed,she expressed understanding and states she does not take them together, remind her caution because serious sec effects including sedation, increase risk for falls or even resp depression/death   patient agrees and verbalizes understanding    Fu 3 mo             Ligia Wong MD

## 2022-06-04 LAB
ALBUMIN SERPL-MCNC: 5 G/DL (ref 3.4–5)
ALP BLD-CCNC: 90 U/L (ref 40–129)
ALT SERPL-CCNC: 11 U/L (ref 10–40)
AST SERPL-CCNC: 63 U/L (ref 15–37)
BILIRUB SERPL-MCNC: 1 MG/DL (ref 0–1)
BILIRUBIN DIRECT: 0.3 MG/DL (ref 0–0.3)
BILIRUBIN, INDIRECT: 0.7 MG/DL (ref 0–1)
ESTIMATED AVERAGE GLUCOSE: 79.6 MG/DL
ETHANOL: NORMAL MG/DL (ref 0–0.08)
HBA1C MFR BLD: 4.4 %
TOTAL PROTEIN: 7 G/DL (ref 6.4–8.2)

## 2022-06-09 LAB
6-ACETYLMORPHINE: NOT DETECTED
7-AMINOCLONAZEPAM: NOT DETECTED
ALPHA-OH-ALPRAZOLAM: PRESENT
ALPHA-OH-MIDAZOLAM, URINE: NOT DETECTED
ALPRAZOLAM: PRESENT
AMPHETAMINE: NOT DETECTED
BARBITURATES: NOT DETECTED
BENZOYLECGONINE: NOT DETECTED
BUPRENORPHINE: NOT DETECTED
CARISOPRODOL: NOT DETECTED
CLONAZEPAM: NOT DETECTED
CODEINE: PRESENT
CREATININE URINE: <20 MG/DL (ref 20–400)
DIAZEPAM: NOT DETECTED
DRUGS EXPECTED: ABNORMAL
EER PAIN MGT DRUG PANEL, HIGH RES/EMIT U: ABNORMAL
ETHYL GLUCURONIDE: NOT DETECTED
FENTANYL: NOT DETECTED
GABAPENTIN: PRESENT
HYDROCODONE: PRESENT
HYDROMORPHONE: NOT DETECTED
LORAZEPAM: NOT DETECTED
MARIJUANA METABOLITE: NOT DETECTED
MDA: NOT DETECTED
MDEA: NOT DETECTED
MDMA URINE: NOT DETECTED
MEPERIDINE: NOT DETECTED
METHADONE: NOT DETECTED
METHAMPHETAMINE: NOT DETECTED
METHYLPHENIDATE: NOT DETECTED
MIDAZOLAM: NOT DETECTED
MORPHINE: PRESENT
NALOXONE: NOT DETECTED
NORBUPRENORPHINE, FREE: NOT DETECTED
NORDIAZEPAM: NOT DETECTED
NORFENTANYL: NOT DETECTED
NORHYDROCODONE, URINE: NOT DETECTED
NOROXYCODONE: NOT DETECTED
NOROXYMORPHONE, URINE: NOT DETECTED
OXAZEPAM: NOT DETECTED
OXYCODONE: NOT DETECTED
OXYMORPHONE: NOT DETECTED
PAIN MANAGEMENT DRUG PANEL: ABNORMAL
PAIN MANAGEMENT DRUG PANEL: ABNORMAL
PCP: NOT DETECTED
PHENTERMINE: NOT DETECTED
PREGABALIN: NOT DETECTED
TAPENTADOL, URINE: NOT DETECTED
TAPENTADOL-O-SULFATE, URINE: NOT DETECTED
TEMAZEPAM: NOT DETECTED
TRAMADOL: NOT DETECTED
ZOLPIDEM: NOT DETECTED

## 2022-06-14 NOTE — TELEPHONE ENCOUNTER
Medication:   Requested Prescriptions     Pending Prescriptions Disp Refills    glipiZIDE (GLUCOTROL) 5 MG tablet [Pharmacy Med Name: GLIPIZIDE 5MG TABLETS] 45 tablet 2     Sig: TAKE 1/2 TABLET BY MOUTH EVERY DAY WITH FOOD        Last Filled:      Patient Phone Number: [phone] (home) [phone] (work)    Last appt: 6/3/2022   Next appt: Visit date not found    Last OARRS:   RX Monitoring 3/8/2021   Attestation -   Periodic Controlled Substance Monitoring Possible medication side effects, risk of tolerance/dependence & alternative treatments discussed. ;No signs of potential drug abuse or diversion identified.

## 2022-06-16 RX ORDER — GLIPIZIDE 5 MG/1
TABLET ORAL
Qty: 45 TABLET | Refills: 2 | Status: SHIPPED | OUTPATIENT
Start: 2022-06-16

## 2022-06-16 RX ORDER — PANTOPRAZOLE SODIUM 40 MG/1
TABLET, DELAYED RELEASE ORAL
Qty: 90 TABLET | Refills: 0 | Status: SHIPPED | OUTPATIENT
Start: 2022-06-16 | End: 2022-09-01

## 2022-06-28 NOTE — PROGRESS NOTES
Occupational Therapy  Discharge  OT evaluation orders received. Spoke with pt who reports she's been up ad cuba to the bathroom, completing ADLs independently this admission. Pt denies OT needs, reports no concerns for d/c home. Will sign off OT, pt in agreement.     Reid Love normal affect/alert and oriented x3/normal behavior details…

## 2022-08-02 ENCOUNTER — TELEPHONE (OUTPATIENT)
Dept: FAMILY MEDICINE CLINIC | Age: 77
End: 2022-08-02

## 2022-08-02 ENCOUNTER — ANTI-COAG VISIT (OUTPATIENT)
Dept: FAMILY MEDICINE CLINIC | Age: 77
End: 2022-08-02

## 2022-08-02 DIAGNOSIS — Z95.2 AORTIC VALVE REPLACED: Primary | ICD-10-CM

## 2022-08-02 LAB — INR BLD: 3.1

## 2022-08-02 NOTE — LETTER
[address], [address] [address]  Phone: [phone]  Fax: [phone]    Manny Domingo MD        August 5, 2022    [address] [place] [id number]      Dear Xenia Clemens: Our office has made several attempts to reach you in regards to your results. Please contact the office at your earliest convenience. If you have any questions or concerns, please don't hesitate to call.     Sincerely,        Manny Domingo MD

## 2022-08-03 NOTE — PROGRESS NOTES
Pt called back stating she is taking 3 mg daily. Pt states we can leave her a detailed message with what Dr. Bernabe Jonh wants her to do.

## 2022-08-23 ENCOUNTER — ANTI-COAG VISIT (OUTPATIENT)
Dept: FAMILY MEDICINE CLINIC | Age: 77
End: 2022-08-23

## 2022-08-23 ENCOUNTER — TELEPHONE (OUTPATIENT)
Dept: FAMILY MEDICINE CLINIC | Age: 77
End: 2022-08-23

## 2022-08-23 DIAGNOSIS — Z95.2 AORTIC VALVE REPLACED: Primary | ICD-10-CM

## 2022-08-23 LAB — INR BLD: 2.9

## 2022-08-26 ENCOUNTER — TELEPHONE (OUTPATIENT)
Dept: CASE MANAGEMENT | Age: 77
End: 2022-08-26

## 2022-08-26 NOTE — TELEPHONE ENCOUNTER
Pt due for follow-up Chest Ct as ordered by Dr. Belinda Altman. Reminder letter mailed to pt.       Farzana PEDRON, RN   Lung Nodule Navigator  [facility]  [phone]

## 2022-09-19 ENCOUNTER — OFFICE VISIT (OUTPATIENT)
Dept: FAMILY MEDICINE CLINIC | Age: 77
End: 2022-09-19
Payer: MEDICARE

## 2022-09-19 VITALS
RESPIRATION RATE: 16 BRPM | TEMPERATURE: 97.2 F | HEIGHT: 59 IN | WEIGHT: 109.3 LBS | BODY MASS INDEX: 22.04 KG/M2 | SYSTOLIC BLOOD PRESSURE: 136 MMHG | DIASTOLIC BLOOD PRESSURE: 64 MMHG | OXYGEN SATURATION: 95 % | HEART RATE: 98 BPM

## 2022-09-19 DIAGNOSIS — E11.22 TYPE 2 DIABETES MELLITUS WITH STAGE 3B CHRONIC KIDNEY DISEASE, WITHOUT LONG-TERM CURRENT USE OF INSULIN (HCC): Primary | ICD-10-CM

## 2022-09-19 DIAGNOSIS — D63.8 ANEMIA OF CHRONIC DISEASE: ICD-10-CM

## 2022-09-19 DIAGNOSIS — N18.32 TYPE 2 DIABETES MELLITUS WITH STAGE 3B CHRONIC KIDNEY DISEASE, WITHOUT LONG-TERM CURRENT USE OF INSULIN (HCC): Primary | ICD-10-CM

## 2022-09-19 DIAGNOSIS — R74.02 ELEVATED LDH: ICD-10-CM

## 2022-09-19 DIAGNOSIS — Z95.2 AORTIC VALVE REPLACED: ICD-10-CM

## 2022-09-19 DIAGNOSIS — E78.5 HYPERLIPIDEMIA, UNSPECIFIED HYPERLIPIDEMIA TYPE: ICD-10-CM

## 2022-09-19 PROCEDURE — 3044F HG A1C LEVEL LT 7.0%: CPT | Performed by: FAMILY MEDICINE

## 2022-09-19 PROCEDURE — 1036F TOBACCO NON-USER: CPT | Performed by: FAMILY MEDICINE

## 2022-09-19 PROCEDURE — G8400 PT W/DXA NO RESULTS DOC: HCPCS | Performed by: FAMILY MEDICINE

## 2022-09-19 PROCEDURE — G8427 DOCREV CUR MEDS BY ELIG CLIN: HCPCS | Performed by: FAMILY MEDICINE

## 2022-09-19 PROCEDURE — G8420 CALC BMI NORM PARAMETERS: HCPCS | Performed by: FAMILY MEDICINE

## 2022-09-19 PROCEDURE — 1123F ACP DISCUSS/DSCN MKR DOCD: CPT | Performed by: FAMILY MEDICINE

## 2022-09-19 PROCEDURE — 1090F PRES/ABSN URINE INCON ASSESS: CPT | Performed by: FAMILY MEDICINE

## 2022-09-19 PROCEDURE — 99214 OFFICE O/P EST MOD 30 MIN: CPT | Performed by: FAMILY MEDICINE

## 2022-09-19 RX ORDER — GLIPIZIDE 5 MG/1
TABLET ORAL
Qty: 45 TABLET | Refills: 2 | Status: CANCELLED | OUTPATIENT
Start: 2022-09-19

## 2022-09-19 RX ORDER — PANTOPRAZOLE SODIUM 40 MG/1
TABLET, DELAYED RELEASE ORAL
Qty: 90 TABLET | Refills: 0 | Status: SHIPPED | OUTPATIENT
Start: 2022-09-19

## 2022-09-19 NOTE — TELEPHONE ENCOUNTER
Medication:   Requested Prescriptions     Pending Prescriptions Disp Refills    pantoprazole (PROTONIX) 40 MG tablet [Pharmacy Med Name: PANTOPRAZOLE 40MG TABLETS] 90 tablet 0     Sig: TAKE 1 TABLET BY MOUTH EVERY DAY        Last Filled:      Patient Phone Number: [phone] (home) [phone] (work)    Last appt: 6/3/2022   Next appt: 9/19/2022    Last OARRS:   RX Monitoring 3/8/2021   Attestation -   Periodic Controlled Substance Monitoring Possible medication side effects, risk of tolerance/dependence & alternative treatments discussed. ;No signs of potential drug abuse or diversion identified.

## 2022-09-19 NOTE — PROGRESS NOTES
toxic-appearing or diaphoretic. HENT:      Head: Normocephalic. Eyes:      General: No scleral icterus. Conjunctiva/sclera: Conjunctivae normal.      Pupils: Pupils are equal, round, and reactive to light. Neck:      Thyroid: No thyromegaly. Vascular: No JVD. Comments: No carotid bruits  Cardiovascular:      Rate and Rhythm: Normal rate and regular rhythm. Pulses: Normal pulses. Carotid pulses are 2+ on the right side and 2+ on the left side. Heart sounds: Murmur (soft JOHNATHAN LUSB) heard. No friction rub. No gallop. Comments: No edema    Pulmonary:      Effort: Pulmonary effort is normal. No respiratory distress. Breath sounds: No wheezing or rales. Chest:      Chest wall: No tenderness. Abdominal:      General: Bowel sounds are normal.      Palpations: Abdomen is soft. There is no mass. Tenderness: There is no abdominal tenderness. Musculoskeletal:      Cervical back: Normal range of motion and neck supple. Lymphadenopathy:      Cervical: No cervical adenopathy. Skin:     General: Skin is warm. Coloration: Skin is not pale. Neurological:      General: No focal deficit present. Mental Status: She is alert and oriented to person, place, and time. Mental status is at baseline. Cranial Nerves: No cranial nerve deficit. Motor: No weakness or abnormal muscle tone. Deep Tendon Reflexes: Reflexes are normal and symmetric. Blood work   Assessment:       Diagnosis Orders   1. Type 2 diabetes mellitus with stage 3b chronic kidney disease, without long-term current use of insulin (HCC)  Basic Metabolic Panel    Hemoglobin A1C    Lipid Panel      2. Anemia of chronic disease        3. Elevated LDH        4. Hyperlipidemia, unspecified hyperlipidemia type        5.  Aortic valve replaced                Plan:      Check fu labs including fructosamine (anemia)   If a1c at goal will discontinue metformin due to ckd   Encourage compliance with medication, life style changes    Flu vaccine today. 2. 3 results reviewed and interpreted by me for today's visit . Her LDH is elevated that may be sec to CKD but also possible hemolytic anemia? She will continue to fu with hematology      4. Continue statin     5.  Stable  Anticoagulated,   Fu with cardiology         Jeremy Pena MD

## 2022-09-23 ENCOUNTER — OFFICE VISIT (OUTPATIENT)
Dept: PULMONOLOGY | Age: 77
End: 2022-09-23
Payer: MEDICARE

## 2022-09-23 ENCOUNTER — HOSPITAL ENCOUNTER (OUTPATIENT)
Dept: CT IMAGING | Age: 77
Discharge: HOME | End: 2022-09-23
Payer: MEDICARE

## 2022-09-23 VITALS
SYSTOLIC BLOOD PRESSURE: 182 MMHG | HEIGHT: 59 IN | HEART RATE: 69 BPM | DIASTOLIC BLOOD PRESSURE: 62 MMHG | OXYGEN SATURATION: 98 % | BODY MASS INDEX: 22.62 KG/M2 | WEIGHT: 112.2 LBS

## 2022-09-23 DIAGNOSIS — J44.9 COPD, MILD (HCC): ICD-10-CM

## 2022-09-23 DIAGNOSIS — R91.8 PULMONARY NODULES: Primary | ICD-10-CM

## 2022-09-23 DIAGNOSIS — R91.8 PULMONARY NODULES: ICD-10-CM

## 2022-09-23 PROCEDURE — G8420 CALC BMI NORM PARAMETERS: HCPCS | Performed by: INTERNAL MEDICINE

## 2022-09-23 PROCEDURE — 1123F ACP DISCUSS/DSCN MKR DOCD: CPT | Performed by: INTERNAL MEDICINE

## 2022-09-23 PROCEDURE — 1090F PRES/ABSN URINE INCON ASSESS: CPT | Performed by: INTERNAL MEDICINE

## 2022-09-23 PROCEDURE — 1036F TOBACCO NON-USER: CPT | Performed by: INTERNAL MEDICINE

## 2022-09-23 PROCEDURE — 71250 CT THORAX DX C-: CPT

## 2022-09-23 PROCEDURE — 3023F SPIROM DOC REV: CPT | Performed by: INTERNAL MEDICINE

## 2022-09-23 PROCEDURE — 99214 OFFICE O/P EST MOD 30 MIN: CPT | Performed by: INTERNAL MEDICINE

## 2022-09-23 PROCEDURE — G8427 DOCREV CUR MEDS BY ELIG CLIN: HCPCS | Performed by: INTERNAL MEDICINE

## 2022-09-23 PROCEDURE — G8400 PT W/DXA NO RESULTS DOC: HCPCS | Performed by: INTERNAL MEDICINE

## 2022-09-23 RX ORDER — UREA 10 %
100 LOTION (ML) TOPICAL DAILY
COMMUNITY

## 2022-09-23 NOTE — PROGRESS NOTES
[facility]    Outpatient Follow Up Note    Subjective:   CHIEF COMPLAINT / HPI:     The patient is 69 y.o. female who is here for 12-month follow-up of multiple pulmonary nodules and mediastinal adenopathy in the setting of previous tobacco use and mild COPD. Currently she has no dyspnea on exertion, cough, wheezing, chest tightness, anorexia, or weight loss    8/17/2021  Umang Roger is here for 6-month follow-up of multiple pulmonary nodules, mediastinal adenopathy, and mild COPD. Since last visit she has been diagnosed with CKD and heart failure. She states that she is not had any breathing problems and denies any cough. She had a CT chest earlier today    1/27/2021  Umang Roger presents today for follow-up of multiple pulmonary nodules, mediastinal lymphadenopathy, and mild COPD. She recently had a CT chest for follow-up of her abnormalities. Patient feels well and denies any fevers, chills, night sweats, anorexia, weight loss, dyspnea, cough, wheezing, or chest tightness    11/11/2020  Umang Roger presents today for a new patient visit for evaluation of multiple pulmonary nodules and precarinal lymphadenopathy. The largest nodule has had a small increase since previous imaging and now measures approximately 11 mm. Her precarinal lymphadenopathy has mildly enlarged as well. She has a history of COPD but no PFTs on file. She states that it is mild and she has a rescue albuterol that she rarely ever uses. She does have a 20-pack-year history but quit smoking 16 years ago. She has a remote history of B-cell lymphoma and is followed by Dr. Misty Brunson.     Patient feels well and denies any fevers, chills, night sweats, anorexia, weight loss, dyspnea, cough, wheezing, or chest tightness    Past Medical History:    Past Medical History:   Diagnosis Date    A-fib ([facility] [address]     Anemia     multifactorial:under care of heme-onc:Dr. Misty Brunson    Anemia:multifactorial 2011    as per heme-onc Anxiety     Cataract     bilateral    Chronic back pain     Under care of ortho spine:Dr. Meir Schmitt    CKD (chronic kidney disease) stage 3, GFR 30-59 ml/min ([facility] [address] 1/2012    Colitis, ischemic ([facility] [address] 6/2012    Under care of Dr. Dominga Wilkerson    COPD     Diabetes     Diabetic eye exam ([facility] [address] 1/28/15    CEI    GERD (gastroesophageal reflux disease)     Hx of mammogram 05/23/2018    negative:see scanned report. Hyperlipidaemia LDL goal <100     Hypertension     Insomnia     Long-term (current) use of anticoagulants, INR goal 2.5-3.5     Lymphoma:monoclonal B cell 1/2012    Under care of Dr. Bermudez:oncologist/hematologist    Mammogram abnormal 7/30/15    Right breast mass:benign(?lipoma)per US:repeat testing in 6mos=1/30/16    Nonspecific abnormal results of kidney function study 1/25/2012    Osteoarthritis     Renal cysts, right 1/2/2014    RLS (restless legs syndrome) 10/19/11    Sciatica     Screening colonoscopy 2010;6/19/13    Nml. Next 10yrs:6/2023:Dr. Ender Fiore. 9/22/16(Dr. Waddell):nml EGD & colonoscopy except mild diverticulosis    Therapeutic drug monitoring 04/10/2015    OARRS report consistent on 4/10/15;7/6/15;10/23/15;2/7/16;5/16/16;8/19/16;11/4/16;3/6/17;6/26/17;9/20/17;12/18/17; 12/18/17;3/12/18;5/29/18    Valvular heart disease     s/p aortic and mitral valve repair. Under care of cards:Dr. Dennis Davis. Visit for screening mammogram 04/10/2017    negative:see scanned report. Social History:    Patient recently retired. She was working part-time at [employer]. She is .   She smoked half a pack of cigarettes a day for 40 years but quit 16 years ago    Family History:  Lung cancer  Diabetes    Current Medications:  Current Outpatient Medications on File Prior to Visit   Medication Sig Dispense Refill    vitamin B-12 (CYANOCOBALAMIN) 100 MCG tablet Take 100 mcg by mouth daily      pantoprazole (PROTONIX) 40 MG tablet TAKE 1 TABLET BY MOUTH EVERY DAY 90 tablet 0    glipiZIDE (GLUCOTROL) 5 MG tablet TAKE 1/2 TABLET BY MOUTH EVERY DAY WITH FOOD 45 tablet 2    metFORMIN (GLUCOPHAGE) 1000 MG tablet Half tab po bid 180 tablet 2    ALPRAZolam (XANAX) 0.5 MG tablet One po bid prn anxiety 60 tablet 2    furosemide (LASIX) 20 MG tablet Take 2 tablets by mouth daily 180 tablet 2    atorvastatin (LIPITOR) 10 MG tablet TAKE 1 TABLET BY MOUTH DAILY IN THE EVENING FOR CHOLESTEROL 90 tablet 3    blood glucose monitor strips Test 2 times a day & as needed for symptoms of irregular blood glucose. Ok to dispense what is covered by insurance. Patient has been using [employer] test strips 100 strip 3    lisinopril (PRINIVIL;ZESTRIL) 40 MG tablet TAKE 1 TABLET BY MOUTH DAILY 90 tablet 3    warfarin (COUMADIN) 1 MG tablet TAKE TWO AND ONE-HALF TABLETS BY MOUTH ON SUNDAYS AND TUESDAY AND TWO TABLETS BY MOUTH ALL OTHER DAYS 192 tablet 3    amLODIPine (NORVASC) 5 MG tablet Take 1 tablet by mouth daily 30 tablet 5    TRUE METRIX BLOOD GLUCOSE TEST strip USE TO TEST BLOOD SUGAR TWICE DAILY 100 strip 2    warfarin (COUMADIN) 3 MG tablet TAKE 1 TABLET BY MOUTH AS DIRECTED 90 tablet 1    metoprolol tartrate (LOPRESSOR) 25 MG tablet Take 12.5 mg by mouth 2 times daily 0.5 tab BID        No current facility-administered medications on file prior to visit.      REVIEW OF SYSTEMS:    CONSTITUTIONAL: Negative for fevers and chills  HEENT: Negative for oropharyngeal exudate, post nasal drip, sinus pain / pressure, nasal congestion, ear pain  RESPIRATORY:  See HPI  CARDIOVASCULAR: Negative for chest pain, palpitations, edema  GASTROINTESTINAL: Negative for nausea, vomiting, diarrhea, constipation and abdominal pain  GENITOURINARY: Negative for dysuria, urinary frequency, urinary hesitancy  HEMATOLOGICAL: Negative for adenopathy  SKIN: Negative for clubbing, cyanosis, skin lesions  ENDOCRINE: Negative for polyuria, polydipsia, heat intolerance, cold intolerance   EXTREMITIES: Negative for weakness or decreased ROM in all extremities  NEUROLOGICAL: Negative for unilateral weakness, speech or gait abnormalities    Objective:   PHYSICAL EXAM:        VITALS:  BP (!) 182/62 (Site: Left Upper Arm, Position: Sitting, Cuff Size: Small Adult)   Pulse 69   Ht 4' 11\" (1.499 m)   Wt 112 lb 3.2 oz (50.9 kg)   SpO2 98% Comment: ra  BMI 22.66 kg/m²   On room air  CONSTITUTIONAL:  Awake, alert, cooperative, no apparent distress, and appears stated age  HEENT: No oropharyngeal exudate, PERRL, no cervical adenopathy, no tracheal deviation, thyroid size normal  LUNGS:  No increased work of breathing and clear to auscultation, no crackles or wheezing  CARDIOVASCULAR:  normal S1 and S2 and no JVD  ABDOMEN:  Normal bowel sounds, non-distended and non-tender to palpation  EXT: No edema, no calf tenderness. Pulses are present bilaterally. NEUROLOGIC:  Mental Status Exam:  Level of Alertness:   awake  Orientation:   person, place, time. SKIN:  normal skin color, texture, turgor, no redness, warmth, or swelling     DATA:      Radiology Review:  Pertinent images / reports were reviewed as a part of this visit. CT chest September 23, 2022  Impression       1. Stable tiny pulmonary nodules in the right lung. No suspicious abnormality identified. Follow-up CT in 12 months recommended. 2. Mild subpleural fibrosis in lower lungs. 3. Stable mediastinal lymph nodes. Lymph nodes are presumably reactive. No progressive lymphadenopathy or acute thoracic abnormality identified. 4. Stable borderline splenomegaly. CT chest August 17, 2021  Impression   1. Stable benign-appearing pulmonary nodules right lung. Follow-up in 12 months recommended. .   2. Resolution of ground glass airspace disease in the right upper lobe   3. Stable enlarged pretracheal lymph node.    4. Stable left breast outer tail asymmetric fibroglandular elements which are unchanged from mammography of 3/21/2012, benign     CT chest 1/06/2021  EXAM: CT CHEST WO CONTRAST INDICATION: Pulmonary nodules, follow-up       COMPARISON: October 2020 and prior       TECHNIQUE: Axial CT imaging of the chest was performed. Axial images, multiplanar reformatted images and axial maximum intensity projection were reviewed. Individualized dose optimization technique was used in order to meet ALARA standards for    radiation dose reduction. In addition to vendor specific dose reduction algorithms, the dose reduction techniques vary based on the specific scanner utilized but frequently include automated exposure control, adjustment of the mA and/or kV according to    patient size, and use of iterative reconstruction technique. CONTRAST: None       FINDINGS:       LUNGS AND AIRWAYS: Airways are patent. Subpleural nodular density posterior left upper lobe is decreased in size from prior study of October 2020, appearing smaller and subsolid, approximately 4 x 8 mm, series 4, image 25. *  3 mm nodule right apex, series 4, image 18, unchanged. *  2 mm nodule posterior right upper lobe, image 36, unchanged. *  4 mm groundglass density superior right lower lobe, image 41, unchanged. *  Perihilar groundglass densities, image 51, 5 and 6 mm   *  4 mm groundglass density posterior right lower lobe, image 63. *  Mild atelectasis/groundglass density in the posterior lung bases. PLEURA: No pleural effusions or significant pleural thickening. HEART / GREAT VESSELS: Heart is enlarged. Artifact from valve replacement. Dense arterial calcification. LYMPH NODES: Precarinal node is 13 x 20 mm, previously reported as 15 x 24. CHEST WALL / LOWER NECK: No significant abnormality. UPPER ABDOMEN: No significant abnormality. BONES: No acute abnormality. Impression       1. Subpleural nodule posterior left upper lobe is decreased in size and subsolid. 2. Other scattered pulmonary nodules unchanged.    3. Small groundglass densities, right perihilar and posterior lung bases. Recommend follow-up study in 3-6 months. 4. Enlarged precarinal lymph node, slightly decreased in size. 5. Atherosclerotic disease. CT Chest 10/16/2020 reveals the following:  CT lung screen         HISTORY: 40 pack year history of cigarette smoking; pulmonary nodule         Thin section scans through the chest without IV contrast. Up-to-date CT equipment with radiation dose reduction techniques. Comparison with 6/15/2020 and 10/5/2020         Lung screening specific (lung RADS)-positive. Multiple pulmonary nodules, including-         3 mm nodule right upper lobe series 4 image 30-stable         11 mm nodule left upper lobe images 34-35 (previously 8 mm)         3 mm subpleural nodule right upper lobe image 45, stable         2 separate 3 mm nodules right upper lobe contiguous with the major fissure images 46 and 47, stable         2 adjacent nodules superior segment right lower lobe measuring 2 and 5 mm, respectively, image 53, stable         Potentially significant incidental findings (lung RADS category S)-enlarged precarinal lymph node measuring 17, unchanged         Other incidental findings-aortic arch calcification; valvular prosthetic device              Impression         Lung RADS category 4A with dominant 11 mm pulmonary nodule left upper lobe with increase in size since June 2020. Lung RADS category S-stable precarinal lymph node, mildly enlarged by short axis      PET scan 19/38/9472  No hypermetabolic uptake in any nodule or the precarinal lymphadenopathy. See media tab for full report    CTA chest Nisa 15, 2020  Chest    1. No findings to suggest aortic dissection. 2.  Scattered bilateral pulmonary nodules, the largest within the left upper lobe. Current    guidelines suggest CT follow-up in 3-6 months to evaluate stability. 3.  Cardiomegaly in the presence of changes related to aortic and mitral valve prostheses.       Last PFTs:  None on file    Assessment:      Diagnosis Orders   1. Pulmonary nodules  CT CHEST LOW DOSE (LDCT)      2. COPD, mild ([address]              Plan:     1. Overall her CT shows unchanged multiple small pulmonary nodules. .  Her lymphadenopathy is unchanged. Repeat CT in 12 months  2. COPD -asymptomatic. No treatment needed  3. Patient currently is not smoking -quit in 2007  4. Follow-up in 12 months    On this date 8/17/2021 I have spent 32 minutes reviewing previous notes, test results and face to face with the patient discussing the diagnosis and importance of compliance with the treatment plan as well as documenting on the day of the visit.

## 2022-10-03 ENCOUNTER — ANTI-COAG VISIT (OUTPATIENT)
Dept: FAMILY MEDICINE CLINIC | Age: 77
End: 2022-10-03

## 2022-10-03 DIAGNOSIS — Z95.2 AORTIC VALVE REPLACED: Primary | ICD-10-CM

## 2022-10-03 DIAGNOSIS — G89.29 CHRONIC BILATERAL LOW BACK PAIN WITHOUT SCIATICA: ICD-10-CM

## 2022-10-03 DIAGNOSIS — F41.9 ANXIETY: ICD-10-CM

## 2022-10-03 DIAGNOSIS — M54.50 CHRONIC BILATERAL LOW BACK PAIN WITHOUT SCIATICA: ICD-10-CM

## 2022-10-03 LAB — INR BLD: 2

## 2022-10-03 NOTE — TELEPHONE ENCOUNTER
Medication:   Requested Prescriptions     Pending Prescriptions Disp Refills    acetaminophen-codeine (TYLENOL #3) 300-30 MG per tablet [Pharmacy Med Name: ACETAMINOPHEN/COD #3 (300/30MG) TAB] 90 tablet      Sig: TAKE 1 TABLET BY MOUTH EVERY NIGHT AS NEEDED FOR PAIN        Last Filled:      Patient Phone Number: [phone] (home) [phone] (work)    Last appt: 9/19/2022   Next appt: 3/20/2023    Last OARRS:   RX Monitoring 3/8/2021   Attestation -   Periodic Controlled Substance Monitoring Possible medication side effects, risk of tolerance/dependence & alternative treatments discussed. ;No signs of potential drug abuse or diversion identified.

## 2022-10-03 NOTE — TELEPHONE ENCOUNTER
SHE DID NOT REQUESTED MED DURING HER APT   WILL NEED vv OR PH VISIT TO GET REFILL BECAUSE IT IS A CONTROLLED MED. DOES HE HAVE MEDICATION AGREEMENT UTD?

## 2022-10-04 RX ORDER — ALPRAZOLAM 0.5 MG/1
TABLET ORAL
Qty: 60 TABLET | Refills: 1 | Status: SHIPPED | OUTPATIENT
Start: 2022-10-04 | End: 2022-12-02

## 2022-10-04 RX ORDER — ACETAMINOPHEN AND CODEINE PHOSPHATE 300; 30 MG/1; MG/1
TABLET ORAL
Qty: 90 TABLET | OUTPATIENT
Start: 2022-10-04

## 2022-10-04 NOTE — TELEPHONE ENCOUNTER
Medication:   Requested Prescriptions     Pending Prescriptions Disp Refills    ALPRAZolam (XANAX) 0.5 MG tablet [Pharmacy Med Name: ALPRAZOLAM 0.5MG TABLETS] 60 tablet      Sig: TAKE 1 TABLET BY MOUTH TWICE DAILY AS NEEDED FOR ANXIETY        Last Filled:      Patient Phone Number: [phone] (home) [phone] (work)    Last appt: 9/19/2022   Next appt: 3/20/2023    Last OARRS:   RX Monitoring 3/8/2021   Attestation -   Periodic Controlled Substance Monitoring Possible medication side effects, risk of tolerance/dependence & alternative treatments discussed. ;No signs of potential drug abuse or diversion identified.

## 2022-10-05 ENCOUNTER — TELEMEDICINE (OUTPATIENT)
Dept: FAMILY MEDICINE CLINIC | Age: 77
End: 2022-10-05
Payer: MEDICARE

## 2022-10-05 DIAGNOSIS — M54.50 CHRONIC BILATERAL LOW BACK PAIN WITHOUT SCIATICA: Primary | ICD-10-CM

## 2022-10-05 DIAGNOSIS — G89.29 CHRONIC BILATERAL LOW BACK PAIN WITHOUT SCIATICA: Primary | ICD-10-CM

## 2022-10-05 DIAGNOSIS — Z95.2 AORTIC VALVE REPLACED: ICD-10-CM

## 2022-10-05 DIAGNOSIS — I10 ESSENTIAL HYPERTENSION, BENIGN: ICD-10-CM

## 2022-10-05 PROCEDURE — 99442 PR PHYS/QHP TELEPHONE EVALUATION 11-20 MIN: CPT | Performed by: FAMILY MEDICINE

## 2022-10-05 RX ORDER — ACETAMINOPHEN AND CODEINE PHOSPHATE 300; 30 MG/1; MG/1
1 TABLET ORAL NIGHTLY PRN
Qty: 90 TABLET | Refills: 0 | Status: SHIPPED | OUTPATIENT
Start: 2022-10-05 | End: 2023-01-03

## 2022-10-05 RX ORDER — AMLODIPINE BESYLATE 5 MG/1
5 TABLET ORAL DAILY
Qty: 30 TABLET | Refills: 5 | Status: SHIPPED | OUTPATIENT
Start: 2022-10-05

## 2022-10-05 NOTE — PROGRESS NOTES
Subjective:      Patient ID: Micki Jordan is a 69 y.o. female. HPI    Micki Jordan is a 69 y.o. female evaluated via telephone on 10/5/2022 for Medication Refill  . Documentation:  I communicated with the patient and/or health care decision maker about cc . Details of this discussion including any medical advice provided: yes    Total Time: minutes: 12 minutes    Micki Jordan was evaluated through a synchronous (real-time) audio encounter. Patient identification was verified at the start of the visit. She (or guardian if applicable) is aware that this is a billable service, which includes applicable co-pays. This visit was conducted with the patient's (and/or legal guardian's) verbal consent. She has not had a related appointment within my department in the past 7 days or scheduled within the next 24 hours. The patient was located at Home: [address] [place] [address]. The provider was located at [facility] ([address] [place] [address]): [id number], [address]. Note: not billable if this call serves to triage the patient into an appointment for the relevant concern    Lidia Cortes MD      Back pain follow up      This is a chronic problem. The current episode started more than 1 year ago. The problem occurs intermittently. The problem is unchanged or stable . The pain is present in the lumbar spine. The quality of the pain is described as aching and cramping. The pain does not radiate. The pain is moderate to severe usually at night . Pertinent negatives include no abdominal pain, bladder incontinence, bowel incontinence, chest pain, dysuria, fever, headaches, leg pain, numbness, paresis, paresthesias, pelvic pain, perianal numbness, tingling, weakness or weight loss. Risk factors include menopause.    Treatments tried: tylenol # 3 no sec effects    Got rx for Tramadol by her rheumatologist but agrees not take this medication with tylenol 3        Chronic pain 5 A's ADLs wnl   Adverse effects? No   Analgesia good  Aberrant behavior no  Affect normal       Review of Systems    Objective:   Physical Exam  Constitutional:       General: She is not in acute distress. HENT:      Head:      Comments: Hearing intact to nml conversation     Neurological:      Mental Status: She is alert and oriented to person, place, and time. Psychiatric:         Mood and Affect: Mood normal.         Thought Content: Thought content normal.       Assessment:       Diagnosis Orders   1. Chronic bilateral low back pain without sciatica  acetaminophen-codeine (TYLENOL #3) 300-30 MG per tablet      2. Aortic valve replaced  amLODIPine (NORVASC) 5 MG tablet      3. Essential hypertension, benign  amLODIPine (NORVASC) 5 MG tablet              Plan:      1/ stable  Refills  She was told she can not get other opiates rx by other physicians at it is a violation of her medication agreement  She expressed understanding and agrees to continue treatment ONLY with tylenol # 2   Interaction with tramadol and xanax discussed including excessive sedation or even respiratory failure/death,   She expressed understanding and agrees with plan as discussed. Controlled Substances Monitoring: Attestation: The Prescription Monitoring Report for this patient was reviewed today. Gogo Blackwood MD)  Documentation: No signs of potential drug abuse or diversion identified.  Gogo Blackwood MD)      Refilled Norvasc     Fu in 3 mo               Luisa Vargas MD

## 2022-10-18 NOTE — TELEPHONE ENCOUNTER
Medication:   Requested Prescriptions     Pending Prescriptions Disp Refills    metFORMIN (GLUCOPHAGE) 1000 MG tablet [Pharmacy Med Name: METFORMIN 1000MG TABLETS] 180 tablet 2     Sig: TAKE 1 TABLET BY MOUTH TWICE DAILY        Last Filled:      Patient Phone Number: [phone] (home) [phone] (work)    Last appt: 10/5/2022   Next appt: 3/20/2023    Last OARRS:   RX Monitoring 3/8/2021   Attestation -   Periodic Controlled Substance Monitoring Possible medication side effects, risk of tolerance/dependence & alternative treatments discussed. ;No signs of potential drug abuse or diversion identified.

## 2022-11-07 ENCOUNTER — ANTI-COAG VISIT (OUTPATIENT)
Dept: FAMILY MEDICINE CLINIC | Age: 77
End: 2022-11-07

## 2022-11-07 DIAGNOSIS — Z95.2 AORTIC VALVE REPLACED: Primary | ICD-10-CM

## 2022-11-07 LAB — INR BLD: 3.7

## 2022-11-07 NOTE — PROGRESS NOTES
Message from Dr. Juaquin Martino:    New Dose: take 2.5 mg qd except Fri take 3 mg  Re check in 2 weeks. Pt has been informed and expressed understanding.

## 2022-11-14 DIAGNOSIS — Z79.01 LONG-TERM (CURRENT) USE OF ANTICOAGULANTS, INR GOAL 2.5-3.5: ICD-10-CM

## 2022-11-14 RX ORDER — WARFARIN SODIUM 1 MG/1
TABLET ORAL
Qty: 192 TABLET | Refills: 3 | Status: SHIPPED | OUTPATIENT
Start: 2022-11-14

## 2022-11-14 NOTE — TELEPHONE ENCOUNTER
Medication:   Requested Prescriptions     Pending Prescriptions Disp Refills    warfarin (COUMADIN) 1 MG tablet [Pharmacy Med Name: WARFARIN SOD 1MG TABLETS (PINK)] 192 tablet 3     Sig: TAKE 2 AND 1/2 TABLETS BY MOUTH ON SUNDAYS AND TUESDAYS. TAKE 2 TABLETS BY MOUTH ALL OTHER DAYS. Last Filled:      Patient Phone Number: [phone] (home) [phone] (work)    Last appt: 10/5/2022   Next appt: 3/20/2023    Last OARRS:   RX Monitoring 3/8/2021   Attestation -   Periodic Controlled Substance Monitoring Possible medication side effects, risk of tolerance/dependence & alternative treatments discussed. ;No signs of potential drug abuse or diversion identified.

## 2022-11-15 ENCOUNTER — ANTI-COAG VISIT (OUTPATIENT)
Dept: FAMILY MEDICINE CLINIC | Age: 77
End: 2022-11-15

## 2022-11-15 DIAGNOSIS — Z95.2 AORTIC VALVE REPLACED: Primary | ICD-10-CM

## 2022-11-15 LAB — INR BLD: 2.5

## 2022-11-15 NOTE — PROGRESS NOTES
Pt has been informed. Pt states that the pharm states that the sig on the coumadin rx needs to be changed. She's having a hard time getting rx filled. I spoke to Marlys from pharmacy, he states that they got the rx filled for pt. Message completed. Ok to close.

## 2022-11-29 ENCOUNTER — TELEPHONE (OUTPATIENT)
Dept: PULMONOLOGY | Age: 77
End: 2022-11-29

## 2022-11-29 ENCOUNTER — TELEPHONE (OUTPATIENT)
Dept: FAMILY MEDICINE CLINIC | Age: 77
End: 2022-11-29

## 2022-11-29 RX ORDER — LEVOFLOXACIN 750 MG/1
750 TABLET ORAL DAILY
Qty: 7 TABLET | Refills: 0 | Status: SHIPPED | OUTPATIENT
Start: 2022-11-29 | End: 2022-12-06

## 2022-11-29 NOTE — TELEPHONE ENCOUNTER
Pt advised to hold Coumadin for 2 days, then take 2 mg daily and recheck INR in 1 wk.   Pt expressed understanding

## 2022-11-29 NOTE — TELEPHONE ENCOUNTER
Wife , Anabel Browning (1945) calls on behalf of Gogo Fong and herself. She is patient of Dr. Orlena Fabry also. He is not improving in the illness that he had last week. He just finished the prescribed ABX ( 6 days). Cough is productive of greenish/yellowish mucous. Wife is still ill with PNA (she had gone to urgent care and was prescribed a different ABX after not responding to Zithromax). Gogo Fong has not felt felt feverish. No more SOB than usual.  Some wheezing. Worse at night for both of them. Wife, Anabel Browning, who is your patient also, can be reached at [phone]. She has continuing cough. She would like to be considered to have called in for herself also, so this message is being copied into Pao's chart as well.      Pharm: [facility] at [facility] [address] in

## 2022-11-29 NOTE — TELEPHONE ENCOUNTER
Pt went to UC last week and was found to have pneumonia. They gave her 6 pills (antibiotic). She has 2 pills left to take but her INR is in the 5's.       Please advise    Last OV:  10-5-22    CB:  [phone]

## 2022-12-01 ENCOUNTER — TELEPHONE (OUTPATIENT)
Dept: FAMILY MEDICINE CLINIC | Age: 77
End: 2022-12-01

## 2022-12-01 NOTE — TELEPHONE ENCOUNTER
Lm informing pt. INR is 5 needs to hold coumadin for 2 days   Then take 2 mg po qd      Re check INR in 1 week    Will call pt back to verify that she received message.

## 2022-12-01 NOTE — TELEPHONE ENCOUNTER
Pt states that she was given levofloxacin 750 mg, (take 1 tab rios.)    She will start this medication today. She's states that she's worried for blood clots. She wants to know how much coumadin she should take, with this med. Pt has already received Dr. Francesco Vincent message from yesterday. Please advise.

## 2022-12-02 DIAGNOSIS — F41.9 ANXIETY: ICD-10-CM

## 2022-12-02 RX ORDER — ALPRAZOLAM 0.5 MG/1
TABLET ORAL
Qty: 60 TABLET | Refills: 0 | Status: SHIPPED | OUTPATIENT
Start: 2022-12-02 | End: 2023-01-05

## 2022-12-02 NOTE — TELEPHONE ENCOUNTER
Medication:   Requested Prescriptions     Pending Prescriptions Disp Refills    ALPRAZolam (XANAX) 0.5 MG tablet [Pharmacy Med Name: ALPRAZOLAM 0.5MG TABLETS] 60 tablet      Sig: TAKE 1 TABLET BY MOUTH TWICE DAILY AS NEEDED FOR ANXIETY        Last Filled:      Patient Phone Number: [phone] (home) [phone] (work)    Last appt: 10/5/2022   Next appt: 1/9/2023    Last OARRS:   RX Monitoring 3/8/2021   Attestation -   Periodic Controlled Substance Monitoring Possible medication side effects, risk of tolerance/dependence & alternative treatments discussed. ;No signs of potential drug abuse or diversion identified.

## 2022-12-04 ENCOUNTER — HOSPITAL ENCOUNTER (INPATIENT)
Age: 77
LOS: 3 days | Discharge: HOME | End: 2022-12-07
Attending: EMERGENCY MEDICINE | Admitting: INTERNAL MEDICINE
Payer: MEDICARE

## 2022-12-04 ENCOUNTER — APPOINTMENT (OUTPATIENT)
Dept: CT IMAGING | Age: 77
End: 2022-12-04
Payer: MEDICARE

## 2022-12-04 ENCOUNTER — APPOINTMENT (OUTPATIENT)
Dept: GENERAL RADIOLOGY | Age: 77
End: 2022-12-04
Payer: MEDICARE

## 2022-12-04 DIAGNOSIS — N18.30 STAGE 3 CHRONIC KIDNEY DISEASE, UNSPECIFIED WHETHER STAGE 3A OR 3B CKD (HCC): ICD-10-CM

## 2022-12-04 DIAGNOSIS — R11.2 NAUSEA AND VOMITING, UNSPECIFIED VOMITING TYPE: ICD-10-CM

## 2022-12-04 DIAGNOSIS — Z95.2 AORTIC VALVE REPLACED: ICD-10-CM

## 2022-12-04 DIAGNOSIS — N17.9 AKI (ACUTE KIDNEY INJURY) (HCC): Primary | ICD-10-CM

## 2022-12-04 DIAGNOSIS — E83.42 HYPOMAGNESEMIA: ICD-10-CM

## 2022-12-04 LAB
A/G RATIO: 1.8 (ref 1.1–2.2)
ALBUMIN SERPL-MCNC: 3.7 G/DL (ref 3.4–5)
ALP BLD-CCNC: 53 U/L (ref 40–129)
ALT SERPL-CCNC: 11 U/L (ref 10–40)
AMORPHOUS: ABNORMAL /HPF
ANION GAP SERPL CALCULATED.3IONS-SCNC: 14 MMOL/L (ref 3–16)
AST SERPL-CCNC: 40 U/L (ref 15–37)
BACTERIA: ABNORMAL /HPF
BASOPHILS ABSOLUTE: 0.1 K/UL (ref 0–0.2)
BASOPHILS RELATIVE PERCENT: 0.4 %
BILIRUB SERPL-MCNC: 1 MG/DL (ref 0–1)
BILIRUBIN URINE: NEGATIVE
BLOOD, URINE: ABNORMAL
BUN BLDV-MCNC: 61 MG/DL (ref 7–20)
CALCIUM SERPL-MCNC: 7.8 MG/DL (ref 8.3–10.6)
CHLORIDE BLD-SCNC: 99 MMOL/L (ref 99–110)
CLARITY: CLEAR
CO2: 22 MMOL/L (ref 21–32)
COARSE CASTS, UA: ABNORMAL /LPF (ref 0–2)
COLOR: YELLOW
CREAT SERPL-MCNC: 3.8 MG/DL (ref 0.6–1.2)
EOSINOPHILS ABSOLUTE: 0 K/UL (ref 0–0.6)
EOSINOPHILS RELATIVE PERCENT: 0.1 %
EPITHELIAL CELLS, UA: ABNORMAL /HPF (ref 0–5)
GFR SERPL CREATININE-BSD FRML MDRD: 12 ML/MIN/{1.73_M2}
GLUCOSE BLD-MCNC: 160 MG/DL (ref 70–99)
GLUCOSE URINE: NEGATIVE MG/DL
HCT VFR BLD CALC: 21.8 % (ref 36–48)
HEMOGLOBIN: 7.6 G/DL (ref 12–16)
INR BLD: 3.86 (ref 0.87–1.14)
KETONES, URINE: NEGATIVE MG/DL
LEUKOCYTE ESTERASE, URINE: ABNORMAL
LIPASE: 40 U/L (ref 13–60)
LYMPHOCYTES ABSOLUTE: 2.1 K/UL (ref 1–5.1)
LYMPHOCYTES RELATIVE PERCENT: 14.2 %
MAGNESIUM: 1 MG/DL (ref 1.8–2.4)
MCH RBC QN AUTO: 31.5 PG (ref 26–34)
MCHC RBC AUTO-ENTMCNC: 35 G/DL (ref 31–36)
MCV RBC AUTO: 90.1 FL (ref 80–100)
MICROSCOPIC EXAMINATION: YES
MONOCYTES ABSOLUTE: 1.5 K/UL (ref 0–1.3)
MONOCYTES RELATIVE PERCENT: 10.2 %
NEUTROPHILS ABSOLUTE: 11 K/UL (ref 1.7–7.7)
NEUTROPHILS RELATIVE PERCENT: 75.1 %
NITRITE, URINE: NEGATIVE
PDW BLD-RTO: 17.2 % (ref 12.4–15.4)
PH UA: 6 (ref 5–8)
PHOSPHORUS: 3.8 MG/DL (ref 2.5–4.9)
PLATELET # BLD: 150 K/UL (ref 135–450)
PMV BLD AUTO: 10.3 FL (ref 5–10.5)
POTASSIUM REFLEX MAGNESIUM: 4.2 MMOL/L (ref 3.5–5.1)
PRO-BNP: 6701 PG/ML (ref 0–449)
PROTEIN UA: 100 MG/DL
PROTHROMBIN TIME: 38.3 SEC (ref 11.7–14.5)
RBC # BLD: 2.42 M/UL (ref 4–5.2)
RBC UA: ABNORMAL /HPF (ref 0–4)
RENAL EPITHELIAL, UA: ABNORMAL /HPF (ref 0–1)
SODIUM BLD-SCNC: 135 MMOL/L (ref 136–145)
SPECIFIC GRAVITY UA: 1.02 (ref 1–1.03)
TOTAL PROTEIN: 5.8 G/DL (ref 6.4–8.2)
TROPONIN: <0.01 NG/ML
URINE REFLEX TO CULTURE: ABNORMAL
URINE TYPE: ABNORMAL
UROBILINOGEN, URINE: 0.2 E.U./DL
WBC # BLD: 14.7 K/UL (ref 4–11)
WBC UA: ABNORMAL /HPF (ref 0–5)

## 2022-12-04 PROCEDURE — 85610 PROTHROMBIN TIME: CPT

## 2022-12-04 PROCEDURE — 99285 EMERGENCY DEPT VISIT HI MDM: CPT

## 2022-12-04 PROCEDURE — 83735 ASSAY OF MAGNESIUM: CPT

## 2022-12-04 PROCEDURE — 96375 TX/PRO/DX INJ NEW DRUG ADDON: CPT

## 2022-12-04 PROCEDURE — 96361 HYDRATE IV INFUSION ADD-ON: CPT

## 2022-12-04 PROCEDURE — 36415 COLL VENOUS BLD VENIPUNCTURE: CPT

## 2022-12-04 PROCEDURE — 84100 ASSAY OF PHOSPHORUS: CPT

## 2022-12-04 PROCEDURE — 93005 ELECTROCARDIOGRAM TRACING: CPT | Performed by: EMERGENCY MEDICINE

## 2022-12-04 PROCEDURE — 84484 ASSAY OF TROPONIN QUANT: CPT

## 2022-12-04 PROCEDURE — 96365 THER/PROPH/DIAG IV INF INIT: CPT

## 2022-12-04 PROCEDURE — 71046 X-RAY EXAM CHEST 2 VIEWS: CPT

## 2022-12-04 PROCEDURE — 85025 COMPLETE CBC W/AUTO DIFF WBC: CPT

## 2022-12-04 PROCEDURE — 81001 URINALYSIS AUTO W/SCOPE: CPT

## 2022-12-04 PROCEDURE — 1200000000 HC SEMI PRIVATE

## 2022-12-04 PROCEDURE — 2580000003 HC RX 258: Performed by: EMERGENCY MEDICINE

## 2022-12-04 PROCEDURE — 6360000002 HC RX W HCPCS: Performed by: EMERGENCY MEDICINE

## 2022-12-04 PROCEDURE — 74176 CT ABD & PELVIS W/O CONTRAST: CPT

## 2022-12-04 PROCEDURE — 80053 COMPREHEN METABOLIC PANEL: CPT

## 2022-12-04 PROCEDURE — 83690 ASSAY OF LIPASE: CPT

## 2022-12-04 PROCEDURE — 83880 ASSAY OF NATRIURETIC PEPTIDE: CPT

## 2022-12-04 RX ORDER — INSULIN LISPRO 100 [IU]/ML
0-4 INJECTION, SOLUTION INTRAVENOUS; SUBCUTANEOUS NIGHTLY
Status: DISCONTINUED | OUTPATIENT
Start: 2022-12-04 | End: 2022-12-07 | Stop reason: HOSPADM

## 2022-12-04 RX ORDER — ONDANSETRON 2 MG/ML
4 INJECTION INTRAMUSCULAR; INTRAVENOUS EVERY 6 HOURS PRN
Status: DISCONTINUED | OUTPATIENT
Start: 2022-12-04 | End: 2022-12-07 | Stop reason: HOSPADM

## 2022-12-04 RX ORDER — MAGNESIUM SULFATE IN WATER 40 MG/ML
4000 INJECTION, SOLUTION INTRAVENOUS ONCE
Status: COMPLETED | OUTPATIENT
Start: 2022-12-04 | End: 2022-12-05

## 2022-12-04 RX ORDER — INSULIN LISPRO 100 [IU]/ML
0-4 INJECTION, SOLUTION INTRAVENOUS; SUBCUTANEOUS
Status: DISCONTINUED | OUTPATIENT
Start: 2022-12-05 | End: 2022-12-07 | Stop reason: HOSPADM

## 2022-12-04 RX ORDER — ATORVASTATIN CALCIUM 10 MG/1
10 TABLET, FILM COATED ORAL NIGHTLY
Status: DISCONTINUED | OUTPATIENT
Start: 2022-12-04 | End: 2022-12-07 | Stop reason: HOSPADM

## 2022-12-04 RX ORDER — SODIUM CHLORIDE 9 MG/ML
INJECTION, SOLUTION INTRAVENOUS PRN
Status: DISCONTINUED | OUTPATIENT
Start: 2022-12-04 | End: 2022-12-07 | Stop reason: HOSPADM

## 2022-12-04 RX ORDER — ALPRAZOLAM 0.5 MG/1
0.5 TABLET ORAL NIGHTLY PRN
Status: DISCONTINUED | OUTPATIENT
Start: 2022-12-05 | End: 2022-12-07

## 2022-12-04 RX ORDER — UBIDECARENONE 75 MG
100 CAPSULE ORAL DAILY
Status: DISCONTINUED | OUTPATIENT
Start: 2022-12-05 | End: 2022-12-07 | Stop reason: HOSPADM

## 2022-12-04 RX ORDER — SODIUM CHLORIDE 0.9 % (FLUSH) 0.9 %
5-40 SYRINGE (ML) INJECTION PRN
Status: DISCONTINUED | OUTPATIENT
Start: 2022-12-04 | End: 2022-12-07 | Stop reason: HOSPADM

## 2022-12-04 RX ORDER — ACETAMINOPHEN 650 MG/1
650 SUPPOSITORY RECTAL EVERY 6 HOURS PRN
Status: DISCONTINUED | OUTPATIENT
Start: 2022-12-04 | End: 2022-12-07 | Stop reason: HOSPADM

## 2022-12-04 RX ORDER — ONDANSETRON 4 MG/1
4 TABLET, ORALLY DISINTEGRATING ORAL EVERY 8 HOURS PRN
Status: DISCONTINUED | OUTPATIENT
Start: 2022-12-04 | End: 2022-12-07 | Stop reason: HOSPADM

## 2022-12-04 RX ORDER — DEXTROSE MONOHYDRATE 100 MG/ML
INJECTION, SOLUTION INTRAVENOUS CONTINUOUS PRN
Status: DISCONTINUED | OUTPATIENT
Start: 2022-12-04 | End: 2022-12-07 | Stop reason: HOSPADM

## 2022-12-04 RX ORDER — HYDROCODONE BITARTRATE AND ACETAMINOPHEN 5; 325 MG/1; MG/1
1 TABLET ORAL NIGHTLY PRN
Status: DISCONTINUED | OUTPATIENT
Start: 2022-12-05 | End: 2022-12-06

## 2022-12-04 RX ORDER — SODIUM CHLORIDE, SODIUM LACTATE, POTASSIUM CHLORIDE, CALCIUM CHLORIDE 600; 310; 30; 20 MG/100ML; MG/100ML; MG/100ML; MG/100ML
1000 INJECTION, SOLUTION INTRAVENOUS CONTINUOUS
Status: DISCONTINUED | OUTPATIENT
Start: 2022-12-04 | End: 2022-12-06

## 2022-12-04 RX ORDER — SODIUM CHLORIDE 0.9 % (FLUSH) 0.9 %
5-40 SYRINGE (ML) INJECTION EVERY 12 HOURS SCHEDULED
Status: DISCONTINUED | OUTPATIENT
Start: 2022-12-04 | End: 2022-12-07 | Stop reason: HOSPADM

## 2022-12-04 RX ORDER — AMLODIPINE BESYLATE 5 MG/1
5 TABLET ORAL DAILY
Status: DISCONTINUED | OUTPATIENT
Start: 2022-12-05 | End: 2022-12-07 | Stop reason: HOSPADM

## 2022-12-04 RX ORDER — ACETAMINOPHEN 325 MG/1
650 TABLET ORAL EVERY 6 HOURS PRN
Status: DISCONTINUED | OUTPATIENT
Start: 2022-12-04 | End: 2022-12-07 | Stop reason: HOSPADM

## 2022-12-04 RX ORDER — ONDANSETRON 2 MG/ML
4 INJECTION INTRAMUSCULAR; INTRAVENOUS ONCE
Status: COMPLETED | OUTPATIENT
Start: 2022-12-04 | End: 2022-12-04

## 2022-12-04 RX ORDER — SODIUM CHLORIDE, SODIUM LACTATE, POTASSIUM CHLORIDE, AND CALCIUM CHLORIDE .6; .31; .03; .02 G/100ML; G/100ML; G/100ML; G/100ML
500 INJECTION, SOLUTION INTRAVENOUS ONCE
Status: COMPLETED | OUTPATIENT
Start: 2022-12-04 | End: 2022-12-04

## 2022-12-04 RX ORDER — PANTOPRAZOLE SODIUM 40 MG/1
40 TABLET, DELAYED RELEASE ORAL DAILY
Status: DISCONTINUED | OUTPATIENT
Start: 2022-12-05 | End: 2022-12-07 | Stop reason: HOSPADM

## 2022-12-04 RX ORDER — LEVOFLOXACIN 500 MG/1
TABLET, FILM COATED ORAL
COMMUNITY
Start: 2022-11-22 | End: 2022-12-05

## 2022-12-04 RX ADMIN — SODIUM CHLORIDE, POTASSIUM CHLORIDE, SODIUM LACTATE AND CALCIUM CHLORIDE 500 ML: 600; 310; 30; 20 INJECTION, SOLUTION INTRAVENOUS at 20:38

## 2022-12-04 RX ADMIN — MAGNESIUM SULFATE HEPTAHYDRATE 4000 MG: 40 INJECTION, SOLUTION INTRAVENOUS at 22:06

## 2022-12-04 RX ADMIN — ONDANSETRON 4 MG: 2 INJECTION INTRAMUSCULAR; INTRAVENOUS at 20:50

## 2022-12-04 ASSESSMENT — PAIN - FUNCTIONAL ASSESSMENT: PAIN_FUNCTIONAL_ASSESSMENT: NONE - DENIES PAIN

## 2022-12-05 PROBLEM — E87.1 HYPONATREMIA: Status: ACTIVE | Noted: 2022-12-05

## 2022-12-05 PROBLEM — E83.42 HYPOMAGNESEMIA: Status: ACTIVE | Noted: 2022-12-05

## 2022-12-05 PROBLEM — I50.32 CHRONIC DIASTOLIC HEART FAILURE (HCC): Status: ACTIVE | Noted: 2021-07-29

## 2022-12-05 LAB
ABO/RH: NORMAL
ALBUMIN SERPL-MCNC: 3.2 G/DL (ref 3.4–5)
ANION GAP SERPL CALCULATED.3IONS-SCNC: 12 MMOL/L (ref 3–16)
ANTIBODY SCREEN: NORMAL
BASOPHILS ABSOLUTE: 0 K/UL (ref 0–0.2)
BASOPHILS RELATIVE PERCENT: 0.2 %
BUN BLDV-MCNC: 57 MG/DL (ref 7–20)
CALCIUM SERPL-MCNC: 7.8 MG/DL (ref 8.3–10.6)
CHLORIDE BLD-SCNC: 98 MMOL/L (ref 99–110)
CHP ED QC CHECK: YES
CO2: 25 MMOL/L (ref 21–32)
CREAT SERPL-MCNC: 3.5 MG/DL (ref 0.6–1.2)
CREATININE URINE: 104 MG/DL (ref 28–259)
CREATININE URINE: 99.5 MG/DL (ref 28–259)
EKG ATRIAL RATE: 61 BPM
EKG DIAGNOSIS: NORMAL
EKG P AXIS: -74 DEGREES
EKG P-R INTERVAL: 236 MS
EKG Q-T INTERVAL: 522 MS
EKG QRS DURATION: 96 MS
EKG QTC CALCULATION (BAZETT): 525 MS
EKG R AXIS: 88 DEGREES
EKG T AXIS: 39 DEGREES
EKG VENTRICULAR RATE: 61 BPM
EOSINOPHILS ABSOLUTE: 0 K/UL (ref 0–0.6)
EOSINOPHILS RELATIVE PERCENT: 0.2 %
ESTIMATED AVERAGE GLUCOSE: 108.3 MG/DL
GFR SERPL CREATININE-BSD FRML MDRD: 13 ML/MIN/{1.73_M2}
GLUCOSE BLD-MCNC: 143 MG/DL
GLUCOSE BLD-MCNC: 143 MG/DL (ref 70–99)
GLUCOSE BLD-MCNC: 156 MG/DL (ref 70–99)
GLUCOSE BLD-MCNC: 167 MG/DL (ref 70–99)
GLUCOSE BLD-MCNC: 194 MG/DL (ref 70–99)
GLUCOSE BLD-MCNC: 269 MG/DL (ref 70–99)
GLUCOSE BLD-MCNC: 296 MG/DL (ref 70–99)
HBA1C MFR BLD: 5.4 %
HCT VFR BLD CALC: 19.1 % (ref 36–48)
HCT VFR BLD CALC: 19.6 % (ref 36–48)
HEMOGLOBIN: 6.7 G/DL (ref 12–16)
HEMOGLOBIN: 6.8 G/DL (ref 12–16)
INR BLD: 4.69 (ref 0.87–1.14)
LYMPHOCYTES ABSOLUTE: 2.3 K/UL (ref 1–5.1)
LYMPHOCYTES RELATIVE PERCENT: 17 %
MAGNESIUM: 2.9 MG/DL (ref 1.8–2.4)
MCH RBC QN AUTO: 31.1 PG (ref 26–34)
MCHC RBC AUTO-ENTMCNC: 34.4 G/DL (ref 31–36)
MCV RBC AUTO: 90.2 FL (ref 80–100)
MONOCYTES ABSOLUTE: 1.5 K/UL (ref 0–1.3)
MONOCYTES RELATIVE PERCENT: 10.6 %
NEUTROPHILS ABSOLUTE: 9.9 K/UL (ref 1.7–7.7)
NEUTROPHILS RELATIVE PERCENT: 72 %
OSMOLALITY URINE: 348 MOSM/KG (ref 390–1070)
PDW BLD-RTO: 17.2 % (ref 12.4–15.4)
PERFORMED ON: ABNORMAL
PHOSPHORUS: 3.3 MG/DL (ref 2.5–4.9)
PLATELET # BLD: 153 K/UL (ref 135–450)
PMV BLD AUTO: 10.9 FL (ref 5–10.5)
POTASSIUM SERPL-SCNC: 4 MMOL/L (ref 3.5–5.1)
PROTEIN PROTEIN: 166 MG/DL
PROTEIN/CREAT RATIO: 1.7 MG/DL
PROTHROMBIN TIME: 44.7 SEC (ref 11.7–14.5)
RBC # BLD: 2.18 M/UL (ref 4–5.2)
SODIUM BLD-SCNC: 135 MMOL/L (ref 136–145)
SODIUM URINE: <20 MMOL/L
TOTAL CK: 78 U/L (ref 26–192)
WBC # BLD: 13.7 K/UL (ref 4–11)

## 2022-12-05 PROCEDURE — 85014 HEMATOCRIT: CPT

## 2022-12-05 PROCEDURE — 2580000003 HC RX 258: Performed by: EMERGENCY MEDICINE

## 2022-12-05 PROCEDURE — 80069 RENAL FUNCTION PANEL: CPT

## 2022-12-05 PROCEDURE — 86900 BLOOD TYPING SEROLOGIC ABO: CPT

## 2022-12-05 PROCEDURE — 85018 HEMOGLOBIN: CPT

## 2022-12-05 PROCEDURE — 83935 ASSAY OF URINE OSMOLALITY: CPT

## 2022-12-05 PROCEDURE — 82570 ASSAY OF URINE CREATININE: CPT

## 2022-12-05 PROCEDURE — 86850 RBC ANTIBODY SCREEN: CPT

## 2022-12-05 PROCEDURE — 84300 ASSAY OF URINE SODIUM: CPT

## 2022-12-05 PROCEDURE — 83036 HEMOGLOBIN GLYCOSYLATED A1C: CPT

## 2022-12-05 PROCEDURE — 84156 ASSAY OF PROTEIN URINE: CPT

## 2022-12-05 PROCEDURE — 83735 ASSAY OF MAGNESIUM: CPT

## 2022-12-05 PROCEDURE — 1200000000 HC SEMI PRIVATE

## 2022-12-05 PROCEDURE — 82550 ASSAY OF CK (CPK): CPT

## 2022-12-05 PROCEDURE — 85025 COMPLETE CBC W/AUTO DIFF WBC: CPT

## 2022-12-05 PROCEDURE — 86923 COMPATIBILITY TEST ELECTRIC: CPT

## 2022-12-05 PROCEDURE — 86901 BLOOD TYPING SEROLOGIC RH(D): CPT

## 2022-12-05 PROCEDURE — 6370000000 HC RX 637 (ALT 250 FOR IP): Performed by: INTERNAL MEDICINE

## 2022-12-05 PROCEDURE — 85610 PROTHROMBIN TIME: CPT

## 2022-12-05 PROCEDURE — 36415 COLL VENOUS BLD VENIPUNCTURE: CPT

## 2022-12-05 PROCEDURE — 2580000003 HC RX 258: Performed by: INTERNAL MEDICINE

## 2022-12-05 PROCEDURE — 97161 PT EVAL LOW COMPLEX 20 MIN: CPT

## 2022-12-05 PROCEDURE — 97116 GAIT TRAINING THERAPY: CPT

## 2022-12-05 PROCEDURE — 99223 1ST HOSP IP/OBS HIGH 75: CPT | Performed by: INTERNAL MEDICINE

## 2022-12-05 PROCEDURE — 6360000002 HC RX W HCPCS: Performed by: INTERNAL MEDICINE

## 2022-12-05 PROCEDURE — P9016 RBC LEUKOCYTES REDUCED: HCPCS

## 2022-12-05 RX ORDER — AMLODIPINE BESYLATE 5 MG/1
5 TABLET ORAL DAILY
Status: CANCELLED | OUTPATIENT
Start: 2022-12-06

## 2022-12-05 RX ORDER — SODIUM CHLORIDE 9 MG/ML
INJECTION, SOLUTION INTRAVENOUS PRN
Status: CANCELLED | OUTPATIENT
Start: 2022-12-05

## 2022-12-05 RX ORDER — SODIUM CHLORIDE 9 MG/ML
INJECTION, SOLUTION INTRAVENOUS PRN
Status: DISCONTINUED | OUTPATIENT
Start: 2022-12-05 | End: 2022-12-07 | Stop reason: HOSPADM

## 2022-12-05 RX ADMIN — ALPRAZOLAM 0.5 MG: 0.5 TABLET ORAL at 22:25

## 2022-12-05 RX ADMIN — SODIUM CHLORIDE, POTASSIUM CHLORIDE, SODIUM LACTATE AND CALCIUM CHLORIDE 1000 ML: 600; 310; 30; 20 INJECTION, SOLUTION INTRAVENOUS at 00:08

## 2022-12-05 RX ADMIN — PANTOPRAZOLE SODIUM 40 MG: 40 TABLET, DELAYED RELEASE ORAL at 05:11

## 2022-12-05 RX ADMIN — INSULIN LISPRO 2 UNITS: 100 INJECTION, SOLUTION INTRAVENOUS; SUBCUTANEOUS at 13:21

## 2022-12-05 RX ADMIN — ATORVASTATIN CALCIUM 10 MG: 20 TABLET, FILM COATED ORAL at 20:46

## 2022-12-05 RX ADMIN — ATORVASTATIN CALCIUM 10 MG: 20 TABLET, FILM COATED ORAL at 02:09

## 2022-12-05 RX ADMIN — SODIUM CHLORIDE, PRESERVATIVE FREE 10 ML: 5 INJECTION INTRAVENOUS at 09:45

## 2022-12-05 RX ADMIN — AMLODIPINE BESYLATE 5 MG: 5 TABLET ORAL at 09:17

## 2022-12-05 RX ADMIN — ACETAMINOPHEN 650 MG: 325 TABLET, FILM COATED ORAL at 21:06

## 2022-12-05 RX ADMIN — SODIUM CHLORIDE, PRESERVATIVE FREE 10 ML: 5 INJECTION INTRAVENOUS at 20:47

## 2022-12-05 RX ADMIN — ONDANSETRON 4 MG: 2 INJECTION INTRAMUSCULAR; INTRAVENOUS at 00:18

## 2022-12-05 RX ADMIN — SODIUM CHLORIDE, POTASSIUM CHLORIDE, SODIUM LACTATE AND CALCIUM CHLORIDE 1000 ML: 600; 310; 30; 20 INJECTION, SOLUTION INTRAVENOUS at 22:29

## 2022-12-05 RX ADMIN — METOPROLOL TARTRATE 12.5 MG: 25 TABLET, FILM COATED ORAL at 20:46

## 2022-12-05 RX ADMIN — ONDANSETRON 4 MG: 4 TABLET, ORALLY DISINTEGRATING ORAL at 14:58

## 2022-12-05 RX ADMIN — VITAM B12 100 MCG: 100 TAB at 11:23

## 2022-12-05 RX ADMIN — METOPROLOL TARTRATE 12.5 MG: 25 TABLET, FILM COATED ORAL at 02:09

## 2022-12-05 ASSESSMENT — PAIN DESCRIPTION - ONSET: ONSET: ON-GOING

## 2022-12-05 ASSESSMENT — PAIN DESCRIPTION - PAIN TYPE: TYPE: ACUTE PAIN

## 2022-12-05 ASSESSMENT — LIFESTYLE VARIABLES: HOW OFTEN DO YOU HAVE A DRINK CONTAINING ALCOHOL: NEVER

## 2022-12-05 ASSESSMENT — PAIN SCALES - GENERAL
PAINLEVEL_OUTOF10: 3
PAINLEVEL_OUTOF10: 0
PAINLEVEL_OUTOF10: 4
PAINLEVEL_OUTOF10: 4

## 2022-12-05 ASSESSMENT — PAIN - FUNCTIONAL ASSESSMENT: PAIN_FUNCTIONAL_ASSESSMENT: ACTIVITIES ARE NOT PREVENTED

## 2022-12-05 ASSESSMENT — PAIN DESCRIPTION - FREQUENCY: FREQUENCY: INTERMITTENT

## 2022-12-05 ASSESSMENT — PAIN DESCRIPTION - DESCRIPTORS: DESCRIPTORS: ACHING

## 2022-12-05 ASSESSMENT — PAIN DESCRIPTION - LOCATION: LOCATION: GENERALIZED

## 2022-12-05 ASSESSMENT — PAIN DESCRIPTION - ORIENTATION: ORIENTATION: RIGHT;LEFT;MID;UPPER;LOWER

## 2022-12-05 NOTE — FLOWSHEET NOTE
Panic INR result received from lab- Perfect Serve message to hospitalist for notification. Resting quietly. VSS. Denies pain or nausea- ate breakfast without difficulty. Monitor atrial paced. Plan of care discussed.

## 2022-12-05 NOTE — CONSULTS
Clinical Pharmacy Progress Note    Warfarin - Management by Pharmacy    Consult Date(s): 12/5/22  Consulting Provider(s): Dr. Jenn Arce / Plan  1)  h/o mechanical aortic valve replacement - Warfarin  Goal INR: 2.5 - 3.5  Concurrent Anticoagulants / Antiplatelets: n/a  Interactions: No significant acute interactions noted. Current Regimen / Plan:   INR today = 4.69  Supratherapeutic INR likely due to vomiting and poor oral intake pt reported on admission. Will HOLD warfarin today, and will continue to hold until INR is declining and approaches 3.5. Do not recommend reversal with Vit K unless pt shows S/Sx of active bleeding or urgent procedure is needed. Will monitor pt's clinical status and INR daily, and make dose adjustments as needed. Please call with questions--  Thanks--  Randy Guillermo, PharmD, BCPS, BCGP  [id number] ([facility])   12/5/2022 11:26 AM      Interval update:  Slight improvement in SCr from 3.8--> 3.5 this morning. INR elevated at 4.69 today. Subjective/Objective:   Glendy Rosario is a 69 y.o. female with a PMHx significant for mechanical AVR, A.fib, anxiety, CKD (baseline SCr 1.3-1.6), DM, GERD, HTN, HLD, RLS, and COPD who is admitted with nausea/vomiting, poor oral intake, and fatigue - found to have FOX on CKD. Pharmacy is consulted to dose Warfarin. Ht Readings from Last 1 Encounters:   12/04/22 4' 11\" (1.499 m)     Wt Readings from Last 1 Encounters:   12/05/22 105 lb 9.6 oz (47.9 kg)     Prior / Home Warfarin Regimen:  Warfarin 2.5mg daily except 3mg on Fridays per PCP latest anticoag note; however, pt states she has been taking 2mg daily. Warfarin is managed by pt's PCP  Last INR check 11/15; INR = 2.5.    Patient has 1mg tablets at home    Date INR Warfarin   12/4 3.86 --   12/5 4.69                  Recent Labs     12/04/22  2113 12/05/22  0413 12/05/22  0530 12/05/22  0957   INR 3.86*  --   --  4.69*   HGB 7.6* 6.7* 6.8*  --      --  153  -- LABALBU 3.7  --  3.2*  --    CREATININE 3.8*  --  3.5*  --

## 2022-12-05 NOTE — TELEPHONE ENCOUNTER
Pt states that she received my message. Pt is currently in the hospital. She states that she still had not been feeling well since after thanksgiving, so she went to the hospital yesterday. She states that she was dehydrated and she needs a blood transfusion.      ALEJANDRINA

## 2022-12-05 NOTE — PROGRESS NOTES
Physical Therapy  Facility/Department: 1 [facility] EMERGENCY DEPARTMENT  Physical Therapy Initial Assessment, Treatment and Discharge    Name: Aliza Dobbins  : 1945  MRN: [id number]  Date of Service: 2022    Discharge Recommendations:    Aliza Dobbins scored a 20/24 on the AM-PAC short mobility form. Current research shows that an AM-PAC score of 18 or greater is typically associated with a discharge to the patient's home setting. Based on the patient's AM-PAC score and their current functional mobility deficits, it is recommended that the patient have 2-3 sessions per week of Physical Therapy at d/c to increase the patient's independence. At this time, this patient demonstrates the endurance and safety to discharge home with    (home vs OP services) and a follow up treatment frequency of 2-3x/wk. Please see assessment section for further patient specific details. If patient discharges prior to next session this note will serve as a discharge summary. Please see below for the latest assessment towards goals. PT Equipment Recommendations  Equipment Needed: No      Patient Diagnosis(es): The primary encounter diagnosis was FOX (acute kidney injury) ([facility] [address] Diagnoses of Nausea and vomiting, unspecified vomiting type and Hypomagnesemia were also pertinent to this visit.   Past Medical History:  has a past medical history of A-fib ([address] Anemia, Anemia:multifactorial, Anxiety, Cataract, Chronic back pain, CKD (chronic kidney disease) stage 3, GFR 30-59 ml/min (HCC), Colitis, ischemic (HCC), COPD, Diabetes, Diabetic eye exam ([address] GERD (gastroesophageal reflux disease), Hx of mammogram, Hyperlipidaemia LDL goal <100, Hypertension, Insomnia, Long-term (current) use of anticoagulants, INR goal 2.5-3.5, Lymphoma:monoclonal B cell, Mammogram abnormal, Nonspecific abnormal results of kidney function study, Osteoarthritis, Renal cysts, right, RLS (restless legs syndrome), Sciatica, Screening colonoscopy, Therapeutic drug monitoring, Valvular heart disease, and Visit for screening mammogram.  Past Surgical History:  has a past surgical history that includes Aortic valve replacement; Mitral valve replacement; Hysterectomy; Cataract removal (12/11/13;1/8/14); Colonoscopy; pacemaker placement (8/07); Tunneled venous port placement (Right, 10/3/2014); and laparoscopy (2/3/15). Assessment   Assessment: Pt lives with spouse and is normally independent with functional mobility and gait. Did well with therapy today and the plan is to return home with a prn. No therapy needs identified at this time  Treatment Diagnosis: Decreased functional mobility post admission for N/V  Barriers to Learning: none noted  Activity Tolerance  Activity Tolerance: Patient tolerated treatment well;Patient tolerated evaluation without incident     Plan   Physcial Therapy Plan  General Plan: Discharge  Safety Devices  Type of Devices: Call light within reach, Left in bed, Nurse notified, Bed alarm in place     Restrictions  Position Activity Restriction  Other position/activity restrictions: up with assist     Subjective   General  Additional Pertinent Hx: Adm 1/2 with N/V post several weeks of abx treatment for sinus infection and PNA.   Referring Practitioner: Leida Carroll  Diagnosis: Nausea/vomiting  Subjective  Subjective: Pt in bed upon PT entry, agreeable to working with PT         Social/Functional History  Social/Functional History  Lives With: Spouse  Type of Home: House  Home Layout: Two level, Able to Live on Main level with bedroom/bathroom  Home Access: Level entry  Bathroom Shower/Tub: Walk-in shower  Bathroom Toilet: Standard  Bathroom Accessibility: Accessible  Home Equipment:  (no DME)  Has the patient had two or more falls in the past year or any fall with injury in the past year?: No  ADL Assistance: [address] [place] [address] Avenue: Independent  Ambulation Assistance: Independent  Transfer Assistance: Independent  Active : Yes  Additional Comments: cares for her spouse who has lung cancer  Vision/Hearing  Vision  Vision: Impaired  Vision Exceptions: Wears glasses for reading  Hearing  Hearing: Within functional limits    Cognition   Orientation  Overall Orientation Status: Within Functional Limits     Objective   Heart Rate: 62  Heart Rate Source: Monitor  BP: (!) 144/41  BP Location: Left lower arm  BP Method: Automatic  Patient Position: Semi fowlers  MAP (Calculated): 75  Resp: 15  SpO2: 96 %  O2 Device: None (Room air)              AROM RLE (degrees)  RLE AROM: WFL  AROM LLE (degrees)  LLE AROM : WFL  Strength RLE  Strength RLE: WFL  Strength LLE  Strength LLE: WFL           Bed mobility  Supine to Sit: Modified independent (HOB elevated, use of side rail)  Scooting: Modified independent  Transfers  Sit to Stand: Stand by assistance  Stand to Sit: Stand by assistance  Ambulation  Surface: Level tile  Device: No Device  Assistance: Stand by assistance  Quality of Gait: slow and steady gait, no LOB or instability noted  Distance: 200 ft   Reviewed energy conservation during mobility and adl  AM-PAC Score  AM-PAC Inpatient Mobility Raw Score : 20 (12/05/22 1018)  AM-PAC Inpatient T-Scale Score : 47.67 (12/05/22 1018)  Mobility Inpatient CMS 0-100% Score: 35.83 (12/05/22 1018)  Mobility Inpatient CMS G-Code Modifier : CJ (12/05/22 1018)     Education role of PT, safety, bed mobility, transfers, gait, energy conservation.        Therapy Time   Individual Concurrent Group Co-treatment   Time In 0945         Time Out 1008         Minutes 23             Timed Code Treatment Minutes:   15    Total Treatment Minutes:  [address] [facility]

## 2022-12-05 NOTE — ED PROVIDER NOTES
Heart Rate: 73, Resp: 16, SpO2: 100 %     Nursing note and vitals reviewed. General:  Adult female, alert and appropriately interactive. In no distress. HENT: Normocephalic and atraumatic. External ears normal. Nose appears normal externally. Eyes: Conjunctivae normal. No scleral icterus. Neck: Neck supple. No tracheal deviation present. CV: Normal rate. Regular rhythm. S1/S2 auscultated. No murmurs, gallops or rubs. Pulm: Effort normal. Breath sounds clear to auscultation bilaterally. No wheezes. No rales or rhonchi. GI: Soft. No distension. No tenderness. No rebound or guarding. No masses. No peritoneal signs. Musculoskeletal: No edema. No gross deformities. Neurological: Alert and appropriately interactive. Face symmetric, speech without dysarthria or obvious aphasia. Moving all extremities spontaneously. Skin: Warm, dry. No rash. No diaphoresis or erythema. Procedures   Procedures    MEDICAL DECISION MAKING     MDM: Eli Weiner is a 69 y.o. female with history as above presenting for nausea, fatigue, general weakness. On arrival, she is in no distress, vital signs reassuring. She has a soft and benign abdomen. Labs notable for severe FOX, stable chronic anemia, hypomagnesemia. She does have a slightly elevated BNP (likely worsened by FOX), however appears hypovolemic overall and was given IV fluids and magnesium was aggressively supplemented. Suspect she is also whole-body hypokalemic, but was not supplemented due to her severe FOX. INR slightly supratherapeutic, no active bleeding at this time. Chest x-ray without acute findings. Suspect her symptoms are due to chronic nausea and p.o. intolerance in the setting of her multiple antibiotics recently. Discussed with hospitalist who is accepted for further care and management. Clinical Impression     1. FOX (acute kidney injury) ([facility] [address]    2. Nausea and vomiting, unspecified vomiting type    3.  Hypomagnesemia        Disposition DISPOSITION Admitted 12/04/2022 11:06:05 PM        Arita Kawasaki, MD  11:17 PM                     Past Medical, Surgical, Family, and Social History     She has a past medical history of A-fib ([address] Anemia, Anemia:multifactorial, Anxiety, Cataract, Chronic back pain, CKD (chronic kidney disease) stage 3, GFR 30-59 ml/min ([address] Colitis, ischemic ([address] COPD, Diabetes, Diabetic eye exam ([address] GERD (gastroesophageal reflux disease), Hx of mammogram, Hyperlipidaemia LDL goal <100, Hypertension, Insomnia, Long-term (current) use of anticoagulants, INR goal 2.5-3.5, Lymphoma:monoclonal B cell, Mammogram abnormal, Nonspecific abnormal results of kidney function study, Osteoarthritis, Renal cysts, right, RLS (restless legs syndrome), Sciatica, Screening colonoscopy, Therapeutic drug monitoring, Valvular heart disease, and Visit for screening mammogram.  She has a past surgical history that includes Aortic valve replacement; Mitral valve replacement; Hysterectomy; Cataract removal (12/11/13;1/8/14); Colonoscopy; pacemaker placement (8/07); Tunneled venous port placement (Right, 10/3/2014); and laparoscopy (2/3/15). Her family history includes Diabetes in her brother; Esvin Quevedo in her mother. She reports that she quit smoking about 15 years ago. Her smoking use included cigarettes. She has a 40.00 pack-year smoking history. She has never used smokeless tobacco. She reports current alcohol use of about 1.0 standard drink per week. She reports that she does not use drugs. Medications     Previous Medications    ACETAMINOPHEN-CODEINE (TYLENOL #3) 300-30 MG PER TABLET    Take 1 tablet by mouth nightly as needed for Pain for up to 90 days.     ALPRAZOLAM (XANAX) 0.5 MG TABLET    TAKE 1 TABLET BY MOUTH TWICE DAILY AS NEEDED FOR ANXIETY    AMLODIPINE (NORVASC) 5 MG TABLET    Take 1 tablet by mouth daily    ATORVASTATIN (LIPITOR) 10 MG TABLET    TAKE 1 TABLET BY MOUTH DAILY IN THE EVENING FOR CHOLESTEROL    BLOOD GLUCOSE MONITOR STRIPS    Test 2 times a day & as needed for symptoms of irregular blood glucose. Ok to dispense what is covered by insurance. Patient has been using [employer] test strips    FUROSEMIDE (LASIX) 20 MG TABLET    Take 2 tablets by mouth daily    GLIPIZIDE (GLUCOTROL) 5 MG TABLET    TAKE 1/2 TABLET BY MOUTH EVERY DAY WITH FOOD    LEVOFLOXACIN (LEVAQUIN) 500 MG TABLET        LEVOFLOXACIN (LEVAQUIN) 750 MG TABLET    Take 1 tablet by mouth daily for 7 days    LISINOPRIL (PRINIVIL;ZESTRIL) 40 MG TABLET    TAKE 1 TABLET BY MOUTH DAILY    METFORMIN (GLUCOPHAGE) 1000 MG TABLET    TAKE 1 TABLET BY MOUTH TWICE DAILY    METOPROLOL TARTRATE (LOPRESSOR) 25 MG TABLET    Take 12.5 mg by mouth 2 times daily 0.5 tab BID     PANTOPRAZOLE (PROTONIX) 40 MG TABLET    TAKE 1 TABLET BY MOUTH EVERY DAY    TRUE METRIX BLOOD GLUCOSE TEST STRIP    USE TO TEST BLOOD SUGAR TWICE DAILY    VITAMIN B-12 (CYANOCOBALAMIN) 100 MCG TABLET    Take 100 mcg by mouth daily    WARFARIN (COUMADIN) 1 MG TABLET    TAKE 2 AND 1/2 TABLETS BY MOUTH ON SUNDAYS AND TUESDAYS. TAKE 2 TABLETS BY MOUTH ALL OTHER DAYS. WARFARIN (COUMADIN) 3 MG TABLET    TAKE 1 TABLET BY MOUTH AS DIRECTED       Allergies     She is allergic to diclofenac, nsaids, and hydrocodone-acetaminophen. ED Course     Nursing Notes, Past Medical Hx, Past Surgical Hx, Social Hx,Allergies, and Family Hx were reviewed.     Patient was given the following medications:  Orders Placed This Encounter   Medications    lactated ringers bolus    ondansetron (ZOFRAN) injection 4 mg    magnesium sulfate 4000 mg in 100 mL IVPB premix    lactated ringers infusion 1,000 mL    sodium chloride flush 0.9 % injection 5-40 mL    sodium chloride flush 0.9 % injection 5-40 mL    0.9 % sodium chloride infusion    OR Linked Order Group     ondansetron (ZOFRAN-ODT) disintegrating tablet 4 mg     ondansetron (ZOFRAN) injection 4 mg    OR Linked Order Group     acetaminophen (TYLENOL) tablet 650 mg acetaminophen (TYLENOL) suppository 650 mg    glucose chewable tablet 16 g    OR Linked Order Group     dextrose bolus 10% 125 mL     dextrose bolus 10% 250 mL    glucagon (rDNA) injection 1 mg    dextrose 10 % infusion    insulin lispro (1 Unit Dial) (HUMALOG/ADMELOG) pen 0-4 Units    insulin lispro (1 Unit Dial) (HUMALOG/ADMELOG) pen 0-4 Units    HYDROcodone-acetaminophen (NORCO) 5-325 MG per tablet 1 tablet    ALPRAZolam (XANAX) tablet 0.5 mg    amLODIPine (NORVASC) tablet 5 mg    atorvastatin (LIPITOR) tablet 10 mg    metoprolol tartrate (LOPRESSOR) tablet 12.5 mg    pantoprazole (PROTONIX) tablet 40 mg    vitamin B-12 (CYANOCOBALAMIN) tablet 100 mcg       Diagnostic Results     EKG   Atrial paced rhythm, ventricular rate 61. Prolonged ND at 236 MS, prolonged QTC at 525 MS. U waves noted in multiple leads. No ST or T wave changes suggestive of acute ischemia. RECENT VITALS:  BP: (!) 126/31,Temp: 97.6 °F (36.4 °C), Heart Rate: 62, Resp: 20, SpO2: 100 %     RADIOLOGY:  CT ABDOMEN PELVIS WO CONTRAST Additional Contrast? None   Final Result      No renal or ureteral calculi are identified. Advanced atherosclerotic calcifications noted. Otherwise no discrete evidence of acute normality identified. XR CHEST (2 VW)   Final Result      No acute disease.              LABS:   Results for orders placed or performed during the hospital encounter of 12/04/22   CMP w/ Reflex to MG   Result Value Ref Range    Sodium 135 (L) 136 - 145 mmol/L    Potassium reflex Magnesium 4.2 3.5 - 5.1 mmol/L    Chloride 99 99 - 110 mmol/L    CO2 22 21 - 32 mmol/L    Anion Gap 14 3 - 16    Glucose 160 (H) 70 - 99 mg/dL    BUN 61 (H) 7 - 20 mg/dL    Creatinine 3.8 (H) 0.6 - 1.2 mg/dL    Est, Glom Filt Rate 12 (A) >60    Calcium 7.8 (L) 8.3 - 10.6 mg/dL    Total Protein 5.8 (L) 6.4 - 8.2 g/dL    Albumin 3.7 3.4 - 5.0 g/dL    Albumin/Globulin Ratio 1.8 1.1 - 2.2    Total Bilirubin 1.0 0.0 - 1.0 mg/dL    Alkaline Phosphatase 53 40 - 129 U/L    ALT 11 10 - 40 U/L    AST 40 (H) 15 - 37 U/L   CBC with Auto Differential   Result Value Ref Range    WBC 14.7 (H) 4.0 - 11.0 K/uL    RBC 2.42 (L) 4.00 - 5.20 M/uL    Hemoglobin 7.6 (L) 12.0 - 16.0 g/dL    Hematocrit 21.8 (L) 36.0 - 48.0 %    MCV 90.1 80.0 - 100.0 fL    MCH 31.5 26.0 - 34.0 pg    MCHC 35.0 31.0 - 36.0 g/dL    RDW 17.2 (H) 12.4 - 15.4 %    Platelets 198 429 - 569 K/uL    MPV 10.3 5.0 - 10.5 fL    Neutrophils % 75.1 %    Lymphocytes % 14.2 %    Monocytes % 10.2 %    Eosinophils % 0.1 %    Basophils % 0.4 %    Neutrophils Absolute 11.0 (H) 1.7 - 7.7 K/uL    Lymphocytes Absolute 2.1 1.0 - 5.1 K/uL    Monocytes Absolute 1.5 (H) 0.0 - 1.3 K/uL    Eosinophils Absolute 0.0 0.0 - 0.6 K/uL    Basophils Absolute 0.1 0.0 - 0.2 K/uL   Lipase   Result Value Ref Range    Lipase 40.0 13.0 - 60.0 U/L   Urinalysis with Reflex to Culture    Specimen: Urine   Result Value Ref Range    Color, UA Yellow Straw/Yellow    Clarity, UA Clear Clear    Glucose, Ur Negative Negative mg/dL    Bilirubin Urine Negative Negative    Ketones, Urine Negative Negative mg/dL    Specific Gravity, UA 1.025 1.005 - 1.030    Blood, Urine LARGE (A) Negative    pH, UA 6.0 5.0 - 8.0    Protein,  (A) Negative mg/dL    Urobilinogen, Urine 0.2 <2.0 E.U./dL    Nitrite, Urine Negative Negative    Leukocyte Esterase, Urine SMALL (A) Negative    Microscopic Examination YES     Urine Type Voided     Urine Reflex to Culture Not Indicated    Protime-INR   Result Value Ref Range    Protime 38.3 (H) 11.7 - 14.5 sec    INR 3.86 (H) 0.87 - 1.14   Troponin   Result Value Ref Range    Troponin <0.01 <0.01 ng/mL   Brain Natriuretic Peptide   Result Value Ref Range    Pro-BNP 6,701 (H) 0 - 449 pg/mL   Magnesium   Result Value Ref Range    Magnesium 1.00 (L) 1.80 - 2.40 mg/dL   Phosphorus   Result Value Ref Range    Phosphorus 3.8 2.5 - 4.9 mg/dL   Microscopic Urinalysis   Result Value Ref Range    Coarse Casts, UA 0-2 0 - 2 /LPF    WBC, UA 3-5 0 - 5 /HPF    RBC, UA 5-10 (A) 0 - 4 /HPF    Epithelial Cells, UA 6-10 (A) 0 - 5 /HPF    Renal Epithelial, UA 2-5 (A) 0 - 1 /HPF    Bacteria, UA 2+ (A) None Seen /HPF    Amorphous, UA 1+ /HPF       CONSULTS:  IP CONSULT TO HOSPITALIST  PHARMACY TO DOSE WARFARIN    PATIENT REFERRED TO:  No follow-up provider specified.     DISCHARGE MEDICATIONS:  New Prescriptions    No medications on file          Nishi Hurd MD  12/04/22 1396

## 2022-12-05 NOTE — PLAN OF CARE
Problem: ABCDS Injury Assessment  Goal: Absence of physical injury  Outcome: Progressing     Problem: Cardiovascular - Adult  Goal: Maintains optimal cardiac output and hemodynamic stability  12/5/2022 1502 by Priyank Poole RN  Outcome: Progressing  12/5/2022 0257 by Grecia Brush RN  Outcome: Progressing  Goal: Absence of cardiac dysrhythmias or at baseline  Outcome: Progressing     Problem: Skin/Tissue Integrity - Adult  Goal: Skin integrity remains intact  12/5/2022 1502 by Priyank Poole RN  Outcome: Progressing  Flowsheets (Taken 12/5/2022 1136)  Skin Integrity Remains Intact:   Monitor for areas of redness and/or skin breakdown   Assess vascular access sites hourly   Every 4-6 hours minimum: Change oxygen saturation probe site  12/5/2022 0257 by Grecia Brush RN  Outcome: Progressing     Problem: Musculoskeletal - Adult  Goal: Return mobility to safest level of function  12/5/2022 1502 by Priyank Poole RN  Outcome: Progressing  12/5/2022 0257 by Grecia Brush RN  Outcome: Progressing     Problem: Genitourinary - Adult  Goal: Absence of urinary retention  12/5/2022 1502 by Priyank Poole RN  Outcome: Progressing  Flowsheets (Taken 12/5/2022 1136)  Absence of urinary retention:   Assess patients ability to void and empty bladder   Monitor intake/output and perform bladder scan as needed  12/5/2022 0257 by Grecia Brush RN  Outcome: Progressing

## 2022-12-05 NOTE — H&P
Hospital Medicine History & Physical      PCP: Dede Hicks MD    Date of Admission: 12/4/2022    Date of Service: Pt seen/examined on 12/4/2022 and Admitted to Inpatient with expected LOS greater than two midnights due to medical therapy. Chief Complaint: Generalized weakness      History Of Present Illness:    69 y.o. female with significant past medical history of the diabetes type 2, CKD 3, chronic diastolic heart failure, chronic anticoagulation for mechanical aortic valve who presented to [facility] ED with nausea, vomiting, generalized weakness. Patient has been treated for sinus infection and pneumonia since Thanksgiving and developed nausea and vomiting which she believes is from the antibiotics and today was feeling more fatigued and still having poor p.o. intake and came in to the emergency room. Denies any fevers, chills, increased shortness of breath, orthopnea, PND or lower extremity edema, dyspnea on exertion, lightheadedness, syncope. She also had a short-lived bout of diarrhea. In emergency room her temperature 97.6, blood pressure 140/55, heart rate 73, respirations 16, sats 100% on room air. Past Medical History:          Diagnosis Date    A-fib [facility])     Anemia     multifactorial:under care of heme-onc:Dr. Jodie Gold    Anemia:multifactorial 2011    as per heme-onc    Anxiety     Cataract     bilateral    Chronic back pain     Under care of ortho spine:Dr. Pretty Sanches    CKD (chronic kidney disease) stage 3, GFR 30-59 ml/min ([facility] [address] 1/2012    Colitis, ischemic ([facility] [address] 6/2012    Under care of Dr. Kennedi Avila    COPD     Diabetes     Diabetic eye exam ([facility] [address] 1/28/15    CEI    GERD (gastroesophageal reflux disease)     Hx of mammogram 05/23/2018    negative:see scanned report.     Hyperlipidaemia LDL goal <100     Hypertension     Insomnia     Long-term (current) use of anticoagulants, INR goal 2.5-3.5     Lymphoma:monoclonal B cell 1/2012    Under care of  Lara:oncologist/hematologist    Mammogram abnormal 7/30/15    Right breast mass:benign(?lipoma)per US:repeat testing in 6mos=1/30/16    Nonspecific abnormal results of kidney function study 1/25/2012    Osteoarthritis     Renal cysts, right 1/2/2014    RLS (restless legs syndrome) 10/19/11    Sciatica     Screening colonoscopy 2010;6/19/13    Nml. Next 10yrs:6/2023:Dr. Leslye Maya. 9/22/16(Dr. Waddell):nml EGD & colonoscopy except mild diverticulosis    Therapeutic drug monitoring 04/10/2015    OARRS report consistent on 4/10/15;7/6/15;10/23/15;2/7/16;5/16/16;8/19/16;11/4/16;3/6/17;6/26/17;9/20/17;12/18/17; 12/18/17;3/12/18;5/29/18    Valvular heart disease     s/p aortic and mitral valve repair. Under care of cards:Dr. Jonathan Hawk. Visit for screening mammogram 04/10/2017    negative:see scanned report. Past Surgical History:          Procedure Laterality Date    AORTIC VALVE REPLACEMENT      CATARACT REMOVAL  12/11/13;1/8/14    Right;left respectively    COLONOSCOPY      \"twilight sleep didnt work\"    HYSTERECTOMY (CERVIX STATUS UNKNOWN)      Fibroids    LAPAROSCOPY  2/3/15    SBO:converted to open for lysis of adhesions    MITRAL VALVE REPLACEMENT      PACEMAKER PLACEMENT  8/07    for bradycardia, sympony    TUNNELED VENOUS PORT PLACEMENT Right 10/3/2014    ATTEMPTED RIGHT SUBCLAVIEN VEIN, PLACED RIGHT INTERNAL       Medications Prior to Admission:      Prior to Admission medications    Medication Sig Start Date End Date Taking? Authorizing Provider   levoFLOXacin (LEVAQUIN) 500 MG tablet  11/22/22   Historical Provider, MD   ALPRAZolam Dari Eli) 0.5 MG tablet TAKE 1 TABLET BY MOUTH TWICE DAILY AS NEEDED FOR ANXIETY 12/2/22 1/3/23  Luisa Vargas MD   levoFLOXacin (LEVAQUIN) 750 MG tablet Take 1 tablet by mouth daily for 7 days 11/29/22 12/6/22  Princess Zbigniew MD   warfarin (COUMADIN) 1 MG tablet TAKE 2 AND 1/2 TABLETS BY MOUTH ON SUNDAYS AND TUESDAYS. TAKE 2 TABLETS BY MOUTH ALL OTHER DAYS.  11/14/22 Cristin Gutierrez MD   metFORMIN (GLUCOPHAGE) 1000 MG tablet TAKE 1 TABLET BY MOUTH TWICE DAILY 10/18/22   Cristin Gutierrez MD   acetaminophen-codeine (TYLENOL #3) 300-30 MG per tablet Take 1 tablet by mouth nightly as needed for Pain for up to 90 days. 10/5/22 1/3/23  Cristin Gutierrez MD   amLODIPine (NORVASC) 5 MG tablet Take 1 tablet by mouth daily 10/5/22   Cristin Gutierrez MD   vitamin B-12 (CYANOCOBALAMIN) 100 MCG tablet Take 100 mcg by mouth daily    Historical Provider, MD   pantoprazole (PROTONIX) 40 MG tablet TAKE 1 TABLET BY MOUTH EVERY DAY 9/19/22   Cristin Gutierrez MD   glipiZIDE (GLUCOTROL) 5 MG tablet TAKE 1/2 TABLET BY MOUTH EVERY DAY WITH FOOD 6/16/22   Cristin Gutierrez MD   furosemide (LASIX) 20 MG tablet Take 2 tablets by mouth daily 5/13/22   Kelle Galvan MD   atorvastatin (LIPITOR) 10 MG tablet TAKE 1 TABLET BY MOUTH DAILY IN THE EVENING FOR CHOLESTEROL 4/5/22   Cristin Gutierrez MD   blood glucose monitor strips Test 2 times a day & as needed for symptoms of irregular blood glucose. Ok to dispense what is covered by insurance. Patient has been using [facility] test strips 3/31/22   Cristin Gutierrez MD   lisinopril (PRINIVIL;ZESTRIL) 40 MG tablet TAKE 1 TABLET BY MOUTH DAILY 12/15/21   Cristin Gutierrez MD   TRUE METRIX BLOOD GLUCOSE TEST strip USE TO TEST BLOOD SUGAR TWICE DAILY 7/29/21   Cristin Gutierrez MD   warfarin (COUMADIN) 3 MG tablet TAKE 1 TABLET BY MOUTH AS DIRECTED 6/14/21   Cristin Gutierrez MD   metoprolol tartrate (LOPRESSOR) 25 MG tablet Take 12.5 mg by mouth 2 times daily 0.5 tab BID     Historical Provider, MD       Allergies:  Diclofenac, Nsaids, and Hydrocodone-acetaminophen    Social History:      The patient currently lives at home    TOBACCO:   reports that she quit smoking about 15 years ago. Her smoking use included cigarettes. She has a 40.00 pack-year smoking history.  She has never used smokeless tobacco.  ETOH:   reports current alcohol use of about 1.0 standard drink per week.  E-cigarette/Vaping       Questions Responses    E-cigarette/Vaping Use     Start Date     Passive Exposure     Quit Date     Counseling Given     Comments               Family History:             Problem Relation Age of Onset    Diabetes Brother     Lung Cancer Mother         Smoker       REVIEW OF SYSTEMS COMPLETED:   Pertinent positives as noted in the HPI. All other systems reviewed and negative. PHYSICAL EXAM PERFORMED:    BP (!) 111/44   Pulse 68   Temp 97.6 °F (36.4 °C) (Oral)   Resp 17   Ht 4' 11\" (1.499 m)   Wt 103 lb 1.6 oz (46.8 kg)   SpO2 94%   BMI 20.82 kg/m²     General appearance: Well-developed/well-nourished, nontoxic in no apparent distress, appears stated age and cooperative. HEENT:  Normal cephalic, atraumatic without obvious deformity. Pupils equal, round, and reactive to light. Extra ocular muscles intact. Conjunctivae/corneas clear. Neck: Supple, with full range of motion. No jugular venous distention. Trachea midline. Respiratory:  Normal respiratory effort. Clear to auscultation, bilaterally without Rales/Wheezes/Rhonchi. Cardiovascular:  Regular rate and rhythm with normal S1/S2, grd. 3/6 JOHNATHAN, no S3. Abdomen: Soft, non-tender, non-distended with normal bowel sounds. Musculoskeletal:  No clubbing, cyanosis or edema bilaterally. Full range of motion without deformity. Skin: Skin color, texture, turgor normal.  No rashes or lesions. Neurologic:  Neurovascularly intact without any focal sensory/motor deficits.  Cranial nerves: II-XII intact, grossly non-focal.  Psychiatric:  Alert and oriented, thought content appropriate, normal insight  Capillary Refill: Brisk,3 seconds, normal  Peripheral Pulses: +2 palpable, equal bilaterally       Labs:     Recent Labs     12/04/22 2113   WBC 14.7*   HGB 7.6*   HCT 21.8*        Recent Labs     12/04/22 2113   *   K 4.2   CL 99   CO2 22   BUN 61*   CREATININE 3.8*   CALCIUM 7.8*   PHOS 3.8     Recent Labs 12/04/22 2113   AST 40*   ALT 11   BILITOT 1.0   ALKPHOS 53     Recent Labs     12/04/22 2113   INR 3.86*     Recent Labs     12/04/22 2113   Eldonna Heater <0.01       Urinalysis:      Lab Results   Component Value Date/Time    NITRU Negative 12/04/2022 09:11 PM    WBCUA 3-5 12/04/2022 09:11 PM    BACTERIA 2+ 12/04/2022 09:11 PM    RBCUA 5-10 12/04/2022 09:11 PM    BLOODU LARGE 12/04/2022 09:11 PM    SPECGRAV 1.025 12/04/2022 09:11 PM    GLUCOSEU Negative 12/04/2022 09:11 PM    GLUCOSEU TRACE 01/30/2012 11:29 AM       Radiology:     CXR: I have reviewed the CXR with the following interpretation: Not done. EKG:  I have reviewed the EKG with the following interpretation: Not done. CT ABDOMEN PELVIS WO CONTRAST Additional Contrast? None   Final Result      No renal or ureteral calculi are identified. Advanced atherosclerotic calcifications noted. Otherwise no discrete evidence of acute normality identified. XR CHEST (2 VW)   Final Result      No acute disease.              Consults:    IP CONSULT TO HOSPITALIST  PHARMACY TO DOSE WARFARIN    ASSESSMENT:    Active Hospital Problems    Diagnosis Date Noted    FOX (acute kidney injury) ([facility] [address] [N17.9] 12/04/2022     Priority: High    Hypomagnesemia [E83.42] 12/05/2022     Priority: Medium    Hyponatremia [E87.1] 12/05/2022     Priority: Medium    Chronic diastolic heart failure ([facility] [address] [I50.32] 07/29/2021     Priority: Medium    COPD, mild ([facility] [address] [J44.9] 03/03/2022     Priority: Low    GERD (gastroesophageal reflux disease) [K21.9] 02/27/2015     Priority: Low    RLS (restless legs syndrome) [G25.81] 10/19/2011     Priority: Low    Essential hypertension, benign [I10] 09/28/2011     Priority: Low    Anxiety disorder [F41.9] 08/24/2011     Priority: Low    Dyslipidemia [E78.5] 07/22/2010     Priority: Low         PLAN:  -Gentle hydration  -Hold diuretics and, ARB and ACE inhibitors, no nephrotoxic agents  -Trend creatinine  -Strict I's and O's and check PVR  -Check fractional excretion of sodium and osmolalities  -Continue home regimen for chronic stable condition with the above-mentioned restrictions    DVT Prophylaxis: Warfarin  Diet: ADULT DIET; Regular; 4 carb choices (60 gm/meal); Low Fat/Low Chol/High Fiber/2 gm Na  Code Status: Full Code    PT/OT Eval Status: Pending    Dispo -home       Matrina Elias MD    Thank you Dede Hicks MD for the opportunity to be involved in this patient's care. If you have any questions or concerns please feel free to contact me at [phone].

## 2022-12-05 NOTE — CONSULTS
Nephrology Consult Note                                                                                                                                                                                                                                                                                                       Office: [phone]       Fax:  [phone]      Patient's Name: Keisha Acosta  9:49 AM  12/5/2022    Reason for Consult: FOX  Requesting Physician:  Jeremy Byrne MD      Chief Complaint:  weakness with N/V    Subjective     HPI:    Keisha Acosta is a 69 y.o. female with FOX on CKD 3  Treated for PNA for last two weeks with antibiotics, patient believes these caused N/V  Low PO intake and fatigue for last week  BP variable with wide pulse pressure, likely due to mechanical aortic valve  Chronic anemia, followed by Dr. Kelly Castro at [facility]  Heme 6.8  Cr 3.5 on admission (baseline 1.3 - 1.6)  BNP 6700  Urine Na <20, Urine Osms low 348          Past Medical History:   Diagnosis Date    A-fib ([facility] [address]     Anemia     multifactorial:under care of heme-onc:Dr. Lee Martino    Anemia:multifactorial 2011    as per heme-onc    Anxiety     Cataract     bilateral    Chronic back pain     Under care of ortho spine:Dr. Ramya Sandoval    CKD (chronic kidney disease) stage 3, GFR 30-59 ml/min ([facility] [address] 1/2012    Colitis, ischemic ([facility] [address] 6/2012    Under care of Dr. Shelia Pineda    COPD     Diabetes     Diabetic eye exam ([facility] [address] 1/28/15    CEI    GERD (gastroesophageal reflux disease)     Hx of mammogram 05/23/2018    negative:see scanned report.     Hyperlipidaemia LDL goal <100     Hypertension     Insomnia     Long-term (current) use of anticoagulants, INR goal 2.5-3.5     Lymphoma:monoclonal B cell 1/2012    Under care of Dr. Bermudez:oncologist/hematologist    Mammogram abnormal 7/30/15    Right breast mass:benign(?lipoma)per US:repeat testing in 6mos=1/30/16    Nonspecific abnormal results of kidney function study 1/25/2012 Osteoarthritis     Renal cysts, right 1/2/2014    RLS (restless legs syndrome) 10/19/11    Sciatica     Screening colonoscopy 2010;6/19/13    Nml. Next 10yrs:6/2023:Dr. Bunch Manual. 9/22/16(Dr. Waddell):nml EGD & colonoscopy except mild diverticulosis    Therapeutic drug monitoring 04/10/2015    OARRS report consistent on 4/10/15;7/6/15;10/23/15;2/7/16;5/16/16;8/19/16;11/4/16;3/6/17;6/26/17;9/20/17;12/18/17; 12/18/17;3/12/18;5/29/18    Valvular heart disease     s/p aortic and mitral valve repair. Under care of cards:Dr. [facility]. Visit for screening mammogram 04/10/2017    negative:see scanned report. Past Surgical History:   Procedure Laterality Date    AORTIC VALVE REPLACEMENT      CATARACT REMOVAL  12/11/13;1/8/14    Right;left respectively    COLONOSCOPY      \"twilight sleep didnt work\"    HYSTERECTOMY (CERVIX STATUS UNKNOWN)      Fibroids    LAPAROSCOPY  2/3/15    SBO:converted to open for lysis of adhesions    MITRAL VALVE REPLACEMENT      PACEMAKER PLACEMENT  8/07    for bradycardia, sympony    TUNNELED VENOUS PORT PLACEMENT Right 10/3/2014    ATTEMPTED RIGHT SUBCLAVIEN VEIN, PLACED RIGHT INTERNAL       Family History   Problem Relation Age of Onset    Diabetes Brother     Lung Cancer Mother         Smoker        reports that she quit smoking about 15 years ago. Her smoking use included cigarettes. She has a 40.00 pack-year smoking history. She has never used smokeless tobacco. She reports current alcohol use of about 1.0 standard drink per week. She reports that she does not use drugs.         Allergies:  Diclofenac, Nsaids, and Hydrocodone-acetaminophen    Current Medications:    0.9 % sodium chloride infusion, PRN  warfarin placeholder: dosing by pharmacy, See Admin Instructions  lactated ringers infusion 1,000 mL, Continuous  sodium chloride flush 0.9 % injection 5-40 mL, 2 times per day  sodium chloride flush 0.9 % injection 5-40 mL, PRN  0.9 % sodium chloride infusion, PRN  ondansetron Motor: Weakness present. Psychiatric:         Mood and Affect: Mood normal.         Behavior: Behavior normal.        Labs:  CBC:   Recent Labs     12/04/22 2113 12/05/22 0413 12/05/22 0530   WBC 14.7*  --  13.7*   HGB 7.6* 6.7* 6.8*     --  153     BMP:    Recent Labs     12/04/22 2113 12/05/22  0111 12/05/22 0530   *  --  135*   K 4.2  --  4.0   CL 99  --  98*   CO2 22  --  25   BUN 61*  --  57*   CREATININE 3.8*  --  3.5*   GLUCOSE 160* 143 156*     Ca/Mg/Phos:   Recent Labs     12/04/22 2113 12/05/22 0530   CALCIUM 7.8* 7.8*   MG 1.00* 2.90*   PHOS 3.8 3.3     Hepatic:   Recent Labs     12/04/22 2113   AST 40*   ALT 11   BILITOT 1.0   ALKPHOS 53     Troponin:   Recent Labs     12/04/22 2113   Don Duster <0.01     BNP: No results for input(s): BNP in the last 72 hours. Lipids: No results for input(s): CHOL, TRIG, HDL, LDLCALC, LABVLDL in the last 72 hours. ABGs: No results for input(s): PHART, PO2ART, LTB6MKV in the last 72 hours. INR:   Recent Labs     12/04/22 2113   INR 3.86*     UA:  Recent Labs     12/04/22 2111   COLORU Yellow   CLARITYU Clear   GLUCOSEU Negative   BILIRUBINUR Negative   KETUA Negative   SPECGRAV 1.025   BLOODU LARGE*   PHUR 6.0   PROTEINU 100*   UROBILINOGEN 0.2   NITRU Negative   LEUKOCYTESUR SMALL*   LABMICR YES   URINETYPE Voided      Urine Microscopic:   Recent Labs     12/04/22 2111   BACTERIA 2+*   WBCUA 3-5   RBCUA 5-10*   EPIU 6-10*     Urine Culture: No results for input(s): LABURIN in the last 72 hours. Urine Chemistry:   Recent Labs     12/05/22  0004   NAUR <20         IMAGING:  CT ABDOMEN PELVIS WO CONTRAST Additional Contrast? None   Final Result      No renal or ureteral calculi are identified. Advanced atherosclerotic calcifications noted. Otherwise no discrete evidence of acute normality identified. XR CHEST (2 VW)   Final Result      No acute disease. Assessment & Plan     FOX on CKD 3    2. HTN    3. Anemia    4. Acid-base/electrolyte imbalance     5. Weakness    6. Nausea and vomiting    Plan:  - Crt 3.8 -> 3.5  - Continue gentle fluids  - Patient hemodynamically stable with history of chronic anemia  - Transfuse if Hgb < 6.5  - Protein/ Crt ratio pending  - Replace electrolytes as needed  - Renally dose medications  - Continue amlodipine        Thank you for allowing us to participate in care of Iraida Lopez. We will continue to follow. Leatha Hirsch, 4th year medical student      I have seen the patient independently from the MS/resident . I discussed the care with the resident. I personally reviewed the HPI, PH, FH, SH, ROS and medications. I repeated pertinent portions of the examination and reviewed the relevant imaging and laboratory data.  I agree with the findings, assessment and plan as documented, with the following addendum:      [place] MD

## 2022-12-05 NOTE — PROGRESS NOTES
Patient admitted to -28. Alert and oriented x 4. Vitals stable, afebrile. Denies pain. Orthostatics complete and charted. Admission complete. Call light and plan of care reviewed. Patient encouraged to call with needs and concerns. Call light and belongings within reach.

## 2022-12-05 NOTE — PLAN OF CARE
Problem: Cardiovascular - Adult  Goal: Maintains optimal cardiac output and hemodynamic stability  Outcome: Progressing   Orthostatics complete. Vitals stable, afebrile. A-paced on tele. C/o slight dizziness upon getting oob but dissipates after dangling feet at bedside. Problem: Skin/Tissue Integrity - Adult  Goal: Skin integrity remains intact  Outcome: Progressing   Slight redness to buttocks; otherwise, skin intact. Patient encouraged to reposition to prevent skin breakdown. Problem: Musculoskeletal - Adult  Goal: Return mobility to safest level of function  Outcome: Progressing   Up with contact guard assist.  +bilateral leg weakness. Problem: Genitourinary - Adult  Goal: Absence of urinary retention  Outcome: Progressing   No signs of urinary retention. Continue to monitor.

## 2022-12-05 NOTE — PROGRESS NOTES
Hospitalist Progress Note      PCP: Sohail Briseno MD    Date of Admission: 12/4/2022    Chief Complaint: Generalized weakness    Hospital Course:   69 y.o. female with significant past medical history of the diabetes type 2, CKD 3 (baseline Cr 1.3-1.65), chronic diastolic heart failure, chronic anticoagulation for mechanical aortic valve with coumadin who presented to [facility] ED with nausea, vomiting, generalized weakness. Patient has been treated for sinus infection and pneumonia since Thanksgiving, she was given antibiotics by urgent care, since she was not feeling better she called her pulmonary DrWaldo Crane who changed to [address] [place] [address] Rd. Despite antibiotics she continues to feel unwell. Denies any fever. She felt more fatigued tired. Has had nausea vomiting. In emergency room her temperature 97.6, blood pressure 140/55, heart rate 73, respirations 16, sats 100% on room air. Labs were significant for creatinine elevation to greater than 3 baseline around 1.3-1.6 admitted for management of acute kidney injury    Subjective: she feels tired fatigued says still having some intermittent nausea and vomiting patient denies any hematochezia or melena  Says she usually had blood transfusion once a month but her hematologist wants to avoid if possible as doesn't want her to get it that often.      Medications:  Reviewed    Infusion Medications    sodium chloride      lactated ringers 1,000 mL (12/05/22 0008)    sodium chloride      dextrose       Scheduled Medications    sodium chloride flush  5-40 mL IntraVENous 2 times per day    insulin lispro  0-4 Units SubCUTAneous TID WC    insulin lispro  0-4 Units SubCUTAneous Nightly    amLODIPine  5 mg Oral Daily    atorvastatin  10 mg Oral Nightly    metoprolol tartrate  12.5 mg Oral BID    pantoprazole  40 mg Oral Daily    vitamin B-12  100 mcg Oral Daily     PRN Meds: sodium chloride, sodium chloride flush, sodium chloride, ondansetron **OR** ondansetron, acetaminophen **OR** acetaminophen, glucose, dextrose bolus **OR** dextrose bolus, glucagon (rDNA), dextrose, HYDROcodone 5 mg - acetaminophen, ALPRAZolam      Intake/Output Summary (Last 24 hours) at 12/5/2022 0933  Last data filed at 12/5/2022 0630  Gross per 24 hour   Intake 987.91 ml   Output --   Net 987.91 ml       Physical Exam Performed:    BP (!) 144/41   Pulse 62   Temp 98.4 °F (36.9 °C) (Oral)   Resp 15   Ht 4' 11\" (1.499 m)   Wt 105 lb 9.6 oz (47.9 kg)   SpO2 96%   BMI 21.33 kg/m²     General appearance: Pale appearing  HEENT: Pupils equal, round, and reactive to light. Conjunctivae/corneas clear. Dry mucous membrane  Neck: Supple, with full range of motion. No jugular venous distention. Trachea midline. Respiratory:  Normal respiratory effort. Clear to auscultation, bilaterally without Rales/Wheezes/Rhonchi. Cardiovascular: Regular rate and rhythm with normal S1/S2 without murmurs, rubs or gallops. Abdomen: Soft, non-tender, non-distended with normal bowel sounds. Musculoskeletal: No clubbing, cyanosis or edema bilaterally. Full range of motion without deformity. Skin: dry skin, skin tenting, decreased skin turgor  Neurologic:  Neurovascularly intact without any focal sensory/motor deficits.  Cranial nerves: II-XII intact, grossly non-focal.  Psychiatric: Alert and oriented, thought content appropriate, normal insight  Capillary Refill: Brisk,< 3 seconds   Peripheral Pulses: +2 palpable, equal bilaterally       Labs:   Recent Labs     12/04/22 2113 12/05/22 0413 12/05/22  0530   WBC 14.7*  --  13.7*   HGB 7.6* 6.7* 6.8*   HCT 21.8* 19.1* 19.6*     --  153     Recent Labs     12/04/22 2113 12/05/22  0530   * 135*   K 4.2 4.0   CL 99 98*   CO2 22 25   BUN 61* 57*   CREATININE 3.8* 3.5*   CALCIUM 7.8* 7.8*   PHOS 3.8 3.3     Recent Labs     12/04/22 2113   AST 40*   ALT 11   BILITOT 1.0   ALKPHOS 53     Recent Labs     12/04/22  2113   INR 3.86*     Recent Labs 12/04/22 2113   TROPONINI <0.01       Urinalysis:      Lab Results   Component Value Date/Time    NITRU Negative 12/04/2022 09:11 PM    WBCUA 3-5 12/04/2022 09:11 PM    BACTERIA 2+ 12/04/2022 09:11 PM    RBCUA 5-10 12/04/2022 09:11 PM    BLOODU LARGE 12/04/2022 09:11 PM    SPECGRAV 1.025 12/04/2022 09:11 PM    GLUCOSEU Negative 12/04/2022 09:11 PM    GLUCOSEU TRACE 01/30/2012 11:29 AM       Radiology:  CT ABDOMEN PELVIS WO CONTRAST Additional Contrast? None   Final Result      No renal or ureteral calculi are identified. Advanced atherosclerotic calcifications noted. Otherwise no discrete evidence of acute normality identified. XR CHEST (2 VW)   Final Result      No acute disease. Assessment/Plan:    Active Hospital Problems    Diagnosis Date Noted    Hypomagnesemia [E83.42] 12/05/2022     Priority: Medium    Hyponatremia [E87.1] 12/05/2022     Priority: Medium    FOX (acute kidney injury) ([address] [N17.9] 12/04/2022     Priority: Medium    COPD, mild ([address] [J44.9] 03/03/2022    Chronic diastolic heart failure ([address] [I50.32] 07/29/2021    GERD (gastroesophageal reflux disease) [K21.9] 02/27/2015    RLS (restless legs syndrome) [G25.81] 10/19/2011    Essential hypertension, benign [I10] 09/28/2011    Anxiety disorder [F41.9] 08/24/2011    Dyslipidemia [E78.5] 07/22/2010     Generalized weakness, multifactorial possible FOX on CKD, she has anemia which is chronic range of 7-8.2 g/dL, she was getting B12 injections. Multifactorial anemia, follows Dr. Yamileth Arce @ [facility], - likely related to anemia of chronic disease, along with iron deficiency, B12 def. Last Transferin receptor 1.78, B12 217 in 09/2022. She also had worsening hemolysis parameters. BM 2/8476 with follicular lymphoma but unchanged with 5% cellularity; this has been stable from prior assessments and unlikely to be cause of worsening anemia. She is on procit monthly.  Per HemOnc last note transfusional support for Hg <6.5, which I agree as she is hemodynamically stable, and not orthostatic. She does have FOX, will discuss with Dr. Pallavi Hernandez if he would want to keep her high for FOX. FOX on CKD: suspect due to dehydration, Baseline Cr 1.3-1.65, Urine Na < 20, nephrology consulted for evaluation    Chronic diastolic congestive heart failure  Continue IVF for now    Rest of the problems are chronic for which home medications are continued      DVT Prophylaxis: SCDs  Diet: ADULT DIET; Regular; 4 carb choices (60 gm/meal); Low Fat/Low Chol/High Fiber/2 gm Na  Code Status: Full Code    PT/OT Eval Status: ordered    Dispo - Admit as inpatient. I anticipate hospitalization spanning more than two midnights for investigation and treatment of the above medically necessary diagnoses.     Maribel Flores MD

## 2022-12-05 NOTE — CARE COORDINATION
Case management is following for discharge planning. The chart was reviewed. Therapy worked with Ms. Karishma Galvan today. It has been recommended she return home with her spouse. No DME or services needed     PT AM-PAC: 20 / 24 per last evaluation on: 12/5    OT AM-PAC:   / 24 per last evaluation on: 12/5    DME Needs for discharge: none    Discharge Plan: Home    Case Management Notes:Ms. Karishma Galvan is from home with her spouse. The home setting is a 2 story house with a level entry. She is able to stay on the main level bedroom/bathroom. She is independent with self care and functional mobility at baseline. No DME. Mary Castro and her family were provided with choice of provider; she and her family are in agreement with the discharge plan.     Care Transition Patient: Yes    Leisa Clifford RN  [facility]  Case Management Department  [phone]

## 2022-12-05 NOTE — PROGRESS NOTES
4 Eyes Admission Assessment     I agree as the admission nurse that 2 RN's have performed a thorough Head to Toe Skin Assessment on the patient. ALL assessment sites listed below have been assessed on admission. Areas assessed by both nurses: Zulay and Latonia  [x]   Head, Face, and Ears   [x]   Shoulders, Back, and Chest  [x]   Arms, Elbows, and Hands   [x]   Coccyx, Sacrum, and Ischium  [x]   Legs, Feet, and Heels        Does the Patient have Skin Breakdown?   No      Slight redness to buttocks   Vinayak Prevention initiated:  Yes   Wound Care Orders initiated:  No      WOC nurse consulted for Pressure Injury (Stage 3,4, Unstageable, DTI, NWPT, and Complex wounds) or Vinayak score 18 or lower:  No      Nurse 1 eSignature: Electronically signed by Pura Howard RN on 12/5/22 at 8:13 AM EST    **SHARE this note so that the co-signing nurse is able to place an eSignature**    Nurse 2 eSignature: {Esignature:[id number]}

## 2022-12-05 NOTE — ED TRIAGE NOTES
Pt reports several weeks of abx treatment for sinus infection and pneumonia. Pt states she has been nauseous for weeks with some vomiting. Pt denies abdominal pain. Pt is alert and oriented, resp even and unlabored, skin natural in color, no signs of acute distress.

## 2022-12-06 LAB
A/G RATIO: 1.8 (ref 1.1–2.2)
ALBUMIN SERPL-MCNC: 3.2 G/DL (ref 3.4–5)
ALBUMIN SERPL-MCNC: 3.2 G/DL (ref 3.4–5)
ALP BLD-CCNC: 49 U/L (ref 40–129)
ALT SERPL-CCNC: 9 U/L (ref 10–40)
ANION GAP SERPL CALCULATED.3IONS-SCNC: 10 MMOL/L (ref 3–16)
ANION GAP SERPL CALCULATED.3IONS-SCNC: 10 MMOL/L (ref 3–16)
AST SERPL-CCNC: 34 U/L (ref 15–37)
BASOPHILS ABSOLUTE: 0 K/UL (ref 0–0.2)
BASOPHILS RELATIVE PERCENT: 0.3 %
BILIRUB SERPL-MCNC: 1 MG/DL (ref 0–1)
BLOOD BANK DISPENSE STATUS: NORMAL
BLOOD BANK PRODUCT CODE: NORMAL
BPU ID: NORMAL
BUN BLDV-MCNC: 38 MG/DL (ref 7–20)
BUN BLDV-MCNC: 38 MG/DL (ref 7–20)
CALCIUM SERPL-MCNC: 7.9 MG/DL (ref 8.3–10.6)
CALCIUM SERPL-MCNC: 7.9 MG/DL (ref 8.3–10.6)
CHLORIDE BLD-SCNC: 101 MMOL/L (ref 99–110)
CHLORIDE BLD-SCNC: 103 MMOL/L (ref 99–110)
CO2: 24 MMOL/L (ref 21–32)
CO2: 25 MMOL/L (ref 21–32)
CREAT SERPL-MCNC: 2.6 MG/DL (ref 0.6–1.2)
CREAT SERPL-MCNC: 2.6 MG/DL (ref 0.6–1.2)
DESCRIPTION BLOOD BANK: NORMAL
EOSINOPHILS ABSOLUTE: 0 K/UL (ref 0–0.6)
EOSINOPHILS RELATIVE PERCENT: 0.3 %
GFR SERPL CREATININE-BSD FRML MDRD: 18 ML/MIN/{1.73_M2}
GFR SERPL CREATININE-BSD FRML MDRD: 18 ML/MIN/{1.73_M2}
GLUCOSE BLD-MCNC: 203 MG/DL (ref 70–99)
GLUCOSE BLD-MCNC: 221 MG/DL (ref 70–99)
GLUCOSE BLD-MCNC: 258 MG/DL (ref 70–99)
GLUCOSE BLD-MCNC: 258 MG/DL (ref 70–99)
GLUCOSE BLD-MCNC: 276 MG/DL (ref 70–99)
GLUCOSE BLD-MCNC: 280 MG/DL (ref 70–99)
HCT VFR BLD CALC: 17.8 % (ref 36–48)
HCT VFR BLD CALC: 23.5 % (ref 36–48)
HEMOGLOBIN: 6.1 G/DL (ref 12–16)
HEMOGLOBIN: 8.3 G/DL (ref 12–16)
INR BLD: 3.98 (ref 0.87–1.14)
LYMPHOCYTES ABSOLUTE: 1.7 K/UL (ref 1–5.1)
LYMPHOCYTES RELATIVE PERCENT: 14.6 %
MCH RBC QN AUTO: 31.2 PG (ref 26–34)
MCHC RBC AUTO-ENTMCNC: 34.5 G/DL (ref 31–36)
MCV RBC AUTO: 90.5 FL (ref 80–100)
MONOCYTES ABSOLUTE: 1.2 K/UL (ref 0–1.3)
MONOCYTES RELATIVE PERCENT: 10.4 %
NEUTROPHILS ABSOLUTE: 8.4 K/UL (ref 1.7–7.7)
NEUTROPHILS RELATIVE PERCENT: 74.4 %
PDW BLD-RTO: 16.6 % (ref 12.4–15.4)
PERFORMED ON: ABNORMAL
PHOSPHORUS: 2.5 MG/DL (ref 2.5–4.9)
PLATELET # BLD: 124 K/UL (ref 135–450)
PMV BLD AUTO: 10.4 FL (ref 5–10.5)
POTASSIUM REFLEX MAGNESIUM: 3.7 MMOL/L (ref 3.5–5.1)
POTASSIUM SERPL-SCNC: 3.7 MMOL/L (ref 3.5–5.1)
PROTHROMBIN TIME: 39.2 SEC (ref 11.7–14.5)
RBC # BLD: 1.96 M/UL (ref 4–5.2)
SODIUM BLD-SCNC: 136 MMOL/L (ref 136–145)
SODIUM BLD-SCNC: 137 MMOL/L (ref 136–145)
TOTAL PROTEIN: 5 G/DL (ref 6.4–8.2)
WBC # BLD: 11.3 K/UL (ref 4–11)

## 2022-12-06 PROCEDURE — 85610 PROTHROMBIN TIME: CPT

## 2022-12-06 PROCEDURE — 2580000003 HC RX 258: Performed by: INTERNAL MEDICINE

## 2022-12-06 PROCEDURE — 80053 COMPREHEN METABOLIC PANEL: CPT

## 2022-12-06 PROCEDURE — 85014 HEMATOCRIT: CPT

## 2022-12-06 PROCEDURE — 6370000000 HC RX 637 (ALT 250 FOR IP): Performed by: INTERNAL MEDICINE

## 2022-12-06 PROCEDURE — 85025 COMPLETE CBC W/AUTO DIFF WBC: CPT

## 2022-12-06 PROCEDURE — 99233 SBSQ HOSP IP/OBS HIGH 50: CPT | Performed by: INTERNAL MEDICINE

## 2022-12-06 PROCEDURE — 85018 HEMOGLOBIN: CPT

## 2022-12-06 PROCEDURE — 1200000000 HC SEMI PRIVATE

## 2022-12-06 PROCEDURE — 36430 TRANSFUSION BLD/BLD COMPNT: CPT

## 2022-12-06 RX ORDER — WARFARIN SODIUM 2 MG/1
2 TABLET ORAL DAILY
Status: DISCONTINUED | OUTPATIENT
Start: 2022-12-06 | End: 2022-12-07 | Stop reason: HOSPADM

## 2022-12-06 RX ORDER — SODIUM CHLORIDE 9 MG/ML
INJECTION, SOLUTION INTRAVENOUS PRN
Status: DISCONTINUED | OUTPATIENT
Start: 2022-12-06 | End: 2022-12-07 | Stop reason: HOSPADM

## 2022-12-06 RX ORDER — ACETAMINOPHEN AND CODEINE PHOSPHATE 300; 30 MG/1; MG/1
1 TABLET ORAL NIGHTLY PRN
Status: DISCONTINUED | OUTPATIENT
Start: 2022-12-06 | End: 2022-12-07 | Stop reason: HOSPADM

## 2022-12-06 RX ORDER — HYDROCODONE BITARTRATE AND ACETAMINOPHEN 5; 325 MG/1; MG/1
1 TABLET ORAL 2 TIMES DAILY PRN
Status: DISCONTINUED | OUTPATIENT
Start: 2022-12-06 | End: 2022-12-07 | Stop reason: HOSPADM

## 2022-12-06 RX ADMIN — ACETAMINOPHEN AND CODEINE PHOSPHATE 1 TABLET: 300; 30 TABLET ORAL at 21:06

## 2022-12-06 RX ADMIN — SODIUM CHLORIDE, PRESERVATIVE FREE 10 ML: 5 INJECTION INTRAVENOUS at 09:14

## 2022-12-06 RX ADMIN — AMLODIPINE BESYLATE 5 MG: 5 TABLET ORAL at 09:12

## 2022-12-06 RX ADMIN — INSULIN GLARGINE 5 UNITS: 100 INJECTION, SOLUTION SUBCUTANEOUS at 21:12

## 2022-12-06 RX ADMIN — INSULIN GLARGINE 5 UNITS: 100 INJECTION, SOLUTION SUBCUTANEOUS at 12:53

## 2022-12-06 RX ADMIN — INSULIN LISPRO 1 UNITS: 100 INJECTION, SOLUTION INTRAVENOUS; SUBCUTANEOUS at 12:51

## 2022-12-06 RX ADMIN — ATORVASTATIN CALCIUM 10 MG: 20 TABLET, FILM COATED ORAL at 21:07

## 2022-12-06 RX ADMIN — SODIUM CHLORIDE, PRESERVATIVE FREE 10 ML: 5 INJECTION INTRAVENOUS at 21:08

## 2022-12-06 RX ADMIN — METOPROLOL TARTRATE 12.5 MG: 25 TABLET, FILM COATED ORAL at 09:12

## 2022-12-06 RX ADMIN — INSULIN LISPRO 1 UNITS: 100 INJECTION, SOLUTION INTRAVENOUS; SUBCUTANEOUS at 09:15

## 2022-12-06 RX ADMIN — METOPROLOL TARTRATE 12.5 MG: 25 TABLET, FILM COATED ORAL at 21:06

## 2022-12-06 RX ADMIN — ACETAMINOPHEN 650 MG: 325 TABLET, FILM COATED ORAL at 09:12

## 2022-12-06 RX ADMIN — VITAM B12 100 MCG: 100 TAB at 10:39

## 2022-12-06 RX ADMIN — WARFARIN SODIUM 2 MG: 2 TABLET ORAL at 18:58

## 2022-12-06 RX ADMIN — PANTOPRAZOLE SODIUM 40 MG: 40 TABLET, DELAYED RELEASE ORAL at 09:12

## 2022-12-06 ASSESSMENT — PAIN SCALES - GENERAL: PAINLEVEL_OUTOF10: 3

## 2022-12-06 ASSESSMENT — PAIN DESCRIPTION - LOCATION: LOCATION: BACK;NECK

## 2022-12-06 NOTE — PROGRESS NOTES
Pt resting in bed. Oriented to room/call light. Denies pain/nausea.  Denies needs, call light in reach

## 2022-12-06 NOTE — PROGRESS NOTES
Clinical Pharmacy Progress Note    Warfarin - Management by Pharmacy    Consult Date(s): 12/5/22  Consulting Provider(s): Dr. oSnja Uribe / Plan  1)  h/o mechanical aortic valve replacement - Warfarin  Goal INR: 2.5 - 3.5  Concurrent Anticoagulants / Antiplatelets: n/a  Interactions: No significant acute interactions noted. Current Regimen / Plan:   INR today = 3.98, down from 4.69 yesterday after holding doses x2 days. Supratherapeutic INR on admission likely due to vomiting and poor oral intake pt reported on admission. As INR is now trending down and is < 4, will resume Warfarin at 2mg po daily. Will monitor pt's clinical status and INR daily, and make dose adjustments as needed. Please call with questions--  Thanks--  Jerad Hannah, PharmD, BCPS, BCGP  [id number] (\[facility]\"")   12/6/2022 11:20 AM      Interval update:  Renal panel and CBC pending this AM.  INR now downtrending and approaching therapeutic range. Subjective/Objective:   Paige Tracey is a 69 y.o. female with a PMHx significant for mechanical AVR, A.fib, anxiety, CKD (baseline SCr 1.3-1.6), DM, GERD, HTN, HLD, RLS, and COPD who is admitted with nausea/vomiting, poor oral intake, and fatigue - found to have FOX on CKD. Pharmacy is consulted to dose Warfarin. Ht Readings from Last 1 Encounters:   12/04/22 4' 11\" (1.499 m)     Wt Readings from Last 1 Encounters:   12/06/22 107 lb 5.8 oz (48.7 kg)     Prior / Home Warfarin Regimen:  Warfarin 2.5mg daily except 3mg on Fridays per PCP latest anticoag note; however, pt states she has been taking 2mg daily. Warfarin is managed by pt's PCP  Last INR check 11/15; INR = 2.5.    Patient has 1mg tablets at home    Date INR Warfarin   12/4 3.86 --   12/5 4.69 --   12/6 3.98             Recent Labs     12/04/22  2113 12/05/22  0413 12/05/22  0530 12/05/22  0957 12/06/22  0527   INR 3.86*  --   --  4.69* 3.98*   HGB 7.6* 6.7* 6.8*  --   --      --  153  --   --    LABALBU 3.7 --  3.2*  --   --    CREATININE 3.8*  --  3.5*  --   --

## 2022-12-06 NOTE — PROGRESS NOTES
Report called to Cary Lawler RN on [address] at this time. Called placed to , Beba, left voicemail to notify of room change per pt request. Pt to be transported and sent with all documented belongings.

## 2022-12-06 NOTE — CONSULTS
Office:  [phone]       Fax:  [phone]    Admit Date: 12/4/2022     Reason for Consult: FOX       Renal Progress Note  Subjective   Interval History:   Fatigue continues with nausea   No vomiting this morning  Morning labs pending  Vitals stable, pulse pressure remains wide        Objective   DIET:  ADULT DIET; Regular; 4 carb choices (60 gm/meal);  Low Fat/Low Chol/High Fiber/2 gm Na    MEDICATION:  Scheduled:    warfarin placeholder: dosing by pharmacy   Other See Admin Instructions    sodium chloride flush  5-40 mL IntraVENous 2 times per day    insulin lispro  0-4 Units SubCUTAneous TID WC    insulin lispro  0-4 Units SubCUTAneous Nightly    amLODIPine  5 mg Oral Daily    atorvastatin  10 mg Oral Nightly    metoprolol tartrate  12.5 mg Oral BID    pantoprazole  40 mg Oral Daily    vitamin B-12  100 mcg Oral Daily       Continuous Infusions:    sodium chloride      lactated ringers 1,000 mL (12/05/22 2229)    sodium chloride      dextrose         LABS:  Recent Labs     12/04/22 2113 12/05/22 0413 12/05/22  0530   WBC 14.7*  --  13.7*   HGB 7.6* 6.7* 6.8*     --  153     Recent Labs     12/04/22 2113 12/05/22  0111 12/05/22  0530   *  --  135*   K 4.2  --  4.0   CL 99  --  98*   CO2 22  --  25   BUN 61*  --  57*   CREATININE 3.8*  --  3.5*   GLUCOSE 160* 143 156*     Recent Labs     12/04/22 2113 12/05/22  0530   CALCIUM 7.8* 7.8*   MG 1.00* 2.90*   PHOS 3.8 3.3     Recent Labs     12/04/22 2113   AST 40*   ALT 11   BILITOT 1.0   ALKPHOS 53     Recent Labs     12/04/22 2113   TROPONINI <0.01     No results for input(s): BNP in the last 72 hours. No results for input(s): CHOL, TRIG, HDL, LDLCALC, LABVLDL in the last 72 hours. ABGs: No results for input(s): PHART, PO2ART, XWC6QDB in the last 72 hours. Recent Labs     12/04/22 2113 12/05/22  0957 12/06/22  0527   INR 3.86* 4.69* 3.98*     UA:  Recent Labs     12/04/22 2111   COLORU Yellow   CLARITYU Clear   GLUCOSEU Negative   BILIRUBINUR Negative   KETUA Negative   SPECGRAV 1.025   BLOODU LARGE*   PHUR 6.0   PROTEINU 100*   UROBILINOGEN 0.2   NITRU Negative   LEUKOCYTESUR SMALL*   LABMICR YES   URINETYPE Voided      Urine Microscopic:   Recent Labs     12/04/22 2111   BACTERIA 2+*   WBCUA 3-5   RBCUA 5-10*   EPIU 6-10*     Urine Culture: No results for input(s): LABURIN in the last 72 hours. Urine Chemistry:   Recent Labs     12/05/22  0004   LABCREA 99.5  104.0   PROTEINUR 166.00*   NAUR <20       VITALS:   BP (!) 153/48   Pulse (!) 6   Temp 97.9 °F (36.6 °C) (Oral)   Resp 14   Ht 4' 11\" (1.499 m)   Wt 107 lb 5.8 oz (48.7 kg)   SpO2 97%   BMI 21.68 kg/m²    Wt Readings from Last 3 Encounters:   12/06/22 107 lb 5.8 oz (48.7 kg)   09/23/22 112 lb 3.2 oz (50.9 kg)   09/19/22 109 lb 4.8 oz (49.6 kg)        24HR INTAKE/OUTPUT:    Intake/Output Summary (Last 24 hours) at 12/6/2022 0842  Last data filed at 12/6/2022 0520  Gross per 24 hour   Intake 1287 ml   Output 200 ml   Net 1087 ml       Physical Exam  Constitutional:       Appearance: Normal appearance. She is underweight. Cardiovascular:      Rate and Rhythm: Normal rate and regular rhythm. Heart sounds: Normal heart sounds. Pulmonary:      Effort: Pulmonary effort is normal. No respiratory distress. Breath sounds: Normal breath sounds. Abdominal:      General: There is no distension. Tenderness: There is no abdominal tenderness. Musculoskeletal:         General: No swelling or tenderness. Skin:     Coloration: Skin is pale. Neurological:      General: No focal deficit present.       Mental Status: She is alert and oriented to person, place, and time. Motor: Weakness present. IMAGING:  CT ABDOMEN PELVIS WO CONTRAST Additional Contrast? None   Final Result      No renal or ureteral calculi are identified. Advanced atherosclerotic calcifications noted. Otherwise no discrete evidence of acute normality identified. XR CHEST (2 VW)   Final Result      No acute disease. Assessment and Plan     FOX on CKD 3  HTN  Chronic Anemia  Acid base/electrolyte imbalance  Weakness  Nausea and vomiting    Plan:  - Crt 3.8 -> 3.5-->2.6  - Continue gentle fluids  - Patient hemodynamically stable with history of chronic anemia  - BG management  - Transfuse if Hgb < 6.5  - Protein/ Crt ratio normal  - Replace electrolytes as needed  - Renally dose medications  - Continue amlodipine  - FENa 0.5% - Suggests pre-renal            Thank you for allowing us to participate in care of Richard Sánchez. We will continue to follow. Shellie Reeves, 4th year medical student      I have seen the patient independently from the resident . I discussed the care with the resident. I personally reviewed the HPI, PH, FH, SH, ROS and medications. I repeated pertinent portions of the examination and reviewed the relevant imaging and laboratory data.  I agree with the findings, assessment and plan as documented, with the following addendum:      Arthur Callahan MD

## 2022-12-06 NOTE — PROGRESS NOTES
Occupational Therapy  Referral received, chart reviewed, including PT note, discussed with nurse. Pt observes up in room with supervision from nurse. Educated pt to role of OT:  Pt verbalized understanding. Pt denies need for OT services and expresses no concerns regarding discharge to home. No eval/tx rendered and will sign off for OT. KUN Saleh, [address] [place] [address]

## 2022-12-06 NOTE — DISCHARGE INSTRUCTIONS
Extra Heart Failure sites:   https://[employer]/ --- this is American Heart Association interactive Healthier Living with Heart Failure guidebook. Please copy and paste link into search bar. Use your mouse to scroll through the pages. Lots and lots of info / tips    HF Dix patrick  --- free smart phone patrick available for [employer] and [facility]. Use your phone to track sodium / fluid intake,  symptoms, weight, etc.    [employer]. [employer] - website-- [employer] is a [employer]. All dialysis patients follow a renal diet which IS low sodium!! This website offers free seasonal cookbooks.   Each quarter, they will release 25-30 new recipes with a breakdown of calories, sodium, glucose, etc    [email or web address]/recipes -- more free recipes

## 2022-12-06 NOTE — PROGRESS NOTES
Blood transfusion began at 1613.     Electronically signed by Jose Zambrano RN on 12/6/2022 at 6:08 PM

## 2022-12-06 NOTE — PROGRESS NOTES
Hospitalist Progress Note      PCP: Naresh Dorsey MD    Date of Admission: 12/4/2022    Chief Complaint: Generalized weakness    Hospital Course:   69 y.o. female with significant past medical history of the diabetes type 2, CKD 3 (baseline Cr 1.3-1.65), chronic diastolic heart failure, chronic anticoagulation for mechanical aortic valve with coumadin who presented to [facility] ED with nausea, vomiting, generalized weakness. Patient has been treated for sinus infection and pneumonia since Thanksgiving, she was given antibiotics by urgent care, since she was not feeling better she called her pulmonary DrWaldo Guzman who changed to [address] [place] [address] Rd. Despite antibiotics she continues to feel unwell. Denies any fever. She felt more fatigued tired. Has had nausea vomiting. In emergency room her temperature 97.6, blood pressure 140/55, heart rate 73, respirations 16, sats 100% on room air.   Labs were significant for creatinine elevation to greater than 3 baseline around 1.3-1.6 admitted for management of acute kidney injury    Subjective:   Feels better  No specific complains  Make more urine and not as dark now    Medications:  Reviewed    Infusion Medications    sodium chloride      lactated ringers 1,000 mL (12/05/22 2229)    sodium chloride      dextrose       Scheduled Medications    warfarin placeholder: dosing by pharmacy   Other See Admin Instructions    sodium chloride flush  5-40 mL IntraVENous 2 times per day    insulin lispro  0-4 Units SubCUTAneous TID WC    insulin lispro  0-4 Units SubCUTAneous Nightly    amLODIPine  5 mg Oral Daily    atorvastatin  10 mg Oral Nightly    metoprolol tartrate  12.5 mg Oral BID    pantoprazole  40 mg Oral Daily    vitamin B-12  100 mcg Oral Daily     PRN Meds: sodium chloride, sodium chloride flush, sodium chloride, ondansetron **OR** ondansetron, acetaminophen **OR** acetaminophen, glucose, dextrose bolus **OR** dextrose bolus, glucagon (rDNA), dextrose, HYDROcodone 5 mg - acetaminophen, ALPRAZolam      Intake/Output Summary (Last 24 hours) at 12/6/2022 0818  Last data filed at 12/6/2022 0520  Gross per 24 hour   Intake 1287 ml   Output 200 ml   Net 1087 ml       Physical Exam Performed:    BP (!) 153/48   Pulse (!) 6   Temp 97.9 °F (36.6 °C) (Oral)   Resp 14   Ht 4' 11\" (1.499 m)   Wt 107 lb 5.8 oz (48.7 kg)   SpO2 97%   BMI 21.68 kg/m²     General appearance: Pale appearing  HEENT: Pupils equal, round, and reactive to light. Conjunctivae/corneas clear. Dry mucous membrane  Neck: Supple, with full range of motion. No jugular venous distention. Trachea midline. Respiratory:  Normal respiratory effort. Clear to auscultation, bilaterally without Rales/Wheezes/Rhonchi. Cardiovascular:Mechanical valve clicks present  Abdomen: Soft, non-tender, non-distended with normal bowel sounds. Musculoskeletal: No clubbing, cyanosis or edema bilaterally. Full range of motion without deformity. Skin: dry skin, skin tenting, decreased skin turgor  Neurologic:  Neurovascularly intact without any focal sensory/motor deficits.  Cranial nerves: II-XII intact, grossly non-focal.  Psychiatric: Alert and oriented, thought content appropriate, normal insight  Capillary Refill: Brisk,< 3 seconds   Peripheral Pulses: +2 palpable, equal bilaterally       Labs:   Recent Labs     12/04/22 2113 12/05/22 0413 12/05/22  0530   WBC 14.7*  --  13.7*   HGB 7.6* 6.7* 6.8*   HCT 21.8* 19.1* 19.6*     --  153     Recent Labs     12/04/22 2113 12/05/22  0530   * 135*   K 4.2 4.0   CL 99 98*   CO2 22 25   BUN 61* 57*   CREATININE 3.8* 3.5*   CALCIUM 7.8* 7.8*   PHOS 3.8 3.3     Recent Labs     12/04/22 2113   AST 40*   ALT 11   BILITOT 1.0   ALKPHOS 53     Recent Labs     12/04/22 2113 12/05/22  0957 12/06/22  0527   INR 3.86* 4.69* 3.98*     Recent Labs     12/04/22 2113 12/05/22  0957   CKTOTAL  --  66   TROPONINI <0.01  --        Urinalysis:      Lab Results   Component Value Date/Time    NITRU Negative 12/04/2022 09:11 PM    WBCUA 3-5 12/04/2022 09:11 PM    BACTERIA 2+ 12/04/2022 09:11 PM    RBCUA 5-10 12/04/2022 09:11 PM    BLOODU LARGE 12/04/2022 09:11 PM    SPECGRAV 1.025 12/04/2022 09:11 PM    GLUCOSEU Negative 12/04/2022 09:11 PM    GLUCOSEU TRACE 01/30/2012 11:29 AM       Radiology:  CT ABDOMEN PELVIS WO CONTRAST Additional Contrast? None   Final Result      No renal or ureteral calculi are identified. Advanced atherosclerotic calcifications noted. Otherwise no discrete evidence of acute normality identified. XR CHEST (2 VW)   Final Result      No acute disease. Assessment/Plan:    Active Hospital Problems    Diagnosis Date Noted    Hypomagnesemia [E83.42] 12/05/2022     Priority: Medium    Hyponatremia [E87.1] 12/05/2022     Priority: Medium    FOX (acute kidney injury) ([address] [N17.9] 12/04/2022     Priority: Medium    COPD, mild ([address] [J44.9] 03/03/2022    Chronic diastolic heart failure ([address] [I50.32] 07/29/2021    GERD (gastroesophageal reflux disease) [K21.9] 02/27/2015    RLS (restless legs syndrome) [G25.81] 10/19/2011    Essential hypertension, benign [I10] 09/28/2011    Anxiety disorder [F41.9] 08/24/2011    Dyslipidemia [E78.5] 07/22/2010     Generalized weakness, multifactorial possible FOX on CKD, she has anemia which is chronic range of 7-8.2 g/dL, she was getting B12 injections. Multifactorial anemia, follows Dr. Renato Weeks @ [facility], - likely related to anemia of chronic disease, along with iron deficiency, B12 def. Last Transferin receptor 1.78, B12 217 in 09/2022. She also had worsening hemolysis parameters. BM 1/7740 with follicular lymphoma but unchanged with 5% cellularity; this has been stable from prior assessments and unlikely to be cause of worsening anemia. She is on procit monthly. Per HemOnc last note transfusional support for Hg <6.5, which I agree as she is hemodynamically stable, and not orthostatic. She does have FOX, will discuss with Dr. Kacey Mckeon if he would want to keep her high for FOX. FOX on CKD: suspect due to dehydration, Baseline Cr 1.3-1.65, Urine Na < 20, nephrology consulted for evaluation. NO LABS DONE SO FAR! Hx of mechanical valve, on coumadin, INR supratherapeutic, do no reverse unless major active bleeding, pharmacy following for coumadin management while inpatient    Hyperglycemia due to type 2 DM; A1c 5.4, falsely low in setting of anemia  - Add lantus 5 units bid, SSI, Hypoglycemia protocol, POC glucose checks, Carb controlled diet    Chronic diastolic congestive heart failure  Continue IVF for now    Rest of the problems are chronic for which home medications are continued      DVT Prophylaxis: SCDs  Diet: ADULT DIET; Regular; 4 carb choices (60 gm/meal); Low Fat/Low Chol/High Fiber/2 gm Na  Code Status: Full Code    PT/OT Eval Status: ordered    Dispo - Admit as inpatient. I anticipate hospitalization spanning more than two midnights for investigation and treatment of the above medically necessary diagnoses.     Kathy Espinoza MD

## 2022-12-06 NOTE — PROGRESS NOTES
4 Eyes Admission Assessment     I agree as the admission nurse that 2 RN's have performed a thorough Head to Toe Skin Assessment on the patient. ALL assessment sites listed below have been assessed on admission. Areas assessed by both nurses:   [x]   Head, Face, and Ears   [x]   Shoulders, Back, and Chest  [x]   Arms, Elbows, and Hands   [x]   Coccyx, Sacrum, and Ischium  [x]   Legs, Feet, and Heels        Does the Patient have Skin Breakdown? No       Bruising to hands, blanchable redness to heels.  No other skin issues noted   Vinayak Prevention initiated:  No   Wound Care Orders initiated:  No      [facility] nurse consulted for Pressure Injury (Stage 3,4, Unstageable, DTI, NWPT, and Complex wounds) or Vinayak score 18 or lower:  No      Nurse 1 eSignature: Electronically signed by Sacha Jones RN on 12/5/22 at 10:55 PM EST    SHARE this note so that the co-signing nurse is able to place an eSignatur    Nurse 2 eSignature: Electronically signed by Martínez Burrows RN on 12/5/2022 at 10:57 PM

## 2022-12-07 VITALS
BODY MASS INDEX: 21.87 KG/M2 | WEIGHT: 108.47 LBS | DIASTOLIC BLOOD PRESSURE: 53 MMHG | SYSTOLIC BLOOD PRESSURE: 152 MMHG | HEART RATE: 66 BPM | TEMPERATURE: 97.8 F | RESPIRATION RATE: 17 BRPM | OXYGEN SATURATION: 94 % | HEIGHT: 59 IN

## 2022-12-07 LAB
ALBUMIN SERPL-MCNC: 3.2 G/DL (ref 3.4–5)
ANION GAP SERPL CALCULATED.3IONS-SCNC: 10 MMOL/L (ref 3–16)
BUN BLDV-MCNC: 41 MG/DL (ref 7–20)
CALCIUM SERPL-MCNC: 7.5 MG/DL (ref 8.3–10.6)
CHLORIDE BLD-SCNC: 100 MMOL/L (ref 99–110)
CO2: 25 MMOL/L (ref 21–32)
CREAT SERPL-MCNC: 2.2 MG/DL (ref 0.6–1.2)
GFR SERPL CREATININE-BSD FRML MDRD: 23 ML/MIN/{1.73_M2}
GLUCOSE BLD-MCNC: 195 MG/DL (ref 70–99)
GLUCOSE BLD-MCNC: 274 MG/DL (ref 70–99)
GLUCOSE BLD-MCNC: 292 MG/DL (ref 70–99)
INR BLD: 2.81 (ref 0.87–1.14)
PERFORMED ON: ABNORMAL
PERFORMED ON: ABNORMAL
PHOSPHORUS: 2.7 MG/DL (ref 2.5–4.9)
POTASSIUM SERPL-SCNC: 3.4 MMOL/L (ref 3.5–5.1)
PROTHROMBIN TIME: 29.8 SEC (ref 11.7–14.5)
SODIUM BLD-SCNC: 135 MMOL/L (ref 136–145)

## 2022-12-07 PROCEDURE — 6360000002 HC RX W HCPCS: Performed by: INTERNAL MEDICINE

## 2022-12-07 PROCEDURE — 36591 DRAW BLOOD OFF VENOUS DEVICE: CPT

## 2022-12-07 PROCEDURE — 80069 RENAL FUNCTION PANEL: CPT

## 2022-12-07 PROCEDURE — 2580000003 HC RX 258: Performed by: INTERNAL MEDICINE

## 2022-12-07 PROCEDURE — 85610 PROTHROMBIN TIME: CPT

## 2022-12-07 PROCEDURE — 99233 SBSQ HOSP IP/OBS HIGH 50: CPT | Performed by: INTERNAL MEDICINE

## 2022-12-07 PROCEDURE — 6370000000 HC RX 637 (ALT 250 FOR IP): Performed by: INTERNAL MEDICINE

## 2022-12-07 RX ORDER — POTASSIUM CHLORIDE 20 MEQ/1
20 TABLET, EXTENDED RELEASE ORAL ONCE
Status: COMPLETED | OUTPATIENT
Start: 2022-12-07 | End: 2022-12-07

## 2022-12-07 RX ORDER — FUROSEMIDE 20 MG/1
40 TABLET ORAL DAILY PRN
Qty: 180 TABLET | Refills: 2 | Status: SHIPPED | OUTPATIENT
Start: 2022-12-07

## 2022-12-07 RX ORDER — ALPRAZOLAM 0.5 MG/1
0.5 TABLET ORAL 2 TIMES DAILY PRN
Status: DISCONTINUED | OUTPATIENT
Start: 2022-12-07 | End: 2022-12-07 | Stop reason: HOSPADM

## 2022-12-07 RX ORDER — HEPARIN SODIUM (PORCINE) LOCK FLUSH IV SOLN 100 UNIT/ML 100 UNIT/ML
100 SOLUTION INTRAVENOUS PRN
Status: DISCONTINUED | OUTPATIENT
Start: 2022-12-07 | End: 2022-12-07 | Stop reason: HOSPADM

## 2022-12-07 RX ORDER — LISINOPRIL 20 MG/1
10 TABLET ORAL DAILY
Qty: 30 TABLET | Refills: 2 | Status: SHIPPED | OUTPATIENT
Start: 2022-12-07

## 2022-12-07 RX ADMIN — INSULIN GLARGINE 5 UNITS: 100 INJECTION, SOLUTION SUBCUTANEOUS at 09:23

## 2022-12-07 RX ADMIN — PANTOPRAZOLE SODIUM 40 MG: 40 TABLET, DELAYED RELEASE ORAL at 09:20

## 2022-12-07 RX ADMIN — VITAM B12 100 MCG: 100 TAB at 09:20

## 2022-12-07 RX ADMIN — AMLODIPINE BESYLATE 5 MG: 5 TABLET ORAL at 09:20

## 2022-12-07 RX ADMIN — ALPRAZOLAM 0.5 MG: 0.5 TABLET ORAL at 10:34

## 2022-12-07 RX ADMIN — SODIUM CHLORIDE, PRESERVATIVE FREE 10 ML: 5 INJECTION INTRAVENOUS at 09:22

## 2022-12-07 RX ADMIN — INSULIN LISPRO 2 UNITS: 100 INJECTION, SOLUTION INTRAVENOUS; SUBCUTANEOUS at 12:39

## 2022-12-07 RX ADMIN — METOPROLOL TARTRATE 12.5 MG: 25 TABLET, FILM COATED ORAL at 09:19

## 2022-12-07 RX ADMIN — ALPRAZOLAM 0.5 MG: 0.5 TABLET ORAL at 00:21

## 2022-12-07 RX ADMIN — HEPARIN SODIUM (PORCINE) LOCK FLUSH IV SOLN 100 UNIT/ML 100 UNITS: 100 SOLUTION at 12:53

## 2022-12-07 RX ADMIN — POTASSIUM CHLORIDE 20 MEQ: 1500 TABLET, EXTENDED RELEASE ORAL at 12:59

## 2022-12-07 ASSESSMENT — PAIN SCALES - GENERAL
PAINLEVEL_OUTOF10: 0
PAINLEVEL_OUTOF10: 0

## 2022-12-07 NOTE — PROGRESS NOTES
Clinical Pharmacy Progress Note    Warfarin - Management by Pharmacy    Consult Date(s): 12/5/22  Consulting Provider(s): Dr. Jenny Meehan / Plan  1)  h/o mechanical aortic valve replacement - Warfarin  Goal INR: 2.5 - 3.5  Concurrent Anticoagulants / Antiplatelets: n/a  Interactions: No significant acute interactions noted. Current Regimen / Plan:   Supratherapeutic INR on admission likely due to vomiting and poor oral intake pt reported on admission. INR today = 2.81  Continue Warfarin at 2mg po daily. INR may drop again slightly tomorrow since it was held x2 days on admission for elevated INR - will monitor. Will monitor pt's clinical status and INR daily, and make dose adjustments as needed. Please call with questions--  Thanks--  Aimee Seymour, PharmD, BCPS, BCGP  [id number] (\[facility] [place] [facility]\"")   12/7/2022 9:09 AM      Interval update:  Renal panel and CBC pending this AM.  INR now back in therapeutic range. Subjective/Objective:   Richard Sánchez is a 69 y.o. female with a PMHx significant for mechanical AVR, A.fib, anxiety, CKD (baseline SCr 1.3-1.6), DM, GERD, HTN, HLD, RLS, and COPD who is admitted with nausea/vomiting, poor oral intake, and fatigue - found to have FOX on CKD. Pharmacy is consulted to dose Warfarin. Ht Readings from Last 1 Encounters:   12/04/22 4' 11\" (1.499 m)     Wt Readings from Last 1 Encounters:   12/06/22 107 lb 5.8 oz (48.7 kg)     Prior / Home Warfarin Regimen:  Warfarin 2.5mg daily except 3mg on Fridays per PCP latest anticoag note; however, pt states she has been taking 2mg daily. Warfarin is managed by pt's PCP  Last INR check 11/15; INR = 2.5.    Patient has 1mg tablets at home    Date INR Warfarin   12/4 3.86 --   12/5 4.69 --   12/6 3.98 2 mg   12/7 2.81        Recent Labs     12/04/22  2113 12/05/22  0413 12/05/22  0530 12/05/22  0957 12/06/22  0527 12/06/22  1240 12/06/22  2020 12/07/22  0630   INR 3.86*  --   --  4.69* 3.98*  --   --  2.81*   HGB 7.6* < > 6.8*  --   --  6.1* 8.3*  --      --  153  --   --  124*  --   --    LABALBU 3.7  --  3.2*  --   --  3.2*  3.2*  --   --    CREATININE 3.8*  --  3.5*  --   --  2.6*  2.6*  --   --     < > = values in this interval not displayed.

## 2022-12-07 NOTE — PROGRESS NOTES
Pt A&O x4, VSS. No complaints of pain. Blood transfusion given via port, tolerated well. Patient is a stand by assist to the restroom. Home medication for tylenol COD walked to pharmacy. Pt is resting in bed with no other needs at this time.     Electronically signed by Filemon Gifford RN on 12/6/2022 at 7:50 PM

## 2022-12-07 NOTE — PROGRESS NOTES
Pt AO4, BP was 166/48. Hospitalist drG was made aware of her BP as well as DBP which has been on the 40s, 50s, and that pt did get her scheduled metoprolol earlier when her bp was 198/51. HR stable in the 60s. pt asymtomatic. No new order placed.   Will continue to monitor

## 2022-12-07 NOTE — CONSULTS
Office:  [phone]       Fax:  [phone]    Admit Date: 12/4/2022     Reason for Consult: FOX       Renal Progress Note  Subjective   Interval History:   Fatigue minimal  Heme dropped to 6.1, received 1 U PRBC's. Repeat 8.3  No vomiting this morning  Morning labs pending  BP spiked last night after transfusion to 971'I systolic        Objective   DIET:  ADULT DIET; Regular; 4 carb choices (60 gm/meal); Low Fat/Low Chol/High Fiber/2 gm Na    MEDICATION:  Scheduled:    insulin glargine  5 Units SubCUTAneous BID    warfarin  2 mg Oral Daily    sodium chloride flush  5-40 mL IntraVENous 2 times per day    insulin lispro  0-4 Units SubCUTAneous TID WC    insulin lispro  0-4 Units SubCUTAneous Nightly    amLODIPine  5 mg Oral Daily    atorvastatin  10 mg Oral Nightly    metoprolol tartrate  12.5 mg Oral BID    pantoprazole  40 mg Oral Daily    vitamin B-12  100 mcg Oral Daily       Continuous Infusions:    sodium chloride      sodium chloride      sodium chloride      dextrose         LABS:  Recent Labs     12/04/22 2113 12/05/22  0413 12/05/22  0530 12/06/22  1240 12/06/22 2020   WBC 14.7*  --  13.7* 11.3*  --    HGB 7.6*   < > 6.8* 6.1* 8.3*     --  153 124*  --     < > = values in this interval not displayed.        Recent Labs     12/04/22 2113 12/05/22  0111 12/05/22  0530 12/06/22  1240   *  --  135* 137  136   K 4.2  --  4.0 3.7  3.7   CL 99  --  98* 103  101   CO2 22  --  25 24  25   BUN 61*  --  57* 38*  38*   CREATININE 3.8*  --  3.5* 2.6*  2.6*   GLUCOSE 160* 143 156* 258*  258*       Recent Labs     12/04/22 2113 12/05/22 0530 12/06/22  1240   CALCIUM 7.8* 7.8* 7.9*  7.9*   MG 1.00* 2.90*  --    PHOS 3.8 3.3 2.5       Recent Labs     12/04/22 2113 12/06/22  1240   AST 40* 34   ALT 11 9*   BILITOT 1.0 1.0   ALKPHOS 53 49       Recent Labs     12/04/22 2113   Shameka Croft <0.01       No results for input(s): BNP in the last 72 hours. No results for input(s): CHOL, TRIG, HDL, LDLCALC, LABVLDL in the last 72 hours. ABGs: No results for input(s): PHART, PO2ART, OUA1WQZ in the last 72 hours. Recent Labs     12/05/22  0957 12/06/22  0527 12/07/22  0630   INR 4.69* 3.98* 2.81*       UA:  Recent Labs     12/04/22 2111   COLORU Yellow   CLARITYU Clear   GLUCOSEU Negative   BILIRUBINUR Negative   KETUA Negative   SPECGRAV 1.025   BLOODU LARGE*   PHUR 6.0   PROTEINU 100*   UROBILINOGEN 0.2   NITRU Negative   LEUKOCYTESUR SMALL*   LABMICR YES   URINETYPE Voided        Urine Microscopic:   Recent Labs     12/04/22 2111   BACTERIA 2+*   WBCUA 3-5   RBCUA 5-10*   EPIU 6-10*       Urine Culture: No results for input(s): LABURIN in the last 72 hours. Urine Chemistry:   Recent Labs     12/05/22  0004   LABCREA 99.5  104.0   PROTEINUR 166.00*   NAUR <20         VITALS:   BP (!) 156/61   Pulse 62   Temp 99.1 °F (37.3 °C) (Oral)   Resp 16   Ht 4' 11\" (1.499 m)   Wt 107 lb 5.8 oz (48.7 kg)   SpO2 94%   BMI 21.68 kg/m²    Wt Readings from Last 3 Encounters:   12/06/22 107 lb 5.8 oz (48.7 kg)   09/23/22 112 lb 3.2 oz (50.9 kg)   09/19/22 109 lb 4.8 oz (49.6 kg)          24HR INTAKE/OUTPUT:    Intake/Output Summary (Last 24 hours) at 12/7/2022 0906  Last data filed at 12/7/2022 0030  Gross per 24 hour   Intake 670 ml   Output 250 ml   Net 420 ml         Physical Exam  Constitutional:       Appearance: Normal appearance. She is underweight. Cardiovascular:      Rate and Rhythm: Normal rate and regular rhythm. Heart sounds: Normal heart sounds. Pulmonary:      Effort: Pulmonary effort is normal. No respiratory distress.       Breath sounds: Normal breath sounds. Abdominal:      General: There is no distension. Tenderness: There is no abdominal tenderness. Musculoskeletal:         General: No swelling or tenderness. Skin:     Coloration: Skin is pale. Neurological:      General: No focal deficit present. Mental Status: She is alert and oriented to person, place, and time. Motor: Weakness present. IMAGING:  CT ABDOMEN PELVIS WO CONTRAST Additional Contrast? None   Final Result      No renal or ureteral calculi are identified. Advanced atherosclerotic calcifications noted. Otherwise no discrete evidence of acute normality identified. XR CHEST (2 VW)   Final Result      No acute disease. Assessment and Plan     FOX on CKD 3  HTN  Chronic Anemia  Acid base/electrolyte imbalance  Weakness  Nausea and vomiting    Plan:  - Crt continues to improve  - no IVF   - Patient hemodynamically stable with history of chronic anemia  - BG management  - Transfuse if Hgb < 6.5  - Protein/ Crt ratio normal  - Replace electrolytes as needed  - Renally dose medications  - Continue amlodipine  - FENa 0.5% - Suggests pre-renal            Thank you for allowing us to participate in care of Aliza Dobbins. We will continue to follow. Brady Geller, 4th year medical student    I have seen the patient independently from the resident . I discussed the care with the resident. I personally reviewed the HPI, PH, FH, SH, ROS and medications. I repeated pertinent portions of the examination and reviewed the relevant imaging and laboratory data.  I agree with the findings, assessment and plan as documented, with the following addendum:      Keyon Velazquez MD

## 2022-12-08 ENCOUNTER — CARE COORDINATION (OUTPATIENT)
Dept: CARE COORDINATION | Age: 77
End: 2022-12-08

## 2022-12-08 ENCOUNTER — CARE COORDINATION (OUTPATIENT)
Dept: CASE MANAGEMENT | Age: 77
End: 2022-12-08

## 2022-12-08 DIAGNOSIS — N18.30 CONTROLLED TYPE 2 DIABETES MELLITUS WITH STAGE 3 CHRONIC KIDNEY DISEASE, WITHOUT LONG-TERM CURRENT USE OF INSULIN (HCC): ICD-10-CM

## 2022-12-08 DIAGNOSIS — I50.9 ACUTE CONGESTIVE HEART FAILURE, UNSPECIFIED HEART FAILURE TYPE (HCC): Primary | ICD-10-CM

## 2022-12-08 DIAGNOSIS — I10 ESSENTIAL HYPERTENSION, BENIGN: Primary | ICD-10-CM

## 2022-12-08 DIAGNOSIS — E11.22 CONTROLLED TYPE 2 DIABETES MELLITUS WITH STAGE 3 CHRONIC KIDNEY DISEASE, WITHOUT LONG-TERM CURRENT USE OF INSULIN (HCC): ICD-10-CM

## 2022-12-08 DIAGNOSIS — I50.32 CHRONIC DIASTOLIC HEART FAILURE (HCC): ICD-10-CM

## 2022-12-08 PROCEDURE — 1111F DSCHRG MED/CURRENT MED MERGE: CPT | Performed by: FAMILY MEDICINE

## 2022-12-08 NOTE — CARE COORDINATION
[facility] Care Transitions Initial Follow Up Call    Call within 2 business days of discharge: Yes    Care Transition Nurse contacted the patient by telephone to perform post hospital discharge assessment. Verified name and  with patient as identifiers. Provided introduction to self, and explanation of the Care Transition Nurse role. Patient: Juanjose Saleh Patient : 1945   MRN: [id number]   Reason for Admission: CHF, HTN, DM, Hypomagnesemia  Discharge Date: 22 RARS: Readmission Risk Score: 18.6      Last Discharge  [facility]       Date Complaint Diagnosis Description Type Department Provider    22 Nausea FOX (acute kidney injury) ([facility] [address] . .. ED to Hosp-Admission (Discharged) (ADMITTED) 481 Fallon Marin MD; Cassie Lucero. .. Was this an external facility discharge? No     Challenges to be reviewed by the provider   Additional needs identified to be addressed with provider: No  none               Method of communication with provider: none. CTN spoke with patient this am for initial 24 hour discharge follow up CTN call. Patient states she is doing well, states she is still really tired and slightly weak. Patient also reports having some slight nausea this am, after eating a bowl of cereal.   Denies having any fever, chills, vomiting, chest pain, or SOB. Patient  has a slight dry cough as well, no BLE Edema, weight gain, dizziness or lightheadedness. CTN and Pt reviewed meds and CTN completed the 1111f. Remote Patient Monitoring Enrollment Note      Date/Time:  2022 1:15 PM    Offered patient enrollment in the [facility] Remote Patient Monitoring (RPM) program for in home monitoring for CHF, Diabetes, and HTN. Patient accepted RPM services.     Patient will be monitoring the following daily:  blood pressure reading  blood pressure heart rate  glucose reading  pulse ox reading  weight    CTN reviewed the information below with patient:    Emergency Contact (name and contact number): Call patients cell phone number first at [phone]. Is listed in contact info as well. [x] A member from the care coordination team will reach out to notify the patient once the RPM kit is ordered. [x] Once the kit is delivered, the [facility] team will contact the patient after UPS deliver to assist with set up. [x] Determined BP cuff size: regular (9.05\"-15.74\")      [x] Determined weight scale: regular (<330lbs)                                                 [x] Hours of ACM monitoring - Monday-Friday [id number]                         All questions about RPM program answered at this time. Care Transition Nurse reviewed discharge instructions, medical action plan, and red flags with patient who verbalized understanding. The patient was given an opportunity to ask questions and does not have any further questions or concerns at this time. Were discharge instructions available to patient? Yes. Reviewed appropriate site of care based on symptoms and resources available to patient including: PCP  Specialist  Urgent care clinics  [facility] 24/7  When to call 911. The patient agrees to contact the PCP office for questions related to their healthcare. Advance Care Planning:   Does patient have an Advance Directive: not on file. Medication reconciliation was performed with patient, who verbalizes understanding of administration of home medications. Medications reviewed, 1111F entered: yes    Was patient discharged with a pulse oximeter? no    Non-face-to-face services provided:  Obtained and reviewed discharge summary and/or continuity of care documents  Education of patient/family/caregiver/guardian to support self-management-CTN encouraged patient to continue to monitor for any of the above noted s/s, states she did not weigh herself this am, instructed to make sure to weigh daily, in am, before eating and after voiding.   Report any 3 lb weight gain overnight, or 5 lb weight gain in a week. Patient also instructed to make sure to take all medications as prescribed, CTN went over fluid restrictions and low sodium diet as well. Assessment and support for treatment adherence and medication management-Medication Review Completed with Patient    Offered patient enrollment in the Remote Patient Monitoring (RPM) program for in-home monitoring: Yes, patient enrolled. Care Transitions 24 Hour Call    Schedule Follow Up Appointment with PCP: Reinaldo Corral you have a copy of your discharge instructions?: Yes  Do you have all of your prescriptions and are they filled?: Yes  Have you been contacted by a [facility] Pharmacist?: No  Have you scheduled your follow up appointment?: Yes  How are you going to get to your appointment?: Car - family or friend to transport  Do you have support at home?: Partner/Spouse/SO  Do you feel like you have everything you need to keep you well at home?: Yes  Are you an active caregiver in your home?: No  Care Transitions Interventions  No Identified Needs         Follow Up  Future Appointments   Date Time Provider Gilmar Broderick   1/9/2023 11:30 AM MD BARRY Frost [facility]   1/13/2023  1:00 PM Anna Hernandez MD [facility]   3/27/2023 11:00 AM Saul Brantley MD [facility] MMA   4/10/2023 11:30 AM MD BARRY Frost [facility]       Care Transition Nurse provided contact information. Plan for follow-up call in 3-5 days based on severity of symptoms and risk factors. Plan for next call: Any worsening weakness or fatigue, continue with CHF education.     Thank Brett Bee RN  Care Transition Coordinator  Contact IPJRWW:[phone]

## 2022-12-08 NOTE — CARE COORDINATION
Remote Patient Kit Ordering Note      Date/Time:  12/8/2022 2:23 PM      [] CC[employer] confirmed patient shipping address  [x] Patient will receive package over the next 2-4 business days. Someone 21 years or older must be present to sign for UPS delivery. [x] Patient to contact virtual installation-specific phone number listed in the patient instructions. [x] If the patient does not contact HRS within 24 hours, an [email or web address] [facility] will call the patient directly: If the patient does not answer, HRS will follow up with the clinical team notifying them about the unsuccessful attempt to contact the patient. HRS will make three call attempts to the patient. [x] LPN will contact patient once equipment is active to welcome them to the program.                                                         [x] Hours of RPM monitoring - Monday-Friday [id number]                     All questions answered at this time. CTN made aware the RPM kit has been ordered. CCSS notified patient of RPM equipment order.

## 2022-12-08 NOTE — PROGRESS NOTES
12/8/22 1:36 PM       Remote Patient Monitoring Treatment Plan    Received request from ACRAÚL/ROMARIO Goss RN to order remote patient monitoring for in home monitoring of CHF, Diabetes, and HTN and order completed. Patient will be monitoring blood pressure   glucose  pulse ox   weight daily. Patient will engage in Remote Patient Monitoring each day to develop the skills necessary for self management. RPM Care Team Responsibilities:   Alerts will be reviewed daily and addressed within 2-4 hours during operational hours (Monday -Friday 9 am-4 pm)  Alert response and intervention documented in patient medical record  Alert response escalated to PCP per protocol and documented in patient medical record  Patient monitored over approximately  days  Discharge from program based on self-management readiness    See care coordination encounters for additional details.       Sylvia Pina DNP, FNP-C, Remote Patient Monitoring NP, (Ph) [phone]

## 2022-12-09 ENCOUNTER — TELEPHONE (OUTPATIENT)
Dept: FAMILY MEDICINE CLINIC | Age: 77
End: 2022-12-09

## 2022-12-09 ENCOUNTER — ANTI-COAG VISIT (OUTPATIENT)
Dept: FAMILY MEDICINE CLINIC | Age: 77
End: 2022-12-09

## 2022-12-09 DIAGNOSIS — E78.5 HYPERLIPIDEMIA WITH TARGET LDL LESS THAN 100: ICD-10-CM

## 2022-12-09 DIAGNOSIS — I10 HYPERTENSION, UNSPECIFIED TYPE: Primary | ICD-10-CM

## 2022-12-09 DIAGNOSIS — E13.9 DIABETES MELLITUS OF OTHER TYPE WITHOUT COMPLICATION, UNSPECIFIED WHETHER LONG TERM INSULIN USE (HCC): ICD-10-CM

## 2022-12-09 DIAGNOSIS — Z95.2 AORTIC VALVE REPLACED: ICD-10-CM

## 2022-12-09 LAB — INR BLD: 2.7

## 2022-12-09 NOTE — PROGRESS NOTES
Patient 2.7 I&R 12-        Dosage 2mg daily  [phone]  Please Call patient         What Labs are needed before OV on 01-  Please advise.

## 2022-12-09 NOTE — CARE COORDINATION
If she is doing well and back to her baseline ok keep apt on Jan  If still sick ok hfu next week db if needed

## 2022-12-09 NOTE — TELEPHONE ENCOUNTER
Patient 2.7 I&R 12-      Dosage 2mg  [phone]  Please Call patient       What Labs are needed before OV on 01-

## 2022-12-09 NOTE — PROGRESS NOTES
INR at goal  Continue same coumadin dose  Next INR in one month      Bmp  Lipids  A1c     Dx dm   Htn

## 2022-12-12 NOTE — TELEPHONE ENCOUNTER
Medication:   Requested Prescriptions     Pending Prescriptions Disp Refills    lisinopril (PRINIVIL;ZESTRIL) 40 MG tablet [Pharmacy Med Name: LISINOPRIL 40MG TABLETS] 90 tablet 3     Sig: TAKE 1 TABLET BY MOUTH DAILY        Last Filled:      Patient Phone Number: [phone] (home) [phone] (work)    Last appt: 10/5/2022   Next appt: 1/9/2023    Last OARRS:   RX Monitoring 3/8/2021   Attestation -   Periodic Controlled Substance Monitoring Possible medication side effects, risk of tolerance/dependence & alternative treatments discussed. ;No signs of potential drug abuse or diversion identified.

## 2022-12-13 ENCOUNTER — CARE COORDINATION (OUTPATIENT)
Dept: CASE MANAGEMENT | Age: 77
End: 2022-12-13

## 2022-12-13 RX ORDER — LISINOPRIL 40 MG/1
40 TABLET ORAL DAILY
Qty: 90 TABLET | Refills: 3 | Status: SHIPPED | OUTPATIENT
Start: 2022-12-13

## 2022-12-13 NOTE — CARE COORDINATION
[facility] Transitions Initial Follow Up Call    Call within 2 business days of discharge: Yes    Patient:  Marcie Del Castillo   Patient :  1945  MRN:  [id number]    Reason for Admission: CHF, DM, HTN, COPD  Discharge Date:  22   RARS:       Transitions of Care Initial Call    Was this an external facility discharge? Discharge Facility: [address] [place] [address] to be reviewed by the provider   Additional needs identified to be addressed with provider:    no    AMB CC Provider Discharge Needs: none            Non-face-to-face services provided:    1ST CTC attempt to reach Pt regarding recent hospital discharge. CTC left voice recording with call back number requesting a call back.     Follow up appointments:    Future Appointments   Date Time Provider Gilmar Broderick   2023 11:30 AM MD BARRY Argueta - [place]   2023  1:00 PM Kiarra Mesa MD [facility] Nephrolo   3/27/2023 11:00 AM Princess Penn MD [facility] P/CC MMA   4/10/2023 11:30 AM MD BARRY Argueta  [place] - [place]       Thank Corona Ozuna RN  Care Transition Coordinator  Contact Number:[phone]

## 2022-12-13 NOTE — CARE COORDINATION
Recorded by: Mary Squires 2022 @ 4:24 PM   Time Spent: 3 minutes     Remote Patient Monitoring Note Date/Time: 2022 4:23 PM LPN contacted patient by telephone regarding yellow alert received for blood pressure reading (171/56). Verified patients name and  as identifiers. Background: HTN, dm, CHF Clinical Interventions: called and left message with contact information will attempt to reach patient for welcome call tomorrow Plan/Follow Up: Will continue to review, monitor and address alerts with follow up based on severity of symptoms and risk factors.  ---- Current Patient Metrics ---- Blood Pressure: 171/56, 85bpm Glucose: -mg/dl Pulseox: 98%, 65bpm Survey: - Weight: 108.2lbs Note Created at: 2022 04:24 PM ET ---- Time-Spent: 3 minutes 0 seconds

## 2022-12-14 ENCOUNTER — CARE COORDINATION (OUTPATIENT)
Dept: CASE MANAGEMENT | Age: 77
End: 2022-12-14

## 2022-12-14 NOTE — CARE COORDINATION
Recorded by: Mackenzie Jones 12/14/2022 @ 1:06 PM   Time Spent: 3 minutes     Remote Patient Monitoring Welcome Note Date/Time: 12/14/2022 1:05 PM Called to speak with patient for Welcome to RPM. Left HIPPA compliant voice message with contact information [phone] for a call Back with an update. Emergency Contact: none listed [ ] Patient received all RPM equipment (tablet, scale, blood pressure device and cuff, and pulse oximeter) Cuff Size: Small [ ] Regular [ ] Large [ ] Weight Scale: Regular [ ] Bariatric [ ] [ ] Instructed patient keep box for use when returning equipment [ ] Reviewed Patient Welcome Letter with patient [ ] Reviewed expectations for patient and care team [ ] Reviewed RPM consent form [ ] Instructed patient to keep scale on flat surface [ ] Instructed patient to keep tablet plugged in at all times [ ] Instructed how to contact IT support (number listed on welcome letter) [ ] Provided Remote Patient Monitoring care  information Remote Patient Monitoring Note Date/Time: 12/14/2022 1:06 PM LPN reviewed patients reported daily Remote Patient Monitoring metrics. All reported metrics are within alert parameters. Plan/Follow Up:  Will continue to review, monitor and address alerts with follow up based on severity of symptoms and risk factors ---- Current Patient Metrics ---- Blood Pressure: 161/53, 89bpm Glucose: -mg/dl Pulseox: 95%, 74bpm Survey: - Weight: 104.8lbs Note Created at: 12/14/2022 01:06 PM ET ---- Time-Spent: 3 minutes 0 seconds

## 2022-12-15 ENCOUNTER — CARE COORDINATION (OUTPATIENT)
Dept: CASE MANAGEMENT | Age: 77
End: 2022-12-15

## 2022-12-15 NOTE — CARE COORDINATION
Recorded by: Lolis Ordoñez 12/15/2022 @ 10:07 AM   Time Spent: 4 minutes     . Remote Patient Monitoring Note Date/Time: 12/15/2022 10:02 AM LPN reviewed patients reported daily Remote Patient Monitoring metrics. All reported metrics are within alert parameters. Did also reach out for welcome call and left HIPPA compliant message on cell phone Plan/Follow Up:  Will continue to review, monitor and address alerts with follow up based on severity of symptoms and risk factors ---- Current Patient Metrics ---- Blood Pressure: 160/53, 86bpm Glucose: -mg/dl Pulseox: 95%, 83bpm Survey: C Weight: 105.4lbs Note Created at: 12/15/2022 10:07 AM ET ---- Time-Spent: 4 minutes 0 seconds

## 2022-12-16 ENCOUNTER — CARE COORDINATION (OUTPATIENT)
Dept: CARE COORDINATION | Age: 77
End: 2022-12-16

## 2022-12-16 NOTE — CARE COORDINATION
Remote Patient Monitoring Note      Date/Time:  12/16/2022 2:55 PM    LPN reviewed patients reported daily Remote Patient Monitoring metrics. All reported metrics are within alert parameters. Plan/Follow Up:  Will continue to review, monitor and address alerts with follow up based on severity of symptoms and risk factors    Current Patient Metrics ---- Blood Pressure: 164/49, 83bpm Glucose: -mg/dl Pulseox: 97%, 72bpm Survey: C Weight: 105.0lbs Note Created at: 12/16/2022 02:56 PM ET ---- Time-Spent: 2 minutes 0 seconds    Anamika Iverson, [address] Transition Nurse/ RPM  [phone]

## 2022-12-19 ENCOUNTER — CARE COORDINATION (OUTPATIENT)
Dept: CASE MANAGEMENT | Age: 77
End: 2022-12-19

## 2022-12-19 NOTE — CARE COORDINATION
[facility] Transitions Initial Follow Up Call    Call within 2 business days of discharge: Yes    Patient:  Bernice Prescott   Patient :  1945  MRN:  [id number]    Reason for Admission:  CHF, DM, COPD, HTN  Discharge Date:  22   RARS:       Transitions of Care Initial Call    Was this an external facility discharge? Discharge Facility: [address] [place] [address] to be reviewed by the provider   Additional needs identified to be addressed with provider:    blaine    AMB CC Provider Discharge Needs: none            Non-face-to-face services provided:    3RD  CTC attempt to reach Pt regarding recent hospital discharge. CTC left voice recording with call back number requesting a call back. CTN will close out CTN episode and forward patient to [facility] with PCP Office, to continue following patient.     Follow up appointments:    Future Appointments   Date Time Provider Gilmar Broderick   2023 11:30 AM MD BARRY Harmna   2023  1:00 PM Kendy Herring MD [facility] Nephrolo   3/27/2023 11:00 AM Julia Maradiaga MD [facility] [address] P/CC MMA   4/10/2023 11:30 AM MD BARRY Harman  [place] - [place]     Thank Maury Garduno RN  Care Transition Coordinator  Contact Number:[phone]

## 2022-12-19 NOTE — CARE COORDINATION
Recorded by: aNrciso Cuevas 2022 @ 10:13 AM   Time Spent: 8 minutes     Remote Patient Monitoring Welcome Note Date/Time: 2022 10:13 AM Verified patients name and  as identifiers. Completed and confirmed the following: Emergency Contact: NONE listed [x] Patient received all RPM equipment (tablet, scale, blood pressure device and cuff, and pulse oximeter) Cuff Size: Small [ ] Regular [x] Large [ ] Weight Scale: Regular [x] Bariatric [ ] [x] Instructed patient keep box for use when returning equipment [x] Reviewed Patient Welcome Letter with patient [x] Reviewed expectations for patient and care team [x] Reviewed RPM consent form [x] Instructed patient to keep scale on flat surface [x] Instructed patient to keep tablet plugged in at all times [x] Instructed how to contact IT support (number listed on welcome letter) [x] Provided Remote Patient Monitoring care  information All questions answered at this time.  ---- Current Patient Metrics ---- Blood Pressure: 166/51, 93bpm Glucose: -mg/dl Pulseox: 95%, 76bpm Survey: - Weight: 105.4lbs Note Created at: 2022 10:13 AM ET ---- Time-Spent: 8 minutes 0 seconds

## 2022-12-20 ENCOUNTER — CARE COORDINATION (OUTPATIENT)
Dept: CASE MANAGEMENT | Age: 77
End: 2022-12-20

## 2022-12-20 NOTE — CARE COORDINATION
Recorded by: Rima Lara 2022 @ 10:24 AM   Time Spent: 2 minutes     Remote Patient Monitoring Note Date/Time: 2022 10:23 AM LPN contacted patient by telephone regarding yellow alert received for blood pressure reading (180/50/85). Verified patients name and  as identifiers. Background: HTN, DM, CHF Clinical Interventions: Called patient and left HIPPA compliant voicemail with call back information for yellow alert Plan/Follow Up: Will continue to review, monitor and address alerts with follow up based on severity of symptoms and risk factors.  ---- Current Patient Metrics ---- Blood Pressure: 180/50, 85bpm Glucose: -mg/dl Pulseox: 96%, 82bpm Survey: C Weight: 107.4lbs Note Created at: 2022 10:24 AM ET ---- Time-Spent: 2 minutes 0 seconds

## 2022-12-21 ENCOUNTER — CARE COORDINATION (OUTPATIENT)
Dept: CASE MANAGEMENT | Age: 77
End: 2022-12-21

## 2022-12-22 ENCOUNTER — CARE COORDINATION (OUTPATIENT)
Dept: CASE MANAGEMENT | Age: 77
End: 2022-12-22

## 2022-12-22 NOTE — CARE COORDINATION
Recorded by: Quentin Cisneros 12/22/2022 @ 10:17 AM   Time Spent: 2 minutes     Remote Patient Monitoring Note Date/Time: 12/22/2022 10:17 AM LPN reviewed patients reported daily Remote Patient Monitoring metrics. All reported metrics are within alert parameters. Plan/Follow Up:  Will continue to review, monitor and address alerts with follow up based on severity of symptoms and risk factors ---- Current Patient Metrics ---- Blood Pressure: 154/52, 84bpm Glucose: -mg/dl Pulseox: 92%, 77bpm Survey: C Weight: 107.6lbs Note Created at: 12/22/2022 10:17 AM ET ---- Time-Spent: 2 minutes 0 seconds

## 2022-12-23 ENCOUNTER — CARE COORDINATION (OUTPATIENT)
Dept: CARE COORDINATION | Age: 77
End: 2022-12-23

## 2022-12-23 NOTE — CARE COORDINATION
Remote Patient Monitoring Note      Date/Time:  12/23/2022 11:52 AM    CCSS reviewed patients reported daily Remote Patient Monitoring metrics. All reported metrics are within alert parameters. Plan/Follow Up:  Will continue to review, monitor and address alerts with follow up based on severity of symptoms and risk factors  ---- Current Patient Metrics ---- Blood Pressure: 162/54, 91bpm Glucose: -mg/dl Pulseox: 95%, 73bpm Survey: C Weight: 105.2lbs Note Created at: 12/23/2022 11:52 AM ET ---- Time-Spent: 2 minutes 0 seconds

## 2022-12-27 ENCOUNTER — CARE COORDINATION (OUTPATIENT)
Dept: CARE COORDINATION | Age: 77
End: 2022-12-27

## 2022-12-27 ENCOUNTER — CARE COORDINATION (OUTPATIENT)
Dept: CASE MANAGEMENT | Age: 77
End: 2022-12-27

## 2022-12-27 NOTE — CARE COORDINATION
Ambulatory Care Coordination Note  12/27/2022    ACC: Fernanda Gutierrez RN    Cooper is doing better today. Reports sats are usually around 91-93 % on room air. Cooper does have a little swelling in one leg. Plans to get labs done soon as she has an appt with Dr Angeles Greene on 1/9/23. Cooper has gained 2 lbs but is doing the RPM and they keep a close watch on wt and vitals. Does check fs but not daily. Reviewed last a1c 5.6. No changes in medications and takes them ok. Plan  1) fu appt (1/9)  2) review wt,sob,edema  3) review fs  4) set goal  Diabetes Assessment      Meal Planning: Avoidance of concentrated sweets   How often do you test your blood sugar?: Other (Comment: as needed)   Do you have barriers with adherence to non-pharmacologic self-management interventions? (Nutrition/Exercise/Self-Monitoring): No   Have you ever had to go to the ED for symptoms of low blood sugar?: No       No patient-reported symptoms        ,   Congestive Heart Failure Assessment    Are you currently restricting fluids?: No Restriction  Do you salt your food before tasting it?: No     No patient-reported symptoms      Symptoms:  None: Yes      Symptom course: stable     , and   COPD Assessment    Does the patient understand envrionmental exposure?: Yes  Is the patient able to verbalize Rescue vs. Long Acting medications?: Yes     No patient-reported symptoms         Symptoms:                Offered patient enrollment in the Remote Patient Monitoring (RPM) program for in-home monitoring: Yes, patient enrolled previously.     Ambulatory Care Coordination Assessment    Care Coordination Protocol  Referral from Primary Care Provider: No  Week 1 - Initial Assessment     Do you have all of your prescriptions and are they filled?: Yes (Comment: 12/27/22-reviewed)  Barriers to medication adherence: None  Are you able to afford your medications?: Yes  How often do you have trouble taking your medications the way you have been told to take them?: I always take them as prescribed. Do you have Home O2 Therapy?: No      Ability to seek help/take action for Emergent Urgent situations i.e. fire, crime, inclement weather or health crisis. : Independent  Ability to ambulate to restroom: Independent  Ability handle personal hygeine needs (bathing/dressing/grooming): Independent  Ability to manage Medications: Independent  Ability to prepare Food Preparation: Independent  Ability to maintain home (clean home, laundry): Independent  Ability to drive and/or has transportation: Independent  Ability to do shopping: Independent  Is patient able to live independently?: Yes     Current Housing: Private Residence        Per the Fall Risk Screening, did the patient have 2 or more falls or 1 fall with injury in the past year?: No     Frequent urination at night?: No  Do you use rails/bars?: No  Do you have a non-slip tub mat?: No        Are there any problems with your patients lifestyle behaviors (alcohol, drugs, diet, exercise) that are impacting on physical or mental well-being?: No identified areas of concern   Do you have any other concerns about your patients mental well-being?  How would you rate their severity and impact on the patient?: No identified areas of concern   How would you rate their home environment in terms of safety and stability (including domestic violence, insecure housing, neighbor harassment)?: Consistently safe, supportive, stable, no identified problems   How do daily activities impact on the patient's well-being? (include current or anticipated unemployment, work, caregiving, access to transportation or other): No identified problems or perceived positive benefits   How would you rate their social network (family, work, friends)?: Good participation with social networks   How wells does the patient now understand their health and well-being (symptoms, signs or risk factors) and what they need to do to manage their health?: Reasonable to good understanding and already engages in managing health or is willing to undertake better management   How well do you think your patient can engage in healthcare discussions? (Barriers include language, deafness, aphasia, alcohol or drug problems, learning difficulties, concentration): Clear and open communication, no identified barriers   Do other services need to be involved to help this patient?: Other care/services not required at this time   Suggested Interventions and Community Resources  Diabetes Education: In Process    Disease Assocation: In Process   Registered Dietician: Not Started   Zone Management Tools: In Process                  Prior to Admission medications    Medication Sig Start Date End Date Taking? Authorizing Provider   lisinopril (PRINIVIL;ZESTRIL) 40 MG tablet TAKE 1 TABLET BY MOUTH DAILY 12/13/22   Edgar Owens MD   furosemide (LASIX) 20 MG tablet Take 2 tablets by mouth daily as needed (leg swelling or weight gain of 3-5 lbs) 12/7/22   Isiah Ramirez MD   lisinopril (PRINIVIL;ZESTRIL) 20 MG tablet Take 0.5 tablets by mouth daily 12/7/22   Isiah Ramirez MD   ALPRAZolam Nemo Cowden) 0.5 MG tablet TAKE 1 TABLET BY MOUTH TWICE DAILY AS NEEDED FOR ANXIETY 12/2/22 1/3/23  Edgar Owens MD   warfarin (COUMADIN) 1 MG tablet TAKE 2 AND 1/2 TABLETS BY MOUTH ON SUNDAYS AND TUESDAYS. TAKE 2 TABLETS BY MOUTH ALL OTHER DAYS. Patient taking differently: TAKE 2 tablets daily. 11/14/22   Edgar Owens MD   metFORMIN (GLUCOPHAGE) 1000 MG tablet TAKE 1 TABLET BY MOUTH TWICE DAILY 10/18/22   Edgar Owens MD   acetaminophen-codeine (TYLENOL #3) 300-30 MG per tablet Take 1 tablet by mouth nightly as needed for Pain for up to 90 days.  10/5/22 1/3/23  Edgar Owens MD   amLODIPine (NORVASC) 5 MG tablet Take 1 tablet by mouth daily 10/5/22   Edgar Owens MD   vitamin B-12 (CYANOCOBALAMIN) 100 MCG tablet Take 100 mcg by mouth daily    Historical Provider, MD   pantoprazole (PROTONIX) 40 MG tablet TAKE 1 TABLET BY MOUTH EVERY DAY 9/19/22   Orlando Stevenson MD   glipiZIDE (GLUCOTROL) 5 MG tablet TAKE 1/2 TABLET BY MOUTH EVERY DAY WITH FOOD 6/16/22   Orlando Stevenson MD   atorvastatin (LIPITOR) 10 MG tablet TAKE 1 TABLET BY MOUTH DAILY IN THE EVENING FOR CHOLESTEROL 4/5/22   Orlando Stevenson MD   blood glucose monitor strips Test 2 times a day & as needed for symptoms of irregular blood glucose. Ok to dispense what is covered by insurance.  Patient has been using [employer] test strips 3/31/22   Orlando Stevenson MD   TRUE METRIX BLOOD GLUCOSE TEST strip USE TO TEST BLOOD SUGAR TWICE DAILY 7/29/21   Orlando Stevenson MD   metoprolol tartrate (LOPRESSOR) 25 MG tablet Take 12.5 mg by mouth 2 times daily 0.5 tab BID     Historical Provider, MD       Future Appointments   Date Time Provider Gilmar Broderikc   1/9/2023 11:30 AM MD BARRY Charles   1/13/2023  1:00 PM Khloe Mcgrath MD [facility] Nephrolo   3/27/2023 11:00 AM MD Farrah Krishnan P/CC MMA   4/10/2023 11:30 AM MD BARRY Charles

## 2022-12-27 NOTE — CARE COORDINATION
Recorded by: Yesica Olivera 2022 @ 3:46 PM   Time Spent: 2 minutes      Remote Patient Monitoring Note Date/Time: 2022 11:27 AM LPN contacted patient by telephone regarding red alert received for pulse ox reading (90). Verified patients name and  as identifiers. Background: HTN, DM CHF Clinical Interventions: Called patient and left HIPPA compliant message with callback information Plan/Follow Up: Will continue to review, monitor and address alerts with follow up based on severity of symptoms and risk factors.  UPDATE patient did not return call ---- Current Patient Metrics ---- Blood Pressure: 146/52, 84bpm Glucose: -mg/dl Pulseox: 90%, 83bpm Survey: C Weight: 108.0lbs Note Created at: 2022 11:33 AM ET ---- Time-Spent: 2 minutes 0 seconds

## 2022-12-28 ENCOUNTER — CARE COORDINATION (OUTPATIENT)
Dept: CASE MANAGEMENT | Age: 77
End: 2022-12-28

## 2022-12-28 NOTE — CARE COORDINATION
Recorded by: Ross Hurtado 2022 @ 9:36 AM   Time Spent: 9 minutes      Remote Alert Monitoring Note Date/Time: 2022 8:29 AM LPN contacted patient by telephone regarding red alert received for blood pressure reading (184/60) and pulse ox reading (87). Verified patients name and  as identifiers. Background: HTN, DM, CHF Refer to 911 immediately if:  Patient unresponsive or unable to provide history  Change in cognition or sudden confusion  Patient unable to respond in complete sentences  Intense chest pain/tightness  Any concern for any clinical emergency  Red Alert: Provider response time of 1 hr required for any red alert requiring intervention  Yellow Alert: Provider response time of 3hr required for any escalated yellow alert BP Triage Are you having any Chest Pain> no Are you having any Shortness of Breath? yes Do you have a headache or have any vision changes? no Are you having any numbness or tingling? no Are you having any other health concerns or issues> no O2 Triage Are you having any Chest Pain? no Are you having any Shortness of Breath? yes Swelling in your hands or feet? no Are you having any other health concerns or issues> no Clinical Interventions: Reviewed and followed up on alerts and treatments-Spoke to patient she states she is doing fine has a little SOB while ambulating she has not taken medications yet. Patient was agreeable to recheck pulse ox while on phone and it was 91% she will recheck BP after she takes morning medications. LPN will continue to monitor vitals daily Plan/Follow Up: Will continue to review, monitor and address alerts with follow up based on severity of symptoms and risk factors.  UPDATE patient did a BP recheck after medication and BP was 178/62 ---- Current Patient Metrics ---- Blood Pressure: 184/60, 84bpm Glucose: -mg/dl Pulseox: 91%, 81bpm Survey: C Weight: 106.2lbs Note Created at: 2022 08:34 AM ET ---- Time-Spent: 9 minutes 0 seconds

## 2022-12-29 ENCOUNTER — CARE COORDINATION (OUTPATIENT)
Dept: CASE MANAGEMENT | Age: 77
End: 2022-12-29

## 2022-12-30 ENCOUNTER — CARE COORDINATION (OUTPATIENT)
Dept: CARE COORDINATION | Age: 77
End: 2022-12-30

## 2022-12-30 ENCOUNTER — CARE COORDINATION (OUTPATIENT)
Dept: CASE MANAGEMENT | Age: 77
End: 2022-12-30

## 2022-12-30 NOTE — CARE COORDINATION
I spoke with Mrs. Roberto Carlos Davalos earlier today after receiving this msg, she was to use her albuterol one more time, if her oxygen remained below 90% she was instructed go to ED for evaluation.        patient agrees and verbalizes understanding · Thorough handwashing anytime eyes are touched  · Can use a dilute mix of Baby Shampoo and water to wash eyelashes if mucous accumulates  · Instill eye drops regularly as prescribed: use them until eyes are normal for 2 consecutive awakenings in the [facility] 1  96 lbs and over     20 ml                                                        4                        2                    1                            Ibuprofen/Advil/Motrin Dosing    Please dose by weight whenever possible  Ibuprofen is dosed

## 2022-12-30 NOTE — CARE COORDINATION
Recorded by: Luis Sangeetha 2022 @ 9:29 AM   Time Spent: 15 minutes      Remote Alert Monitoring Note Date/Time: 2022 8:24 AM LPN contacted patient by telephone regarding red alert received for blood pressure reading (171/62) and pulse ox reading (83). Verified patients name and  as identifiers. Background: HTN, DM, CHF Refer to 911 immediately if:  Patient unresponsive or unable to provide history  Change in cognition or sudden confusion  Patient unable to respond in complete sentences  Intense chest pain/tightness  Any concern for any clinical emergency  Red Alert: Provider response time of 1 hr required for any red alert requiring intervention  Yellow Alert: Provider response time of 3hr required for any escalated yellow alert BP Triage Are you having any Chest Pain> no Are you having any Shortness of Breath? yes Do you have a headache or have any vision changes? no Are you having any numbness or tingling? no Are you having any other health concerns or issues> no O2 Triage Are you having any Chest Pain? no Are you having any Shortness of Breath? yes Swelling in your hands or feet? no Are you having any other health concerns or issues> no Clinical Interventions: Escalated alert to PCP-Spoke to patient she states she has been SOB, she denies chest pain, dizziness, tingling, blurred vision nausea. Denies cold fingers pulse ox this am was 83% on room air retake was 82% states it did get up to 90% but then when to 89 . She has albuteral inhaler LPN has asked her to use as directed and recheck PO and BP after she states she is not in distress but has been SOB, denies and swelling to hands feet legs and has no weight gain. Sent message to PCP for same day appointment Plan/Follow Up: Will continue to review, monitor and address alerts with follow up based on severity of symptoms and risk factors. UPDATE patient pulse ox is up to 95% with use of inhaler.  She has been contacted by Dr Percy Woodson office and was told to use inhaler when SOB watch oxygen level closely as well as SOB . LPN reminded patient to listen to her body and seek medical attention if she is in distress will continue to monitor vitals remotely no other concerns at present time.  ---- Current Patient Metrics ---- Blood Pressure: 171/62, 87bpm Glucose: -mg/dl Pulseox: 82%, 95bpm Survey: C Weight: 106.4lbs Note Created at: 12/30/2022 08:30 AM ET ---- Time-Spent: 15 minutes 0 seconds

## 2022-12-30 NOTE — CARE COORDINATION
Ambulatory Care Coordination Note  12/30/2022    ACC: Nsihant Peralta RN    Returning call. Denies any sob. Reports has been using MDI every 4- hours. Reports sats are 93%. Discussed use of pulse ox. Also, has a nebulizer that she got when she had pneumonia to use if needed. Reports is doing ok at this time. Offered patient enrollment in the Remote Patient Monitoring (RPM) program for in-home monitoring:  already on RPM .    Lab Results       None            Care Coordination Interventions    Referral from Primary Care Provider: No  Suggested Interventions and Community Resources  Diabetes Education: In Process  Disease Association: In Process (Comment: CHF,COPD)  Registered Dietician: Not Started  Zone Management Tools: In Process          Goals Addressed    None         Prior to Admission medications    Medication Sig Start Date End Date Taking? Authorizing Provider   lisinopril (PRINIVIL;ZESTRIL) 40 MG tablet TAKE 1 TABLET BY MOUTH DAILY 12/13/22   Rojelio Blackburn MD   furosemide (LASIX) 20 MG tablet Take 2 tablets by mouth daily as needed (leg swelling or weight gain of 3-5 lbs) 12/7/22   Sandro Fiore MD   lisinopril (PRINIVIL;ZESTRIL) 20 MG tablet Take 0.5 tablets by mouth daily 12/7/22   Sandro Fiore MD   ALPRAZolam Duncan Carton) 0.5 MG tablet TAKE 1 TABLET BY MOUTH TWICE DAILY AS NEEDED FOR ANXIETY 12/2/22 1/3/23  Rojelio Blackburn MD   warfarin (COUMADIN) 1 MG tablet TAKE 2 AND 1/2 TABLETS BY MOUTH ON SUNDAYS AND TUESDAYS. TAKE 2 TABLETS BY MOUTH ALL OTHER DAYS. Patient taking differently: TAKE 2 tablets daily. 11/14/22   Rojelio Blackburn MD   metFORMIN (GLUCOPHAGE) 1000 MG tablet TAKE 1 TABLET BY MOUTH TWICE DAILY 10/18/22   Rojelio Blackburn MD   acetaminophen-codeine (TYLENOL #3) 300-30 MG per tablet Take 1 tablet by mouth nightly as needed for Pain for up to 90 days.  10/5/22 1/3/23  Rojelio Blackburn MD   amLODIPine (NORVASC) 5 MG tablet Take 1 tablet by mouth daily 10/5/22   Rojelio Blackburn MD vitamin B-12 (CYANOCOBALAMIN) 100 MCG tablet Take 100 mcg by mouth daily    Historical Provider, MD   pantoprazole (PROTONIX) 40 MG tablet TAKE 1 TABLET BY MOUTH EVERY DAY 9/19/22   Srikanth Walker MD   glipiZIDE (GLUCOTROL) 5 MG tablet TAKE 1/2 TABLET BY MOUTH EVERY DAY WITH FOOD 6/16/22   Srikanth Walker MD   atorvastatin (LIPITOR) 10 MG tablet TAKE 1 TABLET BY MOUTH DAILY IN THE EVENING FOR CHOLESTEROL 4/5/22   Srikanth Walker MD   blood glucose monitor strips Test 2 times a day & as needed for symptoms of irregular blood glucose. Ok to dispense what is covered by insurance.  Patient has been using [employer] test strips 3/31/22   Srikanth Walker MD   TRUE METRIX BLOOD GLUCOSE TEST strip USE TO TEST BLOOD SUGAR TWICE DAILY 7/29/21   Srikanth Walker MD   metoprolol tartrate (LOPRESSOR) 25 MG tablet Take 12.5 mg by mouth 2 times daily 0.5 tab BID     Historical Provider, MD       Future Appointments   Date Time Provider Gilmar Broderick   1/9/2023 11:30 AM MD BARRY Anderson [place] - [facility]   1/13/2023  1:00 PM Keyon Velazquez MD [facility]   3/27/2023 11:00 AM MD Farrah Shah P[facility]   4/10/2023 11:30 AM MD BARRY Anderson  [place] - [facility]

## 2023-01-02 DIAGNOSIS — G89.29 CHRONIC BILATERAL LOW BACK PAIN WITHOUT SCIATICA: ICD-10-CM

## 2023-01-02 DIAGNOSIS — F41.9 ANXIETY: ICD-10-CM

## 2023-01-02 DIAGNOSIS — M54.50 CHRONIC BILATERAL LOW BACK PAIN WITHOUT SCIATICA: ICD-10-CM

## 2023-01-03 ENCOUNTER — CARE COORDINATION (OUTPATIENT)
Dept: CASE MANAGEMENT | Age: 78
End: 2023-01-03

## 2023-01-03 RX ORDER — ACETAMINOPHEN AND CODEINE PHOSPHATE 300; 30 MG/1; MG/1
TABLET ORAL
Qty: 90 TABLET | OUTPATIENT
Start: 2023-01-03

## 2023-01-03 RX ORDER — PANTOPRAZOLE SODIUM 40 MG/1
TABLET, DELAYED RELEASE ORAL
Qty: 90 TABLET | Refills: 0 | Status: SHIPPED | OUTPATIENT
Start: 2023-01-03

## 2023-01-03 RX ORDER — ALPRAZOLAM 0.5 MG/1
TABLET ORAL
Qty: 60 TABLET | OUTPATIENT
Start: 2023-01-03

## 2023-01-03 NOTE — CARE COORDINATION
Recorded by: Carlota Pardo 2023 @ 4:14 PM   Time Spent: 8 minutes      Remote Patient Monitoring Note Date/Time: 1/3/2023 8:28 AM LPN} contacted patient by telephone regarding red alert received for pulse ox reading (88). Verified patients name and  as identifiers. Background: HTN, CHF, DM Clinical Interventions: Called patient for red alert in RPM eft HIPPA compliant voicemail with callback information will continue to monitor vitals daily Plan/Follow Up: Will continue to review, monitor and address alerts with follow up based on severity of symptoms and risk factors. Update patient returned call she is at an appointment with her , she states she has SOB in the am when she wakes up but otherwise feels good she is using the inhaler and will recheck her Pulse ox when she gets home.  Patient did not update vitals as she told LPN she would when she returned home ---- Current Patient Metrics ---- Blood Pressure: 170/53, 84bpm Glucose: -mg/dl Pulseox: 88%, 74bpm Survey: - Weight: 107.2lbs Note Created at: 2023 08:30 AM ET ---- Time-Spent: 8 minutes 0 seconds

## 2023-01-03 NOTE — TELEPHONE ENCOUNTER
Medication:   Requested Prescriptions     Pending Prescriptions Disp Refills    ALPRAZolam (XANAX) 0.5 MG tablet [Pharmacy Med Name: ALPRAZOLAM 0.5MG TABLETS] 60 tablet      Sig: TAKE 1 TABLET BY MOUTH TWICE DAILY AS NEEDED FOR ANXIETY    pantoprazole (PROTONIX) 40 MG tablet [Pharmacy Med Name: PANTOPRAZOLE 40MG TABLETS] 90 tablet 0     Sig: TAKE 1 TABLET BY MOUTH EVERY DAY    acetaminophen-codeine (TYLENOL #3) 300-30 MG per tablet [Pharmacy Med Name: ACETAMINOPHEN/COD #3 (300/30MG) TAB] 90 tablet      Sig: TAKE 1 TABLET BY MOUTH EVERY NIGHT AS NEEDED FOR PAIN        Last Filled:      Patient Phone Number: [phone] (home) [phone] (work)    Last appt: 10/5/2022   Next appt: 1/9/2023    Last OARRS:   RX Monitoring 3/8/2021   Attestation -   Periodic Controlled Substance Monitoring Possible medication side effects, risk of tolerance/dependence & alternative treatments discussed. ;No signs of potential drug abuse or diversion identified.

## 2023-01-04 ENCOUNTER — CARE COORDINATION (OUTPATIENT)
Dept: CASE MANAGEMENT | Age: 78
End: 2023-01-04

## 2023-01-04 NOTE — CARE COORDINATION
Recorded by: Tim Gallardo 2023 @ 4:07 PM   Time Spent: 3 minutes      Remote Patient Monitoring Note Date/Time: 2023 1:22 PM LPN} contacted patient by telephone regarding red alert received for pulse ox reading (none today). Verified patients name and  as identifiers. Background: HTN, DM, CHF Clinical Interventions: Called patient for no Metric for Pulse ox and left HIPPA compliant message for return call Plan/Follow Up: Will continue to review, monitor and address alerts with follow up based on severity of symptoms and risk factors.  UPDATE patient did not return call ---- Current Patient Metrics ---- Blood Pressure: 161/55, 75bpm Glucose: -mg/dl Pulseox: -%, -bpm Survey: C Weight: 107.0lbs Note Created at: 2023 01:25 PM ET ---- Time-Spent: 3 minutes 0 seconds

## 2023-01-05 ENCOUNTER — TELEPHONE (OUTPATIENT)
Dept: OTHER | Facility: CLINIC | Age: 78
End: 2023-01-05

## 2023-01-05 ENCOUNTER — HOSPITAL ENCOUNTER (INPATIENT)
Age: 78
LOS: 3 days | Discharge: HOME | DRG: 291 | End: 2023-01-08
Attending: STUDENT IN AN ORGANIZED HEALTH CARE EDUCATION/TRAINING PROGRAM | Admitting: INTERNAL MEDICINE
Payer: MEDICARE

## 2023-01-05 ENCOUNTER — APPOINTMENT (OUTPATIENT)
Dept: GENERAL RADIOLOGY | Age: 78
DRG: 291 | End: 2023-01-05
Payer: MEDICARE

## 2023-01-05 ENCOUNTER — CARE COORDINATION (OUTPATIENT)
Dept: CASE MANAGEMENT | Age: 78
End: 2023-01-05

## 2023-01-05 DIAGNOSIS — I50.9 ACUTE ON CHRONIC CONGESTIVE HEART FAILURE, UNSPECIFIED HEART FAILURE TYPE (HCC): Primary | ICD-10-CM

## 2023-01-05 PROBLEM — I50.33 CHF (CONGESTIVE HEART FAILURE), NYHA CLASS II, ACUTE ON CHRONIC, DIASTOLIC (HCC): Status: ACTIVE | Noted: 2023-01-05

## 2023-01-05 LAB
ANION GAP SERPL CALCULATED.3IONS-SCNC: 14 MMOL/L (ref 3–16)
BASE EXCESS VENOUS: 0.7 MMOL/L (ref -2–3)
BASOPHILS ABSOLUTE: 0 K/UL (ref 0–0.2)
BASOPHILS RELATIVE PERCENT: 0.5 %
BUN BLDV-MCNC: 13 MG/DL (ref 7–20)
CALCIUM SERPL-MCNC: 8.8 MG/DL (ref 8.3–10.6)
CARBOXYHEMOGLOBIN: 3 % (ref 0–1.5)
CHLORIDE BLD-SCNC: 104 MMOL/L (ref 99–110)
CO2: 24 MMOL/L (ref 21–32)
CREAT SERPL-MCNC: 1.2 MG/DL (ref 0.6–1.2)
EKG ATRIAL RATE: 85 BPM
EKG DIAGNOSIS: NORMAL
EKG Q-T INTERVAL: 498 MS
EKG QRS DURATION: 90 MS
EKG QTC CALCULATION (BAZETT): 493 MS
EKG R AXIS: 93 DEGREES
EKG T AXIS: 122 DEGREES
EKG VENTRICULAR RATE: 59 BPM
EOSINOPHILS ABSOLUTE: 0 K/UL (ref 0–0.6)
EOSINOPHILS RELATIVE PERCENT: 0.5 %
GFR SERPL CREATININE-BSD FRML MDRD: 47 ML/MIN/{1.73_M2}
GLUCOSE BLD-MCNC: 141 MG/DL (ref 70–99)
HCO3 VENOUS: 25.8 MMOL/L (ref 24–28)
HCT VFR BLD CALC: 23.9 % (ref 36–48)
HEMOGLOBIN: 8.1 G/DL (ref 12–16)
LYMPHOCYTES ABSOLUTE: 1.9 K/UL (ref 1–5.1)
LYMPHOCYTES RELATIVE PERCENT: 21.9 %
MCH RBC QN AUTO: 30.8 PG (ref 26–34)
MCHC RBC AUTO-ENTMCNC: 33.8 G/DL (ref 31–36)
MCV RBC AUTO: 91.1 FL (ref 80–100)
METHEMOGLOBIN VENOUS: 0.2 % (ref 0–1.5)
MONOCYTES ABSOLUTE: 0.8 K/UL (ref 0–1.3)
MONOCYTES RELATIVE PERCENT: 8.9 %
NEUTROPHILS ABSOLUTE: 6 K/UL (ref 1.7–7.7)
NEUTROPHILS RELATIVE PERCENT: 68.2 %
O2 SAT, VEN: >100 %
PCO2, VEN: 43.2 MMHG (ref 41–51)
PDW BLD-RTO: 17.7 % (ref 12.4–15.4)
PH VENOUS: 7.38 (ref 7.35–7.45)
PLATELET # BLD: 212 K/UL (ref 135–450)
PMV BLD AUTO: 10.9 FL (ref 5–10.5)
PO2, VEN: 121 MMHG (ref 25–40)
POTASSIUM REFLEX MAGNESIUM: 4.1 MMOL/L (ref 3.5–5.1)
PRO-BNP: 8729 PG/ML (ref 0–449)
RBC # BLD: 2.62 M/UL (ref 4–5.2)
SODIUM BLD-SCNC: 142 MMOL/L (ref 136–145)
TCO2 CALC VENOUS: 27 MMOL/L
TROPONIN: <0.01 NG/ML
WBC # BLD: 8.9 K/UL (ref 4–11)

## 2023-01-05 PROCEDURE — 93005 ELECTROCARDIOGRAM TRACING: CPT | Performed by: STUDENT IN AN ORGANIZED HEALTH CARE EDUCATION/TRAINING PROGRAM

## 2023-01-05 PROCEDURE — 6360000002 HC RX W HCPCS: Performed by: STUDENT IN AN ORGANIZED HEALTH CARE EDUCATION/TRAINING PROGRAM

## 2023-01-05 PROCEDURE — 1200000000 HC SEMI PRIVATE

## 2023-01-05 PROCEDURE — 83880 ASSAY OF NATRIURETIC PEPTIDE: CPT

## 2023-01-05 PROCEDURE — 94761 N-INVAS EAR/PLS OXIMETRY MLT: CPT

## 2023-01-05 PROCEDURE — 84484 ASSAY OF TROPONIN QUANT: CPT

## 2023-01-05 PROCEDURE — 82803 BLOOD GASES ANY COMBINATION: CPT

## 2023-01-05 PROCEDURE — 96374 THER/PROPH/DIAG INJ IV PUSH: CPT

## 2023-01-05 PROCEDURE — 80048 BASIC METABOLIC PNL TOTAL CA: CPT

## 2023-01-05 PROCEDURE — 94664 DEMO&/EVAL PT USE INHALER: CPT

## 2023-01-05 PROCEDURE — 6370000000 HC RX 637 (ALT 250 FOR IP): Performed by: STUDENT IN AN ORGANIZED HEALTH CARE EDUCATION/TRAINING PROGRAM

## 2023-01-05 PROCEDURE — 99285 EMERGENCY DEPT VISIT HI MDM: CPT

## 2023-01-05 PROCEDURE — 71046 X-RAY EXAM CHEST 2 VIEWS: CPT

## 2023-01-05 PROCEDURE — 94640 AIRWAY INHALATION TREATMENT: CPT

## 2023-01-05 PROCEDURE — 85025 COMPLETE CBC W/AUTO DIFF WBC: CPT

## 2023-01-05 RX ORDER — FUROSEMIDE 10 MG/ML
40 INJECTION INTRAMUSCULAR; INTRAVENOUS ONCE
Status: COMPLETED | OUTPATIENT
Start: 2023-01-05 | End: 2023-01-05

## 2023-01-05 RX ORDER — PANTOPRAZOLE SODIUM 40 MG/1
40 TABLET, DELAYED RELEASE ORAL
Status: DISCONTINUED | OUTPATIENT
Start: 2023-01-06 | End: 2023-01-08 | Stop reason: HOSPADM

## 2023-01-05 RX ORDER — IPRATROPIUM BROMIDE AND ALBUTEROL SULFATE 2.5; .5 MG/3ML; MG/3ML
1 SOLUTION RESPIRATORY (INHALATION)
Status: DISCONTINUED | OUTPATIENT
Start: 2023-01-05 | End: 2023-01-05

## 2023-01-05 RX ORDER — SODIUM CHLORIDE 0.9 % (FLUSH) 0.9 %
5-40 SYRINGE (ML) INJECTION EVERY 12 HOURS SCHEDULED
Status: DISCONTINUED | OUTPATIENT
Start: 2023-01-06 | End: 2023-01-08 | Stop reason: HOSPADM

## 2023-01-05 RX ORDER — FUROSEMIDE 10 MG/ML
40 INJECTION INTRAMUSCULAR; INTRAVENOUS DAILY
Status: DISCONTINUED | OUTPATIENT
Start: 2023-01-06 | End: 2023-01-08

## 2023-01-05 RX ORDER — IPRATROPIUM BROMIDE AND ALBUTEROL SULFATE 2.5; .5 MG/3ML; MG/3ML
1 SOLUTION RESPIRATORY (INHALATION) 2 TIMES DAILY
Status: DISCONTINUED | OUTPATIENT
Start: 2023-01-06 | End: 2023-01-08

## 2023-01-05 RX ORDER — SODIUM CHLORIDE 0.9 % (FLUSH) 0.9 %
5-40 SYRINGE (ML) INJECTION PRN
Status: DISCONTINUED | OUTPATIENT
Start: 2023-01-05 | End: 2023-01-08 | Stop reason: HOSPADM

## 2023-01-05 RX ORDER — SODIUM CHLORIDE 9 MG/ML
INJECTION, SOLUTION INTRAVENOUS PRN
Status: DISCONTINUED | OUTPATIENT
Start: 2023-01-05 | End: 2023-01-08 | Stop reason: HOSPADM

## 2023-01-05 RX ORDER — ONDANSETRON 2 MG/ML
4 INJECTION INTRAMUSCULAR; INTRAVENOUS EVERY 6 HOURS PRN
Status: DISCONTINUED | OUTPATIENT
Start: 2023-01-05 | End: 2023-01-08 | Stop reason: HOSPADM

## 2023-01-05 RX ORDER — ONDANSETRON 4 MG/1
4 TABLET, ORALLY DISINTEGRATING ORAL EVERY 8 HOURS PRN
Status: DISCONTINUED | OUTPATIENT
Start: 2023-01-05 | End: 2023-01-08 | Stop reason: HOSPADM

## 2023-01-05 RX ORDER — POLYETHYLENE GLYCOL 3350 17 G/17G
17 POWDER, FOR SOLUTION ORAL DAILY PRN
Status: DISCONTINUED | OUTPATIENT
Start: 2023-01-05 | End: 2023-01-08 | Stop reason: HOSPADM

## 2023-01-05 RX ORDER — ATORVASTATIN CALCIUM 10 MG/1
10 TABLET, FILM COATED ORAL NIGHTLY
Status: DISCONTINUED | OUTPATIENT
Start: 2023-01-06 | End: 2023-01-08 | Stop reason: HOSPADM

## 2023-01-05 RX ORDER — LABETALOL HYDROCHLORIDE 5 MG/ML
5 INJECTION, SOLUTION INTRAVENOUS EVERY 6 HOURS PRN
Status: CANCELLED | OUTPATIENT
Start: 2023-01-05

## 2023-01-05 RX ADMIN — IPRATROPIUM BROMIDE AND ALBUTEROL SULFATE 1 AMPULE: 2.5; .5 SOLUTION RESPIRATORY (INHALATION) at 20:54

## 2023-01-05 RX ADMIN — IPRATROPIUM BROMIDE AND ALBUTEROL SULFATE 1 AMPULE: 2.5; .5 SOLUTION RESPIRATORY (INHALATION) at 17:38

## 2023-01-05 RX ADMIN — FUROSEMIDE 40 MG: 10 INJECTION, SOLUTION INTRAMUSCULAR; INTRAVENOUS at 19:16

## 2023-01-05 ASSESSMENT — ENCOUNTER SYMPTOMS
ABDOMINAL PAIN: 0
SORE THROAT: 0
BLOOD IN STOOL: 0
NAUSEA: 0
SHORTNESS OF BREATH: 1
CHEST TIGHTNESS: 1
ALLERGIC/IMMUNOLOGIC NEGATIVE: 1
WHEEZING: 1
CONSTIPATION: 0
COUGH: 0
VOMITING: 0
RHINORRHEA: 0
DIARRHEA: 0
EYE PAIN: 0
EYES NEGATIVE: 1

## 2023-01-05 ASSESSMENT — PAIN - FUNCTIONAL ASSESSMENT: PAIN_FUNCTIONAL_ASSESSMENT: NONE - DENIES PAIN

## 2023-01-05 NOTE — ED PROVIDER NOTES
ED Attending Attestation Note     Date of evaluation: 1/5/2023    This patient was seen by the resident. I have seen and examined the patient, agree with the workup, evaluation, management and diagnosis. The care plan has been discussed. I have reviewed the ECG and concur with the resident's interpretation. My assessment reveals 56-year-old female with a history of aortic valve replacement, stage III kidney disease, diastolic heart failure presenting to the hospital for evaluation of orthopnea. Patient has been having issues with shortness of breath for the past 7 days and has been getting to the point where she is unable to sleep at this time. She is not on oxygen requirement at home. She denies any chest pain. She has a home pulse oximeter which was prescribed by her primary care doctor but it has been having low readings less than 90 which concerned her primary care doctor and she recommended her to be evaluated as well. Patient my assessment has a right anterior chest wall floor. She has clear breath sounds with some slight rales noted in the lower lung base. She has no evidence of edema in the lower extremities. She has a heart murmur that is appreciated as well. We will be obtaining cardiac work-up assessing for signs of CHF exacerbation at this time.      Anthony Jolley MD  01/05/23 [id number]

## 2023-01-05 NOTE — CARE COORDINATION
Recorded by: Rodney Fried 2023 @ 10:23 AM   Time Spent: 9 minutes     Remote Alert Monitoring Note Date/Time: 2023 10:13 AM LPN contacted patient by telephone regarding red alert received for blood pressure reading (180/57) and pulse ox reading (91). Verified patients name and  as identifiers. Background: HTN, DM< CHF Refer to 911 immediately if:  Patient unresponsive or unable to provide history  Change in cognition or sudden confusion  Patient unable to respond in complete sentences  Intense chest pain/tightness  Any concern for any clinical emergency  Red Alert: Provider response time of 1 hr required for any red alert requiring intervention  Yellow Alert: Provider response time of 3hr required for any escalated yellow alert BP Triage Are you having any Chest Pain? no Are you having any Shortness of Breath? yes Do you have a headache or have any vision changes> no Are you having any numbness or tingling> no Are you having any other health concerns or issues? no O2 Triage Are you having any Chest Pain? no Are you having any Shortness of Breath> yes Swelling in your hands or feet? no Are you having any other health concerns or issues? no Clinical Interventions: Reviewed and followed up on alerts and treatments-Spoke with patient she states she is not feeling well and did not sleep much at all last night she states she has SOB and felt her chest was smothery when she laid down, she denies chest pain, dizziness, swelling blurred vision, LPN had patient recheck BP while on phone and it improved to 15/662/64 she then rechecked pulse ox and it was at 87% LPN instructed patient to recheck pulse ox every 5-8 minutes and if it does not improve to 91 or above within 10 minutes to seek medical attention, she states she is planning on going to the ED but  is not home until noon, LPN instructed patient to call 911 if symptoms worsen or she feels in distress will let ACM and PCP know Plan/Follow Up:  Will continue to review, monitor and address alerts with follow up based on severity of symptoms and risk factors.  ---- Current Patient Metrics ---- Blood Pressure: 156/62, 64bpm Glucose: -mg/dl Pulseox: 87%, 63bpm Survey: C Weight: 106.2lbs Note Created at: 01/05/2023 10:23 AM ET ---- Time-Spent: 9 minutes 0 seconds

## 2023-01-05 NOTE — TELEPHONE ENCOUNTER
Writer contacted Dr. Oshea to inform of 30 day readmission risk.    No Decision on disposition at this time.    Call Back: If you need to call back to inform of disposition you can contact me at [phone]

## 2023-01-05 NOTE — TELEPHONE ENCOUNTER
Spoke with pt, she is currently at [facility] ER.   She has an appt her on 1-10-23

## 2023-01-05 NOTE — ED PROVIDER NOTES
[id number] [facility] RESIDENT NOTE       Date of evaluation: 1/5/2023    Chief Complaint     Shortness of Breath (Pt presents to the ED via triage w/ c/o of SOB. Pt reports that see has been feeling SOB for about 7 days. Last night the SOB became much worse and pt reports that she was unable to sleep. Pt sats in the mid - upper 90s on room air. Pt reports hx of CHF )      of Present Illness     Kyler Jones is a 69 y.o. female with a history of CHF with grade 3 diastolic dysfunction, CKD, diabetes, COPD, mechanical aortic valve who presents to the emergency department the chief complaint of shortness of breath. Patient states she was recently admitted for pneumonia. She has been recovering well but reportedly was enrolled in a program that sent her home with equipment to monitor her weight as well as her other vital signs. She has been doing this for the last week. For the last week, she has had some intermittent shortness of breath that got worse last night to the point where she felt like she was being \"smothered\" and could not lie flat. She stated she sat up and could not sleep. She states that her pulse ox has been reading anywhere from 83 to in the mid upper 90s throughout the week. She called into the triage RN today due to ongoing symptoms of shortness of breath and the described orthopnea she had last night, and reportedly her oxygen level was in the upper 80s to 90 and so she was instructed to present to the emergency department. She denies any recent infectious symptoms including fever, chills, cough, rhinorrhea, congestion, sore throat. Denies any chest pain. Feels limited symptoms currently. Denies any abdominal pain, nausea, vomiting, dysuria, hematuria, melena, hematochezia. States that she normally takes anywhere from 20-40 of p.o. Lasix daily based on her weight and that she currently has been losing weight.   Denies any lower extremity edema.  Did take her albuterol inhaler at home and felt that this helped a little bit but then the effect went away. Warren that she was a little bit wheezy last night. Review of Systems     Review of Systems   Constitutional:  Negative for appetite change, chills, diaphoresis, fever and unexpected weight change. HENT:  Negative for congestion, rhinorrhea, sneezing and sore throat. Eyes: Negative. Respiratory:  Positive for chest tightness, shortness of breath and wheezing. Negative for cough. Cardiovascular:  Negative for chest pain, palpitations and leg swelling. Gastrointestinal:  Negative for abdominal pain, blood in stool, nausea and vomiting. Endocrine: Negative. Genitourinary: Negative. Negative for dysuria and hematuria. Musculoskeletal: Negative. Skin: Negative. Allergic/Immunologic: Negative. Neurological: Negative. Negative for dizziness and syncope. Hematological: Negative. Psychiatric/Behavioral: Negative. All other systems reviewed and are negative.     Past Medical, Surgical, Family, and Social History     She has a past medical history of A-fib ([facility] [address] Anemia, Anemia:multifactorial, Anxiety, Cataract, CHF (congestive heart failure) ([address] Chronic back pain, CKD (chronic kidney disease) stage 3, GFR 30-59 ml/min ([address] Colitis, ischemic ([address] COPD, Diabetes, Diabetic eye exam ([facility] [address] GERD (gastroesophageal reflux disease), H/O heart valve replacement with mechanical valve, Hx of mammogram, Hyperlipidaemia LDL goal <100, Hypertension, Insomnia, Long-term (current) use of anticoagulants, INR goal 2.5-3.5, Lymphoma:monoclonal B cell, Mammogram abnormal, Nonspecific abnormal results of kidney function study, Osteoarthritis, Renal cysts, right, RLS (restless legs syndrome), Sciatica, Screening colonoscopy, Therapeutic drug monitoring, Valvular heart disease, and Visit for screening mammogram.  She has a past surgical history that includes Aortic valve replacement; Mitral valve replacement; Hysterectomy; Cataract removal (12/11/13;1/8/14); Colonoscopy; pacemaker placement (8/07); Tunneled venous port placement (Right, 10/3/2014); and laparoscopy (2/3/15). Her family history includes Diabetes in her brother; Roxy Oshea in her mother. She reports that she quit smoking about 15 years ago. Her smoking use included cigarettes. She has a 40.00 pack-year smoking history. She has never used smokeless tobacco. She reports current alcohol use of about 1.0 standard drink per week. She reports that she does not use drugs. Medications     Previous Medications    ALPRAZOLAM (XANAX) 0.5 MG TABLET    TAKE 1 TABLET BY MOUTH TWICE DAILY AS NEEDED FOR ANXIETY    AMLODIPINE (NORVASC) 5 MG TABLET    Take 1 tablet by mouth daily    ATORVASTATIN (LIPITOR) 10 MG TABLET    TAKE 1 TABLET BY MOUTH DAILY IN THE EVENING FOR CHOLESTEROL    BLOOD GLUCOSE MONITOR STRIPS    Test 2 times a day & as needed for symptoms of irregular blood glucose. Ok to dispense what is covered by insurance. Patient has been using sifonrs test strips    FUROSEMIDE (LASIX) 20 MG TABLET    Take 2 tablets by mouth daily as needed (leg swelling or weight gain of 3-5 lbs)    GLIPIZIDE (GLUCOTROL) 5 MG TABLET    TAKE 1/2 TABLET BY MOUTH EVERY DAY WITH FOOD    LISINOPRIL (PRINIVIL;ZESTRIL) 20 MG TABLET    Take 0.5 tablets by mouth daily    LISINOPRIL (PRINIVIL;ZESTRIL) 40 MG TABLET    TAKE 1 TABLET BY MOUTH DAILY    METFORMIN (GLUCOPHAGE) 1000 MG TABLET    TAKE 1 TABLET BY MOUTH TWICE DAILY    METOPROLOL TARTRATE (LOPRESSOR) 25 MG TABLET    Take 12.5 mg by mouth 2 times daily 0.5 tab BID     PANTOPRAZOLE (PROTONIX) 40 MG TABLET    TAKE 1 TABLET BY MOUTH EVERY DAY    TRUE METRIX BLOOD GLUCOSE TEST STRIP    USE TO TEST BLOOD SUGAR TWICE DAILY    VITAMIN B-12 (CYANOCOBALAMIN) 100 MCG TABLET    Take 100 mcg by mouth daily    WARFARIN (COUMADIN) 1 MG TABLET    TAKE 2 AND 1/2 TABLETS BY MOUTH ON SUNDAYS AND TUESDAYS.  TAKE 2 TABLETS BY MOUTH ALL OTHER DAYS. Allergies     She is allergic to diclofenac, nsaids, and hydrocodone-acetaminophen. Physical Exam     INITIAL VITALS: BP: (!) 172/47, Temp: 97.8 °F (36.6 °C), Heart Rate: 64, Resp: 18, SpO2: 95 %   Physical Exam  Vitals reviewed. Constitutional:       General: She is not in acute distress. Appearance: She is normal weight. She is not ill-appearing or diaphoretic. HENT:      Head: Normocephalic and atraumatic. Mouth/Throat:      Mouth: Mucous membranes are moist.      Pharynx: Oropharynx is clear. Eyes:      Extraocular Movements: Extraocular movements intact. Cardiovascular:      Rate and Rhythm: Normal rate. Rhythm irregular. Pulses: Normal pulses. Heart sounds: Murmur (Systolic murmur, loud over both right and left upper sternal border) heard. Pulmonary:      Effort: Pulmonary effort is normal.      Breath sounds: Examination of the right-middle field reveals rales. Examination of the left-middle field reveals rales. Examination of the right-lower field reveals rales. Examination of the left-lower field reveals rales. Rales present. Comments: Satting 96 to 98% on room air with normal work of breathing and no tachypnea. Scant faint wheezing and expiratory phase only. Soft crackles at bilateral bases. Chest:      Chest wall: No tenderness or crepitus. Abdominal:      Palpations: Abdomen is soft. Tenderness: There is no abdominal tenderness. Musculoskeletal:         General: Normal range of motion. Cervical back: Normal range of motion. Right lower leg: No edema. Left lower leg: No edema. Skin:     General: Skin is warm and dry. Capillary Refill: Capillary refill takes less than 2 seconds. Neurological:      General: No focal deficit present. Mental Status: She is alert and oriented to person, place, and time.    Psychiatric:         Mood and Affect: Mood normal.         Behavior: Behavior normal. DiagnosticResults     EKG   Interpreted in conjunction with emergencydepartment physician Holly Quinn MD  Rhythm: normal sinus   Rate: normal  Axis: normal  Ectopy: none  Conduction: normal  ST Segments: no acute change  T Waves: T wave inversion in V2. Somewhat biphasic appearance in lateral leads  Q Waves: none  Clinical Impression: no acute changes  Comparison: Compared to previous on 12/4/2022, biphasic appearance of T waves in lateral leads is new    RADIOLOGY:  XR CHEST (2 VW)   Final Result      1. Bilateral basilar airspace disease and pleural effusions, left greater than right.                 LABS:   Results for orders placed or performed during the hospital encounter of 01/05/23   CBC with Auto Differential   Result Value Ref Range    WBC 8.9 4.0 - 11.0 K/uL    RBC 2.62 (L) 4.00 - 5.20 M/uL    Hemoglobin 8.1 (L) 12.0 - 16.0 g/dL    Hematocrit 23.9 (L) 36.0 - 48.0 %    MCV 91.1 80.0 - 100.0 fL    MCH 30.8 26.0 - 34.0 pg    MCHC 33.8 31.0 - 36.0 g/dL    RDW 17.7 (H) 12.4 - 15.4 %    Platelets 329 511 - 653 K/uL    MPV 10.9 (H) 5.0 - 10.5 fL    Neutrophils % 68.2 %    Lymphocytes % 21.9 %    Monocytes % 8.9 %    Eosinophils % 0.5 %    Basophils % 0.5 %    Neutrophils Absolute 6.0 1.7 - 7.7 K/uL    Lymphocytes Absolute 1.9 1.0 - 5.1 K/uL    Monocytes Absolute 0.8 0.0 - 1.3 K/uL    Eosinophils Absolute 0.0 0.0 - 0.6 K/uL    Basophils Absolute 0.0 0.0 - 0.2 K/uL   BMP w/ Reflex to MG   Result Value Ref Range    Sodium 142 136 - 145 mmol/L    Potassium reflex Magnesium 4.1 3.5 - 5.1 mmol/L    Chloride 104 99 - 110 mmol/L    CO2 24 21 - 32 mmol/L    Anion Gap 14 3 - 16    Glucose 141 (H) 70 - 99 mg/dL    BUN 13 7 - 20 mg/dL    Creatinine 1.2 0.6 - 1.2 mg/dL    Est, Glom Filt Rate 47 (A) >60    Calcium 8.8 8.3 - 10.6 mg/dL   Troponin One time   Result Value Ref Range    Troponin <0.01 <0.01 ng/mL   Brain Natriuretic Peptide   Result Value Ref Range    Pro-BNP 8,729 (H) 0 - 449 pg/mL   Blood gas, venous (Lab)   Result Value Ref Range    pH, [place] 7.385 7.350 - 7.450    pCO2, [place] 43.2 41.0 - 51.0 mmHg    pO2, [place] 121.0 (H) 25.0 - 40.0 mmHg    HCO3, Venous 25.8 24.0 - 28.0 mmol/L    Base Excess, [place] 0.7 -2.0 - 3.0 mmol/L    O2 Sat, [place] >100 Not established %    Carboxyhemoglobin 3.0 (H) 0.0 - 1.5 %    MetHgb, [place] 0.2 0.0 - 1.5 %    TC02 (Calc), [place] 27 mmol/L   EKG 12 Lead   Result Value Ref Range    Ventricular Rate 59 BPM    Atrial Rate 85 BPM    QRS Duration 90 ms    Q-T Interval 498 ms    QTc Calculation (Bazett) 493 ms    R Axis 93 degrees    T Axis 122 degrees    Diagnosis       EKG performed in ER and to be interpreted by ER physician. Confirmed by MD, ER ([id number]),  Niranjan Hyman ([id number]) on 1/5/2023 2:14:04 PM       ED BEDSIDE ULTRASOUND:  No results found. RECENT VITALS:  BP: (!) 159/97, Temp: 97.8 °F (36.6 °C), Heart Rate: 66,Resp: 17, SpO2: 95 %     Procedures         ED Course     Nursing Notes, Past Medical Hx, Past Surgical Hx, Social Hx, Allergies, and Family Hx were reviewed. The patient was given the followingmedications:  Orders Placed This Encounter   Medications    ipratropium-albuterol (DUONEB) nebulizer solution 1 ampule     Order Specific Question:   Initiate RT Bronchodilator Protocol     Answer:   Yes - ED protocol    furosemide (LASIX) injection 40 mg       CONSULTS:  [address] / ASSESSMENT / Checo Hilario is a 69 y.o. female with aortic valve pathology status post mechanical valve replacement, CHF with grade 3 diastolic dysfunction who presents the emergency department with orthopnea. On examination, patient does not seem overtly volume but does have rales as well as faint wheezes. She was given duo nebs with mild improvement in her symptoms that her VBG does not reflect significant obstructive disease. Her chest x-ray appears wet with pleural effusions, and her BNP is elevated to around 8700 from 6700.   Has been taking 20 of Lasix most days that she has been losing weight. Troponin is negative and EKG does not show any ischemic changes. The patient is not hypoxic here, though there are reports that she has been intermittently hypoxic at home, though unsure of the accuracy of her pulse oximeter at home. She is having a high degree of symptom burden with shortness of breath upon exertion and now significant orthopnea to the point where she is unable to sleep at all. Given her pleural effusions, extent of her symptoms, and particularly her history of mechanical valve replacement making her at higher risk of potential decompensation, will elect to admit the patient for CHF exacerbation. I have ordered her 40 mg of IV Lasix which should be appropriate with her potassium of 4.1 and will discuss the case with the admitting hospitalist.    This patient was also evaluated by the attending physician. All care plans werediscussed and agreed upon. Clinical Impression     1. Acute on chronic congestive heart failure, unspecified heart failure type ([address]        Disposition     PATIENT REFERRED TO:  No follow-up provider specified. DISCHARGE MEDICATIONS:  New Prescriptions    No medications on file       DISPOSITION   - Admit    Latonia Lafleur MD  Emergency Medicine PGY-2     Royce Chavira MD  Resident  01/05/23 [phone]

## 2023-01-06 LAB
ANION GAP SERPL CALCULATED.3IONS-SCNC: 12 MMOL/L (ref 3–16)
BASOPHILS ABSOLUTE: 0.1 K/UL (ref 0–0.2)
BASOPHILS RELATIVE PERCENT: 0.9 %
BUN BLDV-MCNC: 13 MG/DL (ref 7–20)
CALCIUM SERPL-MCNC: 8.7 MG/DL (ref 8.3–10.6)
CHLORIDE BLD-SCNC: 103 MMOL/L (ref 99–110)
CO2: 28 MMOL/L (ref 21–32)
CREAT SERPL-MCNC: 1.2 MG/DL (ref 0.6–1.2)
EOSINOPHILS ABSOLUTE: 0 K/UL (ref 0–0.6)
EOSINOPHILS RELATIVE PERCENT: 0.5 %
FERRITIN: 2267 NG/ML (ref 15–150)
GFR SERPL CREATININE-BSD FRML MDRD: 47 ML/MIN/{1.73_M2}
GLUCOSE BLD-MCNC: 167 MG/DL (ref 70–99)
GLUCOSE BLD-MCNC: 249 MG/DL (ref 70–99)
HAPTOGLOBIN: <10 MG/DL (ref 30–200)
HCT VFR BLD CALC: 22.1 % (ref 36–48)
HCT VFR BLD CALC: 22.2 % (ref 36–48)
HCT VFR BLD CALC: 22.9 % (ref 36–48)
HEMOGLOBIN: 7.5 G/DL (ref 12–16)
HEMOGLOBIN: 7.5 G/DL (ref 12–16)
IMMATURE RETIC FRACT: 0.48 (ref 0.21–0.37)
INR BLD: 2.48 (ref 0.87–1.14)
IRON SATURATION: 34 % (ref 15–50)
IRON: 61 UG/DL (ref 37–145)
LACTATE DEHYDROGENASE: 1176 U/L (ref 100–190)
LV EF: 63 %
LVEF MODALITY: NORMAL
LYMPHOCYTES ABSOLUTE: 1.7 K/UL (ref 1–5.1)
LYMPHOCYTES RELATIVE PERCENT: 20.5 %
MAGNESIUM: 1.4 MG/DL (ref 1.8–2.4)
MCH RBC QN AUTO: 30.8 PG (ref 26–34)
MCHC RBC AUTO-ENTMCNC: 34 G/DL (ref 31–36)
MCV RBC AUTO: 90.7 FL (ref 80–100)
MONOCYTES ABSOLUTE: 0.8 K/UL (ref 0–1.3)
MONOCYTES RELATIVE PERCENT: 9.7 %
NEUTROPHILS ABSOLUTE: 5.6 K/UL (ref 1.7–7.7)
NEUTROPHILS RELATIVE PERCENT: 68.4 %
PDW BLD-RTO: 17.6 % (ref 12.4–15.4)
PERFORMED ON: ABNORMAL
PLATELET # BLD: 159 K/UL (ref 135–450)
PMV BLD AUTO: 9.4 FL (ref 5–10.5)
POTASSIUM REFLEX MAGNESIUM: 3.7 MMOL/L (ref 3.5–5.1)
PROTHROMBIN TIME: 26.9 SEC (ref 11.7–14.5)
RBC # BLD: 2.44 M/UL (ref 4–5.2)
RETICULOCYTE ABSOLUTE COUNT: 0.11 M/UL (ref 0.02–0.1)
RETICULOCYTE COUNT PCT: 4.59 % (ref 0.5–2.18)
SODIUM BLD-SCNC: 143 MMOL/L (ref 136–145)
TOTAL IRON BINDING CAPACITY: 178 UG/DL (ref 260–445)
WBC # BLD: 8.1 K/UL (ref 4–11)

## 2023-01-06 PROCEDURE — 6370000000 HC RX 637 (ALT 250 FOR IP)

## 2023-01-06 PROCEDURE — 85045 AUTOMATED RETICULOCYTE COUNT: CPT

## 2023-01-06 PROCEDURE — 82728 ASSAY OF FERRITIN: CPT

## 2023-01-06 PROCEDURE — 83540 ASSAY OF IRON: CPT

## 2023-01-06 PROCEDURE — 6360000002 HC RX W HCPCS

## 2023-01-06 PROCEDURE — 6370000000 HC RX 637 (ALT 250 FOR IP): Performed by: STUDENT IN AN ORGANIZED HEALTH CARE EDUCATION/TRAINING PROGRAM

## 2023-01-06 PROCEDURE — 85025 COMPLETE CBC W/AUTO DIFF WBC: CPT

## 2023-01-06 PROCEDURE — 83735 ASSAY OF MAGNESIUM: CPT

## 2023-01-06 PROCEDURE — 1200000000 HC SEMI PRIVATE

## 2023-01-06 PROCEDURE — 36415 COLL VENOUS BLD VENIPUNCTURE: CPT

## 2023-01-06 PROCEDURE — 85610 PROTHROMBIN TIME: CPT

## 2023-01-06 PROCEDURE — 83550 IRON BINDING TEST: CPT

## 2023-01-06 PROCEDURE — 94761 N-INVAS EAR/PLS OXIMETRY MLT: CPT

## 2023-01-06 PROCEDURE — 2580000003 HC RX 258

## 2023-01-06 PROCEDURE — 94640 AIRWAY INHALATION TREATMENT: CPT

## 2023-01-06 PROCEDURE — 83010 ASSAY OF HAPTOGLOBIN QUANT: CPT

## 2023-01-06 PROCEDURE — 85018 HEMOGLOBIN: CPT

## 2023-01-06 PROCEDURE — 85014 HEMATOCRIT: CPT

## 2023-01-06 PROCEDURE — 83615 LACTATE (LD) (LDH) ENZYME: CPT

## 2023-01-06 PROCEDURE — 6370000000 HC RX 637 (ALT 250 FOR IP): Performed by: INTERNAL MEDICINE

## 2023-01-06 PROCEDURE — 93306 TTE W/DOPPLER COMPLETE: CPT

## 2023-01-06 PROCEDURE — 80048 BASIC METABOLIC PNL TOTAL CA: CPT

## 2023-01-06 RX ORDER — WARFARIN SODIUM 2 MG/1
2 TABLET ORAL DAILY
Status: DISCONTINUED | OUTPATIENT
Start: 2023-01-06 | End: 2023-01-07

## 2023-01-06 RX ORDER — ALPRAZOLAM 0.5 MG/1
0.5 TABLET ORAL 2 TIMES DAILY PRN
Status: DISCONTINUED | OUTPATIENT
Start: 2023-01-06 | End: 2023-01-08 | Stop reason: HOSPADM

## 2023-01-06 RX ORDER — MAGNESIUM SULFATE IN WATER 40 MG/ML
2000 INJECTION, SOLUTION INTRAVENOUS ONCE
Status: COMPLETED | OUTPATIENT
Start: 2023-01-06 | End: 2023-01-06

## 2023-01-06 RX ADMIN — ALPRAZOLAM 0.5 MG: 0.5 TABLET ORAL at 00:37

## 2023-01-06 RX ADMIN — SODIUM CHLORIDE, PRESERVATIVE FREE 10 ML: 5 INJECTION INTRAVENOUS at 00:09

## 2023-01-06 RX ADMIN — ATORVASTATIN CALCIUM 10 MG: 20 TABLET, FILM COATED ORAL at 22:46

## 2023-01-06 RX ADMIN — ALPRAZOLAM 0.5 MG: 0.5 TABLET ORAL at 22:46

## 2023-01-06 RX ADMIN — METOPROLOL TARTRATE 12.5 MG: 25 TABLET, FILM COATED ORAL at 07:52

## 2023-01-06 RX ADMIN — WARFARIN SODIUM 2 MG: 2 TABLET ORAL at 18:24

## 2023-01-06 RX ADMIN — ATORVASTATIN CALCIUM 10 MG: 20 TABLET, FILM COATED ORAL at 00:37

## 2023-01-06 RX ADMIN — METOPROLOL TARTRATE 12.5 MG: 25 TABLET, FILM COATED ORAL at 00:37

## 2023-01-06 RX ADMIN — MAGNESIUM SULFATE HEPTAHYDRATE 2000 MG: 40 INJECTION, SOLUTION INTRAVENOUS at 07:53

## 2023-01-06 RX ADMIN — FUROSEMIDE 40 MG: 10 INJECTION, SOLUTION INTRAMUSCULAR; INTRAVENOUS at 07:52

## 2023-01-06 RX ADMIN — METOPROLOL TARTRATE 12.5 MG: 25 TABLET, FILM COATED ORAL at 22:46

## 2023-01-06 RX ADMIN — SODIUM CHLORIDE, PRESERVATIVE FREE 10 ML: 5 INJECTION INTRAVENOUS at 22:50

## 2023-01-06 RX ADMIN — IPRATROPIUM BROMIDE AND ALBUTEROL SULFATE 1 AMPULE: 2.5; .5 SOLUTION RESPIRATORY (INHALATION) at 08:18

## 2023-01-06 RX ADMIN — IPRATROPIUM BROMIDE AND ALBUTEROL SULFATE 1 AMPULE: 2.5; .5 SOLUTION RESPIRATORY (INHALATION) at 20:35

## 2023-01-06 RX ADMIN — PANTOPRAZOLE SODIUM 40 MG: 40 TABLET, DELAYED RELEASE ORAL at 07:52

## 2023-01-06 RX ADMIN — SODIUM CHLORIDE, PRESERVATIVE FREE 10 ML: 5 INJECTION INTRAVENOUS at 07:51

## 2023-01-06 ASSESSMENT — PAIN SCALES - GENERAL
PAINLEVEL_OUTOF10: 0

## 2023-01-06 NOTE — H&P
Internal Medicine  PGY 1  History & Physical      CC SOB    History Obtained From:  patient    HISTORY OF PRESENT ILLNESS:  This is a 81-year-old female with history of HFpEF (G3DD, 7/2021), mechanical aortic valve, mechanical mitral valve (on warfarin), A. fib, CKD, hypertension, hyperlipidemia, GERD who presents with a 1 week history of shortness of breath. Patient states she lives at home with her . She has noticed progressively worsening shortness of breath over the past 1 week, and has worsened to the point that she could not sleep last night. She felt \"suffocated. \"  She has been sleeping in the recliner sitting up. She is not on any oxygen at home. She takes Lasix 20-40 mg daily depending on her daily weight. She had called her PCP regarding her shortness of breath and was recommended to come to the ED for further evaluation. Of note, she has hypertension and takes her blood pressure at home daily. She states her blood pressure normally runs in the 688-276 systolic. After she takes her lisinopril, her blood pressure improved to the 160s. She endorses mild headache. She denies any lightheadedness, vision changes, chest pain, abdominal pain, nausea, vomiting, urinary changes, diarrhea, constipation, peripheral edema. She is on warfarin daily for her mechanical aortic and mitral valve, and she is compliant with this. She endorses good appetite. In the ED, she was satting 95-98% on room air. She is hypertensive to 170-190s/40-60s. Pulse in the 70s. BMP overall unremarkable. BNP elevated to 8729 (baseline 6K). Troponin negative. Chronic anemia, hemoglobin 8.1 which appears to be around her baseline. Chest x-ray with bilateral airspace disease and pleural effusions (left greater than right). She was given 40 mg IV Lasix and DuoNeb treatment. Admitted for CHF exacerbation and hypertensive urgency.     Past Medical History:        Diagnosis Date    A-fib ([address]     Anemia multifactorial:under care of heme-onc:Dr. Be Torres    Anemia:multifactorial 2011    as per heme-onc    Anxiety     Cataract     bilateral    CHF (congestive heart failure) ([facility]     Chronic back pain     Under care of ortho spine:Dr. Jefferson Herrmann    CKD (chronic kidney disease) stage 3, GFR 30-59 ml/min ([facility] [address] 01/2012    Colitis, ischemic ([facility] [address] 06/2012    Under care of Dr. Lauren Dumont    COPD     Diabetes     Diabetic eye exam ([facility] [address] 01/28/2015    CEI    GERD (gastroesophageal reflux disease)     H/O heart valve replacement with mechanical valve     Hx of mammogram 05/23/2018    negative:see scanned report. Hyperlipidaemia LDL goal <100     Hypertension     Insomnia     Long-term (current) use of anticoagulants, INR goal 2.5-3.5     Lymphoma:monoclonal B cell 01/2012    Under care of Dr. Nabil Adams abnormal 07/30/2015    Right breast mass:benign(?lipoma)per US:repeat testing in 6mos=1/30/16    Nonspecific abnormal results of kidney function study 01/25/2012    Osteoarthritis     Renal cysts, right 01/02/2014    RLS (restless legs syndrome) 10/19/2011    Sciatica     Screening colonoscopy 2010;6/19/13    Nml. Next 10yrs:6/2023:Dr. Román Groves. 9/22/16(Dr. Waddell):nml EGD & colonoscopy except mild diverticulosis    Therapeutic drug monitoring 04/10/2015    OARRS report consistent on 4/10/15;7/6/15;10/23/15;2/7/16;5/16/16;8/19/16;11/4/16;3/6/17;6/26/17;9/20/17;12/18/17; 12/18/17;3/12/18;5/29/18    Valvular heart disease     s/p aortic and mitral valve repair. Under care of cards:Dr. Tien Dolan. Visit for screening mammogram 04/10/2017    negative:see scanned report.        Past Surgical History:        Procedure Laterality Date    AORTIC VALVE REPLACEMENT      CATARACT REMOVAL  12/11/13;1/8/14    Right;left respectively    COLONOSCOPY      \"twilight sleep didnt work\"    HYSTERECTOMY (CERVIX STATUS UNKNOWN)      Fibroids    LAPAROSCOPY  2/3/15    SBO:converted to open for lysis of adhesions MITRAL VALVE REPLACEMENT      PACEMAKER PLACEMENT  8/07    for bradycardia, sympony    TUNNELED VENOUS PORT PLACEMENT Right 10/3/2014    ATTEMPTED RIGHT SUBCLAVIEN VEIN, PLACED RIGHT INTERNAL       Medications Priorto Admission:    Not in a hospital admission. Allergies:  Diclofenac, Nsaids, and Hydrocodone-acetaminophen    Social History:   TOBACCO:   reports that she quit smoking about 15 years ago. Her smoking use included cigarettes. She has a 40.00 pack-year smoking history. She has never used smokeless tobacco.  ETOH:   reports current alcohol use of about 1.0 standard drink per week. DRUGS : None  Patient currently lives with family     Family History:       Problem Relation Age of Onset    Diabetes Brother     Lung Cancer Mother         Smoker       Review of Systems   Constitutional:  Negative for appetite change, chills, fatigue and fever. Eyes:  Negative for pain and visual disturbance. Respiratory:  Positive for shortness of breath. Negative for cough. Cardiovascular:  Negative for chest pain, palpitations and leg swelling. Gastrointestinal:  Negative for abdominal pain, constipation, diarrhea, nausea and vomiting. Genitourinary:  Negative for difficulty urinating. Neurological:  Positive for headaches. Negative for dizziness, syncope, weakness and light-headedness. Psychiatric/Behavioral:  Negative for confusion. Physical Exam  Constitutional:       General: She is not in acute distress. Appearance: Normal appearance. She is not ill-appearing. HENT:      Head: Normocephalic and atraumatic. Mouth/Throat:      Mouth: Mucous membranes are moist.   Eyes:      Extraocular Movements: Extraocular movements intact. Pupils: Pupils are equal, round, and reactive to light. Cardiovascular:      Rate and Rhythm: Normal rate and regular rhythm. Heart sounds: Murmur heard.    Pulmonary:      Effort: Pulmonary effort is normal.      Comments: Scattered crackles  Abdominal:      General: Bowel sounds are normal. There is no distension. Palpations: Abdomen is soft. There is no mass. Tenderness: There is no abdominal tenderness. Musculoskeletal:      Right lower leg: No edema. Left lower leg: No edema. Skin:     General: Skin is warm and dry. Neurological:      General: No focal deficit present. Mental Status: She is alert. Mental status is at baseline. Psychiatric:         Mood and Affect: Mood normal.         Behavior: Behavior normal.     Physical exam:       Vitals:    01/05/23 1916   BP: (!) 159/97   Pulse:    Resp:    Temp:    SpO2:        DATA:    Labs:  CBC:   Recent Labs     01/05/23  1717   WBC 8.9   HGB 8.1*   HCT 23.9*          BMP:   Recent Labs     01/05/23  1717      K 4.1      CO2 24   BUN 13   CREATININE 1.2   GLUCOSE 141*     LFT's: No results for input(s): AST, ALT, ALB, BILITOT, ALKPHOS in the last 72 hours. Troponin:   Recent Labs     01/05/23 1717   TROPONINI <0.01     BNP:No results for input(s): BNP in the last 72 hours. ABGs: No results for input(s): PHART, WSD8PEB, PO2ART in the last 72 hours. INR: No results for input(s): INR in the last 72 hours. U/A:No results for input(s): NITRITE, COLORU, PHUR, LABCAST, WBCUA, RBCUA, MUCUS, TRICHOMONAS, YEAST, BACTERIA, CLARITYU, SPECGRAV, LEUKOCYTESUR, UROBILINOGEN, BILIRUBINUR, BLOODU, GLUCOSEU, AMORPHOUS in the last 72 hours. Invalid input(s): KETONESU    XR CHEST (2 VW)   Final Result      1. Bilateral basilar airspace disease and pleural effusions, left greater than right. ASSESSMENT AND PLAN:  This is a 52-year-old female with history of HFpEF (G3DD, 7/2021), mechanical aortic valve, mechanical mitral valve (on warfarin), A. fib, CKD, hypertension, hyperlipidemia, GERD who presents with a 1 week history of shortness of breath. CHF exacerbation  Presenting with worsening SOB, RONDON, orthopnea.  On lasix 20-40mg daily depending on daily weights. Compliant with this. Chest XR with bilateral airspace disease and pleural effusions (L > R). Last echo done 7/29/2021 with LVH, normal EF 55-60%, G3DD. S/p IV lasix 40mg in the ED.  - cont IV lasix 40mg daily  - strict I/Os   - daily weights  - f/u echo  - cont home metoprolol 12.5mg BID  - consult to HF coordinator    HTN urgency  Pt with reported home -190s that improves to 160s SBP after taking lisinopril and amlodipine. Pt presenting with mild headache and SOB that could be caused by HTN. - given her CHF exacerbation, holding ACE-I and CCB  - start home metoprolol 12.5 BID  - restart home lisinopril and amlodipine once CHF exac improves      Chronic Medical Conditions:  Mechanical aortic valve  Mechanical mitral valve  - compliant with home warfarin  - check PT/INR  - restart warfarin once ID/INR comes back - pharmacy to dose    CKD  Follows with Dr. Maggie Joya outpatient.  Cr actually looks better, 1.2 (baseline appears 2.2)  - monitor    DM  - hold home metformin and glipizide  - LDSSI, increase to MDSSI in AM    GERD   - cont home protonix    Anxiety  - cont home Xanax BID PRN     HLD   - cont home lipitor    Will discuss with attending physician Dr. Trisha Hernández Status:Full code  FEN: Regular, cardiac  PPX: warfarin  DISPO: Ivan Palma MD PGY1  1/5/2023,  8:00 PM

## 2023-01-06 NOTE — PROGRESS NOTES
4 Eyes Admission Assessment     I agree as the admission nurse that 2 RN's have performed a thorough Head to Toe Skin Assessment on the patient. ALL assessment sites listed below have been assessed on admission. Areas assessed by both nurses:   [x]   Head, Face, and Ears   [x]   Shoulders, Back, and Chest  [x]   Arms, Elbows, and Hands   [x]   Coccyx, Sacrum, and Ischium  [x]   Legs, Feet, and Heels        Does the Patient have Skin Breakdown?   No         Vinayak Prevention initiated:  No   Wound Care Orders initiated:  No      [facility] nurse consulted for Pressure Injury (Stage 3,4, Unstageable, DTI, NWPT, and Complex wounds) or Vinayak score 18 or lower:  No      Nurse 1 eSignature: Electronically signed by Norma Ramsay RN on 1/6/23 at 4:26 PM EST    **SHARE this note so that the co-signing nurse is able to place an eSignature**    Nurse 2 eSignature: Electronically signed by Sofia Farris RN on 1/6/23 at 4:37 PM EST

## 2023-01-06 NOTE — CONSULTS
Clinical Pharmacy Progress Note    Warfarin - Management by Pharmacy    Consult Date(s): 1/6/23  Consulting Provider(s): Dr. Rosales Distance / Plan  Anticoagulation - hx of AVR/MVR - Warfarin  Goal INR: 2.5 - 3.5  Concurrent Anticoagulants / Antiplatelets: none  Interactions: No significant interactions noted. Current Regimen / Plan:   INR on admission = 2.48. Will continue Warfarin 2 mg daily. Will monitor pt's clinical status and INR daily, and make dose adjustments as needed. Thank you for consulting Pharmacy! Tim Abbasi, PharmD, [place]  Wireless: [id number]   1/6/2023 2:32 PM        Subjective/Objective:   Tameka Reilly is a 69 y.o. female with a PMHx significant for HFpEF, mechanical AVR, mechanical MVR, AFib, CKD, HTN, GERD who presented with SOB x 1 week history. Admitted with HF exacerbation and HTN urgency. Pharmacy is consulted to dose Warfarin.     Ht Readings from Last 1 Encounters:   01/06/23 4' 11\" (1.499 m)     Wt Readings from Last 1 Encounters:   01/06/23 107 lb 12.9 oz (48.9 kg)     Prior / Home Warfarin Regimen:  Goal INR 2.5-3.5  Managed as outpatient by PCP - Sarah King [facility]  Last INR check 12/9 - INR 2.7  Pt instructed to continue Warfarin 2 mg daily    Date INR Warfarin   1/6 2.48                       Recent Labs     01/05/23  1717 01/06/23  0555 01/06/23  1328   INR  --   --  2.48*   HGB 8.1* 7.5*  --     159  --    CREATININE 1.2 1.2  --

## 2023-01-06 NOTE — PROGRESS NOTES
Labs as ordered drawn with sterile technique from the right chest wall PAC. Will continue to monitor.   Electronically signed by Payton Fernández RN on 1/6/2023 at 5:55 AM

## 2023-01-06 NOTE — DISCHARGE INSTRUCTIONS
Extra Heart Failure sites:   [email or web address] --- this is American Heart Association interactive Healthier Living with Heart Failure guidebook. Please copy and paste link into search bar. Use your mouse to scroll through the pages. Lots and lots of info / tips    [facility] [place] patrick  --- free smart phone patrick available for [employer] and [employer]. Use your phone to track sodium / fluid intake,  symptoms, weight, etc.    [facility]. org - website-- [employer] is a dialysis company. All dialysis patients follow a renal diet which IS low sodium!! This website offers free seasonal cookbooks. Each quarter, they will release 25-30 new recipes with a breakdown of calories, sodium, glucose, etc    www.eatingwell.com/recipes -- more free recipes      -Please follow up with your hematologist and PCP within 1 week of discharge.   -Please take your blood pressure medications regularly.  -Please take your Warfarin tonight. Check your INR DAILY for the next 3 days and make sure to take your Warfarin.

## 2023-01-06 NOTE — PROGRESS NOTES
Internal Medicine Progress Note    Patient Name: Barbara Perez   Patient : 1945   Date: 2023   Admit Date: 2023     CC: Shortness of Breath (Pt presents to the ED via triage w/ c/o of SOB. Pt reports that see has been feeling SOB for about 7 days. Last night the SOB became much worse and pt reports that she was unable to sleep. Pt sats in the mid - upper 90s on room air. Pt reports hx of CHF )       Subjective     Interval History: Patient denies any headache, changes in vision, dizziness, chest pain, shortness of breath or worsening of leg swelling. ROS:  As per interval history above. Objective     Vital Signs:  Patient Vitals for the past 8 hrs:   BP Temp Temp src Pulse Resp SpO2 Height Weight   23 0252 (!) 160/45 97.9 °F (36.6 °C) Oral 72 18 92 % -- --   23 0009 (!) 184/43 98.4 °F (36.9 °C) Oral 79 18 96 % 4' 11\" (1.499 m) 107 lb 12.9 oz (48.9 kg)       Physical Exam:  Physical Exam  Constitutional:       General: She is not in acute distress. Appearance: She is not ill-appearing, toxic-appearing or diaphoretic. HENT:      Head: Normocephalic and atraumatic. Nose: Nose normal.      Mouth/Throat:      Mouth: Mucous membranes are dry. Eyes:      Pupils: Pupils are equal, round, and reactive to light. Cardiovascular:      Rate and Rhythm: Normal rate and regular rhythm. Pulses: Normal pulses. Heart sounds: Murmur heard. No friction rub. No gallop. Pulmonary:      Effort: Pulmonary effort is normal. No respiratory distress. Breath sounds: No stridor. Rales present. No wheezing. Comments: Mild left-sided Rales  Abdominal:      General: Bowel sounds are normal. There is no distension. Palpations: Abdomen is soft. Tenderness: There is no abdominal tenderness. There is no right CVA tenderness, left CVA tenderness or guarding. Musculoskeletal:         General: Normal range of motion. Cervical back: Normal range of motion. Right lower leg: No edema. Left lower leg: No edema. Skin:     General: Skin is dry. Capillary Refill: Capillary refill takes less than 2 seconds. Coloration: Skin is pale. Skin is not jaundiced. Findings: No bruising or lesion. Neurological:      General: No focal deficit present. Mental Status: She is alert and oriented to person, place, and time. Mental status is at baseline. Cranial Nerves: No cranial nerve deficit. Sensory: No sensory deficit. Motor: No weakness. Coordination: Coordination normal.      Gait: Gait normal.   Psychiatric:         Mood and Affect: Mood normal.       Intake/Output Summary (Last 24 hours) at 1/6/2023 0720  Last data filed at 1/6/2023 0009  Gross per 24 hour   Intake 300 ml   Output --   Net 300 ml        Medications:   magnesium sulfate  2,000 mg IntraVENous Once    metoprolol tartrate  12.5 mg Oral BID    ipratropium-albuterol  1 ampule Inhalation BID    atorvastatin  10 mg Oral Nightly    pantoprazole  40 mg Oral QAM AC    sodium chloride flush  5-40 mL IntraVENous 2 times per day    furosemide  40 mg IntraVENous Daily       sodium chloride        ALPRAZolam, sodium chloride flush, sodium chloride, ondansetron **OR** ondansetron, polyethylene glycol, perflutren lipid microspheres     Labs:  CBC:   Recent Labs     01/05/23 1717 01/06/23  0555   WBC 8.9 8.1   HGB 8.1* 7.5*   HCT 23.9* 22.2*    159   MCV 91.1 90.7       Renal:    Recent Labs     01/05/23 1717 01/06/23  0555    143   K 4.1 3.7    103   CO2 24 28   BUN 13 13   CREATININE 1.2 1.2   GLUCOSE 141* 167*   CALCIUM 8.8 8.7   MG  --  1.40*   ANIONGAP 14 12       Hepatic: No results for input(s): AST, ALT, BILITOT, BILIDIR, PROT, LABALBU, ALKPHOS in the last 72 hours. Troponin: Invalid input(s): TROPONIN    Lactic acid: Invalid input(s): LACTICACID    BNP: No results for input(s): BNP in the last 72 hours.     Pro-BNP:   Recent Labs     01/05/23 1717 PROBNP 8,729*       Lipids: No results for input(s): CHOL, TRIG, HDL, LDLCALC, VLDL in the last 72 hours. ABGs:  No results for input(s): PHART, YJC7UAD, PO2ART, ZZH4LUW, BEART, THGBART, B3IFWKXA, CKN7LUW in the last 72 hours. VBGs:   Recent Labs     01/05/23  1717   PHVEN 7.385   ETR4QCN 43.2   PO2VEN 121.0*       INR: No results for input(s): INR in the last 72 hours. aPTT: No results for input(s): APTT in the last 72 hours. Procalcitonin: No results for input(s): PROCAL in the last 72 hours. CRP: No results for input(s): CRP in the last 72 hours. ESR: No results for input(s): ESR in the last 72 hours. Radiology:  XR CHEST (2 VW)   Final Result      1. Bilateral basilar airspace disease and pleural effusions, left greater than right. Assessment & Plan   This is a 67-year-old female with history of HFpEF (G3DD, 7/2021), mechanical aortic valve, mechanical mitral valve (on warfarin), A. fib, CKD, hypertension, hyperlipidemia, GERD who presents with a 1 week history of shortness of breath. Dyspnea 2/2 Acute on Chronic HFpEF and worsening Multifactorial Anemia  Presenting with worsening SOB, RONDON, orthopnea. On lasix 20-40mg daily depending on daily weights. Compliant with this. Chest XR with bilateral airspace disease and pleural effusions (L > R). Last echo done 7/29/2021 with LVH, normal EF 55-60%, G3DD. S/p IV lasix 40mg in the ED. Dry weight around 108lb  BNP 6041(12/4/22)> 8729, exacerbation due to dietary restriction noncompliance. Hgb: 7.5 this admission, 12/6/2022:8.3. Pt has history of CKD, Non Hodgkin's Follicular Lymphoma, Iron Deficiency. 4 months ago,Transferrin receptor increased to 1.78. Rt count: increased to 4.94, G6PD levelsnormal, negative cryoglobulin.  Colonoscopy 2016: Mild diverticulosis EGD 2019: Esophagus: proximal esophageal web biopsied otherwise patient has a normal that will and distal esophagus Hiatal hernia: this measures 1-2 cm. underwent capsule endoscopy which did not reveal any focal small bowel pathology. Stress Test 6/17/2020: Abnormal SPECT myocardial perfusion scan with evidence of pharmacological stress-induced reversible myocardial ischemia in a small area of the apical myocardium. - cont IV lasix 40mg daily  - strict I/Os   - daily weights  - f/u echo  - cont home metoprolol 12.5mg BID  - consult to HF coordinator  -will repeat workup for Anemia, Iron, ferritin, TIBC, Haptoglobin, LDH   -May need GI consult for possible colonoscopy. HTN urgency-resolved  Pt with reported home -190s that improves to 160s SBP after taking lisinopril and amlodipine. Pt presenting with mild headache and SOB that could be caused by HTN. Recheck blood pressure 134/70  - given her CHF exacerbation, holding ACE-I and CCB  - start home metoprolol 12.5 BID  - restart home lisinopril and amlodipine once CHF exac improves     Chronic Medical Conditions:  Non Hodgkin's Lymphoma   Dx: 1/20/2012  -bone marrow 5/8129 with follicular lymphoma but unchanged with 5% cellularity; this has been stable from prior assessments and unlikely to be cause of worsening anemia  -s/p chemotherapy     Paroxysmall Afib  -Amlodipine 10, Coumadin    Mechanical aortic valve  Mechanical mitral valve  - compliant with home warfarin  - check PT/INR  - pharmacy to dose warfarin     CKD stage 3  Follows with Dr. Jenae Zhou outpatient. Cr actually looks better, 1.2 (baseline appears 1.3-1.65)  - monitor     DM  - hold home metformin and glipizide  - LDSSI, increase to MDSSI in AM     GERD   - cont home protonix     Anxiety  - cont home Xanax BID PRN     HLD   - cont home lipitor    COPD  -On inhalers at home-continued     Sinus Node Dysfunction  S/p Dual Chamber PPM(08/08/2018)    DVT PPx:warfarin  Diet:  ADULT DIET;  Regular; Low Fat/Low Chol/High Fiber/2 gm Na; 2000 ml   Code status:  Full Code     Will discuss with attending physician Dr. Marcia Broussard MD.     Arthur Alvarado MD  Internal Medicine, PGY-1

## 2023-01-06 NOTE — RT PROTOCOL NOTE

## 2023-01-06 NOTE — RT PROTOCOL NOTE
RT Inhaler-Nebulizer Bronchodilator Protocol Note    There is a bronchodilator order in the chart from a provider indicating to follow the RT Bronchodilator Protocol and there is an Initiate RT Inhaler-Nebulizer Bronchodilator Protocol order as well (see protocol at bottom of note). CXR Findings:  No results found. The findings from the last RT Protocol Assessment were as follows:   History Pulmonary Disease: Chronic pulmonary disease  Respiratory Pattern: Regular pattern and RR 12-20 bpm  Breath Sounds: Intermittent or unilateral wheezes  Cough: Strong, spontaneous, non-productive  Indication for Bronchodilator Therapy:    Bronchodilator Assessment Score: 6    Aerosolized bronchodilator medication orders have been revised according to the RT Inhaler-Nebulizer Bronchodilator Protocol below. Respiratory Therapist to perform RT Therapy Protocol Assessment initially then follow the protocol. Repeat RT Therapy Protocol Assessment PRN for score 0-3 or on second treatment, BID, and PRN for scores above 3. No Indications - adjust the frequency to every 6 hours PRN wheezing or bronchospasm, if no treatments needed after 48 hours then discontinue using Per Protocol order mode. If indication present, adjust the RT bronchodilator orders based on the Bronchodilator Assessment Score as indicated below. Use Inhaler orders unless patient has one or more of the following: on home nebulizer, not able to hold breath for 10 seconds, is not alert and oriented, cannot activate and use MDI correctly, or respiratory rate 25 breaths per minute or more, then use the equivalent nebulizer order(s) with same Frequency and PRN reasons based on the score. If a patient is on this medication at home then do not decrease Frequency below that used at home.     0-3 - enter or revise RT bronchodilator order(s) to equivalent RT Bronchodilator order with Frequency of every 4 hours PRN for wheezing or increased work of breathing using Per Protocol order mode. 4-6 - enter or revise RT Bronchodilator order(s) to two equivalent RT bronchodilator orders with one order with BID Frequency and one order with Frequency of every 4 hours PRN wheezing or increased work of breathing using Per Protocol order mode. 7-10 - enter or revise RT Bronchodilator order(s) to two equivalent RT bronchodilator orders with one order with TID Frequency and one order with Frequency of every 4 hours PRN wheezing or increased work of breathing using Per Protocol order mode. 11-13 - enter or revise RT Bronchodilator order(s) to one equivalent RT bronchodilator order with QID Frequency and an Albuterol order with Frequency of every 4 hours PRN wheezing or increased work of breathing using Per Protocol order mode. Greater than 13 - enter or revise RT Bronchodilator order(s) to one equivalent RT bronchodilator order with every 4 hours Frequency and an Albuterol order with Frequency of every 2 hours PRN wheezing or increased work of breathing using Per Protocol order mode. RT to enter RT Home Evaluation for COPD & MDI Assessment order using Per Protocol order mode.     Electronically signed by Wallace Pritchard RCP on 1/5/2023 at 8:56 PM

## 2023-01-07 LAB
ANION GAP SERPL CALCULATED.3IONS-SCNC: 10 MMOL/L (ref 3–16)
BASOPHILS ABSOLUTE: 0.1 K/UL (ref 0–0.2)
BASOPHILS RELATIVE PERCENT: 1.2 %
BILIRUB SERPL-MCNC: 0.8 MG/DL (ref 0–1)
BILIRUBIN DIRECT: <0.2 MG/DL (ref 0–0.3)
BILIRUBIN, INDIRECT: NORMAL MG/DL (ref 0–1)
BUN BLDV-MCNC: 15 MG/DL (ref 7–20)
CALCIUM SERPL-MCNC: 8.2 MG/DL (ref 8.3–10.6)
CHLORIDE BLD-SCNC: 99 MMOL/L (ref 99–110)
CO2: 29 MMOL/L (ref 21–32)
CREAT SERPL-MCNC: 1.2 MG/DL (ref 0.6–1.2)
EOSINOPHILS ABSOLUTE: 0 K/UL (ref 0–0.6)
EOSINOPHILS RELATIVE PERCENT: 0.6 %
GFR SERPL CREATININE-BSD FRML MDRD: 47 ML/MIN/{1.73_M2}
GLUCOSE BLD-MCNC: 198 MG/DL (ref 70–99)
GLUCOSE BLD-MCNC: 223 MG/DL (ref 70–99)
GLUCOSE BLD-MCNC: 226 MG/DL (ref 70–99)
GLUCOSE BLD-MCNC: 319 MG/DL (ref 70–99)
GLUCOSE BLD-MCNC: 423 MG/DL (ref 70–99)
HCT VFR BLD CALC: 21.5 % (ref 36–48)
HCT VFR BLD CALC: 22.1 % (ref 36–48)
HEMOGLOBIN: 7.3 G/DL (ref 12–16)
HEMOGLOBIN: 7.4 G/DL (ref 12–16)
INR BLD: 2.06 (ref 0.87–1.14)
LYMPHOCYTES ABSOLUTE: 1.6 K/UL (ref 1–5.1)
LYMPHOCYTES RELATIVE PERCENT: 19.5 %
MAGNESIUM: 1.6 MG/DL (ref 1.8–2.4)
MCH RBC QN AUTO: 30.5 PG (ref 26–34)
MCHC RBC AUTO-ENTMCNC: 33.5 G/DL (ref 31–36)
MCV RBC AUTO: 91 FL (ref 80–100)
MONOCYTES ABSOLUTE: 0.8 K/UL (ref 0–1.3)
MONOCYTES RELATIVE PERCENT: 10.2 %
NEUTROPHILS ABSOLUTE: 5.7 K/UL (ref 1.7–7.7)
NEUTROPHILS RELATIVE PERCENT: 68.5 %
PDW BLD-RTO: 16.8 % (ref 12.4–15.4)
PERFORMED ON: ABNORMAL
PLATELET # BLD: 189 K/UL (ref 135–450)
PMV BLD AUTO: 11 FL (ref 5–10.5)
POTASSIUM REFLEX MAGNESIUM: 3.2 MMOL/L (ref 3.5–5.1)
POTASSIUM REFLEX MAGNESIUM: 4.1 MMOL/L (ref 3.5–5.1)
PROTHROMBIN TIME: 23.3 SEC (ref 11.7–14.5)
RBC # BLD: 2.43 M/UL (ref 4–5.2)
SODIUM BLD-SCNC: 138 MMOL/L (ref 136–145)
WBC # BLD: 8.3 K/UL (ref 4–11)

## 2023-01-07 PROCEDURE — 94640 AIRWAY INHALATION TREATMENT: CPT

## 2023-01-07 PROCEDURE — 1200000000 HC SEMI PRIVATE

## 2023-01-07 PROCEDURE — 6370000000 HC RX 637 (ALT 250 FOR IP): Performed by: INTERNAL MEDICINE

## 2023-01-07 PROCEDURE — 80048 BASIC METABOLIC PNL TOTAL CA: CPT

## 2023-01-07 PROCEDURE — 6370000000 HC RX 637 (ALT 250 FOR IP)

## 2023-01-07 PROCEDURE — 82247 BILIRUBIN TOTAL: CPT

## 2023-01-07 PROCEDURE — 85610 PROTHROMBIN TIME: CPT

## 2023-01-07 PROCEDURE — 97530 THERAPEUTIC ACTIVITIES: CPT

## 2023-01-07 PROCEDURE — 82248 BILIRUBIN DIRECT: CPT

## 2023-01-07 PROCEDURE — 36415 COLL VENOUS BLD VENIPUNCTURE: CPT

## 2023-01-07 PROCEDURE — 6360000002 HC RX W HCPCS

## 2023-01-07 PROCEDURE — 94761 N-INVAS EAR/PLS OXIMETRY MLT: CPT

## 2023-01-07 PROCEDURE — 6370000000 HC RX 637 (ALT 250 FOR IP): Performed by: STUDENT IN AN ORGANIZED HEALTH CARE EDUCATION/TRAINING PROGRAM

## 2023-01-07 PROCEDURE — 97116 GAIT TRAINING THERAPY: CPT

## 2023-01-07 PROCEDURE — 85025 COMPLETE CBC W/AUTO DIFF WBC: CPT

## 2023-01-07 PROCEDURE — 2580000003 HC RX 258

## 2023-01-07 PROCEDURE — 85018 HEMOGLOBIN: CPT

## 2023-01-07 PROCEDURE — 83735 ASSAY OF MAGNESIUM: CPT

## 2023-01-07 PROCEDURE — 85014 HEMATOCRIT: CPT

## 2023-01-07 PROCEDURE — 84132 ASSAY OF SERUM POTASSIUM: CPT

## 2023-01-07 PROCEDURE — 97161 PT EVAL LOW COMPLEX 20 MIN: CPT

## 2023-01-07 RX ORDER — HYDRALAZINE HYDROCHLORIDE 20 MG/ML
5 INJECTION INTRAMUSCULAR; INTRAVENOUS
Status: ACTIVE | OUTPATIENT
Start: 2023-01-07 | End: 2023-01-08

## 2023-01-07 RX ORDER — INSULIN LISPRO 100 [IU]/ML
0-16 INJECTION, SOLUTION INTRAVENOUS; SUBCUTANEOUS
Status: DISCONTINUED | OUTPATIENT
Start: 2023-01-07 | End: 2023-01-08 | Stop reason: HOSPADM

## 2023-01-07 RX ORDER — ENOXAPARIN SODIUM 100 MG/ML
1 INJECTION SUBCUTANEOUS DAILY
Status: DISCONTINUED | OUTPATIENT
Start: 2023-01-07 | End: 2023-01-07

## 2023-01-07 RX ORDER — GLUCAGON 1 MG/ML
1 KIT INJECTION PRN
Status: DISCONTINUED | OUTPATIENT
Start: 2023-01-07 | End: 2023-01-08 | Stop reason: HOSPADM

## 2023-01-07 RX ORDER — INSULIN LISPRO 100 [IU]/ML
0-4 INJECTION, SOLUTION INTRAVENOUS; SUBCUTANEOUS NIGHTLY
Status: DISCONTINUED | OUTPATIENT
Start: 2023-01-07 | End: 2023-01-07

## 2023-01-07 RX ORDER — DEXTROSE MONOHYDRATE 100 MG/ML
INJECTION, SOLUTION INTRAVENOUS CONTINUOUS PRN
Status: DISCONTINUED | OUTPATIENT
Start: 2023-01-07 | End: 2023-01-08 | Stop reason: HOSPADM

## 2023-01-07 RX ORDER — MAGNESIUM SULFATE IN WATER 40 MG/ML
2000 INJECTION, SOLUTION INTRAVENOUS ONCE
Status: COMPLETED | OUTPATIENT
Start: 2023-01-07 | End: 2023-01-07

## 2023-01-07 RX ORDER — INSULIN LISPRO 100 [IU]/ML
0-4 INJECTION, SOLUTION INTRAVENOUS; SUBCUTANEOUS NIGHTLY
Status: DISCONTINUED | OUTPATIENT
Start: 2023-01-07 | End: 2023-01-08 | Stop reason: HOSPADM

## 2023-01-07 RX ORDER — WARFARIN SODIUM 4 MG/1
4 TABLET ORAL
Status: COMPLETED | OUTPATIENT
Start: 2023-01-07 | End: 2023-01-07

## 2023-01-07 RX ORDER — INSULIN LISPRO 100 [IU]/ML
0-4 INJECTION, SOLUTION INTRAVENOUS; SUBCUTANEOUS
Status: DISCONTINUED | OUTPATIENT
Start: 2023-01-07 | End: 2023-01-07

## 2023-01-07 RX ORDER — WARFARIN SODIUM 2 MG/1
2 TABLET ORAL DAILY
Status: DISCONTINUED | OUTPATIENT
Start: 2023-01-08 | End: 2023-01-08

## 2023-01-07 RX ORDER — ENOXAPARIN SODIUM 100 MG/ML
60 INJECTION SUBCUTANEOUS EVERY 24 HOURS
Status: DISCONTINUED | OUTPATIENT
Start: 2023-01-07 | End: 2023-01-08 | Stop reason: HOSPADM

## 2023-01-07 RX ORDER — POTASSIUM CHLORIDE 20 MEQ/1
40 TABLET, EXTENDED RELEASE ORAL
Status: COMPLETED | OUTPATIENT
Start: 2023-01-07 | End: 2023-01-07

## 2023-01-07 RX ORDER — AMLODIPINE BESYLATE 5 MG/1
5 TABLET ORAL 2 TIMES DAILY
Status: DISCONTINUED | OUTPATIENT
Start: 2023-01-07 | End: 2023-01-08 | Stop reason: HOSPADM

## 2023-01-07 RX ORDER — LISINOPRIL 40 MG/1
40 TABLET ORAL DAILY
Status: DISCONTINUED | OUTPATIENT
Start: 2023-01-07 | End: 2023-01-08 | Stop reason: HOSPADM

## 2023-01-07 RX ADMIN — INSULIN LISPRO 16 UNITS: 100 INJECTION, SOLUTION INTRAVENOUS; SUBCUTANEOUS at 18:37

## 2023-01-07 RX ADMIN — METOPROLOL TARTRATE 12.5 MG: 25 TABLET, FILM COATED ORAL at 21:51

## 2023-01-07 RX ADMIN — METOPROLOL TARTRATE 12.5 MG: 25 TABLET, FILM COATED ORAL at 08:09

## 2023-01-07 RX ADMIN — ATORVASTATIN CALCIUM 10 MG: 20 TABLET, FILM COATED ORAL at 21:51

## 2023-01-07 RX ADMIN — POTASSIUM CHLORIDE 40 MEQ: 1500 TABLET, EXTENDED RELEASE ORAL at 08:09

## 2023-01-07 RX ADMIN — MAGNESIUM SULFATE HEPTAHYDRATE 2000 MG: 40 INJECTION, SOLUTION INTRAVENOUS at 08:13

## 2023-01-07 RX ADMIN — IPRATROPIUM BROMIDE AND ALBUTEROL SULFATE 1 AMPULE: 2.5; .5 SOLUTION RESPIRATORY (INHALATION) at 09:46

## 2023-01-07 RX ADMIN — ENOXAPARIN SODIUM 60 MG: 100 INJECTION SUBCUTANEOUS at 13:48

## 2023-01-07 RX ADMIN — IPRATROPIUM BROMIDE AND ALBUTEROL SULFATE 1 AMPULE: 2.5; .5 SOLUTION RESPIRATORY (INHALATION) at 21:25

## 2023-01-07 RX ADMIN — INSULIN LISPRO 1 UNITS: 100 INJECTION, SOLUTION INTRAVENOUS; SUBCUTANEOUS at 10:07

## 2023-01-07 RX ADMIN — FUROSEMIDE 40 MG: 10 INJECTION, SOLUTION INTRAMUSCULAR; INTRAVENOUS at 08:09

## 2023-01-07 RX ADMIN — SODIUM CHLORIDE 25 ML: 9 INJECTION, SOLUTION INTRAVENOUS at 08:11

## 2023-01-07 RX ADMIN — WARFARIN SODIUM 4 MG: 4 TABLET ORAL at 18:38

## 2023-01-07 RX ADMIN — PANTOPRAZOLE SODIUM 40 MG: 40 TABLET, DELAYED RELEASE ORAL at 06:47

## 2023-01-07 RX ADMIN — LISINOPRIL 40 MG: 40 TABLET ORAL at 16:26

## 2023-01-07 RX ADMIN — POTASSIUM CHLORIDE 40 MEQ: 1500 TABLET, EXTENDED RELEASE ORAL at 10:06

## 2023-01-07 RX ADMIN — INSULIN LISPRO 3 UNITS: 100 INJECTION, SOLUTION INTRAVENOUS; SUBCUTANEOUS at 13:42

## 2023-01-07 RX ADMIN — INSULIN GLARGINE 10 UNITS: 100 INJECTION, SOLUTION SUBCUTANEOUS at 21:52

## 2023-01-07 RX ADMIN — AMLODIPINE BESYLATE 5 MG: 5 TABLET ORAL at 16:23

## 2023-01-07 RX ADMIN — SODIUM CHLORIDE, PRESERVATIVE FREE 10 ML: 5 INJECTION INTRAVENOUS at 08:10

## 2023-01-07 RX ADMIN — ALPRAZOLAM 0.5 MG: 0.5 TABLET ORAL at 21:51

## 2023-01-07 RX ADMIN — SODIUM CHLORIDE, PRESERVATIVE FREE 10 ML: 5 INJECTION INTRAVENOUS at 19:45

## 2023-01-07 ASSESSMENT — PAIN SCALES - GENERAL
PAINLEVEL_OUTOF10: 0

## 2023-01-07 NOTE — PLAN OF CARE
Problem: Respiratory - Adult  Goal: Achieves optimal ventilation and oxygenation  1/7/2023 1533 by Nia Nix RN  Outcome: Progressing  Flowsheets (Taken 1/7/2023 1533)  Achieves optimal ventilation and oxygenation:   Position to facilitate oxygenation and minimize respiratory effort   Assess for changes in mentation and behavior   Assess for changes in respiratory status     Problem: Cardiovascular - Adult  Goal: Maintains optimal cardiac output and hemodynamic stability  1/7/2023 1533 by Nia Nix RN  Outcome: Progressing  Flowsheets (Taken 1/7/2023 1533)  Maintains optimal cardiac output and hemodynamic stability:   Monitor blood pressure and heart rate   Monitor urine output and notify Licensed Independent Practitioner for values outside of normal range   Assess for signs of decreased cardiac output     Problem: ABCDS Injury Assessment  Goal: Absence of physical injury  Outcome: Progressing  Flowsheets (Taken 1/7/2023 1533)  Absence of Physical Injury: Implement safety measures based on patient assessment     Problem: Metabolic/Fluid and Electrolytes - Adult  Goal: Electrolytes maintained within normal limits  1/7/2023 1533 by Nia Nix RN  Outcome: Progressing  Flowsheets (Taken 1/7/2023 1533)  Electrolytes maintained within normal limits:   Fluid restriction as ordered   Monitor response to electrolyte replacements, including repeat lab results as appropriate   Administer electrolyte replacement as ordered   Monitor labs and assess patient for signs and symptoms of electrolyte imbalances

## 2023-01-07 NOTE — PROGRESS NOTES
Clinical Pharmacy Progress Note    Warfarin - Management by Pharmacy    Consult Date(s): 1/6/23  Consulting Provider(s): Dr. Stephon Epps / Plan  Anticoagulation - hx of AVR/MVR - Warfarin  Goal INR: 2.5 - 3.5  Concurrent Anticoagulants / Antiplatelets:   Enoxaparin 1 mg/kg daily =60 mg daily. Rounded dose to 60mg daily (pre-filled syringe size) after discussion with Dr. Rneu Littlejohn d/t risk of thrombosis. Dose adjusted to daily dosing d/t CrCl < 30 ml/min. Recommend to continue until INR > 2.5 and stable. Interactions: No significant interactions noted. Current Regimen / Plan:   INR on admission but now trending down (2.48?2.06). Suspect pt missed a dose of Warfarin per medicine team.  Will give Warfarin 4 mg x1 tonight, then continue 2 mg daily. Will monitor pt's clinical status and INR daily, and make dose adjustments as needed. Please call with questions--  Anmol ObregonD, BCPS  Wireless: [id number]   1/7/2023 1:04 PM        Interval update: Plan to transfuse if Hgb < 7 per medicine team. Hgb 7.4 this AM. Remains on furosemide 40mg IV daily. UOP not quantified. Subjective/Objective:   Oskar Blanco is a 69 y.o. female with a PMHx significant for HFpEF, mechanical AVR, mechanical MVR, AFib, CKD, HTN, GERD who presented with SOB x 1 week history. Admitted with HF exacerbation and HTN urgency. Pharmacy is consulted to dose Warfarin.     Ht Readings from Last 1 Encounters:   01/06/23 4' 11\" (1.499 m)     Wt Readings from Last 1 Encounters:   01/07/23 99 lb 13.9 oz (45.3 kg)     Prior / Home Warfarin Regimen:  Goal INR 2.5-3.5  [facility] as [facility]  Last INR check 12/9 - INR 2.7  Pt instructed to continue Warfarin 2 mg daily    Date INR Warfarin   1/6 2.48 2 mg   1/7 2.06                  Recent Labs     01/05/23  1717 01/06/23  0555 01/06/23  1328 01/06/23  2302 01/07/23  0600   INR  --   --  2.48*  --  2.06*   HGB 8.1* 7.5*  --  7.5* 7.4*    159  --   --  189   CREATININE 1.2 1.2  --   --  1.2

## 2023-01-07 NOTE — PLAN OF CARE
Problem: Respiratory - Adult  Goal: Achieves optimal ventilation and oxygenation  1/7/2023 0751 by Don Sethi RN  Outcome: Progressing  Flowsheets (Taken 1/6/2023 1614 by Carolyn Means RN)  Achieves optimal ventilation and oxygenation: Assess for changes in respiratory status     Problem: Cardiovascular - Adult  Goal: Maintains optimal cardiac output and hemodynamic stability  1/7/2023 0751 by Don Sethi RN  Outcome: Progressing  Flowsheets (Taken 1/6/2023 1614 by Carolyn Means RN)  Maintains optimal cardiac output and hemodynamic stability: Monitor blood pressure and heart rate     Problem: Cardiovascular - Adult  Goal: Absence of cardiac dysrhythmias or at baseline  1/7/2023 0751 by Don Sethi RN  Outcome: Progressing  Flowsheets (Taken 1/6/2023 1614 by Carolyn Means RN)  Absence of cardiac dysrhythmias or at baseline: Monitor cardiac rate and rhythm     Problem: Metabolic/Fluid and Electrolytes - Adult  Goal: Electrolytes maintained within normal limits  1/7/2023 0751 by Don Sethi RN  Outcome: Progressing  Flowsheets (Taken 1/6/2023 1614 by Carolyn Means RN)  Electrolytes maintained within normal limits: Monitor labs and assess patient for signs and symptoms of electrolyte imbalances     Problem: Safety - Adult  Goal: Free from fall injury  1/7/2023 0751 by Don Sethi RN  Outcome: Progressing  Flowsheets (Taken 1/6/2023 1605 by Carolyn Means RN)  Free From Fall Injury: Instruct family/caregiver on patient safety

## 2023-01-07 NOTE — PROGRESS NOTES
Physical Therapy  Facility/Department: [place] [address] [facility]  Physical Therapy Initial Assessment and Treatment/Discharge    Name: Song Matias  : 1945  MRN: [id number]  Date of Service: 2023    Discharge Recommendations:    Song Matias scored a 23/24 on the AM-PAC short mobility form. At this time, no further PT is recommended upon discharge due to pt supervision. Recommend patient returns to prior setting with prior services. PT Equipment Recommendations  Equipment Needed: No      Patient Diagnosis(es): The encounter diagnosis was Acute on chronic congestive heart failure, unspecified heart failure type ([facility] [address] Past Medical History:  has a past medical history of A-fib ([facility] [address] Anemia, Anemia:multifactorial, Anxiety, Cataract, CHF (congestive heart failure) ([facility] [address] Chronic back pain, CKD (chronic kidney disease) stage 3, GFR 30-59 ml/min (HCC), Colitis, ischemic (HCC), COPD, Diabetes, Diabetic eye exam ([facility] [address] GERD (gastroesophageal reflux disease), H/O heart valve replacement with mechanical valve, Hx of mammogram, Hyperlipidaemia LDL goal <100, Hypertension, Insomnia, Long-term (current) use of anticoagulants, INR goal 2.5-3.5, Lymphoma:monoclonal B cell, Mammogram abnormal, Nonspecific abnormal results of kidney function study, Osteoarthritis, Renal cysts, right, RLS (restless legs syndrome), Sciatica, Screening colonoscopy, Therapeutic drug monitoring, Valvular heart disease, and Visit for screening mammogram.  Past Surgical History:  has a past surgical history that includes Aortic valve replacement; Mitral valve replacement; Hysterectomy; Cataract removal (13;14); Colonoscopy; pacemaker placement (); Tunneled venous port placement (Right, 10/3/2014); and laparoscopy (2/3/15). Assessment   Assessment: Pt admitted with SOB/CHF. Normally independent with mobility without AD. Currently ambulating with supervision. Pt denies feeling weak or wobbly.   Plans to return home at d/c and has no concerns about managing. Encouraged pt to be OOB and ambulating with staff as tolerated. Pt verbalized understanding. No further PT needs identified. Will sign off. Decision Making: Low Complexity  Requires PT Follow-Up: No     Plan   Physcial Therapy Plan  General Plan: Discharge with evaluation only  Safety Devices  Type of Devices: Chair alarm in place, Call light within reach, Nurse notified, Left in chair (chair alarm pad under pt but no box available - RN informed)     Restrictions  Position Activity Restriction  Other position/activity restrictions: up as tolerated     Subjective   General  Additional Pertinent Hx: Pt is a 69 y.o. female adm 1/5 with CHF. Pt presented to ED with progressively worsening shortness of breath over the past 1 week,  Has worsened to the point that she could not sleep last night. CXR:Bilateral basilar airspace disease and pleural effusions, left greater than right. PMH:  anemia, anxiety, CHF, CKD,  COPD, DM, GERD, hyperlipidemia, HTN, OA, aortic and mitral valve replacements, pacemaker  Referring Practitioner: Michelle Montalvo MD  Referral Date : 01/06/23  Subjective  Subjective: Pt found supine. Agreeable to PT. Denies pain.          Social/Functional History  Social/Functional History  Lives With: Spouse  Type of Home: House  Home Layout: Multi-level, Able to Live on Main level with bedroom/bathroom, Laundry in basement  Home Access:  (1 NICOLE)  Bathroom Shower/Tub: Walk-in shower  Bathroom Toilet: Standard  Bathroom Equipment:  (none)  Home Equipment:  (none)  Has the patient had two or more falls in the past year or any fall with injury in the past year?: No  ADL Assistance: Independent  Homemaking Assistance: Independent  Ambulation Assistance: Independent  Transfer Assistance: Independent  Active : Yes  Leisure & Hobbies: Latter-day/bible study, shopping  Additional Comments: Reports  getting treatment for lung CA  Vision/Hearing  Vision  Vision: Impaired  Vision Exceptions: Wears glasses for reading  Hearing  Hearing: Within functional limits    Cognition   Orientation  Overall Orientation Status: Within Functional Limits     Objective                 AROM RLE (degrees)  RLE AROM: WFL  AROM LLE (degrees)  LLE AROM : WFL  Strength RLE  Strength RLE: WFL  Strength LLE  Strength LLE: WFL           Bed mobility  Supine to Sit: Modified independent  Transfers  Sit to Stand: Supervision  Stand to Sit: Supervision  Ambulation  Device: No Device  Assistance: Supervision  Quality of Gait: fairly steady, decreased nkechi  Distance: 12', 100'         Treatment included: bed mobility, transfers, gt, pt education          OutComes Score                                                  AM-PAC Score  AM-PAC Inpatient Mobility Raw Score : 23 (01/07/23 0904)  AM-PAC Inpatient T-Scale Score : 56.93 (01/07/23 0904)  Mobility Inpatient CMS 0-100% Score: 11.2 (01/07/23 0904)  Mobility Inpatient CMS G-Code Modifier : CI (01/07/23 0904)          Tinneti Score       Goals  Short Term Goals  Time Frame for Short Term Goals: No goals set - pt supervision with mobility       Education  Patient Education  Education Given To: Patient  Education Provided: Role of Therapy;Plan of Care  Education Provided Comments: importance of OOB/ambulation  Education Method: Verbal  Education Outcome: Verbalized understanding      Therapy Time   Individual Concurrent Group Co-treatment   Time In 0835         Time Out 0916         Minutes 41             Timed Code Treatment Minutes: 26      Total Treatment Minutes:  Jose Daniel Tinoco, PT

## 2023-01-07 NOTE — PROGRESS NOTES
01/07/23 0951   RT Protocol   History Pulmonary Disease 2   Respiratory pattern 0   Breath sounds 2   Cough 0   Bronchodilator Assessment Score 4

## 2023-01-07 NOTE — RT PROTOCOL NOTE

## 2023-01-07 NOTE — PLAN OF CARE
Problem: Respiratory - Adult  Goal: Achieves optimal ventilation and oxygenation  Outcome: Progressing  Flowsheets (Taken 1/6/2023 1614)  Achieves optimal ventilation and oxygenation: Assess for changes in respiratory status     Problem: Cardiovascular - Adult  Goal: Maintains optimal cardiac output and hemodynamic stability  Outcome: Progressing  Flowsheets (Taken 1/6/2023 1614)  Maintains optimal cardiac output and hemodynamic stability: Monitor blood pressure and heart rate  Goal: Absence of cardiac dysrhythmias or at baseline  Outcome: Progressing  Flowsheets (Taken 1/6/2023 1614)  Absence of cardiac dysrhythmias or at baseline: Monitor cardiac rate and rhythm     Problem: Discharge Planning  Goal: Discharge to home or other facility with appropriate resources  Outcome: Progressing  Flowsheets (Taken 1/6/2023 1614)  Discharge to home or other facility with appropriate resources: Identify barriers to discharge with patient and caregiver     Problem: ABCDS Injury Assessment  Goal: Absence of physical injury  Outcome: Progressing  Flowsheets (Taken 1/6/2023 1605)  Absence of Physical Injury: Implement safety measures based on patient assessment     Problem: Metabolic/Fluid and Electrolytes - Adult  Goal: Electrolytes maintained within normal limits  Outcome: Progressing  Flowsheets (Taken 1/6/2023 1614)  Electrolytes maintained within normal limits: Monitor labs and assess patient for signs and symptoms of electrolyte imbalances  Goal: Hemodynamic stability and optimal renal function maintained  Outcome: Progressing  Flowsheets (Taken 1/6/2023 1614)  Hemodynamic stability and optimal renal function maintained: Monitor labs and assess for signs and symptoms of volume excess or deficit     Problem: Safety - Adult  Goal: Free from fall injury  Outcome: Progressing  Flowsheets (Taken 1/6/2023 1605)  Free From Fall Injury: Instruct family/caregiver on patient safety     Problem: Pain  Goal: Verbalizes/displays adequate comfort level or baseline comfort level  Outcome: Progressing

## 2023-01-07 NOTE — PROGRESS NOTES
Internal Medicine Progress Note    Patient Name: Son Medina   Patient : 1945   Date: 2023   Admit Date: 2023     CC: Shortness of Breath (Pt presents to the ED via triage w/ c/o of SOB. Pt reports that see has been feeling SOB for about 7 days. Last night the SOB became much worse and pt reports that she was unable to sleep. Pt sats in the mid - upper 90s on room air. Pt reports hx of CHF )       Subjective     Interval History: No acute events overnight. Pt endorses improvement in SOB and denies any chest pain, Abdominal Pain or worsening of leg swelling. ROS:  As per interval history above. Objective     Vital Signs:  Patient Vitals for the past 8 hrs:   BP Temp Temp src Pulse Resp SpO2 Weight   23 0539 (!) 175/56 97.3 °F (36.3 °C) Axillary 67 18 94 % 99 lb 13.9 oz (45.3 kg)       Physical Exam:  Physical Exam  Constitutional:       Appearance: She is not ill-appearing. HENT:      Head: Normocephalic and atraumatic. Nose: Nose normal.      Mouth/Throat:      Mouth: Mucous membranes are moist.   Eyes:      Pupils: Pupils are equal, round, and reactive to light. Cardiovascular:      Rate and Rhythm: Normal rate and regular rhythm. Pulses: Normal pulses. Heart sounds: Normal heart sounds. Pulmonary:      Effort: Pulmonary effort is normal. No respiratory distress. Breath sounds: Normal breath sounds. No stridor. No wheezing, rhonchi or rales. Chest:      Chest wall: No tenderness. Abdominal:      General: Bowel sounds are normal. There is no distension. Palpations: Abdomen is soft. Tenderness: There is no abdominal tenderness. There is no right CVA tenderness, left CVA tenderness or guarding. Musculoskeletal:      Right lower leg: No edema. Left lower leg: No edema. Skin:     Capillary Refill: Capillary refill takes less than 2 seconds. Coloration: Skin is pale. Findings: No lesion or rash.    Neurological:      General: No focal deficit present. Mental Status: She is alert and oriented to person, place, and time. Mental status is at baseline. Cranial Nerves: No cranial nerve deficit. Sensory: No sensory deficit. Motor: No weakness. Gait: Gait normal.   Psychiatric:         Mood and Affect: Mood normal.       Intake/Output Summary (Last 24 hours) at 1/7/2023 0736  Last data filed at 1/7/2023 0539  Gross per 24 hour   Intake 540 ml   Output 600 ml   Net -60 ml        Medications:   magnesium sulfate  2,000 mg IntraVENous Once    potassium chloride  40 mEq Oral Q2H    warfarin  2 mg Oral Daily    metoprolol tartrate  12.5 mg Oral BID    ipratropium-albuterol  1 ampule Inhalation BID    atorvastatin  10 mg Oral Nightly    pantoprazole  40 mg Oral QAM AC    sodium chloride flush  5-40 mL IntraVENous 2 times per day    furosemide  40 mg IntraVENous Daily       sodium chloride        ALPRAZolam, sodium chloride flush, sodium chloride, ondansetron **OR** ondansetron, polyethylene glycol, perflutren lipid microspheres     Labs:  CBC:   Recent Labs     01/05/23 1717 01/06/23  0555 01/06/23  2302 01/07/23  0600   WBC 8.9 8.1  --  8.3   HGB 8.1* 7.5* 7.5* 7.4*   HCT 23.9* 22.9*  22.2* 22.1* 22.1*    159  --  189   MCV 91.1 90.7  --  91.0       Renal:    Recent Labs     01/05/23 1717 01/06/23  0555 01/07/23  0600    143 138   K 4.1 3.7 3.2*    103 99   CO2 24 28 29   BUN 13 13 15   CREATININE 1.2 1.2 1.2   GLUCOSE 141* 167* 226*   CALCIUM 8.8 8.7 8.2*   MG  --  1.40* 1.60*   ANIONGAP 14 12 10       Hepatic: No results for input(s): AST, ALT, BILITOT, BILIDIR, PROT, LABALBU, ALKPHOS in the last 72 hours. Troponin: Invalid input(s): TROPONIN    Lactic acid: Invalid input(s): LACTICACID    BNP: No results for input(s): BNP in the last 72 hours.     Pro-BNP:   Recent Labs     01/05/23  1717   PROBNP 8,729*       Lipids: No results for input(s): CHOL, TRIG, HDL, LDLCALC, VLDL in the last 72 hours.    ABGs:  No results for input(s): PHART, TKD0ZJR, PO2ART, GWP3COK, BEART, THGBART, L6WSXXKB, RZO8MEM in the last 72 hours. VBGs:   Recent Labs     01/05/23  1717   PHVEN 7.385   OSZ2MQM 43.2   PO2VEN 121.0*       INR:   Recent Labs     01/06/23  1328 01/07/23  0600   INR 2.48* 2.06*     aPTT: No results for input(s): APTT in the last 72 hours. Procalcitonin: No results for input(s): PROCAL in the last 72 hours. CRP: No results for input(s): CRP in the last 72 hours. ESR: No results for input(s): ESR in the last 72 hours. Radiology:  XR CHEST (2 VW)   Final Result      1. Bilateral basilar airspace disease and pleural effusions, left greater than right. Assessment & Plan   This is a 71-year-old female with history of HFpEF (G3DD, 7/2021), mechanical aortic valve, mechanical mitral valve (on warfarin), A. fib, CKD, hypertension, hyperlipidemia, GERD who presents with a 1 week history of shortness of breath. Dyspnea 2/2 Acute on Chronic HFpEF and worsening Multifactorial Anemia  Presenting with worsening SOB, RONDON, orthopnea. On lasix 20-40mg daily depending on daily weights. Compliant with this. Chest XR with bilateral airspace disease and pleural effusions (L > R). Last echo done 7/29/2021 with LVH, normal EF 55-60%, G3DD. S/p IV lasix 40mg in the ED. Dry weight around 108lb  BNP 6041(12/4/22)> 8729, exacerbation due to dietary restriction noncompliance and HTN crisis. Hgb: 7.5 this admission, 12/6/2022:8.3. Pt has history of CKD, Non Hodgkin's Follicular Lymphoma, Iron Deficiency. 4 months ago:Transferrin receptor increased to 1.78. Rt count: increased to 4.94, G6PD levelsnormal, negative cryoglobulin. Colonoscopy 2016: Mild diverticulosis EGD 2019: Esophagus: proximal esophageal web biopsied otherwise patient has a normal that will and distal esophagus Hiatal hernia: this measures 1-2 cm. underwent capsule endoscopy which did not reveal any focal small bowel pathology. Stress Test 2020: Abnormal SPECT myocardial perfusion scan with evidence of pharmacological stress-induced reversible myocardial ischemia in a small area of the apical myocardium. Ferritin high 2,267, Iron and TIBC 61(LOW), 78, Reticulocyte count increased, LDH increased, haptoglobin low :(hemolyzed) hemolysis could be due to mechanical heart valve. Reticulocyte index is 1.25 which indicates hypoproliferation. - cont IV lasix 40mg daily  - strict I/Os   - daily weights  - f/u echo(2023):  Ejection fraction is visually estimated to be 60-65%. Cannot exclude regional wall motion abnormalities secondary to poor endocardial visualization. Grade III diastolic dysfunction with elevated LV filling pressures. - cont home metoprolol 12.5mg BID  - consult to HF coordinator  -will repeat workup for Anemia, Iron, ferritin, TIBC, Haptoglobin, LDH   -May need GI consult for possible colonoscopy. Hypomagnesemia  M.6  -Repleted      Hypokalemia   K:3.2   -Repleting     HTN  Pt with reported home -190s that improves to 160s SBP after taking lisinopril and amlodipine. Pt presenting with mild headache and SOB that could be caused by HTN. Recheck blood pressure 134/70> 145/78  -start home metoprolol 12.5 BID  -restart home lisinopril and amlodipine     Chronic Medical Conditions:  Non Hodgkin's Lymphoma   Dx: 2012  -bone marrow 7464 with follicular lymphoma but unchanged with 5% cellularity; this has been stable from prior assessments and unlikely to be cause of worsening anemia     Paroxysmall Afib  -Amlodipine 10, Coumadin     Mechanical aortic valve  Mechanical mitral valve  - compliant with home warfarin  - check PT/INR  - pharmacy to dose warfarin     CKD stage 3  Follows with Dr. Zainab Fernández outpatient.  Cr actually looks better, 1.2 (baseline appears 1.3-1.65)  - monitor     DM  - hold home metformin and glipizide  - LDSSI, POCT glucose and hypoglycemia protocol     GERD   - cont home protonix     Anxiety  - cont home Xanax BID PRN     HLD   - cont home lipitor     COPD  -On inhalers at home-continued      Sinus Node Dysfunction  S/p Dual Chamber PPM(08/08/2018)    DVT PPx:warfarin  Diet:  ADULT DIET;  Regular; Low Fat/Low Chol/High Fiber/2 gm Na; 2000 ml   Code status:  Full Code       Will discuss with attending physician Dr. Valeria aKtz MD.     Anthony Monroe MD  Internal Medicine, PGY-1

## 2023-01-08 VITALS
WEIGHT: 99.65 LBS | RESPIRATION RATE: 18 BRPM | DIASTOLIC BLOOD PRESSURE: 52 MMHG | SYSTOLIC BLOOD PRESSURE: 166 MMHG | HEIGHT: 59 IN | HEART RATE: 70 BPM | OXYGEN SATURATION: 97 % | BODY MASS INDEX: 20.09 KG/M2 | TEMPERATURE: 98.6 F

## 2023-01-08 LAB
ANION GAP SERPL CALCULATED.3IONS-SCNC: 9 MMOL/L (ref 3–16)
BASOPHILS ABSOLUTE: 0.1 K/UL (ref 0–0.2)
BASOPHILS RELATIVE PERCENT: 0.7 %
BUN BLDV-MCNC: 14 MG/DL (ref 7–20)
CALCIUM SERPL-MCNC: 8.4 MG/DL (ref 8.3–10.6)
CHLORIDE BLD-SCNC: 102 MMOL/L (ref 99–110)
CO2: 28 MMOL/L (ref 21–32)
CREAT SERPL-MCNC: 1.4 MG/DL (ref 0.6–1.2)
EOSINOPHILS ABSOLUTE: 0.1 K/UL (ref 0–0.6)
EOSINOPHILS RELATIVE PERCENT: 0.8 %
GFR SERPL CREATININE-BSD FRML MDRD: 39 ML/MIN/{1.73_M2}
GLUCOSE BLD-MCNC: 113 MG/DL (ref 70–99)
GLUCOSE BLD-MCNC: 134 MG/DL (ref 70–99)
HAPTOGLOBIN: <10 MG/DL (ref 30–200)
HCT VFR BLD CALC: 20.7 % (ref 36–48)
HEMOGLOBIN: 7 G/DL (ref 12–16)
INR BLD: 1.87 (ref 0.87–1.14)
LYMPHOCYTES ABSOLUTE: 2.2 K/UL (ref 1–5.1)
LYMPHOCYTES RELATIVE PERCENT: 26.2 %
MAGNESIUM: 2.2 MG/DL (ref 1.8–2.4)
MCH RBC QN AUTO: 30.6 PG (ref 26–34)
MCHC RBC AUTO-ENTMCNC: 33.8 G/DL (ref 31–36)
MCV RBC AUTO: 90.6 FL (ref 80–100)
MONOCYTES ABSOLUTE: 0.8 K/UL (ref 0–1.3)
MONOCYTES RELATIVE PERCENT: 9.3 %
NEUTROPHILS ABSOLUTE: 5.2 K/UL (ref 1.7–7.7)
NEUTROPHILS RELATIVE PERCENT: 63 %
PDW BLD-RTO: 16.3 % (ref 12.4–15.4)
PERFORMED ON: ABNORMAL
PLATELET # BLD: 149 K/UL (ref 135–450)
PMV BLD AUTO: 9.7 FL (ref 5–10.5)
POTASSIUM REFLEX MAGNESIUM: 3.9 MMOL/L (ref 3.5–5.1)
PROTHROMBIN TIME: 21.5 SEC (ref 11.7–14.5)
RBC # BLD: 2.28 M/UL (ref 4–5.2)
SODIUM BLD-SCNC: 139 MMOL/L (ref 136–145)
WBC # BLD: 8.3 K/UL (ref 4–11)

## 2023-01-08 PROCEDURE — 6370000000 HC RX 637 (ALT 250 FOR IP): Performed by: STUDENT IN AN ORGANIZED HEALTH CARE EDUCATION/TRAINING PROGRAM

## 2023-01-08 PROCEDURE — 2580000003 HC RX 258

## 2023-01-08 PROCEDURE — 6370000000 HC RX 637 (ALT 250 FOR IP): Performed by: INTERNAL MEDICINE

## 2023-01-08 PROCEDURE — 6370000000 HC RX 637 (ALT 250 FOR IP)

## 2023-01-08 PROCEDURE — 83010 ASSAY OF HAPTOGLOBIN QUANT: CPT

## 2023-01-08 PROCEDURE — 94761 N-INVAS EAR/PLS OXIMETRY MLT: CPT

## 2023-01-08 PROCEDURE — 94640 AIRWAY INHALATION TREATMENT: CPT

## 2023-01-08 PROCEDURE — 6360000002 HC RX W HCPCS

## 2023-01-08 PROCEDURE — 85610 PROTHROMBIN TIME: CPT

## 2023-01-08 PROCEDURE — 85025 COMPLETE CBC W/AUTO DIFF WBC: CPT

## 2023-01-08 PROCEDURE — 80048 BASIC METABOLIC PNL TOTAL CA: CPT

## 2023-01-08 PROCEDURE — 83735 ASSAY OF MAGNESIUM: CPT

## 2023-01-08 RX ORDER — FUROSEMIDE 40 MG/1
40 TABLET ORAL DAILY
Status: DISCONTINUED | OUTPATIENT
Start: 2023-01-09 | End: 2023-01-08 | Stop reason: HOSPADM

## 2023-01-08 RX ORDER — WARFARIN SODIUM 2 MG/1
2 TABLET ORAL DAILY
Status: DISCONTINUED | OUTPATIENT
Start: 2023-01-09 | End: 2023-01-08 | Stop reason: HOSPADM

## 2023-01-08 RX ORDER — MECOBALAMIN 5000 MCG
5 TABLET,DISINTEGRATING ORAL NIGHTLY
Status: DISCONTINUED | OUTPATIENT
Start: 2023-01-08 | End: 2023-01-08 | Stop reason: HOSPADM

## 2023-01-08 RX ORDER — WARFARIN SODIUM 4 MG/1
4 TABLET ORAL
Status: DISCONTINUED | OUTPATIENT
Start: 2023-01-08 | End: 2023-01-08 | Stop reason: HOSPADM

## 2023-01-08 RX ORDER — HEPARIN SODIUM (PORCINE) LOCK FLUSH IV SOLN 100 UNIT/ML 100 UNIT/ML
100 SOLUTION INTRAVENOUS
Status: COMPLETED | OUTPATIENT
Start: 2023-01-08 | End: 2023-01-08

## 2023-01-08 RX ORDER — FUROSEMIDE 20 MG/1
40 TABLET ORAL DAILY
Qty: 180 TABLET | Refills: 2 | Status: SHIPPED | OUTPATIENT
Start: 2023-01-08

## 2023-01-08 RX ORDER — IPRATROPIUM BROMIDE AND ALBUTEROL SULFATE 2.5; .5 MG/3ML; MG/3ML
1 SOLUTION RESPIRATORY (INHALATION) EVERY 4 HOURS PRN
Status: DISCONTINUED | OUTPATIENT
Start: 2023-01-08 | End: 2023-01-08 | Stop reason: HOSPADM

## 2023-01-08 RX ADMIN — FUROSEMIDE 40 MG: 10 INJECTION, SOLUTION INTRAMUSCULAR; INTRAVENOUS at 09:02

## 2023-01-08 RX ADMIN — AMLODIPINE BESYLATE 5 MG: 5 TABLET ORAL at 09:02

## 2023-01-08 RX ADMIN — METOPROLOL TARTRATE 12.5 MG: 25 TABLET, FILM COATED ORAL at 09:02

## 2023-01-08 RX ADMIN — LISINOPRIL 40 MG: 40 TABLET ORAL at 09:02

## 2023-01-08 RX ADMIN — PANTOPRAZOLE SODIUM 40 MG: 40 TABLET, DELAYED RELEASE ORAL at 07:05

## 2023-01-08 RX ADMIN — HEPARIN SODIUM (PORCINE) LOCK FLUSH IV SOLN 100 UNIT/ML 100 UNITS: 100 SOLUTION at 13:56

## 2023-01-08 RX ADMIN — IPRATROPIUM BROMIDE AND ALBUTEROL SULFATE 1 AMPULE: 2.5; .5 SOLUTION RESPIRATORY (INHALATION) at 08:05

## 2023-01-08 RX ADMIN — SODIUM CHLORIDE, PRESERVATIVE FREE 10 ML: 5 INJECTION INTRAVENOUS at 09:02

## 2023-01-08 ASSESSMENT — PAIN SCALES - GENERAL
PAINLEVEL_OUTOF10: 0
PAINLEVEL_OUTOF10: 0

## 2023-01-08 NOTE — PLAN OF CARE
Problem: Respiratory - Adult  Goal: Achieves optimal ventilation and oxygenation  1/8/2023 1244 by Ramirez Damian RN  Outcome: Progressing  Flowsheets (Taken 1/8/2023 1244)  Achieves optimal ventilation and oxygenation:   Assess for changes in respiratory status   Assess for changes in mentation and behavior   Position to facilitate oxygenation and minimize respiratory effort     Problem: Cardiovascular - Adult  Goal: Absence of cardiac dysrhythmias or at baseline  1/8/2023 1244 by Ramirez Damian RN  Outcome: Progressing  Flowsheets (Taken 1/8/2023 1244)  Absence of cardiac dysrhythmias or at baseline:   Monitor cardiac rate and rhythm   Assess for signs of decreased cardiac output   Administer antiarrhythmia medication and electrolyte replacement as ordered     Problem: Discharge Planning  Goal: Discharge to home or other facility with appropriate resources  1/8/2023 1244 by Ramirez Damian RN  Outcome: Progressing  Flowsheets (Taken 1/8/2023 1244)  Discharge to home or other facility with appropriate resources:   Identify barriers to discharge with patient and caregiver   Arrange for needed discharge resources and transportation as appropriate   Identify discharge learning needs (meds, wound care, etc)     Problem: Metabolic/Fluid and Electrolytes - Adult  Goal: Electrolytes maintained within normal limits  1/8/2023 1244 by Ramirez Damian RN  Outcome: Progressing  Flowsheets (Taken 1/8/2023 1244)  Electrolytes maintained within normal limits:   Monitor labs and assess patient for signs and symptoms of electrolyte imbalances   Administer electrolyte replacement as ordered   Monitor response to electrolyte replacements, including repeat lab results as appropriate

## 2023-01-08 NOTE — PROGRESS NOTES
Clinical Pharmacy Progress Note    Warfarin - Management by Pharmacy    Consult Date(s): 1/6/23  Consulting Provider(s): Dr. Luis Angel Rizo / Plan  Anticoagulation - hx of AVR/MVR - Warfarin  Goal INR: 2.5 - 3.5  Concurrent Anticoagulants / Antiplatelets:   Enoxaparin 1 mg/kg daily =60 mg daily. Rounded dose to 60mg daily (pre-filled syringe size) after discussion with Dr. Ralph Wallcae on 1/7 d/t risk of thrombosis. Recommend to continue until INR > 2.5 and stable. Interactions: No significant interactions noted. Current Regimen / Plan:   INR remains subtherapeutic and trending down (2.06?1.87). Suspect pt missed a dose of warfarin prior to admission (per Dr. Ralph Wallace). Will give another bolus dose of Warfarin 4 mg x1 tonight, then continue 2 mg daily. Anticipate INR to start increasing tomorrow. Will monitor pt's clinical status and INR daily, and make dose adjustments as needed. Discharge recommendations:  Recommend Warfarin 4 mg x1 today (Sun 1/8), followed by Warfarin 2 mg daily. Would have INR checked in 2-3 days. Please call with questions--  Judge Dyana PharmD, BCPS  Wireless: [phone]   1/8/2023 1:13 PM        Interval update: Plan for discharge today. Subjective/Objective:   Salud Fraser is a 69 y.o. female with a PMHx significant for HFpEF, mechanical AVR, mechanical MVR, AFib, CKD, HTN, GERD who presented with SOB x 1 week history. Admitted with HF exacerbation and HTN urgency. Pharmacy is consulted to dose Warfarin.     Ht Readings from Last 1 Encounters:   01/06/23 4' 11\" (1.499 m)     Wt Readings from Last 1 Encounters:   01/08/23 99 lb 10.4 oz (45.2 kg)     Prior / Home Warfarin Regimen:  Goal INR 2.5-3.5  Managed as outpatient by PCP - [facility]  Last INR check 12/9 - INR 2.7  Pt instructed to continue Warfarin 2 mg daily    Date INR Warfarin   1/6 2.48 2 mg   1/7 2.06 4 mg   1/8 1.87             Recent Labs     01/06/23  0555 01/06/23  1328 01/06/23  2302 01/07/23  0600 01/07/23  1750 01/08/23  0523 01/08/23  0524   INR  --  2.48*  --  2.06*  --  1.87*  --    HGB 7.5*  --    < > 7.4* 7.3*  --  7.0*     --   --  189  --   --  149   CREATININE 1.2  --   --  1.2  --   --  1.4*    < > = values in this interval not displayed.

## 2023-01-08 NOTE — RT PROTOCOL NOTE
RT Nebulizer Bronchodilator Protocol Note    There is a bronchodilator order in the chart from a provider indicating to follow the RT Bronchodilator Protocol and there is an Initiate RT Bronchodilator Protocol order as well (see protocol at bottom of note). CXR Findings:  No results found. The findings from the last RT Protocol Assessment were as follows:  Smoking: Chronic pulmonary disease  Respiratory Pattern: Regular pattern and RR 12-20 bpm  Breath Sounds: Clear breath sounds  Cough: Strong, spontaneous, non-productive  Indication for Bronchodilator Therapy: On home bronchodilators  Bronchodilator Assessment Score: 2    Aerosolized bronchodilator medication orders have been revised according to the RT Nebulizer Bronchodilator Protocol below. Respiratory Therapist to perform RT Therapy Protocol Assessment initially then follow the protocol. Repeat RT Therapy Protocol Assessment PRN for score 0-3 or on second treatment, BID, and PRN for scores above 3. No Indications - adjust the frequency to every 6 hours PRN wheezing or bronchospasm, if no treatments needed after 48 hours then discontinue using Per Protocol order mode. If indication present, adjust the RT bronchodilator orders based on the Bronchodilator Assessment Score as indicated below. If a patient is on this medication at home then do not decrease Frequency below that used at home. 0-3 - enter or revise RT bronchodilator order(s) to equivalent RT Bronchodilator order with Frequency of every 4 hours PRN for wheezing or increased work of breathing using Per Protocol order mode. 4-6 - enter or revise RT Bronchodilator order(s) to two equivalent RT bronchodilator orders with one order with BID Frequency and one order with Frequency of every 4 hours PRN wheezing or increased work of breathing using Per Protocol order mode.          7-10 - enter or revise RT Bronchodilator order(s) to two equivalent RT bronchodilator orders with one order with TID Frequency and one order with Frequency of every 4 hours PRN wheezing or increased work of breathing using Per Protocol order mode. 11-13 - enter or revise RT Bronchodilator order(s) to one equivalent RT bronchodilator order with QID Frequency and an Albuterol order with Frequency of every 4 hours PRN wheezing or increased work of breathing using Per Protocol order mode. Greater than 13 - enter or revise RT Bronchodilator order(s) to one equivalent RT bronchodilator order with every 4 hours Frequency and an Albuterol order with Frequency of every 2 hours PRN wheezing or increased work of breathing using Per Protocol order mode. RT to enter RT Home Evaluation for COPD & MDI Assessment order using Per Protocol order mode.     Electronically signed by Poonam Smith RCP on 1/8/2023 at 8:08 AM

## 2023-01-08 NOTE — PLAN OF CARE
Problem: Respiratory - Adult  Goal: Achieves optimal ventilation and oxygenation  Outcome: Progressing  Flowsheets (Taken 1/7/2023 1533 by Elijah Carrasco RN)  Achieves optimal ventilation and oxygenation:   Position to facilitate oxygenation and minimize respiratory effort   Assess for changes in mentation and behavior   Assess for changes in respiratory status     Problem: Cardiovascular - Adult  Goal: Absence of cardiac dysrhythmias or at baseline  Outcome: Progressing     Problem: Discharge Planning  Goal: Discharge to home or other facility with appropriate resources  Outcome: Progressing  Flowsheets (Taken 1/7/2023 1533 by Elijah Carrasco RN)  Discharge to home or other facility with appropriate resources:   Identify barriers to discharge with patient and caregiver   Arrange for needed discharge resources and transportation as appropriate   Identify discharge learning needs (meds, wound care, etc)     Problem: Metabolic/Fluid and Electrolytes - Adult  Goal: Electrolytes maintained within normal limits  Outcome: Progressing  Flowsheets (Taken 1/7/2023 1533 by Elijah Carrasco RN)  Electrolytes maintained within normal limits:   Fluid restriction as ordered   Monitor response to electrolyte replacements, including repeat lab results as appropriate   Administer electrolyte replacement as ordered   Monitor labs and assess patient for signs and symptoms of electrolyte imbalances     Problem: Metabolic/Fluid and Electrolytes - Adult  Goal: Hemodynamic stability and optimal renal function maintained  Outcome: Progressing  Flowsheets (Taken 1/6/2023 1614 by Erinn Somers, MEME)  Hemodynamic stability and optimal renal function maintained: Monitor labs and assess for signs and symptoms of volume excess or deficit     Problem: Safety - Adult  Goal: Free from fall injury  Outcome: Progressing  Flowsheets (Taken 1/6/2023 1605 by Erinn Somers, RN)  Free From Fall Injury: Instruct family/caregiver on patient safety Problem: Cardiovascular - Adult  Goal: Maintains optimal cardiac output and hemodynamic stability  Flowsheets (Taken 1/7/2023 1533 by Devin Mccann RN)  Maintains optimal cardiac output and hemodynamic stability:   Monitor blood pressure and heart rate   Monitor urine output and notify Licensed Independent Practitioner for values outside of normal range   Assess for signs of decreased cardiac output

## 2023-01-08 NOTE — DISCHARGE INSTR - COC
Continuity of Care Form    Patient Name: Son Medina   :  1945  MRN:  [id number]    Admit date:  2023  Discharge date:  ***    Code Status Order: Full Code   Advance Directives:     Admitting Physician:  Kristopher Crum MD  PCP: Carlos Manuel Allen MD    Discharging Nurse: [facility] Unit/Room#: 3332/3851-29  Discharging Unit Phone Number: ***    Emergency Contact:   Extended Emergency Contact Information  Primary Emergency Contact: Chary Wood  Address: [address] [place] [address] Phone: [phone]  Work Phone: [phone]  Relation: Spouse    Past Surgical History:  Past Surgical History:   Procedure Laterality Date    AORTIC VALVE REPLACEMENT      CATARACT REMOVAL  13;14    Right;left respectively    COLONOSCOPY      \"twilight sleep didnt work\"    HYSTERECTOMY (CERVIX STATUS UNKNOWN)      Fibroids    LAPAROSCOPY  2/3/15    SBO:converted to open for lysis of adhesions    MITRAL VALVE REPLACEMENT      PACEMAKER PLACEMENT      for bradycardia, sympony    TUNNELED VENOUS PORT PLACEMENT Right 10/3/2014    ATTEMPTED RIGHT SUBCLAVIEN VEIN, PLACED RIGHT INTERNAL       Immunization History:   Immunization History   Administered Date(s) Administered    COVID-19, PFIZER GRAY top, DO NOT Dilute, (age 15 y+), IM, 30 mcg/0.3 mL 2022    COVID-19, PFIZER PURPLE top, DILUTE for use, (age 15 y+), 30mcg/0.3mL 2021, 2021, 2022    Influenza A (S4Y1-63) Vaccine PF IM 2009    Influenza Nasal 2010    Influenza Vaccine, unspecified formulation 2015    Influenza Virus Vaccine 11/10/2007, 2009, 2011, 09/10/2013, 10/18/2014, 10/18/2014, 2017, 10/16/2017, 2017, 10/10/2018, 10/06/2020    Influenza Whole 09/10/2013    Influenza, AFLURIA (age 1 yrs+), FLUZONE, (age 10 mo+), MDV, 0.5mL 10/11/2016    Influenza, FLUAD, (age 72 y+), Adjuvanted, 0.5mL 10/13/2021    Influenza, FLUMIST, (age 2-49 y), Live, Intranasal, 0.2mL 09/04/2010    Influenza, Doree Head (age 72 y+), High Dose, 0.7mL 10/11/2016, 10/02/2019, 10/06/2020, 09/01/2022    Influenza, High Dose (Fluzone 65 yrs and older) 10/11/2016, 10/02/2019    Influenza, Triv, 3 Years and older, IM, PF (Afluria 5yrs and older) 09/25/2019    Influenza, Triv, inactivated, subunit, adjuvanted, IM (Fluad 65 yrs and older) 09/26/2017, 10/02/2018, 09/25/2019    Pneumococcal Conjugate 13-valent (Margreta Von Ormy) 09/21/2015, 02/02/2018    Pneumococcal Polysaccharide (Imjaxmedc75) 10/01/2006, 12/28/2011    Zoster Live (Zostavax) 03/14/2016    Zoster Recombinant (Shingrix) 10/01/2020, 03/02/2021       Active Problems:  Patient Active Problem List   Diagnosis Code    Aortic valve replaced Z95.2    Dyslipidemia E78.5    Anxiety disorder F41.9    Essential hypertension, benign I10    RLS (restless legs syndrome) G25.81    Anemia:multifactorial D64.9    Stage 3 chronic kidney disease, unspecified whether stage 3a or 3b CKD ([facility] N18.30    Lymphoma:monoclonal B cell C85.90    Chronic back pain M54.9, G89.29    Lumbar disc herniation M51.26    Insomnia G47.00    Hyperlipidemia with target LDL less than 100 E78.5    Osteoarthritis:hands/knees M19.90    Cardiac pacemaker in situ Z95.0    Encounter for annual wellness visit (AWV) in Medicare patient Z00.00    Renal lesion N28.9    GERD (gastroesophageal reflux disease) K21.9    Controlled type 2 diabetes mellitus with stage 3 chronic kidney disease, without long-term current use of insulin (HCC) E11.22, N18.30    Primary insomnia F51.01    Chronic diastolic heart failure (HCC) I50.32    COPD, mild (HCC) J44.9    FOX (acute kidney injury) ([facility] N17.9    Hypomagnesemia E83.42    Hyponatremia E87.1    CHF (congestive heart failure), NYHA class II, acute on chronic, diastolic (HCC) J65.50       Isolation/Infection:   Isolation            No Isolation          Patient Infection Status       None to display            Nurse Assessment:  Last Vital Signs: BP (!) 173/65   Pulse 73   Temp 98.5 °F (36.9 °C) (Axillary)   Resp 16   Ht 4' 11\" (1.499 m)   Wt 99 lb 10.4 oz (45.2 kg)   SpO2 96%   BMI 20.13 kg/m²     Last documented pain score (0-10 scale): Pain Level: 0  Last Weight:   Wt Readings from Last 1 Encounters:   01/08/23 99 lb 10.4 oz (45.2 kg)     Mental Status:  {IP PT MENTAL STATUS:20030}    IV Access:  { RENEE IV ACCESS:518317698}    Nursing Mobility/ADLs:  Walking   {CHP DME NUKA:708908601}  Transfer  {CHP DME JNZX:750526964}  Bathing  {CHP DME DFQW:680890062}  Dressing  {CHP DME QRZW:477964207}  Toileting  {CHP DME MPGD:332300785}  Feeding  {CHP DME PFKC:311984696}  Med Admin  {P DME BRVK:958498294}  Med Delivery   { RENEE MED Delivery:210328429}    Wound Care Documentation and Therapy:        Elimination:  Continence: Bowel: {YES / KP:95120}  Bladder: {YES / HW:48213}  Urinary Catheter: {Urinary Catheter:134621899}   Colostomy/Ileostomy/Ileal Conduit: {YES / IB:58215}       Date of Last BM: ***    Intake/Output Summary (Last 24 hours) at 1/8/2023 1211  Last data filed at 1/8/2023 1003  Gross per 24 hour   Intake 440 ml   Output 1000 ml   Net -560 ml     I/O last 3 completed shifts:   In: 600 [P.O.:600]  Out: 1600 [Urine:1600]    Safety Concerns:     [employer] Safety Concerns:727069342}    Impairments/Disabilities:      [employer] Impairments/Disabilities:857294853}    Nutrition Therapy:  Current Nutrition Therapy:   [employer] Diet List:618158087}    Routes of Feeding: {CHP DME Other Feedings:365701713}  Liquids: {Slp liquid thickness:89263}  Daily Fluid Restriction: {CHP DME Yes amt example:355766938}  Last Modified Barium Swallow with Video (Video Swallowing Test): {Done Not Done DTGD:398893662}    Treatments at the Time of Hospital Discharge:   Respiratory Treatments: ***  Oxygen Therapy:  {Therapy; copd oxygen:20915}  Ventilator:    { CC Vent VQKM:229165069}    Rehab Therapies: {THERAPEUTIC INTERVENTION:8279243556}  Weight Bearing Status/Restrictions: 50[facility] CC Weight Bearin}  Other Medical Equipment (for information only, NOT a DME order):  {EQUIPMENT:336478212}  Other Treatments: ***    Patient's personal belongings (please select all that are sent with patient):  {CHP DME Belongings:203642600}    RN SIGNATURE:  {Esignature:909384482}    CASE MANAGEMENT/SOCIAL WORK SECTION    Inpatient Status Date: ***    Readmission Risk Assessment Score:  Readmission Risk              Risk of Unplanned Readmission:  26           Discharging to Facility/ Agency   Name:   Address:  Phone:  Fax:    Dialysis Facility (if applicable)   Name:  Address:  Dialysis Schedule:  Phone:  Fax:    / signature: {Esignature:780062535}    PHYSICIAN SECTION    Prognosis: {Prognosis:1652405263}    Condition at Discharge: [employer] Patient Condition:776304523}    Rehab Potential (if transferring to Rehab): {Prognosis:4002821225}    Recommended Labs or Other Treatments After Discharge: ***    Physician Certification: I certify the above information and transfer of Junito Cadena  is necessary for the continuing treatment of the diagnosis listed and that she requires {Admit to Appropriate Level of Care:96770} for {GREATER/LESS:083321913} 30 days.      Update Admission H&P: {CHP DME Changes in FLWIA:458529921}    PHYSICIAN SIGNATURE:  {Esignature:501294110}

## 2023-01-08 NOTE — PROGRESS NOTES
Internal Medicine Progress Note    Patient Name: Bill Hurd   Patient : 1945   Date: 2023   Admit Date: 2023     CC: Shortness of Breath (Pt presents to the ED via triage w/ c/o of SOB. Pt reports that see has been feeling SOB for about 7 days. Last night the SOB became much worse and pt reports that she was unable to sleep. Pt sats in the mid - upper 90s on room air. Pt reports hx of CHF )       Subjective     Interval History: Patient seen and examined at bedside. No acute events overnight. Patient reports feeling well this morning, breathing has improved. Patient denies any SOB, chest pain, nausea, vomiting, abdominal pain. ROS:  As per interval history above. Objective     Vital Signs:  Patient Vitals for the past 8 hrs:   BP Temp Temp src Pulse Resp SpO2 Weight   23 0856 (!) 173/65 98.5 °F (36.9 °C) Axillary 73 16 96 % --   23 0807 -- -- -- -- -- 100 % --   23 0458 (!) 158/56 98.8 °F (37.1 °C) Axillary 60 16 96 % 99 lb 10.4 oz (45.2 kg)       Physical Exam:  Physical Exam  Constitutional:       Appearance: She is not ill-appearing. HENT:      Head: Normocephalic and atraumatic. Nose: Nose normal.      Mouth/Throat:      Mouth: Mucous membranes are moist.   Eyes:      Pupils: Pupils are equal, round, and reactive to light. Cardiovascular:      Rate and Rhythm: Normal rate and regular rhythm. Pulses: Normal pulses. Heart sounds: Murmur heard. Pulmonary:      Effort: Pulmonary effort is normal. No respiratory distress. Breath sounds: Normal breath sounds. No stridor. No wheezing, rhonchi or rales. Chest:      Chest wall: No tenderness. Abdominal:      General: Bowel sounds are normal. There is no distension. Palpations: Abdomen is soft. Tenderness: There is no abdominal tenderness. There is no right CVA tenderness, left CVA tenderness or guarding. Musculoskeletal:      Right lower leg: No edema.       Left lower leg: No edema.   Skin:     Capillary Refill: Capillary refill takes less than 2 seconds. Coloration: Skin is pale. Findings: No lesion or rash. Neurological:      General: No focal deficit present. Mental Status: She is alert and oriented to person, place, and time. Mental status is at baseline. Cranial Nerves: No cranial nerve deficit. Sensory: No sensory deficit. Motor: No weakness. Gait: Gait normal.   Psychiatric:         Mood and Affect: Mood normal.       Intake/Output Summary (Last 24 hours) at 1/8/2023 1148  Last data filed at 1/8/2023 1003  Gross per 24 hour   Intake 440 ml   Output 1300 ml   Net -860 ml        Medications:   melatonin  5 mg Oral Nightly    warfarin  2 mg Oral Daily    enoxaparin  60 mg SubCUTAneous Q24H    lisinopril  40 mg Oral Daily    amLODIPine  5 mg Oral BID    insulin lispro  0-16 Units SubCUTAneous TID WC    insulin lispro  0-4 Units SubCUTAneous Nightly    insulin glargine  10 Units SubCUTAneous Nightly    metoprolol tartrate  12.5 mg Oral BID    atorvastatin  10 mg Oral Nightly    pantoprazole  40 mg Oral QAM AC    sodium chloride flush  5-40 mL IntraVENous 2 times per day    furosemide  40 mg IntraVENous Daily       dextrose      sodium chloride 25 mL (01/07/23 0811)      ipratropium-albuterol, glucose, dextrose bolus **OR** dextrose bolus, glucagon (rDNA), dextrose, ALPRAZolam, sodium chloride flush, sodium chloride, ondansetron **OR** ondansetron, polyethylene glycol, perflutren lipid microspheres     Labs:  CBC:   Recent Labs     01/06/23  0555 01/06/23  2302 01/07/23  0600 01/07/23  1750 01/08/23  0524   WBC 8.1  --  8.3  --  8.3   HGB 7.5*   < > 7.4* 7.3* 7.0*   HCT 22.9*  22.2*   < > 22.1* 21.5* 20.7*     --  189  --  149   MCV 90.7  --  91.0  --  90.6    < > = values in this interval not displayed.        Renal:    Recent Labs     01/06/23  0555 01/07/23  0600 01/07/23  1410 01/08/23  0524    138  --  139   K 3.7 3.2* 4.1 3.9   CL 103 99  --  102   CO2 28 29  --  28   BUN 13 15  --  14   CREATININE 1.2 1.2  --  1.4*   GLUCOSE 167* 226*  --  113*   CALCIUM 8.7 8.2*  --  8.4   MG 1.40* 1.60*  --  2.20   ANIONGAP 12 10  --  9       Hepatic:   Recent Labs     01/07/23  0600   BILITOT 0.8   BILIDIR <0.2       Troponin: Invalid input(s): TROPONIN    Lactic acid: Invalid input(s): LACTICACID    BNP: No results for input(s): BNP in the last 72 hours. Pro-BNP:   Recent Labs     01/05/23  1717   PROBNP 8,729*       Lipids: No results for input(s): CHOL, TRIG, HDL, LDLCALC, VLDL in the last 72 hours. ABGs:  No results for input(s): PHART, JWM7CPC, PO2ART, JXO9OJG, BEART, THGBART, V9URTOZN, GHO0DYK in the last 72 hours. VBGs:   Recent Labs     01/05/23  1717   PHVEN 7.385   DYV8TUG 43.2   PO2VEN 121.0*       INR:   Recent Labs     01/06/23  1328 01/07/23  0600 01/08/23  0523   INR 2.48* 2.06* 1.87*     aPTT: No results for input(s): APTT in the last 72 hours. Procalcitonin: No results for input(s): PROCAL in the last 72 hours. CRP: No results for input(s): CRP in the last 72 hours. ESR: No results for input(s): ESR in the last 72 hours. Radiology:  XR CHEST (2 VW)   Final Result      1. Bilateral basilar airspace disease and pleural effusions, left greater than right. Assessment & Plan   This is a 64-year-old female with history of HFpEF (G3DD, 7/2021), mechanical aortic valve, mechanical mitral valve (on warfarin), A. fib, CKD, hypertension, hyperlipidemia, GERD who presents with a 1 week history of shortness of breath. Dyspnea 2/2 Acute on Chronic HFpEF and worsening Multifactorial Anemia  Presenting with worsening SOB, RONDON, orthopnea. On lasix 20-40mg daily depending on daily weights. Compliant with this. Chest XR with bilateral airspace disease and pleural effusions (L > R). Last echo done 7/29/2021 with LVH, normal EF 55-60%, G3DD. S/p IV lasix 40mg in the ED.  Dry weight around 108lb  BNP 6041(12/4/22)> 8729, exacerbation due to dietary restriction noncompliance and HTN crisis. Hgb: 7.5 this admission, 2022:8.3. Pt has history of CKD, Non Hodgkin's Follicular Lymphoma, Iron Deficiency. 4 months ago:Transferrin receptor increased to 1.78. Rt count: increased to 4.94, G6PD levelsnormal, negative cryoglobulin. Colonoscopy 2016: Mild diverticulosis EGD 2019: Esophagus: proximal esophageal web biopsied otherwise patient has a normal that will and distal esophagus Hiatal hernia: this measures 1-2 cm. underwent capsule endoscopy which did not reveal any focal small bowel pathology. Stress Test 2020: Abnormal SPECT myocardial perfusion scan with evidence of pharmacological stress-induced reversible myocardial ischemia in a small area of the apical myocardium. Ferritin high 2,267, Iron and TIBC 61(LOW), 78, Reticulocyte count increased, LDH increased, haptoglobin low :(hemolyzed) hemolysis could be due to mechanical heart valve. Reticulocyte index is 1.25 which indicates hypoproliferation. - cont IV lasix 40mg daily  - strict I/Os   - daily weights  - f/u echo(2023):  Ejection fraction is visually estimated to be 60-65%. Cannot exclude regional wall motion abnormalities secondary to poor endocardial visualization. Grade III diastolic dysfunction with elevated LV filling pressures. - cont home metoprolol 12.5mg BID  - consult to HF coordinator  -will repeat workup for Anemia, Iron, ferritin, TIBC, Haptoglobin, LDH   - Haptoglobin hemolyzed yesterday  - Patient will be discharged home with instructions to follow-up with her Hematologist, Cardiologist, and PCP within 1 week. Patient also instructed to resume her home Warfarin schedule and monitor her INRs daily as she is able to check them at home. Hypomagnesemia - resolved  M.6  -Repleted, now 2.20    Hypokalemia - resolved  K:3.2   -Repleting, now 3.9     HTN  Pt with reported home -190s that improves to 160s SBP after taking lisinopril and amlodipine. Pt presenting with mild headache and SOB that could be caused by HTN. Recheck blood pressure 134/70> 145/78  -start home metoprolol 12.5 BID  -restart home lisinopril and amlodipine, continue to monitor BP     Chronic Medical Conditions:  Non Hodgkin's Lymphoma   Dx: 1/20/2012  -bone marrow 5/1429 with follicular lymphoma but unchanged with 5% cellularity; this has been stable from prior assessments and unlikely to be cause of worsening anemia     Paroxysmall Afib  -Amlodipine 10, Coumadin     Mechanical aortic valve  Mechanical mitral valve  - compliant with home warfarin  - check PT/INR  - pharmacy to dose warfarin     CKD stage 3  Follows with Dr. Dominica Libman outpatient. Cr actually looks better, 1.2 (baseline appears 1.3-1.65)  - monitor     DM  - hold home metformin and glipizide  - LDSSI, POCT glucose and hypoglycemia protocol     GERD   - cont home protonix     Anxiety  - cont home Xanax BID PRN     HLD   - cont home lipitor     COPD  -On inhalers at home-continued      Sinus Node Dysfunction  S/p Dual Chamber PPM(08/08/2018)    DVT PPx:warfarin  Diet:  ADULT DIET; Regular; 4 carb choices (60 gm/meal);  Low Fat/Low Chol/High Fiber/2 gm Na; 2000 ml   Code status:  Full Code       Will discuss with attending physician Dr. Jose Elias Santana MD.     Nathaniel Pope, DO  Internal Medicine, PGY-2

## 2023-01-09 ENCOUNTER — CLINICAL DOCUMENTATION ONLY (OUTPATIENT)
Facility: CLINIC | Age: 78
End: 2023-01-09

## 2023-01-09 ENCOUNTER — OFFICE VISIT (OUTPATIENT)
Dept: FAMILY MEDICINE CLINIC | Age: 78
End: 2023-01-09

## 2023-01-09 VITALS
DIASTOLIC BLOOD PRESSURE: 50 MMHG | TEMPERATURE: 97.3 F | BODY MASS INDEX: 20.64 KG/M2 | HEIGHT: 59 IN | WEIGHT: 102.4 LBS | SYSTOLIC BLOOD PRESSURE: 145 MMHG | HEART RATE: 64 BPM

## 2023-01-09 DIAGNOSIS — C82.80 OTHER TYPE OF FOLLICULAR LYMPHOMA, UNSPECIFIED BODY REGION (HCC): ICD-10-CM

## 2023-01-09 DIAGNOSIS — N18.32 TYPE 2 DIABETES MELLITUS WITH STAGE 3B CHRONIC KIDNEY DISEASE, WITHOUT LONG-TERM CURRENT USE OF INSULIN (HCC): ICD-10-CM

## 2023-01-09 DIAGNOSIS — E44.1 MILD PROTEIN-CALORIE MALNUTRITION (HCC): ICD-10-CM

## 2023-01-09 DIAGNOSIS — I50.9 ACUTE CONGESTIVE HEART FAILURE, UNSPECIFIED HEART FAILURE TYPE (HCC): ICD-10-CM

## 2023-01-09 DIAGNOSIS — N18.32 STAGE 3B CHRONIC KIDNEY DISEASE (HCC): ICD-10-CM

## 2023-01-09 DIAGNOSIS — F41.9 ANXIETY: ICD-10-CM

## 2023-01-09 DIAGNOSIS — J44.9 COPD, MILD (HCC): ICD-10-CM

## 2023-01-09 DIAGNOSIS — G89.29 CHRONIC BILATERAL LOW BACK PAIN WITHOUT SCIATICA: ICD-10-CM

## 2023-01-09 DIAGNOSIS — Z09 HOSPITAL DISCHARGE FOLLOW-UP: Primary | ICD-10-CM

## 2023-01-09 DIAGNOSIS — M54.50 CHRONIC BILATERAL LOW BACK PAIN WITHOUT SCIATICA: ICD-10-CM

## 2023-01-09 DIAGNOSIS — I35.0 AORTIC VALVE STENOSIS, ETIOLOGY OF CARDIAC VALVE DISEASE UNSPECIFIED: ICD-10-CM

## 2023-01-09 DIAGNOSIS — Z95.2 S/P AVR: ICD-10-CM

## 2023-01-09 DIAGNOSIS — E11.22 TYPE 2 DIABETES MELLITUS WITH STAGE 3B CHRONIC KIDNEY DISEASE, WITHOUT LONG-TERM CURRENT USE OF INSULIN (HCC): ICD-10-CM

## 2023-01-09 DIAGNOSIS — D64.9 CHRONIC ANEMIA: ICD-10-CM

## 2023-01-09 PROBLEM — F11.20 OPIOID DEPENDENCE WITH CURRENT USE (HCC): Status: ACTIVE | Noted: 2023-01-09

## 2023-01-09 PROBLEM — E46 PROTEIN CALORIE MALNUTRITION (HCC): Status: ACTIVE | Noted: 2023-01-09

## 2023-01-09 RX ORDER — ACETAMINOPHEN AND CODEINE PHOSPHATE 300; 30 MG/1; MG/1
1 TABLET ORAL NIGHTLY PRN
Qty: 90 TABLET | Refills: 0 | Status: SHIPPED | OUTPATIENT
Start: 2023-01-09 | End: 2023-04-09

## 2023-01-09 RX ORDER — ALPRAZOLAM 0.5 MG/1
TABLET ORAL
Qty: 60 TABLET | Refills: 1 | Status: SHIPPED | OUTPATIENT
Start: 2023-01-09 | End: 2023-02-13

## 2023-01-09 SDOH — ECONOMIC STABILITY: FOOD INSECURITY: WITHIN THE PAST 12 MONTHS, THE FOOD YOU BOUGHT JUST DIDN'T LAST AND YOU DIDN'T HAVE MONEY TO GET MORE.: NEVER TRUE

## 2023-01-09 SDOH — ECONOMIC STABILITY: FOOD INSECURITY: WITHIN THE PAST 12 MONTHS, YOU WORRIED THAT YOUR FOOD WOULD RUN OUT BEFORE YOU GOT MONEY TO BUY MORE.: NEVER TRUE

## 2023-01-09 ASSESSMENT — PATIENT HEALTH QUESTIONNAIRE - PHQ9
2. FEELING DOWN, DEPRESSED OR HOPELESS: 0
SUM OF ALL RESPONSES TO PHQ9 QUESTIONS 1 & 2: 0
1. LITTLE INTEREST OR PLEASURE IN DOING THINGS: 0
SUM OF ALL RESPONSES TO PHQ QUESTIONS 1-9: 0

## 2023-01-09 ASSESSMENT — SOCIAL DETERMINANTS OF HEALTH (SDOH): HOW HARD IS IT FOR YOU TO PAY FOR THE VERY BASICS LIKE FOOD, HOUSING, MEDICAL CARE, AND HEATING?: NOT HARD AT ALL

## 2023-01-09 NOTE — PROGRESS NOTES
Post-Discharge Transitional Care  Follow Up      Richard Sánchez   YOB: 1945    Date of Office Visit:  1/9/2023  Date of Hospital Admission: 1/5/23  Date of Hospital Discharge: 1/8/23  Risk of hospital readmission (high >=14%. Medium >=10%) :Readmission Risk Score: 17.5      Care management risk score Rising risk (score 2-5) and Complex Care (Scores >=6): No Risk Score On File     Non face to face  following discharge, date last encounter closed (first attempt may have been earlier): *No documented post hospital discharge outreach found in the last 14 days    Call initiated 2 business days of discharge: *No response recorded in the last 14 days    ASSESSMENT/PLAN:       Diagnosis Orders   1. Hospital discharge follow-up  PA DISCHARGE MEDS RECONCILED W/ CURRENT OUTPATIENT MED LIST      2. Acute congestive heart failure, unspecified heart failure type ([address]   Her sob has significantly improved   encourage compliance with medication, low sodium and wt daily. Followed by cardiology        3. COPD, mild ([address]   Former smoker  Continue albuterol prn          4. Type 2 diabetes mellitus with stage 3b chronic kidney disease, without long-term current use of insulin (HCC)   A1c at goal , continue Glipizide   Deteriorated renal function   Stop metformin   Re check a1c in 3 mo   patient agrees and verbalizes understanding  Fu 3 mo, I will prob change to Jardiance        5. Chronic bilateral low back pain without sciatica   Nsaid contraindicated (anticoagulated)  Controlled Substances Monitoring: Attestation: The Prescription Monitoring Report for this patient was reviewed today. Jorge Traore MD)  Documentation: No signs of potential drug abuse or diversion identified. Jorge Traore MD)  Refill tylenol # 3    acetaminophen-codeine (TYLENOL #3) 300-30 MG per tablet      6. Other type of follicular lymphoma, unspecified body region [facility])   With chronic anemia  Continue to fu with hematology        7.  Stage 3b chronic kidney disease (HCC)   Deteriorated  Stop metformin   Continue lisinopril 20 mg            8. Anxiety   Stable  Refills  She does not take this med with tylenol # 3 she is aware of serious interaction ,    ALPRAZolam (XANAX) 0.5 MG tablet      9. Mild protein-calorie malnutrition (HCC)   Stable  Continue to monitor   Albumin mildly decrease        10. Chronic anemia   Fu with hematology        11. Aortic valve stenosis, etiology of cardiac valve disease unspecified   Last echo with severe Ao stenosis, she may need replacement again. She will fu with cardiology          12. S/P AVR              Medical Decision Making: high complexity  No follow-ups on file. Subjective:   HPI:  Follow up of Hospital problems/diagnosis(es):    Diagnosis Orders   1. Hospital discharge follow-up  OR DISCHARGE MEDS RECONCILED W/ CURRENT OUTPATIENT MED LIST      2. Acute congestive heart failure, unspecified heart failure type ([address]        3. COPD, mild ([address]        4. Type 2 diabetes mellitus with stage 3b chronic kidney disease, without long-term current use of insulin ([address]        5. Chronic bilateral low back pain without sciatica  acetaminophen-codeine (TYLENOL #3) 300-30 MG per tablet      6. Other type of follicular lymphoma, unspecified body region ([address]        7. Stage 3b chronic kidney disease ([address]        8. Anxiety  ALPRAZolam (XANAX) 0.5 MG tablet      9. Mild protein-calorie malnutrition ([address]        10. Chronic anemia        11. Aortic valve stenosis, etiology of cardiac valve disease unspecified        12. S/P AVR          Denies: cp, cough, wheezing, edema, pnd, orthopnea   Sob is improved , has been complaint with med  Nees refills for her back pain and anxiety, takes tylenol # 3 at night that provides good relief and xanax bid for panic attacks, no hx of secondary effects. All other ros is neg       Inpatient course: Discharge summary reviewed- see chart.     Interval history/Current status: fair Patient Active Problem List   Diagnosis    Aortic valve replaced    Dyslipidemia    Anxiety disorder    Essential hypertension, benign    RLS (restless legs syndrome)    Anemia:multifactorial    Stage 3 chronic kidney disease, unspecified whether stage 3a or 3b CKD (HCC)    Lymphoma:monoclonal B cell    Chronic back pain    Lumbar disc herniation    Insomnia    Hyperlipidemia with target LDL less than 100    Osteoarthritis:hands/knees    Cardiac pacemaker in situ    Encounter for annual wellness visit (AWV) in Medicare patient    Renal lesion    GERD (gastroesophageal reflux disease)    Controlled type 2 diabetes mellitus with stage 3 chronic kidney disease, without long-term current use of insulin (HCC)    Primary insomnia    Chronic diastolic heart failure (HCC)    COPD, mild (HCC)    FOX (acute kidney injury) ([facility] [address]    Hypomagnesemia    Hyponatremia    CHF (congestive heart failure), NYHA class II, acute on chronic, diastolic ([facility] [address] [facility]       Medications listed as ordered at the time of discharge from hospital     Medication List            Accurate as of January 9, 2023 12:01 PM. If you have any questions, ask your nurse or doctor. CHANGE how you take these medications      warfarin 1 MG tablet  Commonly known as: COUMADIN  Take as directed by the anticoagulation clinic. If you are unsure how to take this medication, talk to your nurse or doctor. Original instructions: TAKE 2 AND 1/2 TABLETS BY MOUTH ON SUNDAYS AND TUESDAYS. TAKE 2 TABLETS BY MOUTH ALL OTHER DAYS.   What changed: additional instructions            CONTINUE taking these medications      ALPRAZolam 0.5 MG tablet  Commonly known as: XANAX  TAKE 1 TABLET BY MOUTH TWICE DAILY AS NEEDED FOR ANXIETY     amLODIPine 5 MG tablet  Commonly known as: NORVASC  Take 1 tablet by mouth daily     atorvastatin 10 MG tablet  Commonly known as: LIPITOR  TAKE 1 TABLET BY MOUTH DAILY IN THE EVENING FOR CHOLESTEROL     furosemide 20 MG tablet  Commonly known as: Lasix  Take 2 tablets by mouth daily     glipiZIDE 5 MG tablet  Commonly known as: GLUCOTROL  TAKE 1/2 TABLET BY MOUTH EVERY DAY WITH FOOD     * lisinopril 20 MG tablet  Commonly known as: PRINIVIL;ZESTRIL  Take 0.5 tablets by mouth daily     * lisinopril 40 MG tablet  Commonly known as: PRINIVIL;ZESTRIL  TAKE 1 TABLET BY MOUTH DAILY     metFORMIN 1000 MG tablet  Commonly known as: GLUCOPHAGE  TAKE 1 TABLET BY MOUTH TWICE DAILY     metoprolol tartrate 25 MG tablet  Commonly known as: LOPRESSOR     pantoprazole 40 MG tablet  Commonly known as: PROTONIX  TAKE 1 TABLET BY MOUTH EVERY DAY     * True Metrix Blood Glucose Test strip  Generic drug: blood glucose test strips  USE TO TEST BLOOD SUGAR TWICE DAILY     * blood glucose test strips  Test 2 times a day & as needed for symptoms of irregular blood glucose. Ok to dispense what is covered by insurance. Patient has been using [employer] test strips     vitamin B-12 100 MCG tablet  Commonly known as: CYANOCOBALAMIN           * This list has 4 medication(s) that are the same as other medications prescribed for you. Read the directions carefully, and ask your doctor or other care provider to review them with you. Medications marked \"taking\" at this time  Outpatient Medications Marked as Taking for the 1/9/23 encounter (Office Visit) with David Alston MD   Medication Sig Dispense Refill    furosemide (LASIX) 20 MG tablet Take 2 tablets by mouth daily 180 tablet 2    pantoprazole (PROTONIX) 40 MG tablet TAKE 1 TABLET BY MOUTH EVERY DAY 90 tablet 0    lisinopril (PRINIVIL;ZESTRIL) 40 MG tablet TAKE 1 TABLET BY MOUTH DAILY 90 tablet 3    lisinopril (PRINIVIL;ZESTRIL) 20 MG tablet Take 0.5 tablets by mouth daily 30 tablet 2    warfarin (COUMADIN) 1 MG tablet TAKE 2 AND 1/2 TABLETS BY MOUTH ON SUNDAYS AND TUESDAYS.  TAKE 2 TABLETS BY MOUTH ALL OTHER DAYS. (Patient taking differently: TAKE 2 tablets daily.) 192 tablet 3    metFORMIN (GLUCOPHAGE) 1000 MG tablet TAKE 1 TABLET BY MOUTH TWICE DAILY 180 tablet 2    amLODIPine (NORVASC) 5 MG tablet Take 1 tablet by mouth daily 30 tablet 5    vitamin B-12 (CYANOCOBALAMIN) 100 MCG tablet Take 100 mcg by mouth daily      glipiZIDE (GLUCOTROL) 5 MG tablet TAKE 1/2 TABLET BY MOUTH EVERY DAY WITH FOOD 45 tablet 2    atorvastatin (LIPITOR) 10 MG tablet TAKE 1 TABLET BY MOUTH DAILY IN THE EVENING FOR CHOLESTEROL 90 tablet 3    blood glucose monitor strips Test 2 times a day & as needed for symptoms of irregular blood glucose. Ok to dispense what is covered by insurance. Patient has been using [employer] test strips 100 strip 3    TRUE METRIX BLOOD GLUCOSE TEST strip USE TO TEST BLOOD SUGAR TWICE DAILY 100 strip 2    metoprolol tartrate (LOPRESSOR) 25 MG tablet Take 25 mg by mouth 2 times daily 0.5 tab BID          Medications patient taking as of now reconciled against medications ordered at time of hospital discharge: Yes    A comprehensive review of systems was negative except for what was noted in the HPI.     Objective:    BP (!) 145/50   Pulse 64   Temp 97.3 °F (36.3 °C) (Tympanic)   Ht 4' 11\" (1.499 m)   Wt 102 lb 6.4 oz (46.4 kg)   BMI 20.68 kg/m²   General Appearance: alert and oriented to person, place and time, well developed and well- nourished, in no acute distress  Skin: pale , warm and dry, no rash or erythema  Head: normocephalic and atraumatic  Eyes: pupils equal, round, and reactive to light, extraocular eye movements intact, conjunctivae normal  ENT: tympanic membrane, external ear and ear canal normal bilaterally, nose without deformity, nasal mucosa and turbinates normal without polyps  Neck: supple and non-tender without mass, no thyromegaly or thyroid nodules, no cervical lymphadenopathy  Pulmonary/Chest: clear to auscultation bilaterally- no wheezes, rales or rhonchi, normal air movement, no respiratory distress  Cardiovascular: normal rate, regular rhythm, normal S1 and S2, +murmurs,  no rubs, clicks, or gallops, distal pulses intact, no carotid bruits  Abdomen: soft, non-tender, non-distended, normal bowel sounds, no masses or organomegaly  Extremities: no cyanosis, clubbing or edema  Musculoskeletal: normal range of motion, no joint swelling, deformity or tenderness  Neurologic: reflexes normal and symmetric, no cranial nerve deficit, gait, coordination and speech normal      An electronic signature was used to authenticate this note.   --Srikanth Walker MD

## 2023-01-09 NOTE — DISCHARGE SUMMARY
INTERNAL MEDICINE DEPARTMENT AT [address] [place] [address]  DISCHARGE SUMMARY    Patient ID: Broderick Booker                                             Discharge Date: 1/8/2023   Patient's PCP: Mary Jane Mills MD                                          Discharge Physician: Marge Reyes MD MD  Admit Date: 1/5/2023   Admitting Physician: Cristofer Austin MD    PROBLEMS DURING HOSPITALIZATION:  Present on Admission:   CHF (congestive heart failure), NYHA class II, acute on chronic, diastolic (HCC)      DISCHARGE DIAGNOSES:    HPI:This is a 50-year-old female with history of HFpEF (G3DD, 7/2021), mechanical aortic valve, mechanical mitral valve (on warfarin), A. fib, CKD, hypertension, hyperlipidemia, GERD who presents with a 1 week history of shortness of breath. Patient states she lives at home with her . She has noticed progressively worsening shortness of breath over the past 1 week, and has worsened to the point that she could not sleep last night. She felt \"suffocated. \"  She has been sleeping in the recliner sitting up. She is not on any oxygen at home. She takes Lasix 20-40 mg daily depending on her daily weight. She had called her PCP regarding her shortness of breath and was recommended to come to the ED for further evaluation. Of note, she has hypertension and takes her blood pressure at home daily. She states her blood pressure normally runs in the 467-846 systolic. After she takes her lisinopril, her blood pressure improved to the 160s. She endorses mild headache. She denies any lightheadedness, vision changes, chest pain, abdominal pain, nausea, vomiting, urinary changes, diarrhea, constipation, peripheral edema. She is on warfarin daily for her mechanical aortic and mitral valve, and she is compliant with this. She endorses good appetite. In the ED:  she was satting 95-98% on room air. She is hypertensive to 170-190s/40-60s. Pulse in the 70s. BMP overall unremarkable.   BNP elevated to 8729 (baseline 6K). Troponin negative. Chronic anemia, hemoglobin 8.1 which appears to be around her baseline. Chest x-ray with bilateral airspace disease and pleural effusions (left greater than right). She was given 40 mg IV Lasix and DuoNeb treatment. Admitted for CHF exacerbation and hypertensive urgency. On the Medical Floor: The patient was treated for Acute on Chronic Heart Failure exacerbation with IV Lasix. Due to her Echo done about 1 year ago a repeat echo was performed to check for any changes in systolic and diastolic function. Patient also underwent workup for concerns of anemia which was also considered to be a contributing factor for her chief complaint. The work up for anemia was significant for multifactorial causes of her anemia. During her admission, her INR was found to subtherapeutic possibly due to missed dose, a temporary Lovenox bridging was done. She was discharged on resolution of her dyspnea and she will follow up with her PCP, Cardiologist and hematologist within 1-2 weeks of discharge. The following issues were addressed during hospitalization:    Physical Exam:  Physical Exam  Constitutional:       Appearance: She is not ill-appearing. HENT:      Head: Normocephalic and atraumatic. Nose: Nose normal.      Mouth/Throat:      Mouth: Mucous membranes are moist.   Eyes:      Pupils: Pupils are equal, round, and reactive to light. Cardiovascular:      Rate and Rhythm: Normal rate and regular rhythm. Pulses: Normal pulses. Heart sounds: Murmur heard. Pulmonary:      Effort: Pulmonary effort is normal. No respiratory distress. Breath sounds: Normal breath sounds. No stridor. No wheezing, rhonchi or rales. Chest:      Chest wall: No tenderness. Abdominal:      General: Bowel sounds are normal. There is no distension. Palpations: Abdomen is soft. Tenderness: There is no abdominal tenderness.  There is no right CVA tenderness, left CVA tenderness or guarding. Musculoskeletal:      Right lower leg: No edema. Left lower leg: No edema. Skin:     Capillary Refill: Capillary refill takes less than 2 seconds. Coloration: Skin is pale. Findings: No lesion or rash. Neurological:      General: No focal deficit present. Mental Status: She is alert and oriented to person, place, and time. Mental status is at baseline. Cranial Nerves: No cranial nerve deficit. Sensory: No sensory deficit. Motor: No weakness. Gait: Gait normal.   Psychiatric:         Mood and Affect: Mood normal.     Consults: none  Significant Diagnostic Studies:  None  Treatments: Lasix  Disposition: home  Discharged Condition: Stable  Follow Up: Primary Care Physician in one week    DISCHARGE MEDICATION:       Medication List        CHANGE how you take these medications      furosemide 20 MG tablet  Commonly known as: Lasix  Take 2 tablets by mouth daily  What changed:   when to take this  reasons to take this     warfarin 1 MG tablet  Commonly known as: COUMADIN  Take as directed. If you are unsure how to take this medication, talk to your nurse or doctor. Original instructions: TAKE 2 AND 1/2 TABLETS BY MOUTH ON SUNDAYS AND TUESDAYS. TAKE 2 TABLETS BY MOUTH ALL OTHER DAYS.   What changed: additional instructions            CONTINUE taking these medications      ALPRAZolam 0.5 MG tablet  Commonly known as: XANAX  TAKE 1 TABLET BY MOUTH TWICE DAILY AS NEEDED FOR ANXIETY     amLODIPine 5 MG tablet  Commonly known as: NORVASC  Take 1 tablet by mouth daily     atorvastatin 10 MG tablet  Commonly known as: LIPITOR  TAKE 1 TABLET BY MOUTH DAILY IN THE EVENING FOR CHOLESTEROL     glipiZIDE 5 MG tablet  Commonly known as: GLUCOTROL  TAKE 1/2 TABLET BY MOUTH EVERY DAY WITH FOOD     * lisinopril 20 MG tablet  Commonly known as: PRINIVIL;ZESTRIL  Take 0.5 tablets by mouth daily     * lisinopril 40 MG tablet  Commonly known as: PRINIVIL;ZESTRIL  TAKE 1 TABLET BY MOUTH DAILY     metFORMIN 1000 MG tablet  Commonly known as: GLUCOPHAGE  TAKE 1 TABLET BY MOUTH TWICE DAILY     metoprolol tartrate 25 MG tablet  Commonly known as: LOPRESSOR     pantoprazole 40 MG tablet  Commonly known as: PROTONIX  TAKE 1 TABLET BY MOUTH EVERY DAY     * True Metrix Blood Glucose Test strip  Generic drug: blood glucose test strips  USE TO TEST BLOOD SUGAR TWICE DAILY     * blood glucose test strips  Test 2 times a day & as needed for symptoms of irregular blood glucose. Ok to dispense what is covered by insurance. Patient has been using [employer] test strips     vitamin B-12 100 MCG tablet  Commonly known as: CYANOCOBALAMIN           * This list has 4 medication(s) that are the same as other medications prescribed for you. Read the directions carefully, and ask your doctor or other care provider to review them with you.                    Where to Get Your Medications        These medications were sent to  [facility]      Phone: [phone]   furosemide 20 MG tablet          Activity: activity as tolerated  Diet: cardiac diet  Wound Care: none needed    Time Spent on discharge is more than 30 minutes    Signed:  Pao Quiroga MD, PGY-1  1/8/2023

## 2023-01-16 ENCOUNTER — CARE COORDINATION (OUTPATIENT)
Dept: CARE COORDINATION | Age: 78
End: 2023-01-16

## 2023-01-16 NOTE — CARE COORDINATION
Ambulatory Care Coordination Note  1/16/2023    ACC: Ngozi Mohamud, MEME    Reports is doing better since being home from hospital.  Reports saw cardiologist and her heart valve might be sticking. For the time being they're just watching it. Still a little sob at times. Currently in grocery store so did not talk long. Reports no changes in her medications. Reports had been sleeping late and hasn't been doing RPM but will start back. Checks fs as needed and reviewed last a1c 5.4. PLAN  1) fu appts  2) review wt,sob,edema  3) review fs  Diabetes Assessment      Meal Planning: Avoidance of concentrated sweets   How often do you test your blood sugar?: Other (Comment: as needed)   Do you have barriers with adherence to non-pharmacologic self-management interventions?  (Nutrition/Exercise/Self-Monitoring): No   Have you ever had to go to the ED for symptoms of low blood sugar?: No       No patient-reported symptoms   Do you have hyperglycemia symptoms?: No   Do you have hypoglycemia symptoms?: No   Blood Sugar Trends: No Change        ,   Congestive Heart Failure Assessment    Are you currently restricting fluids?: No Restriction  Do you understand a low sodium diet?: Yes  Do you understand how to read food labels?: Yes  How many restaurant meals do you eat per week?: 0  Do you salt your food before tasting it?: No     No patient-reported symptoms      Symptoms:  None: Yes           , and   COPD Assessment    Does the patient understand envrionmental exposure?: Yes  Is the patient able to verbalize Rescue vs. Long Acting medications?: Yes  Does the patient have a nebulizer?: No  Does the patient use a space with inhaled medications?: No     No patient-reported symptoms         Symptoms:                Offered patient enrollment in the Remote Patient Monitoring (RPM) program for in-home monitoring:  pt continuing with RPM .    Lab Results       None            Care Coordination Interventions    Referral from Primary Care Provider: No  Suggested Interventions and Community Resources  Diabetes Education: Completed  Disease Association: Completed (Comment: CHF,COPD)  Registered Dietician: Declined (Comment: maybe later)  Zone Management Tools: Completed          Goals Addressed                   This Visit's Progress     Conditions and Symptoms   Improving     I will schedule office visits, as directed by my provider. I will keep my appointment or reschedule if I have to cancel. Barriers: lack of motivation  Plan for overcoming my barriers: will work with AC  Confidence: 9/10  Anticipated Goal Completion Date: 5/27/23                Prior to Admission medications    Medication Sig Start Date End Date Taking? Authorizing Provider   acetaminophen-codeine (TYLENOL #3) 300-30 MG per tablet Take 1 tablet by mouth nightly as needed for Pain for up to 90 days. 1/9/23 4/9/23  MD Ludwig Hernandez) 0.5 MG tablet TAKE 1 TABLET BY MOUTH TWICE DAILY AS NEEDED FOR ANXIETY 1/9/23 2/13/23  Carlos Lopez MD   furosemide (LASIX) 20 MG tablet Take 2 tablets by mouth daily 1/8/23   Asmita Berg DO   pantoprazole (PROTONIX) 40 MG tablet TAKE 1 TABLET BY MOUTH EVERY DAY 1/3/23   Carlos Lopez MD   lisinopril (PRINIVIL;ZESTRIL) 40 MG tablet TAKE 1 TABLET BY MOUTH DAILY  Patient taking differently: Take 40 mg by mouth daily Takes 1/2 tab daily. 12/13/22   MD Ludwig Hernandezi) 0.5 MG tablet TAKE 1 TABLET BY MOUTH TWICE DAILY AS NEEDED FOR ANXIETY 12/2/22 1/5/23  Carlos Lopez MD   warfarin (COUMADIN) 1 MG tablet TAKE 2 AND 1/2 TABLETS BY MOUTH ON SUNDAYS AND TUESDAYS. TAKE 2 TABLETS BY MOUTH ALL OTHER DAYS. Patient taking differently: TAKE 2 tablets daily.  11/14/22   Carlos Lopez MD   amLODIPine (NORVASC) 5 MG tablet Take 1 tablet by mouth daily 10/5/22   Carlos Lopez MD   vitamin B-12 (CYANOCOBALAMIN) 100 MCG tablet Take 100 mcg by mouth daily    Historical Provider, MD   glipiZIDE (GLUCOTROL) 5 MG tablet TAKE 1/2 TABLET BY MOUTH EVERY DAY WITH FOOD 6/16/22   David Tapia MD   atorvastatin (LIPITOR) 10 MG tablet TAKE 1 TABLET BY MOUTH DAILY IN THE EVENING FOR CHOLESTEROL 4/5/22   David Tapia MD   blood glucose monitor strips Test 2 times a day & as needed for symptoms of irregular blood glucose. Ok to dispense what is covered by insurance.  Patient has been using [employer] test strips 3/31/22   David Tapia MD   TRUE METRIX BLOOD GLUCOSE TEST strip USE TO TEST BLOOD SUGAR TWICE DAILY 7/29/21   David Tapia MD   metoprolol tartrate (LOPRESSOR) 25 MG tablet Take 25 mg by mouth 2 times daily 0.5 tab BID    Historical Provider, MD       Future Appointments   Date Time Provider Gilmar Broderick   3/7/2023 10:20 AM Kelly Vallejo MD [facility]   4/10/2023 11:30 AM David Tapia MD [facility]  [place] [facility]   4/18/2023 12:45 PM MD JACQUE Hardy [facility]

## 2023-01-26 ENCOUNTER — CARE COORDINATION (OUTPATIENT)
Dept: CARE COORDINATION | Age: 78
End: 2023-01-26

## 2023-01-26 DIAGNOSIS — E11.22 CONTROLLED TYPE 2 DIABETES MELLITUS WITH STAGE 3 CHRONIC KIDNEY DISEASE, WITHOUT LONG-TERM CURRENT USE OF INSULIN (HCC): ICD-10-CM

## 2023-01-26 DIAGNOSIS — I10 ESSENTIAL HYPERTENSION, BENIGN: Primary | ICD-10-CM

## 2023-01-26 DIAGNOSIS — I50.32 CHRONIC DIASTOLIC HEART FAILURE (HCC): ICD-10-CM

## 2023-01-26 DIAGNOSIS — N18.30 CONTROLLED TYPE 2 DIABETES MELLITUS WITH STAGE 3 CHRONIC KIDNEY DISEASE, WITHOUT LONG-TERM CURRENT USE OF INSULIN (HCC): ICD-10-CM

## 2023-01-26 NOTE — CARE COORDINATION
Ambulatory Care Coordination Note  1/26/2023    ACC: Julissa Aguillon, RN    Returning call. Reports is doing good and saw Dr Ailyn Hamilton a few weeks ago. Reports fs are doing good and usually around 120-130's. Reviewed last a1c 5.4. Reviewed diabetic, low fat, low sodium diets. Reports no swelling or wt changes. Denies any sob. Reports doesn't want to do RPM any longer as she sleeps late and just doesn't want to do it since she's doing much better now. PLAN  1) review wt,sob,edema  2) review fs  Diabetes Assessment      Meal Planning: Avoidance of concentrated sweets   How often do you test your blood sugar?: Other (Comment: as needed)   Do you have barriers with adherence to non-pharmacologic self-management interventions? (Nutrition/Exercise/Self-Monitoring): No   Have you ever had to go to the ED for symptoms of low blood sugar?: No       No patient-reported symptoms        ,   Congestive Heart Failure Assessment    Are you currently restricting fluids?: No Restriction  Do you understand a low sodium diet?: Yes  Do you understand how to read food labels?: Yes  How many restaurant meals do you eat per week?: 0  Do you salt your food before tasting it?: No     No patient-reported symptoms      Symptoms:  None: Yes           , and   COPD Assessment    Does the patient understand envrionmental exposure?: Yes  Is the patient able to verbalize Rescue vs. Long Acting medications?: Yes  Does the patient have a nebulizer?: No  Does the patient use a space with inhaled medications?: No     No patient-reported symptoms         Symptoms:                Offered patient enrollment in the Remote Patient Monitoring (RPM) program for in-home monitoring:  Reports doesn't want to do RPM as she is feeling better and sleeps late. She will send back equipment .     Lab Results       None            Care Coordination Interventions    Referral from Primary Care Provider: No  Suggested Interventions and Community Resources  Diabetes Education: Completed  Disease Association: Completed (Comment: CHF,COPD)  Registered Dietician: Declined (Comment: maybe later)  Zone Management Tools: Completed          Goals Addressed                   This Visit's Progress     Conditions and Symptoms   Improving     I will schedule office visits, as directed by my provider. I will keep my appointment or reschedule if I have to cancel. Barriers: lack of motivation  Plan for overcoming my barriers: will work with [facility]  Confidence: 9/10  Anticipated Goal Completion Date: 5/27/23                Prior to Admission medications    Medication Sig Start Date End Date Taking? Authorizing Provider   acetaminophen-codeine (TYLENOL #3) 300-30 MG per tablet Take 1 tablet by mouth nightly as needed for Pain for up to 90 days. 1/9/23 4/9/23  Emily Pandey MD ALPRAZoinessa Taylorleen Kidney) 0.5 MG tablet TAKE 1 TABLET BY MOUTH TWICE DAILY AS NEEDED FOR ANXIETY 1/9/23 2/13/23  Emily Pandey MD   furosemide (LASIX) 20 MG tablet Take 2 tablets by mouth daily 1/8/23   Guillermo Guthrie DO   pantoprazole (PROTONIX) 40 MG tablet TAKE 1 TABLET BY MOUTH EVERY DAY 1/3/23   Emily Pandey MD   lisinopril (PRINIVIL;ZESTRIL) 40 MG tablet TAKE 1 TABLET BY MOUTH DAILY  Patient taking differently: Take 40 mg by mouth daily Takes 1/2 tab daily. 12/13/22   Emily Pandey MD   ALPRAZolam Rosaleen Kidney) 0.5 MG tablet TAKE 1 TABLET BY MOUTH TWICE DAILY AS NEEDED FOR ANXIETY 12/2/22 1/5/23  Emily Pandey MD   warfarin (COUMADIN) 1 MG tablet TAKE 2 AND 1/2 TABLETS BY MOUTH ON SUNDAYS AND TUESDAYS. TAKE 2 TABLETS BY MOUTH ALL OTHER DAYS. Patient taking differently: TAKE 2 tablets daily.  11/14/22   Emily Pandey MD   amLODIPine (NORVASC) 5 MG tablet Take 1 tablet by mouth daily 10/5/22   Emily Pandey MD   vitamin B-12 (CYANOCOBALAMIN) 100 MCG tablet Take 100 mcg by mouth daily    Historical Provider, MD   glipiZIDE (GLUCOTROL) 5 MG tablet TAKE 1/2 TABLET BY MOUTH EVERY DAY WITH FOOD 6/16/22   Tamiko Valencia Mary Jane Downing MD   atorvastatin (LIPITOR) 10 MG tablet TAKE 1 TABLET BY MOUTH DAILY IN THE EVENING FOR CHOLESTEROL 4/5/22   Shelley Tee MD   blood glucose monitor strips Test 2 times a day & as needed for symptoms of irregular blood glucose. Ok to dispense what is covered by insurance.  Patient has been using [employer] test strips 3/31/22   Shelley Tee MD   TRUE METRIX BLOOD GLUCOSE TEST strip USE TO TEST BLOOD SUGAR TWICE DAILY 7/29/21   Shelley Tee MD   metoprolol tartrate (LOPRESSOR) 25 MG tablet Take 25 mg by mouth 2 times daily 0.5 tab BID    Historical Provider, MD       Future Appointments   Date Time Provider Gilmar Broderick   3/7/2023 10:20 AM MD Anurag Hutchinswall P/CC [facility]   4/10/2023 11:30 AM Shelley Tee MD [facility] [place] [facility]   4/18/2023 12:45 PM Gisell Pineda MD [facility]

## 2023-01-26 NOTE — CARE COORDINATION
CCSS placed call to patient to arrange RPM kit  through [facility]. Reviewed with patient how to pack equipment in original packing. LVM    Verified patient's availability to schedule UPS  time. N/A    UPS  time requested.  Anticipated  date range 4-7 business days

## 2023-01-26 NOTE — PROGRESS NOTES
1/26/23 4:20 PM     Remote Patient Order Discontinued    Received request from [address] [place] [address]. MEME Ram to discontinue order for remote patient monitoring of CHF, Diabetes, and HTN and order completed.      Alyse Babcock DNP, FNP-C, Remote Patient Monitoring NP, ) [phone]
Met with Mr. Nelson and his wife Autumn to review GOC prior to dc as per wife's request. They both share understanding that pt's cancer has recurred and that he is declining functionally. Autumn offered that she is awaiting a call from Dr. Thomas to discuss if more cancer treatment options are available or if comfort focus will be suggested instead. They both were open to an overall overview of current issues, impressions, and options for support.    We spoke briefly reviewing their understanding of acute events since pt has come. They understand that pt's biliary drain issues brought him in, that this was upgraded by IR with good effect. Likewise they know about CT findings showing progressive cancer with hydro from local obstruction, now s/p R nephrostomy with good output and resolution of YAMILETH. They know they will have to follow up with subspecialties for these new devices and home care RNs will visit to support further.     Thus, we went on to discuss home care options and advanced directives. We reviewed all levels of care including the difference between home palliative care (RN + PT alongside continued cancer treatment) and home hospice (if Dr. Thomas says no more cancer treatment AND family ready to focus on comfort). We discussed each option in detail and completing MOLST form with choices that coincide with overall plan: DNR, Limited interventions (for now/until onc clarifies if it is time for hospice), DNI, send to hospital (until ready for hospice), no feeding tube, trial of IVF, use abx, NO dialysis. Forms copied and given to wife for records, with original placed on chart. They expressed understanding all their options and readiness to upgrade to hospice if and when comfort focus is endorsed by onc. Spent time reassuring them that referral would follow the latter and all included services will then be reviewed by hospice reps; thus no need to worry about what may not be recalled. Floor SW/case management also engaged who will follow up.     Laslty, we reviewed pain plan, wife on board with addition of fentanyl and final dispo plan of home with pall + PT when primary medical team deems pt stable for dc. Discussed above with Dr. Weeks, ELLI Winters, and floor RNs at bedside.

## 2023-02-06 DIAGNOSIS — F41.9 ANXIETY: ICD-10-CM

## 2023-02-06 DIAGNOSIS — M54.50 CHRONIC BILATERAL LOW BACK PAIN WITHOUT SCIATICA: ICD-10-CM

## 2023-02-06 DIAGNOSIS — G89.29 CHRONIC BILATERAL LOW BACK PAIN WITHOUT SCIATICA: ICD-10-CM

## 2023-02-06 RX ORDER — GLIPIZIDE 5 MG/1
TABLET ORAL
Qty: 45 TABLET | Refills: 2 | Status: SHIPPED | OUTPATIENT
Start: 2023-02-06

## 2023-02-06 RX ORDER — LISINOPRIL 40 MG/1
40 TABLET ORAL DAILY
Qty: 90 TABLET | Refills: 3 | Status: SHIPPED | OUTPATIENT
Start: 2023-02-06

## 2023-02-06 RX ORDER — ALPRAZOLAM 0.5 MG/1
TABLET ORAL
Qty: 60 TABLET | Refills: 1 | Status: SHIPPED | OUTPATIENT
Start: 2023-02-06 | End: 2023-03-13

## 2023-02-06 RX ORDER — ACETAMINOPHEN AND CODEINE PHOSPHATE 300; 30 MG/1; MG/1
1 TABLET ORAL NIGHTLY PRN
Qty: 90 TABLET | Refills: 0 | Status: SHIPPED | OUTPATIENT
Start: 2023-02-06 | End: 2023-03-10 | Stop reason: SDUPTHER

## 2023-02-06 NOTE — TELEPHONE ENCOUNTER
Pt called requesting refills on Lisinopril, Glipizide, Alprazolam, and Tylenol #3    Meds pended    Last OV:  1-9-23    Also, Pt D/C'd Metformin like you told her to, but now her fasting BS's are 244, 250,262 etc    Please advise

## 2023-02-15 ENCOUNTER — TELEPHONE (OUTPATIENT)
Dept: FAMILY MEDICINE CLINIC | Age: 78
End: 2023-02-15

## 2023-02-15 NOTE — TELEPHONE ENCOUNTER
Received a fax from [facility], stating that pt is 18 days past due for her INR. Lm for pt to call back.

## 2023-02-20 ENCOUNTER — TELEPHONE (OUTPATIENT)
Dept: FAMILY MEDICINE CLINIC | Age: 78
End: 2023-02-20

## 2023-02-20 ENCOUNTER — ANTI-COAG VISIT (OUTPATIENT)
Dept: FAMILY MEDICINE CLINIC | Age: 78
End: 2023-02-20

## 2023-02-20 DIAGNOSIS — Z95.2 AORTIC VALVE REPLACED: Primary | ICD-10-CM

## 2023-02-20 LAB — INR BLD: 1.2

## 2023-02-27 ENCOUNTER — CARE COORDINATION (OUTPATIENT)
Dept: CARE COORDINATION | Age: 78
End: 2023-02-27

## 2023-02-27 NOTE — CARE COORDINATION
ACM called to introduce self to patient as the new ACM for her PCP office. Pt stated she was currently waiting to see her podiatrist for an appointment, but could talk for a few minutes. She stated she is doing as well overall. She stated she has no needs, questions or concerns for ACM and she has f/u appointments scheduled with pulmonary, her PCP and nephrology. PT agreed to call if anything came up and she needed additional support or resources. ACM to end current CC episode and remove name from care team at this time.

## 2023-03-07 ENCOUNTER — OFFICE VISIT (OUTPATIENT)
Dept: PULMONOLOGY | Age: 78
End: 2023-03-07
Payer: MEDICARE

## 2023-03-07 VITALS
HEIGHT: 59 IN | OXYGEN SATURATION: 96 % | SYSTOLIC BLOOD PRESSURE: 136 MMHG | DIASTOLIC BLOOD PRESSURE: 60 MMHG | BODY MASS INDEX: 22.66 KG/M2 | WEIGHT: 112.4 LBS | HEART RATE: 86 BPM

## 2023-03-07 DIAGNOSIS — R59.0 MEDIASTINAL LYMPHADENOPATHY: ICD-10-CM

## 2023-03-07 DIAGNOSIS — R91.8 PULMONARY NODULES: Primary | ICD-10-CM

## 2023-03-07 DIAGNOSIS — J44.9 COPD, MILD (HCC): ICD-10-CM

## 2023-03-07 PROCEDURE — 1090F PRES/ABSN URINE INCON ASSESS: CPT | Performed by: INTERNAL MEDICINE

## 2023-03-07 PROCEDURE — 3023F SPIROM DOC REV: CPT | Performed by: INTERNAL MEDICINE

## 2023-03-07 PROCEDURE — 1036F TOBACCO NON-USER: CPT | Performed by: INTERNAL MEDICINE

## 2023-03-07 PROCEDURE — G8420 CALC BMI NORM PARAMETERS: HCPCS | Performed by: INTERNAL MEDICINE

## 2023-03-07 PROCEDURE — G8427 DOCREV CUR MEDS BY ELIG CLIN: HCPCS | Performed by: INTERNAL MEDICINE

## 2023-03-07 PROCEDURE — G8484 FLU IMMUNIZE NO ADMIN: HCPCS | Performed by: INTERNAL MEDICINE

## 2023-03-07 PROCEDURE — 1123F ACP DISCUSS/DSCN MKR DOCD: CPT | Performed by: INTERNAL MEDICINE

## 2023-03-07 PROCEDURE — G8400 PT W/DXA NO RESULTS DOC: HCPCS | Performed by: INTERNAL MEDICINE

## 2023-03-07 PROCEDURE — 3078F DIAST BP <80 MM HG: CPT | Performed by: INTERNAL MEDICINE

## 2023-03-07 PROCEDURE — 99214 OFFICE O/P EST MOD 30 MIN: CPT | Performed by: INTERNAL MEDICINE

## 2023-03-07 PROCEDURE — 3075F SYST BP GE 130 - 139MM HG: CPT | Performed by: INTERNAL MEDICINE

## 2023-03-07 NOTE — PROGRESS NOTES
[facility] Pulmonary and Critical Care    Outpatient Follow Up Note    Subjective:   CHIEF COMPLAINT / HPI:     The patient is 69 y.o. female who is here for follow-up of COPD, pulmonary nodules, and lymphadenopathy. Since last visit she was admitted twice. Once in December for acute kidney injury and anemia. She also was admitted in January for acute on chronic CHF exacerbation. Currently she appears back to her baseline. COPD appears to be well controlled and infrequently uses albuterol. She is not needing a maintenance inhaler. She has no significant dyspnea on exertion, cough, wheezing, or chest tightness. She does mention that she gets fatigue more than she used to and cannot clean her house all at once at present    9/23/2022  Doni Diamond is here for 12-month follow-up of multiple pulmonary nodules and mediastinal lymphadenopathy in the setting of previous tobacco use and mild COPD. Currently she has no dyspnea on exertion, cough, wheezing, chest tightness, anorexia, or weight loss    8/17/2021  Doni Diamond is here for 6-month follow-up of multiple pulmonary nodules, mediastinal adenopathy, and mild COPD. Since last visit she has been diagnosed with CKD and heart failure. She states that she is not had any breathing problems and denies any cough. She had a CT chest earlier today    1/27/2021  Doni Diamond presents today for follow-up of multiple pulmonary nodules, mediastinal lymphadenopathy, and mild COPD. She recently had a CT chest for follow-up of her abnormalities. Patient feels well and denies any fevers, chills, night sweats, anorexia, weight loss, dyspnea, cough, wheezing, or chest tightness    11/11/2020  Doni Diamond presents today for a new patient visit for evaluation of multiple pulmonary nodules and precarinal lymphadenopathy. The largest nodule has had a small increase since previous imaging and now measures approximately 11 mm. Her precarinal lymphadenopathy has mildly enlarged as well.   She has a history of COPD but no PFTs on file. She states that it is mild and she has a rescue albuterol that she rarely ever uses. She does have a 20-pack-year history but quit smoking 16 years ago. She has a remote history of B-cell lymphoma and is followed by Dr. Christiano Mesa. Patient feels well and denies any fevers, chills, night sweats, anorexia, weight loss, dyspnea, cough, wheezing, or chest tightness    Past Medical History:    Past Medical History:   Diagnosis Date    A-fib ([facility] [address]     Anemia     multifactorial:under care of heme-onc:Dr. Christiano Mesa    Anemia:multifactorial 2011    as per heme-onc    Anxiety     Cataract     bilateral    CHF (congestive heart failure) ([facility] [address]     Chronic back pain     Under care of ortho spine:Dr. Neelam Ryan    CKD (chronic kidney disease) stage 3, GFR 30-59 ml/min ([facility] [address] 01/2012    Colitis, ischemic ([facility] [address] 06/2012    Under care of Dr. Bradley Bamberger    COPD     Diabetes     Diabetic eye exam ([facility] [address] 01/28/2015    CEI    GERD (gastroesophageal reflux disease)     H/O heart valve replacement with mechanical valve     Hx of mammogram 05/23/2018    negative:see scanned report. Hyperlipidaemia LDL goal <100     Hypertension     Insomnia     Long-term (current) use of anticoagulants, INR goal 2.5-3.5     Lymphoma:monoclonal B cell 01/2012    Under care of Dr. Susy Trujillo abnormal 07/30/2015    Right breast mass:benign(?lipoma)per US:repeat testing in 6mos=1/30/16    Nonspecific abnormal results of kidney function study 01/25/2012    Osteoarthritis     Renal cysts, right 01/02/2014    RLS (restless legs syndrome) 10/19/2011    Sciatica     Screening colonoscopy 2010;6/19/13    Nml. Next 10yrs:6/2023:Dr. Aye Abel.  9/22/16(Dr. Waddell):nml EGD & colonoscopy except mild diverticulosis    Therapeutic drug monitoring 04/10/2015    OARRS report consistent on 4/10/15;7/6/15;10/23/15;2/7/16;5/16/16;8/19/16;11/4/16;3/6/17;6/26/17;9/20/17;12/18/17; 12/18/17;3/12/18;5/29/18    Valvular heart disease     s/p aortic and mitral valve repair. Under care of cards:Dr. Gildardo Lobato. Visit for screening mammogram 04/10/2017    negative:see scanned report. Social History:    Patient recently retired. She was working part-time at [employer]. She is . She smoked half a pack of cigarettes a day for 40 years but quit 16 years ago    Family History:  Lung cancer  Diabetes    Current Medications:  Current Outpatient Medications on File Prior to Visit   Medication Sig Dispense Refill    glipiZIDE (GLUCOTROL) 5 MG tablet TAKE 1/2 TABLET BY MOUTH EVERY DAY WITH FOOD 45 tablet 2    acetaminophen-codeine (TYLENOL #3) 300-30 MG per tablet Take 1 tablet by mouth nightly as needed for Pain for up to 90 days. 90 tablet 0    ALPRAZolam (XANAX) 0.5 MG tablet TAKE 1 TABLET BY MOUTH TWICE DAILY AS NEEDED FOR ANXIETY 60 tablet 1    lisinopril (PRINIVIL;ZESTRIL) 40 MG tablet Take 1 tablet by mouth daily (Patient taking differently: Take 40 mg by mouth daily Take 1/2 TABLET DAILY) 90 tablet 3    furosemide (LASIX) 20 MG tablet Take 2 tablets by mouth daily 180 tablet 2    pantoprazole (PROTONIX) 40 MG tablet TAKE 1 TABLET BY MOUTH EVERY DAY 90 tablet 0    warfarin (COUMADIN) 1 MG tablet TAKE 2 AND 1/2 TABLETS BY MOUTH ON SUNDAYS AND TUESDAYS. TAKE 2 TABLETS BY MOUTH ALL OTHER DAYS. (Patient taking differently: TAKE 2 tablets daily.) 192 tablet 3    amLODIPine (NORVASC) 5 MG tablet Take 1 tablet by mouth daily 30 tablet 5    vitamin B-12 (CYANOCOBALAMIN) 100 MCG tablet Take 100 mcg by mouth daily      atorvastatin (LIPITOR) 10 MG tablet TAKE 1 TABLET BY MOUTH DAILY IN THE EVENING FOR CHOLESTEROL 90 tablet 3    blood glucose monitor strips Test 2 times a day & as needed for symptoms of irregular blood glucose. Ok to dispense what is covered by insurance.  Patient has been using [employer] test strips 100 strip 3    TRUE METRIX BLOOD GLUCOSE TEST strip USE TO TEST BLOOD SUGAR TWICE DAILY 100 strip 2    metoprolol tartrate (LOPRESSOR) 25 MG tablet Take 25 mg by mouth 2 times daily 0.5 tab BID      ALPRAZolam (XANAX) 0.5 MG tablet TAKE 1 TABLET BY MOUTH TWICE DAILY AS NEEDED FOR ANXIETY 60 tablet 0     No current facility-administered medications on file prior to visit. REVIEW OF SYSTEMS:    CONSTITUTIONAL: Negative for fevers and chills  HEENT: Negative for oropharyngeal exudate, post nasal drip, sinus pain / pressure, nasal congestion, ear pain  RESPIRATORY:  See HPI  CARDIOVASCULAR: Negative for chest pain, palpitations, edema  GASTROINTESTINAL: Negative for nausea, vomiting, diarrhea, constipation and abdominal pain  GENITOURINARY: Negative for dysuria, urinary frequency, urinary hesitancy  HEMATOLOGICAL: Negative for adenopathy  SKIN: Negative for clubbing, cyanosis, skin lesions  ENDOCRINE: Negative for polyuria, polydipsia, heat intolerance, cold intolerance   EXTREMITIES: Negative for weakness or decreased ROM in all extremities  NEUROLOGICAL: Negative for unilateral weakness, speech or gait abnormalities    Objective:   PHYSICAL EXAM:        VITALS:  /60 (Site: Right Upper Arm, Position: Sitting, Cuff Size: Small Adult)   Pulse 86   Ht 4' 11\" (1.499 m)   Wt 112 lb 6.4 oz (51 kg)   SpO2 96% Comment: ra  BMI 22.70 kg/m²   On room air  CONSTITUTIONAL:  Awake, alert, cooperative, no apparent distress, and appears stated age  HEENT: No oropharyngeal exudate, PERRL, no cervical adenopathy, no tracheal deviation, thyroid size normal  LUNGS:  No increased work of breathing and clear to auscultation, no crackles or wheezing  CARDIOVASCULAR:  normal S1 and S2 and no JVD  ABDOMEN:  Normal bowel sounds, non-distended and non-tender to palpation  EXT: No edema, no calf tenderness. Pulses are present bilaterally. NEUROLOGIC:  Mental Status Exam:  Level of Alertness:   awake  Orientation:   person, place, time.   SKIN:  normal skin color, texture, turgor, no redness, warmth, or swelling DATA:      Radiology Review:  Pertinent images / reports were reviewed as a part of this visit. CT chest September 23, 2022  Impression       1. Stable tiny pulmonary nodules in the right lung. No suspicious abnormality identified. Follow-up CT in 12 months recommended. 2. Mild subpleural fibrosis in lower lungs. 3. Stable mediastinal lymph nodes. Lymph nodes are presumably reactive. No progressive lymphadenopathy or acute thoracic abnormality identified. 4. Stable borderline splenomegaly. CT chest August 17, 2021  Impression   1. Stable benign-appearing pulmonary nodules right lung. Follow-up in 12 months recommended. .   2. Resolution of ground glass airspace disease in the right upper lobe   3. Stable enlarged pretracheal lymph node. 4. Stable left breast outer tail asymmetric fibroglandular elements which are unchanged from mammography of 3/21/2012, benign     CT chest 1/06/2021  EXAM: CT CHEST WO CONTRAST       INDICATION: Pulmonary nodules, follow-up       COMPARISON: October 2020 and prior       TECHNIQUE: Axial CT imaging of the chest was performed. Axial images, multiplanar reformatted images and axial maximum intensity projection were reviewed. Individualized dose optimization technique was used in order to meet ALARA standards for    radiation dose reduction. In addition to vendor specific dose reduction algorithms, the dose reduction techniques vary based on the specific scanner utilized but frequently include automated exposure control, adjustment of the mA and/or kV according to    patient size, and use of iterative reconstruction technique. CONTRAST: None       FINDINGS:       LUNGS AND AIRWAYS: Airways are patent. Subpleural nodular density posterior left upper lobe is decreased in size from prior study of October 2020, appearing smaller and subsolid, approximately 4 x 8 mm, series 4, image 25. *  3 mm nodule right apex, series 4, image 18, unchanged.    *  2 mm nodule posterior right upper lobe, image 36, unchanged. *  4 mm groundglass density superior right lower lobe, image 41, unchanged. *  Perihilar groundglass densities, image 51, 5 and 6 mm   *  4 mm groundglass density posterior right lower lobe, image 63. *  Mild atelectasis/groundglass density in the posterior lung bases. PLEURA: No pleural effusions or significant pleural thickening. HEART / GREAT VESSELS: Heart is enlarged. Artifact from valve replacement. Dense arterial calcification. LYMPH NODES: Precarinal node is 13 x 20 mm, previously reported as 15 x 24. CHEST WALL / LOWER NECK: No significant abnormality. UPPER ABDOMEN: No significant abnormality. BONES: No acute abnormality. Impression       1. Subpleural nodule posterior left upper lobe is decreased in size and subsolid. 2. Other scattered pulmonary nodules unchanged. 3. Small groundglass densities, right perihilar and posterior lung bases. Recommend follow-up study in 3-6 months. 4. Enlarged precarinal lymph node, slightly decreased in size. 5. Atherosclerotic disease. CT Chest 10/16/2020 reveals the following:  CT lung screen         HISTORY: 40 pack year history of cigarette smoking; pulmonary nodule         Thin section scans through the chest without IV contrast. Up-to-date CT equipment with radiation dose reduction techniques. Comparison with 6/15/2020 and 10/5/2020         Lung screening specific (lung RADS)-positive.  Multiple pulmonary nodules, including-         3 mm nodule right upper lobe series 4 image 30-stable         11 mm nodule left upper lobe images 34-35 (previously 8 mm)         3 mm subpleural nodule right upper lobe image 45, stable         2 separate 3 mm nodules right upper lobe contiguous with the major fissure images 46 and 47, stable         2 adjacent nodules superior segment right lower lobe measuring 2 and 5 mm, respectively, image 53, stable Potentially significant incidental findings (lung RADS category S)-enlarged precarinal lymph node measuring 17, unchanged         Other incidental findings-aortic arch calcification; valvular prosthetic device              Impression         Lung RADS category 4A with dominant 11 mm pulmonary nodule left upper lobe with increase in size since June 2020. Lung RADS category S-stable precarinal lymph node, mildly enlarged by short axis      PET scan 57/05/9006  No hypermetabolic uptake in any nodule or the precarinal lymphadenopathy. See media tab for full report    CTA chest Nisa 15, 2020  Chest    1. No findings to suggest aortic dissection. 2.  Scattered bilateral pulmonary nodules, the largest within the left upper lobe. Current    guidelines suggest CT follow-up in 3-6 months to evaluate stability. 3.  Cardiomegaly in the presence of changes related to aortic and mitral valve prostheses. Last PFTs:  None on file    Assessment:      Diagnosis Orders   1. Pulmonary nodules  CT CHEST WO CONTRAST      2. COPD, mild ([address]        3. Mediastinal lymphadenopathy  CT CHEST WO CONTRAST            Plan:     1. Overall her CT 9/2022 shows unchanged multiple small pulmonary nodules. Her lymphadenopathy is unchanged. Repeat CT in 17 months  2. COPD - asymptomatic. No daily treatment needed. Uses albuterol about once a month or less  3. Patient currently is not smoking - quit in 2007  4.   Follow-up in 7 months

## 2023-03-10 ENCOUNTER — ANTI-COAG VISIT (OUTPATIENT)
Dept: FAMILY MEDICINE CLINIC | Age: 78
End: 2023-03-10

## 2023-03-10 ENCOUNTER — OFFICE VISIT (OUTPATIENT)
Dept: FAMILY MEDICINE CLINIC | Age: 78
End: 2023-03-10

## 2023-03-10 VITALS
HEART RATE: 86 BPM | OXYGEN SATURATION: 99 % | SYSTOLIC BLOOD PRESSURE: 136 MMHG | WEIGHT: 109.3 LBS | HEIGHT: 59 IN | DIASTOLIC BLOOD PRESSURE: 60 MMHG | TEMPERATURE: 97.4 F | RESPIRATION RATE: 16 BRPM | BODY MASS INDEX: 22.04 KG/M2

## 2023-03-10 DIAGNOSIS — E11.22 TYPE 2 DIABETES MELLITUS WITH STAGE 3B CHRONIC KIDNEY DISEASE, WITHOUT LONG-TERM CURRENT USE OF INSULIN (HCC): Primary | ICD-10-CM

## 2023-03-10 DIAGNOSIS — N18.32 TYPE 2 DIABETES MELLITUS WITH STAGE 3B CHRONIC KIDNEY DISEASE, WITHOUT LONG-TERM CURRENT USE OF INSULIN (HCC): Primary | ICD-10-CM

## 2023-03-10 DIAGNOSIS — F41.9 ANXIETY: ICD-10-CM

## 2023-03-10 DIAGNOSIS — F11.20 OPIOID DEPENDENCE WITH CURRENT USE (HCC): ICD-10-CM

## 2023-03-10 DIAGNOSIS — M54.50 CHRONIC BILATERAL LOW BACK PAIN WITHOUT SCIATICA: ICD-10-CM

## 2023-03-10 DIAGNOSIS — I50.33 CHF (CONGESTIVE HEART FAILURE), NYHA CLASS II, ACUTE ON CHRONIC, DIASTOLIC (HCC): ICD-10-CM

## 2023-03-10 DIAGNOSIS — Z95.2 AORTIC VALVE REPLACED: Primary | ICD-10-CM

## 2023-03-10 DIAGNOSIS — Z79.01 ANTICOAGULATED: ICD-10-CM

## 2023-03-10 DIAGNOSIS — G89.29 CHRONIC BILATERAL LOW BACK PAIN WITHOUT SCIATICA: ICD-10-CM

## 2023-03-10 LAB — INR BLD: 2.1

## 2023-03-10 RX ORDER — ALPRAZOLAM 0.5 MG/1
TABLET ORAL
Qty: 60 TABLET | Refills: 2 | Status: SHIPPED | OUTPATIENT
Start: 2023-03-10 | End: 2023-06-16

## 2023-03-10 RX ORDER — ACETAMINOPHEN AND CODEINE PHOSPHATE 300; 30 MG/1; MG/1
1 TABLET ORAL NIGHTLY PRN
Qty: 30 TABLET | Refills: 2 | Status: SHIPPED | OUTPATIENT
Start: 2023-03-10 | End: 2023-06-08

## 2023-03-10 ASSESSMENT — ENCOUNTER SYMPTOMS
SHORTNESS OF BREATH: 0
BACK PAIN: 1
ABDOMINAL PAIN: 0

## 2023-03-10 NOTE — PROGRESS NOTES
Subjective:      Patient ID: Pao Wahl is a 78 y.o. female.    Diabetes  She presents for her follow-up diabetic visit. She has type 2 diabetes mellitus. Her disease course has been worsening. Hypoglycemia symptoms include nervousness/anxiousness. Associated symptoms include fatigue. Pertinent negatives for diabetes include no chest pain, no polydipsia, no polyphagia, no polyuria, no weakness and no weight loss. There are no hypoglycemic complications. Symptoms are stable. Current diabetic treatment includes oral agent (monotherapy). She is compliant with treatment all of the time. She is following a generally healthy diet. Meal planning includes avoidance of concentrated sweets. Her breakfast blood glucose is taken between 8-9 am. Her breakfast blood glucose range is generally 140-180 mg/dl.   Back Pain  Chronicity: acute on chronic. The current episode started today. The problem occurs constantly. The problem has been gradually worsening since onset. The pain is present in the lumbar spine. The quality of the pain is described as aching. Radiates to: L leg. The pain is moderate. Associated symptoms include leg pain. Pertinent negatives include no abdominal pain, bladder incontinence, chest pain, dysuria, pelvic pain, weakness or weight loss. Risk factors include menopause, sedentary lifestyle, poor posture, history of osteoporosis and lack of exercise. Treatments tried: tylenol # 3. The treatment provided significant relief.   Anxiety  Presents for follow-up visit. Symptoms include excessive worry ('s illness) and nervous/anxious behavior. Patient reports no chest pain, decreased concentration, depressed mood, irritability or shortness of breath. Symptoms occur occasionally.         Review of Systems   Constitutional:  Positive for fatigue. Negative for irritability and weight loss.   Respiratory:  Negative for shortness of breath.    Cardiovascular:  Negative for chest pain.   Gastrointestinal:   Negative for abdominal pain. Endocrine: Negative for polydipsia, polyphagia and polyuria. Genitourinary:  Negative for bladder incontinence, dysuria and pelvic pain. Musculoskeletal:  Positive for back pain. Neurological:  Negative for weakness. Psychiatric/Behavioral:  Negative for decreased concentration. The patient is nervous/anxious. Objective:  Blood pressure 136/60, pulse 86, temperature 97.4 °F (36.3 °C), temperature source Tympanic, resp. rate 16, height 4' 11\" (1.499 m), weight 109 lb 4.8 oz (49.6 kg), SpO2 99 %, not currently breastfeeding. Physical Exam  Vitals and nursing note reviewed. Constitutional:       General: She is not in acute distress. Appearance: She is well-developed. HENT:      Head: Normocephalic. Eyes:      General: No scleral icterus. Conjunctiva/sclera: Conjunctivae normal.      Pupils: Pupils are equal, round, and reactive to light. Neck:      Thyroid: No thyromegaly. Vascular: No JVD. Comments: No carotid bruits  Cardiovascular:      Rate and Rhythm: Normal rate and regular rhythm. Pulses: Normal pulses. Carotid pulses are 2+ on the right side and 2+ on the left side. Heart sounds: Normal heart sounds. No murmur heard. No friction rub. No gallop. Comments: No edema    Pulmonary:      Effort: Pulmonary effort is normal. No respiratory distress. Breath sounds: Normal breath sounds. No wheezing, rhonchi or rales. Chest:      Chest wall: No tenderness. Abdominal:      Palpations: There is no mass. Musculoskeletal:      Cervical back: Normal range of motion and neck supple. Right lower leg: Edema present. Left lower leg: Edema present. Comments: 2+ pitting edema both ankles     Lymphadenopathy:      Cervical: No cervical adenopathy. Skin:     General: Skin is warm. Coloration: Skin is not pale. Neurological:      General: No focal deficit present.       Mental Status: She is alert and oriented to person, place, and time. Mental status is at baseline. Cranial Nerves: No cranial nerve deficit. Motor: No abnormal muscle tone. Deep Tendon Reflexes: Reflexes are normal and symmetric. Assessment and plan:        Diagnosis Orders   1. Type 2 diabetes mellitus with stage 3b chronic kidney disease, without long-term current use of insulin (HCC)   Deteriorated after stopping Metformin  Add Jardiance   Fu in 3 mo   Encourage compliance with medication, life style changes   empagliflozin (JARDIANCE) 10 MG tablet      2. Chronic bilateral low back pain without sciatica   Deteriorated today   Continue tylneol # 3 no hx of intolerance or sec effects. Controlled Substances Monitoring: Attestation: The Prescription Monitoring Report for this patient was reviewed today. Silverio Mas MD)  Documentation: No signs of potential drug abuse or diversion identified. Silverio Mas MD)   acetaminophen-codeine (TYLENOL #3) 300-30 MG per tablet      3. CHF (congestive heart failure), NYHA class II, acute on chronic, diastolic (HCC)   With edema  Take lasix (80 mg today) then 40 mg po qd   encourage low sodium diet  Needs to check weight every day and take extra Lasix if > 2 Lb   Fu with cardiology         4. Anxiety   Stable  Refills  DO NOT TAKE this with tylenol # 3   patient agrees and verbalizes understanding   ALPRAZolam (XANAX) 0.5 MG tablet      5. Anticoagulated   INR at goal  Re check in 1mo         6.  Opioid dependence with current use ([address]   Sec effects and interactions discussed  Continue same medications no changes needed at this time   Fu 3 mo                   Kenney Wells MD

## 2023-04-03 DIAGNOSIS — E78.5 HYPERLIPIDEMIA WITH TARGET LDL LESS THAN 100: ICD-10-CM

## 2023-04-03 RX ORDER — ATORVASTATIN CALCIUM 10 MG/1
TABLET, FILM COATED ORAL
Qty: 90 TABLET | Refills: 3 | Status: SHIPPED | OUTPATIENT
Start: 2023-04-03

## 2023-04-03 RX ORDER — PANTOPRAZOLE SODIUM 40 MG/1
TABLET, DELAYED RELEASE ORAL
Qty: 90 TABLET | Refills: 0 | Status: SHIPPED | OUTPATIENT
Start: 2023-04-03

## 2023-04-03 NOTE — TELEPHONE ENCOUNTER
Medication:   Requested Prescriptions     Pending Prescriptions Disp Refills    atorvastatin (LIPITOR) 10 MG tablet [Pharmacy Med Name: ATORVASTATIN 10MG TABLETS] 90 tablet 3     Sig: TAKE 1 TABLET BY MOUTH DAILY IN THE EVENING FOR CHOLESTEROL    pantoprazole (PROTONIX) 40 MG tablet [Pharmacy Med Name: PANTOPRAZOLE 40MG TABLETS] 90 tablet 0     Sig: TAKE 1 TABLET BY MOUTH EVERY DAY        Last Filled:      Patient Phone Number: [phone] (home) [phone] (work)    Last appt: 3/10/2023   Next appt: 4/10/2023    Last OARRS:   RX Monitoring 3/8/2021   Attestation -   Periodic Controlled Substance Monitoring Possible medication side effects, risk of tolerance/dependence & alternative treatments discussed. ;No signs of potential drug abuse or diversion identified.

## 2023-04-05 DIAGNOSIS — M54.50 CHRONIC BILATERAL LOW BACK PAIN WITHOUT SCIATICA: ICD-10-CM

## 2023-04-05 DIAGNOSIS — G89.29 CHRONIC BILATERAL LOW BACK PAIN WITHOUT SCIATICA: ICD-10-CM

## 2023-04-05 RX ORDER — ACETAMINOPHEN AND CODEINE PHOSPHATE 300; 30 MG/1; MG/1
1 TABLET ORAL NIGHTLY PRN
Qty: 30 TABLET | Refills: 0 | Status: SHIPPED | OUTPATIENT
Start: 2023-04-05 | End: 2023-05-05

## 2023-04-05 NOTE — TELEPHONE ENCOUNTER
Medication:   Requested Prescriptions     Pending Prescriptions Disp Refills    acetaminophen-codeine (TYLENOL #3) 300-30 MG per tablet [Pharmacy Med Name: ACETAMINOPHEN/COD #3 (300/30MG) TAB] 90 tablet      Sig: TAKE 1 TABLET BY MOUTH EVERY NIGHT AS NEEDED FOR PAIN        Last Filled:      Patient Phone Number: [phone] (home) [phone] (work)    Last appt: 3/10/2023   Next appt: 6/15/2023    Last OARRS:   RX Monitoring 3/8/2021   Attestation -   Periodic Controlled Substance Monitoring Possible medication side effects, risk of tolerance/dependence & alternative treatments discussed. ;No signs of potential drug abuse or diversion identified.

## 2023-04-17 ENCOUNTER — TELEPHONE (OUTPATIENT)
Dept: FAMILY MEDICINE CLINIC | Age: 78
End: 2023-04-17

## 2023-04-17 NOTE — TELEPHONE ENCOUNTER
Nurse Triage:    Pt's blood sugar has been in the 300's. I spoke to pt she states her blood sugar was: Today: 242 (before medication.)  Yesterday: 222 am, 316 pm  317 the previous evening. Pt take 1 tab of Jardiance in the am, also 0.5 tab of glipizide. Please advise.

## 2023-04-17 NOTE — TELEPHONE ENCOUNTER
Pt states that she needs to find out when they are going to do her husbands surgery. Pt will call back.

## 2023-04-18 ENCOUNTER — TELEMEDICINE (OUTPATIENT)
Dept: FAMILY MEDICINE CLINIC | Age: 78
End: 2023-04-18

## 2023-04-18 DIAGNOSIS — N18.30 CONTROLLED TYPE 2 DIABETES MELLITUS WITH STAGE 3 CHRONIC KIDNEY DISEASE, WITHOUT LONG-TERM CURRENT USE OF INSULIN (HCC): Primary | ICD-10-CM

## 2023-04-18 DIAGNOSIS — E11.22 CONTROLLED TYPE 2 DIABETES MELLITUS WITH STAGE 3 CHRONIC KIDNEY DISEASE, WITHOUT LONG-TERM CURRENT USE OF INSULIN (HCC): Primary | ICD-10-CM

## 2023-04-18 RX ORDER — GLIPIZIDE 5 MG/1
TABLET ORAL
Qty: 60 TABLET | Refills: 2 | COMMUNITY
Start: 2023-04-18

## 2023-04-18 ASSESSMENT — PATIENT HEALTH QUESTIONNAIRE - PHQ9
SUM OF ALL RESPONSES TO PHQ QUESTIONS 1-9: 1
SUM OF ALL RESPONSES TO PHQ QUESTIONS 1-9: 1
SUM OF ALL RESPONSES TO PHQ9 QUESTIONS 1 & 2: 1
SUM OF ALL RESPONSES TO PHQ QUESTIONS 1-9: 1
SUM OF ALL RESPONSES TO PHQ QUESTIONS 1-9: 1
2. FEELING DOWN, DEPRESSED OR HOPELESS: 1
1. LITTLE INTEREST OR PLEASURE IN DOING THINGS: 0

## 2023-04-18 ASSESSMENT — ENCOUNTER SYMPTOMS
BLURRED VISION: 0
VISUAL CHANGE: 0

## 2023-04-18 NOTE — PROGRESS NOTES
Subjective:      Patient ID: Beena Nicole is a 69 y.o. female. Beena Nicole is a 69 y.o. female evaluated via telephone on 4/18/2023 for Other (High BS)  . Documentation:  I communicated with the patient and/or health care decision maker about CC. Details of this discussion including any medical advice provided: yes    Total Time: minutes: 11-20 minutes    Beena Nicole was evaluated through a synchronous (real-time) audio encounter. Patient identification was verified at the start of the visit. She (or guardian if applicable) is aware that this is a billable service, which includes applicable co-pays. This visit was conducted with the patient's (and/or legal guardian's) verbal consent. She has not had a related appointment within my department in the past 7 days or scheduled within the next 24 hours. The patient was located at Other: [facility] with her   . The provider was located at [facility] ([address] [place] [address]): [address], [address], [address]. Note: not billable if this call serves to triage the patient into an appointment for the relevant concern    Satya Aranda MD      Diabetes  She presents for her follow-up diabetic visit. She has type 2 diabetes mellitus. There are no hypoglycemic associated symptoms. Pertinent negatives for diabetes include no blurred vision, no chest pain, no fatigue, no foot paresthesias, no foot ulcerations, no polydipsia, no polyphagia, no polyuria, no visual change, no weakness and no weight loss. There are no hypoglycemic complications. Symptoms are worsening. Diabetic complications include nephropathy. Current diabetic treatment includes oral agent (dual therapy). She is compliant with treatment all of the time. Her weight is stable. She is following a generally unhealthy (changes in her diet (spending more time in hospitals accompanyin gher )) diet. She never participates in exercise. Her home blood glucose trend is increasing steadily.

## 2023-04-21 ENCOUNTER — ANTI-COAG VISIT (OUTPATIENT)
Dept: FAMILY MEDICINE CLINIC | Age: 78
End: 2023-04-21

## 2023-04-21 ENCOUNTER — TELEPHONE (OUTPATIENT)
Dept: FAMILY MEDICINE CLINIC | Age: 78
End: 2023-04-21

## 2023-04-21 DIAGNOSIS — Z95.2 AORTIC VALVE REPLACED: Primary | ICD-10-CM

## 2023-04-21 LAB — INR BLD: 1.9

## 2023-04-24 ENCOUNTER — TELEPHONE (OUTPATIENT)
Dept: FAMILY MEDICINE CLINIC | Age: 78
End: 2023-04-24
Payer: MEDICARE

## 2023-04-24 DIAGNOSIS — N18.32 TYPE 2 DIABETES MELLITUS WITH STAGE 3B CHRONIC KIDNEY DISEASE, WITHOUT LONG-TERM CURRENT USE OF INSULIN (HCC): Primary | ICD-10-CM

## 2023-04-24 DIAGNOSIS — E11.22 TYPE 2 DIABETES MELLITUS WITH STAGE 3B CHRONIC KIDNEY DISEASE, WITHOUT LONG-TERM CURRENT USE OF INSULIN (HCC): Primary | ICD-10-CM

## 2023-04-24 PROCEDURE — 36415 COLL VENOUS BLD VENIPUNCTURE: CPT | Performed by: FAMILY MEDICINE

## 2023-04-24 NOTE — TELEPHONE ENCOUNTER
Patient called stating that every morning sugar is at over 200. Patient calling to see if medication needs to be adjusted again.        Last VV: 04/18/2023

## 2023-04-24 NOTE — TELEPHONE ENCOUNTER
Increase glipizide  to 5 mg po bid   Currently she is taking 2.5 mg po bid (half tab)    Check bmp and a1c at [facility] lab and fu apt in   Put orders as lab collect please

## 2023-04-27 ENCOUNTER — TELEPHONE (OUTPATIENT)
Dept: FAMILY MEDICINE CLINIC | Age: 78
End: 2023-04-27

## 2023-05-01 RX ORDER — CALCIUM CITRATE/VITAMIN D3 200MG-6.25
TABLET ORAL
Qty: 100 STRIP | Refills: 3 | Status: SHIPPED | OUTPATIENT
Start: 2023-05-01

## 2023-05-01 NOTE — TELEPHONE ENCOUNTER
Medication:   Requested Prescriptions     Pending Prescriptions Disp Refills    blood glucose test strips (TRUE METRIX BLOOD GLUCOSE TEST) strip [Pharmacy Med Name: TRUE METRIX BLOOD GLUCOSE TEST STRP] 100 strip 3     Sig: TEST TWICE DAILY AND AS NEEDED FOR SYMPTOMS OF IRREGULAR BLOOD GLUCOSE        Last Filled:      Patient Phone Number: [phone] (home) [phone] (work)    Last appt: 4/18/2023   Next appt: 6/15/2023    Last OARRS:   RX Monitoring 3/8/2021   Attestation -   Periodic Controlled Substance Monitoring Possible medication side effects, risk of tolerance/dependence & alternative treatments discussed. ;No signs of potential drug abuse or diversion identified.

## 2023-05-05 DIAGNOSIS — E11.22 TYPE 2 DIABETES MELLITUS WITH STAGE 3B CHRONIC KIDNEY DISEASE, WITHOUT LONG-TERM CURRENT USE OF INSULIN (HCC): Primary | ICD-10-CM

## 2023-05-05 DIAGNOSIS — N18.32 TYPE 2 DIABETES MELLITUS WITH STAGE 3B CHRONIC KIDNEY DISEASE, WITHOUT LONG-TERM CURRENT USE OF INSULIN (HCC): Primary | ICD-10-CM

## 2023-05-05 DIAGNOSIS — M54.50 CHRONIC BILATERAL LOW BACK PAIN WITHOUT SCIATICA: ICD-10-CM

## 2023-05-05 DIAGNOSIS — G89.29 CHRONIC BILATERAL LOW BACK PAIN WITHOUT SCIATICA: ICD-10-CM

## 2023-05-08 DIAGNOSIS — M54.50 CHRONIC BILATERAL LOW BACK PAIN WITHOUT SCIATICA: ICD-10-CM

## 2023-05-08 DIAGNOSIS — G89.29 CHRONIC BILATERAL LOW BACK PAIN WITHOUT SCIATICA: ICD-10-CM

## 2023-05-08 RX ORDER — ACETAMINOPHEN AND CODEINE PHOSPHATE 300; 30 MG/1; MG/1
1 TABLET ORAL NIGHTLY PRN
Qty: 30 TABLET | Refills: 0 | Status: SHIPPED | OUTPATIENT
Start: 2023-05-08 | End: 2023-06-07

## 2023-05-16 ENCOUNTER — OFFICE VISIT (OUTPATIENT)
Dept: FAMILY MEDICINE CLINIC | Age: 78
End: 2023-05-16

## 2023-05-16 ENCOUNTER — TELEPHONE (OUTPATIENT)
Dept: FAMILY MEDICINE CLINIC | Age: 78
End: 2023-05-16

## 2023-05-16 VITALS
OXYGEN SATURATION: 97 % | WEIGHT: 104.5 LBS | RESPIRATION RATE: 16 BRPM | HEART RATE: 71 BPM | DIASTOLIC BLOOD PRESSURE: 78 MMHG | HEIGHT: 59 IN | SYSTOLIC BLOOD PRESSURE: 136 MMHG | TEMPERATURE: 97.2 F | BODY MASS INDEX: 21.07 KG/M2

## 2023-05-16 DIAGNOSIS — E11.22 TYPE 2 DIABETES MELLITUS WITH STAGE 3B CHRONIC KIDNEY DISEASE, WITHOUT LONG-TERM CURRENT USE OF INSULIN (HCC): Primary | ICD-10-CM

## 2023-05-16 DIAGNOSIS — E11.22 TYPE 2 DIABETES MELLITUS WITH STAGE 3B CHRONIC KIDNEY DISEASE, WITHOUT LONG-TERM CURRENT USE OF INSULIN (HCC): ICD-10-CM

## 2023-05-16 DIAGNOSIS — E78.5 HYPERLIPIDEMIA WITH TARGET LDL LESS THAN 100: ICD-10-CM

## 2023-05-16 DIAGNOSIS — N18.32 TYPE 2 DIABETES MELLITUS WITH STAGE 3B CHRONIC KIDNEY DISEASE, WITHOUT LONG-TERM CURRENT USE OF INSULIN (HCC): Primary | ICD-10-CM

## 2023-05-16 DIAGNOSIS — I10 BENIGN ESSENTIAL HTN: ICD-10-CM

## 2023-05-16 DIAGNOSIS — F41.9 ANXIETY: ICD-10-CM

## 2023-05-16 DIAGNOSIS — N18.32 TYPE 2 DIABETES MELLITUS WITH STAGE 3B CHRONIC KIDNEY DISEASE, WITHOUT LONG-TERM CURRENT USE OF INSULIN (HCC): ICD-10-CM

## 2023-05-16 RX ORDER — ALPRAZOLAM 0.5 MG/1
TABLET ORAL
Qty: 60 TABLET | Refills: 2 | Status: SHIPPED | OUTPATIENT
Start: 2023-05-16 | End: 2023-08-22

## 2023-05-16 NOTE — PROGRESS NOTES
Subjective:      Patient ID: Aliza Dobbins is a 69 y.o. female. HPI    Treatment Adherence:   Medication compliance:  compliant most of the time  Diet compliance:  noncompliant: has been eating out more ofter due to her 's illness  Weight trend: stable  Current exercise: no regular exercise  Barriers: lack of support    Diabetes Mellitus Type 2: Current symptoms/problems include none. Home blood sugar records:  130-200s  Any episodes of hypoglycemia? no  Eye exam current (within one year): yes  Tobacco history: She  reports that she quit smoking about 15 years ago. Her smoking use included cigarettes. She has a 40.00 pack-year smoking history. She has never used smokeless tobacco.   Daily Aspirin? Yes    Hypertension:  Home blood pressure monitoring: No.  She is not adherent to a low sodium diet. Patient denies chest pain, shortness of breath, lightheadedness, and peripheral edema. Antihypertensive medication side effects: no medication side effects noted. Use of agents associated with hypertension: none. Hyperlipidemia:  No new myalgias or GI upset on atorvastatin (Lipitor). Anxiety: her sx are stable   Takes Xanax prn   No sec effects. Lab Results   Component Value Date    LABA1C 5.4 12/05/2022    LABA1C 4.4 06/03/2022    LABA1C 5.1 10/11/2021     Lab Results   Component Value Date    LABMICR YES 12/04/2022    CREATININE 1.4 (H) 01/08/2023     Lab Results   Component Value Date    ALT 9 (L) 12/06/2022    AST 34 12/06/2022     Lab Results   Component Value Date    CHOL 144 10/11/2021    TRIG 241 (H) 10/11/2021    HDL 38 (L) 10/11/2021    LDLCALC 73 10/11/2021        Review of Systems    Objective:  Blood pressure 136/78, pulse 71, temperature 97.2 °F (36.2 °C), temperature source Tympanic, resp. rate 16, height 4' 11\" (1.499 m), weight 104 lb 8 oz (47.4 kg), SpO2 97 %, not currently breastfeeding. Physical Exam  Vitals and nursing note reviewed.    Constitutional:       General: She

## 2023-05-16 NOTE — TELEPHONE ENCOUNTER
Let pt know her A1c is high 8.9  Increase Jardiance to 20 mg but we need to check kidney function in 1 month   Ok LV

## 2023-05-17 DIAGNOSIS — E11.22 TYPE 2 DIABETES MELLITUS WITH STAGE 3B CHRONIC KIDNEY DISEASE, WITHOUT LONG-TERM CURRENT USE OF INSULIN (HCC): ICD-10-CM

## 2023-05-17 DIAGNOSIS — E11.22 CONTROLLED TYPE 2 DIABETES MELLITUS WITH STAGE 3 CHRONIC KIDNEY DISEASE, WITHOUT LONG-TERM CURRENT USE OF INSULIN (HCC): ICD-10-CM

## 2023-05-17 DIAGNOSIS — N18.30 CONTROLLED TYPE 2 DIABETES MELLITUS WITH STAGE 3 CHRONIC KIDNEY DISEASE, WITHOUT LONG-TERM CURRENT USE OF INSULIN (HCC): ICD-10-CM

## 2023-05-17 DIAGNOSIS — N18.32 TYPE 2 DIABETES MELLITUS WITH STAGE 3B CHRONIC KIDNEY DISEASE, WITHOUT LONG-TERM CURRENT USE OF INSULIN (HCC): ICD-10-CM

## 2023-05-17 RX ORDER — GLIPIZIDE 5 MG/1
TABLET ORAL
Qty: 60 TABLET | Refills: 2 | Status: CANCELLED | OUTPATIENT
Start: 2023-05-17

## 2023-05-17 NOTE — TELEPHONE ENCOUNTER
rx transmitted electronically to the pharmacy via [employer]   Let patient know and verify pharmacy    Check bmp in 1 month

## 2023-05-23 DIAGNOSIS — E11.22 CONTROLLED TYPE 2 DIABETES MELLITUS WITH STAGE 3 CHRONIC KIDNEY DISEASE, WITHOUT LONG-TERM CURRENT USE OF INSULIN (HCC): ICD-10-CM

## 2023-05-23 DIAGNOSIS — N18.30 CONTROLLED TYPE 2 DIABETES MELLITUS WITH STAGE 3 CHRONIC KIDNEY DISEASE, WITHOUT LONG-TERM CURRENT USE OF INSULIN (HCC): ICD-10-CM

## 2023-05-24 ENCOUNTER — TELEPHONE (OUTPATIENT)
Dept: FAMILY MEDICINE CLINIC | Age: 78
End: 2023-05-24

## 2023-05-24 DIAGNOSIS — E11.22 CONTROLLED TYPE 2 DIABETES MELLITUS WITH STAGE 3 CHRONIC KIDNEY DISEASE, WITHOUT LONG-TERM CURRENT USE OF INSULIN (HCC): ICD-10-CM

## 2023-05-24 DIAGNOSIS — N18.30 CONTROLLED TYPE 2 DIABETES MELLITUS WITH STAGE 3 CHRONIC KIDNEY DISEASE, WITHOUT LONG-TERM CURRENT USE OF INSULIN (HCC): ICD-10-CM

## 2023-05-24 RX ORDER — GLIPIZIDE 5 MG/1
TABLET ORAL
Qty: 45 TABLET | OUTPATIENT
Start: 2023-05-24

## 2023-05-24 NOTE — TELEPHONE ENCOUNTER
Patient is requesting RX for Glipizide 5mg        RX is needed to cover double dose        Patient also states Dormriya Bump does not come in 20 only 10&25        Patient need refill       Last OV 05-

## 2023-05-25 RX ORDER — GLIPIZIDE 5 MG/1
TABLET ORAL
Qty: 180 TABLET | Refills: 2 | Status: SHIPPED | OUTPATIENT
Start: 2023-05-25

## 2023-05-25 NOTE — TELEPHONE ENCOUNTER
rx transmitted electronically to the pharmacy via [employer]   Let patient know and verify pharmacy      We need to check her labs in 1 mo   (Kidney function )

## 2023-05-25 NOTE — TELEPHONE ENCOUNTER
Patient called stating that she is still having problems getting Jardiance refill. Patient states [facility] does not have 20 mg. Patient is needing a refill for 10mg. Patient also states that insurance is not covering for the 10 mg prescription. Patient needing call back. Patient is unsure what to do next.       Last Office Visit: 05/16/2023

## 2023-05-25 NOTE — TELEPHONE ENCOUNTER
Spoke with Genna from [facility] states Jardiance 10mg will need a prior Auth for insurance to cover. But if 25mg is prescribed insurance will cover no problem. Patient also states glipizide medication was increased from 0.5 tablet to 1 whole tablet 2 times a day. Alex Briggs states new prescription for Glipizide needs to be sent. You may ask to speak to genna or Roger Mileso who is pharmacy manager.

## 2023-06-06 DIAGNOSIS — G89.29 CHRONIC BILATERAL LOW BACK PAIN WITHOUT SCIATICA: ICD-10-CM

## 2023-06-06 DIAGNOSIS — M54.50 CHRONIC BILATERAL LOW BACK PAIN WITHOUT SCIATICA: ICD-10-CM

## 2023-06-07 NOTE — TELEPHONE ENCOUNTER
Medication:   Requested Prescriptions     Pending Prescriptions Disp Refills    acetaminophen-codeine (TYLENOL #3) 300-30 MG per tablet [Pharmacy Med Name: ACETAMINOPHEN/COD #3 (300/30MG) TAB] 30 tablet      Sig: TAKE 1 TABLET BY MOUTH EVERY NIGHT AS NEEDED FOR PAIN. MAX DAILY AMOUNT: 1 TABLET        Last Filled:      Patient Phone Number: [phone] (home) [phone] (work)    Last appt: 5/16/2023   Next appt: 6/15/2023    Last OARRS:   RX Monitoring 3/8/2021   Attestation -   Periodic Controlled Substance Monitoring Possible medication side effects, risk of tolerance/dependence & alternative treatments discussed. ;No signs of potential drug abuse or diversion identified.

## 2023-06-14 ENCOUNTER — TELEPHONE (OUTPATIENT)
Dept: FAMILY MEDICINE CLINIC | Age: 78
End: 2023-06-14

## 2023-06-19 ENCOUNTER — TELEPHONE (OUTPATIENT)
Dept: FAMILY MEDICINE CLINIC | Age: 78
End: 2023-06-19

## 2023-06-20 ENCOUNTER — TELEPHONE (OUTPATIENT)
Dept: FAMILY MEDICINE CLINIC | Age: 78
End: 2023-06-20

## 2023-06-20 NOTE — TELEPHONE ENCOUNTER
Patient states medication was picked up yesterday Warfarin          Two and half mg Sunday and Tuesday      Two tablets all other days          Patient will start these instruction today        Please contact patient for Dosage

## 2023-07-03 RX ORDER — PANTOPRAZOLE SODIUM 40 MG/1
TABLET, DELAYED RELEASE ORAL
Qty: 90 TABLET | Refills: 0 | Status: SHIPPED | OUTPATIENT
Start: 2023-07-03

## 2023-07-03 NOTE — TELEPHONE ENCOUNTER
Medication:   Requested Prescriptions     Pending Prescriptions Disp Refills    pantoprazole (PROTONIX) 40 MG tablet [Pharmacy Med Name: PANTOPRAZOLE 40MG TABLETS] 90 tablet 0     Sig: TAKE 1 TABLET BY MOUTH EVERY DAY        Last Filled:      Patient Phone Number: [phone] (home) [phone] (work)    Last appt: 6/15/2023   Next appt: 9/20/2023    Last OARRS:   RX Monitoring 3/8/2021   Attestation -   Periodic Controlled Substance Monitoring Possible medication side effects, risk of tolerance/dependence & alternative treatments discussed. ;No signs of potential drug abuse or diversion identified.

## 2023-07-10 NOTE — TELEPHONE ENCOUNTER
Medication:   Requested Prescriptions     Pending Prescriptions Disp Refills    blood glucose test strips (TRUE METRIX BLOOD GLUCOSE TEST) strip 100 strip 3        Last Filled:      Patient Phone Number: [phone] (home) [phone] (work)    Last appt: 6/15/2023   Next appt: 9/20/2023    Last OARRS:   RX Monitoring 3/8/2021   Attestation -   Periodic Controlled Substance Monitoring Possible medication side effects, risk of tolerance/dependence & alternative treatments discussed. ;No signs of potential drug abuse or diversion identified.

## 2023-07-10 NOTE — TELEPHONE ENCOUNTER
[facility] called stating they need a new RX Medicare will not approve and as needed it must state dosage only one twice daily              Glucose strips      [phone]

## 2023-07-11 ENCOUNTER — TELEPHONE (OUTPATIENT)
Dept: FAMILY MEDICINE CLINIC | Age: 78
End: 2023-07-11

## 2023-07-11 ENCOUNTER — ANTI-COAG VISIT (OUTPATIENT)
Dept: FAMILY MEDICINE CLINIC | Age: 78
End: 2023-07-11

## 2023-07-11 DIAGNOSIS — Z95.2 AORTIC VALVE REPLACED: Primary | ICD-10-CM

## 2023-07-11 LAB
INR BLD: 1.8
INR BLD: 1.8

## 2023-07-11 RX ORDER — CALCIUM CITRATE/VITAMIN D3 200MG-6.25
TABLET ORAL
Qty: 100 STRIP | Refills: 3 | Status: SHIPPED | OUTPATIENT
Start: 2023-07-11

## 2023-07-20 ENCOUNTER — ANTI-COAG VISIT (OUTPATIENT)
Dept: FAMILY MEDICINE CLINIC | Age: 78
End: 2023-07-20

## 2023-07-20 NOTE — TELEPHONE ENCOUNTER
Patient states she has not heard about how much medication to take patient calling to check status. Patient also stated she needed to tell Clinical staff more information.        Last Office Visit: 06/15/2023

## 2023-08-07 RX ORDER — CALCIUM CITRATE/VITAMIN D3 200MG-6.25
TABLET ORAL
Qty: 100 STRIP | Refills: 3 | Status: SHIPPED | OUTPATIENT
Start: 2023-08-07

## 2023-08-07 NOTE — TELEPHONE ENCOUNTER
----- Message from Nicko Jacob sent at 8/7/2023 10:27 AM EDT -----  Subject: Refill Request    QUESTIONS  Name of Medication? TRUE METRIX BLOOD GLUCOSE TEST strip  Patient-reported dosage and instructions? twice a day  How many days do you have left? 2  Preferred Pharmacy? [facility]  Pharmacy phone number (if available)? [phone]  ---------------------------------------------------------------------------  --------------  Luis Eduardo CAMPUZANO  What is the best way for the office to contact you? OK to leave message on   voicemail  Preferred Call Back Phone Number? [phone]  ---------------------------------------------------------------------------  --------------  SCRIPT ANSWERS  Relationship to Patient?  Self

## 2023-08-24 LAB — INR BLD: 1.9

## 2023-08-24 NOTE — TELEPHONE ENCOUNTER
Pharmacy states directions has to be very clear medicare will not approve rx saying bid and prn. Pharmacy states it has to be stated as need up to  however many times times it was discussed     Ok to put tid?

## 2023-08-24 NOTE — TELEPHONE ENCOUNTER
Patient states she can't receive glucose strip refill because it's directives doesn't indicate test twice daily (per patient/pharmacy  Please update RX             Please contact patient

## 2023-08-25 RX ORDER — CALCIUM CITRATE/VITAMIN D3 200MG-6.25
TABLET ORAL
Qty: 100 STRIP | Refills: 3 | Status: SHIPPED | OUTPATIENT
Start: 2023-08-25

## 2023-08-29 ENCOUNTER — ANTI-COAG VISIT (OUTPATIENT)
Dept: FAMILY MEDICINE CLINIC | Age: 78
End: 2023-08-29

## 2023-08-29 DIAGNOSIS — Z95.2 AORTIC VALVE REPLACED: Primary | ICD-10-CM

## 2023-08-30 ENCOUNTER — TELEPHONE (OUTPATIENT)
Dept: FAMILY MEDICINE CLINIC | Age: 78
End: 2023-08-30

## 2023-08-30 DIAGNOSIS — N18.32 TYPE 2 DIABETES MELLITUS WITH STAGE 3B CHRONIC KIDNEY DISEASE, WITHOUT LONG-TERM CURRENT USE OF INSULIN (HCC): Primary | ICD-10-CM

## 2023-08-30 DIAGNOSIS — F41.9 ANXIETY: ICD-10-CM

## 2023-08-30 DIAGNOSIS — E11.22 TYPE 2 DIABETES MELLITUS WITH STAGE 3B CHRONIC KIDNEY DISEASE, WITHOUT LONG-TERM CURRENT USE OF INSULIN (HCC): Primary | ICD-10-CM

## 2023-08-30 NOTE — TELEPHONE ENCOUNTER
Pt request that a1c order be faxed to [facility] on [address]     Order has been faxed to [phone]

## 2023-08-30 NOTE — TELEPHONE ENCOUNTER
Medication:   Requested Prescriptions     Pending Prescriptions Disp Refills    ALPRAZolam (XANAX) 0.5 MG tablet [Pharmacy Med Name: ALPRAZOLAM 0.5MG TABLETS] 60 tablet      Sig: TAKE 1 TABLET BY MOUTH TWICE DAILY AS NEEDED FOR ANXIETY        Last Filled:      Patient Phone Number: [phone] (home) [phone] (work)    Last appt: 6/15/2023   Next appt: 9/20/2023    Last OARRS:   RX Monitoring 3/8/2021   Attestation -   Periodic Controlled Substance Monitoring Possible medication side effects, risk of tolerance/dependence & alternative treatments discussed. ;No signs of potential drug abuse or diversion identified.

## 2023-09-05 DIAGNOSIS — G89.29 CHRONIC BILATERAL LOW BACK PAIN WITHOUT SCIATICA: ICD-10-CM

## 2023-09-05 DIAGNOSIS — M54.50 CHRONIC BILATERAL LOW BACK PAIN WITHOUT SCIATICA: ICD-10-CM

## 2023-09-05 RX ORDER — ALPRAZOLAM 0.5 MG/1
0.5 TABLET ORAL 2 TIMES DAILY PRN
Qty: 60 TABLET | Refills: 0 | Status: SHIPPED | OUTPATIENT
Start: 2023-09-05 | End: 2023-10-05

## 2023-09-05 NOTE — TELEPHONE ENCOUNTER
Medication:   Requested Prescriptions     Pending Prescriptions Disp Refills    acetaminophen-codeine (TYLENOL #3) 300-30 MG per tablet [Pharmacy Med Name: ACETAMINOPHEN/COD #3 (300/30MG) TAB] 30 tablet      Sig: TAKE 1 TABLET BY MOUTH EVERY NIGHT AS NEEDED FOR PAIN. MAX DAILY AMOUNT: 1 TABLET        Last Filled:      Patient Phone Number: [phone] (home) [phone] (work)    Last appt: 6/15/2023   Next appt: 9/20/2023    Last OARRS:   RX Monitoring 3/8/2021   Attestation -   Periodic Controlled Substance Monitoring Possible medication side effects, risk of tolerance/dependence & alternative treatments discussed. ;No signs of potential drug abuse or diversion identified.

## 2023-09-07 ENCOUNTER — TELEPHONE (OUTPATIENT)
Dept: FAMILY MEDICINE CLINIC | Age: 78
End: 2023-09-07

## 2023-09-07 DIAGNOSIS — M54.50 CHRONIC BILATERAL LOW BACK PAIN WITHOUT SCIATICA: Primary | ICD-10-CM

## 2023-09-07 DIAGNOSIS — G89.29 CHRONIC BILATERAL LOW BACK PAIN WITHOUT SCIATICA: ICD-10-CM

## 2023-09-07 DIAGNOSIS — G89.29 CHRONIC BILATERAL LOW BACK PAIN WITHOUT SCIATICA: Primary | ICD-10-CM

## 2023-09-07 DIAGNOSIS — M54.50 CHRONIC BILATERAL LOW BACK PAIN WITHOUT SCIATICA: ICD-10-CM

## 2023-09-07 RX ORDER — ACETAMINOPHEN AND CODEINE PHOSPHATE 300; 30 MG/1; MG/1
1 TABLET ORAL NIGHTLY PRN
Qty: 13 TABLET | Refills: 0 | Status: SHIPPED | OUTPATIENT
Start: 2023-09-07 | End: 2023-09-20 | Stop reason: SDUPTHER

## 2023-09-08 RX ORDER — ACETAMINOPHEN AND CODEINE PHOSPHATE 300; 30 MG/1; MG/1
TABLET ORAL
Qty: 30 TABLET | OUTPATIENT
Start: 2023-09-08

## 2023-09-08 NOTE — TELEPHONE ENCOUNTER
Medication:   Requested Prescriptions     Pending Prescriptions Disp Refills    acetaminophen-codeine (TYLENOL/CODEINE #3) 300-30 MG per tablet 13 tablet 0     Sig: Take 1 tablet by mouth nightly as needed for Pain for up to 13 days. Intended supply: 5 days. Take lowest dose possible to manage pain Max Daily Amount: 1 tablet        Last Filled:      Patient Phone Number: [phone] (home) [phone] (work)    Last appt: 6/15/2023   Next appt: 9/20/2023    Last OARRS:   RX Monitoring 3/8/2021   Attestation -   Periodic Controlled Substance Monitoring Possible medication side effects, risk of tolerance/dependence & alternative treatments discussed. ;No signs of potential drug abuse or diversion identified.

## 2023-09-09 RX ORDER — CALCIUM CITRATE/VITAMIN D3 200MG-6.25
TABLET ORAL
Qty: 100 STRIP | Refills: 3 | Status: SHIPPED | OUTPATIENT
Start: 2023-09-09

## 2023-09-09 RX ORDER — ACETAMINOPHEN AND CODEINE PHOSPHATE 300; 30 MG/1; MG/1
1 TABLET ORAL NIGHTLY PRN
Qty: 13 TABLET | Refills: 0 | OUTPATIENT
Start: 2023-09-09 | End: 2023-09-22

## 2023-09-19 ENCOUNTER — PATIENT MESSAGE (OUTPATIENT)
Dept: FAMILY MEDICINE CLINIC | Age: 78
End: 2023-09-19

## 2023-09-20 ENCOUNTER — ANTI-COAG VISIT (OUTPATIENT)
Dept: FAMILY MEDICINE CLINIC | Age: 78
End: 2023-09-20

## 2023-09-20 ENCOUNTER — OFFICE VISIT (OUTPATIENT)
Dept: FAMILY MEDICINE CLINIC | Age: 78
End: 2023-09-20

## 2023-09-20 VITALS
DIASTOLIC BLOOD PRESSURE: 64 MMHG | WEIGHT: 112.4 LBS | RESPIRATION RATE: 16 BRPM | BODY MASS INDEX: 22.66 KG/M2 | TEMPERATURE: 97.3 F | HEIGHT: 59 IN | OXYGEN SATURATION: 97 % | HEART RATE: 86 BPM | SYSTOLIC BLOOD PRESSURE: 128 MMHG

## 2023-09-20 DIAGNOSIS — Z95.2 AORTIC VALVE REPLACED: Primary | ICD-10-CM

## 2023-09-20 DIAGNOSIS — G89.29 CHRONIC BILATERAL LOW BACK PAIN WITHOUT SCIATICA: ICD-10-CM

## 2023-09-20 DIAGNOSIS — E11.22 TYPE 2 DIABETES MELLITUS WITH STAGE 3B CHRONIC KIDNEY DISEASE, WITHOUT LONG-TERM CURRENT USE OF INSULIN (HCC): Primary | ICD-10-CM

## 2023-09-20 DIAGNOSIS — M54.50 CHRONIC BILATERAL LOW BACK PAIN WITHOUT SCIATICA: ICD-10-CM

## 2023-09-20 DIAGNOSIS — N18.32 TYPE 2 DIABETES MELLITUS WITH STAGE 3B CHRONIC KIDNEY DISEASE, WITHOUT LONG-TERM CURRENT USE OF INSULIN (HCC): Primary | ICD-10-CM

## 2023-09-20 DIAGNOSIS — F41.9 ANXIETY: ICD-10-CM

## 2023-09-20 DIAGNOSIS — I10 BENIGN ESSENTIAL HTN: ICD-10-CM

## 2023-09-20 LAB — INR BLD: 2.9

## 2023-09-20 RX ORDER — ALPRAZOLAM 0.5 MG/1
0.5 TABLET ORAL 2 TIMES DAILY PRN
Qty: 60 TABLET | Refills: 2 | Status: SHIPPED | OUTPATIENT
Start: 2023-09-20 | End: 2023-12-19

## 2023-09-20 RX ORDER — ACETAMINOPHEN AND CODEINE PHOSPHATE 300; 30 MG/1; MG/1
1 TABLET ORAL NIGHTLY PRN
Qty: 90 TABLET | Refills: 0 | Status: SHIPPED | OUTPATIENT
Start: 2023-09-20 | End: 2023-12-19

## 2023-09-20 ASSESSMENT — ENCOUNTER SYMPTOMS
BLURRED VISION: 0
VISUAL CHANGE: 0
SHORTNESS OF BREATH: 0
ORTHOPNEA: 0

## 2023-09-20 NOTE — PROGRESS NOTES
MD)  Documentation: No signs of potential drug abuse or diversion identified. Robert Terry MD)  Refills, DO NOT TAKE TYLENOL 3 WITH XANAX, pt is aware of interaction and serious sec effects like respiratory depression and death,   Fu in 3mo     4.  Take xanax prn only   Fu in 3 mo              Payton Carreon MD

## 2023-09-21 DIAGNOSIS — Z79.01 LONG-TERM (CURRENT) USE OF ANTICOAGULANTS, INR GOAL 2.5-3.5: ICD-10-CM

## 2023-09-21 NOTE — TELEPHONE ENCOUNTER
Medication:   Requested Prescriptions     Pending Prescriptions Disp Refills    pantoprazole (PROTONIX) 40 MG tablet [Pharmacy Med Name: PANTOPRAZOLE 40MG TABLETS] 90 tablet 0     Sig: TAKE 1 TABLET BY MOUTH EVERY DAY        Last Filled:      Patient Phone Number: [phone] (home) [phone] (work)    Last appt: 9/20/2023   Next appt: 12/18/2023    Last OARRS:       3/8/2021     7:46 AM   RX Monitoring   Periodic Controlled Substance Monitoring Possible medication side effects, risk of tolerance/dependence & alternative treatments discussed. ;No signs of potential drug abuse or diversion identified.

## 2023-09-21 NOTE — TELEPHONE ENCOUNTER
Medication:   Requested Prescriptions     Pending Prescriptions Disp Refills    warfarin (COUMADIN) 1 MG tablet [Pharmacy Med Name: WARFARIN SOD 1MG TABLETS (PINK)] 192 tablet 3     Sig: TAKE TWO AND ONE-HALF TABLETS BY MOUTH ON SUNDAYS AND TUESDAYS AND TAKE TWO TABLETS BY MOUTH ON ALL OTHER DAYS        Last Filled:      Patient Phone Number: [phone] (home) [phone] (work)    Last appt: 9/20/2023   Next appt: 9/21/2023    Last OARRS:       3/8/2021     7:46 AM   RX Monitoring   Periodic Controlled Substance Monitoring Possible medication side effects, risk of tolerance/dependence & alternative treatments discussed. ;No signs of potential drug abuse or diversion identified.

## 2023-09-22 RX ORDER — PANTOPRAZOLE SODIUM 40 MG/1
TABLET, DELAYED RELEASE ORAL
Qty: 90 TABLET | Refills: 0 | Status: SHIPPED | OUTPATIENT
Start: 2023-09-22

## 2023-09-22 RX ORDER — WARFARIN SODIUM 1 MG/1
TABLET ORAL
Qty: 192 TABLET | Refills: 3 | Status: SHIPPED | OUTPATIENT
Start: 2023-09-22

## 2023-09-29 DIAGNOSIS — F41.9 ANXIETY: ICD-10-CM

## 2023-09-29 NOTE — TELEPHONE ENCOUNTER
Medication:   Requested Prescriptions     Pending Prescriptions Disp Refills    ALPRAZolam (XANAX) 0.5 MG tablet [Pharmacy Med Name: ALPRAZOLAM 0.5MG TABLETS] 60 tablet      Sig: Take 1 tablet by mouth 2 times daily as needed. Last Filled:      Patient Phone Number: [phone] (home) [phone] (work)    Last appt: 9/20/2023   Next appt: 12/18/2023    Last OARRS:       3/8/2021     7:46 AM   RX Monitoring   Periodic Controlled Substance Monitoring Possible medication side effects, risk of tolerance/dependence & alternative treatments discussed. ;No signs of potential drug abuse or diversion identified.

## 2023-10-02 RX ORDER — ALPRAZOLAM 0.5 MG/1
TABLET ORAL
Qty: 60 TABLET | Refills: 2 | Status: SHIPPED | OUTPATIENT
Start: 2023-10-02 | End: 2023-10-31 | Stop reason: SDUPTHER

## 2023-10-06 ENCOUNTER — HOSPITAL ENCOUNTER (OUTPATIENT)
Dept: CT IMAGING | Age: 78
Discharge: HOME | End: 2023-10-06
Attending: INTERNAL MEDICINE
Payer: MEDICARE

## 2023-10-06 ENCOUNTER — OFFICE VISIT (OUTPATIENT)
Dept: PULMONOLOGY | Age: 78
End: 2023-10-06
Payer: MEDICARE

## 2023-10-06 VITALS
WEIGHT: 116 LBS | DIASTOLIC BLOOD PRESSURE: 72 MMHG | HEIGHT: 59 IN | HEART RATE: 109 BPM | SYSTOLIC BLOOD PRESSURE: 130 MMHG | BODY MASS INDEX: 23.39 KG/M2 | OXYGEN SATURATION: 90 %

## 2023-10-06 DIAGNOSIS — R91.8 PULMONARY NODULES: ICD-10-CM

## 2023-10-06 DIAGNOSIS — R59.0 MEDIASTINAL LYMPHADENOPATHY: ICD-10-CM

## 2023-10-06 DIAGNOSIS — J44.9 COPD, MILD (HCC): Primary | ICD-10-CM

## 2023-10-06 PROCEDURE — G8420 CALC BMI NORM PARAMETERS: HCPCS | Performed by: INTERNAL MEDICINE

## 2023-10-06 PROCEDURE — G8427 DOCREV CUR MEDS BY ELIG CLIN: HCPCS | Performed by: INTERNAL MEDICINE

## 2023-10-06 PROCEDURE — G8400 PT W/DXA NO RESULTS DOC: HCPCS | Performed by: INTERNAL MEDICINE

## 2023-10-06 PROCEDURE — 1090F PRES/ABSN URINE INCON ASSESS: CPT | Performed by: INTERNAL MEDICINE

## 2023-10-06 PROCEDURE — 3078F DIAST BP <80 MM HG: CPT | Performed by: INTERNAL MEDICINE

## 2023-10-06 PROCEDURE — 1036F TOBACCO NON-USER: CPT | Performed by: INTERNAL MEDICINE

## 2023-10-06 PROCEDURE — 3075F SYST BP GE 130 - 139MM HG: CPT | Performed by: INTERNAL MEDICINE

## 2023-10-06 PROCEDURE — G8484 FLU IMMUNIZE NO ADMIN: HCPCS | Performed by: INTERNAL MEDICINE

## 2023-10-06 PROCEDURE — 99214 OFFICE O/P EST MOD 30 MIN: CPT | Performed by: INTERNAL MEDICINE

## 2023-10-06 PROCEDURE — 1123F ACP DISCUSS/DSCN MKR DOCD: CPT | Performed by: INTERNAL MEDICINE

## 2023-10-06 PROCEDURE — 71250 CT THORAX DX C-: CPT

## 2023-10-06 PROCEDURE — 3023F SPIROM DOC REV: CPT | Performed by: INTERNAL MEDICINE

## 2023-10-13 NOTE — TELEPHONE ENCOUNTER
Pharmacy [facility] states they have received several RX,s for patient that are invalid per medicare part B        PRN  RX's  isn't allowed and will not be covered under medicare B        Please write test twice daily    Test strips and Lancets     [phone]  Please contact

## 2023-10-13 NOTE — TELEPHONE ENCOUNTER
Medication:   Requested Prescriptions     Pending Prescriptions Disp Refills    blood glucose test strips (TRUE METRIX BLOOD GLUCOSE TEST) strip 100 strip 3     Sig: Test bid and prn    Lancets MISC 200 each 3     Si each by Does not apply route 2 times daily        Last Filled:      Patient Phone Number: [phone] (home) [phone] (work)    Last appt: 2023   Next appt: 2023    Last OARRS:       3/8/2021     7:46 AM   RX Monitoring   Periodic Controlled Substance Monitoring Possible medication side effects, risk of tolerance/dependence & alternative treatments discussed. ;No signs of potential drug abuse or diversion identified.

## 2023-10-16 RX ORDER — CALCIUM CITRATE/VITAMIN D3 200MG-6.25
TABLET ORAL
Qty: 100 STRIP | Refills: 3 | Status: SHIPPED | OUTPATIENT
Start: 2023-10-16

## 2023-10-16 RX ORDER — LANCETS 30 GAUGE
1 EACH MISCELLANEOUS 2 TIMES DAILY
Qty: 200 EACH | Refills: 3 | Status: SHIPPED | OUTPATIENT
Start: 2023-10-16

## 2023-10-30 DIAGNOSIS — F41.9 ANXIETY: ICD-10-CM

## 2023-10-30 NOTE — TELEPHONE ENCOUNTER
Medication:   Requested Prescriptions     Pending Prescriptions Disp Refills    ALPRAZolam (XANAX) 0.5 MG tablet 60 tablet 2     Sig: One po bid prn for anxiety        Last Filled:      Patient Phone Number: [phone] (home) [phone] (work)    Last appt: 9/20/2023   Next appt: 12/18/2023    Last OARRS:       3/8/2021     7:46 AM   RX Monitoring   Periodic Controlled Substance Monitoring Possible medication side effects, risk of tolerance/dependence & alternative treatments discussed. ;No signs of potential drug abuse or diversion identified.          Please advise

## 2023-10-31 RX ORDER — ALPRAZOLAM 0.5 MG/1
TABLET ORAL
Qty: 60 TABLET | Refills: 2 | Status: SHIPPED | OUTPATIENT
Start: 2023-10-31 | End: 2024-01-30

## 2023-11-03 ENCOUNTER — TELEPHONE (OUTPATIENT)
Dept: FAMILY MEDICINE CLINIC | Age: 78
End: 2023-11-03

## 2023-11-03 NOTE — TELEPHONE ENCOUNTER
Pt called today. She states that her husbands  in today. She accidentally took a muscle relaxer instead of her Jaurdiance today. Meds she took today were:  Xanax  Glipizide  Lisinopril   Vit B-12  Amlodipine  Tizanidine. Pt states that she feels tired, and wants to know how long does the effects last.  Pt was advised not to drive herself today. Per Dr. Adelaide Lala: Pt should not drive today. Effects will last 6 to 7 hrs. Pt has been informed. She will call her daughter-in-law to drive today.

## 2023-11-10 NOTE — TELEPHONE ENCOUNTER
Medication:   Requested Prescriptions     Pending Prescriptions Disp Refills    PT/INR Testing Monitor KIT  0     Sig: by Does not apply route        Last Filled:      Patient Phone Number: [phone] (home) [phone] (work)    Last appt: 9/20/2023   Next appt: 12/18/2023    Last OARRS:       3/8/2021     7:46 AM   RX Monitoring   Periodic Controlled Substance Monitoring Possible medication side effects, risk of tolerance/dependence & alternative treatments discussed. ;No signs of potential drug abuse or diversion identified.

## 2023-11-10 NOTE — TELEPHONE ENCOUNTER
Patient states INR equipment /home monitor will no longer be available as a loaner           Patient states she need a RX for monitor / Insurance        Please send to   [facility]  Please contact patient

## 2023-11-20 DIAGNOSIS — Z79.01 LONG-TERM (CURRENT) USE OF ANTICOAGULANTS, INR GOAL 2.5-3.5: ICD-10-CM

## 2023-11-20 RX ORDER — WARFARIN SODIUM 1 MG/1
TABLET ORAL
Qty: 192 TABLET | Refills: 3 | Status: SHIPPED | OUTPATIENT
Start: 2023-11-20

## 2023-11-20 NOTE — TELEPHONE ENCOUNTER
Pt's inr 11/15/23 was 2.9      Medication:   Requested Prescriptions     Pending Prescriptions Disp Refills    warfarin (COUMADIN) 1 MG tablet 192 tablet 3     Sig: TAKE TWO AND ONE-HALF TABLETS BY MOUTH ON SUNDAYS AND TUESDAYS AND TAKE TWO TABLETS BY MOUTH ON ALL OTHER DAYS        Last Filled:      Patient Phone Number: [phone] (home) [phone] (work)    Last appt: 9/20/2023   Next appt: 12/18/2023    Last OARRS:       3/8/2021     7:46 AM   RX Monitoring   Periodic Controlled Substance Monitoring Possible medication side effects, risk of tolerance/dependence & alternative treatments discussed. ;No signs of potential drug abuse or diversion identified.

## 2023-11-30 ENCOUNTER — TELEPHONE (OUTPATIENT)
Dept: FAMILY MEDICINE CLINIC | Age: 78
End: 2023-11-30

## 2023-11-30 LAB — INR BLD: 2.5

## 2023-12-01 ENCOUNTER — TELEPHONE (OUTPATIENT)
Dept: FAMILY MEDICINE CLINIC | Age: 78
End: 2023-12-01

## 2023-12-04 ENCOUNTER — ANTI-COAG VISIT (OUTPATIENT)
Dept: FAMILY MEDICINE CLINIC | Age: 78
End: 2023-12-04

## 2023-12-04 DIAGNOSIS — Z95.2 AORTIC VALVE REPLACED: Primary | ICD-10-CM

## 2023-12-04 NOTE — PROGRESS NOTES
Internal Medicine Discharge Summary    Admission Date: 12/31/2022     Discharge Date: 1/1/23    Principal Dx: Hemoptysis    Secondary Dx: Aortic Ulcer    PCP(outpatient): Sonya Langford MD    Consultations: Vascular Surgery    Procedures: -    Imaging Results:   XR CHEST PA OR AP 1 VIEW   Final Result     No acute cardiopulmonary disease.            Electronically signed by Sandeep Woo MD on 01 01 23 at 01:27      CTA CHEST ABDOMEN PELVIS W CONTRAST   Final Result       No aortic dissection or aneurysm. Small focal region of irregularity which   may represent early changes of ulceration in the aortic wall as described.   Pulmonary arteries grossly normal. Distal branches are limited.        No acute intrathoracic process.       No clear evidence of acute intra-abdominal or pelvic process. Multiple   incidental findings as described above.                   Electronically Signed by: CHAVEZ TORRES MD    Signed on: 1/1/2023 12:41 AM          CTA NECK W CONTRAST   Final Result    No clear evidence of significant stenosis or occlusion. No dissection or   aneurysm. Incidental findings as described.       Electronically Signed by: CHAVEZ TORRES MD    Signed on: 1/1/2023 12:11 AM          XR NECK SOFT TISSUE 2 VIEWS   Final Result       No clear evidence of radiopaque foreign body. Limited evaluation as   described. Prevertebral soft tissue swelling suspected. Recommend   evaluation with CT without contrast.         Electronically Signed by: CHAVEZ TORRES MD    Signed on: 12/31/2022 4:49 PM          CTA THORACIC AORTA    (Results Pending)        Brief Hospital Course:   Haylee Camacho is a 60 year old female PMH of osteoarthritis, PORFIRIO on CPAP gout, HTN, renal cancer s/p R partial nephrectomy who presented for back pain and L shoulder pain.    While removing a scar she had a sudden onset of throat discomfort and coughing.  She noted that she coughed up approximately 1 teaspoon of blood in addition to  INR at goal  Continue same coumadin dose  Next INR in one month chest/back pain.  Due to this she presented to [facility] ED for further evaluation.    CT was performed which did not find any abnormalities in the head and neck area, but CTA was performed of the thoracic area which demonstrated possible ulceration of the aorta.  Vascular surgery was consulted in order to evaluate this image and to provide further direction for management.  They do not think this ulceration is clinically significant, but would like patient to follow-up with repeat imaging in 1 month's time.  This imaging has been ordered and the patient has been informed of this plan.  She agrees to the plan as outlined.    She feels well enough to return home at this time.    Physical Exam:   General: Alert and oriented, No acute distress  HENT: Normocephalic, Moist mucous membranes  Cardiovascular: Normal rate, Regular rhythm  Respiratory: Lungs are clear to ascultation, Respirations are non-labored  Gastrointestinal: Soft, Non-tender  Musculoskeletal: Normal strength, No swelling  Neurological: Normal sensory, No focal neurologic deficits observed    Visit Vitals  /73 (BP Location: LUE - Left upper extremity, Patient Position: Supine)   Pulse 68   Temp 98.1 °F (36.7 °C) (Oral)   Resp 20   Ht 5' 2\" (1.575 m)   Wt 103 kg (227 lb 1.2 oz)   SpO2 100%   BMI 41.53 kg/m²           DISCHARGE MEDICATION LIST      Summary of your Discharge Medications      Take these Medications      Details   acetaminophen 325 MG tablet  Commonly known as: TYLENOL   Take 1 tablet by mouth every 6 hours as needed for Fever.     albuterol 108 (90 Base) MCG/ACT inhaler   Inhale 2 puffs into the lungs every 4 hours as needed for Shortness of Breath or Wheezing.     allopurinol 300 MG tablet  Commonly known as: ZYLOPRIM   Take 1 tablet by mouth daily.     amLODIPine 10 MG tablet  Commonly known as: NORVASC   Take 1 tablet by mouth daily.     Cod Liver Oil Cap   Take 4 capsules by mouth every 7 days.     fluticasone 50 MCG/ACT nasal  spray  Commonly known as: Flonase   Spray 2 sprays in each nostril daily.     VITAMIN C PO   Take 1 tablet by mouth 1 day a week.     Vitamin D3 250 mcg (10,000 units) tablet   Take 250 mcg by mouth 1 day a week.             Changes to medications: -    Discharge Instructions:   Thank you for coming to [facility],  You came in because you had sudden throat discomfort coughed up a little bit of blood.  On evaluation there is no abnormalities found in regards to the.  On imaging however they did note that there was possible ulceration of the aorta.  We have consulted the vascular specialist who do not think you are in any danger at the moment but would like to repeat the scan in approximately 1 month to evaluate for any changes.  We have ordered this CT scan.  Please call [facility] as soon as you can in order to schedule the scan in 1 month time.  After you have the scan you will follow-up with the vascular surgeon so that they can review the images with you.  We have given you the number for the vascular surgeon so that she can schedule this follow-up.    Please see your PCP in the next week or so in order to discuss this hospitalization and see if there is any additional input.  If you should have any worsening of the coughing up blood or chest pain, please return to ED as soon as you can.    Thank you    Condition: Good  Discharge to: Home - self care    Discharge instructions discussed with the patient or surrogate decision maker who verbalized understanding, agreed with the therapeutic plan and had the opportunity to ask questions.    Ronal Bess MD   1/1/2023  12:13 PM

## 2023-12-04 NOTE — TELEPHONE ENCOUNTER
Relevant Problems   No relevant active problems       Anesthetic History   No history of anesthetic complications            Review of Systems / Medical History  Patient summary reviewed and pertinent labs reviewed    Pulmonary  Within defined limits                 Neuro/Psych   Within defined limits           Cardiovascular    Hypertension: well controlled        Dysrhythmias : atrial fibrillation      Exercise tolerance: >4 METS     GI/Hepatic/Renal           PUD     Endo/Other             Other Findings   Comments: Bilateral Hydrouretero nephrosis  H/O prostate cancer   Colon Cancer-S/P surgery and radiation         Physical Exam    Airway  Mallampati: II  TM Distance: 4 - 6 cm  Neck ROM: normal range of motion   Mouth opening: Normal     Cardiovascular  Regular rate and rhythm,  S1 and S2 normal,  no murmur, click, rub, or gallop             Dental  No notable dental hx       Pulmonary  Breath sounds clear to auscultation               Abdominal  GI exam deferred       Other Findings            Anesthetic Plan    ASA: 3  Anesthesia type: general          Induction: Intravenous  Anesthetic plan and risks discussed with: Patient Signed

## 2023-12-27 ENCOUNTER — OFFICE VISIT (OUTPATIENT)
Dept: FAMILY MEDICINE CLINIC | Age: 78
End: 2023-12-27
Payer: MEDICARE

## 2023-12-27 VITALS
HEIGHT: 59 IN | OXYGEN SATURATION: 97 % | TEMPERATURE: 98.1 F | SYSTOLIC BLOOD PRESSURE: 138 MMHG | HEART RATE: 70 BPM | WEIGHT: 120.2 LBS | BODY MASS INDEX: 24.23 KG/M2 | RESPIRATION RATE: 16 BRPM | DIASTOLIC BLOOD PRESSURE: 70 MMHG

## 2023-12-27 DIAGNOSIS — I10 BENIGN ESSENTIAL HTN: ICD-10-CM

## 2023-12-27 DIAGNOSIS — M54.50 CHRONIC BILATERAL LOW BACK PAIN WITHOUT SCIATICA: ICD-10-CM

## 2023-12-27 DIAGNOSIS — F41.9 ANXIETY: ICD-10-CM

## 2023-12-27 DIAGNOSIS — G89.29 CHRONIC BILATERAL LOW BACK PAIN WITHOUT SCIATICA: ICD-10-CM

## 2023-12-27 DIAGNOSIS — J01.00 ACUTE NON-RECURRENT MAXILLARY SINUSITIS: Primary | ICD-10-CM

## 2023-12-27 PROCEDURE — G8427 DOCREV CUR MEDS BY ELIG CLIN: HCPCS | Performed by: FAMILY MEDICINE

## 2023-12-27 PROCEDURE — 1036F TOBACCO NON-USER: CPT | Performed by: FAMILY MEDICINE

## 2023-12-27 PROCEDURE — 3075F SYST BP GE 130 - 139MM HG: CPT | Performed by: FAMILY MEDICINE

## 2023-12-27 PROCEDURE — 1090F PRES/ABSN URINE INCON ASSESS: CPT | Performed by: FAMILY MEDICINE

## 2023-12-27 PROCEDURE — G8400 PT W/DXA NO RESULTS DOC: HCPCS | Performed by: FAMILY MEDICINE

## 2023-12-27 PROCEDURE — 99214 OFFICE O/P EST MOD 30 MIN: CPT | Performed by: FAMILY MEDICINE

## 2023-12-27 PROCEDURE — 1123F ACP DISCUSS/DSCN MKR DOCD: CPT | Performed by: FAMILY MEDICINE

## 2023-12-27 PROCEDURE — G8420 CALC BMI NORM PARAMETERS: HCPCS | Performed by: FAMILY MEDICINE

## 2023-12-27 PROCEDURE — G8484 FLU IMMUNIZE NO ADMIN: HCPCS | Performed by: FAMILY MEDICINE

## 2023-12-27 PROCEDURE — 3078F DIAST BP <80 MM HG: CPT | Performed by: FAMILY MEDICINE

## 2023-12-27 RX ORDER — AMOXICILLIN 500 MG/1
500 CAPSULE ORAL 2 TIMES DAILY
Qty: 20 CAPSULE | Refills: 0 | Status: SHIPPED | OUTPATIENT
Start: 2023-12-27 | End: 2024-01-06

## 2023-12-27 RX ORDER — AMOXICILLIN 875 MG/1
875 TABLET, COATED ORAL 2 TIMES DAILY
Qty: 20 TABLET | Refills: 0 | Status: SHIPPED | OUTPATIENT
Start: 2023-12-27 | End: 2023-12-27

## 2024-01-18 DIAGNOSIS — M54.50 CHRONIC BILATERAL LOW BACK PAIN WITHOUT SCIATICA: ICD-10-CM

## 2024-01-18 DIAGNOSIS — G89.29 CHRONIC BILATERAL LOW BACK PAIN WITHOUT SCIATICA: ICD-10-CM

## 2024-01-18 RX ORDER — ACETAMINOPHEN AND CODEINE PHOSPHATE 300; 30 MG/1; MG/1
1 TABLET ORAL NIGHTLY
Qty: 90 TABLET | Refills: 0 | Status: SHIPPED | OUTPATIENT
Start: 2024-01-18 | End: 2024-04-17

## 2024-01-18 NOTE — TELEPHONE ENCOUNTER
Medication:   Requested Prescriptions     Pending Prescriptions Disp Refills    acetaminophen-codeine (TYLENOL #3) 300-30 MG per tablet [Pharmacy Med Name: ACETAMINOPHEN/COD #3 (300/30MG) TAB] 30 tablet      Sig: TAKE 1 TABLET BY MOUTH EVERY NIGHT AT BEDTIME AS NEEDED FOR PAIN        Last Filled:      Patient Phone Number: [phone] (home)     Last appt: 12/27/2023   Next appt: 3/27/2024    Last OARRS:       3/8/2021     7:46 AM   RX Monitoring   Periodic Controlled Substance Monitoring Possible medication side effects, risk of tolerance/dependence & alternative treatments discussed.;No signs of potential drug abuse or diversion identified.

## 2024-02-13 NOTE — TELEPHONE ENCOUNTER
Decrease to 40mg twice daily    Pls verify who is performing this & how many days are requesting coumadin to be held. Pls request written/verbal request from their office.

## 2024-02-15 RX ORDER — LISINOPRIL 40 MG/1
40 TABLET ORAL DAILY
Qty: 90 TABLET | Refills: 3 | OUTPATIENT
Start: 2024-02-15

## 2024-03-06 NOTE — TELEPHONE ENCOUNTER
Medication and Quantity requested:   lisinopril (PRINIVIL;ZESTRIL) 40 MG tablet      Last Visit  12/27/23    Pharmacy and phone number updated in [facility]:  yes  [facility]

## 2024-03-06 NOTE — TELEPHONE ENCOUNTER
Medication:   Requested Prescriptions     Pending Prescriptions Disp Refills    lisinopril (PRINIVIL;ZESTRIL) 40 MG tablet [Pharmacy Med Name: LISINOPRIL 40MG TABLETS] 90 tablet 3     Sig: TAKE 1 TABLET BY MOUTH DAILY        Last Filled:  2/6/2023    Patient Phone Number: [phone] (home)     Last appt: 12/27/2023   Next appt: 3/27/2024    Last OARRS:       3/8/2021     7:46 AM   RX Monitoring   Periodic Controlled Substance Monitoring Possible medication side effects, risk of tolerance/dependence & alternative treatments discussed.;No signs of potential drug abuse or diversion identified.

## 2024-03-07 RX ORDER — LISINOPRIL 40 MG/1
40 TABLET ORAL DAILY
Qty: 90 TABLET | Refills: 3 | Status: SHIPPED | OUTPATIENT
Start: 2024-03-07

## 2024-03-19 DIAGNOSIS — E78.5 HYPERLIPIDEMIA WITH TARGET LDL LESS THAN 100: ICD-10-CM

## 2024-03-21 RX ORDER — ATORVASTATIN CALCIUM 10 MG/1
TABLET, FILM COATED ORAL
Qty: 90 TABLET | Refills: 3 | Status: SHIPPED | OUTPATIENT
Start: 2024-03-21

## 2024-03-21 RX ORDER — EMPAGLIFLOZIN 25 MG/1
25 TABLET, FILM COATED ORAL DAILY
Qty: 90 TABLET | Refills: 1 | Status: SHIPPED | OUTPATIENT
Start: 2024-03-21

## 2024-03-21 RX ORDER — PANTOPRAZOLE SODIUM 40 MG/1
TABLET, DELAYED RELEASE ORAL
Qty: 90 TABLET | Refills: 0 | Status: SHIPPED | OUTPATIENT
Start: 2024-03-21

## 2024-03-21 NOTE — TELEPHONE ENCOUNTER
Medication:   Requested Prescriptions     Pending Prescriptions Disp Refills    atorvastatin (LIPITOR) 10 MG tablet [Pharmacy Med Name: ATORVASTATIN 10MG TABLETS] 90 tablet 3     Sig: TAKE 1 TABLET BY MOUTH DAILY IN THE EVENING FOR CHOLESTEROL    pantoprazole (PROTONIX) 40 MG tablet [Pharmacy Med Name: PANTOPRAZOLE 40MG TABLETS] 90 tablet 0     Sig: TAKE 1 TABLET BY MOUTH EVERY DAY        Last Filled:  04/03/2023     Patient Phone Number: [phone] (home)     Last appt: 12/27/2023   Next appt: 3/19/2024    Last OARRS:       3/8/2021     7:46 AM   RX Monitoring   Periodic Controlled Substance Monitoring Possible medication side effects, risk of tolerance/dependence & alternative treatments discussed.;No signs of potential drug abuse or diversion identified.

## 2024-03-21 NOTE — TELEPHONE ENCOUNTER
Medication:   Requested Prescriptions     Pending Prescriptions Disp Refills    JARDIANCE 25 MG tablet [Pharmacy Med Name: JARDIANCE 25MG TABLETS] 90 tablet 1     Sig: TAKE 1 TABLET BY MOUTH DAILY        Last Filled:     09/20/2023        Patient Phone Number: [phone] (home)     Last appt: 12/27/2023   Next appt: 3/27/2024    Last OARRS:       3/8/2021     7:46 AM   RX Monitoring   Periodic Controlled Substance Monitoring Possible medication side effects, risk of tolerance/dependence & alternative treatments discussed.;No signs of potential drug abuse or diversion identified.

## 2024-03-27 ENCOUNTER — OFFICE VISIT (OUTPATIENT)
Dept: FAMILY MEDICINE CLINIC | Age: 79
End: 2024-03-27
Payer: MEDICARE

## 2024-03-27 VITALS
HEIGHT: 59 IN | OXYGEN SATURATION: 98 % | BODY MASS INDEX: 24.6 KG/M2 | DIASTOLIC BLOOD PRESSURE: 60 MMHG | WEIGHT: 122 LBS | SYSTOLIC BLOOD PRESSURE: 130 MMHG | HEART RATE: 83 BPM

## 2024-03-27 DIAGNOSIS — C82.80 OTHER TYPE OF FOLLICULAR LYMPHOMA, UNSPECIFIED BODY REGION (HCC): ICD-10-CM

## 2024-03-27 DIAGNOSIS — E44.1 MILD PROTEIN-CALORIE MALNUTRITION (HCC): ICD-10-CM

## 2024-03-27 DIAGNOSIS — J44.9 COPD, MILD (HCC): ICD-10-CM

## 2024-03-27 DIAGNOSIS — F51.01 PRIMARY INSOMNIA: ICD-10-CM

## 2024-03-27 DIAGNOSIS — E11.22 TYPE 2 DIABETES MELLITUS WITH STAGE 3B CHRONIC KIDNEY DISEASE, WITHOUT LONG-TERM CURRENT USE OF INSULIN (HCC): ICD-10-CM

## 2024-03-27 DIAGNOSIS — G89.29 CHRONIC BILATERAL LOW BACK PAIN WITHOUT SCIATICA: ICD-10-CM

## 2024-03-27 DIAGNOSIS — E11.65 UNCONTROLLED TYPE 2 DIABETES MELLITUS WITH HYPERGLYCEMIA (HCC): Primary | ICD-10-CM

## 2024-03-27 DIAGNOSIS — F11.20 OPIOID DEPENDENCE WITH CURRENT USE (HCC): ICD-10-CM

## 2024-03-27 DIAGNOSIS — I50.33 CHF (CONGESTIVE HEART FAILURE), NYHA CLASS II, ACUTE ON CHRONIC, DIASTOLIC (HCC): ICD-10-CM

## 2024-03-27 DIAGNOSIS — M54.50 CHRONIC BILATERAL LOW BACK PAIN WITHOUT SCIATICA: ICD-10-CM

## 2024-03-27 DIAGNOSIS — N18.32 TYPE 2 DIABETES MELLITUS WITH STAGE 3B CHRONIC KIDNEY DISEASE, WITHOUT LONG-TERM CURRENT USE OF INSULIN (HCC): ICD-10-CM

## 2024-03-27 LAB — MAMMOGRAPHY, EXTERNAL: NORMAL

## 2024-03-27 PROCEDURE — G8427 DOCREV CUR MEDS BY ELIG CLIN: HCPCS | Performed by: FAMILY MEDICINE

## 2024-03-27 PROCEDURE — 1090F PRES/ABSN URINE INCON ASSESS: CPT | Performed by: FAMILY MEDICINE

## 2024-03-27 PROCEDURE — 3075F SYST BP GE 130 - 139MM HG: CPT | Performed by: FAMILY MEDICINE

## 2024-03-27 PROCEDURE — G8400 PT W/DXA NO RESULTS DOC: HCPCS | Performed by: FAMILY MEDICINE

## 2024-03-27 PROCEDURE — G8484 FLU IMMUNIZE NO ADMIN: HCPCS | Performed by: FAMILY MEDICINE

## 2024-03-27 PROCEDURE — 3023F SPIROM DOC REV: CPT | Performed by: FAMILY MEDICINE

## 2024-03-27 PROCEDURE — 1036F TOBACCO NON-USER: CPT | Performed by: FAMILY MEDICINE

## 2024-03-27 PROCEDURE — 1123F ACP DISCUSS/DSCN MKR DOCD: CPT | Performed by: FAMILY MEDICINE

## 2024-03-27 PROCEDURE — 99214 OFFICE O/P EST MOD 30 MIN: CPT | Performed by: FAMILY MEDICINE

## 2024-03-27 PROCEDURE — 3078F DIAST BP <80 MM HG: CPT | Performed by: FAMILY MEDICINE

## 2024-03-27 PROCEDURE — G8420 CALC BMI NORM PARAMETERS: HCPCS | Performed by: FAMILY MEDICINE

## 2024-03-27 RX ORDER — ACETAMINOPHEN AND CODEINE PHOSPHATE 300; 30 MG/1; MG/1
1 TABLET ORAL NIGHTLY
Qty: 45 TABLET | Refills: 0 | Status: SHIPPED | OUTPATIENT
Start: 2024-03-27 | End: 2024-06-25

## 2024-03-27 RX ORDER — SEMAGLUTIDE 1.34 MG/ML
INJECTION, SOLUTION SUBCUTANEOUS
Qty: 9 ML | Refills: 1 | Status: SHIPPED | OUTPATIENT
Start: 2024-03-27

## 2024-03-27 RX ORDER — MULTIVIT-MIN/IRON/FOLIC ACID/K 18-600-40
CAPSULE ORAL
COMMUNITY

## 2024-03-27 RX ORDER — TRAZODONE HYDROCHLORIDE 50 MG/1
TABLET ORAL
Qty: 90 TABLET | Refills: 0 | Status: SHIPPED | OUTPATIENT
Start: 2024-03-27

## 2024-03-27 ASSESSMENT — ANXIETY QUESTIONNAIRES
4. TROUBLE RELAXING: NEARLY EVERY DAY
2. NOT BEING ABLE TO STOP OR CONTROL WORRYING: MORE THAN HALF THE DAYS
7. FEELING AFRAID AS IF SOMETHING AWFUL MIGHT HAPPEN: MORE THAN HALF THE DAYS
GAD7 TOTAL SCORE: 11
1. FEELING NERVOUS, ANXIOUS, OR ON EDGE: NOT AT ALL
5. BEING SO RESTLESS THAT IT IS HARD TO SIT STILL: NOT AT ALL
IF YOU CHECKED OFF ANY PROBLEMS ON THIS QUESTIONNAIRE, HOW DIFFICULT HAVE THESE PROBLEMS MADE IT FOR YOU TO DO YOUR WORK, TAKE CARE OF THINGS AT HOME, OR GET ALONG WITH OTHER PEOPLE: SOMEWHAT DIFFICULT
3. WORRYING TOO MUCH ABOUT DIFFERENT THINGS: NEARLY EVERY DAY
6. BECOMING EASILY ANNOYED OR IRRITABLE: SEVERAL DAYS

## 2024-03-27 ASSESSMENT — PATIENT HEALTH QUESTIONNAIRE - PHQ9
SUM OF ALL RESPONSES TO PHQ QUESTIONS 1-9: 12
4. FEELING TIRED OR HAVING LITTLE ENERGY: SEVERAL DAYS
3. TROUBLE FALLING OR STAYING ASLEEP: NEARLY EVERY DAY
7. TROUBLE CONCENTRATING ON THINGS, SUCH AS READING THE NEWSPAPER OR WATCHING TELEVISION: SEVERAL DAYS
5. POOR APPETITE OR OVEREATING: MORE THAN HALF THE DAYS
SUM OF ALL RESPONSES TO PHQ QUESTIONS 1-9: 12
SUM OF ALL RESPONSES TO PHQ QUESTIONS 1-9: 12
SUM OF ALL RESPONSES TO PHQ9 QUESTIONS 1 & 2: 3
8. MOVING OR SPEAKING SO SLOWLY THAT OTHER PEOPLE COULD HAVE NOTICED. OR THE OPPOSITE, BEING SO FIGETY OR RESTLESS THAT YOU HAVE BEEN MOVING AROUND A LOT MORE THAN USUAL: MORE THAN HALF THE DAYS
2. FEELING DOWN, DEPRESSED OR HOPELESS: SEVERAL DAYS
10. IF YOU CHECKED OFF ANY PROBLEMS, HOW DIFFICULT HAVE THESE PROBLEMS MADE IT FOR YOU TO DO YOUR WORK, TAKE CARE OF THINGS AT HOME, OR GET ALONG WITH OTHER PEOPLE: NOT DIFFICULT AT ALL
9. THOUGHTS THAT YOU WOULD BE BETTER OFF DEAD, OR OF HURTING YOURSELF: NOT AT ALL
1. LITTLE INTEREST OR PLEASURE IN DOING THINGS: MORE THAN HALF THE DAYS
SUM OF ALL RESPONSES TO PHQ QUESTIONS 1-9: 12
6. FEELING BAD ABOUT YOURSELF - OR THAT YOU ARE A FAILURE OR HAVE LET YOURSELF OR YOUR FAMILY DOWN: NOT AT ALL

## 2024-03-27 NOTE — PROGRESS NOTES
Subjective:      Patient ID: Pao Wahl is a 79 y.o. female.    HPI  Treatment Adherence:   Medication compliance:  compliant most of the time  Diet compliance:  noncompliant: eating more sweets for months   Weight trend: increasing  Current exercise: no regular exercise  Barriers: lack of support    Diabetes Mellitus Type 2: Current symptoms/problems include none.    Home blood sugar records: fasting range: \"been too high\"   Any episodes of hypoglycemia? no  Eye exam current (within one year): no  Tobacco history: She  reports that she quit smoking about 16 years ago. Her smoking use included cigarettes. She started smoking about 56 years ago. She has a 40.0 pack-year smoking history. She has never used smokeless tobacco.   Daily Aspirin? No:     Hypertension:  Home blood pressure monitoring: Yes - \"is not bad\" .  She is not adherent to a low sodium diet. Patient denies chest pain, shortness of breath, lightheadedness, and peripheral edema.  Antihypertensive medication side effects: no medication side effects noted.  Use of agents associated with hypertension: none.     Hyperlipidemia:  No new myalgias or GI upset on atorvastatin (Lipitor).       Lab Results   Component Value Date    LABA1C 7.3 (H) 06/12/2023    LABA1C 5.4 12/05/2022    LABA1C 4.4 06/03/2022     Lab Results   Component Value Date    CREATININE 1.80 (H) 06/12/2023     Lab Results   Component Value Date    ALT 9 (L) 12/06/2022    AST 34 12/06/2022     Lab Results   Component Value Date    CHOL 144 10/11/2021    TRIG 241 (H) 10/11/2021    HDL 38 (L) 10/11/2021    LDLCALC 73 10/11/2021        Insomnia   Onset of sx:  months ago   Sx described as: unable to stayed asleep  Patient denies: use of stimulants, + depressed mood (grieving )   Symptoms have been: unchanged   Medications tried: melatonin \"not helping\"      Back Pain  Chronicity: chronic  Onset years ago The problem occurs constant  The problem has been unchanged  The pain is present in the

## 2024-03-28 ENCOUNTER — PATIENT MESSAGE (OUTPATIENT)
Dept: FAMILY MEDICINE CLINIC | Age: 79
End: 2024-03-28

## 2024-03-29 NOTE — TELEPHONE ENCOUNTER
Patient started Ozempic on last office visit she would like to know if you still want her to take the Jardiance    Last office Visit: 3/27/2024

## 2024-03-29 NOTE — TELEPHONE ENCOUNTER
From: Pao Wahl  To: Dr. Juliette Ramirez  Sent: 3/28/2024 3:19 PM EDT  Subject: Medicine?    Should I still go take Jardiance

## 2024-04-25 DIAGNOSIS — F41.9 ANXIETY: ICD-10-CM

## 2024-04-25 RX ORDER — ALPRAZOLAM 0.5 MG/1
TABLET ORAL
Qty: 60 TABLET | Refills: 2 | Status: SHIPPED | OUTPATIENT
Start: 2024-04-25 | End: 2024-07-25

## 2024-04-25 NOTE — TELEPHONE ENCOUNTER
Medication and Quantity requested:   ALPRAZolam (XANAX) 0.5 MG tablet      Last Visit  3/27/24    Pharmacy and phone number updated in [facility]:  yes  [facility]

## 2024-05-21 ENCOUNTER — PATIENT MESSAGE (OUTPATIENT)
Dept: FAMILY MEDICINE CLINIC | Age: 79
End: 2024-05-21

## 2024-05-21 ENCOUNTER — TELEPHONE (OUTPATIENT)
Dept: FAMILY MEDICINE CLINIC | Age: 79
End: 2024-05-21

## 2024-05-21 DIAGNOSIS — E11.22 TYPE 2 DIABETES MELLITUS WITH STAGE 3B CHRONIC KIDNEY DISEASE, WITHOUT LONG-TERM CURRENT USE OF INSULIN (HCC): Primary | ICD-10-CM

## 2024-05-21 DIAGNOSIS — E78.5 HYPERLIPIDEMIA WITH TARGET LDL LESS THAN 100: ICD-10-CM

## 2024-05-21 DIAGNOSIS — N18.32 TYPE 2 DIABETES MELLITUS WITH STAGE 3B CHRONIC KIDNEY DISEASE, WITHOUT LONG-TERM CURRENT USE OF INSULIN (HCC): Primary | ICD-10-CM

## 2024-05-21 NOTE — TELEPHONE ENCOUNTER
Patient is coming in next week and would like to have an A1c blood test added to her Blood orders. Fax lads to [facility] to [phone].

## 2024-05-22 NOTE — TELEPHONE ENCOUNTER
From: Pao Wahl  To: Dr. Juliette Ramirez  Sent: 5/21/2024  2:11 PM EDT  Subject: Test     Dr Ramirez could you please fax my orders to [facility] the Fax number is [phone] and I will make an appointment to get the test done Thank you

## 2024-05-22 NOTE — TELEPHONE ENCOUNTER
Requested labs have been faxed to [facility] as requested. Also, asked that labs be faxed back to [phone] once results are back.

## 2024-05-22 NOTE — TELEPHONE ENCOUNTER
MA place call to [phone] (home)  informing patient that the requested labs have been sent to preferred lab [facility] via fax as requested.

## 2024-05-29 ENCOUNTER — OFFICE VISIT (OUTPATIENT)
Dept: FAMILY MEDICINE CLINIC | Age: 79
End: 2024-05-29
Payer: MEDICARE

## 2024-05-29 VITALS
WEIGHT: 121 LBS | SYSTOLIC BLOOD PRESSURE: 176 MMHG | HEART RATE: 84 BPM | DIASTOLIC BLOOD PRESSURE: 60 MMHG | HEIGHT: 59 IN | OXYGEN SATURATION: 100 % | TEMPERATURE: 98.2 F | BODY MASS INDEX: 24.39 KG/M2

## 2024-05-29 DIAGNOSIS — E11.22 CONTROLLED TYPE 2 DIABETES MELLITUS WITH STAGE 3 CHRONIC KIDNEY DISEASE, WITHOUT LONG-TERM CURRENT USE OF INSULIN (HCC): Primary | ICD-10-CM

## 2024-05-29 DIAGNOSIS — F41.9 ANXIETY: ICD-10-CM

## 2024-05-29 DIAGNOSIS — M54.50 CHRONIC BILATERAL LOW BACK PAIN WITHOUT SCIATICA: ICD-10-CM

## 2024-05-29 DIAGNOSIS — G89.29 CHRONIC BILATERAL LOW BACK PAIN WITHOUT SCIATICA: ICD-10-CM

## 2024-05-29 DIAGNOSIS — N18.30 CONTROLLED TYPE 2 DIABETES MELLITUS WITH STAGE 3 CHRONIC KIDNEY DISEASE, WITHOUT LONG-TERM CURRENT USE OF INSULIN (HCC): Primary | ICD-10-CM

## 2024-05-29 DIAGNOSIS — I10 UNCONTROLLED HYPERTENSION: ICD-10-CM

## 2024-05-29 PROCEDURE — 99214 OFFICE O/P EST MOD 30 MIN: CPT | Performed by: FAMILY MEDICINE

## 2024-05-29 PROCEDURE — G8400 PT W/DXA NO RESULTS DOC: HCPCS | Performed by: FAMILY MEDICINE

## 2024-05-29 PROCEDURE — G8427 DOCREV CUR MEDS BY ELIG CLIN: HCPCS | Performed by: FAMILY MEDICINE

## 2024-05-29 PROCEDURE — 3078F DIAST BP <80 MM HG: CPT | Performed by: FAMILY MEDICINE

## 2024-05-29 PROCEDURE — 1036F TOBACCO NON-USER: CPT | Performed by: FAMILY MEDICINE

## 2024-05-29 PROCEDURE — 1090F PRES/ABSN URINE INCON ASSESS: CPT | Performed by: FAMILY MEDICINE

## 2024-05-29 PROCEDURE — 1123F ACP DISCUSS/DSCN MKR DOCD: CPT | Performed by: FAMILY MEDICINE

## 2024-05-29 PROCEDURE — 3077F SYST BP >= 140 MM HG: CPT | Performed by: FAMILY MEDICINE

## 2024-05-29 PROCEDURE — G8420 CALC BMI NORM PARAMETERS: HCPCS | Performed by: FAMILY MEDICINE

## 2024-05-29 RX ORDER — ACETAMINOPHEN AND CODEINE PHOSPHATE 300; 30 MG/1; MG/1
1 TABLET ORAL EVERY 8 HOURS PRN
Qty: 30 TABLET | Refills: 2 | Status: SHIPPED | OUTPATIENT
Start: 2024-05-29 | End: 2024-06-28

## 2024-05-29 RX ORDER — AMLODIPINE BESYLATE 10 MG/1
10 TABLET ORAL DAILY
Qty: 90 TABLET | OUTPATIENT
Start: 2024-05-29

## 2024-05-29 RX ORDER — AMLODIPINE BESYLATE 10 MG/1
10 TABLET ORAL DAILY
Qty: 30 TABLET | Refills: 1 | Status: SHIPPED | OUTPATIENT
Start: 2024-05-29

## 2024-05-29 ASSESSMENT — ENCOUNTER SYMPTOMS
ABDOMINAL PAIN: 0
SHORTNESS OF BREATH: 0
ORTHOPNEA: 0
VISUAL CHANGE: 0
BLURRED VISION: 0
BACK PAIN: 1
BOWEL INCONTINENCE: 0

## 2024-05-29 NOTE — PROGRESS NOTES
Pao Wahl (:  1945) is a 79 y.o. female,Established patient, here for evaluation of the following chief complaint(s):  Diabetes (2 mos follow up)      Assessment & Plan   1. Controlled type 2 diabetes mellitus with stage 3 chronic kidney disease, without long-term current use of insulin (HCC)  She got work up last week but no results available  For now she will continue Glipizide and tolerating Ozempic 0.25 mg   Will increase dose with her new results     2. Anxiety:   Stable   Continue Xanax prn   No secondary effects    3. Chronic back pain   Stable  Refills for tylenol 3   I remind her not to take it with xanax because of serious interaction   patient agrees and verbalizes understanding    4. Uncontrolled HTN   Increase amlodipine to 10 mg   Continue bb , acei     Fu 3 months                 Subjective   Diabetes  She presents for her follow-up diabetic visit. She has type 2 diabetes mellitus. Her disease course has been improving. Hypoglycemia symptoms include nervousness/anxiousness. Pertinent negatives for hypoglycemia include no headaches or sweats. Pertinent negatives for diabetes include no blurred vision, no chest pain, no fatigue, no foot paresthesias, no foot ulcerations, no polydipsia, no polyphagia, no polyuria, no visual change, no weakness and no weight loss. There are no hypoglycemic complications. Symptoms are stable. Current diabetic treatment includes diet and oral agent (dual therapy) (ozempic). She is compliant with treatment most of the time. Her weight is fluctuating minimally. She is following a diabetic diet. Meal planning includes avoidance of concentrated sweets. She never participates in exercise. Her breakfast blood glucose is taken between 7-8 am. Her breakfast blood glucose range is generally 180-200 mg/dl. An ACE inhibitor/angiotensin II receptor blocker is not being taken. Eye exam is current.   Hypertension  This is a chronic problem. The current episode started more

## 2024-05-30 ENCOUNTER — PATIENT MESSAGE (OUTPATIENT)
Dept: FAMILY MEDICINE CLINIC | Age: 79
End: 2024-05-30

## 2024-05-30 NOTE — TELEPHONE ENCOUNTER
From: Pao Wahl  To: Dr. Juliette Ramirez  Sent: 5/30/2024 1:51 PM EDT  Subject: Alc    Dr Ramirez did you get my test results from [facility]?

## 2024-06-06 ENCOUNTER — TELEPHONE (OUTPATIENT)
Dept: FAMILY MEDICINE CLINIC | Age: 79
End: 2024-06-06

## 2024-06-06 NOTE — TELEPHONE ENCOUNTER
MA place call to [phone] (home)  lm informing pt to call back with needed information in regards to current dosage due to her elevated lab result she left with us earlier today.

## 2024-06-06 NOTE — TELEPHONE ENCOUNTER
Pt states she took her INR today and it is 3.6    Pt states Dr Ramirez will want to change her medication    Please call pt back and advise how she is to move forward

## 2024-06-07 NOTE — TELEPHONE ENCOUNTER
MA place call to [phone] (home) spoke with patient and informed her of Dr. Ramirez's message written on labs received via [employer] under the media tab dated 6/8/2024. Patient stated understanding.

## 2024-06-07 NOTE — TELEPHONE ENCOUNTER
Take 2 mg po qd except Sun and Tuesday take 3 mg     (She has 1 mg tab so she can take 2 of those )

## 2024-06-07 NOTE — TELEPHONE ENCOUNTER
MA spoke with patient and informed her of Dr. Ramirez's message stated below. Patient wrote down instructions and has been advised. Patient stated understanding.

## 2024-06-07 NOTE — TELEPHONE ENCOUNTER
Patient stated that she did not check her INR last night due to level being reema the day before. Please advise? Patient called in this morning with up to date on current dosage stated below.

## 2024-06-07 NOTE — TELEPHONE ENCOUNTER
Coumadin dosage is 3mg    Pt would like a call back today to know how much she should take    Pt did not take medication yesterday because she did not know how much to take    Please call back and advise

## 2024-06-10 ENCOUNTER — PATIENT MESSAGE (OUTPATIENT)
Dept: FAMILY MEDICINE CLINIC | Age: 79
End: 2024-06-10

## 2024-06-10 NOTE — TELEPHONE ENCOUNTER
From: Pao Wahl  To: Dr. Juliette Ramirez  Sent: 6/10/2024 1:30 PM EDT  Subject: I am sick i have been so sick since Friday night     I ate a spicy tv dinner and i have been sick since i have been throwing up and having diarrhea and I have pains in my stomach and my side i did not get sick last night but i still have bad stomach pain what should I do i dont think i can drive to the hospital and i dont have a nother way to get there un less

## 2024-06-11 NOTE — TELEPHONE ENCOUNTER
MA place call to [phone] (home) spoke with patient and she stated that our office is to far of a drive. She stated that she was just going to head to the Urgent Care in [facility] that is close to her home. Advised patient to call with the office once she finds out what is going on wit her. Patient stated understanding.

## 2024-06-17 DIAGNOSIS — F51.01 PRIMARY INSOMNIA: ICD-10-CM

## 2024-06-17 RX ORDER — TRAZODONE HYDROCHLORIDE 50 MG/1
TABLET ORAL
Qty: 90 TABLET | Refills: 1 | Status: SHIPPED | OUTPATIENT
Start: 2024-06-17

## 2024-06-17 RX ORDER — PANTOPRAZOLE SODIUM 40 MG/1
TABLET, DELAYED RELEASE ORAL
Qty: 90 TABLET | Refills: 1 | Status: SHIPPED | OUTPATIENT
Start: 2024-06-17

## 2024-06-17 NOTE — TELEPHONE ENCOUNTER
Medication:   Requested Prescriptions     Pending Prescriptions Disp Refills    pantoprazole (PROTONIX) 40 MG tablet [Pharmacy Med Name: PANTOPRAZOLE 40MG TABLETS] 90 tablet 0     Sig: TAKE 1 TABLET BY MOUTH EVERY DAY    traZODone (DESYREL) 50 MG tablet [Pharmacy Med Name: TRAZODONE 50MG TABLETS] 90 tablet 0     Sig: TAKE 1 TABLET BY MOUTH AT BEDTIME AS NEEDED FOR INSOMNIA        Last Filled:      03/21/2024       Patient Phone Number: [phone] (home)     Last appt: 5/29/2024   Next appt: 8/28/2024    Last OARRS:       3/8/2021     7:46 AM   RX Monitoring   Periodic Controlled Substance Monitoring Possible medication side effects, risk of tolerance/dependence & alternative treatments discussed.;No signs of potential drug abuse or diversion identified.

## 2024-06-20 ENCOUNTER — PATIENT MESSAGE (OUTPATIENT)
Dept: FAMILY MEDICINE CLINIC | Age: 79
End: 2024-06-20

## 2024-06-20 RX ORDER — CALCIUM CITRATE/VITAMIN D3 200MG-6.25
TABLET ORAL
Qty: 100 STRIP | Refills: 3 | Status: SHIPPED | OUTPATIENT
Start: 2024-06-20

## 2024-06-20 NOTE — TELEPHONE ENCOUNTER
Medication:   Requested Prescriptions     Pending Prescriptions Disp Refills    blood glucose test strips (TRUE METRIX BLOOD GLUCOSE TEST) strip [Pharmacy Med Name: TRUE METRIX BLOOD GLUCOSE TEST STRP] 100 strip 3     Sig: TEST TWICE DAILY AND AS NEEDED FOR SYMPTOMS OF IRREGULAR BLOOD GLUCOSE        Last Filled:     10/16/2023        Patient Phone Number: [phone] (home)     Last appt: 5/29/2024   Next appt: 8/28/2024    Last OARRS:       3/8/2021     7:46 AM   RX Monitoring   Periodic Controlled Substance Monitoring Possible medication side effects, risk of tolerance/dependence & alternative treatments discussed.;No signs of potential drug abuse or diversion identified.

## 2024-06-21 NOTE — TELEPHONE ENCOUNTER
From: Pao Wahl  To: Dr. Juliette Ramirez  Sent: 6/20/2024 7:37 PM EDT  Subject: Me     Dr Mendez should i take my warfarin to night?

## 2024-06-21 NOTE — TELEPHONE ENCOUNTER
Go to lab in [place] to check INR       Order in chart     If her INR is 7 do not take coumadin for next 2 days     And on Monday we should have her result

## 2024-06-21 NOTE — TELEPHONE ENCOUNTER
MA place call to [phone] (home)  lm informing patient of Dr. Ramirez's message stated below. I will also send a follow up [employer] message to patient with the following instructions as well.

## 2024-06-21 NOTE — TELEPHONE ENCOUNTER
From: Pao Wahl  To: Dr. Juliette Ramirez  Sent: 6/20/2024 4:10 PM EDT  Subject: My i r a Dr Ramirez checked my blood work and it showed     Seven.7 I think my machine is broken becauce I am taking lesser dose i did not take any Warafarin last night and I don’t know if I should take it tonight and I will go somewhere tomorrow and have it checked

## 2024-06-24 ENCOUNTER — PATIENT MESSAGE (OUTPATIENT)
Dept: FAMILY MEDICINE CLINIC | Age: 79
End: 2024-06-24

## 2024-06-25 NOTE — TELEPHONE ENCOUNTER
Pt called regarding her message about INR  I informed pt      Dear Ms. Cedeno:     Yes, continue to take the current dose of coumadin since our INR is at goal. Re check it in one month .         If you have any questions do not hesitate to contact me.     Dr. Ramirez      Pt expressed understanding.

## 2024-06-25 NOTE — TELEPHONE ENCOUNTER
From: Pao Wahl  To: Dr. Juliette Ramirez  Sent: 6/24/2024 4:11 PM EDT  Subject: I    Dr Ramirez my number is at goal should i still take the mg you had me taking before? Sunday and Tuesday take 3 mg and the rest of the days 2mg?

## 2024-07-25 DIAGNOSIS — F41.9 ANXIETY: ICD-10-CM

## 2024-07-26 RX ORDER — ALPRAZOLAM 0.5 MG/1
TABLET ORAL
Qty: 180 TABLET | OUTPATIENT
Start: 2024-07-26

## 2024-07-26 NOTE — TELEPHONE ENCOUNTER
Medication:   Requested Prescriptions     Pending Prescriptions Disp Refills    ALPRAZolam (XANAX) 0.5 MG tablet [Pharmacy Med Name: ALPRAZOLAM 0.5MG TABLETS] 180 tablet      Sig: TAKE 1 TABLET BY MOUTH TWICE DAILY AS NEEDED FOR ANXIETY        Last Filled:    04/25/2024         Patient Phone Number: [phone] (home)     Last appt: 5/29/2024   Next appt: 8/28/2024    Last OARRS:       3/8/2021     7:46 AM   RX Monitoring   Periodic Controlled Substance Monitoring Possible medication side effects, risk of tolerance/dependence & alternative treatments discussed.;No signs of potential drug abuse or diversion identified.

## 2024-07-29 RX ORDER — ALPRAZOLAM 0.5 MG/1
TABLET ORAL
Qty: 60 TABLET | Refills: 2 | Status: SHIPPED | OUTPATIENT
Start: 2024-07-29 | End: 2024-10-28

## 2024-08-21 ENCOUNTER — APPOINTMENT (OUTPATIENT)
Dept: ULTRASOUND IMAGING | Age: 79
DRG: 335 | End: 2024-08-21
Payer: MEDICARE

## 2024-08-21 ENCOUNTER — APPOINTMENT (OUTPATIENT)
Dept: CT IMAGING | Age: 79
DRG: 335 | End: 2024-08-21
Payer: MEDICARE

## 2024-08-21 ENCOUNTER — APPOINTMENT (OUTPATIENT)
Dept: GENERAL RADIOLOGY | Age: 79
DRG: 335 | End: 2024-08-21
Payer: MEDICARE

## 2024-08-21 ENCOUNTER — HOSPITAL ENCOUNTER (INPATIENT)
Age: 79
LOS: 5 days | Discharge: NURSING, REHAB OR LONG-TERM CARE | DRG: 335 | End: 2024-08-26
Attending: EMERGENCY MEDICINE | Admitting: FAMILY MEDICINE
Payer: MEDICARE

## 2024-08-21 DIAGNOSIS — K55.9 MESENTERIC ISCHEMIA (HCC): ICD-10-CM

## 2024-08-21 DIAGNOSIS — D72.829 LEUKOCYTOSIS, UNSPECIFIED TYPE: ICD-10-CM

## 2024-08-21 DIAGNOSIS — Z79.01 LONG-TERM (CURRENT) USE OF ANTICOAGULANTS, INR GOAL 2.5-3.5: ICD-10-CM

## 2024-08-21 DIAGNOSIS — K83.8 PNEUMOBILIA: ICD-10-CM

## 2024-08-21 DIAGNOSIS — I50.9 CHRONIC CONGESTIVE HEART FAILURE, UNSPECIFIED HEART FAILURE TYPE (HCC): ICD-10-CM

## 2024-08-21 DIAGNOSIS — M62.838 NECK MUSCLE SPASM: Primary | ICD-10-CM

## 2024-08-21 LAB
ALBUMIN SERPL-MCNC: 4.3 G/DL (ref 3.4–5)
ALP SERPL-CCNC: 80 U/L (ref 40–129)
ALT SERPL-CCNC: 15 U/L (ref 10–40)
ANION GAP SERPL CALCULATED.3IONS-SCNC: 15 MMOL/L (ref 3–16)
AST SERPL-CCNC: 36 U/L (ref 15–37)
BASOPHILS # BLD: 0 K/UL (ref 0–0.2)
BASOPHILS NFR BLD: 0 %
BILIRUB DIRECT SERPL-MCNC: 0.2 MG/DL (ref 0–0.3)
BILIRUB INDIRECT SERPL-MCNC: 0.3 MG/DL (ref 0–1)
BILIRUB SERPL-MCNC: 0.5 MG/DL (ref 0–1)
BILIRUB UR QL STRIP.AUTO: NEGATIVE
BUN SERPL-MCNC: 24 MG/DL (ref 7–20)
CALCIUM SERPL-MCNC: 8.9 MG/DL (ref 8.3–10.6)
CHLORIDE SERPL-SCNC: 101 MMOL/L (ref 99–110)
CLARITY UR: CLEAR
CO2 SERPL-SCNC: 19 MMOL/L (ref 21–32)
COLOR UR: YELLOW
CREAT SERPL-MCNC: 1.6 MG/DL (ref 0.6–1.2)
DEPRECATED RDW RBC AUTO: 13.8 % (ref 12.4–15.4)
EOSINOPHIL # BLD: 0 K/UL (ref 0–0.6)
EOSINOPHIL NFR BLD: 0 %
EPI CELLS #/AREA URNS HPF: ABNORMAL /HPF (ref 0–5)
FLUAV RNA RESP QL NAA+PROBE: NOT DETECTED
FLUBV RNA RESP QL NAA+PROBE: NOT DETECTED
GFR SERPLBLD CREATININE-BSD FMLA CKD-EPI: 33 ML/MIN/{1.73_M2}
GLUCOSE SERPL-MCNC: 206 MG/DL (ref 70–99)
GLUCOSE UR STRIP.AUTO-MCNC: >=1000 MG/DL
HCT VFR BLD AUTO: 35.2 % (ref 36–48)
HGB BLD-MCNC: 12 G/DL (ref 12–16)
HGB UR QL STRIP.AUTO: NEGATIVE
INR PPP: 1.39 (ref 0.85–1.15)
KETONES UR STRIP.AUTO-MCNC: NEGATIVE MG/DL
LACTATE BLDV-SCNC: 1.7 MMOL/L (ref 0.4–1.9)
LACTATE BLDV-SCNC: 2.5 MMOL/L (ref 0.4–2)
LACTATE BLDV-SCNC: 2.7 MMOL/L (ref 0.4–2)
LEUKOCYTE ESTERASE UR QL STRIP.AUTO: NEGATIVE
LYMPHOCYTES # BLD: 3.5 K/UL (ref 1–5.1)
LYMPHOCYTES NFR BLD: 18 %
MCH RBC QN AUTO: 30.1 PG (ref 26–34)
MCHC RBC AUTO-ENTMCNC: 34 G/DL (ref 31–36)
MCV RBC AUTO: 88.4 FL (ref 80–100)
MONOCYTES # BLD: 1.6 K/UL (ref 0–1.3)
MONOCYTES NFR BLD: 9 %
NEUTROPHILS # BLD: 12.6 K/UL (ref 1.7–7.7)
NEUTROPHILS NFR BLD: 71 %
NITRITE UR QL STRIP.AUTO: NEGATIVE
PATH INTERP BLD-IMP: NO
PH UR STRIP.AUTO: 6.5 [PH] (ref 5–8)
PLATELET # BLD AUTO: 180 K/UL (ref 135–450)
PLATELET BLD QL SMEAR: ADEQUATE
PMV BLD AUTO: 10.7 FL (ref 5–10.5)
POTASSIUM SERPL-SCNC: 5.1 MMOL/L (ref 3.5–5.1)
PROT SERPL-MCNC: 7.2 G/DL (ref 6.4–8.2)
PROT UR STRIP.AUTO-MCNC: 100 MG/DL
PROTHROMBIN TIME: 17.2 SEC (ref 11.9–14.9)
RBC # BLD AUTO: 3.98 M/UL (ref 4–5.2)
RBC #/AREA URNS HPF: ABNORMAL /HPF (ref 0–4)
SARS-COV-2 RNA RESP QL NAA+PROBE: NOT DETECTED
SLIDE REVIEW: ABNORMAL
SODIUM SERPL-SCNC: 135 MMOL/L (ref 136–145)
SP GR UR STRIP.AUTO: 1.01 (ref 1–1.03)
UA DIPSTICK W REFLEX MICRO PNL UR: YES
URN SPEC COLLECT METH UR: ABNORMAL
UROBILINOGEN UR STRIP-ACNC: 0.2 E.U./DL
VARIANT LYMPHS NFR BLD MANUAL: 2 % (ref 0–6)
WBC # BLD AUTO: 17.7 K/UL (ref 4–11)
WBC #/AREA URNS HPF: ABNORMAL /HPF (ref 0–5)

## 2024-08-21 PROCEDURE — 6370000000 HC RX 637 (ALT 250 FOR IP): Performed by: FAMILY MEDICINE

## 2024-08-21 PROCEDURE — 87040 BLOOD CULTURE FOR BACTERIA: CPT

## 2024-08-21 PROCEDURE — 87150 DNA/RNA AMPLIFIED PROBE: CPT

## 2024-08-21 PROCEDURE — 2580000003 HC RX 258: Performed by: EMERGENCY MEDICINE

## 2024-08-21 PROCEDURE — 6360000002 HC RX W HCPCS: Performed by: FAMILY MEDICINE

## 2024-08-21 PROCEDURE — 80048 BASIC METABOLIC PNL TOTAL CA: CPT

## 2024-08-21 PROCEDURE — 99285 EMERGENCY DEPT VISIT HI MDM: CPT

## 2024-08-21 PROCEDURE — 99223 1ST HOSP IP/OBS HIGH 75: CPT | Performed by: SURGERY

## 2024-08-21 PROCEDURE — 87086 URINE CULTURE/COLONY COUNT: CPT

## 2024-08-21 PROCEDURE — 2580000003 HC RX 258: Performed by: FAMILY MEDICINE

## 2024-08-21 PROCEDURE — 6360000002 HC RX W HCPCS: Performed by: EMERGENCY MEDICINE

## 2024-08-21 PROCEDURE — 74176 CT ABD & PELVIS W/O CONTRAST: CPT

## 2024-08-21 PROCEDURE — 87636 SARSCOV2 & INF A&B AMP PRB: CPT

## 2024-08-21 PROCEDURE — 71045 X-RAY EXAM CHEST 1 VIEW: CPT

## 2024-08-21 PROCEDURE — 81001 URINALYSIS AUTO W/SCOPE: CPT

## 2024-08-21 PROCEDURE — 87077 CULTURE AEROBIC IDENTIFY: CPT

## 2024-08-21 PROCEDURE — 76705 ECHO EXAM OF ABDOMEN: CPT

## 2024-08-21 PROCEDURE — 80076 HEPATIC FUNCTION PANEL: CPT

## 2024-08-21 PROCEDURE — 83605 ASSAY OF LACTIC ACID: CPT

## 2024-08-21 PROCEDURE — 36415 COLL VENOUS BLD VENIPUNCTURE: CPT

## 2024-08-21 PROCEDURE — 1200000000 HC SEMI PRIVATE

## 2024-08-21 PROCEDURE — 85610 PROTHROMBIN TIME: CPT

## 2024-08-21 PROCEDURE — 85025 COMPLETE CBC W/AUTO DIFF WBC: CPT

## 2024-08-21 PROCEDURE — 71250 CT THORAX DX C-: CPT

## 2024-08-21 PROCEDURE — 70450 CT HEAD/BRAIN W/O DYE: CPT

## 2024-08-21 PROCEDURE — 70490 CT SOFT TISSUE NECK W/O DYE: CPT

## 2024-08-21 PROCEDURE — 96375 TX/PRO/DX INJ NEW DRUG ADDON: CPT

## 2024-08-21 PROCEDURE — 96365 THER/PROPH/DIAG IV INF INIT: CPT

## 2024-08-21 RX ORDER — 0.9 % SODIUM CHLORIDE 0.9 %
1000 INTRAVENOUS SOLUTION INTRAVENOUS ONCE
Status: COMPLETED | OUTPATIENT
Start: 2024-08-21 | End: 2024-08-22

## 2024-08-21 RX ORDER — SODIUM CHLORIDE 0.9 % (FLUSH) 0.9 %
5-40 SYRINGE (ML) INJECTION EVERY 12 HOURS SCHEDULED
Status: DISCONTINUED | OUTPATIENT
Start: 2024-08-21 | End: 2024-08-26 | Stop reason: HOSPADM

## 2024-08-21 RX ORDER — METHOCARBAMOL 500 MG/1
500 TABLET, FILM COATED ORAL 4 TIMES DAILY PRN
Qty: 40 TABLET | Refills: 0 | Status: SHIPPED | OUTPATIENT
Start: 2024-08-21 | End: 2024-08-31

## 2024-08-21 RX ORDER — MAGNESIUM SULFATE 1 G/100ML
1000 INJECTION INTRAVENOUS ONCE
Status: COMPLETED | OUTPATIENT
Start: 2024-08-21 | End: 2024-08-21

## 2024-08-21 RX ORDER — METHOCARBAMOL 100 MG/ML
1000 INJECTION, SOLUTION INTRAMUSCULAR; INTRAVENOUS ONCE
Status: COMPLETED | OUTPATIENT
Start: 2024-08-21 | End: 2024-08-21

## 2024-08-21 RX ORDER — POLYETHYLENE GLYCOL 3350 17 G/17G
17 POWDER, FOR SOLUTION ORAL DAILY PRN
Status: DISCONTINUED | OUTPATIENT
Start: 2024-08-21 | End: 2024-08-26 | Stop reason: HOSPADM

## 2024-08-21 RX ORDER — ALPRAZOLAM 0.5 MG
0.5 TABLET ORAL 2 TIMES DAILY PRN
Status: DISCONTINUED | OUTPATIENT
Start: 2024-08-21 | End: 2024-08-26 | Stop reason: HOSPADM

## 2024-08-21 RX ORDER — METOPROLOL TARTRATE 25 MG/1
25 TABLET, FILM COATED ORAL 2 TIMES DAILY
Status: DISCONTINUED | OUTPATIENT
Start: 2024-08-21 | End: 2024-08-22

## 2024-08-21 RX ORDER — ATORVASTATIN CALCIUM 10 MG/1
10 TABLET, FILM COATED ORAL DAILY
Status: DISCONTINUED | OUTPATIENT
Start: 2024-08-21 | End: 2024-08-26 | Stop reason: HOSPADM

## 2024-08-21 RX ORDER — 0.9 % SODIUM CHLORIDE 0.9 %
1000 INTRAVENOUS SOLUTION INTRAVENOUS ONCE
Status: COMPLETED | OUTPATIENT
Start: 2024-08-21 | End: 2024-08-21

## 2024-08-21 RX ORDER — WARFARIN SODIUM 2 MG/1
4 TABLET ORAL
Status: COMPLETED | OUTPATIENT
Start: 2024-08-21 | End: 2024-08-22

## 2024-08-21 RX ORDER — TRAZODONE HYDROCHLORIDE 50 MG/1
50 TABLET, FILM COATED ORAL NIGHTLY PRN
Status: DISCONTINUED | OUTPATIENT
Start: 2024-08-21 | End: 2024-08-26 | Stop reason: HOSPADM

## 2024-08-21 RX ORDER — SODIUM CHLORIDE 9 MG/ML
INJECTION, SOLUTION INTRAVENOUS CONTINUOUS
Status: DISCONTINUED | OUTPATIENT
Start: 2024-08-21 | End: 2024-08-22

## 2024-08-21 RX ORDER — ENOXAPARIN SODIUM 100 MG/ML
1 INJECTION SUBCUTANEOUS DAILY
Status: DISCONTINUED | OUTPATIENT
Start: 2024-08-21 | End: 2024-08-22

## 2024-08-21 RX ORDER — AMLODIPINE BESYLATE 10 MG/1
10 TABLET ORAL DAILY
Status: DISCONTINUED | OUTPATIENT
Start: 2024-08-21 | End: 2024-08-26 | Stop reason: HOSPADM

## 2024-08-21 RX ORDER — LISINOPRIL 40 MG/1
40 TABLET ORAL DAILY
Status: DISCONTINUED | OUTPATIENT
Start: 2024-08-21 | End: 2024-08-22

## 2024-08-21 RX ORDER — ACETAMINOPHEN 650 MG/1
650 SUPPOSITORY RECTAL EVERY 6 HOURS PRN
Status: DISCONTINUED | OUTPATIENT
Start: 2024-08-21 | End: 2024-08-23

## 2024-08-21 RX ORDER — SODIUM CHLORIDE 0.9 % (FLUSH) 0.9 %
5-40 SYRINGE (ML) INJECTION PRN
Status: DISCONTINUED | OUTPATIENT
Start: 2024-08-21 | End: 2024-08-26 | Stop reason: HOSPADM

## 2024-08-21 RX ORDER — ACETAMINOPHEN 325 MG/1
650 TABLET ORAL EVERY 6 HOURS PRN
Status: DISCONTINUED | OUTPATIENT
Start: 2024-08-21 | End: 2024-08-23

## 2024-08-21 RX ORDER — 0.9 % SODIUM CHLORIDE 0.9 %
30 INTRAVENOUS SOLUTION INTRAVENOUS ONCE
Status: COMPLETED | OUTPATIENT
Start: 2024-08-21 | End: 2024-08-22

## 2024-08-21 RX ORDER — SODIUM CHLORIDE 9 MG/ML
INJECTION, SOLUTION INTRAVENOUS PRN
Status: DISCONTINUED | OUTPATIENT
Start: 2024-08-21 | End: 2024-08-26 | Stop reason: HOSPADM

## 2024-08-21 RX ORDER — ONDANSETRON 2 MG/ML
4 INJECTION INTRAMUSCULAR; INTRAVENOUS ONCE
Status: COMPLETED | OUTPATIENT
Start: 2024-08-21 | End: 2024-08-21

## 2024-08-21 RX ORDER — METRONIDAZOLE 500 MG/100ML
500 INJECTION, SOLUTION INTRAVENOUS EVERY 8 HOURS
Status: DISCONTINUED | OUTPATIENT
Start: 2024-08-21 | End: 2024-08-22

## 2024-08-21 RX ORDER — PANTOPRAZOLE SODIUM 20 MG/1
20 TABLET, DELAYED RELEASE ORAL
Status: DISCONTINUED | OUTPATIENT
Start: 2024-08-22 | End: 2024-08-26 | Stop reason: HOSPADM

## 2024-08-21 RX ORDER — SODIUM CHLORIDE, SODIUM LACTATE, POTASSIUM CHLORIDE, AND CALCIUM CHLORIDE .6; .31; .03; .02 G/100ML; G/100ML; G/100ML; G/100ML
1000 INJECTION, SOLUTION INTRAVENOUS ONCE
Status: COMPLETED | OUTPATIENT
Start: 2024-08-21 | End: 2024-08-21

## 2024-08-21 RX ADMIN — ONDANSETRON 4 MG: 2 INJECTION INTRAMUSCULAR; INTRAVENOUS at 05:44

## 2024-08-21 RX ADMIN — MAGNESIUM SULFATE HEPTAHYDRATE 1000 MG: 1 INJECTION, SOLUTION INTRAVENOUS at 05:51

## 2024-08-21 RX ADMIN — SODIUM CHLORIDE 1000 ML: 9 INJECTION, SOLUTION INTRAVENOUS at 16:54

## 2024-08-21 RX ADMIN — SODIUM CHLORIDE: 9 INJECTION, SOLUTION INTRAVENOUS at 18:27

## 2024-08-21 RX ADMIN — ACETAMINOPHEN 650 MG: 650 SUPPOSITORY RECTAL at 21:08

## 2024-08-21 RX ADMIN — SODIUM CHLORIDE, PRESERVATIVE FREE 10 ML: 5 INJECTION INTRAVENOUS at 21:32

## 2024-08-21 RX ADMIN — CEFEPIME 2000 MG: 2 INJECTION, POWDER, FOR SOLUTION INTRAVENOUS at 21:32

## 2024-08-21 RX ADMIN — SODIUM CHLORIDE, POTASSIUM CHLORIDE, SODIUM LACTATE AND CALCIUM CHLORIDE 1000 ML: 600; 310; 30; 20 INJECTION, SOLUTION INTRAVENOUS at 05:48

## 2024-08-21 RX ADMIN — METRONIDAZOLE 500 MG: 500 INJECTION, SOLUTION INTRAVENOUS at 21:29

## 2024-08-21 RX ADMIN — METHOCARBAMOL 1000 MG: 100 INJECTION INTRAMUSCULAR; INTRAVENOUS at 05:45

## 2024-08-21 RX ADMIN — WATER 1000 MG: 1 INJECTION INTRAMUSCULAR; INTRAVENOUS; SUBCUTANEOUS at 16:55

## 2024-08-21 ASSESSMENT — PAIN - FUNCTIONAL ASSESSMENT
PAIN_FUNCTIONAL_ASSESSMENT: 0-10
PAIN_FUNCTIONAL_ASSESSMENT: ACTIVITIES ARE NOT PREVENTED

## 2024-08-21 ASSESSMENT — PAIN DESCRIPTION - PAIN TYPE
TYPE: ACUTE PAIN
TYPE: ACUTE PAIN

## 2024-08-21 ASSESSMENT — LIFESTYLE VARIABLES
HOW OFTEN DO YOU HAVE A DRINK CONTAINING ALCOHOL: NEVER
HOW MANY STANDARD DRINKS CONTAINING ALCOHOL DO YOU HAVE ON A TYPICAL DAY: PATIENT DOES NOT DRINK

## 2024-08-21 ASSESSMENT — PAIN DESCRIPTION - LOCATION
LOCATION: ABDOMEN
LOCATION: HEAD;NECK

## 2024-08-21 ASSESSMENT — PAIN DESCRIPTION - DESCRIPTORS: DESCRIPTORS: DISCOMFORT

## 2024-08-21 ASSESSMENT — PAIN SCALES - GENERAL
PAINLEVEL_OUTOF10: 0
PAINLEVEL_OUTOF10: 0
PAINLEVEL_OUTOF10: 9
PAINLEVEL_OUTOF10: 9

## 2024-08-21 ASSESSMENT — PAIN DESCRIPTION - ONSET: ONSET: ON-GOING

## 2024-08-21 ASSESSMENT — PAIN DESCRIPTION - ORIENTATION: ORIENTATION: RIGHT;LOWER

## 2024-08-21 ASSESSMENT — PAIN DESCRIPTION - FREQUENCY: FREQUENCY: CONTINUOUS

## 2024-08-21 NOTE — PROGRESS NOTES
4 Eyes Skin Assessment     NAME:  Pao Wahl  YOB: 1945  MEDICAL RECORD NUMBER:  [id number]    The patient is being assessed for  Admission    I agree that at least one RN has performed a thorough Head to Toe Skin Assessment on the patient. ALL assessment sites listed below have been assessed.      Areas assessed by both nurses:    Head, Face, Ears, Shoulders, Back, Chest, Arms, Elbows, Hands, Sacrum. Buttock, Coccyx, Ischium, Legs. Feet and Heels, and Under Medical Devices         Does the Patient have a Wound? No noted wound(s)       Vinayak Prevention initiated by RN: No  Wound Care Orders initiated by RN: No    Pressure Injury (Stage 3,4, Unstageable, DTI, NWPT, and Complex wounds) if present, place Wound referral order by RN under : No    New Ostomies, if present place, Ostomy referral order under : No     Nurse 1 eSignature: Electronically signed by Selwyn Fraga RN on 8/21/24 at 6:36 PM EDT    **SHARE this note so that the co-signing nurse can place an eSignature**    Nurse 2 eSignature: {Esignature:[id number]}

## 2024-08-21 NOTE — PROGRESS NOTES
ED TO INPATIENT SBAR HANDOFF    Patient Name: Pao Wahl   :  1945  79 y.o.   MRN:  [id number]  Preferred Name  Pao Wahl  ED Room #:  A07/A07-07  Family/Caregiver Present no   Restraints no   Sitter no   Sepsis Risk Score      Situation  Code Status: Full Code No additional code details.    Allergies: Nsaids and Hydrocodone-acetaminophen  Weight: Patient Vitals for the past 96 hrs (Last 3 readings):   Weight   24 0440 52.2 kg (115 lb)     Arrived from: home  Chief Complaint:   Chief Complaint   Patient presents with    Headache    Neck Pain     Hospital Problem/Diagnosis:  Principal Problem:    Pneumobilia  Resolved Problems:    * No resolved hospital problems. *    Imaging:   US GALLBLADDER RUQ   Final Result      No sonographic evidence of acute cholecystitis.      Pneumobilia.      Electronically signed by Jai Curry      CT ABDOMEN PELVIS WO CONTRAST Additional Contrast? None   Final Result      Nondependent pneumobilia. Correlate with history of recent instrumentation or   prior sphincterotomy.      Electronically signed by Jai Curry      CT SOFT TISSUE NECK WO CONTRAST   Final Result   Increased number of nonenlarged and nonpathologic appearing cervical   lymph nodes. These may be reactive. Correlate clinically.      4 mm nodule of the right upper lobe. Per most recent Fleischner Society   Guidelines (Radiology 2017, DOI:10.1148/radiol.0000000712), for patients at low   risk for lung cancer, no further imaging follow-up is recommended. For high risk   patients (e.g. smokers), a follow-up noncontrast chest CT in 12 months is   optional.      Electronically signed by Karlo Pena      XR CHEST PORTABLE   Final Result   Hazy bilateral pulmonary opacities. Correlate for pneumonia.      Electronically signed by Karlo Pena      CT HEAD WO CONTRAST   Final Result      No acute intracranial hemorrhage or mass effect.      Electronically signed by Lisbeth Skelton DO      CT CHEST WO CONTRAST

## 2024-08-21 NOTE — CONSULTS
Raissa which was insignificant. Her last recorded colonoscopy was in 2013. Labs were significant for leukocytosis at 17.7. PT/INR 17.2/1.39.     - No surgical intervention at this time  - Pneumobilia likely incidental finding  - Will follow LFTs  - Will follow Lactic acid  - Will follow on RUQ US  - Recommend GI consult  - Further management per medicine    Patient seen with senior resident.    Suni Rob MD  PGY1, General Surgery  08/21/24  4:07 PM  [phone]

## 2024-08-21 NOTE — ED PROVIDER NOTES
XR CHEST PORTABLE   Final Result   Hazy bilateral pulmonary opacities. Correlate for pneumonia.      Electronically signed by Karlo Pena      CT HEAD WO CONTRAST   Final Result      No acute intracranial hemorrhage or mass effect.      Electronically signed by Lisbeth Skelton DO          LABS:   Results for orders placed or performed during the hospital encounter of 08/21/24   COVID-19 & Influenza Combo    Specimen: Nasal   Result Value Ref Range    SARS-CoV-2 RNA, RT PCR NOT DETECTED NOT DETECTED    Influenza A NOT DETECTED NOT DETECTED    Influenza B NOT DETECTED NOT DETECTED   CBC with Auto Differential   Result Value Ref Range    WBC 17.7 (H) 4.0 - 11.0 K/uL    RBC 3.98 (L) 4.00 - 5.20 M/uL    Hemoglobin 12.0 12.0 - 16.0 g/dL    Hematocrit 35.2 (L) 36.0 - 48.0 %    MCV 88.4 80.0 - 100.0 fL    MCH 30.1 26.0 - 34.0 pg    MCHC 34.0 31.0 - 36.0 g/dL    RDW 13.8 12.4 - 15.4 %    Platelets 180 135 - 450 K/uL    MPV 10.7 (H) 5.0 - 10.5 fL    PLATELET SLIDE REVIEW Adequate     SLIDE REVIEW see below     Path Consult No     Neutrophils % 71.0 %    Lymphocytes % 18.0 %    Monocytes % 9.0 %    Eosinophils % 0.0 %    Basophils % 0.0 %    Neutrophils Absolute 12.6 (H) 1.7 - 7.7 K/uL    Lymphocytes Absolute 3.5 1.0 - 5.1 K/uL    Monocytes Absolute 1.6 (H) 0.0 - 1.3 K/uL    Eosinophils Absolute 0.0 0.0 - 0.6 K/uL    Basophils Absolute 0.0 0.0 - 0.2 K/uL    Atypical Lymphocytes Relative 2 0 - 6 %   Basic Metabolic Panel w/ Reflex to MG   Result Value Ref Range    Sodium 135 (L) 136 - 145 mmol/L    Potassium reflex Magnesium 5.1 3.5 - 5.1 mmol/L    Chloride 101 99 - 110 mmol/L    CO2 19 (L) 21 - 32 mmol/L    Anion Gap 15 3 - 16    Glucose 206 (H) 70 - 99 mg/dL    BUN 24 (H) 7 - 20 mg/dL    Creatinine 1.6 (H) 0.6 - 1.2 mg/dL    Est, Glom Filt Rate 33 (A) >60    Calcium 8.9 8.3 - 10.6 mg/dL   Protime-INR   Result Value Ref Range    Protime 17.2 (H) 11.9 - 14.9 sec    INR 1.39 (H) 0.85 - 1.15   Urinalysis with Microscopic

## 2024-08-21 NOTE — ED PROVIDER NOTES
Please see Dr. Banegas's note.  The patient underwent gallbladder ultrasound which showed persistent pneumobilia but no cholecystitis.  Lactate was 2.7.  Patient was given fluids here blood cultures.  I spoke with surgery and they stated no acute surgical intervention.  They would like LFTs trended and lactate as well as repeat CBC.  The patient was given Rocephin after the blood cultures to cover for any kind of infectious process.  She has no cough or congestion no meningismus     Kalen Michelle MD  08/21/24 [id number]

## 2024-08-21 NOTE — ED PROVIDER NOTES
patient states that the headache was rather gradual in its onset, but does not become the worst headache that she can ever recall having.  She has tried taking some Tylenol in the mornings, and Tylenol with codeine in the evenings, but it has not helped.    She denies any recent URI symptoms, cough, fevers or chills, nausea or vomiting.  She does note that she has had a decreased appetite, but has been tolerating p.o.  Denies any chest pain or shortness of breath.  Denies any abdominal pain.  Denies any antecedent strain or trauma that she can identify.  Patient is on Coumadin for her mechanical valve, states that her most recent INR was 2.4.    Review of Systems     Review of Systems   All other systems reviewed and are negative.      Past Medical, Surgical, Family, and Social History     She has a past medical history of A-fib (HCC), Anemia, Anemia:multifactorial, Anxiety, Cataract, CHF (congestive heart failure) ([facility]), Chronic back pain, CKD (chronic kidney disease) stage 3, GFR 30-59 ml/min ([facility]), Colitis, ischemic (HCC), COPD, Diabetes, Diabetic eye exam ([facility]), GERD (gastroesophageal reflux disease), H/O heart valve replacement with mechanical valve, Hx of mammogram, Hyperlipidaemia LDL goal <100, Hypertension, Insomnia, Long-term (current) use of anticoagulants, INR goal 2.5-3.5, Lymphoma:monoclonal B cell, Mammogram abnormal, Nonspecific abnormal results of kidney function study, Osteoarthritis, Renal cysts, right, RLS (restless legs syndrome), Sciatica, Screening colonoscopy, Therapeutic drug monitoring, Valvular heart disease, and Visit for screening mammogram.  She has a past surgical history that includes Aortic valve replacement; Mitral valve replacement; Hysterectomy; Cataract removal (12/11/13;1/8/14); Colonoscopy; pacemaker placement (8/07); Tunneled venous port placement (Right, 10/3/2014); and laparoscopy (2/3/15).  Her family history includes Diabetes in her brother; Lung Cancer in her mother.  She     methocarbamol (ROBAXIN) injection 1,000 mg    lactated ringers bolus 1,000 mL    magnesium sulfate 1000 mg in dextrose 5% 100 mL IVPB    ondansetron (ZOFRAN) injection 4 mg       CONSULTS:  None    MEDICAL DECISION MAKING / ASSESSMENT / PLAN     Pao Wahl is a 79 y.o. female with a rather complex past medical history as described above, who presents to the emergency department with gradual onset of significant neck pain, first on the left, now bilateral, followed by development of increasingly severe headaches, which the patient describes as being holocephalic, but often prominent occipitally and radiating from the neck into the head.  This occurred in the setting of a sensation of exacerbation of back pain the day prior to the onset of the exacerbated neck pain.  She has no neurologic complaints or deficits on exam, no photophobia, no vomiting.  She has no midline cervical spinal tenderness to palpation, but does have pain on range of motion, particularly lateral rotation, with significant tenderness to palpation and palpable muscle spasm in the cervical paraspinous muscles bilaterally.  She is neurologically intact, with good strength and sensation throughout.    As the patient is anticoagulated on Coumadin, and in the recent past has been supratherapeutic with an INR of around 7, although more recently was in the normal therapeutic range, concern is for spontaneous intracranial hemorrhage.  Basic laboratory studies were ordered.  Head CT was ordered.  The patient has just gone for this imaging study, and results are pending.  In the meantime, the patient was treated with fluids, Robaxin, and magnesium, as a cervicogenic headache related to muscle spasm seems most consistent with her examination.    After treat with these medications, I was advised that the patient was experiencing some nausea, so she was ordered some Zofran..  Her laboratory studies have shown a leukocytosis, with white blood cell count of

## 2024-08-21 NOTE — DISCHARGE INSTRUCTIONS
Extra Heart Failure Education/ Tools/ Resources:     https://[employer].com/publication/?i=102635   --- this is American Heart Association interactive Healthier Living with Heart Failure guidebook.  Please click hyperlink or copy / paste link into search bar. The QR Code is also available below. Use your mouse to scroll through the pages.  Lots of information about weight monitoring, diet tips, activity, meds, etc    Heart Failure Tools and Resources QR Code is below. It includes multiple resources to include symptom tracker, med tracker, further HF info, and access to a HF Support Network online Community    HF [place] Mg  -- this is a free smart phone mg available for iPhone and Android download.  Use your phone to track sodium / fluid intake, zone tool symptom tracking, weights, medications, etc. Click on this hyperlink  HF [place] Mg   for QR code for easy download or the link is also found in the below HF Tools and Resources.      DASH (Dietary Approach to Stop Hypertension) diet --  https://www.nhlbi.nih.gov/education/dash-eating-plan -- this diet is a flexible eating plan that promotes heart healthy eating style.  Click on hyperlink or copy / paste link into search bar.  Lots of low sodium recipes and tips.    https://www.[employer]/recipes  -- more free recipes

## 2024-08-21 NOTE — PROGRESS NOTES
Patient cheri dc to room 5316 from ed. Patient is A&O x 4. VSS. Patient oriented to the room all safety measures in place. Patient given IS and SCDs at this time. Admission orders released and patient 4 eyes completed. Admission documentation completed. No other needs are noted at this time.    [x] Bed alarm on and cord plugged into wall  [x] Bed in lowest position  [x] Call light and bedside table within reach  [x] Patient educated on all safety measures  []Oxygen connected to wall (if applicable)     Nurse 1 Esignature: Electronically signed by Selwyn Fraga RN on 8/21/24 at 6:36 PM EDT  Nurse 2 Esignature: {Esignature:[id number]}

## 2024-08-22 ENCOUNTER — ANESTHESIA EVENT (OUTPATIENT)
Dept: OPERATING ROOM | Age: 79
End: 2024-08-22
Payer: MEDICARE

## 2024-08-22 ENCOUNTER — APPOINTMENT (OUTPATIENT)
Age: 79
DRG: 335 | End: 2024-08-22
Payer: MEDICARE

## 2024-08-22 ENCOUNTER — APPOINTMENT (OUTPATIENT)
Dept: GENERAL RADIOLOGY | Age: 79
DRG: 335 | End: 2024-08-22
Payer: MEDICARE

## 2024-08-22 ENCOUNTER — ANESTHESIA (OUTPATIENT)
Dept: OPERATING ROOM | Age: 79
End: 2024-08-22
Payer: MEDICARE

## 2024-08-22 ENCOUNTER — APPOINTMENT (OUTPATIENT)
Dept: VASCULAR LAB | Age: 79
DRG: 335 | End: 2024-08-22
Payer: MEDICARE

## 2024-08-22 ENCOUNTER — APPOINTMENT (OUTPATIENT)
Dept: CT IMAGING | Age: 79
DRG: 335 | End: 2024-08-22
Payer: MEDICARE

## 2024-08-22 PROBLEM — M62.838 NECK MUSCLE SPASM: Status: ACTIVE | Noted: 2024-08-22

## 2024-08-22 LAB
ABO + RH BLD: NORMAL
ALBUMIN SERPL-MCNC: 3.3 G/DL (ref 3.4–5)
ALBUMIN SERPL-MCNC: 4 G/DL (ref 3.4–5)
ALBUMIN/GLOB SERPL: 1.4 {RATIO} (ref 1.1–2.2)
ALP SERPL-CCNC: 65 U/L (ref 40–129)
ALP SERPL-CCNC: 72 U/L (ref 40–129)
ALT SERPL-CCNC: 22 U/L (ref 10–40)
ALT SERPL-CCNC: 26 U/L (ref 10–40)
ANION GAP SERPL CALCULATED.3IONS-SCNC: 15 MMOL/L (ref 3–16)
ANION GAP SERPL CALCULATED.3IONS-SCNC: 16 MMOL/L (ref 3–16)
ANTI-XA UNFRAC HEPARIN: 0.24 IU/ML (ref 0.3–0.7)
APTT BLD: 39.5 SEC (ref 22.1–36.4)
AST SERPL-CCNC: 36 U/L (ref 15–37)
AST SERPL-CCNC: 43 U/L (ref 15–37)
BACTERIA UR CULT: ABNORMAL
BASOPHILS # BLD: 0.1 K/UL (ref 0–0.2)
BASOPHILS NFR BLD: 0.3 %
BILIRUB DIRECT SERPL-MCNC: 0.2 MG/DL (ref 0–0.3)
BILIRUB INDIRECT SERPL-MCNC: 0.2 MG/DL (ref 0–1)
BILIRUB SERPL-MCNC: 0.4 MG/DL (ref 0–1)
BILIRUB SERPL-MCNC: 0.5 MG/DL (ref 0–1)
BLD GP AB SCN SERPL QL: NORMAL
BUN SERPL-MCNC: 27 MG/DL (ref 7–20)
BUN SERPL-MCNC: 31 MG/DL (ref 7–20)
CA-I BLD-SCNC: 1.02 MMOL/L (ref 1.12–1.32)
CALCIUM SERPL-MCNC: 7.1 MG/DL (ref 8.3–10.6)
CALCIUM SERPL-MCNC: 7.8 MG/DL (ref 8.3–10.6)
CHLORIDE SERPL-SCNC: 110 MMOL/L (ref 99–110)
CHLORIDE SERPL-SCNC: 110 MMOL/L (ref 99–110)
CO2 SERPL-SCNC: 14 MMOL/L (ref 21–32)
CO2 SERPL-SCNC: 17 MMOL/L (ref 21–32)
CREAT SERPL-MCNC: 1.5 MG/DL (ref 0.6–1.2)
CREAT SERPL-MCNC: 1.6 MG/DL (ref 0.6–1.2)
DEPRECATED RDW RBC AUTO: 14.4 % (ref 12.4–15.4)
ECHO AO ASC DIAM: 3.4 CM
ECHO AO ASCENDING AORTA INDEX: 2.33 CM/M2
ECHO AV AREA PEAK VELOCITY: 0.3 CM2
ECHO AV AREA VTI: 0.3 CM2
ECHO AV AREA/BSA PEAK VELOCITY: 0.2 CM2/M2
ECHO AV AREA/BSA VTI: 0.2 CM2/M2
ECHO AV MEAN GRADIENT: 31 MMHG
ECHO AV MEAN VELOCITY: 2.7 M/S
ECHO AV PEAK GRADIENT: 52 MMHG
ECHO AV PEAK VELOCITY: 3.6 M/S
ECHO AV VELOCITY RATIO: 0.17
ECHO AV VTI: 96.7 CM
ECHO BSA: 1.47 M2
ECHO BSA: 1.47 M2
ECHO IVC PROX: 1.5 CM
ECHO LA AREA 4C: 22.1 CM2
ECHO LA DIAMETER INDEX: 3.63 CM/M2
ECHO LA DIAMETER: 5.3 CM
ECHO LA MAJOR AXIS: 5.7 CM
ECHO LA VOL MOD A4C: 69 ML (ref 22–52)
ECHO LA VOLUME INDEX MOD A4C: 47 ML/M2 (ref 16–34)
ECHO LV EDV A2C: 58 ML
ECHO LV EDV A4C: 66 ML
ECHO LV EDV INDEX A4C: 45 ML/M2
ECHO LV EDV NDEX A2C: 40 ML/M2
ECHO LV EJECTION FRACTION A2C: 73 %
ECHO LV EJECTION FRACTION A4C: 67 %
ECHO LV EJECTION FRACTION BIPLANE: 70 % (ref 55–100)
ECHO LV ESV A2C: 16 ML
ECHO LV ESV A4C: 22 ML
ECHO LV ESV INDEX A2C: 11 ML/M2
ECHO LV ESV INDEX A4C: 15 ML/M2
ECHO LV FRACTIONAL SHORTENING: 32 % (ref 28–44)
ECHO LV INTERNAL DIMENSION DIASTOLE INDEX: 2.6 CM/M2
ECHO LV INTERNAL DIMENSION DIASTOLIC: 3.8 CM (ref 3.9–5.3)
ECHO LV INTERNAL DIMENSION SYSTOLIC INDEX: 1.78 CM/M2
ECHO LV INTERNAL DIMENSION SYSTOLIC: 2.6 CM
ECHO LV IVSD: 1.2 CM (ref 0.6–0.9)
ECHO LV MASS 2D: 143.8 G (ref 67–162)
ECHO LV MASS INDEX 2D: 98.5 G/M2 (ref 43–95)
ECHO LV POSTERIOR WALL DIASTOLIC: 1.1 CM (ref 0.6–0.9)
ECHO LV RELATIVE WALL THICKNESS RATIO: 0.58
ECHO LVOT AREA: 1.8 CM2
ECHO LVOT AV VTI INDEX: 0.15
ECHO LVOT DIAM: 1.5 CM
ECHO LVOT MEAN GRADIENT: 1 MMHG
ECHO LVOT PEAK GRADIENT: 2 MMHG
ECHO LVOT PEAK VELOCITY: 0.6 M/S
ECHO LVOT STROKE VOLUME INDEX: 17.8 ML/M2
ECHO LVOT SV: 26 ML
ECHO LVOT VTI: 14.7 CM
ECHO MV A VELOCITY: 0.81 M/S
ECHO MV AREA VTI: 0.6 CM2
ECHO MV E DECELERATION TIME (DT): 235 MS
ECHO MV E VELOCITY: 1.3 M/S
ECHO MV E/A RATIO: 1.6
ECHO MV LVOT VTI INDEX: 2.85
ECHO MV MAX VELOCITY: 1.8 M/S
ECHO MV MEAN GRADIENT: 3 MMHG
ECHO MV MEAN VELOCITY: 0.8 M/S
ECHO MV PEAK GRADIENT: 12 MMHG
ECHO MV VTI: 41.9 CM
ECHO PULMONARY ARTERY END DIASTOLIC PRESSURE: 20 MMHG
ECHO PULMONARY ARTERY END DIASTOLIC PRESSURE: 6 MMHG
ECHO PV MAX VELOCITY: 2.3 M/S
ECHO PV REGURGITANT MAX VELOCITY: 1.3 M/S
ECHO RA AREA 4C: 19.4 CM2
ECHO RA END SYSTOLIC VOLUME APICAL 4 CHAMBER INDEX BSA: 40 ML/M2
ECHO RA VOLUME: 58 ML
ECHO RV FREE WALL PEAK S': 8 CM/S
ECHO RV TAPSE: 1.7 CM (ref 1.7–?)
ECHO TV REGURGITANT MAX VELOCITY: 2.49 M/S
ECHO TV REGURGITANT PEAK GRADIENT: 25 MMHG
EKG ATRIAL RATE: 61 BPM
EKG DIAGNOSIS: NORMAL
EKG P-R INTERVAL: 240 MS
EKG Q-T INTERVAL: 562 MS
EKG QRS DURATION: 136 MS
EKG QTC CALCULATION (BAZETT): 565 MS
EKG R AXIS: 120 DEGREES
EKG T AXIS: 67 DEGREES
EKG VENTRICULAR RATE: 61 BPM
EOSINOPHIL # BLD: 0 K/UL (ref 0–0.6)
EOSINOPHIL NFR BLD: 0 %
GFR SERPLBLD CREATININE-BSD FMLA CKD-EPI: 33 ML/MIN/{1.73_M2}
GFR SERPLBLD CREATININE-BSD FMLA CKD-EPI: 35 ML/MIN/{1.73_M2}
GLUCOSE BLD-MCNC: 186 MG/DL (ref 70–99)
GLUCOSE BLD-MCNC: 212 MG/DL (ref 70–99)
GLUCOSE SERPL-MCNC: 190 MG/DL (ref 70–99)
GLUCOSE SERPL-MCNC: 207 MG/DL (ref 70–99)
HCT VFR BLD AUTO: 31.1 % (ref 36–48)
HGB BLD-MCNC: 10.3 G/DL (ref 12–16)
INR PPP: 1.61 (ref 0.85–1.15)
INR PPP: 2.94 (ref 0.85–1.15)
INR PPP: 3.67 (ref 0.85–1.15)
LACTATE BLDV-SCNC: 1.8 MMOL/L (ref 0.4–2)
LDH SERPL L TO P-CCNC: 541 U/L (ref 100–190)
LIPASE SERPL-CCNC: 14 U/L (ref 13–60)
LYMPHOCYTES # BLD: 1.2 K/UL (ref 1–5.1)
LYMPHOCYTES NFR BLD: 4.9 %
MCH RBC QN AUTO: 30.1 PG (ref 26–34)
MCHC RBC AUTO-ENTMCNC: 33.2 G/DL (ref 31–36)
MCV RBC AUTO: 90.7 FL (ref 80–100)
MONOCYTES # BLD: 2.2 K/UL (ref 0–1.3)
MONOCYTES NFR BLD: 8.5 %
NEUTROPHILS # BLD: 21.7 K/UL (ref 1.7–7.7)
NEUTROPHILS NFR BLD: 86.3 %
ORGANISM: ABNORMAL
PERFORMED ON: ABNORMAL
PERFORMED ON: ABNORMAL
PH VENOUS: 7.26 (ref 7.35–7.45)
PLATELET # BLD AUTO: 126 K/UL (ref 135–450)
PMV BLD AUTO: 11.5 FL (ref 5–10.5)
POTASSIUM SERPL-SCNC: 4.5 MMOL/L (ref 3.5–5.1)
POTASSIUM SERPL-SCNC: 4.5 MMOL/L (ref 3.5–5.1)
PROT SERPL-MCNC: 5.6 G/DL (ref 6.4–8.2)
PROT SERPL-MCNC: 6.5 G/DL (ref 6.4–8.2)
PROTHROMBIN TIME: 19.2 SEC (ref 11.9–14.9)
PROTHROMBIN TIME: 30.5 SEC (ref 11.9–14.9)
PROTHROMBIN TIME: 36.2 SEC (ref 11.9–14.9)
RBC # BLD AUTO: 3.43 M/UL (ref 4–5.2)
REPORT: NORMAL
SODIUM SERPL-SCNC: 140 MMOL/L (ref 136–145)
SODIUM SERPL-SCNC: 142 MMOL/L (ref 136–145)
VANCOMYCIN SERPL-MCNC: 22.9 UG/ML
VAS AORTA DIST AP: 1.19 CM
VAS AORTA DIST PSV: 162 CM/S
VAS AORTA MID AP: 1.37 CM
VAS AORTA MID PSV: 229 CM/S
VAS AORTA MID TRANS: 1.4 CM
VAS AORTA PROX AP: 1.44 CM
VAS AORTA PROX PSV: 95.5 CM/S
VAS AORTA PROX TR: 1.4 CM
VAS CELIAC EDV: 20.7 CM/S
VAS CELIAC PSV: 161 CM/S
VAS COMMON HEPATIC EDV: 34.4 CM/S
VAS COMMON HEPATIC PSV: 185 CM/S
VAS DIST SMA EDV: 47.3 CM/S
VAS DIST SMA PSV: 183 CM/S
VAS MID SMA EDV: 45.4 CM/S
VAS MID SMA PSV: 332 CM/S
VAS ORIGIN SMA EDV: 7.5 CM/S
VAS ORIGIN SMA PSV: 360 CM/S
VAS PROX SMA EDV: 12.4 CM/S
VAS PROX SMA PSV: 228 CM/S
VAS SPLENIC EDV: 25.8 CM/S
VAS SPLENIC PSV: 176 CM/S
WBC # BLD AUTO: 25.2 K/UL (ref 4–11)

## 2024-08-22 PROCEDURE — 6360000002 HC RX W HCPCS: Performed by: NURSE ANESTHETIST, CERTIFIED REGISTERED

## 2024-08-22 PROCEDURE — 6360000004 HC RX CONTRAST MEDICATION: Performed by: STUDENT IN AN ORGANIZED HEALTH CARE EDUCATION/TRAINING PROGRAM

## 2024-08-22 PROCEDURE — 2580000003 HC RX 258

## 2024-08-22 PROCEDURE — 3700000000 HC ANESTHESIA ATTENDED CARE: Performed by: SURGERY

## 2024-08-22 PROCEDURE — 3700000001 HC ADD 15 MINUTES (ANESTHESIA): Performed by: SURGERY

## 2024-08-22 PROCEDURE — 83605 ASSAY OF LACTIC ACID: CPT

## 2024-08-22 PROCEDURE — 6360000002 HC RX W HCPCS: Performed by: ANESTHESIOLOGY

## 2024-08-22 PROCEDURE — 93976 VASCULAR STUDY: CPT | Performed by: SURGERY

## 2024-08-22 PROCEDURE — 2709999900 HC NON-CHARGEABLE SUPPLY: Performed by: SURGERY

## 2024-08-22 PROCEDURE — 36415 COLL VENOUS BLD VENIPUNCTURE: CPT

## 2024-08-22 PROCEDURE — 49320 DIAG LAPARO SEPARATE PROC: CPT | Performed by: SURGERY

## 2024-08-22 PROCEDURE — 6360000002 HC RX W HCPCS

## 2024-08-22 PROCEDURE — 3600000014 HC SURGERY LEVEL 4 ADDTL 15MIN: Performed by: SURGERY

## 2024-08-22 PROCEDURE — 86900 BLOOD TYPING SEROLOGIC ABO: CPT

## 2024-08-22 PROCEDURE — 88185 FLOWCYTOMETRY/TC ADD-ON: CPT

## 2024-08-22 PROCEDURE — 81206 BCR/ABL1 GENE MAJOR BP: CPT

## 2024-08-22 PROCEDURE — 83615 LACTATE (LD) (LDH) ENZYME: CPT

## 2024-08-22 PROCEDURE — 80053 COMPREHEN METABOLIC PANEL: CPT

## 2024-08-22 PROCEDURE — 85610 PROTHROMBIN TIME: CPT

## 2024-08-22 PROCEDURE — 93010 ELECTROCARDIOGRAM REPORT: CPT | Performed by: INTERNAL MEDICINE

## 2024-08-22 PROCEDURE — 6360000002 HC RX W HCPCS: Performed by: FAMILY MEDICINE

## 2024-08-22 PROCEDURE — 85520 HEPARIN ASSAY: CPT

## 2024-08-22 PROCEDURE — 81208 BCR/ABL1 GENE OTHER BP: CPT

## 2024-08-22 PROCEDURE — 93005 ELECTROCARDIOGRAM TRACING: CPT

## 2024-08-22 PROCEDURE — 2580000003 HC RX 258: Performed by: SURGERY

## 2024-08-22 PROCEDURE — 6370000000 HC RX 637 (ALT 250 FOR IP): Performed by: FAMILY MEDICINE

## 2024-08-22 PROCEDURE — 93976 VASCULAR STUDY: CPT

## 2024-08-22 PROCEDURE — 2500000003 HC RX 250 WO HCPCS: Performed by: ANESTHESIOLOGY

## 2024-08-22 PROCEDURE — 99223 1ST HOSP IP/OBS HIGH 75: CPT | Performed by: HOSPITALIST

## 2024-08-22 PROCEDURE — 85730 THROMBOPLASTIN TIME PARTIAL: CPT

## 2024-08-22 PROCEDURE — 2500000003 HC RX 250 WO HCPCS: Performed by: SURGERY

## 2024-08-22 PROCEDURE — 88184 FLOWCYTOMETRY/ TC 1 MARKER: CPT

## 2024-08-22 PROCEDURE — 2720000010 HC SURG SUPPLY STERILE: Performed by: SURGERY

## 2024-08-22 PROCEDURE — 99223 1ST HOSP IP/OBS HIGH 75: CPT | Performed by: SURGERY

## 2024-08-22 PROCEDURE — 74177 CT ABD & PELVIS W/CONTRAST: CPT

## 2024-08-22 PROCEDURE — 7100000000 HC PACU RECOVERY - FIRST 15 MIN: Performed by: SURGERY

## 2024-08-22 PROCEDURE — 80202 ASSAY OF VANCOMYCIN: CPT

## 2024-08-22 PROCEDURE — 86850 RBC ANTIBODY SCREEN: CPT

## 2024-08-22 PROCEDURE — 86901 BLOOD TYPING SEROLOGIC RH(D): CPT

## 2024-08-22 PROCEDURE — 2580000003 HC RX 258: Performed by: FAMILY MEDICINE

## 2024-08-22 PROCEDURE — 81207 BCR/ABL1 GENE MINOR BP: CPT

## 2024-08-22 PROCEDURE — 6360000002 HC RX W HCPCS: Performed by: SURGERY

## 2024-08-22 PROCEDURE — 3600000004 HC SURGERY LEVEL 4 BASE: Performed by: SURGERY

## 2024-08-22 PROCEDURE — 87641 MR-STAPH DNA AMP PROBE: CPT

## 2024-08-22 PROCEDURE — 99233 SBSQ HOSP IP/OBS HIGH 50: CPT | Performed by: SURGERY

## 2024-08-22 PROCEDURE — 83690 ASSAY OF LIPASE: CPT

## 2024-08-22 PROCEDURE — C8929 TTE W OR WO FOL WCON,DOPPLER: HCPCS

## 2024-08-22 PROCEDURE — 74018 RADEX ABDOMEN 1 VIEW: CPT

## 2024-08-22 PROCEDURE — 2580000003 HC RX 258: Performed by: INTERNAL MEDICINE

## 2024-08-22 PROCEDURE — 93306 TTE W/DOPPLER COMPLETE: CPT | Performed by: INTERNAL MEDICINE

## 2024-08-22 PROCEDURE — 2500000003 HC RX 250 WO HCPCS: Performed by: NURSE ANESTHETIST, CERTIFIED REGISTERED

## 2024-08-22 PROCEDURE — 7100000001 HC PACU RECOVERY - ADDTL 15 MIN: Performed by: SURGERY

## 2024-08-22 PROCEDURE — 03HC33Z INSERTION OF INFUSION DEVICE INTO LEFT RADIAL ARTERY, PERCUTANEOUS APPROACH: ICD-10-PCS | Performed by: STUDENT IN AN ORGANIZED HEALTH CARE EDUCATION/TRAINING PROGRAM

## 2024-08-22 PROCEDURE — 85025 COMPLETE CBC W/AUTO DIFF WBC: CPT

## 2024-08-22 PROCEDURE — 2500000003 HC RX 250 WO HCPCS: Performed by: INTERNAL MEDICINE

## 2024-08-22 PROCEDURE — 6360000004 HC RX CONTRAST MEDICATION

## 2024-08-22 PROCEDURE — 1200000000 HC SEMI PRIVATE

## 2024-08-22 PROCEDURE — 82330 ASSAY OF CALCIUM: CPT

## 2024-08-22 PROCEDURE — 2500000003 HC RX 250 WO HCPCS: Performed by: NURSE PRACTITIONER

## 2024-08-22 PROCEDURE — 0DN84ZZ RELEASE SMALL INTESTINE, PERCUTANEOUS ENDOSCOPIC APPROACH: ICD-10-PCS | Performed by: SURGERY

## 2024-08-22 RX ORDER — SODIUM CHLORIDE 0.9 % (FLUSH) 0.9 %
5-40 SYRINGE (ML) INJECTION EVERY 12 HOURS SCHEDULED
Status: DISCONTINUED | OUTPATIENT
Start: 2024-08-22 | End: 2024-08-22 | Stop reason: HOSPADM

## 2024-08-22 RX ORDER — HYDROMORPHONE HYDROCHLORIDE 2 MG/ML
INJECTION, SOLUTION INTRAMUSCULAR; INTRAVENOUS; SUBCUTANEOUS PRN
Status: DISCONTINUED | OUTPATIENT
Start: 2024-08-22 | End: 2024-08-22 | Stop reason: SDUPTHER

## 2024-08-22 RX ORDER — WARFARIN SODIUM 1 MG/1
1 TABLET ORAL
Status: DISCONTINUED | OUTPATIENT
Start: 2024-08-22 | End: 2024-08-22

## 2024-08-22 RX ORDER — HYDRALAZINE HYDROCHLORIDE 20 MG/ML
10 INJECTION INTRAMUSCULAR; INTRAVENOUS
Status: DISCONTINUED | OUTPATIENT
Start: 2024-08-22 | End: 2024-08-22 | Stop reason: HOSPADM

## 2024-08-22 RX ORDER — PROPOFOL 10 MG/ML
INJECTION, EMULSION INTRAVENOUS PRN
Status: DISCONTINUED | OUTPATIENT
Start: 2024-08-22 | End: 2024-08-22 | Stop reason: SDUPTHER

## 2024-08-22 RX ORDER — SODIUM CHLORIDE 9 MG/ML
50 INJECTION, SOLUTION INTRAVENOUS ONCE
Status: COMPLETED | OUTPATIENT
Start: 2024-08-22 | End: 2024-08-22

## 2024-08-22 RX ORDER — DEXAMETHASONE SODIUM PHOSPHATE 4 MG/ML
INJECTION, SOLUTION INTRA-ARTICULAR; INTRALESIONAL; INTRAMUSCULAR; INTRAVENOUS; SOFT TISSUE PRN
Status: DISCONTINUED | OUTPATIENT
Start: 2024-08-22 | End: 2024-08-22 | Stop reason: SDUPTHER

## 2024-08-22 RX ORDER — SODIUM CHLORIDE, SODIUM LACTATE, POTASSIUM CHLORIDE, AND CALCIUM CHLORIDE .6; .31; .03; .02 G/100ML; G/100ML; G/100ML; G/100ML
IRRIGANT IRRIGATION PRN
Status: DISCONTINUED | OUTPATIENT
Start: 2024-08-22 | End: 2024-08-22 | Stop reason: HOSPADM

## 2024-08-22 RX ORDER — FENTANYL CITRATE 50 UG/ML
25 INJECTION, SOLUTION INTRAMUSCULAR; INTRAVENOUS EVERY 5 MIN PRN
Status: DISCONTINUED | OUTPATIENT
Start: 2024-08-22 | End: 2024-08-22 | Stop reason: HOSPADM

## 2024-08-22 RX ORDER — SODIUM CHLORIDE 0.9 % (FLUSH) 0.9 %
5-40 SYRINGE (ML) INJECTION PRN
Status: DISCONTINUED | OUTPATIENT
Start: 2024-08-22 | End: 2024-08-22 | Stop reason: HOSPADM

## 2024-08-22 RX ORDER — LORAZEPAM 2 MG/ML
INJECTION INTRAMUSCULAR
Status: COMPLETED
Start: 2024-08-22 | End: 2024-08-22

## 2024-08-22 RX ORDER — METOCLOPRAMIDE HYDROCHLORIDE 5 MG/ML
10 INJECTION INTRAMUSCULAR; INTRAVENOUS
Status: DISCONTINUED | OUTPATIENT
Start: 2024-08-22 | End: 2024-08-22 | Stop reason: HOSPADM

## 2024-08-22 RX ORDER — LABETALOL HYDROCHLORIDE 5 MG/ML
10 INJECTION, SOLUTION INTRAVENOUS
Status: DISCONTINUED | OUTPATIENT
Start: 2024-08-22 | End: 2024-08-22 | Stop reason: HOSPADM

## 2024-08-22 RX ORDER — SODIUM CHLORIDE 9 MG/ML
INJECTION, SOLUTION INTRAVENOUS PRN
Status: DISCONTINUED | OUTPATIENT
Start: 2024-08-22 | End: 2024-08-22

## 2024-08-22 RX ORDER — METOPROLOL TARTRATE 1 MG/ML
2.5 INJECTION, SOLUTION INTRAVENOUS EVERY 6 HOURS
Status: DISCONTINUED | OUTPATIENT
Start: 2024-08-22 | End: 2024-08-25

## 2024-08-22 RX ORDER — LIDOCAINE HYDROCHLORIDE 20 MG/ML
INJECTION, SOLUTION INTRAVENOUS PRN
Status: DISCONTINUED | OUTPATIENT
Start: 2024-08-22 | End: 2024-08-22 | Stop reason: SDUPTHER

## 2024-08-22 RX ORDER — SODIUM CHLORIDE 9 MG/ML
INJECTION, SOLUTION INTRAVENOUS PRN
Status: DISCONTINUED | OUTPATIENT
Start: 2024-08-22 | End: 2024-08-26 | Stop reason: HOSPADM

## 2024-08-22 RX ORDER — SODIUM CHLORIDE 9 MG/ML
INJECTION, SOLUTION INTRAVENOUS PRN
Status: DISCONTINUED | OUTPATIENT
Start: 2024-08-22 | End: 2024-08-22 | Stop reason: HOSPADM

## 2024-08-22 RX ORDER — HYDROMORPHONE HYDROCHLORIDE 1 MG/ML
0.5 INJECTION, SOLUTION INTRAMUSCULAR; INTRAVENOUS; SUBCUTANEOUS EVERY 5 MIN PRN
Status: COMPLETED | OUTPATIENT
Start: 2024-08-22 | End: 2024-08-22

## 2024-08-22 RX ORDER — NALOXONE HYDROCHLORIDE 0.4 MG/ML
INJECTION, SOLUTION INTRAMUSCULAR; INTRAVENOUS; SUBCUTANEOUS PRN
Status: DISCONTINUED | OUTPATIENT
Start: 2024-08-22 | End: 2024-08-22 | Stop reason: HOSPADM

## 2024-08-22 RX ORDER — LORAZEPAM 2 MG/ML
0.5 INJECTION INTRAMUSCULAR
Status: COMPLETED | OUTPATIENT
Start: 2024-08-22 | End: 2024-08-22

## 2024-08-22 RX ORDER — ROCURONIUM BROMIDE 10 MG/ML
INJECTION, SOLUTION INTRAVENOUS PRN
Status: DISCONTINUED | OUTPATIENT
Start: 2024-08-22 | End: 2024-08-22 | Stop reason: SDUPTHER

## 2024-08-22 RX ORDER — BUPIVACAINE HYDROCHLORIDE AND EPINEPHRINE 5; 5 MG/ML; UG/ML
INJECTION, SOLUTION EPIDURAL; INTRACAUDAL; PERINEURAL PRN
Status: DISCONTINUED | OUTPATIENT
Start: 2024-08-22 | End: 2024-08-22 | Stop reason: HOSPADM

## 2024-08-22 RX ORDER — ONDANSETRON 2 MG/ML
4 INJECTION INTRAMUSCULAR; INTRAVENOUS
Status: DISCONTINUED | OUTPATIENT
Start: 2024-08-22 | End: 2024-08-22 | Stop reason: HOSPADM

## 2024-08-22 RX ORDER — OXYCODONE HYDROCHLORIDE 5 MG/1
5 TABLET ORAL PRN
Status: DISCONTINUED | OUTPATIENT
Start: 2024-08-22 | End: 2024-08-22 | Stop reason: HOSPADM

## 2024-08-22 RX ORDER — HEPARIN SODIUM 10000 [USP'U]/100ML
5-30 INJECTION, SOLUTION INTRAVENOUS CONTINUOUS
Status: DISCONTINUED | OUTPATIENT
Start: 2024-08-22 | End: 2024-08-26

## 2024-08-22 RX ORDER — OXYCODONE HYDROCHLORIDE 5 MG/1
10 TABLET ORAL PRN
Status: DISCONTINUED | OUTPATIENT
Start: 2024-08-22 | End: 2024-08-22 | Stop reason: HOSPADM

## 2024-08-22 RX ORDER — CALCIUM GLUCONATE 20 MG/ML
2000 INJECTION, SOLUTION INTRAVENOUS ONCE
Status: COMPLETED | OUTPATIENT
Start: 2024-08-22 | End: 2024-08-22

## 2024-08-22 RX ORDER — IOPAMIDOL 755 MG/ML
75 INJECTION, SOLUTION INTRAVASCULAR
Status: COMPLETED | OUTPATIENT
Start: 2024-08-22 | End: 2024-08-22

## 2024-08-22 RX ADMIN — ALPRAZOLAM 0.5 MG: 0.5 TABLET ORAL at 00:05

## 2024-08-22 RX ADMIN — PERFLUTREN 1.5 ML: 6.52 INJECTION, SUSPENSION INTRAVENOUS at 11:21

## 2024-08-22 RX ADMIN — CALCIUM GLUCONATE 2000 MG: 20 INJECTION, SOLUTION INTRAVENOUS at 11:09

## 2024-08-22 RX ADMIN — HYDROMORPHONE HYDROCHLORIDE 0.25 MG: 2 INJECTION, SOLUTION INTRAMUSCULAR; INTRAVENOUS; SUBCUTANEOUS at 12:18

## 2024-08-22 RX ADMIN — METOPROLOL TARTRATE 25 MG: 25 TABLET, FILM COATED ORAL at 09:12

## 2024-08-22 RX ADMIN — TRAZODONE HYDROCHLORIDE 50 MG: 50 TABLET ORAL at 00:06

## 2024-08-22 RX ADMIN — PIPERACILLIN AND TAZOBACTAM 3375 MG: 3; .375 INJECTION, POWDER, LYOPHILIZED, FOR SOLUTION INTRAVENOUS at 12:15

## 2024-08-22 RX ADMIN — LISINOPRIL 40 MG: 40 TABLET ORAL at 00:05

## 2024-08-22 RX ADMIN — AMLODIPINE BESYLATE 10 MG: 10 TABLET ORAL at 00:04

## 2024-08-22 RX ADMIN — WARFARIN SODIUM 4 MG: 2 TABLET ORAL at 01:10

## 2024-08-22 RX ADMIN — HYDROMORPHONE HYDROCHLORIDE 0.5 MG: 1 INJECTION, SOLUTION INTRAMUSCULAR; INTRAVENOUS; SUBCUTANEOUS at 14:25

## 2024-08-22 RX ADMIN — METRONIDAZOLE 500 MG: 500 INJECTION, SOLUTION INTRAVENOUS at 05:12

## 2024-08-22 RX ADMIN — PROTHROMBIN COMPLEX CONCENTRATE (HUMAN) 1000 UNITS: 25.5; 16.5; 24; 22; 22; 26 POWDER, FOR SOLUTION INTRAVENOUS at 11:00

## 2024-08-22 RX ADMIN — LIDOCAINE HYDROCHLORIDE 50 MG: 20 INJECTION, SOLUTION INTRAVENOUS at 12:05

## 2024-08-22 RX ADMIN — SODIUM CHLORIDE: 9 INJECTION, SOLUTION INTRAVENOUS at 01:16

## 2024-08-22 RX ADMIN — HYDROMORPHONE HYDROCHLORIDE 0.25 MG: 2 INJECTION, SOLUTION INTRAMUSCULAR; INTRAVENOUS; SUBCUTANEOUS at 12:01

## 2024-08-22 RX ADMIN — SODIUM BICARBONATE: 84 INJECTION, SOLUTION INTRAVENOUS at 23:39

## 2024-08-22 RX ADMIN — DEXAMETHASONE SODIUM PHOSPHATE 8 MG: 4 INJECTION INTRA-ARTICULAR; INTRALESIONAL; INTRAMUSCULAR; INTRAVENOUS; SOFT TISSUE at 12:30

## 2024-08-22 RX ADMIN — VANCOMYCIN HYDROCHLORIDE 750 MG: 750 INJECTION, POWDER, LYOPHILIZED, FOR SOLUTION INTRAVENOUS at 17:31

## 2024-08-22 RX ADMIN — PANTOPRAZOLE SODIUM 20 MG: 20 TABLET, DELAYED RELEASE ORAL at 09:13

## 2024-08-22 RX ADMIN — LISINOPRIL 40 MG: 40 TABLET ORAL at 09:12

## 2024-08-22 RX ADMIN — SODIUM CHLORIDE 1566 ML: 900 INJECTION, SOLUTION INTRAVENOUS at 02:24

## 2024-08-22 RX ADMIN — LORAZEPAM 0.5 MG: 2 INJECTION INTRAMUSCULAR at 13:35

## 2024-08-22 RX ADMIN — METOPROLOL TARTRATE 2.5 MG: 1 INJECTION, SOLUTION INTRAVENOUS at 21:15

## 2024-08-22 RX ADMIN — HEPARIN SODIUM 12 UNITS/KG/HR: 10000 INJECTION, SOLUTION INTRAVENOUS at 17:42

## 2024-08-22 RX ADMIN — METOPROLOL TARTRATE 25 MG: 25 TABLET, FILM COATED ORAL at 00:05

## 2024-08-22 RX ADMIN — ROCURONIUM BROMIDE 50 MG: 10 INJECTION, SOLUTION INTRAVENOUS at 12:06

## 2024-08-22 RX ADMIN — PROPOFOL 50 MG: 10 INJECTION, EMULSION INTRAVENOUS at 12:05

## 2024-08-22 RX ADMIN — PIPERACILLIN AND TAZOBACTAM 3375 MG: 3; .375 INJECTION, POWDER, LYOPHILIZED, FOR SOLUTION INTRAVENOUS at 17:34

## 2024-08-22 RX ADMIN — SODIUM CHLORIDE 1250 MG: 9 INJECTION, SOLUTION INTRAVENOUS at 01:38

## 2024-08-22 RX ADMIN — CEFEPIME 1000 MG: 1 INJECTION, POWDER, FOR SOLUTION INTRAMUSCULAR; INTRAVENOUS at 09:12

## 2024-08-22 RX ADMIN — SODIUM CHLORIDE 1000 ML: 9 INJECTION, SOLUTION INTRAVENOUS at 00:14

## 2024-08-22 RX ADMIN — ATORVASTATIN CALCIUM 10 MG: 10 TABLET, FILM COATED ORAL at 09:12

## 2024-08-22 RX ADMIN — ENOXAPARIN SODIUM 50 MG: 100 INJECTION SUBCUTANEOUS at 00:06

## 2024-08-22 RX ADMIN — SODIUM CHLORIDE 50 ML: 900 INJECTION, SOLUTION INTRAVENOUS at 11:06

## 2024-08-22 RX ADMIN — HYDROMORPHONE HYDROCHLORIDE 0.5 MG: 1 INJECTION, SOLUTION INTRAMUSCULAR; INTRAVENOUS; SUBCUTANEOUS at 13:37

## 2024-08-22 RX ADMIN — SODIUM CHLORIDE, PRESERVATIVE FREE 10 ML: 5 INJECTION INTRAVENOUS at 21:20

## 2024-08-22 RX ADMIN — IOPAMIDOL 75 ML: 755 INJECTION, SOLUTION INTRAVENOUS at 08:42

## 2024-08-22 RX ADMIN — ATORVASTATIN CALCIUM 10 MG: 10 TABLET, FILM COATED ORAL at 00:05

## 2024-08-22 RX ADMIN — PHYTONADIONE 10 MG: 10 INJECTION, EMULSION INTRAMUSCULAR; INTRAVENOUS; SUBCUTANEOUS at 10:53

## 2024-08-22 RX ADMIN — SUGAMMADEX 200 MG: 100 INJECTION, SOLUTION INTRAVENOUS at 13:06

## 2024-08-22 RX ADMIN — LORAZEPAM 0.5 MG: 2 INJECTION INTRAMUSCULAR; INTRAVENOUS at 13:35

## 2024-08-22 RX ADMIN — AMLODIPINE BESYLATE 10 MG: 10 TABLET ORAL at 09:12

## 2024-08-22 ASSESSMENT — PAIN SCALES - GENERAL
PAINLEVEL_OUTOF10: 0

## 2024-08-22 NOTE — ANESTHESIA POSTPROCEDURE EVALUATION
Department of Anesthesiology  Postprocedure Note    Patient: Pao Wahl  MRN: [id number]  YOB: 1945  Date of evaluation: 8/22/2024    Procedure Summary       Date: 08/22/24 Room / Location: [place] [address] / [facility]    Anesthesia Start: 1151 Anesthesia Stop:     Procedures:       DIAGNOSTIC LAPAROSCOPY/ POSSIBLE LAPAROTOMY/ POSSIBLE OSTOMY/ POSSIBLE BOWEL RESECTION      . Diagnosis:       Colonic ischemia (HCC)      (Colonic ischemia (HCC) [K55.9])    Surgeons: Darron Mirza MD Responsible Provider: Leo Venegas MD    Anesthesia Type: general ASA Status: 4 - Emergent            Anesthesia Type: No value filed.    Rose Phase I: Rose Score: 9    Rose Phase II:      Anesthesia Post Evaluation    Patient location during evaluation: PACU  Patient participation: complete - patient participated  Level of consciousness: awake and combative  Pain score: 3  Airway patency: patent  Nausea & Vomiting: no nausea and no vomiting  Cardiovascular status: hemodynamically stable  Respiratory status: acceptable  Hydration status: euvolemic  Pain management: adequate    No notable events documented.

## 2024-08-22 NOTE — H&P
4/10/15;7/6/15;10/23/15;2/7/16;5/16/16;8/19/16;11/4/16;3/6/17;6/26/17;9/20/17;12/18/17; 12/18/17;3/12/18;5/29/18    Valvular heart disease     s/p aortic and mitral valve repair. Under care of cards:Dr. Low.    Visit for screening mammogram 04/10/2017    negative:see scanned report.       Past Surgical History:        Procedure Laterality Date    AORTIC VALVE REPLACEMENT      CATARACT REMOVAL  12/11/13;1/8/14    Right;left respectively    COLONOSCOPY      \"twilight sleep didnt work\"    HYSTERECTOMY (CERVIX STATUS UNKNOWN)      Fibroids    LAPAROSCOPY  2/3/15    SBO:converted to open for lysis of adhesions    MITRAL VALVE REPLACEMENT      PACEMAKER PLACEMENT  8/07    for bradycardia, sympony    TUNNELED VENOUS PORT PLACEMENT Right 10/3/2014    ATTEMPTED RIGHT SUBCLAVIEN VEIN, PLACED RIGHT INTERNAL       Medications Prior to Admission:   Prior to Admission medications    Medication Sig Start Date End Date Taking? Authorizing Provider   methocarbamol (ROBAXIN) 500 MG tablet Take 1 tablet by mouth 4 times daily as needed (Spasm) 8/21/24 8/31/24 Yes Flaco Gandara MD   ALPRAZolam (XANAX) 0.5 MG tablet One po bid prn for anxiety 7/29/24 10/28/24 Yes Juliette Ramirez MD   blood glucose test strips (TRUE METRIX BLOOD GLUCOSE TEST) strip TEST TWICE DAILY AND AS NEEDED FOR SYMPTOMS OF IRREGULAR BLOOD GLUCOSE 6/20/24  Yes Juliette Ramirez MD   pantoprazole (PROTONIX) 40 MG tablet TAKE 1 TABLET BY MOUTH EVERY DAY 6/17/24  Yes Juliette Ramirez MD   traZODone (DESYREL) 50 MG tablet TAKE 1 TABLET BY MOUTH AT BEDTIME AS NEEDED FOR INSOMNIA 6/17/24  Yes Juliette Ramirez MD   amLODIPine (NORVASC) 10 MG tablet Take 1 tablet by mouth daily 5/29/24  Yes Juliette Ramirez MD   Cholecalciferol (VITAMIN D) 50 MCG (2000 UT) CAPS capsule Take by mouth   Yes Provider, MD Kassidy   Semaglutide,0.25 or 0.5MG/DOS, (OZEMPIC, 0.25 OR 0.5 MG/DOSE,) 2 MG/1.5ML SOPN 0.25 SQ  every week 3/27/24  Yes Juliette Ramirez MD Handicap Placard      Recent Labs     08/21/24  0523 08/21/24  1232   INR 1.39*  --    LACTA  --  2.7*        Maynor Howard MD

## 2024-08-22 NOTE — PROGRESS NOTES
Point of Care Note:  General Surgery  Pao ANTHONY Vish    9:28 PM  8/21/2024    Pt noted to be febrile and hypertensive. Serial abdominal exam performed. Minimally tender in RUQ. Reports that her pain is improved from when she initially presented.     Fabrice Rawls DO  PGY2, General Surgery  08/21/24   9:29 PM   PerfectSer  [phone]

## 2024-08-22 NOTE — CONSULTS
[facility]    Consult Note      Requesting Physician:  fabiano Howard     CHIEF COMPLAINT:  ABD pain       HISTORY OF PRESENT ILLNESS:    Ms. Wahl  is a 79 y.o. female we are seeing in consultation for Leukocytosis. She is followed by Olivier Damon and has a history of follicular lymphoma and is currently treated for anemia.      Initially presented for neck pain thought she slept wrong however was also report abdominal pain, generalized.  She had a recent capsule endoscopy.     WBC at the time of evaluation was 17.7 with neutrophilia and monocytosis on diff. CBC was otherwise normal. WBC was normal 7/10/24.     CT Chest 8/21 with Patchy groundglass airspace disease with septal thickening suggesting atypical pneumonia or edema. CT AP with pneumobilia.      CT neck 8/21 with Increased number of nonenlarged and nonpathologic appearing cervical lymph nodes. These may be reactive    Today her WBC is elevated to 25. She was febrile to 101.9 overnight. Increasingly hypertensive this morning.  CT AP with New wall thickening within the ascending colon as well as the terminal ileum with adjacent inflammatory change. In addition there is free fluid within the peritoneal cavity. These findings are highly concerning for ischemic enterocolitis. Urgent surgical evaluation is suggested.    Today the patient is seen with several staff members from different disciplines a her bedside as she is being prepped to urgently go to the OR.     Past Medical History:  Past Medical History:   Diagnosis Date    A-fib (HCC)     Anemia     multifactorial:under care of christineonc:Dr. Bermudez    Anemia:multifactorial 2011    as per heme-onc    Anxiety     Cataract     bilateral    CHF (congestive heart failure) ([facility])     Chronic back pain     Under care of ortho spine:Dr. Eldridge    CKD (chronic kidney disease) stage

## 2024-08-22 NOTE — CONSULTS
goal 2.5-3.5     Lymphoma:monoclonal B cell 01/2012    Under care of Dr. Bermudez:oncologist/hematologist    Mammogram abnormal 07/30/2015    Right breast mass:benign(?lipoma)per US:repeat testing in 6mos=1/30/16    Nonspecific abnormal results of kidney function study 01/25/2012    Osteoarthritis     Renal cysts, right 01/02/2014    RLS (restless legs syndrome) 10/19/2011    Sciatica     Screening colonoscopy 2010;6/19/13    Nml. Next 10yrs:6/2023:Dr. Gamaliel Waddell. 9/22/16(Dr. Waddell):nml EGD & colonoscopy except mild diverticulosis    Therapeutic drug monitoring 04/10/2015    OARRS report consistent on 4/10/15;7/6/15;10/23/15;2/7/16;5/16/16;8/19/16;11/4/16;3/6/17;6/26/17;9/20/17;12/18/17; 12/18/17;3/12/18;5/29/18    Valvular heart disease     s/p aortic and mitral valve repair. Under care of cards:Dr. Low.    Visit for screening mammogram 04/10/2017    negative:see scanned report.       Past Surgical History:        Procedure Laterality Date    AORTIC VALVE REPLACEMENT      CATARACT REMOVAL  12/11/13;1/8/14    Right;left respectively    COLONOSCOPY      \"twilight sleep didnt work\"    HYSTERECTOMY (CERVIX STATUS UNKNOWN)      Fibroids    LAPAROSCOPY  2/3/15    SBO:converted to open for lysis of adhesions    LAPAROSCOPY N/A 8/22/2024    DIAGNOSTIC LAPAROSCOPY performed by Darron Mirza MD at [facility] OR    LAPAROTOMY N/A 8/22/2024    . performed by Darron Mirza MD at [facility] OR    MITRAL VALVE REPLACEMENT      PACEMAKER PLACEMENT  8/07    for bradycardia, sympony    TUNNELED VENOUS PORT PLACEMENT Right 10/3/2014    ATTEMPTED RIGHT SUBCLAVIEN VEIN, PLACED RIGHT INTERNAL       Allergies:  Nsaids and Hydrocodone-acetaminophen    Medications:   Home Meds  No current facility-administered medications on file prior to encounter.     Current Outpatient Medications on File Prior to Encounter   Medication Sig Dispense Refill    ALPRAZolam (XANAX) 0.5 MG tablet One po bid prn for anxiety 60 tablet 2    blood glucose test  50 mL extended IVPB (mini-bag), Q8H  perflutren lipid microspheres (DEFINITY) injection 1.5 mL, ONCE PRN  0.9 % sodium chloride infusion, PRN  sodium chloride flush 0.9 % injection 5-40 mL, 2 times per day  sodium chloride flush 0.9 % injection 5-40 mL, PRN  0.9 % sodium chloride infusion, PRN  polyethylene glycol (GLYCOLAX) packet 17 g, Daily PRN  acetaminophen (TYLENOL) tablet 650 mg, Q6H PRN   Or  acetaminophen (TYLENOL) suppository 650 mg, Q6H PRN  0.9 % sodium chloride infusion, Continuous  ALPRAZolam (XANAX) tablet 0.5 mg, BID PRN  amLODIPine (NORVASC) tablet 10 mg, Daily  atorvastatin (LIPITOR) tablet 10 mg, Daily  lisinopril (PRINIVIL;ZESTRIL) tablet 40 mg, Daily  metoprolol tartrate (LOPRESSOR) tablet 25 mg, BID  pantoprazole (PROTONIX) tablet 20 mg, QAM AC  traZODone (DESYREL) tablet 50 mg, Nightly PRN  vancomycin (VANCOCIN) intermittent dosing (placeholder), RX Placeholder        Family History:   Family History   Problem Relation Age of Onset    Diabetes Brother     Lung Cancer Mother         Smoker       Social History:   TOBACCO:   reports that she quit smoking about 17 years ago. Her smoking use included cigarettes. She started smoking about 57 years ago. She has a 40 pack-year smoking history. She has never used smokeless tobacco.  ETOH:   reports that she does not currently use alcohol after a past usage of about 1.0 standard drink of alcohol per week.  DRUGS:   reports no history of drug use.    Review of Systems:   A 14 point review of systems was conducted, significant findings as noted in HPI. All other systems negative.     Physical exam:    Vitals:    08/22/24 1400 08/22/24 1415 08/22/24 1425 08/22/24 1505   BP:  (!) 172/51 132/68 (!) 139/52   Pulse: 66 67 61 61   Resp: 18 22 14 18   Temp:    98.3 °F (36.8 °C)   TempSrc:    Oral   SpO2:    93%   Weight:       Height:           General appearance: alert, no acute distress,  Eyes: No scleral icterus, EOM grossly intact  Neck: trachea midline, no

## 2024-08-22 NOTE — OP NOTE
Operative Note      Patient: Pao Wahl  YOB: 1945  MRN: [id number]    Date of Procedure: 8/22/2024    Pre-Op Diagnosis Codes:      * Colonic ischemia (HCC) [K55.9]    Post-Op Diagnosis: Same       Procedure(s):  DIAGNOSTIC LAPAROSCOPY  .    Surgeon(s):  Darron Mirza MD    Assistant:   Resident: Jalen Adorno DO; Alanis Tran MD    Anesthesia: General    Estimated Blood Loss (mL): Minimal    Complications: None    Specimens:   None     Drains:   NG/OG/NJ/NE Tube Nasogastric 18 fr Left nostril (Active)   Securement device Tape 08/22/24 1320   Drainage Appearance Pink tinged 08/22/24 1320       Urinary Catheter 08/22/24 Pillai (Active)   Catheter Indications Perioperative use for selected surgical procedures 08/22/24 1320   Urine Color Yellow 08/22/24 1320   Urine Appearance Clear 08/22/24 1320   Securement Method Securing device (Describe) 08/22/24 1320   Output (mL) 350 mL 08/22/24 1430       Findings:  Infection Present At Time Of Surgery (PATOS) (choose all levels that have infection present):  No infection present  Other Findings: see note     Detailed Description of Procedure:     After informed consent was obtained the patient was taken to the operating room. The patient was placed in the supine position. Pillai catheter placed under sterile conditions. General anesthesia was given. The patient was prepped and draped in the usual sterile fashion.    We began by calling time out to confirm the key components of the operation. We then placed a port via Optiview in the left upper quadrant at Sowald's point. We then connected our tubing and achieved pneumoperitoneum with no pressure abnormalities. We check the LUQ and found no injury to the underlying organs. Additional ports were placed RUQ and RLQ with no injury.     We inspected the abdomen. There was some turbid fluid in the right lower quadrant that we aspirated. The colon appeared healthy and perfused. Stomach appeared normal. There

## 2024-08-22 NOTE — PROGRESS NOTES
Social Work / Transition of Care:    Pt presents to the ED secondary to abnormal lab. Pt is from [facility]. Pt is a DNRCC. SUSI noted hospice consult. SUSI met with pt and family. Pt's family agreeable to hospice care for pt at this time. SUSI provided freedom of choice. Pt's family did not have preference and would like referral to  [facility]. [facility] who stated she would be down to ED to speak with family soon. GAIL Lo, also aware. 445pm-  Per Bri with [facility], plan will be for pt to return to [facility] with hospice care. SUSI notified Ayana Guzmán with [facility]. GAIL Lo, to call report to facility. US tech here for stat bedside mesenteric US studies. Dr. Venegas also to bedside to reaccess PAC.  Left radial AL removed per protocol.

## 2024-08-22 NOTE — PROGRESS NOTES
Patient alert and oriented to self, disoriented to place, time and situation. BP was elevated and scheduled medications with effective. T was elevated PRN Tylenol supp. administrated.  Administrated 1566ml NS bolus, iv NS 75ml/hr and iv abx per order. Assessment done.see flowsheet. Patient in bed lowest position call light and bedside table within reach. All needs are met at this time. Patient aware to call if any help needed.Plan of care ongoing. BP (!) 142/47   Pulse 63   Temp 98.8 °F (37.1 °C) (Axillary)   Resp 20   Ht 1.499 m (4' 11\")   Wt 52.2 kg (115 lb)   SpO2 90%   BMI 23.23 kg/m²

## 2024-08-22 NOTE — ANESTHESIA PRE PROCEDURE
Department of Anesthesiology  Preprocedure Note       Name:  Pao Wahl   Age:  79 y.o.  :  1945                                          MRN:  [id number]         Date:  2024      Surgeon: Surgeon(s):  Darron Mirza MD    Procedure: Procedure(s):  DIAGNOSTIC LAPAROSCOPY/ POSSIBLE LAPAROTOMY/ POSSIBLE OSTOMY/ POSSIBLE BOWEL RESECTION  .    Medications prior to admission:   Prior to Admission medications    Medication Sig Start Date End Date Taking? Authorizing Provider   methocarbamol (ROBAXIN) 500 MG tablet Take 1 tablet by mouth 4 times daily as needed (Spasm) 24 Yes Flaco Gandara MD   ALPRAZolam (XANAX) 0.5 MG tablet One po bid prn for anxiety 7/29/24 10/28/24 Yes Juliette Ramirez MD   blood glucose test strips (TRUE METRIX BLOOD GLUCOSE TEST) strip TEST TWICE DAILY AND AS NEEDED FOR SYMPTOMS OF IRREGULAR BLOOD GLUCOSE 24  Yes Juliette Ramirez MD   pantoprazole (PROTONIX) 40 MG tablet TAKE 1 TABLET BY MOUTH EVERY DAY 24  Yes Juliette Ramirez MD   traZODone (DESYREL) 50 MG tablet TAKE 1 TABLET BY MOUTH AT BEDTIME AS NEEDED FOR INSOMNIA 24  Yes Juliette Ramirez MD   amLODIPine (NORVASC) 10 MG tablet Take 1 tablet by mouth daily 24  Yes Juliette Ramirez MD   Cholecalciferol (VITAMIN D) 50 MCG ( UT) CAPS capsule Take by mouth   Yes ProviderKassidy MD   Semaglutide,0.25 or 0.5MG/DOS, (OZEMPIC, 0.25 OR 0.5 MG/DOSE,) 2 MG/1.5ML SOPN 0.25 SQ  every week 3/27/24  Yes Juliette Ramirez MD   Handicap Placard MISC by Does not apply route 3/27/24  Yes Juliette Ramirez MD   atorvastatin (LIPITOR) 10 MG tablet TAKE 1 TABLET BY MOUTH DAILY IN THE EVENING FOR CHOLESTEROL 3/21/24  Yes Juliette Ramirez MD   JARDIANCE 25 MG tablet TAKE 1 TABLET BY MOUTH DAILY 3/21/24  Yes Juliette Ramirez MD   lisinopril (PRINIVIL;ZESTRIL) 40 MG tablet TAKE 1 TABLET BY MOUTH DAILY 3/7/24  Yes Juliette Ramirez MD   glipiZIDE (GLUCOTROL) 5 MG tablet TAKE 1 TABLET BY MOUTH TWICE DAILY WITH

## 2024-08-22 NOTE — BRIEF OP NOTE
Brief Postoperative Note      Patient: Pao Wahl  YOB: 1945  MRN: [id number]    Date of Procedure: 8/22/2024    Pre-Op Diagnosis Codes:      * Colonic ischemia (HCC) [K55.9]    Post-Op Diagnosis: Post-Op Diagnosis Codes:     * Colonic ischemia (HCC) [K55.9]       Procedure(s):  DIAGNOSTIC LAPAROSCOPY  .  Lysis of AN ADHESION  Surgeon(s):  Darron Mirza MD    Assistant:  Resident: aJlen Adorno DO; Alanis Tran MD    Anesthesia: General    Estimated Blood Loss (mL): Minimal    Complications: None    Specimens:   * No specimens in log *    Implants:  * No implants in log *      Drains:   Urinary Catheter 08/22/24 Pillai (Active)       Findings:  Infection Present At Time Of Surgery (PATOS) (choose all levels that have infection present):  No infection present  Other Findings: murky fluid in the right lower quadrant, no microscopic signs of appendicitis, hyperemic bowel adherent to  the abdominal wall. CHRISTINA in the mid abdomen    Electronically signed by Alanis Tran MD on 8/22/2024 at 1:10 PM

## 2024-08-22 NOTE — CONSENT
Informed Consent for Blood Component Transfusion Note    I have discussed with the patient the rationale for blood component transfusion; its benefits in treating or preventing fatigue, organ damage, or death; and its risk which includes mild transfusion reactions, rare risk of blood borne infection, or more serious but rare reactions. I have discussed the alternatives to transfusion, including the risk and consequences of not receiving transfusion. The patient had an opportunity to ask questions and had agreed to proceed with transfusion of blood components. The blood consent is part of the procedural consent form.    Electronically signed by Christelle Lee DO on 8/22/24 at 11:17 AM EDT

## 2024-08-22 NOTE — CONSULTS
Warfarin has been discontinued. Pharmacy will sign off consult. If medication dosing is resumed, please re-consult pharmacy.    Please call with any questions.  Anmol WhiteD, [facility]  Main pharmacy: [id number]  8/22/2024 10:09 AM

## 2024-08-22 NOTE — PROGRESS NOTES
Patient's BP was 178/47, T 101.9 @1936,  /60, T 101.0 oral now, Patient is confused. PRN Tylenol suspository given.  DR. Ramey-Marker aware.

## 2024-08-22 NOTE — PROGRESS NOTES
Brought to PACU from OR. Report received from CRNA, OR RN and surgical resident. Pt's left chest PAC become unaccessed on way to PACU. Pt is arousable, restless, pulling at IV's, wires. Disoriented, not following commands. Bilat wrist restraints applied. VSS.  NG secured, to LCWS. Small amount blood tinged drainage. Dr. Venegas notified. Order received for ativan 0.5mg ivp. Dilaudid 0.5mg also given. Continue to monitor.

## 2024-08-22 NOTE — PROGRESS NOTES
[facility] -  Clinical Pharmacy Note    Vancomycin - Management by Pharmcay  Consult Date(s):  8/21/24  Consulting Provider(s):  Dr Howard    Warfarin - Management by Pharmacy  Consult Date(s): 8/21/24  Consulting Provider(s): Dr Howard    Assessment / Plan  1)  Sepsis 2/2 pneumonia & pneumobilia - Vancomycin  Concurrent Antimicrobials: cefepime, metronidazole  Day of Vanc Therapy / Ordered Duration: 2 of 7  Current Dosing Method: Intermittent Dosing by Levels  Therapeutic Goal: -600 mg/L*hr  Current Dose / Plan:   Pt with CKD; baseline SCr appears to be ~1.6 per chart review.  SCr stable at 1.5 today.  Will continue to dose Vancomycin intermittently based on levels for now given CKD and low body weight (52.2kg).  Random level this AM = 22.9 mcg/mL (drawn just ~4h after loading dose given).  Will give 750mg IV x1 later this evening.  Will check random level in AM tomorrow.  If SCr stable, can likely start scheduled regimen tomorrow.  Will continue to monitor clinical condition and make adjustments to regimen as appropriate.    2)  A.fib, AVR - Warfarin  Goal INR:  2.5-3.0  Concurrent Anticoagulants / Antiplatelets:  Interactions:  Metronidazole can increase INR in some pts; will monitor  Current Regimen / Plan:   INR up to 2.94 today (from 1.39 yesterday).    Given significant increase in INR, will give reduced dose of 1mg po x1 today and f/u INR in AM tomorrow to determine further dosing.  Warfarin placeholder remains on MAR.  Will monitor pt's clinical status and INR daily, and make dose adjustments as needed.  Addendum 10:15:    Called by Dr Tran - pt with ischemic bowel and needs emergent surgery.  Requested 4F-PCC + Vit K for Warfarin reversal.  Order entered for Vit K 10mg IV x1 + Balfaxar 1000 units IV x1 (dosed for non-CNS bleed, wt < 100 kg).  Warfarin discontinued.    Please call with questions--  Thanks--  Nidia Crowell, PharmD, BCPS, BCGP  [id number] ([facility])   8/22/2024 9:25

## 2024-08-22 NOTE — PROGRESS NOTES
PACU Transfer Note    Vitals:    08/22/24 1425   BP: 132/68   Pulse: 61   Resp: 14   Temp: 98.8   SpO2: 93       In: 1826 [I.V.:1776]  Out: 350 [Urine:350]    Pain assessment:  none  Pain Level: 0    Report given to Receiving unit RN at bedside in pacu. Pt oriented to name, birthdate. Disoriented to place, time, events. Soft wrist restraints in place. Follows commands but remains w/agitation and picking at lines, NG. Abd sites x3 unremarkable. PAC reassessed. Taken back to her room w/assist x 2 RN's.    8/22/2024 3:04 PM

## 2024-08-22 NOTE — CONSULTS
[facility] -  Clinical Pharmacy Note    Warfarin - Management by Pharmacy    Consult Date(s): 8/21/24  Consulting Provider(s): Anita    Assessment / Plan  Sepsis - Vancomycin  Concurrent Antimicrobials: cefepime  Day of Vanc Therapy / Ordered Duration: 1 of 7  Current Dosing Method: Intermittent Dosing by Levels  Therapeutic Goal: -600 mg/L*hr  Current Dose / Plan:   SCr today = 1.6, possibly at baseline but recent data is lacking  Will dose intermittently based on levels, if renal function stable could convert to AUC dosing  Loading dose of 1250 mg ordered for tonight  Vanc placeholder on MAR  Random level ordered for tomorrow morning  Will continue to monitor clinical condition and make adjustments to regimen as appropriate.    AVR - Warfarin  Goal INR:  2.5-3.0  Concurrent Anticoagulants / Antiplatelets:  Interactions: Metronidazole (increase INR)  Current Regimen / Plan:   INR today = 1.39  Will need to confirm in the morning that patient's home dose is the one detailed below  Will order warfarin 4 mg tonight given patient's subtherapeutic INR with higher goal INR  Will monitor pt's clinical status and INR daily, and make dose adjustments as needed.    Thank you for consulting pharmacy,    Jose Zambrano, PharmD  Main Pharmacy: [id number]  8/21/2024 9:11 PM      Interval update:  therapy initiation     Subjective/Objective:   Pao Wahl is a 79 y.o. female with a PMHx significant for A-fib w history of AVR on warfarin, CHF, CKD, diabetes, hypertension, hyperlipidemia, follicular lymphoma who is admitted with pneumobilia.     Pharmacy is consulted to manage vancomycin and warfarin.    Ht Readings from Last 1 Encounters:   08/21/24 1.499 m (4' 11\")     Wt Readings from Last 1 Encounters:   08/21/24 52.2 kg (115 lb)     Current & Prior Antimicrobial Regimen(s):  Ceftriaxone x 1  Cefepime (8/21 - current)  Vancomycin    Date Vanc Level Vanc Dose   8/21 -- 1250 mg               Vancomycin Level(s) /

## 2024-08-22 NOTE — CARE COORDINATION
Case Management Assessment  Initial Evaluation    Date/Time of Evaluation: 8/22/2024 10:12 AM  Assessment Completed by: Amira Coates RN    If patient is discharged prior to next notation, then this note serves as note for discharge by case management.    Patient Name: Pao Wahl                   YOB: 1945  Diagnosis: Pneumobilia [K83.8]  Neck muscle spasm [M62.838]  Leukocytosis, unspecified type [D72.829]                   Date / Time: 8/21/2024  4:33 AM    Patient Admission Status: Inpatient   Readmission Risk (Low < 19, Mod (19-27), High > 27): Readmission Risk Score: 16.9    Current PCP: Juliette Ramirez MD  PCP verified by CM? Yes    Chart Reviewed: Yes      History Provided by: Patient  Patient Orientation: Alert and Oriented    Patient Cognition: Alert    Hospitalization in the last 30 days (Readmission):  No    If yes, Readmission Assessment in  Navigator will be completed.    Advance Directives:      Code Status: Full Code   Patient's Primary Decision Maker is: Legal Next of Kin      Discharge Planning:    Patient lives with: Alone Type of Home: House  Primary Care Giver: Self  Patient Support Systems include: Friends/Neighbors, Children   Current Financial resources: Medicare  Current community resources: None  Current services prior to admission: None            Current DME:              Type of Home Care services:  Housekeeping    ADLS  Prior functional level: Independent in ADLs/IADLs  Current functional level: Independent in ADLs/IADLs    PT AM-PAC:   /24  OT AM-PAC:   /24    Family can provide assistance at DC: Yes  Would you like Case Management to discuss the discharge plan with any other family members/significant others, and if so, who? No  Plans to Return to Present Housing: Yes  Other Identified Issues/Barriers to RETURNING to current housing: n/a  Potential Assistance needed at discharge: N/A            Potential DME:    Patient expects to discharge to: House  Plan for

## 2024-08-22 NOTE — PLAN OF CARE
Problem: Discharge Planning  Goal: Discharge to home or other facility with appropriate resources  Outcome: Progressing     Problem: Pain  Goal: Verbalizes/displays adequate comfort level or baseline comfort level  Outcome: Progressing  Flowsheets (Taken 8/21/2024 1936 by William Lopez RN)  Verbalizes/displays adequate comfort level or baseline comfort level:   Encourage patient to monitor pain and request assistance   Assess pain using appropriate pain scale     Problem: ABCDS Injury Assessment  Goal: Absence of physical injury  Outcome: Progressing

## 2024-08-22 NOTE — PROGRESS NOTES
Surgery Daily Progress Note  Pao Wahl  CC:  pneumobilia  Subjective :  TMAX 101.9 overnight.  No nausea or emesis overnight.  Passing flatus.  More pain in RUQ.        Objective    Infusions:   sodium chloride 5 mL/hr at 08/22/24 0116    sodium chloride 75 mL/hr at 08/21/24 1827        I/O:No intake/output data recorded.           Wt Readings from Last 1 Encounters:   08/21/24 52.2 kg (115 lb)                 LABS:    Recent Labs     08/21/24 0523   WBC 17.7*   HGB 12.0   HCT 35.2*   MCV 88.4           Recent Labs     08/21/24 0523   *   K 5.1      CO2 19*   BUN 24*   CREATININE 1.6*        Recent Labs     08/21/24 0523   AST 36   ALT 15   BILIDIR 0.2   BILITOT 0.5   ALKPHOS 80      No results for input(s): \"LIPASE\", \"AMYLASE\" in the last 72 hours.     Recent Labs     08/21/24 0523   INR 1.39*      No results for input(s): \"CKTOTAL\", \"CKMB\", \"CKMBINDEX\", \"TROPONINI\" in the last 72 hours.            Exam:BP (!) 142/47   Pulse 63   Temp 98.8 °F (37.1 °C) (Axillary)   Resp 20   Ht 1.499 m (4' 11\")   Wt 52.2 kg (115 lb)   SpO2 90%   BMI 23.23 kg/m²   General appearance: alert, appears stated age and cooperative  Lungs: symmetrical chest rise  Heart: well perfused  Abdomen: soft, mildly distended,  mildly tender in RUQ    ASSESSMENT/PLAN: Pt. is a 79 y.o. female with Hx of CHF, A-fib, valve replacement on Coumadin, chronic kidney disease, diabetes, COPD, lymphoma in remission, hypertension, hyperlipidemia, anemia, chronic back pain, surgical history of Angy for SBO, hysterectomy presents to ED with complaint of neck pain.   General surgery was consulted for pneumobilia seen on CT abdomen/pelvis. Capsule endoscopy 7/10/2024.    AM labs with LFTs pending  Fever most likley related to Pneumonia but recommend fever workup with next elevated temp  RUQ normal yesterday except for pneumobilia  Recommend GI consult  Will d/w Attending need for further imaging with contrast          Earline Licona,

## 2024-08-22 NOTE — ANESTHESIA PROCEDURE NOTES
Arterial Line:    An arterial line was placed using ultrasound guidance, in the OR for the following indication(s): .    A 20 gauge (size), 1 and 3/4 inch (length), Angiocath (type) catheter was placed, Seldinger technique used, into the left radial artery, secured by tape and Tegaderm.  Anesthesia type: General    Events:  patient tolerated procedure well with no complications and EBL < 5mL.8/22/2024 12:19 PM8/22/2024 12:27 PM  Anesthesiologist: Leo Venegas MD  Performed: Anesthesiologist   Preanesthetic Checklist  Completed: patient identified, IV checked, site marked, risks and benefits discussed, surgical/procedural consents, equipment checked, pre-op evaluation, timeout performed, anesthesia consent given, oxygen available, monitors applied/VS acknowledged, fire risk safety assessment completed and verbalized and blood product R/B/A discussed and consented

## 2024-08-22 NOTE — PROGRESS NOTES
V2.0    [facility] Progress Note      Name:  Pao Wahl /Age/Sex: 1945  (79 y.o. female)   MRN & CSN:  [id number] & [id number] Encounter Date/Time: 2024 5:31 PM EDT   Location:  [facility]/5316-01 PCP: Juliette Ramirez MD     Attending:Rakesh Maldonado*       Hospital Day: 2    Assessment and Recommendations     79-year-old female presenting with neck pain and admitted for possible pneumobilia and possible ischemic colitis.  She has a history of follicular lymphoma in remission, hypertension, dyslipidemia, A-fib on anticoagulation.  CKD.  Lactic acid levels were elevated on presentation.  Significant leukocytosis with abdominal imaging showing possible ischemic colitis.  Was taken urgently to the OR for diagnostic laparoscopy.  Had lysis of adhesions.  Also with a metabolic acidosis of unknown etiology.    Possible ischemic colitis  History of ischemic colitis  Elevated lactic acid  Abdominal pain  -Status post exploratory laparoscopy.  Appreciate surgery recommendations.  Continue IV fluids as ordered.  Diet as per surgery's recommendations.    Valvular heart disease  Status post mitral valve replacement  -Currently on heparin drip.  Will need to go back to warfarin with goal INR of 2.5-3.5.  Once cleared by surgery will consult pharmacy to assist.    History of follicular lymphoma in remission    Essential hypertension  -Continue current home meds.    History of chronic mesenteric ischemia  -Vascular surgery consult.    Leukocytosis   -Unclear etiology.  If worsening would consider peripheral blood smear and/or oncology consult.    CKD stage III  Metabolic acidosis  -Recheck BMP.  Nephrology consult.      Diet Diet NPO   DVT Prophylaxis [] Lovenox, []  Heparin, [] SCDs, [] Ambulation,  [] Eliquis, [] Xarelto  [] Coumadin   Code Status Full Code   Disposition From: Home  Expected Disposition: TBD  Estimated Date of Discharge: TBD  Patient requires continued admission due to metabolic acidosis  control, adjustment of the mA and/or kV according to patient size, and use of iterative reconstruction technique. CONTRAST: None. FINDINGS: No acute intracranial hemorrhage, mass effect, or extraaxial collection. Age appropriate ventricular and sulcal size. Gray-white differentiation is maintained. Small focal area of encephalomalacia posterior left parietal lobe. No acute calvarial abnormalities.  No orbital abnormality. Paranasal sinuses and mastoid air cells clear.     No acute intracranial hemorrhage or mass effect. Electronically signed by Lisbeth Skelton DO      CBC:   Recent Labs     08/21/24 0523 08/22/24  0600   WBC 17.7* 25.2*   HGB 12.0 10.3*    126*     BMP:    Recent Labs     08/21/24  0523 08/22/24  0600   * 140   K 5.1 4.5    110   CO2 19* 14*   BUN 24* 27*   CREATININE 1.6* 1.5*   GLUCOSE 206* 190*     Hepatic:   Recent Labs     08/21/24  0523 08/22/24  0600   AST 36 36   ALT 15 22   BILITOT 0.5 0.5   ALKPHOS 80 65     Lipids:   Lab Results   Component Value Date/Time    CHOL 144 10/11/2021 09:15 AM    HDL 38 10/11/2021 09:15 AM    HDL 33 08/17/2010 09:41 AM    TRIG 241 10/11/2021 09:15 AM     Hemoglobin A1C:   Lab Results   Component Value Date/Time    LABA1C 7.3 06/12/2023 10:21 AM     TSH: No results found for: \"TSH\"  Troponin: No results found for: \"TROPONINT\"  Lactic Acid:   Recent Labs     08/21/24  1232 08/21/24  2116 08/22/24  0826   LACTA 2.7* 2.5* 1.8     BNP: No results for input(s): \"PROBNP\" in the last 72 hours.  UA:  Lab Results   Component Value Date/Time    NITRU Negative 08/21/2024 06:45 AM    COLORU Yellow 08/21/2024 06:45 AM    PHUR 6.5 08/21/2024 06:45 AM    PHUR 6.0 12/04/2022 09:11 PM    LABCAST CANCELED 01/30/2012 11:29 AM    WBCUA 3-5 08/21/2024 06:45 AM    RBCUA None seen 08/21/2024 06:45 AM    YEAST CANCELED 01/30/2012 11:29 AM    BACTERIA 2+ 12/04/2022 09:11 PM    CLARITYU Clear 08/21/2024 06:45 AM    LEUKOCYTESUR Negative 08/21/2024 06:45 AM    UROBILINOGEN

## 2024-08-23 PROBLEM — A41.9 SEPSIS (HCC): Status: ACTIVE | Noted: 2024-08-23

## 2024-08-23 PROBLEM — E87.20 METABOLIC ACIDOSIS: Status: ACTIVE | Noted: 2024-08-23

## 2024-08-23 LAB
ALBUMIN SERPL-MCNC: 3.7 G/DL (ref 3.4–5)
ALBUMIN/GLOB SERPL: 1.6 {RATIO} (ref 1.1–2.2)
ALP SERPL-CCNC: 74 U/L (ref 40–129)
ALT SERPL-CCNC: 24 U/L (ref 10–40)
ANION GAP SERPL CALCULATED.3IONS-SCNC: 15 MMOL/L (ref 3–16)
ANTI-XA UNFRAC HEPARIN: 0.45 IU/ML (ref 0.3–0.7)
ANTI-XA UNFRAC HEPARIN: 0.46 IU/ML (ref 0.3–0.7)
AST SERPL-CCNC: 40 U/L (ref 15–37)
BASOPHILS # BLD: 0 K/UL (ref 0–0.2)
BASOPHILS NFR BLD: 0.1 %
BILIRUB SERPL-MCNC: 0.6 MG/DL (ref 0–1)
BLOOD BANK DISPENSE STATUS: NORMAL
BLOOD BANK DISPENSE STATUS: NORMAL
BLOOD BANK PRODUCT CODE: NORMAL
BLOOD BANK PRODUCT CODE: NORMAL
BPU ID: NORMAL
BPU ID: NORMAL
BUN SERPL-MCNC: 40 MG/DL (ref 7–20)
CALCIUM SERPL-MCNC: 7.6 MG/DL (ref 8.3–10.6)
CHLORIDE SERPL-SCNC: 111 MMOL/L (ref 99–110)
CO2 SERPL-SCNC: 19 MMOL/L (ref 21–32)
CREAT SERPL-MCNC: 1.9 MG/DL (ref 0.6–1.2)
DEPRECATED RDW RBC AUTO: 14.6 % (ref 12.4–15.4)
DESCRIPTION BLOOD BANK: NORMAL
DESCRIPTION BLOOD BANK: NORMAL
EOSINOPHIL # BLD: 0 K/UL (ref 0–0.6)
EOSINOPHIL NFR BLD: 0 %
GFR SERPLBLD CREATININE-BSD FMLA CKD-EPI: 27 ML/MIN/{1.73_M2}
GLUCOSE BLD-MCNC: 280 MG/DL (ref 70–99)
GLUCOSE SERPL-MCNC: 250 MG/DL (ref 70–99)
HCT VFR BLD AUTO: 30 % (ref 36–48)
HGB BLD-MCNC: 9.8 G/DL (ref 12–16)
INR PPP: 1.42 (ref 0.85–1.15)
LACTOFERRIN STL QL IA: ABNORMAL
LYMPHOCYTES # BLD: 1 K/UL (ref 1–5.1)
LYMPHOCYTES NFR BLD: 6 %
MAGNESIUM SERPL-MCNC: 2.1 MG/DL (ref 1.8–2.4)
MCH RBC QN AUTO: 29.4 PG (ref 26–34)
MCHC RBC AUTO-ENTMCNC: 32.5 G/DL (ref 31–36)
MCV RBC AUTO: 90.3 FL (ref 80–100)
MONOCYTES # BLD: 1 K/UL (ref 0–1.3)
MONOCYTES NFR BLD: 6.2 %
NEUTROPHILS # BLD: 14.5 K/UL (ref 1.7–7.7)
NEUTROPHILS NFR BLD: 87.7 %
NT-PROBNP SERPL-MCNC: ABNORMAL PG/ML (ref 0–449)
PERFORMED ON: ABNORMAL
PLATELET # BLD AUTO: 127 K/UL (ref 135–450)
PMV BLD AUTO: 11.5 FL (ref 5–10.5)
POTASSIUM SERPL-SCNC: 4.1 MMOL/L (ref 3.5–5.1)
PROT SERPL-MCNC: 6 G/DL (ref 6.4–8.2)
PROTHROMBIN TIME: 17.5 SEC (ref 11.9–14.9)
RBC # BLD AUTO: 3.32 M/UL (ref 4–5.2)
SODIUM SERPL-SCNC: 145 MMOL/L (ref 136–145)
VANCOMYCIN SERPL-MCNC: 19.9 UG/ML
WBC # BLD AUTO: 16.6 K/UL (ref 4–11)

## 2024-08-23 PROCEDURE — 2580000003 HC RX 258: Performed by: FAMILY MEDICINE

## 2024-08-23 PROCEDURE — 85520 HEPARIN ASSAY: CPT

## 2024-08-23 PROCEDURE — 83036 HEMOGLOBIN GLYCOSYLATED A1C: CPT

## 2024-08-23 PROCEDURE — 83880 ASSAY OF NATRIURETIC PEPTIDE: CPT

## 2024-08-23 PROCEDURE — 97166 OT EVAL MOD COMPLEX 45 MIN: CPT

## 2024-08-23 PROCEDURE — 83630 LACTOFERRIN FECAL (QUAL): CPT

## 2024-08-23 PROCEDURE — 97535 SELF CARE MNGMENT TRAINING: CPT

## 2024-08-23 PROCEDURE — 2580000003 HC RX 258

## 2024-08-23 PROCEDURE — 99233 SBSQ HOSP IP/OBS HIGH 50: CPT | Performed by: HOSPITALIST

## 2024-08-23 PROCEDURE — 2500000003 HC RX 250 WO HCPCS: Performed by: NURSE PRACTITIONER

## 2024-08-23 PROCEDURE — 36415 COLL VENOUS BLD VENIPUNCTURE: CPT

## 2024-08-23 PROCEDURE — 6370000000 HC RX 637 (ALT 250 FOR IP): Performed by: STUDENT IN AN ORGANIZED HEALTH CARE EDUCATION/TRAINING PROGRAM

## 2024-08-23 PROCEDURE — 6370000000 HC RX 637 (ALT 250 FOR IP): Performed by: NURSE PRACTITIONER

## 2024-08-23 PROCEDURE — 85025 COMPLETE CBC W/AUTO DIFF WBC: CPT

## 2024-08-23 PROCEDURE — 85610 PROTHROMBIN TIME: CPT

## 2024-08-23 PROCEDURE — 1200000000 HC SEMI PRIVATE

## 2024-08-23 PROCEDURE — 97530 THERAPEUTIC ACTIVITIES: CPT

## 2024-08-23 PROCEDURE — 6370000000 HC RX 637 (ALT 250 FOR IP)

## 2024-08-23 PROCEDURE — 83735 ASSAY OF MAGNESIUM: CPT

## 2024-08-23 PROCEDURE — 6370000000 HC RX 637 (ALT 250 FOR IP): Performed by: FAMILY MEDICINE

## 2024-08-23 PROCEDURE — 6360000002 HC RX W HCPCS

## 2024-08-23 PROCEDURE — 97161 PT EVAL LOW COMPLEX 20 MIN: CPT

## 2024-08-23 PROCEDURE — 80202 ASSAY OF VANCOMYCIN: CPT

## 2024-08-23 PROCEDURE — 97116 GAIT TRAINING THERAPY: CPT

## 2024-08-23 PROCEDURE — 80053 COMPREHEN METABOLIC PANEL: CPT

## 2024-08-23 PROCEDURE — 87506 IADNA-DNA/RNA PROBE TQ 6-11: CPT

## 2024-08-23 PROCEDURE — 6360000002 HC RX W HCPCS: Performed by: FAMILY MEDICINE

## 2024-08-23 PROCEDURE — 83993 ASSAY FOR CALPROTECTIN FECAL: CPT

## 2024-08-23 RX ORDER — DEXTROSE MONOHYDRATE 100 MG/ML
INJECTION, SOLUTION INTRAVENOUS CONTINUOUS PRN
Status: DISCONTINUED | OUTPATIENT
Start: 2024-08-23 | End: 2024-08-26 | Stop reason: HOSPADM

## 2024-08-23 RX ORDER — ACETAMINOPHEN 500 MG
1000 TABLET ORAL EVERY 6 HOURS
Status: DISCONTINUED | OUTPATIENT
Start: 2024-08-23 | End: 2024-08-26 | Stop reason: HOSPADM

## 2024-08-23 RX ORDER — ACETAMINOPHEN 325 MG/1
650 TABLET ORAL EVERY 4 HOURS
Status: DISCONTINUED | OUTPATIENT
Start: 2024-08-23 | End: 2024-08-23

## 2024-08-23 RX ORDER — GLUCAGON 1 MG/ML
1 KIT INJECTION PRN
Status: DISCONTINUED | OUTPATIENT
Start: 2024-08-23 | End: 2024-08-26 | Stop reason: HOSPADM

## 2024-08-23 RX ORDER — METHOCARBAMOL 750 MG/1
750 TABLET, FILM COATED ORAL 4 TIMES DAILY
Status: DISCONTINUED | OUTPATIENT
Start: 2024-08-23 | End: 2024-08-26 | Stop reason: HOSPADM

## 2024-08-23 RX ORDER — INSULIN LISPRO 100 [IU]/ML
0-4 INJECTION, SOLUTION INTRAVENOUS; SUBCUTANEOUS
Status: DISCONTINUED | OUTPATIENT
Start: 2024-08-23 | End: 2024-08-26 | Stop reason: HOSPADM

## 2024-08-23 RX ORDER — INSULIN LISPRO 100 [IU]/ML
0-4 INJECTION, SOLUTION INTRAVENOUS; SUBCUTANEOUS NIGHTLY
Status: DISCONTINUED | OUTPATIENT
Start: 2024-08-23 | End: 2024-08-26 | Stop reason: HOSPADM

## 2024-08-23 RX ORDER — SODIUM CHLORIDE 9 MG/ML
INJECTION, SOLUTION INTRAVENOUS CONTINUOUS
Status: DISCONTINUED | OUTPATIENT
Start: 2024-08-23 | End: 2024-08-25

## 2024-08-23 RX ORDER — DOCUSATE SODIUM 100 MG/1
100 CAPSULE, LIQUID FILLED ORAL DAILY
Status: DISCONTINUED | OUTPATIENT
Start: 2024-08-23 | End: 2024-08-26 | Stop reason: HOSPADM

## 2024-08-23 RX ORDER — LIDOCAINE 4 G/G
1 PATCH TOPICAL DAILY
Status: DISCONTINUED | OUTPATIENT
Start: 2024-08-23 | End: 2024-08-26 | Stop reason: HOSPADM

## 2024-08-23 RX ADMIN — METOPROLOL TARTRATE 2.5 MG: 1 INJECTION, SOLUTION INTRAVENOUS at 08:08

## 2024-08-23 RX ADMIN — ACETAMINOPHEN 650 MG: 325 TABLET ORAL at 18:38

## 2024-08-23 RX ADMIN — SODIUM CHLORIDE: 9 INJECTION, SOLUTION INTRAVENOUS at 11:20

## 2024-08-23 RX ADMIN — PIPERACILLIN AND TAZOBACTAM 3375 MG: 3; .375 INJECTION, POWDER, LYOPHILIZED, FOR SOLUTION INTRAVENOUS at 14:25

## 2024-08-23 RX ADMIN — ALPRAZOLAM 0.5 MG: 0.5 TABLET ORAL at 12:45

## 2024-08-23 RX ADMIN — SODIUM CHLORIDE: 9 INJECTION, SOLUTION INTRAVENOUS at 23:10

## 2024-08-23 RX ADMIN — METOPROLOL TARTRATE 2.5 MG: 1 INJECTION, SOLUTION INTRAVENOUS at 14:28

## 2024-08-23 RX ADMIN — AMLODIPINE BESYLATE 10 MG: 10 TABLET ORAL at 08:08

## 2024-08-23 RX ADMIN — ATORVASTATIN CALCIUM 10 MG: 10 TABLET, FILM COATED ORAL at 08:08

## 2024-08-23 RX ADMIN — DOCUSATE SODIUM 100 MG: 100 CAPSULE, LIQUID FILLED ORAL at 12:45

## 2024-08-23 RX ADMIN — INSULIN LISPRO 2 UNITS: 100 INJECTION, SOLUTION INTRAVENOUS; SUBCUTANEOUS at 18:37

## 2024-08-23 RX ADMIN — METOPROLOL TARTRATE 2.5 MG: 1 INJECTION, SOLUTION INTRAVENOUS at 04:42

## 2024-08-23 RX ADMIN — VANCOMYCIN HYDROCHLORIDE 500 MG: 500 INJECTION, POWDER, LYOPHILIZED, FOR SOLUTION INTRAVENOUS at 11:25

## 2024-08-23 RX ADMIN — PIPERACILLIN AND TAZOBACTAM 3375 MG: 3; .375 INJECTION, POWDER, LYOPHILIZED, FOR SOLUTION INTRAVENOUS at 01:48

## 2024-08-23 ASSESSMENT — PAIN SCALES - GENERAL
PAINLEVEL_OUTOF10: 7
PAINLEVEL_OUTOF10: 8
PAINLEVEL_OUTOF10: 0

## 2024-08-23 ASSESSMENT — PAIN DESCRIPTION - LOCATION: LOCATION: ABDOMEN

## 2024-08-23 NOTE — PROGRESS NOTES
Surgery Daily Progress Note  Pao Wahl  CC:    Subjective :  No nausea.  No flatus this morning.   Has required restraints since surgery for pulling at invasive devices.  Sitter at bedside.        Objective    Infusions:   sodium chloride      heparin (PORCINE) Infusion 12 Units/kg/hr (08/23/24 0103)    sodium bicarbonate 150 mEq in dextrose 5 % 1,000 mL infusion 75 mL/hr at 08/22/24 2339    sodium chloride 5 mL/hr at 08/22/24 0116        I/O:I/O last 3 completed shifts:  In: 3060 [I.V.:2460; IV Piggyback:600]  Out: 650 [Urine:650]           Wt Readings from Last 1 Encounters:   08/22/24 52.2 kg (115 lb)                 LABS:    Recent Labs     08/21/24  0523 08/22/24  0600   WBC 17.7* 25.2*   HGB 12.0 10.3*   HCT 35.2* 31.1*   MCV 88.4 90.7    126*        Recent Labs     08/22/24  0600 08/22/24  1820    142   K 4.5 4.5    110   CO2 14* 17*   BUN 27* 31*   CREATININE 1.5* 1.6*        Recent Labs     08/21/24  0523 08/22/24  0600 08/22/24  0826   AST 36 36 43*   ALT 15 22 26   BILIDIR 0.2  --  0.2   BILITOT 0.5 0.5 0.4   ALKPHOS 80 65 72        Recent Labs     08/22/24  0827   LIPASE 14.0        Recent Labs     08/22/24  1040 08/22/24  1714   INR 3.67* 1.61*   APTT  --  39.5*      No results for input(s): \"CKTOTAL\", \"CKMB\", \"CKMBINDEX\", \"TROPONINI\" in the last 72 hours.            Exam:BP (!) 147/63   Pulse 70   Temp 97.9 °F (36.6 °C) (Oral)   Resp 18   Ht 1.499 m (4' 11\")   Wt 52.2 kg (115 lb)   SpO2 91%   BMI 23.23 kg/m²     General appearance: alert, appears stated age  Lungs: symmetrical chest rise  Heart: well perfused  Abdomen: soft, appropriately-tender;   NG with bilious output  Trocar sites well approx      ASSESSMENT/PLAN: Pt. is a 79 y.o. female with h/o aortic and mitral valve replacements presented with concerns for ischemic colitis who underwent diagnostic lap 8/22/24      Awaiting return of GI function  Continue hep drip.  Will discuss with Attending regarding

## 2024-08-23 NOTE — PROGRESS NOTES
[facility] -  Clinical Pharmacy Note    Vancomycin - Management by Pharmcay  Consult Date(s):  8/21/24  Consulting Provider(s):  Dr Howard    Assessment / Plan  1)  Sepsis 2/2 pneumonia & pneumobilia - Vancomycin  Concurrent Antimicrobials: Zosyn  Day of Vanc Therapy / Ordered Duration: 3 of 7  Current Dosing Method: Intermittent Dosing by Levels  Therapeutic Goal: -600 mg/L*hr  Current Dose / Plan:   Pt with CKD; baseline SCr appears to be ~1.6 per chart review.  SCr up 1.5-->1.9 today.  Will continue to dose Vancomycin intermittently based on levels for now given CKD and low body weight (52.2kg).  Random level this AM = 19.9 mcg/mL  Will give 500mg IV x1 today.  Will check random level in AM tomorrow.    Will continue to monitor clinical condition and make adjustments to regimen as appropriate.    2)  A.fib, AVR - Warfarin  Warfarin d/c'd yesterday for emergent OR.    Will monitor for plan re: resuming oral anticoagulation.  If Warfarin is resumed, please re-consult at that time.    Please call with questions--  Thanks--  Nidia Crwoell, PharmD, BCPS, BCGP  [id number] (hospitals)   8/23/2024 10:44 AM      Interval update:  CT concerning for ischemic bowel yesterday; 4F-PCC & Vit K given for Warfarin reversal, and pt was taken emergently to OR for diagnostic laparoscopy (done 8/22).  Confusion overnight requiring restraints / sitter due to pulling at invasive lines / devices.  NG removed this AM.    Subjective/Objective:   Pao Wahl is a 79 y.o. female with a PMHx significant for A-fib, h/o AVR on warfarin, CHF, CKD, diabetes, hypertension, hyperlipidemia, anxiety, chronic back pain, DM, GERD, RLS and follicular lymphoma who is admitted with sepsis, possible pneumonia, and pneumobilia seen on CT.  Pt is s/p emergent Dx lap (done 8/22/24) d/t concern for ischemic bowel.      Pharmacy is consulted to manage vancomycin.    Ht Readings from Last 1 Encounters:   08/22/24 1.499 m (4' 11\")     Wt

## 2024-08-23 NOTE — PROGRESS NOTES
Patient alert and oriented to self, disoriented to place date and situation. Reoriented without effect d/t forgetful. NGT in place and functional. Pillai in place draining. Patient denies any pain or nausea. Assessment done.see flowsheet. Patient in bed lowest position call light and bedside table within reach. All needs are met at this time. Patient aware to call if any help needed.Plan of care ongoing  BP (!) 147/63   Pulse 70   Temp 97.9 °F (36.6 °C) (Oral)   Resp 18   Ht 1.499 m (4' 11\")   Wt 52.2 kg (115 lb)   SpO2 91%   BMI 23.23 kg/m²

## 2024-08-23 NOTE — PROGRESS NOTES
Just received call that Gram stain Aerobic bottle: Gram positive cocci in clusters resembling Staphylococcu coagulase negative DNA Detected. Dr. Ramey-Marker aware.

## 2024-08-23 NOTE — PROGRESS NOTES
Bilateral wrist restraints removed at 0905 AM. NGT removed by surgical team prior. Sitter remains at bedside for continued safety. Care is on going.

## 2024-08-23 NOTE — PROGRESS NOTES
General Surgery  Post-operative Note    Procedure(s) Performed:     Procedure(s):  DIAGNOSTIC LAPAROSCOPY  .     Subjective:     Patient somewhat confused and delirious at post operative check. Denies chest pain or SOB.    Objective:  Date 08/22/24 0701 - 08/23/24 0700 08/23/24 0701 - 08/24/24 0700   Shift 8789-1248 7054-5268 24 Hour Total 6464-7572 9847-4748 24 Hour Total   INTAKE   I.V. 1776  1776      IV Piggyback 50  50      Shift Total 1826  1826      OUTPUT   Urine 350 350 700      Shift Total 350 350 700      NET 1476 -350 1126           Vitals:   Vitals:    08/22/24 1425 08/22/24 1505 08/22/24 1948 08/22/24 2257   BP: 132/68 (!) 139/52 (!) 154/65 (!) 147/57   Pulse: 61 61 61 60   Resp: 14 18 18 18   Temp:  98.3 °F (36.8 °C) 98.4 °F (36.9 °C) 98.7 °F (37.1 °C)   TempSrc:  Oral Oral Oral   SpO2:  93% 97% 92%   Weight:       Height:           Physical Exam:  Post-op vital signs: Stable   General appearance:  no acute distress  Eyes: No scleral icterus, EOM grossly intact  Neck: Trachea midline, no JVD, no lymphadenopathy, neck supple  Chest/Lungs: Normal effort with no accessory muscle use on 2L NC  Cardiovascular: RRR, well-perfused  Abdomen: Soft, appropriately-tender, incisions c/d/i and well approximated with Dermabond  Skin: Warm and dry, no rashes  Extremities: No edema, no cyanosis  Genitourinary: Pillai in place with clear yellow urine  Neuro: oriented to self       Scheduled Meds:   piperacillin-tazobactam  3,375 mg IntraVENous Q8H    metoprolol  2.5 mg IntraVENous Q6H    sodium chloride flush  5-40 mL IntraVENous 2 times per day    amLODIPine  10 mg Oral Daily    atorvastatin  10 mg Oral Daily    pantoprazole  20 mg Oral QAM AC    vancomycin (VANCOCIN) intermittent dosing (placeholder)   Other RX Placeholder     Continuous Infusions:   sodium chloride      heparin (PORCINE) Infusion 12 Units/kg/hr (08/23/24 0103)    sodium bicarbonate 150 mEq in dextrose 5 % 1,000 mL infusion 75 mL/hr at 08/22/24 7259

## 2024-08-23 NOTE — PROGRESS NOTES
Occupational Therapy  Facility/Department: [address]  Occupational Therapy Initial Assessment and Treatment    Name: Pao Wahl  : 1945  MRN: [id number]  Date of Service: 2024    Discharge Recommendations:  Subacute/Skilled Nursing Facility  OT Equipment Recommendations  Equipment Needed: No  Other: defer to d/c setting    Treatment Diagnosis: Impaired ADLs, mobility and activity tolerance    Assessment  Performance deficits / Impairments: Decreased functional mobility ;Decreased ADL status;Decreased balance;Decreased strength;Decreased endurance;Decreased cognition;Decreased safe awareness  Assessment: Pt is independent and lives alone at baseline. Pt presents below her baseline following s/p abdominal sx. Pt demo impaired sitting and standing balance, requires assist for safety with all ADLs and mobility using RW. Pt with confusion this date and decreased safety awareness and problem solving. Recommend SNF upon d/c. Will follow for acute OT  Treatment Diagnosis: Impaired ADLs, mobility and activity tolerance  Prognosis: Good  Decision Making: Medium Complexity  REQUIRES OT FOLLOW-UP: Yes  Activity Tolerance  Activity Tolerance: Patient Tolerated treatment well     Plan  Occupational Therapy Plan  Times Per Week: 2-5  Current Treatment Recommendations: Safety education & training, Strengthening, Balance training, Patient/Caregiver education & training, Endurance training, Equipment evaluation, education, & procurement, Self-Care / ADL    Restrictions  Position Activity Restriction  Other position/activity restrictions: ambulate    Subjective  General  Chart Reviewed: Yes  Additional Pertinent Hx: 79 y.o. female who presented to [facility] neck pain and abdominal pain. Imaging revealed pneumobilia.  s/p diagnostic laparotomy, possible suspicion infectious colitis.   PMHx significant for A-fib on anticoagulation CHF CKD diabetes hypertension hyperlipidemia follicular lymphoma.  Family /  alarm in place;Left in chair;Sitter present    Goals  Short Term Goals  Time Frame for Short Term Goals: discharge  Short Term Goal 1: independent sitting balance during ADLs- not met  Short Term Goal 2: SBA toilet transfer- not met  Short Term Goal 3: min A LB dressing using AE prn- not met  Short Term Goal 4: supervision standing balance during ADLs- not met      Therapy Time   Individual Concurrent Group Co-treatment   Time In 1330         Time Out 1417         Minutes 47         Timed Code Treatment Minutes: 32 Minutes   Total Treatment Time: 47 minutes    Kina Jay, OT

## 2024-08-23 NOTE — PROGRESS NOTES
Placeholder        Review of Systems:   14 point ROS obtained but were negative except mentioned in HPI      Physical exam:     Vitals:  BP (!) 143/83   Pulse 74   Temp 98.6 °F (37 °C) (Oral)   Resp 18   Ht 1.499 m (4' 11\")   Wt 52.2 kg (115 lb)   SpO2 93%   BMI 23.23 kg/m²   Constitutional:  OAA X3 NAD No  Skin: no rash, turgor wnl  Heent:  eomi, mmm  Neck: no bruits or jvd noted  Cardiovascular:  S1, S2 without m/r/g  Respiratory: CTA B without w/r/r  Abdomen:  , soft, nt, nd  Ext:  lower extremity edema No  Musculoskeletal:  Rom, muscular strength intact    Data:   Labs:  CBC:   Recent Labs     08/21/24  0523 08/22/24  0600 08/23/24  0630   WBC 17.7* 25.2* 16.6*   HGB 12.0 10.3* 9.8*    126* 127*     BMP:    Recent Labs     08/22/24  0600 08/22/24  1820 08/23/24  0630    142 145   K 4.5 4.5 4.1    110 111*   CO2 14* 17* 19*   BUN 27* 31* 40*   CREATININE 1.5* 1.6* 1.9*   GLUCOSE 190* 207* 250*     Ca/Mg/Phos:   Recent Labs     08/22/24  0600 08/22/24  1820 08/23/24  0115 08/23/24  0630   CALCIUM 7.1* 7.8*  --  7.6*   MG  --   --  2.10  --      Hepatic:   Recent Labs     08/22/24  0600 08/22/24  0826 08/23/24  0630   AST 36 43* 40*   ALT 22 26 24   BILITOT 0.5 0.4 0.6   ALKPHOS 65 72 74     Troponin: No results for input(s): \"TROPONINI\" in the last 72 hours.  BNP: No results for input(s): \"BNP\" in the last 72 hours.  Lipids: No results for input(s): \"CHOL\", \"TRIG\", \"HDL\" in the last 72 hours.    Invalid input(s): \"LDLCALC\", \"LABVLDL\"  ABGs: No results for input(s): \"PHART\", \"PO2ART\", \"[id number]\" in the last 72 hours.  INR:   Recent Labs     08/22/24  1040 08/22/24  1714 08/23/24  0630   INR 3.67* 1.61* 1.42*     UA:  Recent Labs     08/21/24  0645   COLORU Yellow   CLARITYU Clear   GLUCOSEU >=1000*   BILIRUBINUR Negative   KETUA Negative   BLOODU Negative   PHUR 6.5   PROTEINU 100*   UROBILINOGEN 0.2   NITRU Negative   LEUKOCYTESUR Negative   URINETYPE NotGiven      Urine Microscopic:    Recent Labs     08/21/24  0645   WBCUA 3-5   RBCUA None seen     Urine Culture:   Recent Labs     08/21/24  0645   LABURIN <10,000 CFU/ml  No further workup       Urine Chemistry: No results for input(s): \"CLUR\", \"LABCREA\", \"PROTEINUR\", \"NAUR\" in the last 72 hours.      IMAGING:  XR ABDOMEN (KUB) (SINGLE AP VIEW)   Final Result   1. Nasogastric tube tip body stomach.      Electronically signed by Julius Lee      Vascular duplex mesenteric artery   Final Result      CT ABDOMEN PELVIS W IV CONTRAST Additional Contrast? None   Final Result      New wall thickening within the ascending colon as well as the terminal ileum   with adjacent inflammatory change. In addition there is free fluid within the   peritoneal cavity. These findings are highly concerning for ischemic   enterocolitis. Urgent surgical evaluation is suggested.      Unchanged low-density lesion within the spleen.      Electronically signed by Joe Sheikh      CT CHEST WO CONTRAST   Final Result      Patchy groundglass airspace disease with septal thickening suggesting atypical pneumonia or edema.      Coronary calcification.      Electronically signed by Eugenio Montejo MD      US GALLBLADDER RUQ   Final Result      No sonographic evidence of acute cholecystitis.      Pneumobilia.      Electronically signed by Jai Curry      CT ABDOMEN PELVIS WO CONTRAST Additional Contrast? None   Final Result      Nondependent pneumobilia. Correlate with history of recent instrumentation or   prior sphincterotomy.      Electronically signed by Jai Curry      CT SOFT TISSUE NECK WO CONTRAST   Final Result   Increased number of nonenlarged and nonpathologic appearing cervical   lymph nodes. These may be reactive. Correlate clinically.      4 mm nodule of the right upper lobe. Per most recent Fleischner Society   Guidelines (Radiology 2017, DOI:10.1148/radiol.1644041771), for patients at low   risk for lung cancer, no further imaging follow-up is

## 2024-08-23 NOTE — PROGRESS NOTES
V2.0    [facility] Progress Note      Name:  Pao Wahl /Age/Sex: 1945  (79 y.o. female)   MRN & CSN:  [id number] & [id number] Encounter Date/Time: 2024 5:31 PM EDT   Location:  [facility]/5316-01 PCP: Juliette Ramirez MD     Attending:Rakesh Maldonado*       Hospital Day: 3    Assessment and Recommendations     79-year-old female presenting with neck pain and admitted for possible pneumobilia and possible ischemic colitis.  She has a history of follicular lymphoma in remission, hypertension, dyslipidemia, A-fib on anticoagulation.  CKD.  Lactic acid levels were elevated on presentation.  Significant leukocytosis with abdominal imaging showing possible ischemic colitis.  Was taken urgently to the OR for diagnostic laparoscopy.  Had lysis of adhesions.  Also with a metabolic acidosis possibly secondary to colitis.     Possible ischemic colitis  History of ischemic colitis  Elevated lactic acid  Abdominal pain  -Status post exploratory laparoscopy.  Appreciate surgery recommendations.  Continue IV fluids as ordered.  Diet as per surgery's recommendations.    Valvular heart disease  Status post mitral valve replacement  -Currently on heparin drip.  Will need to go back to warfarin with goal INR of 2.5-3.5.  Once cleared by surgery will consult pharmacy to assist.    History of follicular lymphoma in remission    Essential hypertension  -Continue current home meds.    History of chronic mesenteric ischemia  -Vascular surgery consult.    Leukocytosis   -Unclear etiology possibly from colitis.  Check stool studies    CKD stage III  Metabolic acidosis  -Recheck BMP.  Nephrology consult.    Type II DM  -Medium sliding scale added    Diet ADULT DIET; Clear Liquid   DVT Prophylaxis [] Lovenox, []  Heparin, [] SCDs, [] Ambulation,  [] Eliquis, [] Xarelto  [] Coumadin   Code Status Full Code   Disposition From: Home  Expected Disposition: TBD  Estimated Date of Discharge: TBD  Patient requires continued admission  axillary adenopathy is identified. Arch size is prominent with coronary calcification noted. Limited views of the upper abdomen show no acute abnormality. Osseous structures are unremarkable.     Patchy groundglass airspace disease with septal thickening suggesting atypical pneumonia or edema. Coronary calcification. Electronically signed by Eugenio Montejo MD    US GALLBLADDER RUQ    Result Date: 8/21/2024  EXAM: US GALLBLADDER RUQ INDICATION: Cholecystitis? COMPARISON: CT 5 hours prior TECHNIQUE: Real-time grayscale and Doppler images of the abdomen were obtained, with attention directed to the right upper quadrant. FINDINGS: Liver: Branching echogenicity in the left hepatic lobe corresponding to pneumobilia seen on recent CT. No focal hepatic lesions. Biliary/CBD: 3 mm. No intra or extrahepatic ductal dilatation. Gallbladder: No stones, wall thickening or pericholecystic fluid.. The sonographic Sharp sign is reported as negative. Pancreas: Visualized portions are unremarkable. Right kidney: 9.3 cm in length. Normal parenchymal echogenicity.  No hydronephrosis. Other: No free fluid.     No sonographic evidence of acute cholecystitis. Pneumobilia. Electronically signed by Jai Curry    CT ABDOMEN PELVIS WO CONTRAST Additional Contrast? None    Result Date: 8/21/2024  EXAM: CT ABDOMEN PELVIS WO CONTRAST INDICATION: abdominal pain, leukocytosis, Pain COMPARISON: None. TECHNIQUE: Axial CT imaging obtained from lung bases through pelvis. Axial images and multiplanar reformatted images are provided for review.  Individualized dose optimization technique was used in order to meet ALARA standards for radiation dose reduction.  In addition to vendor specific dose reduction algorithms, the dose reduction techniques vary based on the specific scanner utilized but frequently include automated exposure control, adjustment of the mA and/or kV according to patient size, and use of iterative reconstruction technique. IV

## 2024-08-23 NOTE — PROGRESS NOTES
ONCOLOGY HEMATOLOGY CARE PROGRESS NOTE      SUBJECTIVE:    Abdominal pain, patient appears to be confused    ROS:     Unable to obtain due to her mental status.    OBJECTIVE        Physical    VITALS:  Patient Vitals for the past 24 hrs:   BP Temp Temp src Pulse Resp SpO2   08/23/24 1038 (!) 143/83 98.6 °F (37 °C) Oral 74 18 93 %   08/23/24 0804 (!) 159/57 98.5 °F (36.9 °C) Axillary 65 18 91 %   08/23/24 0437 (!) 147/63 97.9 °F (36.6 °C) Oral 70 18 91 %   08/22/24 2257 (!) 147/57 98.7 °F (37.1 °C) Oral 60 18 92 %   08/22/24 1948 (!) 154/65 98.4 °F (36.9 °C) Oral 61 18 97 %   08/22/24 1505 (!) 139/52 98.3 °F (36.8 °C) Oral 61 18 93 %   08/22/24 1425 132/68 -- -- 61 14 --   08/22/24 1415 (!) 172/51 -- -- 67 22 --   08/22/24 1400 -- -- -- 66 18 --   08/22/24 1345 (!) 151/53 -- -- 65 13 93 %   08/22/24 1335 (!) 144/53 -- -- 61 15 --   08/22/24 1330 (!) 137/122 -- -- 60 19 93 %   08/22/24 1325 (!) 156/50 -- -- 64 15 --   08/22/24 1320 128/75 97.6 °F (36.4 °C) Temporal 63 14 93 %       24HR INTAKE/OUTPUT:    Intake/Output Summary (Last 24 hours) at 8/23/2024 1307  Last data filed at 8/23/2024 1214  Gross per 24 hour   Intake 2379.3 ml   Output 1475 ml   Net 904.3 ml       CONSTITUTIONAL: awake, Appears to be confused.  In restraints.  EYES: pupils equal, round and reactive to light, sclera clear and conjunctiva normal  ENT: Normocephalic, without obvious abnormality, atraumatic  NECK: supple, symmetrical, no jugular venous distension and no carotid bruits     LUNGS: no increased work of breathing and clear to auscultation   CARDIOVASCULAR: regular rate and rhythm, normal S1 and S2, no murmur noted  ABDOMEN:  Status post laparoscopic surgery. Non tender, soft  MUSCULOSKELETAL:Moving all extremities spontaneously.  NEUROLOGIC: awake, confused  SKIN: Normal skin color, texture, turgor and no jaundice. appears intact   EXTREMITIES: no LE edema     DATA:  CBC:    Recent Labs      dose reduction techniques were employed.    IV Contrast: 100 cc Omnipaque 350  Oral Contrast: Yes.    FINDINGS:    LUNG BASES: There is bilateral interlobular septal thickening, stable from prior  study..    LIVER: Normal.    GALLBLADDER AND BILIARY TREE: No calcified gallstones. No gallbladder  distention.  No intra- or extrahepatic biliary dilatation.    PANCREAS: Normal.    SPLEEN: Lesion in the spleen measures 2.6 x 2.4 cm, unchanged from prior  studies..    ADRENAL GLANDS: Normal.    KIDNEYS AND URETERS: No hydronephrosis.  Symmetrical normal enhancement.    URINARY BLADDER: Normal.    REPRODUCTIVE ORGANS: No associated masses.    BOWEL: Wall thickening within the ascending colon as well as the terminal ileum,  new from comparison CT.    LYMPH NODES: No abnormally enlarged nodes.    PERITONEUM/RETROPERITONEUM: There is scattered stranding of the fat within the  mesentery. Small areas of free fluid have developed within the peritoneal  cavity.  VESSELS: Aorta and IVC without significant abnormality.  Splenic vein, SMV, PV  and hepatic veins demonstrate enhancement.    ABDOMINAL WALL: Normal.    BONES: No significant abnormality.    OTHER FINDINGS: None.  Impression: New wall thickening within the ascending colon as well as the terminal ileum  with adjacent inflammatory change. In addition there is free fluid within the  peritoneal cavity. These findings are highly concerning for ischemic  enterocolitis. Urgent surgical evaluation is suggested.    Unchanged low-density lesion within the spleen.    Electronically signed by Joe Sheikh      Problem List  Patient Active Problem List   Diagnosis    Aortic valve replaced    Dyslipidemia    Anxiety disorder    Essential hypertension, benign    RLS (restless legs syndrome)    Anemia:multifactorial    Stage 3 chronic kidney disease, unspecified whether stage 3a or 3b CKD (HCC)    Lymphoma:monoclonal B cell    Chronic back pain    Lumbar disc herniation    Insomnia

## 2024-08-23 NOTE — PROGRESS NOTES
Physical Therapy  Facility/Department: [address]  Physical Therapy Initial Assessment/Treatment    Name: Pao Wahl  : 1945  MRN: [id number]  Date of Service: 2024    Discharge Recommendations:  Subacute/Skilled Nursing Facility   PT Equipment Recommendations  Equipment Needed: No (defer)      Patient Diagnosis(es): The primary encounter diagnosis was Neck muscle spasm. Diagnoses of Pneumobilia, Leukocytosis, unspecified type, Chronic congestive heart failure, unspecified heart failure type (HCC), and Mesenteric ischemia ([facility]) were also pertinent to this visit.  Past Medical History:  has a past medical history of A-fib (HCC), Anemia, Anemia:multifactorial, Anxiety, Cataract, CHF (congestive heart failure) (HCC), Chronic back pain, CKD (chronic kidney disease) stage 3, GFR 30-59 ml/min (HCC), Colitis, ischemic (HCC), COPD, Diabetes, Diabetic eye exam ([facility]), GERD (gastroesophageal reflux disease), H/O heart valve replacement with mechanical valve, Hx of mammogram, Hyperlipidaemia LDL goal <100, Hypertension, Insomnia, Long-term (current) use of anticoagulants, INR goal 2.5-3.5, Lymphoma:monoclonal B cell, Mammogram abnormal, Nonspecific abnormal results of kidney function study, Osteoarthritis, Renal cysts, right, RLS (restless legs syndrome), Sciatica, Screening colonoscopy, Therapeutic drug monitoring, Valvular heart disease, and Visit for screening mammogram.  Past Surgical History:  has a past surgical history that includes Aortic valve replacement; Mitral valve replacement; Hysterectomy; Cataract removal (13;14); Colonoscopy; pacemaker placement (); Tunneled venous port placement (Right, 10/3/2014); laparoscopy (2/3/15); laparoscopy (N/A, 2024); and laparotomy (N/A, 2024).    Assessment  Body Structures, Functions, Activity Limitations Requiring Skilled Therapeutic Intervention: Decreased functional mobility   Assessment: Pt is currently functioning below  front door  Bathroom Shower/Tub: Walk-in shower  Bathroom Toilet: Standard  Bathroom Equipment: Shower chair, Hand-held shower  Home Equipment: Wheelchair - Manual, Alert button, Reacher, Sock aid  Has the patient had two or more falls in the past year or any fall with injury in the past year?: No  ADL Assistance: Independent  Homemaking Assistance: Needs assistance (cleaning lady weekly; does her easy meal prep and laundry, does her own grocery shopping)  Ambulation Assistance: Independent  Transfer Assistance: Independent  Active : Yes  Leisure & Hobbies: [employer] and [employer] study  Additional Comments: 1 son lives close  Vision/Hearing  Vision  Vision: Within Functional Limits  Vision Exceptions: Wears glasses for reading  Hearing  Hearing: Exceptions to WFL  Hearing Exceptions: Hard of hearing/hearing concerns    Cognition   Orientation  Orientation Level: Oriented X4  Cognition  Overall Cognitive Status: Exceptions  Following Commands: Follows one step commands with increased time  Attention Span: Difficulty attending to directions  Memory: Decreased recall of recent events  Safety Judgement: Decreased awareness of need for assistance  Problem Solving: Decreased awareness of errors  Insights: Decreased awareness of deficits    Objective  Temp: 98.6 °F (37 °C)  Pulse: 75  Heart Rate Source: Monitor  Respirations: 18  SpO2: 96 %  O2 Device: None (Room air)  BP: (!) 152/54  MAP (Calculated): 87  BP Location: Left upper arm  BP Method: Automatic  Patient Position: Sitting             AROM RLE (degrees)  RLE AROM: WFL  AROM LLE (degrees)  LLE AROM : WFL  Strength RLE  Strength RLE: WFL  Strength LLE  Strength LLE: WFL        Bed Mobility Training  Bed Mobility Training: Yes  Supine to Sit: Maximum assistance;Additional time (HOB elevated)  Scooting: Contact-guard assistance (seated)  Balance  Sitting: Impaired  Sitting - Static:  (CGA/min A at EOB)  Transfer Training  Transfer Training: Yes  Interventions: Verbal

## 2024-08-23 NOTE — CARE COORDINATION
ADDENDUM:  SNF list given to patient to review.   Electronically signed by Amira Coates RN on 8/23/2024 at 4:11 PM  [phone]    Patient is from home alone and is unsure of needs at d/c. She does have two sons that live close by and can provide assistance.     NG removed this AM, IV ABX, heparin drip, await ROBF.    Electronically signed by Amira Coates RN on 8/23/2024 at 1:31 PM  [phone]

## 2024-08-23 NOTE — CONSULTS
Nephrology Consult Note                                                                                                                                                                                                                                                                                                                                                               Office : [phone]     Fax :[phone]    Patient's Name: Pao Wahl  12:29 AM  8/23/2024    Reason for Consult:  acidosis  Requesting Physician:  Juliette Ramirez MD  Chief Complaint:    Chief Complaint   Patient presents with    Headache    Neck Pain       Assessment/Plan     Metabolic acidosis with lactic acidosis  CKD stage 3   Systolic hypertension  Pneumobilia      Plan   Bicarbonate containing IV fluids at 75 cc per hour   Creatinine is at baseline of 1.5   Continue to HOLD Jardiance and Lisinopril   Continue Metoprolol and Amlodipine as per home dose   Sepsis management with antibiotics and as per surgery team      History of Present Ilness:    Pao Wahl is a 79 y.o. female with Hx of CHF, A-fib, valve replacement on Coumadin, chronic kidney disease, diabetes, COPD, history of lymphoma, hypertension, surgical history of Angy for SBO, hysterectomy presents to ED with complaint of neck pain. General surgery was consulted for pneumobilia seen on CT abdomen/pelvis. She was febrile, developed right sided abdominal pain and leucocytosis > 11295. CT A/P from 8/22 was concerning for the ischemia in the terminal ileum and right colon. She underwent dx laparoscopy and was found to have hyperemic bowel. Vascular surgery was consulted for further evaluation of chronic mesenteric ischemia.      Interval hx     Past Medical History:   Diagnosis Date    A-fib (HCC)     Anemia     multifactorial:under care of heme-onc:Dr. Bermudez    Anemia:multifactorial 2011    as per heme-onc    Anxiety     Cataract     bilateral    CHF (congestive heart  hours.      IMAGING:  XR ABDOMEN (KUB) (SINGLE AP VIEW)   Final Result   1. Nasogastric tube tip body stomach.      Electronically signed by Julius Lee      Vascular duplex mesenteric artery   Final Result      CT ABDOMEN PELVIS W IV CONTRAST Additional Contrast? None   Final Result      New wall thickening within the ascending colon as well as the terminal ileum   with adjacent inflammatory change. In addition there is free fluid within the   peritoneal cavity. These findings are highly concerning for ischemic   enterocolitis. Urgent surgical evaluation is suggested.      Unchanged low-density lesion within the spleen.      Electronically signed by Joe Sheikh      CT CHEST WO CONTRAST   Final Result      Patchy groundglass airspace disease with septal thickening suggesting atypical pneumonia or edema.      Coronary calcification.      Electronically signed by Eugenio Montejo MD       GALLBLADDER RUQ   Final Result      No sonographic evidence of acute cholecystitis.      Pneumobilia.      Electronically signed by Jai Curry      CT ABDOMEN PELVIS WO CONTRAST Additional Contrast? None   Final Result      Nondependent pneumobilia. Correlate with history of recent instrumentation or   prior sphincterotomy.      Electronically signed by JaiSirigen      CT SOFT TISSUE NECK WO CONTRAST   Final Result   Increased number of nonenlarged and nonpathologic appearing cervical   lymph nodes. These may be reactive. Correlate clinically.      4 mm nodule of the right upper lobe. Per most recent Fleischner Society   Guidelines (Radiology 2017, DOI:10.1148/radiol.5455652982), for patients at low   risk for lung cancer, no further imaging follow-up is recommended. For high risk   patients (e.g. smokers), a follow-up noncontrast chest CT in 12 months is   optional.      Electronically signed by Karlo Pena      XR CHEST PORTABLE   Final Result   Hazy bilateral pulmonary opacities. Correlate for pneumonia.

## 2024-08-23 NOTE — PLAN OF CARE
Problem: Pain  Goal: Verbalizes/displays adequate comfort level or baseline comfort level  Outcome: Progressing  Flowsheets (Taken 8/23 by Abimbola Alberto, RN)    Problem: Chronic Conditions and Co-morbidities  Goal: Patient's chronic conditions and co-morbidity symptoms are monitored and maintained or improved  Outcome: Progressing  Flowsheets (Taken 8/22/2024 1948 by Abimbola Alberto, RN)  Care Plan - Patient's Chronic Conditions and Co-Morbidity Symptoms are Monitored and Maintained or Improved: Monitor and assess patient's chronic conditions and comorbid symptoms for stability, deterioration, or improvement     Problem: ABCDS Injury Assessment  Goal: Absence of physical injury  Outcome: Progressing  Flowsheets (Taken 8/23/2024 1304)  Absence of Physical Injury: Implement safety measures based on patient assessment   /2024 1948 by Abimbola Alberto RN)  Verbalizes/displays adequate comfort level or baseline comfort level:   Encourage patient to monitor pain and request assistance   Assess pain using appropriate pain scale

## 2024-08-23 NOTE — PROGRESS NOTES
Patient was running 7 beats of VT per Tele monitor and back to paced rhythm now. Dr. Ramey-Marker aware.

## 2024-08-23 NOTE — PROGRESS NOTES
Acidosis - change to IV bicarb   Stop ACE I for now     BP in decent range       Rigoberto Gay MD

## 2024-08-23 NOTE — PROGRESS NOTES
Surgery Daily Progress Note  Pao Wahl  CC:    Subjective :  No nausea.  No flatus this morning.   Has required restraints since surgery for pulling at invasive devices.  Sitter at bedside.        Objective    Infusions:   sodium chloride      heparin (PORCINE) Infusion 12 Units/kg/hr (08/23/24 0103)    sodium bicarbonate 150 mEq in dextrose 5 % 1,000 mL infusion 75 mL/hr at 08/22/24 2339    sodium chloride 5 mL/hr at 08/22/24 0116        I/O:I/O last 3 completed shifts:  In: 3060 [I.V.:2460; IV Piggyback:600]  Out: 650 [Urine:650]           Wt Readings from Last 1 Encounters:   08/22/24 52.2 kg (115 lb)                 LABS:    Recent Labs     08/21/24  0523 08/22/24  0600   WBC 17.7* 25.2*   HGB 12.0 10.3*   HCT 35.2* 31.1*   MCV 88.4 90.7    126*        Recent Labs     08/22/24  0600 08/22/24  1820    142   K 4.5 4.5    110   CO2 14* 17*   BUN 27* 31*   CREATININE 1.5* 1.6*        Recent Labs     08/21/24  0523 08/22/24  0600 08/22/24  0826   AST 36 36 43*   ALT 15 22 26   BILIDIR 0.2  --  0.2   BILITOT 0.5 0.5 0.4   ALKPHOS 80 65 72        Recent Labs     08/22/24  0827   LIPASE 14.0        Recent Labs     08/22/24  1040 08/22/24  1714   INR 3.67* 1.61*   APTT  --  39.5*      No results for input(s): \"CKTOTAL\", \"CKMB\", \"CKMBINDEX\", \"TROPONINI\" in the last 72 hours.            Exam:BP (!) 147/63   Pulse 70   Temp 97.9 °F (36.6 °C) (Oral)   Resp 18   Ht 1.499 m (4' 11\")   Wt 52.2 kg (115 lb)   SpO2 91%   BMI 23.23 kg/m²     General appearance: alert, appears stated age  Lungs: symmetrical chest rise  Heart: well perfused  Abdomen: soft, appropriately-tender;   NG with bilious output  Trocar sites well approx      ASSESSMENT/PLAN: Pt. is a 79 y.o. female with concerns for ischemic colitis who underwent diagnostic lap 8/22/24      Awaiting return of GI function  Will discuss with Vascular anticoagulation goals  Consider ID consult for suspicion infectious colitis  OOB   PT/OT  Lidoderm

## 2024-08-23 NOTE — PROGRESS NOTES
21:44      William Lopez RN contacted [facility] NP with initial blood culture results Bottle #1                 obtained 08/21/24 and resulted now on 08/22/2024                    Staphylococcus coagulase negative DNA detected.                        Pharmacist Kenyon at [facility] consulted. Pt. Already receiving IV Vancomycin                  Which should cover this type of strain.                    Bottle #2 - No growth to date.    Plan:         No new orders at this time.

## 2024-08-24 PROBLEM — Z95.2 H/O MECHANICAL AORTIC VALVE REPLACEMENT: Status: ACTIVE | Noted: 2024-08-24

## 2024-08-24 PROBLEM — Z95.0 PACEMAKER: Status: ACTIVE | Noted: 2024-08-24

## 2024-08-24 PROBLEM — Z95.2 H/O MITRAL VALVE REPLACEMENT WITH MECHANICAL VALVE: Status: ACTIVE | Noted: 2024-08-24

## 2024-08-24 LAB
ALBUMIN SERPL-MCNC: 3.2 G/DL (ref 3.4–5)
ALBUMIN/GLOB SERPL: 1.5 {RATIO} (ref 1.1–2.2)
ALP SERPL-CCNC: 58 U/L (ref 40–129)
ALT SERPL-CCNC: 23 U/L (ref 10–40)
ANION GAP SERPL CALCULATED.3IONS-SCNC: 10 MMOL/L (ref 3–16)
ANTI-XA UNFRAC HEPARIN: 0.33 IU/ML (ref 0.3–0.7)
AST SERPL-CCNC: 36 U/L (ref 15–37)
BACTERIA BLD CULT: ABNORMAL
BACTERIA BLD CULT: ABNORMAL
BASOPHILS # BLD: 0 K/UL (ref 0–0.2)
BASOPHILS NFR BLD: 0.2 %
BILIRUB SERPL-MCNC: 0.4 MG/DL (ref 0–1)
BUN SERPL-MCNC: 42 MG/DL (ref 7–20)
CALCIUM SERPL-MCNC: 7.3 MG/DL (ref 8.3–10.6)
CHLORIDE SERPL-SCNC: 111 MMOL/L (ref 99–110)
CO2 SERPL-SCNC: 23 MMOL/L (ref 21–32)
CREAT SERPL-MCNC: 1.9 MG/DL (ref 0.6–1.2)
DEPRECATED RDW RBC AUTO: 14.4 % (ref 12.4–15.4)
EOSINOPHIL # BLD: 0 K/UL (ref 0–0.6)
EOSINOPHIL NFR BLD: 0 %
EST. AVERAGE GLUCOSE BLD GHB EST-MCNC: 122.6 MG/DL
GFR SERPLBLD CREATININE-BSD FMLA CKD-EPI: 27 ML/MIN/{1.73_M2}
GLUCOSE BLD-MCNC: 153 MG/DL (ref 70–99)
GLUCOSE BLD-MCNC: 153 MG/DL (ref 70–99)
GLUCOSE BLD-MCNC: 156 MG/DL (ref 70–99)
GLUCOSE BLD-MCNC: 168 MG/DL (ref 70–99)
GLUCOSE SERPL-MCNC: 156 MG/DL (ref 70–99)
HBA1C MFR BLD: 5.9 %
HCT VFR BLD AUTO: 26.3 % (ref 36–48)
HGB BLD-MCNC: 8.9 G/DL (ref 12–16)
INR PPP: 1.4 (ref 0.85–1.15)
LYMPHOCYTES # BLD: 1.3 K/UL (ref 1–5.1)
LYMPHOCYTES NFR BLD: 8.6 %
MCH RBC QN AUTO: 30.2 PG (ref 26–34)
MCHC RBC AUTO-ENTMCNC: 33.9 G/DL (ref 31–36)
MCV RBC AUTO: 88.9 FL (ref 80–100)
MONOCYTES # BLD: 1.2 K/UL (ref 0–1.3)
MONOCYTES NFR BLD: 7.9 %
MRSA DNA SPEC QL NAA+PROBE: NORMAL
NEUTROPHILS # BLD: 12.8 K/UL (ref 1.7–7.7)
NEUTROPHILS NFR BLD: 83.3 %
ORGANISM: ABNORMAL
ORGANISM: ABNORMAL
PERFORMED ON: ABNORMAL
PLATELET # BLD AUTO: 135 K/UL (ref 135–450)
PMV BLD AUTO: 11 FL (ref 5–10.5)
POTASSIUM SERPL-SCNC: 3.9 MMOL/L (ref 3.5–5.1)
PROT SERPL-MCNC: 5.3 G/DL (ref 6.4–8.2)
PROTHROMBIN TIME: 17.3 SEC (ref 11.9–14.9)
RBC # BLD AUTO: 2.95 M/UL (ref 4–5.2)
SODIUM SERPL-SCNC: 144 MMOL/L (ref 136–145)
VANCOMYCIN SERPL-MCNC: 19.8 UG/ML
WBC # BLD AUTO: 15.4 K/UL (ref 4–11)

## 2024-08-24 PROCEDURE — 94150 VITAL CAPACITY TEST: CPT

## 2024-08-24 PROCEDURE — 99231 SBSQ HOSP IP/OBS SF/LOW 25: CPT | Performed by: SURGERY

## 2024-08-24 PROCEDURE — 6370000000 HC RX 637 (ALT 250 FOR IP): Performed by: FAMILY MEDICINE

## 2024-08-24 PROCEDURE — 6370000000 HC RX 637 (ALT 250 FOR IP): Performed by: NURSE PRACTITIONER

## 2024-08-24 PROCEDURE — 80053 COMPREHEN METABOLIC PANEL: CPT

## 2024-08-24 PROCEDURE — 6360000002 HC RX W HCPCS

## 2024-08-24 PROCEDURE — 85520 HEPARIN ASSAY: CPT

## 2024-08-24 PROCEDURE — 51798 US URINE CAPACITY MEASURE: CPT

## 2024-08-24 PROCEDURE — 2500000003 HC RX 250 WO HCPCS: Performed by: NURSE PRACTITIONER

## 2024-08-24 PROCEDURE — 85610 PROTHROMBIN TIME: CPT

## 2024-08-24 PROCEDURE — 6360000002 HC RX W HCPCS: Performed by: FAMILY MEDICINE

## 2024-08-24 PROCEDURE — 1200000000 HC SEMI PRIVATE

## 2024-08-24 PROCEDURE — 2580000003 HC RX 258: Performed by: FAMILY MEDICINE

## 2024-08-24 PROCEDURE — 6370000000 HC RX 637 (ALT 250 FOR IP)

## 2024-08-24 PROCEDURE — 85025 COMPLETE CBC W/AUTO DIFF WBC: CPT

## 2024-08-24 PROCEDURE — 94761 N-INVAS EAR/PLS OXIMETRY MLT: CPT

## 2024-08-24 PROCEDURE — 99232 SBSQ HOSP IP/OBS MODERATE 35: CPT | Performed by: HOSPITALIST

## 2024-08-24 PROCEDURE — 94669 MECHANICAL CHEST WALL OSCILL: CPT

## 2024-08-24 PROCEDURE — 99223 1ST HOSP IP/OBS HIGH 75: CPT | Performed by: INTERNAL MEDICINE

## 2024-08-24 PROCEDURE — 80202 ASSAY OF VANCOMYCIN: CPT

## 2024-08-24 RX ORDER — TRAMADOL HYDROCHLORIDE 50 MG/1
50 TABLET ORAL EVERY 6 HOURS PRN
Status: DISCONTINUED | OUTPATIENT
Start: 2024-08-24 | End: 2024-08-26 | Stop reason: HOSPADM

## 2024-08-24 RX ORDER — BISACODYL 10 MG
10 SUPPOSITORY, RECTAL RECTAL ONCE
Status: DISCONTINUED | OUTPATIENT
Start: 2024-08-24 | End: 2024-08-26 | Stop reason: HOSPADM

## 2024-08-24 RX ADMIN — DOCUSATE SODIUM 100 MG: 100 CAPSULE, LIQUID FILLED ORAL at 09:43

## 2024-08-24 RX ADMIN — PIPERACILLIN AND TAZOBACTAM 3375 MG: 3; .375 INJECTION, POWDER, LYOPHILIZED, FOR SOLUTION INTRAVENOUS at 03:24

## 2024-08-24 RX ADMIN — METOPROLOL TARTRATE 2.5 MG: 1 INJECTION, SOLUTION INTRAVENOUS at 09:43

## 2024-08-24 RX ADMIN — ACETAMINOPHEN 1000 MG: 500 TABLET ORAL at 20:24

## 2024-08-24 RX ADMIN — ATORVASTATIN CALCIUM 10 MG: 10 TABLET, FILM COATED ORAL at 09:43

## 2024-08-24 RX ADMIN — METOPROLOL TARTRATE 2.5 MG: 1 INJECTION, SOLUTION INTRAVENOUS at 14:45

## 2024-08-24 RX ADMIN — METOPROLOL TARTRATE 2.5 MG: 1 INJECTION, SOLUTION INTRAVENOUS at 20:24

## 2024-08-24 RX ADMIN — PIPERACILLIN AND TAZOBACTAM 3375 MG: 3; .375 INJECTION, POWDER, LYOPHILIZED, FOR SOLUTION INTRAVENOUS at 14:44

## 2024-08-24 RX ADMIN — ACETAMINOPHEN 1000 MG: 500 TABLET ORAL at 14:45

## 2024-08-24 RX ADMIN — METHOCARBAMOL TABLETS 750 MG: 750 TABLET, COATED ORAL at 09:43

## 2024-08-24 RX ADMIN — AMLODIPINE BESYLATE 10 MG: 10 TABLET ORAL at 09:43

## 2024-08-24 RX ADMIN — HEPARIN SODIUM 12.07 UNITS/KG/HR: 10000 INJECTION, SOLUTION INTRAVENOUS at 12:08

## 2024-08-24 RX ADMIN — ALPRAZOLAM 0.5 MG: 0.5 TABLET ORAL at 20:24

## 2024-08-24 RX ADMIN — METHOCARBAMOL TABLETS 750 MG: 750 TABLET, COATED ORAL at 20:24

## 2024-08-24 RX ADMIN — METHOCARBAMOL TABLETS 750 MG: 750 TABLET, COATED ORAL at 14:45

## 2024-08-24 RX ADMIN — METOPROLOL TARTRATE 2.5 MG: 1 INJECTION, SOLUTION INTRAVENOUS at 03:23

## 2024-08-24 RX ADMIN — PANTOPRAZOLE SODIUM 20 MG: 20 TABLET, DELAYED RELEASE ORAL at 09:55

## 2024-08-24 RX ADMIN — TRAMADOL HYDROCHLORIDE 50 MG: 50 TABLET ORAL at 21:59

## 2024-08-24 RX ADMIN — ALPRAZOLAM 0.5 MG: 0.5 TABLET ORAL at 09:55

## 2024-08-24 RX ADMIN — VANCOMYCIN HYDROCHLORIDE 500 MG: 500 INJECTION, POWDER, LYOPHILIZED, FOR SOLUTION INTRAVENOUS at 11:52

## 2024-08-24 RX ADMIN — ACETAMINOPHEN 1000 MG: 500 TABLET ORAL at 09:42

## 2024-08-24 ASSESSMENT — PAIN SCALES - GENERAL
PAINLEVEL_OUTOF10: 3
PAINLEVEL_OUTOF10: 3
PAINLEVEL_OUTOF10: 5

## 2024-08-24 ASSESSMENT — PAIN DESCRIPTION - LOCATION: LOCATION: ABDOMEN

## 2024-08-24 ASSESSMENT — PAIN DESCRIPTION - DESCRIPTORS: DESCRIPTORS: ACHING;DISCOMFORT

## 2024-08-24 ASSESSMENT — PAIN DESCRIPTION - ORIENTATION: ORIENTATION: MID;LOWER

## 2024-08-24 NOTE — PROGRESS NOTES
General Surgery   Daily Progress Note  Patient: Pao Wahl      CC: colitis, concern for ischemic colitis    SUBJECTIVE:   Overnight, the patient had bowel movements. She did not void and required straight cath. She occasionally complains of abdominal pain.     ROS:   A 14 point review of systems was conducted, significant findings as noted above. All other systems negative.    OBJECTIVE:    PHYSICAL EXAM:    Vitals:    08/23/24 1427 08/23/24 2003 08/23/24 2353 08/24/24 0320   BP: (!) 152/54 (!) 146/61 (!) 128/52 (!) 158/62   Pulse: 75 65 63 64   Resp: 18 18 18    Temp:  99.4 °F (37.4 °C) 98.8 °F (37.1 °C) 98.1 °F (36.7 °C)   TempSrc: Oral Axillary Axillary Axillary   SpO2: 96% 94% 96% 96%   Weight:       Height:           General appearance: alert, no acute distress  Eyes: No scleral icterus, EOM grossly intact  Neck: trachea midline, neck supple  Chest/Lungs: normal effort with no accessory muscle use, no signs of respiratory distress, on RA  Cardiovascular: RRR   Abdomen: Soft, appropriately-tender, incisions c/d/i and well approximated with Dermabond  Skin: warm and dry, no rashes  Extremities: no edema, no cyanosis  Neuro: A&Ox3, no focal deficits, sensation intact    ASSESSMENT & PLAN:   This is a 79 y.o. female with a Hx of CHF, A-fib, valve replacement on Coumadin, chronic kidney disease, diabetes, COPD, lymphoma in remission, hypertension, hyperlipidemia, anemia, chronic back pain, surgical history of Angy for SBO, hysterectomy with worry for ischemic colitis now s/p diagnostic laparoscopy on 8/22 likely colitis    - advance to FLD, needs assistance with diet  - Void check by 8am  - Heparin gtt per vascular  - IV abx per primary  - PT/OT, OOB to chair  - pain control  - Recommend Cardiology consult for management of heart failure  - Medical management per primary    Jalen Adorno DO  PGY-1, General Surgery Resident  08/24/24 7:02 AM  [employer]  [phone]      I reviewed with the resident the medical  history and the resident's findings on the physical examination.  I discussed with the resident the patient's diagnosis and concur with the plan.    Darron Mirza M.D.  8/24/24   9:13 AM

## 2024-08-24 NOTE — PLAN OF CARE
Problem: Pain  Goal: Verbalizes/displays adequate comfort level or baseline comfort level  8/24/2024 1111 by Abimbola Alberto, RN  Outcome: Progressing  Flowsheets (Taken 8/24/2024 0425 by Delilah Vang, RN)  Verbalizes/displays adequate comfort level or baseline comfort level:   Encourage patient to monitor pain and request assistance   Administer analgesics based on type and severity of pain and evaluate response    Problem: ABCDS Injury Assessment  Goal: Absence of physical injury  Outcome: Progressing  Flowsheets (Taken 8/23/2024 1304)  Absence of Physical Injury: Implement safety measures based on patient assessment     Problem: Safety - Adult  Goal: Free from fall injury  Outcome: Progressing  Flowsheets (Taken 8/23/2024 1304)  Free From Fall Injury: Instruct family/caregiver on patient safety

## 2024-08-24 NOTE — PLAN OF CARE
Problem: Pain  Goal: Verbalizes/displays adequate comfort level or baseline comfort level  Flowsheets (Taken 8/24/2024 1855)  Verbalizes/displays adequate comfort level or baseline comfort level:   Encourage patient to monitor pain and request assistance   Administer analgesics based on type and severity of pain and evaluate response

## 2024-08-24 NOTE — PROGRESS NOTES
Patient had not urinated after catheter removal. Bladder scan completed for 397mL. Dr. Adorno gave verbal order to straight cath patient. Lynn VALENTINE and this RN used sterile procedure and straight cath'd pt for 325mL. Will continue to monitor for further urine output from patient.     Electronically signed by Delilah Vang RN on 8/24/2024 at 1:01 AM

## 2024-08-24 NOTE — CONSULTS
[facility]  Cardiology Inpatient Consult Service                                                                                          Pt Name: Pao Wahl  Age: 79 y.o.  Sex: female  : 1945  Location: 53/5316-01    Referring Physician: Rakesh Maldonado*      Reason for Consult:       Reason for Consultation/Chief Complaint:   PPM/mechanical mitral/mechanical aortic valve      HPI:      Pao Wahl is a 79 y.o. female with a past medical history of mechanical aortic valve, pacemaker, presented to have failure with pneumobilia, FOX.  Noted to have colitis.  She is postop day 2 of his diagnostic laparoscopy.  We are consulted due to underlying cardiac conditions.  She feels well from a cardiovascular perspective    Histories     Past Medical History:   has a past medical history of A-fib ([facility]), Anemia, Anemia:multifactorial, Anxiety, Cataract, CHF (congestive heart failure) ([facility]), Chronic back pain, CKD (chronic kidney disease) stage 3, GFR 30-59 ml/min ([facility]), Colitis, ischemic ([facility]), COPD, Diabetes, Diabetic eye exam ([facility]), GERD (gastroesophageal reflux disease), H/O heart valve replacement with mechanical valve, Hx of mammogram, Hyperlipidaemia LDL goal <100, Hypertension, Insomnia, Long-term (current) use of anticoagulants, INR goal 2.5-3.5, Lymphoma:monoclonal B cell, Mammogram abnormal, Nonspecific abnormal results of kidney function study, Osteoarthritis, Renal cysts, right, RLS (restless legs syndrome), Sciatica, Screening colonoscopy, Therapeutic drug monitoring, Valvular heart disease, and Visit for screening mammogram.    Surgical History:   has a past surgical history that includes Aortic valve replacement; Mitral valve replacement; Hysterectomy; Cataract removal (13;14); Colonoscopy; pacemaker placement (); Tunneled venous port placement (Right, 10/3/2014); laparoscopy (2/3/15); laparoscopy (N/A, 2024); and laparotomy (N/A,  utilize these agents.    Thank you for allowing to us to participate in the care or Pao GABRIELLE Wahl. Further evaluation will be based upon the patient's clinical course and testing results.    I have spent 55 minutes of face to face time with the patient with more than 50% spent counseling and coordinating care.     All questions and concerns were addressed to the patient/family. Alternatives to my treatment were discussed. The note was completed using EMR. Every effort was made to ensure accuracy; however, inadvertent computerized transcription errors may be present. I have personally reviewed the reports and images of labs, radiological studies, cardiac studies including ECG's and telemetry, current and old medical records.     Raghu Shannon MD, FACC, [facility]  Advanced Cardiac Imaging  [facility]

## 2024-08-24 NOTE — PROGRESS NOTES
[facility] -  Clinical Pharmacy Note    Vancomycin - Management by Pharmcay  Consult Date(s):  8/21/24  Consulting Provider(s):  Dr Howard    Assessment / Plan  1)  Sepsis 2/2 pneumonia & pneumobilia - Vancomycin  Concurrent Antimicrobials: Zosyn  Day of Vanc Therapy / Ordered Duration: 4 of 7  Current Dosing Method: Intermittent Dosing by Levels  Therapeutic Goal: -600 mg/L*hr  Current Dose / Plan:   Pt with CKD; baseline SCr appears to be ~1.6 per chart review.  SCr remains elevated today at 1.9.  Will continue to dose Vancomycin intermittently based on levels for now given CKD and low body weight (52.2kg).  Random level this AM = 19.8 mcg/mL  Will give 500mg IV x1 today.  Will check random level in AM tomorrow.    Will continue to monitor clinical condition and make adjustments to regimen as appropriate.    2)  A.fib, AVR - Warfarin  Warfarin d/c'd 8/22 for emergent OR.    Will monitor for plan re: resuming oral anticoagulation.  If Warfarin is resumed, please re-consult at that time.    Please call with any questions.  No Hoffman, PharmD, [facility]  Main pharmacy: [id number]  8/24/2024 7:51 AM      Interval update:  Unable to void, requiring straight cath. Bowel function has returned. Remains on heparin drip. Diagnostic lap on 8/22 revealed likely colitis.    Subjective/Objective:   Pao Wahl is a 79 y.o. female with a PMHx significant for A-fib, h/o AVR on warfarin, CHF, CKD, diabetes, hypertension, hyperlipidemia, anxiety, chronic back pain, DM, GERD, RLS and follicular lymphoma who is admitted with sepsis, possible pneumonia, and pneumobilia seen on CT.  Pt is s/p emergent Dx lap (done 8/22/24) d/t concern for ischemic bowel.      Pharmacy is consulted to manage vancomycin.    Ht Readings from Last 1 Encounters:   08/22/24 1.499 m (4' 11\")     Wt Readings from Last 1 Encounters:   08/22/24 52.2 kg (115 lb)     Current & Prior Antimicrobial Regimen(s):   (8/21 - 8/22)   (8/21-8/22)  Zosyn  (8/22-current)  Vancomycin - Pharmacy to dose  Intermittent dosing (8/21-current)    Vancomycin Level(s) / Doses:    Date Time Dose Type of Level / Level Interpretation   8/22 01:38  06:00  17:31 1250mg x1 LD  --  750mg x1   Random = 22.9 mcg/mL Intermittent dosing; SCr 1.6--> 1.5.   8/23 06:30  11:25 --  500mg x1  Random = 19.9 mcg/mL Intermittent dosing; SCr 1.9.   8/24 04:40  11:30 --  500 mg x1 ordered Random = 19.8 mcg/mL Intermittent dosing; SCr 1.9.   Note: Serum levels collected for AUC-based dosing may be high if collected in close proximity to the dose administered. This is not necessarily indicative of toxicity.    Cultures & Sensitivities:    Date Site Micro Susceptibility / Result   8/21 COVID-19  Influenza A/B Not detected  Not detected    8/21 Blood x 2 CNS in 1 bottle only Contaminant?   8/21 Urine  <10k Staph epi No further w/u   8/22 MRSA nasal PCR sent      Recent Labs     08/22/24  0600 08/22/24  1820 08/23/24  0630 08/24/24  0440   CREATININE 1.5* 1.6* 1.9* 1.9*   BUN 27* 31* 40* 42*   WBC 25.2*  --  16.6* 15.4*       Estimated Creatinine Clearance: 19 mL/min (A) (based on SCr of 1.9 mg/dL (H)).    Additional Lab Values / Findings of Note:    No results for input(s): \"PROCAL\" in the last 72 hours.,     Prior / Home Warfarin Regimen:  Goal INR 2.5-3.0  Warfarin 2mg on Tut-Nit-Xpik-Fri-Sat + 3mg on Sun & Tue  Warfarin is managed by pt's PCP, Dr Ramirez  Last dose taken at home 8/20/24 (3 mg)    Date INR Warfarin Other   8/21 1.39 4 mg    8/22 2.94 -- Vit K 10mg IV + 4F-PCC 1000 units   8/23 1.62               Recent Labs     08/22/24  0600 08/22/24  1040 08/22/24  1714 08/22/24  1820 08/23/24  0630 08/24/24  0440   INR 2.94*   < > 1.61*  --  1.42* 1.40*   HGB 10.3*  --   --   --  9.8* 8.9*   *  --   --   --  127* 135   CREATININE 1.5*  --   --  1.6* 1.9* 1.9*    < > = values in this interval not displayed.

## 2024-08-24 NOTE — PROGRESS NOTES
V2.0    [facility] [place]      Name:  Pao Wahl /Age/Sex: 1945  (79 y.o. female)   MRN & CSN:  [id number] & [id number] Encounter Date/Time: 2024 5:31 PM EDT   Location:  5316/5316-01 PCP: Juliette Ramirez MD     Attending:Rakesh Maldonado*       Hospital Day: 4    Assessment and Recommendations     79-year-old female presenting with neck pain and admitted for possible pneumobilia and possible ischemic colitis.  She has a history of follicular lymphoma in remission, hypertension, dyslipidemia, A-fib on anticoagulation.  CKD.  Lactic acid levels were elevated on presentation.  Significant leukocytosis with abdominal imaging showing possible ischemic colitis.  Was taken urgently to the OR for diagnostic laparoscopy.  Had lysis of adhesions.  Also with a metabolic acidosis possibly secondary to colitis.     Possible ischemic colitis  History of ischemic colitis  Elevated lactic acid  Abdominal pain  -Status post exploratory laparoscopy.  Appreciate surgery recommendations.  Continue IV fluids as ordered.  Diet as per surgery's recommendations.  -Advance diet as tolerated    Valvular heart disease  Status post mitral valve replacement  -Currently on heparin drip.  Will need to go back to warfarin with goal INR of 2.5-3.5.  Once cleared by surgery will consult pharmacy to assist.  -Cardiology consult as ordered    History of follicular lymphoma in remission    Essential hypertension  -Continue current home meds.    History of chronic mesenteric ischemia  -Vascular surgery consult.  Conservative management for now.  Will follow recommendations.  To continue heparin drip    Leukocytosis   -Unclear etiology possibly from colitis.  Check stool studies    CKD stage III  Metabolic acidosis  FOX  -BMP in the a.m.  Persistent.  Appreciate nephrology.    Type II DM  -Medium sliding scale added    Diet ADULT DIET; Full Liquid   DVT Prophylaxis [] Lovenox, [x]  Heparin, [] SCDs, [] Ambulation,  []  and radiation dose reduction techniques were employed. CT CHEST: There is mild hazy bibasilar groundglass opacity with prominent septal thickening noted. The appearance is suggestive of atypical pneumonia or edema. No suspicious parenchymal masses are identified. No mediastinal or hilar adenopathy is identified. No axillary adenopathy is identified. Arch size is prominent with coronary calcification noted. Limited views of the upper abdomen show no acute abnormality. Osseous structures are unremarkable.     Patchy groundglass airspace disease with septal thickening suggesting atypical pneumonia or edema. Coronary calcification. Electronically signed by Eugenio Montejo MD    US GALLBLADDER RUQ    Result Date: 8/21/2024  EXAM: US GALLBLADDER RUQ INDICATION: Cholecystitis? COMPARISON: CT 5 hours prior TECHNIQUE: Real-time grayscale and Doppler images of the abdomen were obtained, with attention directed to the right upper quadrant. FINDINGS: Liver: Branching echogenicity in the left hepatic lobe corresponding to pneumobilia seen on recent CT. No focal hepatic lesions. Biliary/CBD: 3 mm. No intra or extrahepatic ductal dilatation. Gallbladder: No stones, wall thickening or pericholecystic fluid.. The sonographic Sharp sign is reported as negative. Pancreas: Visualized portions are unremarkable. Right kidney: 9.3 cm in length. Normal parenchymal echogenicity.  No hydronephrosis. Other: No free fluid.     No sonographic evidence of acute cholecystitis. Pneumobilia. Electronically signed by Jai Curry    CT ABDOMEN PELVIS WO CONTRAST Additional Contrast? None    Result Date: 8/21/2024  EXAM: CT ABDOMEN PELVIS WO CONTRAST INDICATION: abdominal pain, leukocytosis, Pain COMPARISON: None. TECHNIQUE: Axial CT imaging obtained from lung bases through pelvis. Axial images and multiplanar reformatted images are provided for review.  Individualized dose optimization technique was used in order to meet ALARA standards for

## 2024-08-24 NOTE — PROGRESS NOTES
Patient is not allowing RN or PCA to get blood sugar. Pt was calmly educated on importance but still confused and refusing.     Electronically signed by Delilah Vang RN on 8/23/2024 at 10:12 PM

## 2024-08-24 NOTE — PROGRESS NOTES
Vascular Surgery   Daily Progress Note  Patient: Pao Wahl      CC: colitis, concern for ischemic colitis    SUBJECTIVE:   Overnight, the patient had bowel movements. She did not void and required straight cath. She occasionally complains of abdominal pain.     ROS:   A 14 point review of systems was conducted, significant findings as noted above. All other systems negative.    OBJECTIVE:    PHYSICAL EXAM:    Vitals:    08/23/24 1427 08/23/24 2003 08/23/24 2353 08/24/24 0320   BP: (!) 152/54 (!) 146/61 (!) 128/52 (!) 158/62   Pulse: 75 65 63 64   Resp: 18 18 18    Temp:  99.4 °F (37.4 °C) 98.8 °F (37.1 °C) 98.1 °F (36.7 °C)   TempSrc: Oral Axillary Axillary Axillary   SpO2: 96% 94% 96% 96%   Weight:       Height:           General appearance: alert, no acute distress  Eyes: No scleral icterus, EOM grossly intact  Neck: trachea midline, neck supple  Chest/Lungs: normal effort with no accessory muscle use, no signs of respiratory distress, on RA  Cardiovascular: RRR   Abdomen: Soft, appropriately-tender, incisions c/d/i and well approximated with Dermabond  Skin: warm and dry, no rashes  Extremities: no edema, no cyanosis  Neuro: A&Ox3, no focal deficits, sensation intact    ASSESSMENT & PLAN:   This is a 79 y.o. female with a Hx of CHF, A-fib, valve replacement on Coumadin, chronic kidney disease, diabetes, COPD, lymphoma in remission, hypertension, hyperlipidemia, anemia, chronic back pain, surgical history of Angy for SBO, hysterectomy with worry for ischemic colitis now s/p diagnostic laparoscopy on 8/22 likely colitis    - Continue hep drip. Will discuss with Attending regarding anticoagulation goals  - Medical management per primary    Jalen Mosqueda DO  PGY-1, General Surgery Resident  08/24/24 7:07 AM  [employer]  [phone]    VASCULAR STAFF    Patient evaluated at the same time in conjunction with the resident Dr. mosqueda of which I performed greater than 50% of the history, physical exam, and/or medical

## 2024-08-25 LAB
ALBUMIN SERPL-MCNC: 3.1 G/DL (ref 3.4–5)
ALBUMIN/GLOB SERPL: 1.5 {RATIO} (ref 1.1–2.2)
ALP SERPL-CCNC: 65 U/L (ref 40–129)
ALT SERPL-CCNC: 26 U/L (ref 10–40)
ANION GAP SERPL CALCULATED.3IONS-SCNC: 14 MMOL/L (ref 3–16)
ANTI-XA UNFRAC HEPARIN: 0.11 IU/ML (ref 0.3–0.7)
ANTI-XA UNFRAC HEPARIN: 0.18 IU/ML (ref 0.3–0.7)
ANTI-XA UNFRAC HEPARIN: 0.33 IU/ML (ref 0.3–0.7)
AST SERPL-CCNC: 31 U/L (ref 15–37)
BACTERIA BLD CULT ORG #2: NORMAL
BASOPHILS # BLD: 0 K/UL (ref 0–0.2)
BASOPHILS NFR BLD: 0.4 %
BILIRUB SERPL-MCNC: 0.5 MG/DL (ref 0–1)
BUN SERPL-MCNC: 31 MG/DL (ref 7–20)
CALCIUM SERPL-MCNC: 6.9 MG/DL (ref 8.3–10.6)
CHLORIDE SERPL-SCNC: 109 MMOL/L (ref 99–110)
CO2 SERPL-SCNC: 19 MMOL/L (ref 21–32)
CREAT SERPL-MCNC: 1.5 MG/DL (ref 0.6–1.2)
DEPRECATED RDW RBC AUTO: 13.7 % (ref 12.4–15.4)
EOSINOPHIL # BLD: 0 K/UL (ref 0–0.6)
EOSINOPHIL NFR BLD: 0.3 %
GFR SERPLBLD CREATININE-BSD FMLA CKD-EPI: 35 ML/MIN/{1.73_M2}
GI PATHOGENS PNL STL NAA+PROBE: NORMAL
GLUCOSE BLD-MCNC: 118 MG/DL (ref 70–99)
GLUCOSE BLD-MCNC: 254 MG/DL (ref 70–99)
GLUCOSE BLD-MCNC: 295 MG/DL (ref 70–99)
GLUCOSE BLD-MCNC: 297 MG/DL (ref 70–99)
GLUCOSE SERPL-MCNC: 114 MG/DL (ref 70–99)
HCT VFR BLD AUTO: 27.3 % (ref 36–48)
HGB BLD-MCNC: 9.4 G/DL (ref 12–16)
INR PPP: 1.49 (ref 0.85–1.15)
LACTATE BLDV-SCNC: 1 MMOL/L (ref 0.4–2)
LYMPHOCYTES # BLD: 1.5 K/UL (ref 1–5.1)
LYMPHOCYTES NFR BLD: 11.6 %
MCH RBC QN AUTO: 30.5 PG (ref 26–34)
MCHC RBC AUTO-ENTMCNC: 34.5 G/DL (ref 31–36)
MCV RBC AUTO: 88.4 FL (ref 80–100)
MONOCYTES # BLD: 1.2 K/UL (ref 0–1.3)
MONOCYTES NFR BLD: 9 %
NEUTROPHILS # BLD: 10.5 K/UL (ref 1.7–7.7)
NEUTROPHILS NFR BLD: 78.7 %
PERFORMED ON: ABNORMAL
PLATELET # BLD AUTO: 142 K/UL (ref 135–450)
PMV BLD AUTO: 10.7 FL (ref 5–10.5)
POTASSIUM SERPL-SCNC: 3.3 MMOL/L (ref 3.5–5.1)
PROT SERPL-MCNC: 5.2 G/DL (ref 6.4–8.2)
PROTHROMBIN TIME: 18.1 SEC (ref 11.9–14.9)
RBC # BLD AUTO: 3.09 M/UL (ref 4–5.2)
SODIUM SERPL-SCNC: 142 MMOL/L (ref 136–145)
VANCOMYCIN SERPL-MCNC: 18.2 UG/ML
WBC # BLD AUTO: 13.3 K/UL (ref 4–11)

## 2024-08-25 PROCEDURE — 6370000000 HC RX 637 (ALT 250 FOR IP): Performed by: STUDENT IN AN ORGANIZED HEALTH CARE EDUCATION/TRAINING PROGRAM

## 2024-08-25 PROCEDURE — 2500000003 HC RX 250 WO HCPCS: Performed by: NURSE PRACTITIONER

## 2024-08-25 PROCEDURE — 85610 PROTHROMBIN TIME: CPT

## 2024-08-25 PROCEDURE — 2580000003 HC RX 258: Performed by: FAMILY MEDICINE

## 2024-08-25 PROCEDURE — 1200000000 HC SEMI PRIVATE

## 2024-08-25 PROCEDURE — 6370000000 HC RX 637 (ALT 250 FOR IP): Performed by: NURSE PRACTITIONER

## 2024-08-25 PROCEDURE — 85520 HEPARIN ASSAY: CPT

## 2024-08-25 PROCEDURE — 99232 SBSQ HOSP IP/OBS MODERATE 35: CPT | Performed by: HOSPITALIST

## 2024-08-25 PROCEDURE — 6370000000 HC RX 637 (ALT 250 FOR IP): Performed by: FAMILY MEDICINE

## 2024-08-25 PROCEDURE — 80053 COMPREHEN METABOLIC PANEL: CPT

## 2024-08-25 PROCEDURE — 85025 COMPLETE CBC W/AUTO DIFF WBC: CPT

## 2024-08-25 PROCEDURE — 6370000000 HC RX 637 (ALT 250 FOR IP)

## 2024-08-25 PROCEDURE — 6360000002 HC RX W HCPCS: Performed by: FAMILY MEDICINE

## 2024-08-25 PROCEDURE — 83605 ASSAY OF LACTIC ACID: CPT

## 2024-08-25 PROCEDURE — 80202 ASSAY OF VANCOMYCIN: CPT

## 2024-08-25 PROCEDURE — 99231 SBSQ HOSP IP/OBS SF/LOW 25: CPT | Performed by: SURGERY

## 2024-08-25 RX ORDER — METOPROLOL TARTRATE 25 MG/1
25 TABLET, FILM COATED ORAL 2 TIMES DAILY
Status: DISCONTINUED | OUTPATIENT
Start: 2024-08-25 | End: 2024-08-26

## 2024-08-25 RX ADMIN — AMLODIPINE BESYLATE 10 MG: 10 TABLET ORAL at 09:12

## 2024-08-25 RX ADMIN — ACETAMINOPHEN 1000 MG: 500 TABLET ORAL at 03:09

## 2024-08-25 RX ADMIN — POTASSIUM BICARBONATE 20 MEQ: 782 TABLET, EFFERVESCENT ORAL at 09:13

## 2024-08-25 RX ADMIN — ALPRAZOLAM 0.5 MG: 0.5 TABLET ORAL at 20:16

## 2024-08-25 RX ADMIN — ATORVASTATIN CALCIUM 10 MG: 10 TABLET, FILM COATED ORAL at 09:13

## 2024-08-25 RX ADMIN — PANTOPRAZOLE SODIUM 20 MG: 20 TABLET, DELAYED RELEASE ORAL at 06:26

## 2024-08-25 RX ADMIN — METHOCARBAMOL TABLETS 750 MG: 750 TABLET, COATED ORAL at 12:34

## 2024-08-25 RX ADMIN — ACETAMINOPHEN 1000 MG: 500 TABLET ORAL at 20:08

## 2024-08-25 RX ADMIN — POTASSIUM BICARBONATE 20 MEQ: 782 TABLET, EFFERVESCENT ORAL at 20:08

## 2024-08-25 RX ADMIN — METOPROLOL TARTRATE 2.5 MG: 1 INJECTION, SOLUTION INTRAVENOUS at 09:14

## 2024-08-25 RX ADMIN — PIPERACILLIN AND TAZOBACTAM 3375 MG: 3; .375 INJECTION, POWDER, LYOPHILIZED, FOR SOLUTION INTRAVENOUS at 15:27

## 2024-08-25 RX ADMIN — ALPRAZOLAM 0.5 MG: 0.5 TABLET ORAL at 09:13

## 2024-08-25 RX ADMIN — INSULIN LISPRO 1 UNITS: 100 INJECTION, SOLUTION INTRAVENOUS; SUBCUTANEOUS at 12:48

## 2024-08-25 RX ADMIN — ACETAMINOPHEN 1000 MG: 500 TABLET ORAL at 15:25

## 2024-08-25 RX ADMIN — METHOCARBAMOL TABLETS 750 MG: 750 TABLET, COATED ORAL at 09:12

## 2024-08-25 RX ADMIN — METHOCARBAMOL TABLETS 750 MG: 750 TABLET, COATED ORAL at 20:08

## 2024-08-25 RX ADMIN — PIPERACILLIN AND TAZOBACTAM 3375 MG: 3; .375 INJECTION, POWDER, LYOPHILIZED, FOR SOLUTION INTRAVENOUS at 03:09

## 2024-08-25 RX ADMIN — VANCOMYCIN HYDROCHLORIDE 500 MG: 1 INJECTION, POWDER, LYOPHILIZED, FOR SOLUTION INTRAVENOUS at 12:34

## 2024-08-25 RX ADMIN — METOPROLOL TARTRATE 2.5 MG: 1 INJECTION, SOLUTION INTRAVENOUS at 03:08

## 2024-08-25 RX ADMIN — METHOCARBAMOL TABLETS 750 MG: 750 TABLET, COATED ORAL at 15:25

## 2024-08-25 RX ADMIN — INSULIN LISPRO 2 UNITS: 100 INJECTION, SOLUTION INTRAVENOUS; SUBCUTANEOUS at 17:42

## 2024-08-25 RX ADMIN — METOPROLOL TARTRATE 25 MG: 25 TABLET, FILM COATED ORAL at 17:28

## 2024-08-25 ASSESSMENT — PAIN SCALES - GENERAL
PAINLEVEL_OUTOF10: 3
PAINLEVEL_OUTOF10: 4
PAINLEVEL_OUTOF10: 0
PAINLEVEL_OUTOF10: 2
PAINLEVEL_OUTOF10: 0

## 2024-08-25 ASSESSMENT — PAIN DESCRIPTION - LOCATION
LOCATION: ABDOMEN
LOCATION: ABDOMEN

## 2024-08-25 NOTE — PROGRESS NOTES
V2.0    [facility] [place]      Name:  Pao Wahl /Age/Sex: 1945  (79 y.o. female)   MRN & CSN:  [id number] & [id number] Encounter Date/Time: 2024 5:31 PM EDT   Location:  5316/5316-01 PCP: Juliette Ramirez MD     Attending:Rakesh Maldonado*       Hospital Day: 5    Assessment and Recommendations     79-year-old female presenting with neck pain and admitted for possible pneumobilia and possible ischemic colitis.  She has a history of follicular lymphoma in remission, hypertension, dyslipidemia, A-fib on anticoagulation.  CKD.  Lactic acid levels were elevated on presentation.  Significant leukocytosis with abdominal imaging showing possible ischemic colitis.  Was taken urgently to the OR for diagnostic laparoscopy.  Had lysis of adhesions.  Also with a metabolic acidosis possibly secondary to colitis.  Overall improvement    Possible ischemic colitis  History of ischemic colitis  Elevated lactic acid  Abdominal pain  -Status post exploratory laparoscopy.  Appreciate surgery recommendations.    Diet as per surgery's recommendations.  -Advance diet as tolerated.    Valvular heart disease  Status post mitral valve replacement  -Currently on heparin drip.  Will need to go back to warfarin with goal INR of 2.5-3.5.  Bridge to warfarin on 2024.  -Cardiology consult as ordered    History of follicular lymphoma in remission    Essential hypertension  -Continue current home meds.    History of chronic mesenteric ischemia  -Vascular surgery consult.  Conservative management for now.  Will follow recommendations.  To continue heparin drip bridge to warfarin if continuing to be stable    Leukocytosis -improving  -Unclear etiology possibly from colitis.      CKD stage III  Metabolic acidosis  FOX  -BMP in the a.m.  Persistent.  Appreciate nephrology.    Type II DM  -Medium sliding scale     Diet ADULT DIET; Regular  ADULT ORAL NUTRITION SUPPLEMENT; Breakfast, Lunch, Dinner; Standard High      TSH: No results found for: \"TSH\"  Troponin: No results found for: \"TROPONINT\"  Lactic Acid:   Recent Labs     08/25/24  1236   LACTA 1.0     BNP:   Recent Labs     08/23/24  0630   PROBNP 30,944*     UA:  Lab Results   Component Value Date/Time    NITRU Negative 08/21/2024 06:45 AM    COLORU Yellow 08/21/2024 06:45 AM    PHUR 6.5 08/21/2024 06:45 AM    PHUR 6.0 12/04/2022 09:11 PM    LABCAST CANCELED 01/30/2012 11:29 AM    WBCUA 3-5 08/21/2024 06:45 AM    RBCUA None seen 08/21/2024 06:45 AM    YEAST CANCELED 01/30/2012 11:29 AM    BACTERIA 2+ 12/04/2022 09:11 PM    CLARITYU Clear 08/21/2024 06:45 AM    LEUKOCYTESUR Negative 08/21/2024 06:45 AM    UROBILINOGEN 0.2 08/21/2024 06:45 AM    BILIRUBINUR Negative 08/21/2024 06:45 AM    BLOODU Negative 08/21/2024 06:45 AM    GLUCOSEU >=1000 08/21/2024 06:45 AM    GLUCOSEU TRACE 01/30/2012 11:29 AM    KETUA Negative 08/21/2024 06:45 AM    AMORPHOUS 1+ 12/04/2022 09:11 PM     Urine Cultures:   Lab Results   Component Value Date/Time    LABURIN <10,000 CFU/ml  No further workup   08/21/2024 06:45 AM     Blood Cultures:   Lab Results   Component Value Date/Time    BC See additional report for complete BCID panel. 08/21/2024 04:57 PM    BC  08/21/2024 04:57 PM     POSITIVE for  This organism was isolated in one set.  Susceptibility testing is not routinely done as this  organism frequently represents skin contamination.  Additional testing can be ordered by calling the  Microbiology Department.       Lab Results   Component Value Date/Time    BLOODCULT2  08/21/2024 04:47 PM     No Growth to date.  Any change in status will be called.     Organism:   Lab Results   Component Value Date/Time    ORG Staphylococcus coagulase negative DNA Detected 08/21/2024 04:57 PM    ORG Staphylococcus coagulase-negative 08/21/2024 04:57 PM         Electronically signed by Rakesh Flores MD on 8/25/2024 at 2:03 PM

## 2024-08-25 NOTE — PROGRESS NOTES
General Surgery   Daily Progress Note  Patient: Pao Wahl      CC: colitis, concern for ischemic colitis    SUBJECTIVE:   No acute events overnight. Tolerating FLD without nausea/vomiting. Reporting abdominal pain is much improved, only endorsing in pressure at this time. Ambulating without issues. Having multiple loose bowel movements.     ROS:   A 14 point review of systems was conducted, significant findings as noted above. All other systems negative.    OBJECTIVE:    PHYSICAL EXAM:    Vitals:    08/24/24 1437 08/24/24 2005 08/24/24 2341 08/25/24 0307   BP: (!) 160/64 (!) 171/64 (!) 148/70 (!) 178/62   Pulse: 60 67 70 70   Resp: 16 16 16 16   Temp: 97.8 °F (36.6 °C) 98 °F (36.7 °C) 97.8 °F (36.6 °C) 98 °F (36.7 °C)   TempSrc: Oral Oral Oral Oral   SpO2: 97% 98% 99% 100%   Weight:       Height:           General appearance: alert, no acute distress  Eyes: No scleral icterus, EOM grossly intact  Neck: trachea midline, neck supple  Chest/Lungs: normal effort with no accessory muscle use, no signs of respiratory distress, on RA  Cardiovascular: RRR   Abdomen: Soft, appropriately-tender, incisions c/d/i and well approximated with Dermabond  Skin: warm and dry, no rashes  Extremities: no edema, no cyanosis  Neuro: A&Ox3, no focal deficits, sensation intact    ASSESSMENT & PLAN:   This is a 79 y.o. female with a Hx of CHF, A-fib, valve replacement on Coumadin, chronic kidney disease, diabetes, COPD, lymphoma in remission, hypertension, hyperlipidemia, anemia, chronic back pain, surgical history of Angy for SBO, hysterectomy with worry for ischemic colitis now s/p diagnostic laparoscopy on 8/22 likely colitis    - Advance to regular diet  - Heparin gtt per vascular bridge to warfarin tomorrow  - IV abx per primary  - PT/OT, OOB to chair  - pain control  - Cardiology following and signed off.   - Medical management per primary    Yanet Skelton DO  PGY1, General Surgery  08/25/24  7:13 AM  [phone]     I reviewed with the

## 2024-08-25 NOTE — PLAN OF CARE
Point of Care Note:  General Surgery  Pao Wahl    9:50 PM  8/24/2024    Serial abdominal exam    Patient assessed at bedside. Laying comfortably and reporting much improved abdominal pain. Ambulated the halls, passing gas and having bowel movements. Tolerating full liquid diet without nausea/vomiting. Denies lightheadedness/dizziness when ambulated.      Christelle Lee DO, Les  PGY-3, General Surgery  08/24/24  9:52 PM  [place]

## 2024-08-25 NOTE — PROGRESS NOTES
Nephrology Progress Note                                                                                                                                                                                                                                                                                                                                                               Office : [phone]     Fax :[phone]    Patient's Name: Pao Wahl  11:38 PM  8/24/2024    Reason for Consult:  acidosis  Requesting Physician:  Juliette Ramirez MD  Chief Complaint:    Chief Complaint   Patient presents with    Headache    Neck Pain       Assessment/Plan     FOX on CKD 3  Etiology post operative phase   Baseline creatinine 1.5, now 1.9, seem to have peaked   Metabolic acidosis with lactic acidosis  Better   Systolic hypertension  Pneumobilia      Plan   If NPO or poor intake, then please continue NS   Continue to HOLD Jardiance and Lisinopril   Continue Metoprolol and Amlodipine as per home dose   Sepsis management with antibiotics and as per surgery team      History of Present Ilness:    Pao Wahl is a 79 y.o. female with Hx of CHF, A-fib, valve replacement on Coumadin, chronic kidney disease, diabetes, COPD, history of lymphoma, hypertension, surgical history of Angy for SBO, hysterectomy presents to ED with complaint of neck pain. General surgery was consulted for pneumobilia seen on CT abdomen/pelvis. She was febrile, developed right sided abdominal pain and leucocytosis > 85783. CT A/P from 8/22 was concerning for the ischemia in the terminal ileum and right colon. She underwent dx laparoscopy and was found to have hyperemic bowel. Vascular surgery was consulted for further evaluation of chronic mesenteric ischemia.      Interval hx   She is not encephalopathic anymore.       Past Medical History:   Diagnosis Date    A-fib (HCC)     Anemia     multifactorial:under care of heme-onc:Dr. Bermudez

## 2024-08-25 NOTE — PROGRESS NOTES
[facility] -  Clinical Pharmacy Note    Vancomycin - Management by Pharmcay  Consult Date(s):  8/21/24  Consulting Provider(s):  Dr Howard    Assessment / Plan  1)  Sepsis 2/2 pneumonia & pneumobilia - Vancomycin  Concurrent Antimicrobials: Zosyn  Day of Vanc Therapy / Ordered Duration: 5 of 7  Current Dosing Method: Intermittent Dosing by Levels  Therapeutic Goal: -600 mg/L*hr  Current Dose / Plan:   Pt with CKD; baseline SCr appears to be ~1.6 per chart review.  SCr improved to baseline today, 1.5.  Random level this AM = 18.2 mcg/mL (drawn ~18h after prior dose), pt appears to be clearing vancomycin.  Will schedule 500 mg q24h for remaining 3 days of therapy.  Repeat levels will be ordered when appropriate to assess kinetics.  Will continue to monitor clinical condition and make adjustments to regimen as appropriate.    2)  A.fib, AVR - Warfarin  Warfarin d/c'd 8/22 for emergent OR.    Per surgery, plan to restart warfarin with heparin bridge tomorrow. If Warfarin is resumed, please re-consult at that time.    Please call with any questions.  No Hoffman, PharmD, [facility]  Main pharmacy: [id number]  8/25/2024 7:58 AM      Interval update:  Bowel function has returned, pt ambulating. Diet advanced today. Remains on heparin drip. Diagnostic lap on 8/22 revealed likely colitis.    Subjective/Objective:   Pao Wahl is a 79 y.o. female with a PMHx significant for A-fib, h/o AVR on warfarin, CHF, CKD, diabetes, hypertension, hyperlipidemia, anxiety, chronic back pain, DM, GERD, RLS and follicular lymphoma who is admitted with sepsis, possible pneumonia, and pneumobilia seen on CT.  Pt is s/p emergent Dx lap (done 8/22/24) d/t concern for ischemic bowel.      Pharmacy is consulted to manage vancomycin.    Ht Readings from Last 1 Encounters:   08/22/24 1.499 m (4' 11\")     Wt Readings from Last 1 Encounters:   08/22/24 52.2 kg (115 lb)     Current & Prior Antimicrobial Regimen(s):   (8/21 - 8/22)    (8/21-8/22)  Zosyn (8/22-current)  Vancomycin - Pharmacy to dose  Intermittent dosing (8/21-8/24)  500 mg q24h (8/25-current)    Vancomycin Level(s) / Doses:    Date Time Dose Type of Level / Level Interpretation   8/22 01:38  06:00  17:31 1250mg x1 LD  --  750mg x1   Random = 22.9 mcg/mL Intermittent dosing; SCr 1.6--> 1.5.   8/23 06:30  11:25 --  500mg x1  Random = 19.9 mcg/mL Intermittent dosing; SCr 1.9.   8/24 04:40  11:52 --  500 mg x1  Random = 19.8 mcg/mL Intermittent dosing; SCr 1.9.   8/25 05:53 -- Random = 18.2 mcg/mL Schedule 500 mg q24h   Note: Serum levels collected for AUC-based dosing may be high if collected in close proximity to the dose administered. This is not necessarily indicative of toxicity.    Cultures & Sensitivities:    Date Site Micro Susceptibility / Result   8/21 COVID-19  Influenza A/B Not detected  Not detected    8/21 Blood x 2 CNS in 1 bottle only Contaminant?   8/21 Urine  <10k Staph epi No further w/u   8/22 MRSA nasal PCR negative      Recent Labs     08/23/24  0630 08/24/24  0440 08/25/24  0445 08/25/24  0553   CREATININE 1.9* 1.9*  --  1.5*   BUN 40* 42*  --  31*   WBC 16.6* 15.4* 13.3*  --        Estimated Creatinine Clearance: 24 mL/min (A) (based on SCr of 1.5 mg/dL (H)).    Additional Lab Values / Findings of Note:    No results for input(s): \"PROCAL\" in the last 72 hours.,     Prior / Home Warfarin Regimen:  Goal INR 2.5-3.0  Warfarin 2mg on Ghm-Eis-Qxzn-Fri-Sat + 3mg on Sun & Tue  Warfarin is managed by pt's PCP, Dr Ramirez  Last dose taken at home 8/20/24 (3 mg)    Date INR Warfarin Other   8/21 1.39 4 mg    8/22 2.94 -- Vit K 10mg IV + 4F-PCC 1000 units   8/23 1.62               Recent Labs     08/23/24  0630 08/24/24  0440 08/25/24  0445 08/25/24  0553   INR 1.42* 1.40* 1.49*  --    HGB 9.8* 8.9* 9.4*  --    * 135 142  --    CREATININE 1.9* 1.9*  --  1.5*

## 2024-08-25 NOTE — PROGRESS NOTES
Vascular Surgery   Daily Progress Note  Patient: Pao Wahl      CC: colitis, concern for ischemic colitis    SUBJECTIVE:   No acute events overnight. Tolerating FLD without nausea/vomiting. Reporting abdominal pain is much improved, only endorsing in pressure at this time. Ambulating without issues. Having multiple loose bowel movements.     OBJECTIVE:    PHYSICAL EXAM:    General appearance: alert, no acute distress  Eyes: No scleral icterus, EOM grossly intact  Neck: trachea midline, neck supple  Chest/Lungs: normal effort with no accessory muscle use, no signs of respiratory distress, on RA  Cardiovascular: RRR   Abdomen: Soft, appropriately-tender, incisions c/d/i and well approximated with Dermabond  Skin: warm and dry, no rashes  Extremities: no edema, no cyanosis  Neuro: A&Ox3, no focal deficits, sensation intact    ASSESSMENT & PLAN:   This is a 79 y.o. female with a Hx of CHF, A-fib, valve replacement on Coumadin, chronic kidney disease, diabetes, COPD, lymphoma in remission, hypertension, hyperlipidemia, anemia, chronic back pain, surgical history of Angy for SBO, hysterectomy with worry for ischemic colitis now s/p diagnostic laparoscopy on 8/22 likely colitis    - Will discuss transitioning from Heparin gtt to home po Warfarin today given improvement in clinical exam, diet tolerance and bowel function  - Medical management per primary      Christelle Lee DO, MSMEd  PGY-3, General Surgery  08/25/24  5:39 AM  [facility]    VASCULAR STAFF    Patient evaluated at the same time in conjunction with the resident Dr. Lee of which I performed greater than 50% of the history, physical exam, and/or medical decision making:    No acute events overnight  Patient resting comfortably this morning without pain  Tolerating diet advancement to full liquids yesterday    No plan for vascular intervention as patient is tolerating diet which is not consistent with chronic mesenteric ischemia  Will continue to follow his

## 2024-08-25 NOTE — PROGRESS NOTES
Nephrology Progress Note                                                                                                                                                                                                                                                                                                                                                               Office : [phone]     Fax :[phone]    Patient's Name: Pao Wahl  2:54 PM  8/25/2024    Reason for Consult:  acidosis  Requesting Physician:  Juliette Ramirez MD  Chief Complaint:    Chief Complaint   Patient presents with    Headache    Neck Pain       Assessment/Plan     FOX on CKD 3  Etiology post operative phase   Baseline creatinine seem to be 1.5, trending down from 1.9 > 1.5  Metabolic acidosis with lactic acidosis  Better   Systolic hypertension  Pneumobilia      Plan   If NPO or poor intake, then please continue NS   Continue to HOLD Jardiance and Lisinopril   Continue Metoprolol and Amlodipine as per home dose   Sepsis management with antibiotics and as per surgery team  She is on heparin drip      History of Present Ilness:    Pao Wahl is a 79 y.o. female with Hx of CHF, A-fib, valve replacement on Coumadin, chronic kidney disease, diabetes, COPD, history of lymphoma, hypertension, surgical history of Angy for SBO, hysterectomy presents to ED with complaint of neck pain. General surgery was consulted for pneumobilia seen on CT abdomen/pelvis. She was febrile, developed right sided abdominal pain and leucocytosis > 73059. CT A/P from 8/22 was concerning for the ischemia in the terminal ileum and right colon. She underwent dx laparoscopy and was found to have hyperemic bowel. Vascular surgery was consulted for further evaluation of chronic mesenteric ischemia.      Interval hx   She is not encephalopathic anymore.       Past Medical History:   Diagnosis Date    A-fib (HCC)     Anemia     multifactorial:under care of

## 2024-08-25 NOTE — PLAN OF CARE
Problem: Safety - Adult  Goal: Free from fall injury  8/25/2024 1639 by Abimbola Alberto, RN  Outcome: Progressing  Flowsheets  Problem: Chronic Conditions and Co-morbidities  Goal: Patient's chronic conditions and co-morbidity symptoms are monitored and maintained or improved  Flowsheets   Care Plan - Patient's Chronic Conditions and Co-Morbidity Symptoms are Monitored and Maintained or Improved: Monitor and assess patient's chronic conditions and comorbid symptoms for stability, deterioration, or improvement      Problem: Skin/Tissue Integrity  Goal: Absence of new skin breakdown  Description: 1.  Monitor for areas of redness and/or skin breakdown  2.  Assess vascular access sites hourly  3.  Every 4-6 hours minimum:  Change oxygen saturation probe site  4.  Every 4-6 hours:  If on nasal continuous positive airway pressure, respiratory therapy assess nares and determine need for appliance change or resting period.  8/25/2024 1639 by Abimbola Alberto, RN  Outcome: Progressing

## 2024-08-25 NOTE — PLAN OF CARE
Problem: Discharge Planning  Goal: Discharge to home or other facility with appropriate resources  Outcome: Progressing  Flowsheets (Taken 8/25/2024 0321)  Discharge to home or other facility with appropriate resources:   Identify barriers to discharge with patient and caregiver   Arrange for needed discharge resources and transportation as appropriate   Refer to discharge planning if patient needs post-hospital services based on physician order or complex needs related to functional status, cognitive ability or social support system     Problem: ABCDS Injury Assessment  Goal: Absence of physical injury  Outcome: Progressing  Flowsheets (Taken 8/25/2024 0321)  Absence of Physical Injury: Implement safety measures based on patient assessment     Problem: Safety - Adult  Goal: Free from fall injury  Outcome: Progressing  Flowsheets (Taken 8/25/2024 0321)  Free From Fall Injury:   Instruct family/caregiver on patient safety   Based on caregiver fall risk screen, instruct family/caregiver to ask for assistance with transferring infant if caregiver noted to have fall risk factors     Problem: Skin/Tissue Integrity  Goal: Absence of new skin breakdown  Description: 1.  Monitor for areas of redness and/or skin breakdown  2.  Assess vascular access sites hourly  3.  Every 4-6 hours minimum:  Change oxygen saturation probe site  4.  Every 4-6 hours:  If on nasal continuous positive airway pressure, respiratory therapy assess nares and determine need for appliance change or resting period.  Outcome: Progressing

## 2024-08-26 VITALS
TEMPERATURE: 97.6 F | DIASTOLIC BLOOD PRESSURE: 50 MMHG | RESPIRATION RATE: 15 BRPM | HEIGHT: 59 IN | BODY MASS INDEX: 23.18 KG/M2 | WEIGHT: 115 LBS | HEART RATE: 68 BPM | SYSTOLIC BLOOD PRESSURE: 168 MMHG | OXYGEN SATURATION: 98 %

## 2024-08-26 PROBLEM — I50.9 CHRONIC CONGESTIVE HEART FAILURE (HCC): Status: ACTIVE | Noted: 2024-08-26

## 2024-08-26 LAB
ALBUMIN SERPL-MCNC: 2.9 G/DL (ref 3.4–5)
ALBUMIN SERPL-MCNC: 2.9 G/DL (ref 3.4–5)
ALBUMIN/GLOB SERPL: 1.5 {RATIO} (ref 1.1–2.2)
ALP SERPL-CCNC: 59 U/L (ref 40–129)
ALT SERPL-CCNC: 19 U/L (ref 10–40)
ANION GAP SERPL CALCULATED.3IONS-SCNC: 10 MMOL/L (ref 3–16)
ANION GAP SERPL CALCULATED.3IONS-SCNC: 9 MMOL/L (ref 3–16)
ANTI-XA UNFRAC HEPARIN: 0.3 IU/ML (ref 0.3–0.7)
AST SERPL-CCNC: 21 U/L (ref 15–37)
BASOPHILS # BLD: 0.1 K/UL (ref 0–0.2)
BASOPHILS NFR BLD: 0.5 %
BILIRUB SERPL-MCNC: 0.4 MG/DL (ref 0–1)
BUN SERPL-MCNC: 21 MG/DL (ref 7–20)
BUN SERPL-MCNC: 21 MG/DL (ref 7–20)
CALCIUM SERPL-MCNC: 6.8 MG/DL (ref 8.3–10.6)
CALCIUM SERPL-MCNC: 6.8 MG/DL (ref 8.3–10.6)
CHLORIDE SERPL-SCNC: 106 MMOL/L (ref 99–110)
CHLORIDE SERPL-SCNC: 107 MMOL/L (ref 99–110)
CO2 SERPL-SCNC: 21 MMOL/L (ref 21–32)
CO2 SERPL-SCNC: 22 MMOL/L (ref 21–32)
CREAT SERPL-MCNC: 1.2 MG/DL (ref 0.6–1.2)
CREAT SERPL-MCNC: 1.3 MG/DL (ref 0.6–1.2)
DEPRECATED RDW RBC AUTO: 13.8 % (ref 12.4–15.4)
EOSINOPHIL # BLD: 0.1 K/UL (ref 0–0.6)
EOSINOPHIL NFR BLD: 1.4 %
GFR SERPLBLD CREATININE-BSD FMLA CKD-EPI: 42 ML/MIN/{1.73_M2}
GFR SERPLBLD CREATININE-BSD FMLA CKD-EPI: 46 ML/MIN/{1.73_M2}
GLUCOSE BLD-MCNC: 161 MG/DL (ref 70–99)
GLUCOSE BLD-MCNC: 191 MG/DL (ref 70–99)
GLUCOSE BLD-MCNC: 244 MG/DL (ref 70–99)
GLUCOSE SERPL-MCNC: 183 MG/DL (ref 70–99)
GLUCOSE SERPL-MCNC: 187 MG/DL (ref 70–99)
HCT VFR BLD AUTO: 27.3 % (ref 36–48)
HGB BLD-MCNC: 9.2 G/DL (ref 12–16)
INR PPP: 1.51 (ref 0.85–1.15)
LYMPHOCYTES # BLD: 1.6 K/UL (ref 1–5.1)
LYMPHOCYTES NFR BLD: 15.6 %
MAGNESIUM SERPL-MCNC: 1.8 MG/DL (ref 1.8–2.4)
MCH RBC QN AUTO: 29.6 PG (ref 26–34)
MCHC RBC AUTO-ENTMCNC: 33.6 G/DL (ref 31–36)
MCV RBC AUTO: 88.3 FL (ref 80–100)
MONOCYTES # BLD: 1.1 K/UL (ref 0–1.3)
MONOCYTES NFR BLD: 10.1 %
NEUTROPHILS # BLD: 7.5 K/UL (ref 1.7–7.7)
NEUTROPHILS NFR BLD: 72.4 %
PERFORMED ON: ABNORMAL
PHOSPHATE SERPL-MCNC: 1.3 MG/DL (ref 2.5–4.9)
PLATELET # BLD AUTO: 143 K/UL (ref 135–450)
PMV BLD AUTO: 11.1 FL (ref 5–10.5)
POTASSIUM SERPL-SCNC: 3.4 MMOL/L (ref 3.5–5.1)
POTASSIUM SERPL-SCNC: 3.5 MMOL/L (ref 3.5–5.1)
PROT SERPL-MCNC: 4.8 G/DL (ref 6.4–8.2)
PROTHROMBIN TIME: 18.4 SEC (ref 11.9–14.9)
RBC # BLD AUTO: 3.1 M/UL (ref 4–5.2)
SODIUM SERPL-SCNC: 137 MMOL/L (ref 136–145)
SODIUM SERPL-SCNC: 138 MMOL/L (ref 136–145)
WBC # BLD AUTO: 10.4 K/UL (ref 4–11)

## 2024-08-26 PROCEDURE — 6370000000 HC RX 637 (ALT 250 FOR IP): Performed by: STUDENT IN AN ORGANIZED HEALTH CARE EDUCATION/TRAINING PROGRAM

## 2024-08-26 PROCEDURE — 6360000002 HC RX W HCPCS

## 2024-08-26 PROCEDURE — 94669 MECHANICAL CHEST WALL OSCILL: CPT

## 2024-08-26 PROCEDURE — 97535 SELF CARE MNGMENT TRAINING: CPT

## 2024-08-26 PROCEDURE — 99232 SBSQ HOSP IP/OBS MODERATE 35: CPT | Performed by: INTERNAL MEDICINE

## 2024-08-26 PROCEDURE — 6370000000 HC RX 637 (ALT 250 FOR IP): Performed by: INTERNAL MEDICINE

## 2024-08-26 PROCEDURE — 2500000003 HC RX 250 WO HCPCS: Performed by: INTERNAL MEDICINE

## 2024-08-26 PROCEDURE — 6370000000 HC RX 637 (ALT 250 FOR IP): Performed by: NURSE PRACTITIONER

## 2024-08-26 PROCEDURE — 85025 COMPLETE CBC W/AUTO DIFF WBC: CPT

## 2024-08-26 PROCEDURE — 6360000002 HC RX W HCPCS: Performed by: STUDENT IN AN ORGANIZED HEALTH CARE EDUCATION/TRAINING PROGRAM

## 2024-08-26 PROCEDURE — 6370000000 HC RX 637 (ALT 250 FOR IP)

## 2024-08-26 PROCEDURE — 85610 PROTHROMBIN TIME: CPT

## 2024-08-26 PROCEDURE — 80053 COMPREHEN METABOLIC PANEL: CPT

## 2024-08-26 PROCEDURE — 85520 HEPARIN ASSAY: CPT

## 2024-08-26 PROCEDURE — 2580000003 HC RX 258: Performed by: INTERNAL MEDICINE

## 2024-08-26 PROCEDURE — 2580000003 HC RX 258: Performed by: FAMILY MEDICINE

## 2024-08-26 PROCEDURE — 97116 GAIT TRAINING THERAPY: CPT

## 2024-08-26 PROCEDURE — 97530 THERAPEUTIC ACTIVITIES: CPT

## 2024-08-26 PROCEDURE — 6370000000 HC RX 637 (ALT 250 FOR IP): Performed by: FAMILY MEDICINE

## 2024-08-26 PROCEDURE — 83735 ASSAY OF MAGNESIUM: CPT

## 2024-08-26 PROCEDURE — 6360000002 HC RX W HCPCS: Performed by: FAMILY MEDICINE

## 2024-08-26 RX ORDER — ERGOCALCIFEROL 1.25 MG/1
50000 CAPSULE, LIQUID FILLED ORAL WEEKLY
Status: DISCONTINUED | OUTPATIENT
Start: 2024-08-26 | End: 2024-08-26 | Stop reason: HOSPADM

## 2024-08-26 RX ORDER — CARVEDILOL 3.12 MG/1
3.12 TABLET ORAL 2 TIMES DAILY WITH MEALS
Status: DISCONTINUED | OUTPATIENT
Start: 2024-08-26 | End: 2024-08-26 | Stop reason: HOSPADM

## 2024-08-26 RX ORDER — ENOXAPARIN SODIUM 100 MG/ML
1 INJECTION SUBCUTANEOUS DAILY
Qty: 4.2 ML | Refills: 0
Start: 2024-08-26 | End: 2024-09-02

## 2024-08-26 RX ORDER — WARFARIN SODIUM 3 MG/1
3 TABLET ORAL
Status: COMPLETED | OUTPATIENT
Start: 2024-08-26 | End: 2024-08-26

## 2024-08-26 RX ORDER — WARFARIN SODIUM 1 MG/1
TABLET ORAL
Qty: 192 TABLET | Refills: 3
Start: 2024-08-26

## 2024-08-26 RX ORDER — ENOXAPARIN SODIUM 100 MG/ML
1 INJECTION SUBCUTANEOUS 2 TIMES DAILY
Qty: 8.4 ML | Refills: 0
Start: 2024-08-26 | End: 2024-08-26

## 2024-08-26 RX ORDER — WARFARIN SODIUM 3 MG/1
3 TABLET ORAL
Status: DISCONTINUED | OUTPATIENT
Start: 2024-08-27 | End: 2024-08-26 | Stop reason: HOSPADM

## 2024-08-26 RX ORDER — ENOXAPARIN SODIUM 100 MG/ML
1 INJECTION SUBCUTANEOUS DAILY
Status: DISCONTINUED | OUTPATIENT
Start: 2024-08-26 | End: 2024-08-26 | Stop reason: HOSPADM

## 2024-08-26 RX ORDER — WARFARIN SODIUM 2 MG/1
2 TABLET ORAL
Status: DISCONTINUED | OUTPATIENT
Start: 2024-08-26 | End: 2024-08-26 | Stop reason: HOSPADM

## 2024-08-26 RX ADMIN — ERGOCALCIFEROL 50000 UNITS: 1.25 CAPSULE ORAL at 12:12

## 2024-08-26 RX ADMIN — ENOXAPARIN SODIUM 50 MG: 100 INJECTION SUBCUTANEOUS at 15:11

## 2024-08-26 RX ADMIN — WARFARIN SODIUM 3 MG: 3 TABLET ORAL at 15:11

## 2024-08-26 RX ADMIN — HEPARIN SODIUM 16 UNITS/KG/HR: 10000 INJECTION, SOLUTION INTRAVENOUS at 01:53

## 2024-08-26 RX ADMIN — METHOCARBAMOL TABLETS 750 MG: 750 TABLET, COATED ORAL at 08:27

## 2024-08-26 RX ADMIN — PANTOPRAZOLE SODIUM 20 MG: 20 TABLET, DELAYED RELEASE ORAL at 07:20

## 2024-08-26 RX ADMIN — PIPERACILLIN AND TAZOBACTAM 3375 MG: 3; .375 INJECTION, POWDER, LYOPHILIZED, FOR SOLUTION INTRAVENOUS at 12:15

## 2024-08-26 RX ADMIN — PIPERACILLIN AND TAZOBACTAM 3375 MG: 3; .375 INJECTION, POWDER, LYOPHILIZED, FOR SOLUTION INTRAVENOUS at 01:57

## 2024-08-26 RX ADMIN — VANCOMYCIN HYDROCHLORIDE 500 MG: 1 INJECTION, POWDER, LYOPHILIZED, FOR SOLUTION INTRAVENOUS at 10:55

## 2024-08-26 RX ADMIN — WARFARIN SODIUM 2 MG: 2 TABLET ORAL at 18:01

## 2024-08-26 RX ADMIN — ACETAMINOPHEN 1000 MG: 500 TABLET ORAL at 08:28

## 2024-08-26 RX ADMIN — INSULIN LISPRO 1 UNITS: 100 INJECTION, SOLUTION INTRAVENOUS; SUBCUTANEOUS at 12:12

## 2024-08-26 RX ADMIN — AMLODIPINE BESYLATE 10 MG: 10 TABLET ORAL at 08:28

## 2024-08-26 RX ADMIN — ALPRAZOLAM 0.5 MG: 0.5 TABLET ORAL at 08:33

## 2024-08-26 RX ADMIN — METHOCARBAMOL TABLETS 750 MG: 750 TABLET, COATED ORAL at 12:12

## 2024-08-26 RX ADMIN — POTASSIUM PHOSPHATE, MONOBASIC AND POTASSIUM PHOSPHATE, DIBASIC 20 MMOL: 224; 236 INJECTION, SOLUTION, CONCENTRATE INTRAVENOUS at 12:16

## 2024-08-26 RX ADMIN — DOCUSATE SODIUM 100 MG: 100 CAPSULE, LIQUID FILLED ORAL at 08:28

## 2024-08-26 RX ADMIN — ATORVASTATIN CALCIUM 10 MG: 10 TABLET, FILM COATED ORAL at 08:28

## 2024-08-26 RX ADMIN — METOPROLOL TARTRATE 25 MG: 25 TABLET, FILM COATED ORAL at 08:28

## 2024-08-26 RX ADMIN — ACETAMINOPHEN 1000 MG: 500 TABLET ORAL at 15:11

## 2024-08-26 RX ADMIN — CARVEDILOL 3.12 MG: 3.12 TABLET, FILM COATED ORAL at 18:01

## 2024-08-26 RX ADMIN — POTASSIUM BICARBONATE 20 MEQ: 782 TABLET, EFFERVESCENT ORAL at 08:27

## 2024-08-26 RX ADMIN — METHOCARBAMOL TABLETS 750 MG: 750 TABLET, COATED ORAL at 18:00

## 2024-08-26 NOTE — PROGRESS NOTES
Occupational Therapy  Facility/Department: [address]  Occupational Therapy Treatment    Name: Pao Wahl  : 1945  MRN: [id number]  Date of Service: 2024    Discharge Recommendations:  Subacute/Skilled Nursing Facility  OT Equipment Recommendations  Equipment Needed: No  Other: defer to d/c setting       Patient Diagnosis(es): The primary encounter diagnosis was Neck muscle spasm. Diagnoses of Pneumobilia, Leukocytosis, unspecified type, Chronic congestive heart failure, unspecified heart failure type ([facility]), Mesenteric ischemia ([facility]), and Long-term (current) use of anticoagulants, INR goal 2.5-3.5 were also pertinent to this visit.  Past Medical History:  has a past medical history of A-fib ([facility]), Anemia, Anemia:multifactorial, Anxiety, Cataract, CHF (congestive heart failure) ([facility]), Chronic back pain, CKD (chronic kidney disease) stage 3, GFR 30-59 ml/min ([facility]), Colitis, ischemic ([facility]), COPD, Diabetes, Diabetic eye exam ([facility]), GERD (gastroesophageal reflux disease), H/O heart valve replacement with mechanical valve, Hx of mammogram, Hyperlipidaemia LDL goal <100, Hypertension, Insomnia, Long-term (current) use of anticoagulants, INR goal 2.5-3.5, Lymphoma:monoclonal B cell, Mammogram abnormal, Nonspecific abnormal results of kidney function study, Osteoarthritis, Renal cysts, right, RLS (restless legs syndrome), Sciatica, Screening colonoscopy, Therapeutic drug monitoring, Valvular heart disease, and Visit for screening mammogram.  Past Surgical History:  has a past surgical history that includes Aortic valve replacement; Mitral valve replacement; Hysterectomy; Cataract removal (13;14); Colonoscopy; pacemaker placement (); Tunneled venous port placement (Right, 10/3/2014); laparoscopy (2/3/15); laparoscopy (N/A, 2024); and laparotomy (N/A, 2024).    Treatment Diagnosis: Impaired ADLs, mobility and activity tolerance      Assessment  Performance deficits / Impairments:  Stairs - Number of Steps: 1 + 1 through front door  Bathroom Shower/Tub: Walk-in shower  Bathroom Toilet: Standard  Bathroom Equipment: Shower chair, Hand-held shower  Home Equipment: Wheelchair - Manual, Alert button, Reacher, Sock aid  Has the patient had two or more falls in the past year or any fall with injury in the past year?: No  ADL Assistance: Independent  Homemaking Assistance: Needs assistance (cleaning lady weekly; does her easy meal prep and laundry, does her own grocery shopping)  Ambulation Assistance: Independent  Transfer Assistance: Independent  Active : Yes  Leisure & Hobbies: [employer] and [place] study  Additional Comments: 1 son lives close    Objective             Safety Devices  Type of Devices: Nurse notified;Call light within reach;Chair alarm in place;Left in chair    Balance  Sitting: Impaired (Static= SBA; Dynamic= CGA-Min A)  Standing: High guard (CGA-SBA w/ RW)    Rail Use: Right  Stairs - Level of Assistance: Contact-guard assistance  Number of Stairs Trained: 2    Toilet Transfers  Toilet - Technique: Ambulating  Equipment Used: Standard toilet  Toilet Transfer: Contact guard assistance       ADL  LE Dressing: Minimal assistance  LE Dressing Skilled Clinical Factors: pt threaded BLE into brief while seated EOB w/ Min A for dynamic sitting balance and increased time and VCs to sequence (i.e. threading RLE prior to LLE).  Toileting: Contact guard assistance  Toileting Skilled Clinical Factors: pt simulated w/ CGA    Functional Mobility: Contact guard assistance;Stand by assistance  Functional Mobility Skilled Clinical Factors: Pt demo functional mobility to/from bathroom w/ CGA w/o use of AD. PT demo functional mobility in room and in hallway using RW w/ CGA-SBA.             Transfers  Sit to stand: Contact guard assistance  Stand to sit: Contact guard assistance    Vision  Vision: Within Functional Limits  Vision Exceptions: Wears glasses for reading  Hearing  Hearing: Exceptions to

## 2024-08-26 NOTE — PROGRESS NOTES
Called [facility] and gave report to Gina. All questions answered at this time.  Electronically signed by Yumi Lafleur RN on 8/26/2024 at 4:15 PM

## 2024-08-26 NOTE — DISCHARGE INSTR - COC
Continuity of Care Form    Patient Name: Pao Wahl   :  1945  MRN:  [id number]    Admit date:  2024  Discharge date:  2024    Code Status Order: Full Code   Advance Directives:   Advance Care Flowsheet Documentation        Date/Time Healthcare Directive Type of Healthcare Directive Copy in Chart Healthcare Agent Appointed Healthcare Agent's Name Healthcare Agent's Phone Number    [phone] Yes, patient has an advance directive for healthcare treatment  Durable power of  for health care;Living will  No, copy requested from family  Adult Children  VishHerb (Child)  [phone]  Herb Wahl (Child)  [phone]                     Admitting Physician:  Maynor Howard MD  PCP: Juliette Ramirez MD    Discharging Nurse: Yumi  Discharging Hospital Unit/Room#: 5316/5316-01  Discharging Unit Phone Number: [phone]    Emergency Contact:   Extended Emergency Contact Information  Primary Emergency Contact: Herb Wahl  Mobile Phone: [phone]  Relation: Child  Secondary Emergency Contact: Efe Wahl  Mobile Phone: [phone]  Relation: Child    Past Surgical History:  Past Surgical History:   Procedure Laterality Date    AORTIC VALVE REPLACEMENT      CATARACT REMOVAL  13;14    Right;left respectively    COLONOSCOPY      \"twilight sleep didnt work\"    HYSTERECTOMY (CERVIX STATUS UNKNOWN)      Fibroids    LAPAROSCOPY  2/3/15    SBO:converted to open for lysis of adhesions    LAPAROSCOPY N/A 2024    DIAGNOSTIC LAPAROSCOPY performed by Darron Mirza MD at [facility] OR    LAPAROTOMY N/A 2024    . performed by Darron Mirza MD at [facility] OR    MITRAL VALVE REPLACEMENT      PACEMAKER PLACEMENT      for bradycardia, sympony    TUNNELED VENOUS PORT PLACEMENT Right 10/3/2014    ATTEMPTED RIGHT SUBCLAVIEN VEIN, PLACED RIGHT INTERNAL       Immunization History:   Immunization History   Administered Date(s) Administered    COVID-19, PFIZER GRAY top, DO NOT Dilute, (age  12 y+), IM, 30 mcg/0.3 mL 01/18/2022    COVID-19, PFIZER PURPLE top, DILUTE for use, (age 12 y+), 30mcg/0.3mL 01/14/2021, 02/04/2021, 01/18/2022    Influenza A ([id number]) Vaccine PF IM 12/30/2009    Influenza Vaccine, unspecified formulation 09/21/2015    Influenza Virus Vaccine 11/10/2007, 12/30/2009, 09/04/2010, 08/16/2011, 09/10/2013, 10/18/2014, 10/18/2014, 10/11/2016, 09/26/2017, 10/16/2017, 11/01/2017, 10/10/2018, 10/06/2020    Influenza Whole 09/10/2013    Influenza, AFLURIA (age 3 y+), FLUZONE, (age 6 mo+), Quadv MDV, 0.5mL 10/11/2016    Influenza, FLUAD, (age 65 y+), IM, Quadv, 0.5mL 10/13/2021    Influenza, FLUAD, (age 65 y+), IM, Trivalent PF, 0.5mL 09/26/2017, 10/02/2018, 09/25/2019    Influenza, FLUARIX, FLULAVAL, FLUZONE, (age 6 mo+), AFLURIA, (age 3 y+), IM, Trivalent PF, 0.5mL 09/25/2019    Influenza, FLUMIST, (age 2-49 y), Quadv Live, INTRANASAL, 0.2m 09/04/2010    Influenza, FLUMIST, (age 2-49 yr), Trivalent Live, INTRANASAL, 0.2mL 09/04/2010    Influenza, FLUZONE High Dose (age 65 y+), IM, Quadv, 0.7mL 10/11/2016, 10/02/2019, 10/06/2020, 09/01/2022    Influenza, FLUZONE High Dose, (age 65 y+), IM, Trivalent PF, 0.5mL 10/11/2016, 10/02/2019    Pneumococcal, PCV-13, PREVNAR 13, (age 6w+), IM, 0.5mL 09/21/2015, 02/02/2018    Pneumococcal, PPSV23, PNEUMOVAX 23, (age 2y+), SC/IM, 0.5mL 10/01/2006, 12/28/2011    Zoster Live (Zostavax) 03/14/2016    Zoster Recombinant (Shingrix) 10/01/2020, 03/02/2021       Active Problems:  Patient Active Problem List   Diagnosis Code    Aortic valve replaced Z95.2    Dyslipidemia E78.5    Anxiety disorder F41.9    Essential hypertension, benign I10    RLS (restless legs syndrome) G25.81    Anemia:multifactorial D64.9    Stage 3 chronic kidney disease, unspecified whether stage 3a or 3b CKD (HCC) N18.30    Lymphoma:monoclonal B cell C85.90    Chronic back pain M54.9, G89.29    Lumbar disc herniation M51.26    Insomnia G47.00    Hyperlipidemia with target LDL less than

## 2024-08-26 NOTE — PROGRESS NOTES
[facility] -  Clinical Pharmacy Note    Vancomycin  - Management by Pharmcay  Consult Date(s):  8/21/24   Consulting Provider(s):  Dr Howard    Assessment / Plan  1)  Sepsis 2/2 pneumonia & pneumobilia - Vancomycin  Concurrent Antimicrobials: Zosyn- will increase frequency to q8h as CrCl now > 20 ml/min  Day of Vanc Therapy / Ordered Duration: 5 of 7  Current Dosing Method: Intermittent Dosing by Levels  Therapeutic Goal: -600 mg/L*hr  Current Dose / Plan:   Pt with CKD; SCr today = 1.3 (has been trending down; appears to be at baseline now) . UOP not documented  Started scheduled dosing of 500 mg IV q24h yesterday   Estimated AUCss ~ 438 mg/L*hr and troughss ~ 13.6 mg/L on this regimen  Will re-check level tomorrow, since SCr has been changing  Will continue to monitor clinical condition and make adjustments to regimen as appropriate.         Warfarin - Management by Pharmacy    Consult Date(s): 8/26/24  Consulting Provider(s): Dr Flores    Assessment / Plan  AFib, AVR   Warfarin  Goal INR: 2.5 - 3.5  (per consult; outpatient goal is 2.5-3.0)  Concurrent Anticoagulants / Antiplatelets: Wt based heparin drip stopped- plan to transition to bridge with enoxaparin on discharge today  Interactions: No significant interactions noted.    Current Regimen / Plan:   DISCHARGE RECOMMENDATION  INR today = 1.51   (last warfarin dose given 8/21; received vitamin K and 4F-PCC on 8/22 for emergent surgery)  Will order 3 mg x1 prior to discharge (note this is 1 mg higher than normal home Monday dose)   Starting tomorrow, have patient resume home dosing of 3 mg on Sundays and Tuesdays, and 2 mg all other days   Enoxaparin 60 mg BID is ordered on discharge med list;  As current CrCl estimated < 30 ml/min, recommend decreasing enoxaparin dose on discharge to 50 mg ONCE DAILY for weight 52 kg.   Recommend continuing enoxaparin until INR 2.5-3.5 with close follow-up   If discharging to SNF, recommend daily  only Contaminant?   8/21 Urine  <10k Staph epi No further w/u   8/22 MRSA nasal PCR negative      Recent Labs     08/24/24  0440 08/25/24  0445 08/25/24  0553 08/26/24  0430   CREATININE 1.9*  --  1.5* 1.3*  1.2   BUN 42*  --  31* 21*  21*   WBC 15.4* 13.3*  --  10.4       Estimated Creatinine Clearance: 27 mL/min (A) (based on SCr of 1.3 mg/dL (H)).    Additional Lab Values / Findings of Note:    No results for input(s): \"PROCAL\" in the last 72 hours.,     Prior / Home Warfarin Regimen:  Goal INR 2.5-3.0  Warfarin 2mg on Bia-Nsj-Qkjb-Fri-Sat + 3mg on Sun & Tue  Warfarin is managed by pt's PCP, Dr Ramirez  Last dose taken at home 8/20/24 (3 mg)    Date INR Warfarin Other   8/21 1.39 4 mg    8/22 2.94  3.67  1.61 -- Vit K 10mg IV + 4F-PCC 1000 units   8/23 1.42 ---    8/24 1.40 ---    8/25 1.49 ---    8/26 1.51               Recent Labs     08/24/24  0440 08/25/24  0445 08/25/24  0553 08/26/24  0430   INR 1.40* 1.49*  --  1.51*   HGB 8.9* 9.4*  --  9.2*    142  --  143   CREATININE 1.9*  --  1.5* 1.3*  1.2

## 2024-08-26 NOTE — PROGRESS NOTES
General Surgery   Daily Progress Note  Patient: Pao Wahl      CC: colitis, concern for ischemic colitis    SUBJECTIVE:   Reports she is feeling better, denies pain. Has been eating without issue.     ROS:   A 14 point review of systems was conducted, significant findings as noted above. All other systems negative.    OBJECTIVE:    PHYSICAL EXAM:    Vitals:    08/25/24 1728 08/25/24 1935 08/25/24 2328 08/26/24 0405   BP: (!) 159/58 (!) 155/89 (!) 154/53 (!) 155/58   Pulse: 68 66 66 68   Resp:  16 16 17   Temp:  97.9 °F (36.6 °C) 98 °F (36.7 °C) 98.5 °F (36.9 °C)   TempSrc:  Oral Oral Oral   SpO2:  97% 98% 98%   Weight:       Height:           General appearance: alert, no acute distress  Chest/Lungs: normal effort with no accessory muscle use, no signs of respiratory distress, on RA  Cardiovascular: RRR   Abdomen: Soft, appropriately-tender, incisions c/d/i and well approximated with Dermabond  Skin: warm and dry, no rashes  Extremities: no edema, no cyanosis  Neuro: A&Ox3, no focal deficits, sensation intact    ASSESSMENT & PLAN:   This is a 79 y.o. female with a Hx of CHF, A-fib, valve replacement on Coumadin, chronic kidney disease, diabetes, COPD, lymphoma in remission, hypertension, hyperlipidemia, anemia, chronic back pain, surgical history of Angy for SBO, hysterectomy with worry for ischemic colitis now s/p diagnostic laparoscopy on 8/22 likely colitis    -  regular diet  - IV abx per primary  - PT/OT, OOB to chair  - pain control  - Medical management per primary    Charisma Amaya CNP   General Surgery  08/26/24  7:38 AM

## 2024-08-26 NOTE — PROGRESS NOTES
Patient alert, oriented to person place and situation. Forgetful at times.  Heparin drip infusing.   Tolerating clear liquid diet.   Walked in the room to bathroom. 1 BM noted over night.  Denies pain and nausea.  Generalized edema present.  Fall precautions in place, patient using the call light appropriately.

## 2024-08-26 NOTE — PROGRESS NOTES
Pharmacist Review and Automatic Dose Adjustment of Prophylactic Enoxaparin    Reviewed reason(s) for admission/hospital problem list    The reviewing pharmacist has made an adjustment to the ordered enoxaparin dose or converted to UFH per the approved [facility] protocol and table as identified below.        Pao Wahl is a 79 y.o. female.     Recent Labs     08/24/24  0440 08/25/24  0553 08/26/24  0430   CREATININE 1.9* 1.5* 1.3*  1.2       Estimated Creatinine Clearance: 27 mL/min (A) (based on SCr of 1.3 mg/dL (H)).    Recent Labs     08/25/24  0445 08/26/24  0430   HGB 9.4* 9.2*   HCT 27.3* 27.3*    143     Recent Labs     08/25/24  0445 08/26/24  0430   INR 1.49* 1.51*       Height:   Ht Readings from Last 1 Encounters:   08/22/24 1.499 m (4' 11\")     Weight:  Wt Readings from Last 1 Encounters:   08/22/24 52.2 kg (115 lb)               Plan: Based upon the patient's weight and renal function    Ordered:  enoxaparin 50 mg BID    Changed/converted to    New Order:  enoxaparin 50 mg daily      Thank you,  No Hoffman [facility]  8/26/2024, 12:48 PM

## 2024-08-26 NOTE — CARE COORDINATION
Patient is from home alone and is normally independent at baseline. I left a SNF list on Friday and her son was interested in having her go to [facility]. I sent a referral to them but also asked for two other choices. He agreed for me to send one to [facility] in [place].     Patient will need to bridge off of heparin drip.     Electronically signed by Amira Coates RN on 8/26/2024 at 12:05 PM  [phone]

## 2024-08-26 NOTE — PROGRESS NOTES
Nephrology Progress Note                                                                                                                                                                                                                                                                                                                                                               Office : [phone]     Fax :[phone]    Patient's Name: Pao Wahl  5:06 PM  8/26/2024    Reason for Consult:  acidosis  Requesting Physician:  Juliette Ramirez MD  Chief Complaint:    Chief Complaint   Patient presents with    Headache    Neck Pain       Assessment/Plan     FOX on CKD 3  Etiology post operative phase   Cr better   Metabolic acidosis with lactic acidosis- resolved   Better   Systolic hypertension  Pneumobilia  Hypokalemia       Plan   Off fluids   Continue to HOLD Jardiance and Lisinopril   Switch Metoprolol--> Coreg and Amlodipine as per home dose   Sepsis management with antibiotics and as per surgery team    History of Present Ilness:    Pao Wahl is a 79 y.o. female with Hx of CHF, A-fib, valve replacement on Coumadin, chronic kidney disease, diabetes, COPD, history of lymphoma, hypertension, surgical history of Angy for SBO, hysterectomy presents to ED with complaint of neck pain. General surgery was consulted for pneumobilia seen on CT abdomen/pelvis. She was febrile, developed right sided abdominal pain and leucocytosis > 64200. CT A/P from 8/22 was concerning for the ischemia in the terminal ileum and right colon. She underwent dx laparoscopy and was found to have hyperemic bowel. Vascular surgery was consulted for further evaluation of chronic mesenteric ischemia.      Interval hx   She is not encephalopathic anymore.   Cr beter  K 3,4-3,5   BP (SBP) elevated     Past Medical History:   Diagnosis Date    A-fib (HCC)     Anemia     multifactorial:under care of heme-onc:Dr. Bermudez    Anemia:multifactorial 2011    Pneumobilia.      Electronically signed by Jai Curry      CT ABDOMEN PELVIS WO CONTRAST Additional Contrast? None   Final Result      Nondependent pneumobilia. Correlate with history of recent instrumentation or   prior sphincterotomy.      Electronically signed by Jai Patricio      CT SOFT TISSUE NECK WO CONTRAST   Final Result   Increased number of nonenlarged and nonpathologic appearing cervical   lymph nodes. These may be reactive. Correlate clinically.      4 mm nodule of the right upper lobe. Per most recent Fleischner Society   Guidelines (Radiology 2017, DOI:10.1148/radiol.1230305081), for patients at low   risk for lung cancer, no further imaging follow-up is recommended. For high risk   patients (e.g. smokers), a follow-up noncontrast chest CT in 12 months is   optional.      Electronically signed by Karlo Pena      XR CHEST PORTABLE   Final Result   Hazy bilateral pulmonary opacities. Correlate for pneumonia.      Electronically signed by Karlo Pena      CT HEAD WO CONTRAST   Final Result      No acute intracranial hemorrhage or mass effect.      Electronically signed by Lisbeth Skelton DO            Medical Decision Making:  The following items were considered in medical decision making:  Discussion of patient care with other providers  Reviewed clinical lab tests  Reviewed radiology tests  Reviewed other diagnostic tests/interventions    Will be discussed w/  Primary team     Thank you for allowing us to participate in care of Pao GABRIELLE Wahl   Feel free to contact me,     Anjum Flower MD   [facility]  Office : [phone] or through [employer]  Fax :[phone]

## 2024-08-26 NOTE — CARE COORDINATION
Case Management Assessment            Discharge Note                    Date / Time of Note: 8/26/2024 4:01 PM                  Discharge Note Completed by: Amira Coates RN    Patient Name: Pao Wahl   YOB: 1945  Diagnosis: Pneumobilia [K83.8]  Neck muscle spasm [M62.838]  Leukocytosis, unspecified type [D72.829]   Date / Time: 8/21/2024  4:33 AM    Current PCP: Juliette Ramirez MD  Clinic patient: No    Hospitalization in the last 30 days: No       Advance Directives:  Code Status: Full Code  [place] DNR form completed and on chart: No    Financial:  Payor: MEDICARE / Plan: MEDICARE PART A AND B / Product Type: *No Product type* /      Pharmacy:    [id number] [address] [facility] [address] AND  -  [phone] - F [phone]  [address] [address] AND [address] [place] [address]  Phone: [phone] Fax: [phone]      Assistance purchasing medications?: Potential Assistance Purchasing Medications: No  Assistance provided by Case Management: None at this time    Does patient want to participate in local refill/ meds to beds program?: Yes    Meds To Beds General Rules:  1. Can ONLY be done Monday- Friday between 8:30am-5pm  2. Prescription(s) must be in pharmacy by 3pm to be filled same day  3.Copy of patient's insurance/ prescription drug card and patient face sheet must be sent along with the prescription(s)  4. Cost of Rx cannot be added to hospital bill. If financial assistance is needed, please contact unit  or ;  or  CANNOT provide pharmacy voucher for patients co-pays  5. Patients can then  the prescription on their way out of the hospital at discharge, or pharmacy can deliver to the bedside if staff is available. (payment due at time of pick-up or delivery - cash, check, or card accepted)     Able to afford home medications/ co-pay costs: Yes    ADLS:  Current PT AM-PAC Score: 17 /24  Current OT

## 2024-08-26 NOTE — PROGRESS NOTES
moving from lying on your back to sitting on the side of a flat bed without using bedrails?: A Little  How much help is needed moving to and from a bed to a chair?: A Little  How much help is needed standing up from a chair using your arms?: A Little  How much help is needed walking in hospital room?: A Little  How much help is needed climbing 3-5 steps with a railing?: A Lot  AM-PAC Inpatient Mobility Raw Score : 17  AM-PAC Inpatient T-Scale Score : 42.13  Mobility Inpatient CMS 0-100% Score: 50.57  Mobility Inpatient CMS G-Code Modifier : CK         Tinneti Score       Goals  Short Term Goals  Time Frame for Short Term Goals: D/C  Short Term Goal 1: supine<->sit SBA.  Ongoing  Short Term Goal 2: sit<->stand SBA.  Ongoing  Short Term Goal 3: Amb 50 ft wtih RW CGA.  MET 8/26.  Revised goal:  Pt will amb >250' with LRAD supervision  Short Term Goal 4: Pt will up/down flight of steps with rail/LRAD SBA- if going home  Patient Goals   Patient Goals : to get well       Education  Patient Education  Education Given To: Patient  Education Provided: Role of Therapy;Plan of Care;Transfer Training  Education Provided Comments: importance of OOB/frequent ambulation  Education Method: Verbal;Demonstration  Barriers to Learning: Cognition  Education Outcome: Continued education needed      Therapy Time   Individual Concurrent Group Co-treatment   Time In 1343         Time Out 1439         Minutes 56               Timed Code Treatment Minutes: 56      Total Treatment Minutes:  56    Shelia Kimbrough, PT

## 2024-08-26 NOTE — PROGRESS NOTES
Physician Progress Note      PATIENT:               VIRGIL SANTOS  CSN #:                  [id number]  :                       1945  ADMIT DATE:       2024 4:33 AM  DISCH DATE:  RESPONDING  PROVIDER #:        Rakesh Flores MD          QUERY TEXT:    Pt admitted with pneumobilia and pneumonia. Pt noted to have fever, increased   WBC, and increased LA. If possible, please document in the progress notes and   discharge summary if you are evaluating and /or treating any of the following:  The medical record reflects the following:  Risk Factors: Pneumobilia, pneumonia, acidosis  Clinical Indicators: Temp 101.9, WBC 17.7, LA 2.7  Surgery : Fever most   likley related to Pneumonia but recommend fever workup with next elevated   temp.  Surgery PN : Consider ID consult for suspicion infectious colitis  Internal medicine PN : Leukocytosis -Unclear etiology possibly from   colitis.  Check stool studies  Treatment: Administrated 1566ml NS bolus, iv NS 75ml/hr and iv abx per order.,   CX, CXRAY, CT scan, The patient was given Rocephin after the blood cultures   to cover for any kind of infectious process, Surgery.  Options provided:  -- Sepsis, likely due to acute ischemic colitis, present on admission  -- Acute ischemic colitis without Sepsis  -- Other - I will add my own diagnosis  -- Disagree - Not applicable / Not valid  -- Disagree - Clinically unable to determine / Unknown  -- Refer to Clinical Documentation Reviewer    PROVIDER RESPONSE TEXT:    This patient has acute ischemic colitis without Sepsis    Query created by: Reba Nunez on 2024 7:57 AM      QUERY TEXT:    Pt admitted with pneumobilia and pneumonia. Pt noted to have confusion. If   possible, please document in the progress notes and discharge summary if you   are evaluating and / or treating any of the following:    The medical record reflects the following:  Risk Factors: pneumobilia, acidosis, pneumonia  Clinical

## 2024-08-27 ENCOUNTER — CARE COORDINATION (OUTPATIENT)
Dept: CARE COORDINATION | Age: 79
End: 2024-08-27

## 2024-08-27 LAB — CALPROTECTIN STL-MCNT: 217 UG/G

## 2024-08-27 NOTE — CARE COORDINATION
Care Transitions Post-Acute Facility Discharge Call    2024    Patient: Pao Wahl Patient : 1945   MRN: [id number]  Reason for Admission: CHF (BNP- 30,944 on ), neck muscle spasm, pneumobilia, leukocytosis, laparoscopic lysis of adhesions  Discharge Date: 24 RARS: Readmission Risk Score: 17.2       Post Acute Facility Update    Care Transitions Post Acute Facility Update    Care Transitions Interventions      Post Acute Facility Update   Post Acute Facility: [place]    ELOS: 17 days      Nursing   Prescribed diet: SOCO    Wounds: Neg   Reported Nursing Updates: HEMOGLOBIN A1C  VITAMIN D 25 OH  LIPID PROFILE  *BASIC METABOLIC PANEL  *CBC  & PLATELET COUNT . CBC/ BMP every Monday and . PT/ OT eval and treat.        Rehab/Functional   Prior Level of Functioning: home alone independent       SW/Discharge Planning   Palliative/Hospice Referral indicated: Neg   Discharge Planning / Barriers: Pt from 2-story home alone with 1+1 step into home. Pt lives on main floor, laundry in basement/ shower in basement. Cleaning lady weekly- meal prep/ laundry. Pt son lives close. DME at home- walk-in shower, SC, hand held shower, manual w/c, alert button, reacher, sock aid.    Care Progression on Track?: Pos  Next IDT Planned Review: 9/3/24           Next IDT Planned Review: 9/3/2024    Future Appointments   Date Time Provider Department Center   10/11/2024 11:00 AM Surya Bell MD [place] [facility]       SN Notes:   Diet: - Oral Diet:  Low Sodium (3-4gm)  Wounds: none  Medications: Other: med rec complete  Other:    Post-acute CC Notes: emailed admission orders    OSCAR CHEATHAM RN

## 2024-08-29 ENCOUNTER — TELEPHONE (OUTPATIENT)
Dept: FAMILY MEDICINE CLINIC | Age: 79
End: 2024-08-29

## 2024-08-29 NOTE — TELEPHONE ENCOUNTER
MA place call to [phone]  informing pt to call the office in regards to need to know what her last INR test was. Also, informed her received letter via fax in regards to needing to have INR level checked. Patient advised to call the office back at her convenience.

## 2024-08-29 NOTE — PROGRESS NOTES
Pt called back and stated that her INR 2.5 pt is in [facility] and she does not know how long she is going to be in there

## 2024-08-29 NOTE — TELEPHONE ENCOUNTER
MA place call to [phone] (home)  spoke with patient and she stated that she is in rehab right now. Stated that she was in the hospital for a while and that she she was unable at the time to have it checked. Patient has ppt set up for 9/11/2024 at 11 AM and will let the people at the rehab center know so that they can set up her transportation.

## 2024-09-04 ENCOUNTER — CARE COORDINATION (OUTPATIENT)
Dept: CARE COORDINATION | Age: 79
End: 2024-09-04

## 2024-09-04 NOTE — CARE COORDINATION
Care Transitions Post-Acute Facility Discharge Call    2024    Patient: Pao Wahl Patient : 1945   MRN: [id number]  Reason for Admission: CHF (BNP- 30,944 on ), neck muscle spasm, pneumobilia, leukocytosis, laparoscopic lysis of adhesions    Discharge Date: 24 RARS: Readmission Risk Score: 17.2       Post Acute Facility Update    Care Transitions Post Acute Facility Update    Care Transitions Interventions      Post Acute Facility Update   Post Acute Facility: [place]    ELOS: 17 days      Nursing   Prescribed diet: SOCO    Nutrition intake assessment: 0-25%   Labs: Abnormal   Abnormal Lab Notes: proBNP- 59,442   Skilled Medication therapies: being addressed by facility CNP   Disease specific clinical considerations: CHF, COPD, HTN, DM,. valve replacement (214 yr), pacemaker, follicular lymphome in remission   Reported Nursing Updates: 122.2# (was 134.6# on ), 85% on room air- O2 applied.   Pt sent to ER today d/t respiratory distress with SpO2 in the 60's%. Will continue to follow for admission or DC disposition            Rehab/Functional   Prior Level of Functioning: home alone   ADLs: Stand by Assist - Presence and Cueing   Bed Mobility: Stand by Assist - Presence and Cueing   Transfer Assistance: Stand by Assist - Presence and Cueing   Ambulation Assistance: Stand by Assist - Presence and Cueing   How far (in feet) is the patient ambulating?: 234   Does patient use an assistive device?: Yes   Assistive Devices: 2WW    Rehab Notes: Clothing/ jessenia hygiene- SBA/ sup, UB dressing- ind, LB dressing- sup, roll R/ L- ind, supine to sit- sup. Pt SOB and hypoxic with exertion        SW/Discharge Planning   Discharge Planning / Barriers: Pt from 2-story home alone with 1+1 step into home. Pt lives on main floor, laundry in basement/ shower in basement. Cleaning lady weekly- meal prep/ laundry. Pt son lives close. DME at home- walk-in shower, SC, hand held shower, manual w/c, alert button, reacher,

## 2024-09-05 ENCOUNTER — CARE COORDINATION (OUTPATIENT)
Dept: CARE COORDINATION | Age: 79
End: 2024-09-05

## 2024-09-05 NOTE — CARE COORDINATION
Care Transitions Post-Acute Facility Discharge Call    2024    Patient: Pao Wahl Patient : 1945   MRN: [id number]  Reason for Admission: CHF (BNP- 30,944 on ), neck muscle spasm, pneumobilia, leukocytosis, laparoscopic lysis of adhesions     Discharge Date: 24 RARS: Readmission Risk Score: 17.2    Writer received official confirmation of patient readmission to [facility] . Enrollment ended. Will continue to follow for discharge disposition     Discharge Facility:    Care Transitions Post Acute Facility Transition    Post Acute Facility: [facility]  Post Acute Admit Date: 24  Post Acute Discharge Date: 24  ELOS: 17 days  Do you have all of your prescriptions and are they filled?: Yes (Comment: 22-reviewed)         Do you have support at home?: Partner/Spouse/SO      Care Transitions Interventions         Future Appointments   Date Time Provider Department Center   2024 11:00 AM Juliette Ramirez MD [facility]   10/11/2024 11:00 AM Surya Bell MD [place] [facility]       OSCAR CHEATHAM RN

## 2024-09-10 ENCOUNTER — TELEPHONE (OUTPATIENT)
Dept: FAMILY MEDICINE CLINIC | Age: 79
End: 2024-09-10

## 2024-09-12 ENCOUNTER — TELEPHONE (OUTPATIENT)
Dept: FAMILY MEDICINE CLINIC | Age: 79
End: 2024-09-12

## 2024-09-15 DIAGNOSIS — E11.22 CONTROLLED TYPE 2 DIABETES MELLITUS WITH STAGE 3 CHRONIC KIDNEY DISEASE, WITHOUT LONG-TERM CURRENT USE OF INSULIN (HCC): ICD-10-CM

## 2024-09-15 DIAGNOSIS — N18.30 CONTROLLED TYPE 2 DIABETES MELLITUS WITH STAGE 3 CHRONIC KIDNEY DISEASE, WITHOUT LONG-TERM CURRENT USE OF INSULIN (HCC): ICD-10-CM

## 2024-09-16 RX ORDER — GLIPIZIDE 5 MG/1
TABLET ORAL
Qty: 180 TABLET | Refills: 2 | Status: SHIPPED | OUTPATIENT
Start: 2024-09-16

## 2024-09-17 RX ORDER — EMPAGLIFLOZIN 25 MG/1
25 TABLET, FILM COATED ORAL DAILY
Qty: 90 TABLET | Refills: 1 | Status: SHIPPED | OUTPATIENT
Start: 2024-09-17

## 2024-09-29 ENCOUNTER — TELEPHONE (OUTPATIENT)
Dept: FAMILY MEDICINE CLINIC | Age: 79
End: 2024-09-29

## 2024-09-30 DIAGNOSIS — Z79.01 LONG-TERM (CURRENT) USE OF ANTICOAGULANTS, INR GOAL 2.5-3.5: Primary | ICD-10-CM

## 2024-09-30 NOTE — TELEPHONE ENCOUNTER
MA place call to [phone] (home)  lm informing patient to call in regards to message she left earlier.

## 2024-09-30 NOTE — TELEPHONE ENCOUNTER
Pt called requesting INR testing strips from [facility].  Pt called them to get a refill and they told her to call her PCP office

## 2024-09-30 NOTE — TELEPHONE ENCOUNTER
Spoke to Linda from [facility] home care [phone] who request BP parameters d/t pt diastolic running in the 40's. Per nurse and patient this has been chronic since hospitalization. Chart review reveals recent specialist visit bp was 120/39. Informed home care nurse since patient is asymptomatic, will address with pcp at upcoming visit.

## 2024-10-01 ENCOUNTER — TELEPHONE (OUTPATIENT)
Dept: FAMILY MEDICINE CLINIC | Age: 79
End: 2024-10-01

## 2024-10-01 NOTE — TELEPHONE ENCOUNTER
Medication:   Requested Prescriptions     Pending Prescriptions Disp Refills    PT/INR Test STRP 30 strip 2     Sig: Check INR as recommended        Last Filled:      Patient Phone Number: [phone] (home)     Last appt: 5/29/2024   Next appt: 10/2/2024    Last OARRS:       3/8/2021     7:46 AM   RX Monitoring   Periodic Controlled Substance Monitoring Possible medication side effects, risk of tolerance/dependence & alternative treatments discussed.;No signs of potential drug abuse or diversion identified.       Medication:   Requested Prescriptions     Pending Prescriptions Disp Refills    PT/INR Test STRP 30 strip 2     Sig: Check INR as recommended        Last Filled:      Patient Phone Number: [phone] (home)     Last appt: 5/29/2024   Next appt: 10/2/2024    Last OARRS:       3/8/2021     7:46 AM   RX Monitoring   Periodic Controlled Substance Monitoring Possible medication side effects, risk of tolerance/dependence & alternative treatments discussed.;No signs of potential drug abuse or diversion identified.       Medication:   Requested Prescriptions     Pending Prescriptions Disp Refills    PT/INR Test STRP 30 strip 2     Sig: Check INR as recommended        Last Filled:      Patient Phone Number: [phone] (home)     Last appt: 5/29/2024   Next appt: 10/2/2024    Last OARRS:       3/8/2021     7:46 AM   RX Monitoring   Periodic Controlled Substance Monitoring Possible medication side effects, risk of tolerance/dependence & alternative treatments discussed.;No signs of potential drug abuse or diversion identified.

## 2024-10-01 NOTE — TELEPHONE ENCOUNTER
Patient was released from the hospital Saturday, patient had fluid build up.     Patient is experiencing a lot of nashua and would like something for it.     Patient is scheduled for appointment on 10/2/2024

## 2024-10-02 ENCOUNTER — OFFICE VISIT (OUTPATIENT)
Dept: FAMILY MEDICINE CLINIC | Age: 79
End: 2024-10-02

## 2024-10-02 DIAGNOSIS — R42 VERTIGO: Primary | ICD-10-CM

## 2024-10-02 DIAGNOSIS — I95.1 ORTHOSTATIC HYPOTENSION: ICD-10-CM

## 2024-10-02 DIAGNOSIS — Z95.2 AORTIC VALVE REPLACED: ICD-10-CM

## 2024-10-02 DIAGNOSIS — Z23 FLU VACCINE NEED: ICD-10-CM

## 2024-10-02 DIAGNOSIS — R11.2 NAUSEA AND VOMITING, UNSPECIFIED VOMITING TYPE: ICD-10-CM

## 2024-10-02 RX ORDER — ONDANSETRON 4 MG/1
4 TABLET, ORALLY DISINTEGRATING ORAL 3 TIMES DAILY PRN
Qty: 21 TABLET | Refills: 0 | Status: SHIPPED | OUTPATIENT
Start: 2024-10-02

## 2024-10-02 RX ORDER — MECLIZINE HCL 12.5 MG 12.5 MG/1
12.5 TABLET ORAL 3 TIMES DAILY PRN
Qty: 15 TABLET | Refills: 0 | Status: SHIPPED | OUTPATIENT
Start: 2024-10-02 | End: 2024-10-12

## 2024-10-02 SDOH — ECONOMIC STABILITY: FOOD INSECURITY: WITHIN THE PAST 12 MONTHS, YOU WORRIED THAT YOUR FOOD WOULD RUN OUT BEFORE YOU GOT MONEY TO BUY MORE.: NEVER TRUE

## 2024-10-02 SDOH — ECONOMIC STABILITY: FOOD INSECURITY: WITHIN THE PAST 12 MONTHS, THE FOOD YOU BOUGHT JUST DIDN'T LAST AND YOU DIDN'T HAVE MONEY TO GET MORE.: NEVER TRUE

## 2024-10-02 SDOH — ECONOMIC STABILITY: INCOME INSECURITY: HOW HARD IS IT FOR YOU TO PAY FOR THE VERY BASICS LIKE FOOD, HOUSING, MEDICAL CARE, AND HEATING?: NOT HARD AT ALL

## 2024-10-02 NOTE — ASSESSMENT & PLAN NOTE
Check INR   INR will be followed by cardiology       Orders:    Protime-INR; Future     Unknown if ever smoked

## 2024-10-02 NOTE — PROGRESS NOTES
Pao Wahl (:  1945) is a 79 y.o. female,Established patient, here for evaluation of the following chief complaint(s):  Medication Check and Dizziness (Stated that she has been feeling dizzy x 3 days ago. Stated that she has some nausea and vomiting with these issues as well. )         Assessment & Plan  Vertigo   Neurological exam is reassuring   She does have hypotension upon standing , stop amlodipine   + dizziness with head movement and bending, possible bpv, rx for meclizine prn  and zofra prn for nausea.   Fu 1 week       Orders:    meclizine (ANTIVERT) 12.5 MG tablet; Take 1 tablet by mouth 3 times daily as needed for Dizziness or Nausea    ondansetron (ZOFRAN-ODT) 4 MG disintegrating tablet; Take 1 tablet by mouth 3 times daily as needed for Nausea or Vomiting    Nausea and vomiting, unspecified vomiting type    No abd pain, one episode of nbnb emesis yesterday sec to vertigo   Trial with zofran          Aortic valve replaced    Check INR   INR will be followed by cardiology       Orders:    Protime-INR; Future    Flu vaccine need       Orders:    Influenza, FLUAD Trivalent, (age 65 y+), IM, Preservative Free, 0.5mL    Orthostatic hypotension    Stop amlodipine   Continue BB   Lisinopril dc at hospital          Time face to face 45  minutes, 50% time spent in education and coordination of care  Topics discussed:    Medical condition,   Preparing to se patient (review of test, hospitalization on Sep 4 notes )  Performing a medical appropriate examination and /or evaluation  Counseling and edu the patient / family/caregiver   Ordering medications, tests or procedures  Referring to other health care professional   Documenting clinical information in this electronic record   Independently interpreting results and communications results to the patient / family or caregiver  Care coordination             Subjective   Dizziness  This is a new problem. The current episode started in the past 7 days. The

## 2024-10-03 VITALS
BODY MASS INDEX: 20.28 KG/M2 | HEART RATE: 89 BPM | SYSTOLIC BLOOD PRESSURE: 100 MMHG | DIASTOLIC BLOOD PRESSURE: 50 MMHG | HEIGHT: 59 IN | WEIGHT: 100.6 LBS | TEMPERATURE: 97 F | OXYGEN SATURATION: 95 %

## 2024-10-03 ASSESSMENT — ENCOUNTER SYMPTOMS
BLURRED VISION: 0
SHORTNESS OF BREATH: 0
ORTHOPNEA: 0

## 2024-10-03 NOTE — TELEPHONE ENCOUNTER
Medication:   Requested Prescriptions     Pending Prescriptions Disp Refills    PT/INR Test STRP 30 strip 2     Sig: Check INR as recommended    PT/INR Testing Monitor KIT 1 kit 1     Sig: Use as directed        Last Filled:  11/10/2023     Patient Phone Number: [phone] (home)     Last appt: 5/29/2024   Next appt: 10/1/2024    Last OARRS:       3/8/2021     7:46 AM   RX Monitoring   Periodic Controlled Substance Monitoring Possible medication side effects, risk of tolerance/dependence & alternative treatments discussed.;No signs of potential drug abuse or diversion identified.       Medication:   Requested Prescriptions     Pending Prescriptions Disp Refills    PT/INR Test STRP 30 strip 2     Sig: Check INR as recommended        Last Filled:      Patient Phone Number: [phone] (home)     Last appt: 5/29/2024   Next appt: 10/1/2024    Last OARRS:       3/8/2021     7:46 AM   RX Monitoring   Periodic Controlled Substance Monitoring Possible medication side effects, risk of tolerance/dependence & alternative treatments discussed.;No signs of potential drug abuse or diversion identified.

## 2024-10-04 ENCOUNTER — TELEPHONE (OUTPATIENT)
Dept: FAMILY MEDICINE CLINIC | Age: 79
End: 2024-10-04

## 2024-10-04 NOTE — TELEPHONE ENCOUNTER
MA place call to [phone] (home)  lm informing patient of Dr. Ramirez's message written on labs scanned in to media tab. Advised that if they have any other questions or concerns can call our office back at [phone] at their convenience.

## 2024-10-10 ENCOUNTER — TELEPHONE (OUTPATIENT)
Dept: FAMILY MEDICINE CLINIC | Age: 79
End: 2024-10-10

## 2024-10-10 NOTE — TELEPHONE ENCOUNTER
Re check bp now   If less than 120/40 and sx like sob, lightheadedness, cp, go to ED   Apt with NP tomorrow if possible for bp check

## 2024-10-10 NOTE — TELEPHONE ENCOUNTER
MA place call to # stated below to speak with Peggy and informed her of Dr. 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     MA called patient at [phone] (home)  lm informing patient to call the office to possibly get in for an appt to be seen with CNP tomorrow some time.

## 2024-10-24 DIAGNOSIS — M54.50 CHRONIC BILATERAL LOW BACK PAIN WITHOUT SCIATICA: ICD-10-CM

## 2024-10-24 DIAGNOSIS — G89.29 CHRONIC BILATERAL LOW BACK PAIN WITHOUT SCIATICA: ICD-10-CM

## 2024-10-24 DIAGNOSIS — R42 VERTIGO: ICD-10-CM

## 2024-10-24 NOTE — TELEPHONE ENCOUNTER
Medication:   Requested Prescriptions     Pending Prescriptions Disp Refills    ondansetron (ZOFRAN-ODT) 4 MG disintegrating tablet [Pharmacy Med Name: ONDANSETRON ODT 4MG TABLETS] 21 tablet 0     Sig: DISSOLVE 1 TABLET ON THE TONGUE THREE TIMES DAILY AS NEEDED FOR NAUSEA OR VOMITING    acetaminophen-codeine (TYLENOL #3) 300-30 MG per tablet [Pharmacy Med Name: ACETAMINOPHEN/COD #3 (300/30MG) TAB] 30 tablet      Sig: TAKE 1 TABLET BY MOUTH EVERY 8 HOURS AS NEEDED FOR PAIN. MAX DAILY AMOUNT: 3 TABLETS    meclizine (ANTIVERT) 12.5 MG tablet [Pharmacy Med Name: MECLIZINE 12.5MG (RX) TABLETS] 15 tablet 0     Sig: TAKE 1 TABLET BY MOUTH THREE TIMES DAILY AS NEEDED FOR DIZZINESS OR NAUSEA        Last Filled:  ondansetron 10/02/2024                       Tylenol 05/29/2024                       Meclizine 10/02/2024    Patient Phone Number: [phone] (home)     Last appt: 10/2/2024   Next appt: 11/4/2024    Last OARRS:       3/8/2021     7:46 AM   RX Monitoring   Periodic Controlled Substance Monitoring Possible medication side effects, risk of tolerance/dependence & alternative treatments discussed.;No signs of potential drug abuse or diversion identified.

## 2024-10-28 RX ORDER — ONDANSETRON 4 MG/1
TABLET, ORALLY DISINTEGRATING ORAL
Qty: 21 TABLET | Refills: 0 | Status: SHIPPED | OUTPATIENT
Start: 2024-10-28

## 2024-10-28 RX ORDER — ACETAMINOPHEN AND CODEINE PHOSPHATE 300; 30 MG/1; MG/1
1 TABLET ORAL EVERY 8 HOURS PRN
Qty: 30 TABLET | Refills: 0 | Status: SHIPPED | OUTPATIENT
Start: 2024-10-28 | End: 2024-11-27

## 2024-10-28 RX ORDER — MECLIZINE HCL 12.5 MG 12.5 MG/1
TABLET ORAL
Qty: 15 TABLET | Refills: 4 | Status: SHIPPED | OUTPATIENT
Start: 2024-10-28

## 2024-11-06 ENCOUNTER — TELEPHONE (OUTPATIENT)
Dept: FAMILY MEDICINE CLINIC | Age: 79
End: 2024-11-06

## 2024-11-06 NOTE — TELEPHONE ENCOUNTER
MA place call to [phone] (home)  lm informing patient of Dr. Ramirez's message written on labs scanned in to media tab. Advised that if they have any other questions or concerns can call our office back at [phone] at their convenience.

## 2024-11-16 ENCOUNTER — APPOINTMENT (OUTPATIENT)
Age: 79
End: 2024-11-16
Payer: MEDICARE

## 2024-11-16 ENCOUNTER — HOSPITAL ENCOUNTER (EMERGENCY)
Age: 79
Discharge: HOME | End: 2024-11-16
Attending: EMERGENCY MEDICINE
Payer: MEDICARE

## 2024-11-16 VITALS
HEART RATE: 88 BPM | SYSTOLIC BLOOD PRESSURE: 207 MMHG | WEIGHT: 96.5 LBS | TEMPERATURE: 97.7 F | OXYGEN SATURATION: 100 % | HEIGHT: 59 IN | DIASTOLIC BLOOD PRESSURE: 65 MMHG | BODY MASS INDEX: 19.45 KG/M2 | RESPIRATION RATE: 17 BRPM

## 2024-11-16 DIAGNOSIS — S30.0XXA LUMBAR CONTUSION, INITIAL ENCOUNTER: Primary | ICD-10-CM

## 2024-11-16 DIAGNOSIS — G89.29 CHRONIC BILATERAL LOW BACK PAIN WITHOUT SCIATICA: ICD-10-CM

## 2024-11-16 DIAGNOSIS — M54.50 CHRONIC BILATERAL LOW BACK PAIN WITHOUT SCIATICA: ICD-10-CM

## 2024-11-16 PROCEDURE — 72125 CT NECK SPINE W/O DYE: CPT

## 2024-11-16 PROCEDURE — 72131 CT LUMBAR SPINE W/O DYE: CPT

## 2024-11-16 PROCEDURE — 70450 CT HEAD/BRAIN W/O DYE: CPT

## 2024-11-16 PROCEDURE — 99284 EMERGENCY DEPT VISIT MOD MDM: CPT

## 2024-11-16 RX ORDER — ACETAMINOPHEN AND CODEINE PHOSPHATE 300; 30 MG/1; MG/1
1 TABLET ORAL NIGHTLY
Qty: 5 TABLET | Refills: 0 | Status: SHIPPED | OUTPATIENT
Start: 2024-11-16 | End: 2024-11-21

## 2024-11-16 ASSESSMENT — PAIN DESCRIPTION - LOCATION: LOCATION: BACK

## 2024-11-16 ASSESSMENT — ENCOUNTER SYMPTOMS
BACK PAIN: 1
GASTROINTESTINAL NEGATIVE: 1
RESPIRATORY NEGATIVE: 1

## 2024-11-16 ASSESSMENT — PAIN - FUNCTIONAL ASSESSMENT: PAIN_FUNCTIONAL_ASSESSMENT: 0-10

## 2024-11-16 ASSESSMENT — PAIN DESCRIPTION - ORIENTATION: ORIENTATION: LOWER

## 2024-11-16 ASSESSMENT — PAIN DESCRIPTION - DESCRIPTORS: DESCRIPTORS: THROBBING

## 2024-11-16 ASSESSMENT — PAIN SCALES - GENERAL: PAINLEVEL_OUTOF10: 10

## 2024-11-16 NOTE — ED NOTES
Pt ambulated with minimal assist. Pt had steady gait and only reported back pain. Pt tolerated well, MD aware.

## 2024-11-16 NOTE — ED PROVIDER NOTES
Test STRP Check INR as recommended  Qty: 30 strip, Refills: 2    Associated Diagnoses: Long-term (current) use of anticoagulants, INR goal 2.5-3.5      PT/INR Testing Monitor KIT Use as directed  Qty: 1 kit, Refills: 1      glipiZIDE (GLUCOTROL) 5 MG tablet TAKE 1 TABLET BY MOUTH TWICE DAILY WITH MEALS  Qty: 180 tablet, Refills: 2    Associated Diagnoses: Controlled type 2 diabetes mellitus with stage 3 chronic kidney disease, without long-term current use of insulin (HCC)      warfarin (COUMADIN) 1 MG tablet TAKE TWO AND ONE-HALF TABLETS BY MOUTH ON SUNDAYS AND TUESDAYS AND TAKE TWO TABLETS BY MOUTH ON ALL OTHER DAYS  Qty: 192 tablet, Refills: 3    Associated Diagnoses: Long-term (current) use of anticoagulants, INR goal 2.5-3.5      ALPRAZolam (XANAX) 0.5 MG tablet One po bid prn for anxiety  Qty: 60 tablet, Refills: 2    Associated Diagnoses: Anxiety      blood glucose test strips (TRUE METRIX BLOOD GLUCOSE TEST) strip TEST TWICE DAILY AND AS NEEDED FOR SYMPTOMS OF IRREGULAR BLOOD GLUCOSE  Qty: 100 strip, Refills: 3      pantoprazole (PROTONIX) 40 MG tablet TAKE 1 TABLET BY MOUTH EVERY DAY  Qty: 90 tablet, Refills: 1      Cholecalciferol (VITAMIN D) 50 MCG (2000 UT) CAPS capsule Take by mouth      Semaglutide,0.25 or 0.5MG/DOS, (OZEMPIC, 0.25 OR 0.5 MG/DOSE,) 2 MG/1.5ML SOPN 0.25 SQ  every week  Qty: 9 mL, Refills: 1    Associated Diagnoses: Uncontrolled type 2 diabetes mellitus with hyperglycemia (HCC)      Handicap Placard MISC by Does not apply route  Qty: 1 each, Refills: 0    Associated Diagnoses: Chronic bilateral low back pain without sciatica      atorvastatin (LIPITOR) 10 MG tablet TAKE 1 TABLET BY MOUTH DAILY IN THE EVENING FOR CHOLESTEROL  Qty: 90 tablet, Refills: 3    Associated Diagnoses: Hyperlipidemia with target LDL less than 100      Lancets MISC 1 each by Does not apply route 2 times daily  Qty: 200 each, Refills: 3      vitamin B-12 (CYANOCOBALAMIN) 100 MCG tablet Take 1 tablet by mouth daily  Doni Cornejo               EKG:      PROCEDURES   Unless otherwise noted below, none     CRITICAL CARE TIME   I personally saw the patient and independently provided 0 minutes of non-concurrent critical care out of the total shared critical care time excluding separately billable procedures.        Vitals:    Vitals:    11/16/24 1204 11/16/24 1352   BP: (!) 177/61 (!) 207/65   Pulse: 88 88   Resp: 17 17   Temp: 97.7 °F (36.5 °C)    TempSrc: Oral    SpO2: 100% 100%   Weight: 43.8 kg (96 lb 8 oz)    Height: 1.499 m (4' 11\")              Is this patient to be included in the SEP-1 Core Measure due to severe sepsis or septic shock?   No Exclusion criteria - the patient is NOT to be included for SEP-1 Core Measure due to: Infection is not suspected       CC/HPI Summary, DDx, ED Course, and Reassessment:     1:45 PM ABLE TO AMBULATE AT BASELINE.     2:02 PM this is a 79-year-old female with no acute identified traumatic abnormalities on CT imaging today.  She fell twice over the past 3 weeks and came in today for low back pain.  She is able to ambulate without difficulty.  She is neurologically intact.  My plan is to refill pain medication after her prescribing record was checked and she will follow-up outpatient with primary care.       Patient was given the following medications:   Medications - No data to display     CONSULTS:   None   Discussion with Other Professionals: None   Social Determinants: None   Chronic Conditions:  None    Records Reviewed: ePIC      Disposition Considerations: Home self-care  I am the Primary Clinician of Record.        FINAL IMPRESSION    1. Lumbar contusion, initial encounter    2. Chronic bilateral low back pain without sciatica           DISPOSITION/PLAN:     Pt discharged home in stable condition.     PATIENT REFERRED TO:   Juliette Ramirez MD  [address]  [facility] [address] [phone]    Call in 3 days         DISCHARGE MEDICATIONS:   Current Discharge

## 2024-11-16 NOTE — ED TRIAGE NOTES
Pt reports a mechanical fall \"a couple weeks ago\" hitting her back, reports another fall 1 week ago where she hit her head. Denies LOC, reports taking warfarin daily. Reports dizziness \"on and off\", but that \"the dizziness did not cause the falls\". Denies current headache. Pt reports lower back pain d/t the first fall. Denies numbness or tingling of extremities.     Patient oriented to room and ED throughput process.  Safety measures with ED bed locked in lowest position and call light in reach.  Patient educated on all orders, including any medications.  Patient educated on chief complaint/symptoms. Patient encouraged to ask questions regarding care, medications or treatment plan.  Patient aware of how to reach staff with questions/concerns.

## 2024-11-18 ENCOUNTER — PATIENT MESSAGE (OUTPATIENT)
Dept: FAMILY MEDICINE CLINIC | Age: 79
End: 2024-11-18

## 2024-11-27 ENCOUNTER — OFFICE VISIT (OUTPATIENT)
Dept: FAMILY MEDICINE CLINIC | Age: 79
End: 2024-11-27
Payer: MEDICARE

## 2024-11-27 VITALS
HEIGHT: 59 IN | DIASTOLIC BLOOD PRESSURE: 60 MMHG | BODY MASS INDEX: 19.68 KG/M2 | TEMPERATURE: 97.6 F | SYSTOLIC BLOOD PRESSURE: 160 MMHG | OXYGEN SATURATION: 100 % | HEART RATE: 70 BPM | WEIGHT: 97.6 LBS

## 2024-11-27 DIAGNOSIS — F41.9 ANXIETY: ICD-10-CM

## 2024-11-27 DIAGNOSIS — Z00.00 MEDICARE ANNUAL WELLNESS VISIT, SUBSEQUENT: Primary | ICD-10-CM

## 2024-11-27 DIAGNOSIS — I10 PRIMARY HYPERTENSION: ICD-10-CM

## 2024-11-27 PROCEDURE — G0439 PPPS, SUBSEQ VISIT: HCPCS | Performed by: FAMILY MEDICINE

## 2024-11-27 PROCEDURE — G8482 FLU IMMUNIZE ORDER/ADMIN: HCPCS | Performed by: FAMILY MEDICINE

## 2024-11-27 PROCEDURE — 3078F DIAST BP <80 MM HG: CPT | Performed by: FAMILY MEDICINE

## 2024-11-27 PROCEDURE — 3077F SYST BP >= 140 MM HG: CPT | Performed by: FAMILY MEDICINE

## 2024-11-27 PROCEDURE — 1159F MED LIST DOCD IN RCRD: CPT | Performed by: FAMILY MEDICINE

## 2024-11-27 PROCEDURE — 1123F ACP DISCUSS/DSCN MKR DOCD: CPT | Performed by: FAMILY MEDICINE

## 2024-11-27 RX ORDER — ALPRAZOLAM 0.5 MG
TABLET ORAL
Qty: 60 TABLET | Refills: 2 | Status: SHIPPED | OUTPATIENT
Start: 2024-11-27 | End: 2025-02-26

## 2024-11-27 ASSESSMENT — PATIENT HEALTH QUESTIONNAIRE - PHQ9
SUM OF ALL RESPONSES TO PHQ9 QUESTIONS 1 & 2: 2
SUM OF ALL RESPONSES TO PHQ QUESTIONS 1-9: 6
1. LITTLE INTEREST OR PLEASURE IN DOING THINGS: SEVERAL DAYS
9. THOUGHTS THAT YOU WOULD BE BETTER OFF DEAD, OR OF HURTING YOURSELF: NOT AT ALL
3. TROUBLE FALLING OR STAYING ASLEEP: NEARLY EVERY DAY
4. FEELING TIRED OR HAVING LITTLE ENERGY: SEVERAL DAYS
8. MOVING OR SPEAKING SO SLOWLY THAT OTHER PEOPLE COULD HAVE NOTICED. OR THE OPPOSITE, BEING SO FIGETY OR RESTLESS THAT YOU HAVE BEEN MOVING AROUND A LOT MORE THAN USUAL: NOT AT ALL
2. FEELING DOWN, DEPRESSED OR HOPELESS: SEVERAL DAYS
SUM OF ALL RESPONSES TO PHQ QUESTIONS 1-9: 6
10. IF YOU CHECKED OFF ANY PROBLEMS, HOW DIFFICULT HAVE THESE PROBLEMS MADE IT FOR YOU TO DO YOUR WORK, TAKE CARE OF THINGS AT HOME, OR GET ALONG WITH OTHER PEOPLE: NOT DIFFICULT AT ALL
SUM OF ALL RESPONSES TO PHQ QUESTIONS 1-9: 6
SUM OF ALL RESPONSES TO PHQ QUESTIONS 1-9: 6
5. POOR APPETITE OR OVEREATING: NOT AT ALL
7. TROUBLE CONCENTRATING ON THINGS, SUCH AS READING THE NEWSPAPER OR WATCHING TELEVISION: NOT AT ALL
6. FEELING BAD ABOUT YOURSELF - OR THAT YOU ARE A FAILURE OR HAVE LET YOURSELF OR YOUR FAMILY DOWN: NOT AT ALL

## 2024-11-27 ASSESSMENT — LIFESTYLE VARIABLES
HOW MANY STANDARD DRINKS CONTAINING ALCOHOL DO YOU HAVE ON A TYPICAL DAY: PATIENT DOES NOT DRINK
HOW OFTEN DO YOU HAVE A DRINK CONTAINING ALCOHOL: NEVER

## 2024-11-27 NOTE — PROGRESS NOTES
Medicare Annual Wellness Visit    Pao Wahl is here for Medicare AWV    Assessment & Plan      Diagnosis Orders   1. Medicare annual wellness visit, subsequent    .  encourage healthy diet, exercise, safety , fall precautions     . Preventive: recommended RSV and Covid vaccines          2. Anxiety   Stable   Controlled Substances Monitoring: Attestation: The Prescription Monitoring Report for this patient was reviewed today.  (Juliette Ramirez MD)  Documentation: No signs of potential drug abuse or diversion identified. (Juliette Ramirez MD)    Refill , use PRN for sleep  ALPRAZolam (XANAX) 0.5 MG tablet        3. HTN, her readings at home are in the 130s/90's  She continues to fu with nephrology and current tx.        No follow-ups on file.     Subjective   The following acute and/or chronic problems were also addressed today:  Dizziness :  Improved, no rotatory vertigo , no falls.   Tx : none needed now       Anxiety   Chronic, getting little better after the passing of her .  Takes Xanax prn usually at night  No hx of sec effects.         Patient's complete Health Risk Assessment and screening values have been reviewed and are found in Flowsheets. The following problems were reviewed today and where indicated follow up appointments were made and/or referrals ordered.    Positive Risk Factor Screenings with Interventions:    Fall Risk:  Do you feel unsteady or are you worried about falling? : (!) yes  2 or more falls in past year?: (!) yes  Fall with injury in past year?: no     Interventions:    Reviewed medications, home hazards, visual acuity, and co-morbidities that can increase risk for falls  Finished physical therapy and declines further tx .     Depression:  PHQ-2 Score: 2  PHQ-9 Total Score: 6  Total Score Interpretation: 5-9 = mild depression    Interventions:  Monitor sx, she has mor anxiety and takes xanax prn , I refilled med          Self-assessment of health:  In general, how would you say your

## 2024-12-02 NOTE — PATIENT INSTRUCTIONS
Version: 14.2  © 2024 [facility] [employer].   Care instructions adapted under license by [employer]. If you have questions about a medical condition or this instruction, always ask your healthcare professional. [employer] disclaims any warranty or liability for your use of this information.      Personalized Preventive Plan for Pao ANTHONY Wahl - 11/27/2024  Medicare offers a range of preventive health benefits. Some of the tests and screenings are paid in full while other may be subject to a deductible, co-insurance, and/or copay.    Some of these benefits include a comprehensive review of your medical history including lifestyle, illnesses that may run in your family, and various assessments and screenings as appropriate.    After reviewing your medical record and screening and assessments performed today your provider may have ordered immunizations, labs, imaging, and/or referrals for you.  A list of these orders (if applicable) as well as your Preventive Care list are included within your After Visit Summary for your review.    Other Preventive Recommendations:    A preventive eye exam performed by an eye specialist is recommended every 1-2 years to screen for glaucoma; cataracts, macular degeneration, and other eye disorders.  A preventive dental visit is recommended every 6 months.  Try to get at least 150 minutes of exercise per week or 10,000 steps per day on a pedometer .  Order or download the FREE \"Exercise & Physical Activity: Your Everyday Guide\" from [employer] [place] [employer]. Call 1-975.205.4831 or search [employer] [place] [employer] online.  You need 2342-5584 mg of calcium and 4830-6912 IU of vitamin D per day. It is possible to meet your calcium requirement with diet alone, but a vitamin D supplement is usually necessary to meet this goal.  When exposed to the sun, use a sunscreen that protects against both UVA and UVB radiation with an SPF of 30 or greater. Reapply every 2

## 2024-12-03 ENCOUNTER — TELEPHONE (OUTPATIENT)
Dept: FAMILY MEDICINE CLINIC | Age: 79
End: 2024-12-03

## 2024-12-03 RX ORDER — ACETAMINOPHEN 325 MG/1
TABLET ORAL
Qty: 90 TABLET | Refills: 0 | Status: ON HOLD | OUTPATIENT
Start: 2024-12-03

## 2024-12-03 NOTE — TELEPHONE ENCOUNTER
Pt stated that she expected to get the Tylenol 3 but instead regular Tylenol was sent to pharmacy. Pt stated that the over the counter Tylenol has not worked for her and would like the Tylenol 3's.

## 2024-12-03 NOTE — TELEPHONE ENCOUNTER
[facility] stated that the pt was expecting to get acetaminophen-codeine (TYLENOL #3) 300-30 MG per tablet [[id number]]  ENDED but the medication was not sent to the pharmacy. [facility] just wants to make sure wether they will be getting a prescription sent or not.

## 2024-12-03 NOTE — TELEPHONE ENCOUNTER
MA place call to [phone] (home)  spoke with patient and she stated that the rx was sent in for the incorrect medication that she had requested. She requested refill for Tylenol #3's. Please advise?

## 2024-12-04 ENCOUNTER — TELEPHONE (OUTPATIENT)
Dept: FAMILY MEDICINE CLINIC | Age: 79
End: 2024-12-04

## 2024-12-04 ENCOUNTER — ANTI-COAG VISIT (OUTPATIENT)
Dept: FAMILY MEDICINE CLINIC | Age: 79
End: 2024-12-04

## 2024-12-04 DIAGNOSIS — G89.29 CHRONIC BILATERAL LOW BACK PAIN WITHOUT SCIATICA: ICD-10-CM

## 2024-12-04 DIAGNOSIS — Z95.2 AORTIC VALVE REPLACED: Primary | ICD-10-CM

## 2024-12-04 DIAGNOSIS — Z79.01 LONG-TERM (CURRENT) USE OF ANTICOAGULANTS, INR GOAL 2.5-3.5: ICD-10-CM

## 2024-12-04 DIAGNOSIS — M54.50 CHRONIC BILATERAL LOW BACK PAIN WITHOUT SCIATICA: ICD-10-CM

## 2024-12-04 RX ORDER — ACETAMINOPHEN AND CODEINE PHOSPHATE 300; 30 MG/1; MG/1
1 TABLET ORAL NIGHTLY PRN
Qty: 90 TABLET | Refills: 0 | Status: SHIPPED | OUTPATIENT
Start: 2024-12-04 | End: 2025-03-04

## 2024-12-04 RX ORDER — WARFARIN SODIUM 1 MG/1
TABLET ORAL
Qty: 192 TABLET | Refills: 3 | Status: SHIPPED | OUTPATIENT
Start: 2024-12-04

## 2024-12-04 NOTE — TELEPHONE ENCOUNTER
INR is high   Hold coumadin today     Take 1 mg on Thur/Sat and sun and 2 mg rest of days  Re check in 2 weeks      Rx for tylenol 3 sent

## 2024-12-04 NOTE — TELEPHONE ENCOUNTER
I have attempted to contact this patient by phone with the following results: left message to return my call on answering machine. Told pt to hold coumadin for today and Dr Ramirez needs to know how pt is taking her coumadin for the week what is pt schedule on taking medication

## 2024-12-04 NOTE — TELEPHONE ENCOUNTER
Pt stated that she takes     warfarin (COUMADIN) 1 MG tablet     Pt takes 2.5 MG every night.    Pt also stated that she would like to have the Tylenol 's sent to the pharmacy.   [id number] - [place], [place] - [address] - P [phone] - F [phone]  [address] [address]  Phone: [phone]  Fax: [phone]

## 2024-12-04 NOTE — TELEPHONE ENCOUNTER
MA place call to [phone] (home)  lm informing patient of Dr. Ramirez's message stated below along with needed instructions on her coumadin medication as well.

## 2024-12-04 NOTE — TELEPHONE ENCOUNTER
Medication:   Requested Prescriptions     Pending Prescriptions Disp Refills    warfarin (COUMADIN) 1 MG tablet 192 tablet 3     Sig: TAKE TWO AND ONE-HALF TABLETS BY MOUTH ON SUNDAYS AND TUESDAYS AND TAKE TWO TABLETS BY MOUTH ON ALL OTHER DAYS    acetaminophen-codeine (TYLENOL #3) 300-30 MG per tablet 30 tablet 0     Sig: Take 1 tablet by mouth every 8 hours as needed for Pain for up to 30 days. Max Daily Amount: 3 tablets        Last Filled:  8/26/2024 & 10/28/2024     Patient Phone Number: [phone] (home)     Last appt: 11/27/2024   Next appt: 2/27/2025    Last OARRS:       10/28/2024     1:51 PM   RX Monitoring   Periodic Controlled Substance Monitoring No signs of potential drug abuse or diversion identified.

## 2024-12-16 RX ORDER — PANTOPRAZOLE SODIUM 40 MG/1
TABLET, DELAYED RELEASE ORAL
Qty: 90 TABLET | Refills: 1 | Status: ON HOLD | OUTPATIENT
Start: 2024-12-16

## 2024-12-16 NOTE — TELEPHONE ENCOUNTER
Medication:   Requested Prescriptions     Pending Prescriptions Disp Refills    pantoprazole (PROTONIX) 40 MG tablet [Pharmacy Med Name: PANTOPRAZOLE 40MG TABLETS] 90 tablet 1     Sig: TAKE 1 TABLET BY MOUTH EVERY DAY        Last Filled: 06/17/2024      Patient Phone Number: [phone] (home)     Last appt: 11/27/2024   Next appt: 2/27/2025    Last OARRS:       12/4/2024    12:49 PM   RX Monitoring   Periodic Controlled Substance Monitoring Possible medication side effects, risk of tolerance/dependence & alternative treatments discussed.;No signs of potential drug abuse or diversion identified.

## 2024-12-21 ENCOUNTER — APPOINTMENT (OUTPATIENT)
Age: 79
End: 2024-12-21
Payer: MEDICARE

## 2024-12-21 ENCOUNTER — HOSPITAL ENCOUNTER (EMERGENCY)
Age: 79
Discharge: ANOTHER HOSPITAL | End: 2024-12-21
Attending: EMERGENCY MEDICINE | Admitting: INTERNAL MEDICINE
Payer: MEDICARE

## 2024-12-21 ENCOUNTER — APPOINTMENT (OUTPATIENT)
Dept: GENERAL RADIOLOGY | Age: 79
DRG: 308 | End: 2024-12-21
Attending: HOSPITALIST
Payer: MEDICARE

## 2024-12-21 ENCOUNTER — HOSPITAL ENCOUNTER (INPATIENT)
Age: 79
LOS: 10 days | Discharge: NURSING, REHAB OR LONG-TERM CARE | DRG: 308 | End: 2024-12-31
Attending: HOSPITALIST | Admitting: STUDENT IN AN ORGANIZED HEALTH CARE EDUCATION/TRAINING PROGRAM
Payer: MEDICARE

## 2024-12-21 VITALS
DIASTOLIC BLOOD PRESSURE: 42 MMHG | TEMPERATURE: 97.9 F | HEIGHT: 59 IN | BODY MASS INDEX: 20.16 KG/M2 | OXYGEN SATURATION: 99 % | SYSTOLIC BLOOD PRESSURE: 211 MMHG | WEIGHT: 100 LBS | HEART RATE: 68 BPM | RESPIRATION RATE: 16 BRPM

## 2024-12-21 DIAGNOSIS — S72.001A CLOSED FRACTURE OF RIGHT HIP, INITIAL ENCOUNTER (HCC): ICD-10-CM

## 2024-12-21 DIAGNOSIS — S72.001A CLOSED RIGHT HIP FRACTURE, INITIAL ENCOUNTER (HCC): Primary | ICD-10-CM

## 2024-12-21 DIAGNOSIS — S72.001A CLOSED DISPLACED FRACTURE OF RIGHT FEMORAL NECK (HCC): ICD-10-CM

## 2024-12-21 DIAGNOSIS — I35.0 NONRHEUMATIC AORTIC VALVE STENOSIS: Primary | ICD-10-CM

## 2024-12-21 LAB
ABO + RH BLD: NORMAL
ALBUMIN SERPL-MCNC: 3.6 G/DL (ref 3.4–5)
ALBUMIN/GLOB SERPL: 1.6 {RATIO}
ALP SERPL-CCNC: 81 U/L (ref 40–129)
ALT SERPL-CCNC: 9 U/L (ref 10–40)
ANION GAP SERPL CALCULATED.3IONS-SCNC: 13 MMOL/L (ref 3–16)
APTT BLD: 28.3 SEC (ref 22.1–36.4)
AST SERPL-CCNC: 44 U/L (ref 15–37)
BASE EXCESS BLDV CALC-SCNC: 2 MMOL/L (ref -3–3)
BASOPHILS # BLD: 0.03 K/UL (ref 0–0.2)
BASOPHILS NFR BLD: 0 %
BILIRUB SERPL-MCNC: 0.9 MG/DL (ref 0–1)
BLOOD BANK SAMPLE EXPIRATION: NORMAL
BLOOD GROUP ANTIBODIES SERPL: NEGATIVE
BUN SERPL-MCNC: 20 MG/DL (ref 7–20)
CA-I BLD-SCNC: 1.06 MMOL/L (ref 1.12–1.32)
CALCIUM SERPL-MCNC: 8.2 MG/DL (ref 8.3–10.6)
CHLORIDE SERPL-SCNC: 95 MMOL/L (ref 99–110)
CO2 BLDV-SCNC: 27 MMOL/L
CO2 SERPL-SCNC: 28 MMOL/L (ref 21–32)
CREAT SERPL-MCNC: 1.4 MG/DL (ref 0.6–1.2)
DEPRECATED RDW RBC AUTO: 18.6 % (ref 12.4–15.4)
EOSINOPHIL # BLD: 0.07 K/UL (ref 0–0.6)
EOSINOPHILS RELATIVE PERCENT: 1 %
ERYTHROCYTE [DISTWIDTH] IN BLOOD BY AUTOMATED COUNT: 17 % (ref 12.4–15.4)
GFR, ESTIMATED: 37 ML/MIN/1.73M2
GLUCOSE BLD-MCNC: 194 MG/DL (ref 70–99)
GLUCOSE SERPL-MCNC: 191 MG/DL (ref 70–99)
HCO3 BLDV-SCNC: 25.8 MMOL/L (ref 23–29)
HCT VFR BLD AUTO: 24.3 % (ref 36–48)
HCT VFR BLD AUTO: 27 % (ref 36–48)
HCT VFR BLD AUTO: 27.3 % (ref 36–48)
HGB BLD CALC-MCNC: 9.1 GM/DL (ref 12–16)
HGB BLD-MCNC: 8.2 G/DL (ref 12–16)
HGB BLD-MCNC: 9.1 G/DL (ref 12–16)
IMM GRANULOCYTES # BLD AUTO: 0.12 K/UL (ref 0–0.5)
IMM GRANULOCYTES NFR BLD: 2 %
INR PPP: 1.5 (ref 0.9–1.2)
INR PPP: 1.53 (ref 0.85–1.15)
LACTATE BLD-SCNC: 3.64 MMOL/L (ref 0.4–2)
LACTATE BLDV-SCNC: 3.4 MMOL/L (ref 0.4–2)
LYMPHOCYTES NFR BLD: 1.19 K/UL (ref 1–5.1)
LYMPHOCYTES RELATIVE PERCENT: 15 %
MAGNESIUM SERPL-MCNC: 1.4 MG/DL (ref 1.8–2.4)
MCH RBC QN AUTO: 29.7 PG (ref 26–34)
MCH RBC QN AUTO: 30.5 PG (ref 26–34)
MCHC RBC AUTO-ENTMCNC: 33.5 G/DL (ref 31–36)
MCHC RBC AUTO-ENTMCNC: 33.7 G/DL (ref 31–36)
MCV RBC AUTO: 88.7 FL (ref 80–100)
MCV RBC AUTO: 90.3 FL (ref 80–100)
MONOCYTES NFR BLD: 0.58 K/UL (ref 0–1.3)
MONOCYTES NFR BLD: 7 %
NEUTROPHILS NFR BLD: 75 %
NEUTS SEG NFR BLD: 6.01 K/UL (ref 1.7–7.7)
PCO2 BLDV: 37.3 MM HG (ref 40–50)
PERFORMED ON: ABNORMAL
PH BLDV: 7.45 [PH] (ref 7.35–7.45)
PLATELET # BLD AUTO: 216 K/UL (ref 135–450)
PLATELET # BLD AUTO: 261 K/UL (ref 135–450)
PMV BLD AUTO: 10.4 FL (ref 5–10.5)
PMV BLD AUTO: 12.6 FL
PO2 BLDV: 36 MM HG
POC SAMPLE TYPE: ABNORMAL
POTASSIUM BLD-SCNC: 3.3 MMOL/L (ref 3.5–5.1)
POTASSIUM SERPL-SCNC: 3.1 MMOL/L (ref 3.5–5.1)
PROT SERPL-MCNC: 5.9 G/DL (ref 6.4–8.2)
PROTHROMBIN TIME: 18.4 SEC (ref 11.9–14.9)
PROTHROMBIN TIME: 18.5 SEC (ref 11.9–14.9)
RBC # BLD AUTO: 2.69 M/UL (ref 4–5.2)
RBC # BLD AUTO: 3.07 M/UL (ref 4–5.2)
SAO2 % BLDV: 72 %
SODIUM BLD-SCNC: 137 MMOL/L (ref 136–145)
SODIUM SERPL-SCNC: 136 MMOL/L (ref 136–145)
WBC # BLD AUTO: 21 K/UL (ref 4–11)
WBC OTHER # BLD: 8 K/UL (ref 4–11)

## 2024-12-21 PROCEDURE — 82803 BLOOD GASES ANY COMBINATION: CPT

## 2024-12-21 PROCEDURE — 86923 COMPATIBILITY TEST ELECTRIC: CPT

## 2024-12-21 PROCEDURE — 86901 BLOOD TYPING SEROLOGIC RH(D): CPT

## 2024-12-21 PROCEDURE — 3E033XZ INTRODUCTION OF VASOPRESSOR INTO PERIPHERAL VEIN, PERCUTANEOUS APPROACH: ICD-10-PCS | Performed by: HOSPITALIST

## 2024-12-21 PROCEDURE — 6360000002 HC RX W HCPCS: Performed by: EMERGENCY MEDICINE

## 2024-12-21 PROCEDURE — 85025 COMPLETE CBC W/AUTO DIFF WBC: CPT

## 2024-12-21 PROCEDURE — 86900 BLOOD TYPING SEROLOGIC ABO: CPT

## 2024-12-21 PROCEDURE — 85610 PROTHROMBIN TIME: CPT

## 2024-12-21 PROCEDURE — 6360000002 HC RX W HCPCS: Performed by: HOSPITALIST

## 2024-12-21 PROCEDURE — 83605 ASSAY OF LACTIC ACID: CPT

## 2024-12-21 PROCEDURE — 1200000000 HC SEMI PRIVATE

## 2024-12-21 PROCEDURE — 86850 RBC ANTIBODY SCREEN: CPT

## 2024-12-21 PROCEDURE — 82728 ASSAY OF FERRITIN: CPT

## 2024-12-21 PROCEDURE — 93005 ELECTROCARDIOGRAM TRACING: CPT | Performed by: EMERGENCY MEDICINE

## 2024-12-21 PROCEDURE — 83735 ASSAY OF MAGNESIUM: CPT

## 2024-12-21 PROCEDURE — 80053 COMPREHEN METABOLIC PANEL: CPT

## 2024-12-21 PROCEDURE — 2000000000 HC ICU R&B

## 2024-12-21 PROCEDURE — 73502 X-RAY EXAM HIP UNI 2-3 VIEWS: CPT

## 2024-12-21 PROCEDURE — 96374 THER/PROPH/DIAG INJ IV PUSH: CPT

## 2024-12-21 PROCEDURE — 85730 THROMBOPLASTIN TIME PARTIAL: CPT

## 2024-12-21 PROCEDURE — 99285 EMERGENCY DEPT VISIT HI MDM: CPT

## 2024-12-21 PROCEDURE — 2580000003 HC RX 258: Performed by: NURSE PRACTITIONER

## 2024-12-21 PROCEDURE — 96375 TX/PRO/DX INJ NEW DRUG ADDON: CPT

## 2024-12-21 PROCEDURE — 84132 ASSAY OF SERUM POTASSIUM: CPT

## 2024-12-21 PROCEDURE — 70450 CT HEAD/BRAIN W/O DYE: CPT

## 2024-12-21 PROCEDURE — 82947 ASSAY GLUCOSE BLOOD QUANT: CPT

## 2024-12-21 PROCEDURE — 85027 COMPLETE CBC AUTOMATED: CPT

## 2024-12-21 PROCEDURE — 30233N1 TRANSFUSION OF NONAUTOLOGOUS RED BLOOD CELLS INTO PERIPHERAL VEIN, PERCUTANEOUS APPROACH: ICD-10-PCS | Performed by: HOSPITALIST

## 2024-12-21 PROCEDURE — 84295 ASSAY OF SERUM SODIUM: CPT

## 2024-12-21 PROCEDURE — 71045 X-RAY EXAM CHEST 1 VIEW: CPT

## 2024-12-21 PROCEDURE — 6360000002 HC RX W HCPCS: Performed by: NURSE PRACTITIONER

## 2024-12-21 PROCEDURE — 93005 ELECTROCARDIOGRAM TRACING: CPT | Performed by: HOSPITALIST

## 2024-12-21 PROCEDURE — 82330 ASSAY OF CALCIUM: CPT

## 2024-12-21 PROCEDURE — 96376 TX/PRO/DX INJ SAME DRUG ADON: CPT

## 2024-12-21 PROCEDURE — 6370000000 HC RX 637 (ALT 250 FOR IP): Performed by: EMERGENCY MEDICINE

## 2024-12-21 PROCEDURE — 85014 HEMATOCRIT: CPT

## 2024-12-21 PROCEDURE — 85520 HEPARIN ASSAY: CPT

## 2024-12-21 PROCEDURE — P9016 RBC LEUKOCYTES REDUCED: HCPCS

## 2024-12-21 RX ORDER — MAGNESIUM SULFATE IN WATER 40 MG/ML
2000 INJECTION, SOLUTION INTRAVENOUS ONCE
Status: COMPLETED | OUTPATIENT
Start: 2024-12-21 | End: 2024-12-22

## 2024-12-21 RX ORDER — ALBUTEROL SULFATE 0.83 MG/ML
2.5 SOLUTION RESPIRATORY (INHALATION) EVERY 6 HOURS PRN
Status: DISCONTINUED | OUTPATIENT
Start: 2024-12-21 | End: 2024-12-22

## 2024-12-21 RX ORDER — PANTOPRAZOLE SODIUM 40 MG/1
40 TABLET, DELAYED RELEASE ORAL
Status: DISCONTINUED | OUTPATIENT
Start: 2024-12-22 | End: 2024-12-31 | Stop reason: HOSPADM

## 2024-12-21 RX ORDER — HEPARIN SODIUM 5000 [USP'U]/ML
5000 INJECTION, SOLUTION INTRAVENOUS; SUBCUTANEOUS 2 TIMES DAILY
Status: DISCONTINUED | OUTPATIENT
Start: 2024-12-21 | End: 2024-12-21 | Stop reason: HOSPADM

## 2024-12-21 RX ORDER — ACETAMINOPHEN 650 MG/1
650 SUPPOSITORY RECTAL EVERY 6 HOURS PRN
Status: DISCONTINUED | OUTPATIENT
Start: 2024-12-21 | End: 2024-12-31 | Stop reason: HOSPADM

## 2024-12-21 RX ORDER — MORPHINE SULFATE 2 MG/ML
2 INJECTION, SOLUTION INTRAMUSCULAR; INTRAVENOUS ONCE
Status: COMPLETED | OUTPATIENT
Start: 2024-12-21 | End: 2024-12-21

## 2024-12-21 RX ORDER — POTASSIUM CHLORIDE 7.45 MG/ML
10 INJECTION INTRAVENOUS
Status: ACTIVE | OUTPATIENT
Start: 2024-12-22 | End: 2024-12-22

## 2024-12-21 RX ORDER — METOPROLOL TARTRATE 25 MG/1
25 TABLET, FILM COATED ORAL 2 TIMES DAILY
Status: DISCONTINUED | OUTPATIENT
Start: 2024-12-21 | End: 2024-12-31 | Stop reason: HOSPADM

## 2024-12-21 RX ORDER — HEPARIN SODIUM 1000 [USP'U]/ML
80 INJECTION, SOLUTION INTRAVENOUS; SUBCUTANEOUS PRN
Status: DISCONTINUED | OUTPATIENT
Start: 2024-12-21 | End: 2024-12-28

## 2024-12-21 RX ORDER — MORPHINE SULFATE 2 MG/ML
2 INJECTION, SOLUTION INTRAMUSCULAR; INTRAVENOUS EVERY 4 HOURS PRN
Status: CANCELLED | OUTPATIENT
Start: 2024-12-21

## 2024-12-21 RX ORDER — HEPARIN SODIUM 10000 [USP'U]/100ML
5-30 INJECTION, SOLUTION INTRAVENOUS CONTINUOUS
Status: DISCONTINUED | OUTPATIENT
Start: 2024-12-21 | End: 2024-12-28

## 2024-12-21 RX ORDER — METOPROLOL TARTRATE 25 MG/1
25 TABLET, FILM COATED ORAL 2 TIMES DAILY
Status: CANCELLED | OUTPATIENT
Start: 2024-12-21

## 2024-12-21 RX ORDER — GLUCAGON 1 MG/ML
1 KIT INJECTION PRN
Status: DISCONTINUED | OUTPATIENT
Start: 2024-12-21 | End: 2024-12-31 | Stop reason: HOSPADM

## 2024-12-21 RX ORDER — POTASSIUM CHLORIDE 29.8 MG/ML
20 INJECTION INTRAVENOUS PRN
Status: DISCONTINUED | OUTPATIENT
Start: 2024-12-21 | End: 2024-12-24

## 2024-12-21 RX ORDER — SODIUM CHLORIDE 9 MG/ML
INJECTION, SOLUTION INTRAVENOUS PRN
Status: DISCONTINUED | OUTPATIENT
Start: 2024-12-21 | End: 2024-12-21

## 2024-12-21 RX ORDER — ONDANSETRON 2 MG/ML
4 INJECTION INTRAMUSCULAR; INTRAVENOUS EVERY 6 HOURS PRN
Status: CANCELLED | OUTPATIENT
Start: 2024-12-21

## 2024-12-21 RX ORDER — MAGNESIUM SULFATE IN WATER 40 MG/ML
2000 INJECTION, SOLUTION INTRAVENOUS PRN
Status: DISCONTINUED | OUTPATIENT
Start: 2024-12-21 | End: 2024-12-31 | Stop reason: HOSPADM

## 2024-12-21 RX ORDER — METOPROLOL TARTRATE 25 MG/1
25 TABLET, FILM COATED ORAL ONCE
Status: COMPLETED | OUTPATIENT
Start: 2024-12-21 | End: 2024-12-21

## 2024-12-21 RX ORDER — POTASSIUM CHLORIDE 1500 MG/1
20 TABLET, EXTENDED RELEASE ORAL ONCE
Status: DISCONTINUED | OUTPATIENT
Start: 2024-12-21 | End: 2024-12-21

## 2024-12-21 RX ORDER — SODIUM CHLORIDE 0.9 % (FLUSH) 0.9 %
5-40 SYRINGE (ML) INJECTION EVERY 12 HOURS SCHEDULED
Status: DISCONTINUED | OUTPATIENT
Start: 2024-12-21 | End: 2024-12-31 | Stop reason: HOSPADM

## 2024-12-21 RX ORDER — FUROSEMIDE 40 MG/1
40 TABLET ORAL 2 TIMES DAILY
Status: DISCONTINUED | OUTPATIENT
Start: 2024-12-21 | End: 2024-12-31 | Stop reason: HOSPADM

## 2024-12-21 RX ORDER — 0.9 % SODIUM CHLORIDE 0.9 %
1000 INTRAVENOUS SOLUTION INTRAVENOUS ONCE
Status: COMPLETED | OUTPATIENT
Start: 2024-12-21 | End: 2024-12-22

## 2024-12-21 RX ORDER — ONDANSETRON 2 MG/ML
4 INJECTION INTRAMUSCULAR; INTRAVENOUS ONCE
Status: COMPLETED | OUTPATIENT
Start: 2024-12-21 | End: 2024-12-21

## 2024-12-21 RX ORDER — ATORVASTATIN CALCIUM 10 MG/1
10 TABLET, FILM COATED ORAL NIGHTLY
Status: DISCONTINUED | OUTPATIENT
Start: 2024-12-21 | End: 2024-12-31 | Stop reason: HOSPADM

## 2024-12-21 RX ORDER — DEXTROSE MONOHYDRATE 100 MG/ML
INJECTION, SOLUTION INTRAVENOUS CONTINUOUS PRN
Status: DISCONTINUED | OUTPATIENT
Start: 2024-12-21 | End: 2024-12-31 | Stop reason: HOSPADM

## 2024-12-21 RX ORDER — POLYETHYLENE GLYCOL 3350 17 G/17G
17 POWDER, FOR SOLUTION ORAL DAILY PRN
Status: DISCONTINUED | OUTPATIENT
Start: 2024-12-21 | End: 2024-12-31 | Stop reason: HOSPADM

## 2024-12-21 RX ORDER — POTASSIUM CHLORIDE 7.45 MG/ML
10 INJECTION INTRAVENOUS PRN
Status: DISCONTINUED | OUTPATIENT
Start: 2024-12-21 | End: 2024-12-24

## 2024-12-21 RX ORDER — TIZANIDINE 2 MG/1
2 TABLET ORAL EVERY 6 HOURS PRN
Status: ON HOLD | COMMUNITY
Start: 2024-11-20

## 2024-12-21 RX ORDER — SODIUM CHLORIDE 9 MG/ML
INJECTION, SOLUTION INTRAVENOUS PRN
Status: DISCONTINUED | OUTPATIENT
Start: 2024-12-21 | End: 2024-12-31 | Stop reason: HOSPADM

## 2024-12-21 RX ORDER — FUROSEMIDE 40 MG/1
40 TABLET ORAL 2 TIMES DAILY
Status: ON HOLD | COMMUNITY
Start: 2024-10-30

## 2024-12-21 RX ORDER — SODIUM CHLORIDE 9 MG/ML
INJECTION, SOLUTION INTRAVENOUS PRN
Status: CANCELLED | OUTPATIENT
Start: 2024-12-21

## 2024-12-21 RX ORDER — ACETAMINOPHEN 650 MG/1
650 SUPPOSITORY RECTAL EVERY 6 HOURS PRN
Status: CANCELLED | OUTPATIENT
Start: 2024-12-21

## 2024-12-21 RX ORDER — ONDANSETRON 4 MG/1
4 TABLET, ORALLY DISINTEGRATING ORAL EVERY 8 HOURS PRN
Status: CANCELLED | OUTPATIENT
Start: 2024-12-21

## 2024-12-21 RX ORDER — SODIUM CHLORIDE, SODIUM LACTATE, POTASSIUM CHLORIDE, AND CALCIUM CHLORIDE .6; .31; .03; .02 G/100ML; G/100ML; G/100ML; G/100ML
1000 INJECTION, SOLUTION INTRAVENOUS ONCE
Status: COMPLETED | OUTPATIENT
Start: 2024-12-21 | End: 2024-12-22

## 2024-12-21 RX ORDER — AMLODIPINE BESYLATE 5 MG/1
5 TABLET ORAL DAILY
Status: DISCONTINUED | OUTPATIENT
Start: 2024-12-22 | End: 2024-12-25

## 2024-12-21 RX ORDER — ACETAMINOPHEN 325 MG/1
650 TABLET ORAL EVERY 6 HOURS PRN
Status: DISCONTINUED | OUTPATIENT
Start: 2024-12-21 | End: 2024-12-31 | Stop reason: HOSPADM

## 2024-12-21 RX ORDER — SODIUM CHLORIDE 0.9 % (FLUSH) 0.9 %
5-40 SYRINGE (ML) INJECTION EVERY 12 HOURS SCHEDULED
Status: CANCELLED | OUTPATIENT
Start: 2024-12-21

## 2024-12-21 RX ORDER — PANTOPRAZOLE SODIUM 40 MG/1
40 TABLET, DELAYED RELEASE ORAL
Status: CANCELLED | OUTPATIENT
Start: 2024-12-22

## 2024-12-21 RX ORDER — ATORVASTATIN CALCIUM 10 MG/1
10 TABLET, FILM COATED ORAL NIGHTLY
Status: CANCELLED | OUTPATIENT
Start: 2024-12-21

## 2024-12-21 RX ORDER — CLONIDINE HYDROCHLORIDE 0.1 MG/1
0.1 TABLET ORAL ONCE
Status: COMPLETED | OUTPATIENT
Start: 2024-12-21 | End: 2024-12-21

## 2024-12-21 RX ORDER — ACETAMINOPHEN 500 MG
1000 TABLET ORAL ONCE
Status: CANCELLED | OUTPATIENT
Start: 2024-12-21 | End: 2024-12-21

## 2024-12-21 RX ORDER — HYDRALAZINE HYDROCHLORIDE 20 MG/ML
10 INJECTION INTRAMUSCULAR; INTRAVENOUS EVERY 6 HOURS PRN
Status: DISCONTINUED | OUTPATIENT
Start: 2024-12-21 | End: 2024-12-31 | Stop reason: HOSPADM

## 2024-12-21 RX ORDER — HEPARIN SODIUM 1000 [USP'U]/ML
80 INJECTION, SOLUTION INTRAVENOUS; SUBCUTANEOUS ONCE
Status: DISCONTINUED | OUTPATIENT
Start: 2024-12-21 | End: 2024-12-23

## 2024-12-21 RX ORDER — POTASSIUM CHLORIDE 29.8 MG/ML
INJECTION INTRAVENOUS
Status: COMPLETED
Start: 2024-12-21 | End: 2024-12-22

## 2024-12-21 RX ORDER — ONDANSETRON 2 MG/ML
4 INJECTION INTRAMUSCULAR; INTRAVENOUS EVERY 6 HOURS PRN
Status: DISCONTINUED | OUTPATIENT
Start: 2024-12-21 | End: 2024-12-22

## 2024-12-21 RX ORDER — HEPARIN SODIUM 1000 [USP'U]/ML
40 INJECTION, SOLUTION INTRAVENOUS; SUBCUTANEOUS PRN
Status: DISCONTINUED | OUTPATIENT
Start: 2024-12-21 | End: 2024-12-28

## 2024-12-21 RX ORDER — AMLODIPINE BESYLATE 5 MG/1
10 TABLET ORAL DAILY
Status: DISCONTINUED | OUTPATIENT
Start: 2024-12-22 | End: 2024-12-21

## 2024-12-21 RX ORDER — ALPRAZOLAM 0.5 MG
0.5 TABLET ORAL 2 TIMES DAILY PRN
Status: DISCONTINUED | OUTPATIENT
Start: 2024-12-21 | End: 2024-12-31 | Stop reason: HOSPADM

## 2024-12-21 RX ORDER — POTASSIUM CHLORIDE 7.45 MG/ML
10 INJECTION INTRAVENOUS PRN
Status: DISCONTINUED | OUTPATIENT
Start: 2024-12-21 | End: 2024-12-21 | Stop reason: ALTCHOICE

## 2024-12-21 RX ORDER — INSULIN LISPRO 100 [IU]/ML
0-4 INJECTION, SOLUTION INTRAVENOUS; SUBCUTANEOUS
Status: DISCONTINUED | OUTPATIENT
Start: 2024-12-21 | End: 2024-12-31 | Stop reason: HOSPADM

## 2024-12-21 RX ORDER — ALPRAZOLAM 0.5 MG
0.5 TABLET ORAL 2 TIMES DAILY PRN
Status: CANCELLED | OUTPATIENT
Start: 2024-12-21

## 2024-12-21 RX ORDER — HEPARIN SODIUM 5000 [USP'U]/ML
5000 INJECTION, SOLUTION INTRAVENOUS; SUBCUTANEOUS 2 TIMES DAILY
Status: CANCELLED | OUTPATIENT
Start: 2024-12-21

## 2024-12-21 RX ORDER — POTASSIUM CHLORIDE 1500 MG/1
40 TABLET, EXTENDED RELEASE ORAL PRN
Status: DISCONTINUED | OUTPATIENT
Start: 2024-12-21 | End: 2024-12-24

## 2024-12-21 RX ORDER — POLYETHYLENE GLYCOL 3350 17 G/17G
17 POWDER, FOR SOLUTION ORAL DAILY PRN
Status: CANCELLED | OUTPATIENT
Start: 2024-12-21

## 2024-12-21 RX ORDER — ONDANSETRON 4 MG/1
4 TABLET, ORALLY DISINTEGRATING ORAL EVERY 8 HOURS PRN
Status: DISCONTINUED | OUTPATIENT
Start: 2024-12-21 | End: 2024-12-22

## 2024-12-21 RX ORDER — SODIUM CHLORIDE 0.9 % (FLUSH) 0.9 %
5-40 SYRINGE (ML) INJECTION PRN
Status: DISCONTINUED | OUTPATIENT
Start: 2024-12-21 | End: 2024-12-31 | Stop reason: HOSPADM

## 2024-12-21 RX ORDER — MECLIZINE HCL 12.5 MG 12.5 MG/1
12.5 TABLET ORAL 3 TIMES DAILY PRN
Status: CANCELLED | OUTPATIENT
Start: 2024-12-21

## 2024-12-21 RX ORDER — POTASSIUM CHLORIDE 1500 MG/1
40 TABLET, EXTENDED RELEASE ORAL ONCE
Status: CANCELLED | OUTPATIENT
Start: 2024-12-21 | End: 2024-12-21

## 2024-12-21 RX ORDER — SODIUM CHLORIDE 0.9 % (FLUSH) 0.9 %
5-40 SYRINGE (ML) INJECTION PRN
Status: CANCELLED | OUTPATIENT
Start: 2024-12-21

## 2024-12-21 RX ORDER — MORPHINE SULFATE 4 MG/ML
4 INJECTION, SOLUTION INTRAMUSCULAR; INTRAVENOUS ONCE
Status: COMPLETED | OUTPATIENT
Start: 2024-12-21 | End: 2024-12-21

## 2024-12-21 RX ORDER — ACETAMINOPHEN 325 MG/1
650 TABLET ORAL EVERY 6 HOURS PRN
Status: CANCELLED | OUTPATIENT
Start: 2024-12-21

## 2024-12-21 RX ORDER — INSULIN LISPRO 100 [IU]/ML
0-8 INJECTION, SOLUTION INTRAVENOUS; SUBCUTANEOUS
Status: CANCELLED | OUTPATIENT
Start: 2024-12-21

## 2024-12-21 RX ORDER — FUROSEMIDE 20 MG/1
40 TABLET ORAL 2 TIMES DAILY
Status: CANCELLED | OUTPATIENT
Start: 2024-12-21

## 2024-12-21 RX ORDER — DEXAMETHASONE SODIUM PHOSPHATE 10 MG/ML
8 INJECTION, SOLUTION INTRAMUSCULAR; INTRAVENOUS ONCE
Status: CANCELLED | OUTPATIENT
Start: 2024-12-21 | End: 2024-12-21

## 2024-12-21 RX ORDER — FUROSEMIDE 20 MG/1
20 TABLET ORAL ONCE
Status: DISCONTINUED | OUTPATIENT
Start: 2024-12-21 | End: 2024-12-21

## 2024-12-21 RX ORDER — MORPHINE SULFATE 2 MG/ML
2 INJECTION, SOLUTION INTRAMUSCULAR; INTRAVENOUS
Status: DISCONTINUED | OUTPATIENT
Start: 2024-12-21 | End: 2024-12-31 | Stop reason: HOSPADM

## 2024-12-21 RX ADMIN — MAGNESIUM SULFATE HEPTAHYDRATE 2000 MG: 40 INJECTION, SOLUTION INTRAVENOUS at 22:45

## 2024-12-21 RX ADMIN — POTASSIUM CHLORIDE 20 MEQ: 29.8 INJECTION, SOLUTION INTRAVENOUS at 23:27

## 2024-12-21 RX ADMIN — EPINEPHRINE 1 MG: 0.1 INJECTION, SOLUTION ENDOTRACHEAL; INTRACARDIAC; INTRAVENOUS at 22:42

## 2024-12-21 RX ADMIN — METOPROLOL TARTRATE 25 MG: 25 TABLET, FILM COATED ORAL at 20:35

## 2024-12-21 RX ADMIN — MORPHINE SULFATE 2 MG: 2 INJECTION, SOLUTION INTRAMUSCULAR; INTRAVENOUS at 15:48

## 2024-12-21 RX ADMIN — MORPHINE SULFATE 4 MG: 4 INJECTION, SOLUTION INTRAMUSCULAR; INTRAVENOUS at 16:30

## 2024-12-21 RX ADMIN — CLONIDINE HYDROCHLORIDE 0.1 MG: 0.1 TABLET ORAL at 18:59

## 2024-12-21 RX ADMIN — ONDANSETRON 4 MG: 2 INJECTION, SOLUTION INTRAMUSCULAR; INTRAVENOUS at 18:59

## 2024-12-21 RX ADMIN — MORPHINE SULFATE 2 MG: 2 INJECTION, SOLUTION INTRAMUSCULAR; INTRAVENOUS at 20:35

## 2024-12-21 RX ADMIN — SODIUM CHLORIDE, POTASSIUM CHLORIDE, SODIUM LACTATE AND CALCIUM CHLORIDE 1000 ML: 600; 310; 30; 20 INJECTION, SOLUTION INTRAVENOUS at 23:48

## 2024-12-21 RX ADMIN — CLONIDINE HYDROCHLORIDE 0.1 MG: 0.1 TABLET ORAL at 20:58

## 2024-12-21 ASSESSMENT — PAIN SCALES - GENERAL
PAINLEVEL_OUTOF10: 6
PAINLEVEL_OUTOF10: 10
PAINLEVEL_OUTOF10: 9
PAINLEVEL_OUTOF10: 5
PAINLEVEL_OUTOF10: 10
PAINLEVEL_OUTOF10: 10
PAINLEVEL_OUTOF10: 9

## 2024-12-21 ASSESSMENT — PAIN DESCRIPTION - ORIENTATION
ORIENTATION: RIGHT

## 2024-12-21 ASSESSMENT — PAIN DESCRIPTION - LOCATION
LOCATION: HIP
LOCATION: HIP;LEG
LOCATION: HIP

## 2024-12-21 ASSESSMENT — PAIN DESCRIPTION - DESCRIPTORS
DESCRIPTORS: ACHING
DESCRIPTORS: PATIENT UNABLE TO DESCRIBE
DESCRIPTORS: ACHING;THROBBING;SHARP

## 2024-12-21 ASSESSMENT — PAIN - FUNCTIONAL ASSESSMENT: PAIN_FUNCTIONAL_ASSESSMENT: 0-10

## 2024-12-21 NOTE — ED PROVIDER NOTES
EMERGENCY DEPARTMENT ENCOUNTER     [facility] EMERGENCY DEPT     Pt Name: Pao Wahl   MRN: [id number]   Birthdate 1945   Date of evaluation: 12/21/2024   Provider: JAMAL VALENZUELA MD   PCP: Juliette Ramirez MD   Note Started: 3:31 PM EST 12/21/24     CHIEF COMPLAINT     Chief Complaint   Patient presents with    Fall     Walking down to driveway, got dizzy and fell. Denies hitting head or LOC. Abrasions both hands left finger and rt elbow.         HISTORY OF PRESENT ILLNESS:  History from : Patient   Limitations to history : None     Pao Wahl is a 79 y.o. female who presents after a fall.  The patient went out to her driveway to get something, lost her balance, got dizzy and fell.  She landed on her right side, scraped her right arm and injured her left hip.  She did not hit her head but knows that she is on warfarin and is at risk for head bleeds.  She does not have a headache.  She does not have ongoing dizziness or lightheadedness.  She has no weakness numbness or tingling in her arms or legs.  She states she had been feeling fine before she went outside.    Nursing Notes were all reviewed and agreed with or any disagreements were addressed in the HPI.     ROS: Positives and Pertinent negatives as per HPI.    PAST MEDICAL HISTORY     Past medical history:  has a past medical history of A-fib ([facility]), Anemia, Anemia:multifactorial (2011), Anxiety, Cataract, CHF (congestive heart failure) ([facility]), Chronic back pain, CKD (chronic kidney disease) stage 3, GFR 30-59 ml/min ([facility]) (01/2012), Colitis, ischemic ([facility]) (06/2012), COPD, Diabetes, Diabetic eye exam ([facility]) (01/28/2015), GERD (gastroesophageal reflux disease), H/O heart valve replacement with mechanical valve, mammogram (05/23/2018), Hyperlipidaemia LDL goal <100, Hypertension, Insomnia, Long-term (current) use of anticoagulants, INR goal 2.5-3.5, Lymphoma:monoclonal B cell (01/2012), Mammogram abnormal (07/30/2015), Nonspecific

## 2024-12-21 NOTE — PROGRESS NOTES
Informed of patient's admission by ED.  Chart reviewed.  Patient has significant cardiac history with mechanical aortic and mitral valves with significant prosthetic aortic valve stenosis not amenable to surgery as per cardiology evaluation during her hospitalization at  in September 2024.  Echo done on 9/6/2024 noted as below.  Case discussed with Dr. Ramirez, cardiology who feels patient would be high risk surgery and recommends cardiac anesthesia to be involved during her surgery.  Recommend transfer to [facility].  Spoke to Dr. Roman, orthopedics who in turn spoke to Dr. Perez with anesthesia here at [place] who also agrees that patient should be transferred given her increased cardiac risk.  Orthopedics also recommend starting on heparin subcu here and can place on heparin gtt. at [place].  Depending on time of surgery, heparin drip needs to be stopped 6 hours before.  Spoke to ED attending Dr. Talamantes and informed of above recommendations.  At this time, will cancel admission here at [facility].     Echocardiogram 9/6/2024  Study Conclusions   - Left ventricle: The cavity size is normal. Wall thickness was increased in a pattern of mild LVH. Systolic function is mildly to moderately reduced. The estimated ejection fraction is 40-45%. Cannot assess LV diastolic function. There is no evidence of a thrombus revealed by acoustic contrast opacification.   - Aortic valve: A mechanical prosthetic valve is present. Gradients are above the expected range, consistent with significant stenosis. There is moderate regurgitation. The mean systolic gradient is 44mm Hg. The ratio of LVOT to aortic valve peak velocity is 0.13.   - Mitral valve: A mechanical prosthesis is present. Inflow velocity is within the expected range. There is no regurgitation (evaluation is limited due to shielding). HR 81 bpm. The mean diastolic gradient is 3mm Hg.   - Left atrium: The atrium is moderately  dilated.   - Right ventricle: The cavity size is normal. Pacer wire or catheter noted in right ventricle. Systolic function is low normal.   - Atrial septum: Agitated saline contrast study at baseline shows no right-to-left atrial level shunt.   - Pulmonary arteries: Systolic pressure could not be accurately estimated, but it is at least 36 mm Hg + the RA pressure.   - Inferior vena cava: The IVC is normal-sized. Respirophasic diameter changes are in the normal range (> 50%).   - Regional wall motion: There is hypokinesis of the apical septal and apical lateral walls and the apex.

## 2024-12-21 NOTE — ED NOTES
ED to Inpatient Handoff SBAR    Patient Name: Pao Wahl   :  1945  79 y.o.   MRN:  [id number]  Preferred Name    ED Room #:    Family/Caregiver Present yes     Chief Complaint Fall (Walking down to driveway, got dizzy and fell. Denies hitting head or LOC. Abrasions both hands left finger and rt elbow. )       Restraints no   Sitter no   Sepsis Risk Score    Isolation No active isolations   Fall Risk Assessment Presents to emergency department  because of falls (Syncope, seizure, or loss of consciousness): Yes, Age > 70: Yes, Altered Mental Status, Intoxication with alcohol or substance confusion (Disorientation, impaired judgment, poor safety awaremess, or inability to follow instructions): No, Impaired Mobility: Ambulates or transfers with assistive devices or assistance; Unable to ambulate or transer.: Yes, Nursing Judgement: Yes     Situation  Code Status: Prior Full code .    Allergies: Nsaids and Hydrocodone-acetaminophen  Weight: Patient Vitals for the past 96 hrs (Last 3 readings):   Weight   24 1508 45.4 kg (100 lb)     Arrived from: home  Hospital Problem/Diagnosis:  Principal Problem:    Closed displaced fracture of right femoral neck (HCC)  Active Problems:    Closed right hip fracture, initial encounter ([facility])  Resolved Problems:    * No resolved hospital problems. *    Imaging:   XR HIP 2-3 VW W PELVIS RIGHT   Final Result   Right subcapital femoral neck fracture.         Electronically signed by Darron Muniz      CT HEAD WO CONTRAST   Final Result      1.  No acute intracranial process.   2.  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      Electronically signed by Darron Muniz        Abnormal labs:   Abnormal Labs Reviewed   CBC WITH AUTO DIFFERENTIAL - Abnormal; Notable for the following components:       Result Value    RBC 2.69 (*)     Hemoglobin 8.2 (*)     Hematocrit 24.3 (*)     RDW 17.0 (*)     Immature Granulocytes % 2 (*)     All other components within normal  colonoscopy except mild diverticulosis    Therapeutic drug monitoring 04/10/2015    OARRS report consistent on 4/10/15;7/6/15;10/23/15;2/7/16;5/16/16;8/19/16;11/4/16;3/6/17;6/26/17;9/20/17;12/18/17; 12/18/17;3/12/18;5/29/18    Valvular heart disease     s/p aortic and mitral valve repair. Under care of cards:Dr. Low.    Visit for screening mammogram 04/10/2017    negative:see scanned report.       Assessment    Vitals/MEWS:    Level of Consciousness: Alert (0)   Vitals:    12/21/24 1508 12/21/24 1515 12/21/24 1530   BP:  (!) 215/48 (!) 200/40   Pulse:  65 67   Resp:  20 18   Temp:  97.9 °F (36.6 °C)    TempSrc:  Oral    SpO2:  100% 100%   Weight: 45.4 kg (100 lb)     Height: 1.499 m (4' 11\")       O2 Flow Rate:       Cardiac Rhythm:      Pain Assessment:  [x] Verbal [] Ba Baxter Scale  Pain Scale: Pain Assessment  Pain Assessment: 0-10  Pain Level: 10  Patient's Stated Pain Goal: 0 - No pain  Pain Location: Hip  Pain Orientation: Right  Pain Descriptors: Aching  Last documented pain score (0-10 scale) Pain Level: 10  Last documented pain medication administered: Morphine 4mg 1630     Isamar Coma Scale (GCS): [place] Coma Scale  Eye Opening: Spontaneous  Best Verbal Response: Oriented  Best Motor Response: Obeys commands  Isamar Coma Scale Score: 15  Orientation Level:      NIH Score:    C-SSRS: Risk of Suicide: No Risk  Active LDA's:         PO Status: NPO effective midnight     Pertinent or High Risk Medications/Drips: no   If Yes, please provide details:   Pending Blood Product Administration: no   Medications administered in Emergency Department [unfilled]    Home Medications   No current facility-administered medications for this encounter.     Current Outpatient Medications   Medication Sig Dispense Refill    furosemide (LASIX) 40 MG tablet Take 1 tablet by mouth 2 times daily      tiZANidine (ZANAFLEX) 2 MG tablet Take 1 tablet by mouth every 6 hours as needed      pantoprazole (PROTONIX) 40 MG tablet

## 2024-12-21 NOTE — ED TRIAGE NOTES
Walking down to driveway, got dizzy and fell. Denies hitting head or LOC. Abrasions both hands left finger and rt elbow. Rt knee abrasion

## 2024-12-21 NOTE — ED NOTES
Pt's son, Herb, called and updated per pt request. Pt's son updated on plan of care and transfer, denied further questions.

## 2024-12-21 NOTE — PROGRESS NOTES
[facility] Hospitalist Transfer Note:  [facility] PS received     Case reviewed with physician -      Reason for Transfer: Need surgery under Cardiac Anesthesia    Transfer requested by: Dr. Santiago, Dr. Ramirez   Transfer accepted by: Dr. Ross Holloway     Prelim diagnosis: R hip fracture  d/t fall, in setting of Prosthetic valve Aortic Stenosis     Patient has been accepted for transfer to [facility].     Once patient arrive please page ON CALL HOSPITALIST so patient can be seen.        Thanks  Ross Holloway MD  Hospitalist

## 2024-12-21 NOTE — PLAN OF CARE
Consult noted and x-rays reviewed will plan for right hip fixation of femoral neck fracture with cannulated screws.  Likely tomorrow pending medical evaluation and risk stratification.        Davie Roman MD, FAAOS  Board Certified Orthopaedic Surgeon  [facility]    Phone:[phone]  Fax [phone]    12/21/24  4:30 PM        UPDATE 17:19 12/21  Spoke with Dr. Santiago and Dr. Perez (anesthesia).  Patient with stenosed mechanical aortic valve with elevated systolic gradient at 44 mmHg and concern for circulatory collapse with induction of general anesthesia.  At the recommendation of cardiology, patient should be transferred to a cardiac care center with more advanced anesthesia capabilities.  Will defer to ortho surgeon on call at that site for treatment of the fracture.  Patient will be heparinized for the time being.        Davie Roman MD, FAAOS  Board Certified Orthopaedic Surgeon  [facility]    Phone:[phone]  Fax [phone]    12/21/24  5:24 PM

## 2024-12-22 ENCOUNTER — APPOINTMENT (OUTPATIENT)
Dept: GENERAL RADIOLOGY | Age: 79
DRG: 308 | End: 2024-12-22
Attending: HOSPITALIST
Payer: MEDICARE

## 2024-12-22 PROBLEM — I35.0 NONRHEUMATIC AORTIC VALVE STENOSIS: Status: ACTIVE | Noted: 2024-12-22

## 2024-12-22 PROBLEM — I47.20 VENTRICULAR TACHYCARDIA (HCC): Status: ACTIVE | Noted: 2024-12-22

## 2024-12-22 PROBLEM — I46.9 CARDIAC ARREST: Status: ACTIVE | Noted: 2024-12-22

## 2024-12-22 LAB
ABO + RH BLD: NORMAL
ALBUMIN SERPL-MCNC: 3 G/DL (ref 3.4–5)
ALBUMIN/GLOB SERPL: 1.3 {RATIO} (ref 1.1–2.2)
ALP SERPL-CCNC: 73 U/L (ref 40–129)
ALT SERPL-CCNC: 27 U/L (ref 10–40)
ANION GAP SERPL CALCULATED.3IONS-SCNC: 11 MMOL/L (ref 3–16)
ANION GAP SERPL CALCULATED.3IONS-SCNC: 13 MMOL/L (ref 3–16)
ANTI-XA UNFRAC HEPARIN: 0.4 IU/ML (ref 0.3–0.7)
ANTI-XA UNFRAC HEPARIN: <0.1 IU/ML (ref 0.3–0.7)
AST SERPL-CCNC: 72 U/L (ref 15–37)
BASOPHILS # BLD: 0.1 K/UL (ref 0–0.2)
BASOPHILS NFR BLD: 0.4 %
BILIRUB SERPL-MCNC: 0.5 MG/DL (ref 0–1)
BLD GP AB SCN SERPL QL: NORMAL
BLOOD BANK DISPENSE STATUS: NORMAL
BLOOD BANK PRODUCT CODE: NORMAL
BPU ID: NORMAL
BUN SERPL-MCNC: 19 MG/DL (ref 7–20)
BUN SERPL-MCNC: 21 MG/DL (ref 7–20)
CALCIUM SERPL-MCNC: 7.8 MG/DL (ref 8.3–10.6)
CALCIUM SERPL-MCNC: 8.1 MG/DL (ref 8.3–10.6)
CHLORIDE SERPL-SCNC: 99 MMOL/L (ref 99–110)
CHLORIDE SERPL-SCNC: 99 MMOL/L (ref 99–110)
CO2 SERPL-SCNC: 26 MMOL/L (ref 21–32)
CO2 SERPL-SCNC: 27 MMOL/L (ref 21–32)
CREAT SERPL-MCNC: 1.4 MG/DL (ref 0.6–1.2)
CREAT SERPL-MCNC: 1.5 MG/DL (ref 0.6–1.2)
DEPRECATED RDW RBC AUTO: 18.9 % (ref 12.4–15.4)
DESCRIPTION BLOOD BANK: NORMAL
EKG ATRIAL RATE: 68 BPM
EKG ATRIAL RATE: 84 BPM
EKG DIAGNOSIS: NORMAL
EKG DIAGNOSIS: NORMAL
EKG P-R INTERVAL: 202 MS
EKG Q-T INTERVAL: 490 MS
EKG Q-T INTERVAL: 532 MS
EKG QRS DURATION: 138 MS
EKG QRS DURATION: 144 MS
EKG QTC CALCULATION (BAZETT): 558 MS
EKG QTC CALCULATION (BAZETT): 565 MS
EKG R AXIS: 125 DEGREES
EKG R AXIS: 126 DEGREES
EKG T AXIS: 42 DEGREES
EKG T AXIS: 48 DEGREES
EKG VENTRICULAR RATE: 68 BPM
EKG VENTRICULAR RATE: 78 BPM
EOSINOPHIL # BLD: 0 K/UL (ref 0–0.6)
EOSINOPHIL NFR BLD: 0 %
FERRITIN SERPL IA-MCNC: 8698 NG/ML (ref 15–150)
FOLATE SERPL-MCNC: 11.9 NG/ML (ref 4.78–24.2)
GFR SERPLBLD CREATININE-BSD FMLA CKD-EPI: 35 ML/MIN/{1.73_M2}
GFR SERPLBLD CREATININE-BSD FMLA CKD-EPI: 38 ML/MIN/{1.73_M2}
GLUCOSE BLD-MCNC: 136 MG/DL (ref 70–99)
GLUCOSE BLD-MCNC: 165 MG/DL (ref 70–99)
GLUCOSE BLD-MCNC: 170 MG/DL (ref 70–99)
GLUCOSE BLD-MCNC: 182 MG/DL (ref 70–99)
GLUCOSE BLD-MCNC: 187 MG/DL (ref 70–99)
GLUCOSE SERPL-MCNC: 190 MG/DL (ref 70–99)
GLUCOSE SERPL-MCNC: 193 MG/DL (ref 70–99)
HCT VFR BLD AUTO: 22.6 % (ref 36–48)
HCT VFR BLD AUTO: 28.7 % (ref 36–48)
HGB BLD-MCNC: 7.5 G/DL (ref 12–16)
HGB BLD-MCNC: 9.6 G/DL (ref 12–16)
INR PPP: 1.59 (ref 0.85–1.15)
IRON SATN MFR SERPL: 15 % (ref 15–50)
IRON SERPL-MCNC: 20 UG/DL (ref 37–145)
LACTATE BLDV-SCNC: 1.1 MMOL/L (ref 0.4–2)
LYMPHOCYTES # BLD: 0.8 K/UL (ref 1–5.1)
LYMPHOCYTES NFR BLD: 6.8 %
MAGNESIUM SERPL-MCNC: 2.84 MG/DL (ref 1.8–2.4)
MCH RBC QN AUTO: 29.5 PG (ref 26–34)
MCHC RBC AUTO-ENTMCNC: 33.3 G/DL (ref 31–36)
MCV RBC AUTO: 88.6 FL (ref 80–100)
MONOCYTES # BLD: 0.8 K/UL (ref 0–1.3)
MONOCYTES NFR BLD: 6.4 %
NEUTROPHILS # BLD: 10.2 K/UL (ref 1.7–7.7)
NEUTROPHILS NFR BLD: 86.4 %
PERFORMED ON: ABNORMAL
PLATELET # BLD AUTO: 186 K/UL (ref 135–450)
PMV BLD AUTO: 9.8 FL (ref 5–10.5)
POTASSIUM SERPL-SCNC: 4.2 MMOL/L (ref 3.5–5.1)
POTASSIUM SERPL-SCNC: 4.3 MMOL/L (ref 3.5–5.1)
PROT SERPL-MCNC: 5.3 G/DL (ref 6.4–8.2)
PROTHROMBIN TIME: 19 SEC (ref 11.9–14.9)
RBC # BLD AUTO: 2.55 M/UL (ref 4–5.2)
SODIUM SERPL-SCNC: 137 MMOL/L (ref 136–145)
SODIUM SERPL-SCNC: 138 MMOL/L (ref 136–145)
TIBC SERPL-MCNC: 131 UG/DL (ref 260–445)
TROPONIN, HIGH SENSITIVITY: 58 NG/L (ref 0–14)
VIT B12 SERPL-MCNC: >4000 PG/ML (ref 211–911)
WBC # BLD AUTO: 11.8 K/UL (ref 4–11)

## 2024-12-22 PROCEDURE — 99223 1ST HOSP IP/OBS HIGH 75: CPT | Performed by: INTERNAL MEDICINE

## 2024-12-22 PROCEDURE — 6360000002 HC RX W HCPCS: Performed by: HOSPITALIST

## 2024-12-22 PROCEDURE — 80053 COMPREHEN METABOLIC PANEL: CPT

## 2024-12-22 PROCEDURE — 93005 ELECTROCARDIOGRAM TRACING: CPT | Performed by: NURSE PRACTITIONER

## 2024-12-22 PROCEDURE — 71045 X-RAY EXAM CHEST 1 VIEW: CPT

## 2024-12-22 PROCEDURE — 83550 IRON BINDING TEST: CPT

## 2024-12-22 PROCEDURE — 85014 HEMATOCRIT: CPT

## 2024-12-22 PROCEDURE — 93010 ELECTROCARDIOGRAM REPORT: CPT | Performed by: INTERNAL MEDICINE

## 2024-12-22 PROCEDURE — 36556 INSERT NON-TUNNEL CV CATH: CPT

## 2024-12-22 PROCEDURE — 83540 ASSAY OF IRON: CPT

## 2024-12-22 PROCEDURE — 82746 ASSAY OF FOLIC ACID SERUM: CPT

## 2024-12-22 PROCEDURE — 84484 ASSAY OF TROPONIN QUANT: CPT

## 2024-12-22 PROCEDURE — 6370000000 HC RX 637 (ALT 250 FOR IP): Performed by: NURSE PRACTITIONER

## 2024-12-22 PROCEDURE — 94640 AIRWAY INHALATION TREATMENT: CPT

## 2024-12-22 PROCEDURE — 85520 HEPARIN ASSAY: CPT

## 2024-12-22 PROCEDURE — 85025 COMPLETE CBC W/AUTO DIFF WBC: CPT

## 2024-12-22 PROCEDURE — 2500000003 HC RX 250 WO HCPCS: Performed by: STUDENT IN AN ORGANIZED HEALTH CARE EDUCATION/TRAINING PROGRAM

## 2024-12-22 PROCEDURE — 94761 N-INVAS EAR/PLS OXIMETRY MLT: CPT

## 2024-12-22 PROCEDURE — 36415 COLL VENOUS BLD VENIPUNCTURE: CPT

## 2024-12-22 PROCEDURE — 2580000003 HC RX 258: Performed by: STUDENT IN AN ORGANIZED HEALTH CARE EDUCATION/TRAINING PROGRAM

## 2024-12-22 PROCEDURE — 83605 ASSAY OF LACTIC ACID: CPT

## 2024-12-22 PROCEDURE — 2000000000 HC ICU R&B

## 2024-12-22 PROCEDURE — 36430 TRANSFUSION BLD/BLD COMPNT: CPT

## 2024-12-22 PROCEDURE — 2580000003 HC RX 258: Performed by: NURSE PRACTITIONER

## 2024-12-22 PROCEDURE — 6360000002 HC RX W HCPCS: Performed by: INTERNAL MEDICINE

## 2024-12-22 PROCEDURE — 2700000000 HC OXYGEN THERAPY PER DAY

## 2024-12-22 PROCEDURE — 83735 ASSAY OF MAGNESIUM: CPT

## 2024-12-22 PROCEDURE — 85018 HEMOGLOBIN: CPT

## 2024-12-22 PROCEDURE — 36592 COLLECT BLOOD FROM PICC: CPT

## 2024-12-22 PROCEDURE — 6360000002 HC RX W HCPCS: Performed by: STUDENT IN AN ORGANIZED HEALTH CARE EDUCATION/TRAINING PROGRAM

## 2024-12-22 PROCEDURE — 6360000002 HC RX W HCPCS

## 2024-12-22 PROCEDURE — 99291 CRITICAL CARE FIRST HOUR: CPT | Performed by: INTERNAL MEDICINE

## 2024-12-22 PROCEDURE — 2500000003 HC RX 250 WO HCPCS: Performed by: NURSE PRACTITIONER

## 2024-12-22 PROCEDURE — 82607 VITAMIN B-12: CPT

## 2024-12-22 PROCEDURE — 02HV33Z INSERTION OF INFUSION DEVICE INTO SUPERIOR VENA CAVA, PERCUTANEOUS APPROACH: ICD-10-PCS | Performed by: STUDENT IN AN ORGANIZED HEALTH CARE EDUCATION/TRAINING PROGRAM

## 2024-12-22 PROCEDURE — 5A0935A ASSISTANCE WITH RESPIRATORY VENTILATION, LESS THAN 24 CONSECUTIVE HOURS, HIGH NASAL FLOW/VELOCITY: ICD-10-PCS | Performed by: HOSPITALIST

## 2024-12-22 PROCEDURE — 6360000002 HC RX W HCPCS: Performed by: NURSE PRACTITIONER

## 2024-12-22 PROCEDURE — 85610 PROTHROMBIN TIME: CPT

## 2024-12-22 RX ORDER — SODIUM CHLORIDE 9 MG/ML
INJECTION, SOLUTION INTRAVENOUS PRN
Status: DISCONTINUED | OUTPATIENT
Start: 2024-12-22 | End: 2024-12-31 | Stop reason: HOSPADM

## 2024-12-22 RX ORDER — ALBUTEROL SULFATE 0.83 MG/ML
2.5 SOLUTION RESPIRATORY (INHALATION)
Status: DISCONTINUED | OUTPATIENT
Start: 2024-12-22 | End: 2024-12-28

## 2024-12-22 RX ORDER — PROCHLORPERAZINE EDISYLATE 5 MG/ML
10 INJECTION INTRAMUSCULAR; INTRAVENOUS EVERY 6 HOURS PRN
Status: DISCONTINUED | OUTPATIENT
Start: 2024-12-22 | End: 2024-12-31 | Stop reason: HOSPADM

## 2024-12-22 RX ORDER — EPINEPHRINE IN SOD CHLOR,ISO 1 MG/10 ML
SYRINGE (ML) INTRAVENOUS
Status: COMPLETED | OUTPATIENT
Start: 2024-12-21 | End: 2024-12-21

## 2024-12-22 RX ORDER — LIDOCAINE HYDROCHLORIDE ANHYDROUS AND DEXTROSE MONOHYDRATE 5; 400 G/100ML; MG/100ML
1 INJECTION, SOLUTION INTRAVENOUS CONTINUOUS
Status: DISCONTINUED | OUTPATIENT
Start: 2024-12-22 | End: 2024-12-23

## 2024-12-22 RX ORDER — ALBUTEROL SULFATE 0.83 MG/ML
2.5 SOLUTION RESPIRATORY (INHALATION) EVERY 4 HOURS PRN
Status: DISCONTINUED | OUTPATIENT
Start: 2024-12-22 | End: 2024-12-31 | Stop reason: HOSPADM

## 2024-12-22 RX ADMIN — PROCHLORPERAZINE EDISYLATE 10 MG: 5 INJECTION INTRAMUSCULAR; INTRAVENOUS at 01:05

## 2024-12-22 RX ADMIN — MORPHINE SULFATE 2 MG: 2 INJECTION, SOLUTION INTRAMUSCULAR; INTRAVENOUS at 16:15

## 2024-12-22 RX ADMIN — Medication 20 ML: at 20:29

## 2024-12-22 RX ADMIN — SODIUM PHOSPHATE, MONOBASIC, MONOHYDRATE AND SODIUM PHOSPHATE, DIBASIC, ANHYDROUS 15 MMOL: 142; 276 INJECTION, SOLUTION INTRAVENOUS at 00:19

## 2024-12-22 RX ADMIN — AMLODIPINE BESYLATE 5 MG: 5 TABLET ORAL at 10:47

## 2024-12-22 RX ADMIN — POTASSIUM CHLORIDE 20 MEQ: 29.8 INJECTION, SOLUTION INTRAVENOUS at 00:08

## 2024-12-22 RX ADMIN — MELATONIN TAB 3 MG 3 MG: 3 TAB at 20:24

## 2024-12-22 RX ADMIN — HYDRALAZINE HYDROCHLORIDE 10 MG: 20 INJECTION INTRAMUSCULAR; INTRAVENOUS at 00:22

## 2024-12-22 RX ADMIN — ATORVASTATIN CALCIUM 10 MG: 10 TABLET, FILM COATED ORAL at 20:24

## 2024-12-22 RX ADMIN — HEPARIN SODIUM 18 UNITS/KG/HR: 10000 INJECTION, SOLUTION INTRAVENOUS at 13:25

## 2024-12-22 RX ADMIN — AMIODARONE HYDROCHLORIDE 1 MG/MIN: 1.8 INJECTION, SOLUTION INTRAVENOUS at 02:20

## 2024-12-22 RX ADMIN — AMIODARONE HYDROCHLORIDE 0.5 MG/MIN: 1.8 INJECTION, SOLUTION INTRAVENOUS at 06:23

## 2024-12-22 RX ADMIN — ALBUTEROL SULFATE 2.5 MG: 2.5 SOLUTION RESPIRATORY (INHALATION) at 12:30

## 2024-12-22 RX ADMIN — SODIUM CHLORIDE 1000 ML: 9 INJECTION, SOLUTION INTRAVENOUS at 01:04

## 2024-12-22 RX ADMIN — LIDOCAINE HYDROCHLORIDE 1 MG/MIN: 4 INJECTION, SOLUTION INTRAVENOUS at 09:39

## 2024-12-22 RX ADMIN — AMIODARONE HYDROCHLORIDE 150 MG: 1.5 INJECTION, SOLUTION INTRAVENOUS at 02:09

## 2024-12-22 RX ADMIN — INSULIN LISPRO 1 UNITS: 100 INJECTION, SOLUTION INTRAVENOUS; SUBCUTANEOUS at 00:11

## 2024-12-22 RX ADMIN — ALBUTEROL SULFATE 2.5 MG: 2.5 SOLUTION RESPIRATORY (INHALATION) at 20:11

## 2024-12-22 RX ADMIN — METOPROLOL TARTRATE 25 MG: 25 TABLET, FILM COATED ORAL at 20:24

## 2024-12-22 RX ADMIN — INSULIN LISPRO 1 UNITS: 100 INJECTION, SOLUTION INTRAVENOUS; SUBCUTANEOUS at 20:28

## 2024-12-22 RX ADMIN — ALPRAZOLAM 0.5 MG: 0.5 TABLET ORAL at 20:24

## 2024-12-22 RX ADMIN — METOPROLOL TARTRATE 25 MG: 25 TABLET, FILM COATED ORAL at 10:46

## 2024-12-22 ASSESSMENT — PAIN DESCRIPTION - PAIN TYPE: TYPE: ACUTE PAIN

## 2024-12-22 ASSESSMENT — PAIN DESCRIPTION - DESCRIPTORS: DESCRIPTORS: ACHING;THROBBING;SHARP

## 2024-12-22 ASSESSMENT — PAIN DESCRIPTION - LOCATION
LOCATION: HIP
LOCATION: HIP

## 2024-12-22 ASSESSMENT — PAIN SCALES - GENERAL
PAINLEVEL_OUTOF10: 10
PAINLEVEL_OUTOF10: 5
PAINLEVEL_OUTOF10: 3
PAINLEVEL_OUTOF10: 4

## 2024-12-22 ASSESSMENT — PAIN DESCRIPTION - ORIENTATION
ORIENTATION: RIGHT
ORIENTATION: RIGHT

## 2024-12-22 ASSESSMENT — PAIN - FUNCTIONAL ASSESSMENT: PAIN_FUNCTIONAL_ASSESSMENT: PREVENTS OR INTERFERES WITH MANY ACTIVE NOT PASSIVE ACTIVITIES

## 2024-12-22 NOTE — H&P
V2.0  History and Physical      Name:  Pao Wahl /Age/Sex: 1945  (79 y.o. female)   MRN & CSN:  [id number] & [id number] Encounter Date/Time: 2024 10:10 PM EST   Location:  [address] [place], [place] [id number]- PCP: Juliette Ramirez MD       Hospital Day: 1    Assessment and Plan:   Pao Wahl is a 79 y.o. female  who presents with Closed right hip fracture, initial encounter ([facility])    Hospital Problems             Last Modified POA    * (Principal) Closed right hip fracture, initial encounter ([facility]) 2024 Yes       Plan:  Right Subcapital Femur Neck Fracture  Admit inpatient with telemetry monitoring  N.p.o. after midnight  Orthopedic consult Dr. Frederick Gonzalez is aware and tentative plan for surgery tomorrow   Prn IV morphine  Bedrest    2. Hypertensive Urgency  Blood pressure has remained elevated even after 2 doses 0.1 mg clonidine.  Will start IV hydralazine  Continue home metoprolol  She did have her Norvasc stopped recently due to some orthostatic blood pressure  She denies severe pain at this time    3. Hypokalemia  Oral and IV replacement    4. Hypomagnesemia  IV replacement    5. Mechanical Aortic/Mitral Valve  Last echo 24 showed significant stenosis of prosthetic aortic valve  She is on Coumadin at home her last dose was 24,     6. Anemia  Hgb 8.2,  trending down since Aug/21/24,  hgb 12  Follows with Hematology for hx of follicular lymphoma, hematology thought most likely secondary to chronic kidney disease  She had EGD/colonoscopy in Sept with no active bleeding      7. Pacemaker    8. Type II DM  Sliding scale insulin          Disposition:   Current Living situation: home  Expected Disposition: most likely will need skilled facility   Estimated D/C: 5    Diet No diet orders on file   DVT Prophylaxis [] Lovenox, [x]  Heparin, [] SCDs, [] Ambulation,  [] Eliquis, [] Xarelto, [] Coumadin   Code Status Full Code   Surrogate Decision Maker/ POA      Personally reviewed Lab Studies and

## 2024-12-22 NOTE — PROGRESS NOTES
At 1042 PM patient Vtach to vfib unresponsive     Start code:   Start cpr     @1042p epi 1      Sinus tach, mag low  1045 p     Pule check and suction by RT Shelia Lora    Vomit x2 1046 pm    V/s obtained      EKG ordered      Dr Santiago at bedside     Pause mag for labs 1048 pm    On non rebreather 100 %    Dr. MELISSA-\" get high flow\"     Patient verbally responding.

## 2024-12-22 NOTE — CONSULTS
[facility] Pulmonary and Critical Care   Consult Note      Reason for Consult: Pulseless polymorphic V. tach/torsades  Requesting Physician: Faina Arango    Subjective:   CHIEF COMPLAINT: Fall and right hip pain     HPI: Patient states that she had a fall while bending over-had some dizziness but did not lose consciousness.  Evaluated in ER and was diagnosed with right femoral neck fracture.  Undergoing orthopedic evaluation about the timing of surgery, given patient's complex cardiac history.  Patient had brief pulseless VT/torsade morphology last night requiring 1 dose of epinephrine and defibrillation-the episode apparently lasted <1min.  Patient awake and alert after the episode.  Now transferred to ICU for close monitoring.    History of mitral valve repair in 1970s with redo mechanical AVR/MVR in 2000 by Dr. Vega.  History of atrial fibrillation with sinus node dysfunction status post PPM, CKD stage III, follicular lymphoma in remission.  She has been noted to have severe prosthetic aortic valve stenosis on recent 2D echocardiogram.  She recently had prolonged hospitalization between 9/4 and 9/28 for CHF decompensation and acute renal failure-received intermittent HD transiently.  Patient is on Coumadin for history of AVR/MVR.  Recently deemed to be unsuitable for valvular surgery by CTS. former smoker.       The patient is a 79 y.o. female with significant past medical history of:      Diagnosis Date    A-fib ([facility])     Anemia     multifactorial:under care of heme-onc:Dr. Bermudez    Anemia:multifactorial 2011    as per heme-onc    Anxiety     Cataract     bilateral    CHF (congestive heart failure) ([facility])     Chronic back pain     Under care of ortho spine:Dr. Eldridge    CKD (chronic kidney disease) stage 3, GFR 30-59 ml/min ([facility]) 01/2012    Colitis, ischemic ([facility]) 06/2012    Under care of Dr. Waddell:GI    COPD     Diabetes     Diabetic eye exam ([facility]) 01/28/2015    CEI    GERD (gastroesophageal reflux disease)

## 2024-12-22 NOTE — PROGRESS NOTES
12/22/24 0503   RT Protocol   History Pulmonary Disease 2   Respiratory pattern 0   Breath sounds 2   Cough 0   Indications for Bronchodilator Therapy Decreased or absent breath sounds   Bronchodilator Assessment Score 4

## 2024-12-22 NOTE — PROGRESS NOTES
Pt Phos is 1.3, perfectserved Dr Dagoberto Estrella if he can please put in an order for the phosphate replacement too. Waiting for a response

## 2024-12-22 NOTE — PROGRESS NOTES
Pt has only one access, that is the port. Pt is hard to stick, amiodarone is not compatible with heparin. Dr Dagoberto Estrella informed, highest priority is amiodarone, according to Dr Dagoberto Estrella. Heparin is put on hold now, only amiod is running.due to lack of access. Will continue to monitor.

## 2024-12-22 NOTE — RT PROTOCOL NOTE
RT Nebulizer Bronchodilator Protocol Note    There is a bronchodilator order in the chart from a provider indicating to follow the RT Bronchodilator Protocol and there is an “Initiate RT Bronchodilator Protocol” order as well (see protocol at bottom of note).    CXR Findings:  XR CHEST PORTABLE    Result Date: 12/22/2024  No acute pulmonary findings.       The findings from the last RT Protocol Assessment were as follows:  Smoking: Chronic pulmonary disease  Respiratory Pattern: Regular pattern and RR 12-20 bpm  Breath Sounds: Slightly diminished and/or crackles  Cough: Strong, spontaneous, non-productive  Indication for Bronchodilator Therapy: Decreased or absent breath sounds  Bronchodilator Assessment Score: 4    Aerosolized bronchodilator medication orders have been revised according to the RT Nebulizer Bronchodilator Protocol below.    Respiratory Therapist to perform RT Therapy Protocol Assessment initially then follow the protocol.  Repeat RT Therapy Protocol Assessment PRN for score 0-3 or on second treatment, BID, and PRN for scores above 3.    No Indications - adjust the frequency to every 6 hours PRN wheezing or bronchospasm, if no treatments needed after 48 hours then discontinue using Per Protocol order mode.     If indication present, adjust the RT bronchodilator orders based on the Bronchodilator Assessment Score as indicated below.  If a patient is on this medication at home then do not decrease Frequency below that used at home.    0-3 - enter or revise RT bronchodilator order(s) to equivalent RT Bronchodilator order with Frequency of every 4 hours PRN for wheezing or increased work of breathing using Per Protocol order mode.       4-6 - enter or revise RT Bronchodilator order(s) to two equivalent RT bronchodilator orders with one order with BID Frequency and one order with Frequency of every 4 hours PRN wheezing or increased work of breathing using Per Protocol order mode.         7-10 - enter or

## 2024-12-22 NOTE — PROGRESS NOTES
[facility]    Hospitalist Progress Note:      Name:  Pao Wahl /Age/Sex: 1945  (79 y.o. female)   MRN & CSN:  [id number] & [id number] Encounter Date: 2024    Location:  ICU3905/3905-01 PCP: Juliette Ramirez MD     Attending:Ross Holloway MD  Admission Date: 2024      Hospital Day: 2    Chief Complain/Reason for Admission:  No chief complaint on file.      Assessment:  Pao Wahl is a 79 y.o. female with pmh of HTN, HLD, COPD, mechanical aortic and mitral valves, CKD, anemia, A-fib, Follicular lymphoma in remission, pacemaker, Type II DM   who presents with fall and right hip pain.  Patient was found to have right hip fracture.  Patient was felt to have high cardiac risk, so was transferred to [facility] for cardiac anesthesia for orthopedic surgery.  During course of hospitalization patient was found to have episode of cardiac arrest which was responded to DC shock and IV epinephrine.  Patient is currently alert, awake and oriented.    S/P cardiac arrest suspected d/t pulseless V-Tach: ROSC after DC shock and IV epinephrine with ACLS. Currently on IV lidocain gtt  Right femoral neck fracture due to fall: Needs orthopedic surgery  PAF  Chronic CHF  History of mechanical aortic and mitral valves  S/p pacemaker  DM 2 with hyperglycemia  Chronic anemia, normocytic    Plan:   Continue IV lidocaine drip and heparin drip per cardiology team  Telemonitoring, echocardiogram, plan to EP study and [facility] tentatively tomorrow.  Follow-up with cardiology team  Hold orthopedic surgery until stabilization of cardiac status.  Orthopedic surgery team noted  Maintain potassium above 4, recheck potassium at 4 PM  Continue Norvasc, Lipitor, metoprolol  Current meds reviewed, adjusted.   See orders  Diet Diet NPO Exceptions are: Sips of Water with Meds  ADULT DIET; Clear Liquid   DVT Prophylaxis [] Lovenox, [x]  Heparin, [] SCDs, [] Ambulation,  [] Eliquis, [] Xarelto  []

## 2024-12-22 NOTE — PLAN OF CARE
78yo F with complex cardiac history and right femoral neck fracture    Will proceed with ORIF versus hemiarthroplasty on 12/22  Consult note to follow    Bedrest  Multimodal pain control  Recommend transfusion preoperatively Hgb 8.2  Further medical optimization    Frederick Gonzalez MD  Orthopedic Surgery, Adult Reconstruction

## 2024-12-22 NOTE — PROCEDURES
INTERNAL JUGULAR CENTRAL VENOUS CATHETER PROCEDURE                                                                   Name:  Pao Wahl   /Age/Sex: 1945  (79 y.o. female)  MRN: [id number]   Room/Bed: [facility]3905/3905-01   Admission Date/Time: 2024  9:39 PM       PRE-PROCEDURE    INDICATION: Central Venous Access  LATERALITY: Right  : Eugenio Calderon DO  ASSIST: MEME Dumont   CONSENT: Patient gave verbal consent to procedure  TIME OUT: Immediately prior to procedure a \"time out\" was called to verify the correct patient, procedure, equipment, support staff and site/side marked as required.    PROCEDURE    Appropriate monitoring equipment such as telemetry and pulse oximetry was in place during the procedure.  The skin over the vein was prepped with chlorhexidine and draped in a sterile fashion.  Local anesthesia was obtained by infiltration using 1% Lidocaine without epinephrine.  The vessel was non-pulsatile and easily compressible on ultrasound.  A 7F 16cm triple lumen catheter was then inserted into the center of the vessel using a modified Seldinger technique and advanced to a depth of 15cm.  Return of non-pulsatile venous blood was observed.  Flow was easily aspirated from each of the catheter lumens and then filled with sterile normal saline.  The line was securely fastened to the skin with sutures.  Then the site was sterilely dressed using an antimicrobial Bio-patch and adhesive bandage.    POST-PROCEDURE    The patient tolerated the procedure Well.    CHEST X-RAY: Shows appropriate placement of catheter.  COMPLICATIONS: None  ESTIMATED BLOOD LOSS: Minimal.      Eugenio Calderon DO 2024 9:59 AM

## 2024-12-22 NOTE — CONSENT
Informed Consent for Blood Component Transfusion Note    I have discussed with the patient the rationale for blood component transfusion; its benefits in treating or preventing fatigue, organ damage, or death; and its risk which includes mild transfusion reactions, rare risk of blood borne infection, or more serious but rare reactions. I have discussed the alternatives to transfusion, including the risk and consequences of not receiving transfusion. The patient had an opportunity to ask questions and had agreed to proceed with transfusion of blood components.    Patient has had prior blood transfusions.     Electronically signed by ALONSO Portillo CNP on 12/21/24 at 10:26 PM EST

## 2024-12-22 NOTE — ACP (ADVANCE CARE PLANNING)
Advanced Care Planning Note:     Purpose of Encounter: Advanced care planning in light of hospitalization    Parties In Attendance: Patient     Decisional Capacity: Yes    Subjective: Patient understands that this conversation for any life threatening event and his future wishes to address long term care goal if situations arise that prevent the ability to personally give input    Objective: Patient is admitted to the hospital with hip fx, VT arrest      Goals of Care Determination: Patient would like to pursue full care with all aggressive measures.    Code Status: Full code    Time spent on Advanced care Plannin minutes    Advanced Care Planning Documents: documented patient's wishes, would like her son Herb Wahl  to make medical decisions if unable to make own decisions.    Ross Holloway MD  2024 1:11 PM

## 2024-12-22 NOTE — PLAN OF CARE
Problem: Chronic Conditions and Co-morbidities  Goal: Patient's chronic conditions and co-morbidity symptoms are monitored and maintained or improved  Outcome: Progressing  Flowsheets (Taken 12/22/2024 0800)  Care Plan - Patient's Chronic Conditions and Co-Morbidity Symptoms are Monitored and Maintained or Improved:   Monitor and assess patient's chronic conditions and comorbid symptoms for stability, deterioration, or improvement   Collaborate with multidisciplinary team to address chronic and comorbid conditions and prevent exacerbation or deterioration   Update acute care plan with appropriate goals if chronic or comorbid symptoms are exacerbated and prevent overall improvement and discharge     Problem: Discharge Planning  Goal: Discharge to home or other facility with appropriate resources  Outcome: Progressing  Flowsheets (Taken 12/22/2024 0800)  Discharge to home or other facility with appropriate resources:   Identify barriers to discharge with patient and caregiver   Arrange for needed discharge resources and transportation as appropriate   Identify discharge learning needs (meds, wound care, etc)   Refer to discharge planning if patient needs post-hospital services based on physician order or complex needs related to functional status, cognitive ability or social support system     Problem: Pain  Goal: Verbalizes/displays adequate comfort level or baseline comfort level  Outcome: Progressing     Problem: Safety - Adult  Goal: Free from fall injury  Outcome: Progressing     Problem: ABCDS Injury Assessment  Goal: Absence of physical injury  Outcome: Progressing  Flowsheets (Taken 12/22/2024 1400)  Absence of Physical Injury: Implement safety measures based on patient assessment     Problem: Skin/Tissue Integrity  Goal: Absence of new skin breakdown  Description: 1.  Monitor for areas of redness and/or skin breakdown  2.  Assess vascular access sites hourly  3.  Every 4-6 hours minimum:  Change oxygen

## 2024-12-22 NOTE — CONSULTS
Severe stenosis of the aortic valve. Elevated prosthetic gradient. AV mean gradient is 31 mmHg. AV area by continuity VTI is 0.3 cm2.    Mitral Valve: Mechanical valve. Moderate annular calcification. Normal prosthetic gradient. MV mean gradient is 3 mmHg.    Tricuspid Valve: Mild regurgitation.    Left Atrium: Left atrium is dilated. Left atrial volume index is moderately increased (42-48 mL/m2).    Right Atrium: Lead present in the right atrium. Right atrium is mildly dilated.    Image quality is suboptimal. Contrast used: Definity. Technically difficult study due to patient's body habitus and limited study due to patient's ability to tolerate test.    Telemetry: 12/21 @ 22:40  Polymorphic VT    EKG  Atrial paced, RBBB, LPHB      ASSESSMENT:    VT:  Pulseless polymorphic VT.  She does have some QT prolongation.  She is on no real QT prolonging medications at home.  Her's potassium was slightly low at 3.1 on admission and may contribute to the QT.  It is unclear if the VT is secondary to that however.  I am concerned about ischemic etiology.  Agree with lidocaine and avoid QT prolonged gating drugs.  Keep potassium over 4.  Will have EP service see her tomorrow as well as interventional cardiology for possible cardiac catheterization.    Hip fracture/preop  Obviously she cannot undergo surgery at this time.  She needs complete evaluation of her cardiac status first.    AVR/MVR  Mechanical  She is felt to have severe prostatic aortic valve stenosis.  She is not a surgical candidate.  Will repeat echocardiogram.  Continue heparin drip.    Plan:  Continue lidocaine and heparin  Keep potassium over 4  Will have the EP and interventional cardiology see her in the morning  Repeat echocardiogram  Clear liquid breakfast in the morning    Thank you for allowing me to participate in the care of this individual.      Alexander Blair M.D., [facility]

## 2024-12-22 NOTE — SIGNIFICANT EVENT
SIGNIFICANT EVENT:  CODE BLUE    SUBJECTIVE:     CODE BLUE was called for Pao Wahl.  Patient is a 79 y.o. female admitted at [facility] for Closed right hip fracture, initial encounter ([facility]).     Patient seen and examined in 3361.  Shortly after seeing patient for admission.  She went into ventricular tachycardia, Examiner was still on unit placing orders.  Patient was unresponsive, no pulse,  she was immediately attached to defibrillator, she was shocked at 100J.  CPR started immediately and epinephrine given.  After less than 1 minute of CPR patient started to become responsive.  ROSC was obtained  Femoral pulse was checked she had a strong pulse.  Her oxygen saturations were 85% nonrebreather further was placed.  Patient did have an episode of vomiting.   As patient became more awake Oxygen sats improved.  Telemetry showed pace rhythm.  /44    Code events are as follows:  1040 noted to be in V-tach, unresponsive no pulse  Shock delivered  1042 epi given  1042 sinus tachycardia, ROSC, achieved.    Dr. Arenas at bedside    OBJECTIVE:      VITALS  Vitals:    12/21/24 2145 12/21/24 2232 12/21/24 2245   BP: (!) 204/48  (!) 196/44   Pulse: 85     Resp: 20     Temp: 98.5 °F (36.9 °C)     TempSrc: Oral     SpO2: 95%     Weight:  45.4 kg (100 lb)    Height:  1.499 m (4' 11\")          ASSESSMENT/PLAN:    1.  Pulseless V-tach  Patient will be transferred to ICU for higher level of care  Patient required 1 shock and 1 dose of epi  Dr. Arenas was at bedside  Nonbreather  EKG prolonged , no ST elevation  IV mag and IV K to be replaced immediately  IV amiodarone with bolus  IV heparin for mechanical aortic and mitral valve  Examiner updated patient's son Herb Wahl all questions answered he will come see patient in the  Cardiology consult tonight  CBC, BMP, Mag, lactic acid, ABG  CXR  Repeat EKG in am  Troponin       Surgical repair of right hip to be postponed until seen by Cardiology    Critical  Care time 40 min  ALONSO Portillo CNP, APRN - CNP 12/21/2024 11:39 PM

## 2024-12-23 ENCOUNTER — APPOINTMENT (OUTPATIENT)
Age: 79
DRG: 308 | End: 2024-12-23
Attending: INTERNAL MEDICINE
Payer: MEDICARE

## 2024-12-23 ENCOUNTER — APPOINTMENT (OUTPATIENT)
Dept: CT IMAGING | Age: 79
DRG: 308 | End: 2024-12-23
Attending: HOSPITALIST
Payer: MEDICARE

## 2024-12-23 PROBLEM — N18.9 ACUTE KIDNEY INJURY SUPERIMPOSED ON CKD (HCC): Status: ACTIVE | Noted: 2022-12-04

## 2024-12-23 PROBLEM — E87.8 DISORDERS OF FLUID, ELECTROLYTE, AND ACID-BASE BALANCE: Status: ACTIVE | Noted: 2024-12-23

## 2024-12-23 LAB
ALBUMIN SERPL-MCNC: 3.2 G/DL (ref 3.4–5)
ALBUMIN/GLOB SERPL: 1.3 {RATIO} (ref 1.1–2.2)
ALP SERPL-CCNC: 80 U/L (ref 40–129)
ALT SERPL-CCNC: 19 U/L (ref 10–40)
AMMONIA PLAS-SCNC: 18 UMOL/L (ref 11–51)
ANION GAP SERPL CALCULATED.3IONS-SCNC: 12 MMOL/L (ref 3–16)
ANISOCYTOSIS BLD QL SMEAR: ABNORMAL
ANTI-XA UNFRAC HEPARIN: 0.43 IU/ML (ref 0.3–0.7)
ANTI-XA UNFRAC HEPARIN: 0.48 IU/ML (ref 0.3–0.7)
AST SERPL-CCNC: 44 U/L (ref 15–37)
BASOPHILS # BLD: 0 K/UL (ref 0–0.2)
BASOPHILS NFR BLD: 0 %
BILIRUB SERPL-MCNC: 0.8 MG/DL (ref 0–1)
BUN SERPL-MCNC: 24 MG/DL (ref 7–20)
CALCIUM SERPL-MCNC: 8.5 MG/DL (ref 8.3–10.6)
CHLORIDE SERPL-SCNC: 95 MMOL/L (ref 99–110)
CO2 SERPL-SCNC: 26 MMOL/L (ref 21–32)
CREAT SERPL-MCNC: 1.7 MG/DL (ref 0.6–1.2)
DEPRECATED RDW RBC AUTO: 20.1 % (ref 12.4–15.4)
ECHO BSA: 1.37 M2
ECHO LA AREA 4C: 23.7 CM2
ECHO LA MAJOR AXIS: 6.3 CM
ECHO LA VOL MOD A4C: 74 ML (ref 22–52)
ECHO LA VOLUME INDEX MOD A4C: 54 ML/M2 (ref 16–34)
ECHO LV EF PHYSICIAN: 60 %
ECHO LV FRACTIONAL SHORTENING: 36 % (ref 28–44)
ECHO LV INTERNAL DIMENSION DIASTOLE INDEX: 3.07 CM/M2
ECHO LV INTERNAL DIMENSION DIASTOLIC: 4.2 CM (ref 3.9–5.3)
ECHO LV INTERNAL DIMENSION SYSTOLIC INDEX: 1.97 CM/M2
ECHO LV INTERNAL DIMENSION SYSTOLIC: 2.7 CM
ECHO LV IVSD: 0.9 CM (ref 0.6–0.9)
ECHO LV MASS 2D: 127.8 G (ref 67–162)
ECHO LV MASS INDEX 2D: 93.3 G/M2 (ref 43–95)
ECHO LV POSTERIOR WALL DIASTOLIC: 1 CM (ref 0.6–0.9)
ECHO LV RELATIVE WALL THICKNESS RATIO: 0.48
ECHO LVOT AREA: 1.8 CM2
ECHO LVOT DIAM: 1.5 CM
ECHO LVOT MEAN GRADIENT: 1 MMHG
ECHO LVOT PEAK GRADIENT: 1 MMHG
ECHO LVOT PEAK VELOCITY: 0.5 M/S
ECHO LVOT STROKE VOLUME INDEX: 16.6 ML/M2
ECHO LVOT SV: 22.8 ML
ECHO LVOT VTI: 12.9 CM
ECHO MV AREA VTI: 0.5 CM2
ECHO MV LVOT VTI INDEX: 3.29
ECHO MV MAX VELOCITY: 1.9 M/S
ECHO MV MEAN GRADIENT: 3 MMHG
ECHO MV MEAN VELOCITY: 0.8 M/S
ECHO MV PEAK GRADIENT: 14 MMHG
ECHO MV VTI: 42.5 CM
ECHO PV MAX VELOCITY: 1.2 M/S
ECHO PV PEAK GRADIENT: 6 MMHG
ECHO RA AREA 4C: 14.3 CM2
ECHO RA END SYSTOLIC VOLUME APICAL 4 CHAMBER INDEX BSA: 27 ML/M2
ECHO RA VOLUME: 37 ML
ECHO TV REGURGITANT MAX VELOCITY: 3.39 M/S
ECHO TV REGURGITANT PEAK GRADIENT: 46 MMHG
EKG ATRIAL RATE: 58 BPM
EKG DIAGNOSIS: NORMAL
EKG P-R INTERVAL: 236 MS
EKG Q-T INTERVAL: 504 MS
EKG QRS DURATION: 138 MS
EKG QTC CALCULATION (BAZETT): 515 MS
EKG R AXIS: 123 DEGREES
EKG T AXIS: 113 DEGREES
EKG VENTRICULAR RATE: 63 BPM
EOSINOPHIL # BLD: 0 K/UL (ref 0–0.6)
EOSINOPHIL NFR BLD: 0 %
GFR SERPLBLD CREATININE-BSD FMLA CKD-EPI: 30 ML/MIN/{1.73_M2}
GLUCOSE BLD-MCNC: 135 MG/DL (ref 70–99)
GLUCOSE BLD-MCNC: 138 MG/DL (ref 70–99)
GLUCOSE BLD-MCNC: 175 MG/DL (ref 70–99)
GLUCOSE BLD-MCNC: 190 MG/DL (ref 70–99)
GLUCOSE SERPL-MCNC: 142 MG/DL (ref 70–99)
HCT VFR BLD AUTO: 27.6 % (ref 36–48)
HGB BLD-MCNC: 9.3 G/DL (ref 12–16)
HYPOCHROMIA BLD QL SMEAR: ABNORMAL
LACTATE BLDV-SCNC: 1.6 MMOL/L (ref 0.4–2)
LYMPHOCYTES # BLD: 1.5 K/UL (ref 1–5.1)
LYMPHOCYTES NFR BLD: 10 %
MAGNESIUM SERPL-MCNC: 2.1 MG/DL (ref 1.8–2.4)
MCH RBC QN AUTO: 28.8 PG (ref 26–34)
MCHC RBC AUTO-ENTMCNC: 33.6 G/DL (ref 31–36)
MCV RBC AUTO: 85.8 FL (ref 80–100)
MICROCYTES BLD QL SMEAR: ABNORMAL
MONOCYTES # BLD: 0.3 K/UL (ref 0–1.3)
MONOCYTES NFR BLD: 2 %
NEUTROPHILS # BLD: 13.6 K/UL (ref 1.7–7.7)
NEUTROPHILS NFR BLD: 88 %
OVALOCYTES BLD QL SMEAR: ABNORMAL
PERFORMED ON: ABNORMAL
PLATELET # BLD AUTO: 187 K/UL (ref 135–450)
PLATELET BLD QL SMEAR: ADEQUATE
PMV BLD AUTO: 9.9 FL (ref 5–10.5)
POTASSIUM SERPL-SCNC: 4 MMOL/L (ref 3.5–5.1)
PROT SERPL-MCNC: 5.6 G/DL (ref 6.4–8.2)
RBC # BLD AUTO: 3.21 M/UL (ref 4–5.2)
SLIDE REVIEW: ABNORMAL
SODIUM SERPL-SCNC: 133 MMOL/L (ref 136–145)
TSH SERPL DL<=0.005 MIU/L-ACNC: 2.51 UIU/ML (ref 0.27–4.2)
WBC # BLD AUTO: 15.4 K/UL (ref 4–11)

## 2024-12-23 PROCEDURE — 36592 COLLECT BLOOD FROM PICC: CPT

## 2024-12-23 PROCEDURE — 85520 HEPARIN ASSAY: CPT

## 2024-12-23 PROCEDURE — 83735 ASSAY OF MAGNESIUM: CPT

## 2024-12-23 PROCEDURE — 82140 ASSAY OF AMMONIA: CPT

## 2024-12-23 PROCEDURE — 94640 AIRWAY INHALATION TREATMENT: CPT

## 2024-12-23 PROCEDURE — 93306 TTE W/DOPPLER COMPLETE: CPT

## 2024-12-23 PROCEDURE — 99291 CRITICAL CARE FIRST HOUR: CPT | Performed by: STUDENT IN AN ORGANIZED HEALTH CARE EDUCATION/TRAINING PROGRAM

## 2024-12-23 PROCEDURE — 6360000002 HC RX W HCPCS: Performed by: NURSE PRACTITIONER

## 2024-12-23 PROCEDURE — 99223 1ST HOSP IP/OBS HIGH 75: CPT | Performed by: INTERNAL MEDICINE

## 2024-12-23 PROCEDURE — 2500000003 HC RX 250 WO HCPCS: Performed by: NURSE PRACTITIONER

## 2024-12-23 PROCEDURE — 6360000002 HC RX W HCPCS: Performed by: HOSPITALIST

## 2024-12-23 PROCEDURE — 70450 CT HEAD/BRAIN W/O DYE: CPT

## 2024-12-23 PROCEDURE — 80176 ASSAY OF LIDOCAINE: CPT

## 2024-12-23 PROCEDURE — 99291 CRITICAL CARE FIRST HOUR: CPT | Performed by: INTERNAL MEDICINE

## 2024-12-23 PROCEDURE — 94761 N-INVAS EAR/PLS OXIMETRY MLT: CPT

## 2024-12-23 PROCEDURE — 99223 1ST HOSP IP/OBS HIGH 75: CPT | Performed by: HOSPITALIST

## 2024-12-23 PROCEDURE — 51798 US URINE CAPACITY MEASURE: CPT

## 2024-12-23 PROCEDURE — 2700000000 HC OXYGEN THERAPY PER DAY

## 2024-12-23 PROCEDURE — 85025 COMPLETE CBC W/AUTO DIFF WBC: CPT

## 2024-12-23 PROCEDURE — 80053 COMPREHEN METABOLIC PANEL: CPT

## 2024-12-23 PROCEDURE — 6370000000 HC RX 637 (ALT 250 FOR IP): Performed by: NURSE PRACTITIONER

## 2024-12-23 PROCEDURE — 93306 TTE W/DOPPLER COMPLETE: CPT | Performed by: INTERNAL MEDICINE

## 2024-12-23 PROCEDURE — 93010 ELECTROCARDIOGRAM REPORT: CPT | Performed by: INTERNAL MEDICINE

## 2024-12-23 PROCEDURE — 83605 ASSAY OF LACTIC ACID: CPT

## 2024-12-23 PROCEDURE — 36415 COLL VENOUS BLD VENIPUNCTURE: CPT

## 2024-12-23 PROCEDURE — 84443 ASSAY THYROID STIM HORMONE: CPT

## 2024-12-23 PROCEDURE — 2000000000 HC ICU R&B

## 2024-12-23 RX ADMIN — ALBUTEROL SULFATE 2.5 MG: 2.5 SOLUTION RESPIRATORY (INHALATION) at 08:22

## 2024-12-23 RX ADMIN — MORPHINE SULFATE 2 MG: 2 INJECTION, SOLUTION INTRAMUSCULAR; INTRAVENOUS at 13:00

## 2024-12-23 RX ADMIN — MORPHINE SULFATE 2 MG: 2 INJECTION, SOLUTION INTRAMUSCULAR; INTRAVENOUS at 00:44

## 2024-12-23 RX ADMIN — MORPHINE SULFATE 2 MG: 2 INJECTION, SOLUTION INTRAMUSCULAR; INTRAVENOUS at 21:19

## 2024-12-23 RX ADMIN — Medication 10 ML: at 19:56

## 2024-12-23 RX ADMIN — ATORVASTATIN CALCIUM 10 MG: 10 TABLET, FILM COATED ORAL at 21:16

## 2024-12-23 RX ADMIN — ALBUTEROL SULFATE 2.5 MG: 2.5 SOLUTION RESPIRATORY (INHALATION) at 21:03

## 2024-12-23 RX ADMIN — ALPRAZOLAM 0.5 MG: 0.5 TABLET ORAL at 14:51

## 2024-12-23 RX ADMIN — MELATONIN TAB 3 MG 3 MG: 3 TAB at 21:16

## 2024-12-23 RX ADMIN — HEPARIN SODIUM 18 UNITS/KG/HR: 10000 INJECTION, SOLUTION INTRAVENOUS at 19:25

## 2024-12-23 ASSESSMENT — PAIN DESCRIPTION - PAIN TYPE
TYPE: ACUTE PAIN
TYPE: ACUTE PAIN

## 2024-12-23 ASSESSMENT — PAIN - FUNCTIONAL ASSESSMENT
PAIN_FUNCTIONAL_ASSESSMENT: PREVENTS OR INTERFERES WITH MANY ACTIVE NOT PASSIVE ACTIVITIES
PAIN_FUNCTIONAL_ASSESSMENT: PREVENTS OR INTERFERES SOME ACTIVE ACTIVITIES AND ADLS

## 2024-12-23 ASSESSMENT — PAIN DESCRIPTION - DESCRIPTORS
DESCRIPTORS: THROBBING;SHARP;DISCOMFORT
DESCRIPTORS: PATIENT UNABLE TO DESCRIBE
DESCRIPTORS: ACHING

## 2024-12-23 ASSESSMENT — PAIN DESCRIPTION - ORIENTATION
ORIENTATION: RIGHT
ORIENTATION: RIGHT
ORIENTATION: RIGHT;MID;LOWER

## 2024-12-23 ASSESSMENT — PAIN SCALES - GENERAL
PAINLEVEL_OUTOF10: 8
PAINLEVEL_OUTOF10: 6
PAINLEVEL_OUTOF10: 2
PAINLEVEL_OUTOF10: 3
PAINLEVEL_OUTOF10: 8

## 2024-12-23 ASSESSMENT — PAIN DESCRIPTION - LOCATION
LOCATION: HIP

## 2024-12-23 ASSESSMENT — PAIN DESCRIPTION - ONSET
ONSET: ON-GOING
ONSET: ON-GOING

## 2024-12-23 ASSESSMENT — PAIN DESCRIPTION - FREQUENCY
FREQUENCY: INTERMITTENT
FREQUENCY: INTERMITTENT

## 2024-12-23 NOTE — ACP (ADVANCE CARE PLANNING)
Advance Care Planning     Palliative Team Advance Care Planning (ACP) Conversation    Date of Conversation: 12/23/24    Individuals present for the conversation: Patient and Legal healthcare agent named below     ACP documents on file prior to discussion:  -None    Previously completed document/s not on file:  ACP document was completed previously but it is not available for review. This writer requested a copy of the document for patient's chart.     Healthcare Decision Maker:    Primary Decision Maker: Herb Wahl - Child - [phone]    Secondary Decision Maker: Efe Wahl - Child - [phone]     Conversation Summary:  Patient confirmed code status as Full Code with Dr. PLACIDO Holloway on 12/22/24. Agrees with two children listed on EMR as DPOA. Listing Herb as primary contact and Efe secondary. No ACP paperwork is on file. Call to son Herb to obtain and have scanned into EMR. He agrees to bring this in prior to surgery is plan.      Resuscitation Status:   Code Status: Full Code     Documentation Completed:  -Patient/surrogate was asked to submit existing ACP documents for our records    I spent 20 minutes with the patient and/or surrogate decision maker discussing the patient's wishes and goals.      Reina Mejia RN

## 2024-12-23 NOTE — PLAN OF CARE
Problem: Chronic Conditions and Co-morbidities  Goal: Patient's chronic conditions and co-morbidity symptoms are monitored and maintained or improved  12/23/2024 1714 by Brittney Tim RN  Outcome: Progressing  Flowsheets (Taken 12/23/2024 0800)  Care Plan - Patient's Chronic Conditions and Co-Morbidity Symptoms are Monitored and Maintained or Improved:   Monitor and assess patient's chronic conditions and comorbid symptoms for stability, deterioration, or improvement   Collaborate with multidisciplinary team to address chronic and comorbid conditions and prevent exacerbation or deterioration   Update acute care plan with appropriate goals if chronic or comorbid symptoms are exacerbated and prevent overall improvement and discharge  12/23/2024 0344 by Pavithra Elizabeth RN  Outcome: Progressing  Flowsheets (Taken 12/23/2024 0344)  Care Plan - Patient's Chronic Conditions and Co-Morbidity Symptoms are Monitored and Maintained or Improved: Monitor and assess patient's chronic conditions and comorbid symptoms for stability, deterioration, or improvement     Problem: Discharge Planning  Goal: Discharge to home or other facility with appropriate resources  Recent Flowsheet Documentation  Taken 12/23/2024 0800 by Brittney Tim RN  Discharge to home or other facility with appropriate resources:   Identify barriers to discharge with patient and caregiver   Arrange for needed discharge resources and transportation as appropriate   Identify discharge learning needs (meds, wound care, etc)   Refer to discharge planning if patient needs post-hospital services based on physician order or complex needs related to functional status, cognitive ability or social support system  12/23/2024 0344 by Pavithra Elizabeth RN  Outcome: Progressing  Flowsheets (Taken 12/23/2024 0344)  Discharge to home or other facility with appropriate resources:   Identify discharge learning needs (meds, wound care, etc)   Refer to discharge planning if patient

## 2024-12-23 NOTE — SIGNIFICANT EVENT
APRN notified by RN of change in mental status.  RN reports patient is oriented to self only.  This represents a change as she was previously alert and oriented x 4.  No focal neurodeficit per RN report although she does have some slowed speech per RN.  -Patient is status post pulseless V. tach arrest with ROSC on 12/21/2024.  -She is currently on heparin drip  -Blood sugar 138  -Will get labs, head CT  -I asked onsite provider to assess due to change  -Further pending results of above    ALONSO Morin - NP

## 2024-12-23 NOTE — PROGRESS NOTES
Pulmonary and Critical Care Medicine    CC: f/u VT   Date: 2024  Admit Date:  2024    HPI     This is a 80 y/o F w/ a hx of COPD, Pulmonary nodules, mechanical valve MVR/AVR, Afib who presented with fall and right hip fx, however course complicated by polymorphic VT.    Overnight:  She is on lidocaine drip  She is on 2L NC  UOP is low  She is agitated overnight, CTH and NH3 level were normal  This AM she is awake, intermittently following but agitated     ROS: Unable to obtain given the patient is confused and agitated    OBJECTIVE DATA       PHYSICAL EXAM:   Temp  Av.5 °F (36.4 °C)  Min: 97.2 °F (36.2 °C)  Max: 97.9 °F (36.6 °C)  Pulse  Av  Min: 60  Max: 72  BP  Min: 81/56  Max: 164/64  SpO2  Av.8 %  Min: 87 %  Max: 100 %    General: NAD  Neuro: Awake, following commands with intermittent agitation   Lung: Clear, no wheezing, equal non labored  Heart: murmur, regular rate   Abd: soft    MEDICATIONS: Reviewed  Scheduled Meds:   albuterol  2.5 mg Nebulization BID RT    atorvastatin  10 mg Oral Nightly    [Held by provider] furosemide  40 mg Oral BID    metoprolol tartrate  25 mg Oral BID    pantoprazole  40 mg Oral QAM AC    insulin lispro  0-4 Units SubCUTAneous 4x Daily AC & HS    sodium chloride flush  5-40 mL IntraVENous 2 times per day    melatonin  3 mg Oral Nightly    amLODIPine  5 mg Oral Daily     Continuous Infusions:   sodium chloride      dextrose      sodium chloride      heparin (PORCINE) Infusion 18 Units/kg/hr (24 0838)     PRN Meds:.prochlorperazine, albuterol, sodium chloride, hydrALAZINE, ALPRAZolam, dextrose bolus **OR** dextrose bolus, glucagon (rDNA), dextrose, sodium chloride flush, sodium chloride, polyethylene glycol, acetaminophen **OR** acetaminophen, morphine, potassium chloride **OR** potassium alternative oral replacement **OR** potassium chloride, magnesium sulfate, heparin (porcine), heparin (porcine), potassium chloride     LABS: Reviewed.   Recent  Labs     12/22/24  0120 12/22/24  0550 12/23/24  0215    137 133*   K 4.3 4.2 4.0   CL 99 99 95*   CO2 26 27 26   BUN 19 21* 24*   CREATININE 1.4* 1.5* 1.7*     Recent Labs     12/21/24  2256 12/22/24  0550 12/22/24 2010 12/23/24  0215   WBC 21.0* 11.8*  --  15.4*   HGB 9.1* 7.5* 9.6* 9.3*   HCT 27.3* 22.6* 28.7* 27.6*   MCV 88.7 88.6  --  85.8    186  --  187     Lab Results   Component Value Date/Time    PROTIME 19.0 12/22/2024 04:29 AM    PROTIME 18.5 12/21/2024 10:56 PM    PROTIME 18.4 12/21/2024 03:30 PM    PROTIME 32.5 08/22/2013 12:14 PM    PROTIME 54.1 08/19/2013 12:45 PM    PROTIME 33.0 06/06/2012 11:06 AM    INR 1.59 12/22/2024 04:29 AM    INR 1.53 12/21/2024 10:56 PM    INR 1.5 12/21/2024 03:30 PM     No results found for: \"IAV8PJW\", \"BEART\", \"K6RGKZQL\", \"PHART\", \"THGBART\", \"AXK0WYT\", \"PO2ART\", \"LNL9BGO\"    Intake/Output Summary (Last 24 hours) at 12/23/2024 1135  Last data filed at 12/23/2024 0400  Gross per 24 hour   Intake 1787.75 ml   Output 300 ml   Net 1487.75 ml      In: 1907.8 [P.O.:360; I.V.:480.6; Blood:324.2]  Out: 300      IMAGES: Reviewed       ASSESSMENT AND PLAN:     Polymorphic VT (?TdaP) - on lidocaine drip, Qtc is increased, has PM, will discuss with, increase baseline HR?, also need for BB in the setting of elevated qthc. interventional cardiology evaluated, suggest conservative management. Will keep electrolytes wnl, avoid qtc prolonging medications.    Delirium - hyperactive, intermittent, CTH neg, NH3, nl, ?ICU delirium, will frequent orientation, open window, avoid further delirium medications, will add UA    Fall / R hip fx - ortho following     MVR/AVR - mechanical on heparin drip, apparently had had hx of falls in the past     FOX - low UOP, ?ATN, I will get nephrology on the case    Leukocytosis - no fever, this could be reactive, will send UA, hold abx for now low suspicion for sepsis, if worse will send bcx and start empiric abx     Suggest palliative care

## 2024-12-23 NOTE — PLAN OF CARE
Problem: Chronic Conditions and Co-morbidities  Goal: Patient's chronic conditions and co-morbidity symptoms are monitored and maintained or improved  12/23/2024 0344 by Pavithra Elizabeth RN  Outcome: Progressing  Flowsheets (Taken 12/23/2024 0344)  Care Plan - Patient's Chronic Conditions and Co-Morbidity Symptoms are Monitored and Maintained or Improved: Monitor and assess patient's chronic conditions and comorbid symptoms for stability, deterioration, or improvement  12/22/2024 1817 by Schoenig, Angela, RN  Outcome: Progressing  Flowsheets (Taken 12/22/2024 0800)  Care Plan - Patient's Chronic Conditions and Co-Morbidity Symptoms are Monitored and Maintained or Improved:   Monitor and assess patient's chronic conditions and comorbid symptoms for stability, deterioration, or improvement   Collaborate with multidisciplinary team to address chronic and comorbid conditions and prevent exacerbation or deterioration   Update acute care plan with appropriate goals if chronic or comorbid symptoms are exacerbated and prevent overall improvement and discharge     Problem: Discharge Planning  Goal: Discharge to home or other facility with appropriate resources  12/23/2024 0344 by Pavithra Elizabeth RN  Outcome: Progressing  Flowsheets (Taken 12/23/2024 0344)  Discharge to home or other facility with appropriate resources:   Identify discharge learning needs (meds, wound care, etc)   Refer to discharge planning if patient needs post-hospital services based on physician order or complex needs related to functional status, cognitive ability or social support system  12/22/2024 1817 by Schoenig, Angela, RN  Outcome: Progressing  Flowsheets (Taken 12/22/2024 0800)  Discharge to home or other facility with appropriate resources:   Identify barriers to discharge with patient and caregiver   Arrange for needed discharge resources and transportation as appropriate   Identify discharge learning needs (meds, wound care, etc)   Refer to

## 2024-12-23 NOTE — PROGRESS NOTES
[facility]    Hospitalist Progress Note:      Name:  Pao Wahl /Age/Sex: 1945  (79 y.o. female)   MRN & CSN:  [id number] & [id number] Encounter Date: 2024    Location:  ICU3905/3905-01 PCP: Juliette Ramirez MD     Attending:Ross Holloway MD  Admission Date: 2024      Hospital Day: 3    Chief Complain/Reason for Admission:  No chief complaint on file.      Assessment:  Pao Wahl is a 79 y.o. female with pmh of HTN, HLD, COPD, mechanical aortic and mitral valves, CKD, anemia, A-fib, Follicular lymphoma in remission, pacemaker, Type II DM   who presents with fall and right hip pain.  Patient was found to have right hip fracture.  Patient was felt to have high cardiac risk, so was transferred to [facility] for cardiac anesthesia for orthopedic surgery.  During course of hospitalization patient was found to have episode of cardiac arrest which was responded to DC shock and IV epinephrine.  Patient is currently alert, awake and oriented.    S/P cardiac arrest suspected d/t pulseless polymorphic V-Tach: ROSC after DC shock and IV epinephrine with ACLS.   Right femoral neck fracture due to fall: Needs orthopedic surgery  AMS/confusion likely delirium, multifactorial  PAF: on IV heparin gtt  FOX over CKD  Chronic CHF  History of mechanical aortic and mitral valves  S/p pacemaker  DM 2 with hyperglycemia  Chronic anemia, normocytic    Plan:   Off IV lidocaine drip, cont heparin drip. F/u EP cardiology team  Telemonitoring, echocardiogram.  Appreciate CCM team, noted. Avoid nephrotoxic meds, obtain nephrology team input  Hold orthopedic surgery until stabilization of cardiac status.  Orthopedic surgery team noted  Delirium precautions, frequent reorientation, avoid polypharmacy, avoid sleep deprivation, constipation and dehydration. Avoid benzodiazepams/anticholinergics. Restrains/Sitter for safety as needed.   Continue Norvasc, Lipitor, metoprolol  Current meds

## 2024-12-23 NOTE — PROGRESS NOTES
[facility] Daily Progress Note      Admit Date:  12/21/2024    No chief complaint on file.       Subjective:  Ms. Wahl Confused and delirious since last night. Restless in bed. Not able to give history No further VT episodes    Objective:   BP (!) 117/91   Pulse 60   Temp 98 °F (36.7 °C) (Temporal)   Resp 11   Ht 1.499 m (4' 11\")   Wt 45.4 kg (100 lb)   SpO2 99%   BMI 20.20 kg/m²     Intake/Output Summary (Last 24 hours) at 12/23/2024 1332  Last data filed at 12/23/2024 0400  Gross per 24 hour   Intake 1787.75 ml   Output 300 ml   Net 1487.75 ml       TELEMETRY: Sinus     Physical Exam:  General:  Awake, alert, disoriented  Skin:  Warm and dry  Neck:  JVD +  Chest:  crackles  Cardiovascular:  RRR S1S2, no S3, 4/6 systolic at 2nd right intercostal space  Abdomen:  Soft, ND, NT, No HSM  Extremities:  No edema    Medications:    albuterol  2.5 mg Nebulization BID RT    atorvastatin  10 mg Oral Nightly    [Held by provider] furosemide  40 mg Oral BID    metoprolol tartrate  25 mg Oral BID    pantoprazole  40 mg Oral QAM AC    insulin lispro  0-4 Units SubCUTAneous 4x Daily AC & HS    sodium chloride flush  5-40 mL IntraVENous 2 times per day    melatonin  3 mg Oral Nightly    amLODIPine  5 mg Oral Daily      sodium chloride      dextrose      sodium chloride      heparin (PORCINE) Infusion 18 Units/kg/hr (12/23/24 0838)     prochlorperazine, albuterol, sodium chloride, hydrALAZINE, ALPRAZolam, dextrose bolus **OR** dextrose bolus, glucagon (rDNA), dextrose, sodium chloride flush, sodium chloride, polyethylene glycol, acetaminophen **OR** acetaminophen, morphine, potassium chloride **OR** potassium alternative oral replacement **OR** potassium chloride, magnesium sulfate, heparin (porcine), heparin (porcine), potassium chloride    Lab Data:  CBC:   Recent Labs     12/21/24  2256 12/22/24  0550 12/22/24 2010 12/23/24  0215   WBC 21.0* 11.8*  --  15.4*   HGB 9.1* 7.5* 9.6* 9.3*   HCT 27.3* 22.6* 28.7*

## 2024-12-23 NOTE — CONSULTS
hip.  ROM deferred.  Motor: Intact DF/PF.    Sensation: Grossly intact to light touch throughout the right lower extremity in all nerve distributions.  Vascular:  2+ right DP pulse.    Labs reviewed:  Recent Labs     12/21/24  2256 12/22/24  0550 12/22/24 2010 12/23/24  0215   WBC 21.0* 11.8*  --  15.4*   HGB 9.1* 7.5* 9.6* 9.3*   HCT 27.3* 22.6* 28.7* 27.6*    186  --  187     Recent Labs     12/21/24  1530 12/22/24  0120 12/22/24  0550 12/23/24  0215 12/23/24  0457    138 137 133*  --    K 3.1* 4.3 4.2 4.0  --    CL 95* 99 99 95*  --    CO2 28 26 27 26  --    BUN 20 19 21* 24*  --    CREATININE 1.4* 1.4* 1.5* 1.7*  --    GLUCOSE 191* 190* 193* 142*  --    CALCIUM 8.2* 8.1* 7.8* 8.5  --    MG 1.4* 2.84*  --   --  2.10     Recent Labs     12/21/24  1530 12/21/24  2256 12/22/24  0429   INR 1.5* 1.53* 1.59*   PROTIME 18.4* 18.5* 19.0*       Lab Results   Component Value Date    COLORU Yellow 08/21/2024    CLARITYU Clear 08/21/2024    PHUR 6.5 08/21/2024    GLUCOSEU >=1000 (A) 08/21/2024    BLOODU Negative 08/21/2024    LEUKOCYTESUR Negative 08/21/2024    BILIRUBINUR Negative 08/21/2024    UROBILINOGEN 0.2 08/21/2024    RBCUA None seen 08/21/2024    WBCUA 3-5 08/21/2024    BACTERIA 2+ (A) 12/04/2022    AMORPHOUS 1+ 12/04/2022       Imaging:  CT HEAD WO CONTRAST   Final Result   1. No acute intracranial abnormality.   2. Old left occipital infarct.   3. Tiny old lacunar infarct, right thalamus.   4. Moderate diffuse cerebral atrophy and mild chronic white matter ischemic   change.         XR CHEST PORTABLE   Final Result   Right internal jugular central venous catheter tip at the lower SVC. No   pneumothorax.         XR CHEST PORTABLE   Final Result   No acute pulmonary findings.             IMPRESSION:  RIGHT nondisplaced femoral neck fracture  Principal Problem:    Closed fracture of right hip (HCC)  Active Problems:    Nonrheumatic aortic valve stenosis    Ventricular tachycardia (HCC)    Cardiac  arrest  Resolved Problems:    * No resolved hospital problems. *      RECOMMENDATIONS:  Awaiting consideration for cardiac clearance.    - If felt to be surgical candidate by ICU/cardiac team would proceed with right hip hemiarthroplasty  - NWB RLE  - Pain control  - DVT prophylaxis: Would need to stop heparin gtt 6 hrs pre-operatively    - Dispo:  Workup in process    I have reviewed imaging and plan with Dr. Flaco Love.      Case discussed with ICU RN    Eduard Rodriguez, APRN - CNP  12/23/2024  9:40 AM

## 2024-12-23 NOTE — CARE COORDINATION
Chart reviewed for discharge planning    CM/SW has continued to follow patient progress to anticipate potential discharge needs. At this time, patient is not ready for discharge.     Barrier(s) to discharge-patient-   Oxygen - 2L Nc   Other -  Cardiology evaluation, might need angiogram for ischemic workup prior to orthopedic surgery. episode of documented polymorphic V. tach with torsades de pontis morphology      Tentative discharge plan-  Needs DCPA   Tentative discharge date- TBD w/ pt's progress.     Case management will continue to follow progress and update discharge plan as needed.     Electronically signed by Gayle Pete on 12/23/2024 at 8:29 AM

## 2024-12-23 NOTE — PROGRESS NOTES
Nutrition Note    RECOMMENDATIONS  PO Diet: Regular, 4 carb choice diet as appropriate  ONS: Order Glucerna BID when diet is resumed    ASSESSMENT   Pt triggered for positive nutrition screen. Change in mental status this morning, currently documented as oriented x1 to person; information obtained from chart review. Wt hx in EMR indicates 22 lb (18.3%) wt loss over the last year. NPO for possible ORIF today. RD will order ONS once diet is resumed and monitor for adequate po intake.     Malnutrition Status: Insufficient data  Chronic Illness    NUTRITION DIAGNOSIS   Inadequate oral intake related to inadequate protein-energy intake as evidenced by weight loss    Goals: PO intake 50% or greater, by next RD assessment     NUTRITION RELATED FINDINGS  Objective: Na+ 133. No BM documented.  Wounds: None    CURRENT NUTRITION THERAPIES  Diet NPO       PO Intake: NPO   PO Supplement Intake:NPO    ANTHROPOMETRICS  Current Height: 149.9 cm (4' 11\")  Current Weight - Scale: 45.8 kg (100 lb 15.5 oz)    Admission weight: 44.8 kg (98 lb 12.3 oz)  Ideal Body Weight (IBW): 95 lbs  (43 kg)        BMI: 20.4    COMPARATIVE STANDARDS  Total Energy Requirements (kcals/day): 1168     Protein (g):  54-68       Fluid (mL/day):  1500    EDUCATION  Education/Counseling not indicated     The patient will be monitored per nutrition standards of care. Consult dietitian if additional nutrition interventions are needed prior to RD reassessment.     Mindy Cotter MS, RD, LD    Contact: [phone]

## 2024-12-23 NOTE — CONSULTS
Nephrology Consult Note                                                                                                                                                                                                                                                                                                                                                               Office : [phone]     Fax :[phone]    Patient's Name: Pao Wahl  5:20 PM  12/23/2024    Reason for Consult:  FOX  Requesting Physician:  Juliette Ramirez MD  Chief Complaint:  No chief complaint on file.      Assessment/Plan     # FOX on CKD 3/4  - baseline Cr ~ 1.4  - likely 2/2 CRS and cardiac arrest  - Severe proteinuria   - Monitor kidney functions closely    # CHF  - Echo 12/23 with EF 55-60%  - We will monitor fluid status closely    # Fall  - R femoral neck fracture  - Ortho following    # HTN  - Monitor amlodipine and Metoprolol    # Anemia of chronic disease  - Monitor    #Hyponatremia  - Monitor     #DM 2  - management per primary    History of Present Ilness:    Pao Wahl is a 79 y.o. female with pmh of HTN, HLD, COPD, mechanical aortic and mitral valves, CKD, anemia, A-fib, Follicular lymphoma in remission, pacemaker, Type II DM who presents with fall and right hip pain. Patient was found to have right hip fracture. Patient was felt to have high cardiac risk, so was transferred to [facility] for cardiac anesthesia for orthopedic surgery. During course of hospitalization patient was found to have episode of cardiac arrest which was responded to DC shock and IV epinephrine. We are consulted for FOX on CKD 3/4.       Interval hx     S/p Cardiac arrest  Palliative care consulted    Past Medical History:   Diagnosis Date    A-fib (HCC)     Anemia     multifactorial:under care of heme-onc:Dr. Bermudez    Anemia:multifactorial 2011    as per heme-onc    Anxiety     Cataract     bilateral    CHF (congestive heart  [facility] OR    MITRAL VALVE REPLACEMENT      PACEMAKER PLACEMENT  8/07    for bradycardia, sympony    TUNNELED VENOUS PORT PLACEMENT Right 10/3/2014    ATTEMPTED RIGHT SUBCLAVIEN VEIN, PLACED RIGHT INTERNAL       Family History   Problem Relation Age of Onset    Diabetes Brother     Lung Cancer Mother         Smoker        reports that she quit smoking about 17 years ago. Her smoking use included cigarettes. She started smoking about 57 years ago. She has a 40 pack-year smoking history. She has never used smokeless tobacco. She reports that she does not currently use alcohol after a past usage of about 1.0 standard drink of alcohol per week. She reports that she does not use drugs.    Allergies:  Nsaids and Hydrocodone-acetaminophen    Current Medications:    prochlorperazine (COMPAZINE) injection 10 mg, Q6H PRN  albuterol (PROVENTIL) (2.5 MG/3ML) 0.083% nebulizer solution 2.5 mg, BID RT  albuterol (PROVENTIL) (2.5 MG/3ML) 0.083% nebulizer solution 2.5 mg, Q4H PRN  0.9 % sodium chloride infusion, PRN  hydrALAZINE (APRESOLINE) injection 10 mg, Q6H PRN  ALPRAZolam (XANAX) tablet 0.5 mg, BID PRN  atorvastatin (LIPITOR) tablet 10 mg, Nightly  [Held by provider] furosemide (LASIX) tablet 40 mg, BID  metoprolol tartrate (LOPRESSOR) tablet 25 mg, BID  pantoprazole (PROTONIX) tablet 40 mg, QAM AC  insulin lispro (HUMALOG,ADMELOG) injection vial 0-4 Units, 4x Daily AC & HS  dextrose bolus 10% 125 mL, PRN   Or  dextrose bolus 10% 250 mL, PRN  glucagon injection 1 mg, PRN  dextrose 10 % infusion, Continuous PRN  sodium chloride flush 0.9 % injection 5-40 mL, 2 times per day  sodium chloride flush 0.9 % injection 5-40 mL, PRN  0.9 % sodium chloride infusion, PRN  polyethylene glycol (GLYCOLAX) packet 17 g, Daily PRN  acetaminophen (TYLENOL) tablet 650 mg, Q6H PRN   Or  acetaminophen (TYLENOL) suppository 650 mg, Q6H PRN  morphine (PF) injection 2 mg, Q3H PRN  potassium chloride (KLOR-CON M) extended release tablet 40 mEq, PRN

## 2024-12-23 NOTE — CONSULTS
Palliative Care:    a 79 y.o. female  who presents with Closed right hip fracture. PMH of HTN, HLD, COPD, mechanical aortic and mitral valves, CKD, anemia, A-fib, Follicular lymphoma in remission, pacemaker, Type II DM who presents with fall and right hip pain. Patient was felt to have high cardiac risk, so was transferred to [facility] for cardiac anesthesia for orthopedic surgery. During course of hospitalization patient was found to have episode of cardiac arrest which was responded to DC shock and IV epinephrine. Admitted 12/21/24. Orthopedic consult initiated. IV pain medications in place.       Past Medical History:   has a past medical history of A-fib ([facility]), Anemia, Anemia:multifactorial, Anxiety, Cataract, CHF (congestive heart failure) ([facility]), Chronic back pain, CKD (chronic kidney disease) stage 3, GFR 30-59 ml/min ([facility]), Colitis, ischemic ([facility]), COPD, Diabetes, Diabetic eye exam ([facility]), GERD (gastroesophageal reflux disease), H/O heart valve replacement with mechanical valve, Hx of mammogram, Hyperlipidaemia LDL goal <100, Hypertension, Insomnia, Long-term (current) use of anticoagulants, INR goal 2.5-3.5, Lymphoma:monoclonal B cell, Mammogram abnormal, Nonspecific abnormal results of kidney function study, Osteoarthritis, Renal cysts, right, RLS (restless legs syndrome), Sciatica, Screening colonoscopy, Therapeutic drug monitoring, Valvular heart disease, and Visit for screening mammogram.    Past Surgical History:   has a past surgical history that includes Aortic valve replacement; Mitral valve replacement; Hysterectomy; Cataract removal (12/11/13;1/8/14); Colonoscopy; pacemaker placement (8/07); Tunneled venous port placement (Right, 10/3/2014); laparoscopy (2/3/15); laparoscopy (N/A, 8/22/2024); and laparotomy (N/A, 8/22/2024).    Advance Directives:        Patient confirmed code status as Full Code with Dr. PLACIDO Holloway on 12/22/24. Agrees with two children listed on EMR as DPOA. Listing

## 2024-12-23 NOTE — CONSULTS
potential trigger for the VT. Ddx include ischemia.   Plan: Will be seen by interventional. She would need ischemic cause ruled out before consideration of device upgrade to AICD. Not sure she is a candidate for any procedures given how agitated she is.    PAF  -Telemetry today shows paced rhythm   - Diamond on today's device interrogation shows < 0.1%  -[id number] Score is at least 6 for age, female, HTN, Valve disease and DM.  She is high risk for thromboembolic event. Anticoagulation is recommended  Plan: Continue heparin gtt, lopressor 25 BID. She was on Warfarin PTA    Sinus Node Dysfunction   - S/P Medtronic dual chamber PPM(follows at )  - Per interrogation today: 4 yrs battery remaining, AF burden < 0.1%, A paced  24.0%, V paced < 0.1%, 10 episodes of VT > 4 beats (since 24), longest 76 seconds 24  Plan: Device is functioning properly, no change     AVR/MVR  -Mechanical  -She is felt to have severe prostatic aortic valve stenosis.  She is not a surgical candidate.  Plan: Recommend updating echocardiogram (she is refusing), Continue heparin drip for now, if not plans for invasive procedures are made across all services, could resume Warfarin.     Relevant available labs, and cardiovascular diagnostics, documents reviewed.   ECG: Sinus rhythm, Paced, RBBB  Telemetry : Paced rhythm   Telemetry:  @ 22:40: Polymorphic VT    NA: 133  K: 4.0  Ma.10  BUN: 24  Cr: 1.7    Trop: 58    TSH: pending   ALT:19  AST: 18    Hgb: 9.3  Platelets: 187  Wbc:  15.4    Echo 2024    Left Ventricle: Normal left ventricular systolic function with a visually estimated EF of 60 - 65%. Left ventricle size is normal. Mildly increased wall thickness. Cardiac dyssynchrony seen.  Focal septal hypokinesia    Right Ventricle: Right ventricle is mildly dilated. Lead present in the right ventricle.    Aortic Valve: Not well visualized. Mechanical valve. AV mean gradient is 31 mmHg. Mildly calcified cusps. Severe  needed 11/20/24  Yes Kassidy Adler MD   pantoprazole (PROTONIX) 40 MG tablet TAKE 1 TABLET BY MOUTH EVERY DAY 12/16/24  Yes Braxton Medel APRN - CNP   warfarin (COUMADIN) 1 MG tablet TAKE TWO AND ONE-HALF TABLETS BY MOUTH ON SUNDAYS AND TUESDAYS AND TAKE TWO TABLETS BY MOUTH ON ALL OTHER DAYS 12/4/24  Yes Juliette Ramirez MD   acetaminophen-codeine (TYLENOL #3) 300-30 MG per tablet Take 1 tablet by mouth nightly as needed for Pain for up to 90 days. Max Daily Amount: 1 tablet 12/4/24 3/4/25 Yes Juliette Ramirez MD   acetaminophen (AMINOFEN) 325 MG tablet Take one po HS prn for back pain 12/3/24  Yes Juliette Ramirez MD   ALPRAZolam (XANAX) 0.5 MG tablet One po bid prn for anxiety 11/27/24 2/26/25 Yes Juliette Ramirez MD   ondansetron (ZOFRAN-ODT) 4 MG disintegrating tablet DISSOLVE 1 TABLET ON THE TONGUE THREE TIMES DAILY AS NEEDED FOR NAUSEA OR VOMITING 10/28/24  Yes Juliette Ramirez MD   meclizine (ANTIVERT) 12.5 MG tablet TAKE 1 TABLET BY MOUTH THREE TIMES DAILY AS NEEDED FOR DIZZINESS OR NAUSEA 10/28/24  Yes Juliette Ramirez MD   PT/INR Test STRP Check INR as recommended 10/4/24  Yes Juliette Ramirez MD   PT/INR Testing Monitor KIT Use as directed 10/4/24  Yes Juliette Ramirez MD   glipiZIDE (GLUCOTROL) 5 MG tablet TAKE 1 TABLET BY MOUTH TWICE DAILY WITH MEALS 9/16/24  Yes Juliette Ramirez MD   blood glucose test strips (TRUE METRIX BLOOD GLUCOSE TEST) strip TEST TWICE DAILY AND AS NEEDED FOR SYMPTOMS OF IRREGULAR BLOOD GLUCOSE 6/20/24  Yes Juliette Ramirez MD   Semaglutide,0.25 or 0.5MG/DOS, (OZEMPIC, 0.25 OR 0.5 MG/DOSE,) 2 MG/1.5ML SOPN 0.25 SQ  every week 3/27/24  Yes Juliette Ramirez MD Handicap Placard MISC by Does not apply route 3/27/24  Yes Juliette Ramirez MD   atorvastatin (LIPITOR) 10 MG tablet TAKE 1 TABLET BY MOUTH DAILY IN THE EVENING FOR CHOLESTEROL 3/21/24  Yes Juliette Ramirez MD   Lancets MISC 1 each by Does not apply route 2 times daily 10/16/23  Yes Juliette Ramirez MD   vitamin  B-12 (CYANOCOBALAMIN) 100 MCG tablet Take 1 tablet by mouth daily   Yes Kassidy Adler MD   metoprolol tartrate (LOPRESSOR) 25 MG tablet Take 1 tablet by mouth 2 times daily   Yes Kassidy Adler MD   acetaminophen-codeine (TYLENOL #3) 300-30 MG per tablet Take 1 tablet by mouth at bedtime for 5 days. Max Daily Amount: 1 tablet 24  Alexander De Anda MD   Cholecalciferol (VITAMIN D) 50 MCG ( UT) CAPS capsule Take by mouth  Patient not taking: Reported on 2024    ProviderKassidy MD       Physical Examination:  Vitals:    24 0822   BP:    Pulse: 60   Resp: 15   Temp:    SpO2: 99%      In: 1907.8 [P.O.:360; I.V.:480.6; Blood:324.2]  Out: 300    Wt Readings from Last 3 Encounters:   24 45.8 kg (100 lb 15.5 oz)   24 45.4 kg (100 lb)   24 44.3 kg (97 lb 9.6 oz)     Temp  Av.5 °F (36.4 °C)  Min: 97.2 °F (36.2 °C)  Max: 97.9 °F (36.6 °C)  Pulse  Av.7  Min: 60  Max: 72  BP  Min: 81/56  Max: 164/64  SpO2  Av.5 %  Min: 87 %  Max: 100 %    Intake/Output Summary (Last 24 hours) at 2024 0836  Last data filed at 2024 0400  Gross per 24 hour   Intake 1907.75 ml   Output 300 ml   Net 1607.75 ml     Constitutional: Agitated   Cardiovascular: Normal rate, regular rhythm,+  valve click  Pulmonary/Chest: Bilateral respiratory sounds. No rhonchi.    Abdominal: Soft. No tenderness   Musculoskeletal: No edema    Neurological: Alert, confused.  Follows command intermittently     Active Hospital Problems    Diagnosis Date Noted    Nonrheumatic aortic valve stenosis [I35.0] 2024    Ventricular tachycardia (HCC) [I47.20] 2024    Cardiac arrest [I46.9] 2024    Closed fracture of right hip (HCC) [S72.001A] 2024       Past Medical History:   Diagnosis Date    A-fib (HCC)     Anemia     multifactorial:under care of heme-onc:Dr. Bermudez    Anemia:multifactorial     as per heme-onc    Anxiety     Cataract     bilateral    CHF

## 2024-12-24 LAB
ANION GAP SERPL CALCULATED.3IONS-SCNC: 11 MMOL/L (ref 3–16)
ANTI-XA UNFRAC HEPARIN: 0.31 IU/ML (ref 0.3–0.7)
BACTERIA UR CULT: NORMAL
BACTERIA URNS QL MICRO: ABNORMAL /HPF
BILIRUB UR QL STRIP.AUTO: NEGATIVE
BUN SERPL-MCNC: 31 MG/DL (ref 7–20)
CALCIUM SERPL-MCNC: 7.5 MG/DL (ref 8.3–10.6)
CHLORIDE SERPL-SCNC: 96 MMOL/L (ref 99–110)
CLARITY UR: CLEAR
CO2 SERPL-SCNC: 26 MMOL/L (ref 21–32)
COLOR UR: ABNORMAL
CREAT SERPL-MCNC: 2.1 MG/DL (ref 0.6–1.2)
DEPRECATED RDW RBC AUTO: 20.4 % (ref 12.4–15.4)
EPI CELLS #/AREA URNS AUTO: 3 /HPF (ref 0–5)
GFR SERPLBLD CREATININE-BSD FMLA CKD-EPI: 24 ML/MIN/{1.73_M2}
GLUCOSE BLD-MCNC: 157 MG/DL (ref 70–99)
GLUCOSE BLD-MCNC: 158 MG/DL (ref 70–99)
GLUCOSE BLD-MCNC: 216 MG/DL (ref 70–99)
GLUCOSE SERPL-MCNC: 134 MG/DL (ref 70–99)
GLUCOSE UR STRIP.AUTO-MCNC: NEGATIVE MG/DL
HCT VFR BLD AUTO: 25.9 % (ref 36–48)
HGB BLD-MCNC: 8.7 G/DL (ref 12–16)
HGB UR QL STRIP.AUTO: NEGATIVE
HYALINE CASTS #/AREA URNS AUTO: 2 /LPF (ref 0–8)
INR PPP: 2.08 (ref 0.85–1.15)
KETONES UR STRIP.AUTO-MCNC: NEGATIVE MG/DL
LEUKOCYTE ESTERASE UR QL STRIP.AUTO: ABNORMAL
LIDOCAIN SERPL-MCNC: 7.9 UG/ML (ref 1.2–5)
MAGNESIUM SERPL-MCNC: 2.08 MG/DL (ref 1.8–2.4)
MCH RBC QN AUTO: 28.8 PG (ref 26–34)
MCHC RBC AUTO-ENTMCNC: 33.5 G/DL (ref 31–36)
MCV RBC AUTO: 86 FL (ref 80–100)
NITRITE UR QL STRIP.AUTO: NEGATIVE
PERFORMED ON: ABNORMAL
PH UR STRIP.AUTO: 5 [PH] (ref 5–8)
PLATELET # BLD AUTO: 160 K/UL (ref 135–450)
PMV BLD AUTO: 10.1 FL (ref 5–10.5)
POTASSIUM SERPL-SCNC: 4.1 MMOL/L (ref 3.5–5.1)
PROT UR STRIP.AUTO-MCNC: 100 MG/DL
PROTHROMBIN TIME: 23.5 SEC (ref 11.9–14.9)
RBC # BLD AUTO: 3.02 M/UL (ref 4–5.2)
RBC CLUMPS #/AREA URNS AUTO: 6 /HPF (ref 0–4)
SODIUM SERPL-SCNC: 133 MMOL/L (ref 136–145)
SP GR UR STRIP.AUTO: 1.02 (ref 1–1.03)
UA COMPLETE W REFLEX CULTURE PNL UR: YES
UA DIPSTICK W REFLEX MICRO PNL UR: YES
URN SPEC COLLECT METH UR: ABNORMAL
UROBILINOGEN UR STRIP-ACNC: 0.2 E.U./DL
WBC # BLD AUTO: 12.7 K/UL (ref 4–11)
WBC #/AREA URNS AUTO: 49 /HPF (ref 0–5)

## 2024-12-24 PROCEDURE — 2000000000 HC ICU R&B

## 2024-12-24 PROCEDURE — 2500000003 HC RX 250 WO HCPCS: Performed by: NURSE PRACTITIONER

## 2024-12-24 PROCEDURE — 99232 SBSQ HOSP IP/OBS MODERATE 35: CPT

## 2024-12-24 PROCEDURE — 94761 N-INVAS EAR/PLS OXIMETRY MLT: CPT

## 2024-12-24 PROCEDURE — 85027 COMPLETE CBC AUTOMATED: CPT

## 2024-12-24 PROCEDURE — 2700000000 HC OXYGEN THERAPY PER DAY

## 2024-12-24 PROCEDURE — 6370000000 HC RX 637 (ALT 250 FOR IP): Performed by: NURSE PRACTITIONER

## 2024-12-24 PROCEDURE — 6360000002 HC RX W HCPCS: Performed by: STUDENT IN AN ORGANIZED HEALTH CARE EDUCATION/TRAINING PROGRAM

## 2024-12-24 PROCEDURE — 85610 PROTHROMBIN TIME: CPT

## 2024-12-24 PROCEDURE — 87086 URINE CULTURE/COLONY COUNT: CPT

## 2024-12-24 PROCEDURE — 2500000003 HC RX 250 WO HCPCS: Performed by: STUDENT IN AN ORGANIZED HEALTH CARE EDUCATION/TRAINING PROGRAM

## 2024-12-24 PROCEDURE — 99232 SBSQ HOSP IP/OBS MODERATE 35: CPT | Performed by: HOSPITALIST

## 2024-12-24 PROCEDURE — 99232 SBSQ HOSP IP/OBS MODERATE 35: CPT | Performed by: STUDENT IN AN ORGANIZED HEALTH CARE EDUCATION/TRAINING PROGRAM

## 2024-12-24 PROCEDURE — 83735 ASSAY OF MAGNESIUM: CPT

## 2024-12-24 PROCEDURE — 81001 URINALYSIS AUTO W/SCOPE: CPT

## 2024-12-24 PROCEDURE — 6360000002 HC RX W HCPCS: Performed by: HOSPITALIST

## 2024-12-24 PROCEDURE — 94640 AIRWAY INHALATION TREATMENT: CPT

## 2024-12-24 PROCEDURE — 85520 HEPARIN ASSAY: CPT

## 2024-12-24 PROCEDURE — 80048 BASIC METABOLIC PNL TOTAL CA: CPT

## 2024-12-24 RX ORDER — ACETAMINOPHEN 500 MG
500 TABLET ORAL 3 TIMES DAILY
Status: DISCONTINUED | OUTPATIENT
Start: 2024-12-24 | End: 2024-12-31 | Stop reason: HOSPADM

## 2024-12-24 RX ADMIN — ALPRAZOLAM 0.5 MG: 0.5 TABLET ORAL at 21:03

## 2024-12-24 RX ADMIN — ATORVASTATIN CALCIUM 10 MG: 10 TABLET, FILM COATED ORAL at 21:02

## 2024-12-24 RX ADMIN — ACETAMINOPHEN 500 MG: 500 TABLET ORAL at 21:02

## 2024-12-24 RX ADMIN — ALBUTEROL SULFATE 2.5 MG: 2.5 SOLUTION RESPIRATORY (INHALATION) at 08:17

## 2024-12-24 RX ADMIN — ALPRAZOLAM 0.5 MG: 0.5 TABLET ORAL at 06:24

## 2024-12-24 RX ADMIN — INSULIN LISPRO 1 UNITS: 100 INJECTION, SOLUTION INTRAVENOUS; SUBCUTANEOUS at 16:48

## 2024-12-24 RX ADMIN — ACETAMINOPHEN 500 MG: 500 TABLET ORAL at 10:47

## 2024-12-24 RX ADMIN — PANTOPRAZOLE SODIUM 40 MG: 40 TABLET, DELAYED RELEASE ORAL at 06:24

## 2024-12-24 RX ADMIN — ACETAMINOPHEN 500 MG: 500 TABLET ORAL at 16:43

## 2024-12-24 RX ADMIN — ALBUTEROL SULFATE 2.5 MG: 2.5 SOLUTION RESPIRATORY (INHALATION) at 20:08

## 2024-12-24 RX ADMIN — Medication 10 ML: at 10:43

## 2024-12-24 RX ADMIN — Medication 10 ML: at 21:06

## 2024-12-24 RX ADMIN — WATER 1000 MG: 1 INJECTION INTRAMUSCULAR; INTRAVENOUS; SUBCUTANEOUS at 10:41

## 2024-12-24 RX ADMIN — MELATONIN TAB 3 MG 3 MG: 3 TAB at 21:03

## 2024-12-24 ASSESSMENT — PAIN DESCRIPTION - ORIENTATION: ORIENTATION: RIGHT

## 2024-12-24 ASSESSMENT — PAIN DESCRIPTION - LOCATION: LOCATION: HIP

## 2024-12-24 ASSESSMENT — PAIN SCALES - GENERAL
PAINLEVEL_OUTOF10: 0

## 2024-12-24 ASSESSMENT — PAIN DESCRIPTION - DESCRIPTORS: DESCRIPTORS: ACHING

## 2024-12-24 NOTE — PLAN OF CARE
[facility] Orthopedic Surgery  Plan of Care Note        Seen sitting up in bed.  More appropriate today.  Calm ; reports pain only with movement.     Plan:  - No plans on operative intervention regarding the hip at this time.   - Will follow to see how much she stabilizes in the coming days  - PATRICK OKEEFE  - OK to work with therapy from our end.  - Pain control; scheduled tylenol added.     ALONSO Dennison - CNP 12/24/2024 9:23 AM

## 2024-12-24 NOTE — PLAN OF CARE
Problem: Chronic Conditions and Co-morbidities  Goal: Patient's chronic conditions and co-morbidity symptoms are monitored and maintained or improved  12/23/2024 2255 by Pavithra Elizabeth RN  Outcome: Progressing  Flowsheets (Taken 12/23/2024 2255)  Care Plan - Patient's Chronic Conditions and Co-Morbidity Symptoms are Monitored and Maintained or Improved: Monitor and assess patient's chronic conditions and comorbid symptoms for stability, deterioration, or improvement  12/23/2024 1714 by Brittney Tim, RN  Outcome: Progressing  Flowsheets (Taken 12/23/2024 0800)  Care Plan - Patient's Chronic Conditions and Co-Morbidity Symptoms are Monitored and Maintained or Improved:   Monitor and assess patient's chronic conditions and comorbid symptoms for stability, deterioration, or improvement   Collaborate with multidisciplinary team to address chronic and comorbid conditions and prevent exacerbation or deterioration   Update acute care plan with appropriate goals if chronic or comorbid symptoms are exacerbated and prevent overall improvement and discharge     Problem: Discharge Planning  Goal: Discharge to home or other facility with appropriate resources  Outcome: Not Progressing  Flowsheets (Taken 12/23/2024 2255)  Discharge to home or other facility with appropriate resources: Identify discharge learning needs (meds, wound care, etc)     Problem: Pain  Goal: Verbalizes/displays adequate comfort level or baseline comfort level  12/23/2024 2255 by Pavithra Elizabeth RN  Outcome: Progressing  Flowsheets (Taken 12/23/2024 2255)  Verbalizes/displays adequate comfort level or baseline comfort level:   Encourage patient to monitor pain and request assistance   Assess pain using appropriate pain scale   Administer analgesics based on type and severity of pain and evaluate response   Implement non-pharmacological measures as appropriate and evaluate response  12/23/2024 1714 by Brittney Tim RN  Outcome: Progressing     Problem:  Safety - Adult  Goal: Free from fall injury  12/23/2024 2255 by Pavithra Elizabeth RN  Outcome: Progressing  12/23/2024 1714 by Brittney Tim RN  Outcome: Progressing     Problem: ABCDS Injury Assessment  Goal: Absence of physical injury  12/23/2024 2255 by Pavithra Elizabeth RN  Outcome: Progressing  Flowsheets (Taken 12/23/2024 0344)  Absence of Physical Injury: Implement safety measures based on patient assessment  12/23/2024 1714 by Brittney Tim RN  Outcome: Progressing     Problem: Skin/Tissue Integrity  Goal: Absence of new skin breakdown  Description: 1.  Monitor for areas of redness and/or skin breakdown  2.  Assess vascular access sites hourly  3.  Every 4-6 hours minimum:  Change oxygen saturation probe site  4.  Every 4-6 hours:  If on nasal continuous positive airway pressure, respiratory therapy assess nares and determine need for appliance change or resting period.  12/23/2024 2255 by Pavithra Elizabeth RN  Outcome: Progressing  12/23/2024 1714 by Brittney Tim RN  Outcome: Progressing     Problem: Nutrition Deficit:  Goal: Optimize nutritional status  12/23/2024 2255 by Pavithra Elizabeth RN  Outcome: Progressing  Flowsheets (Taken 12/23/2024 2255)  Nutrient intake appropriate for improving, restoring, or maintaining nutritional needs:   Assess nutritional status and recommend course of action   Monitor oral intake, labs, and treatment plans   Recommend appropriate diets, oral nutritional supplements, and vitamin/mineral supplements  12/23/2024 1714 by Brittney Tim RN  Outcome: Progressing     Problem: Discharge Planning  Goal: Discharge to home or other facility with appropriate resources  Outcome: Not Progressing  Flowsheets (Taken 12/23/2024 2255)  Discharge to home or other facility with appropriate resources: Identify discharge learning needs (meds, wound care, etc)

## 2024-12-24 NOTE — PROGRESS NOTES
Nephrology Note                                                                                                                                                                                                                                                                                                                                                               Office : [phone]     Fax :[phone]    Patient's Name: Pao Wahl  5:15 PM  12/24/2024    Reason for Consult:  FOX  Requesting Physician:  Juliette Ramirez MD  Chief Complaint:  No chief complaint on file.      Assessment/Plan     # FOX on CKD 3/4  - baseline Cr ~ 1.4  - FOX likely 2/2 CRS and cardiac arrest  - Severe proteinuric disease   - Monitor kidney functions closely    # CHF  - Echo 12/23 with EF 55-60%  - We will monitor fluid status closely    # Fall  - R femoral neck fracture  - Ortho following    # HTN  - Monitor   - on amlodipine and Metoprolol    # Anemia of chronic disease  - s/p PRBC transfusion  - Monitor    #Hyponatremia  - Monitor     #DM 2  - management per primary    History of Present Ilness:    Pao Wahl is a 79 y.o. female with pmh of HTN, HLD, COPD, mechanical aortic and mitral valves, CKD, anemia, A-fib, Follicular lymphoma in remission, pacemaker, Type II DM who presents with fall and right hip pain. Patient was found to have right hip fracture. Patient was felt to have high cardiac risk, so was transferred to [facility] for cardiac anesthesia for orthopedic surgery. During course of hospitalization patient was found to have episode of cardiac arrest which was responded to DC shock and IV epinephrine. We are consulted for FOX on CKD 3/4.       Interval hx     States she feels well overall today. Mental status has improved.     Past Medical History:   Diagnosis Date    A-fib (HCC)     Anemia     multifactorial:under care of heme-onc:Dr. Bermudez    Anemia:multifactorial 2011    as per heme-onc    Anxiety   8/22/2024    . performed by Darron Mirza MD at [facility] OR    MITRAL VALVE REPLACEMENT      PACEMAKER PLACEMENT  8/07    for bradycardia, sympony    TUNNELED VENOUS PORT PLACEMENT Right 10/3/2014    ATTEMPTED RIGHT SUBCLAVIEN VEIN, PLACED RIGHT INTERNAL       Family History   Problem Relation Age of Onset    Diabetes Brother     Lung Cancer Mother         Smoker        reports that she quit smoking about 17 years ago. Her smoking use included cigarettes. She started smoking about 57 years ago. She has a 40 pack-year smoking history. She has never used smokeless tobacco. She reports that she does not currently use alcohol after a past usage of about 1.0 standard drink of alcohol per week. She reports that she does not use drugs.    Allergies:  Nsaids and Hydrocodone-acetaminophen    Current Medications:    acetaminophen (TYLENOL) tablet 500 mg, TID  cefTRIAXone (ROCEPHIN) 1,000 mg in sterile water 10 mL IV syringe, Q24H  prochlorperazine (COMPAZINE) injection 10 mg, Q6H PRN  albuterol (PROVENTIL) (2.5 MG/3ML) 0.083% nebulizer solution 2.5 mg, BID RT  albuterol (PROVENTIL) (2.5 MG/3ML) 0.083% nebulizer solution 2.5 mg, Q4H PRN  0.9 % sodium chloride infusion, PRN  hydrALAZINE (APRESOLINE) injection 10 mg, Q6H PRN  ALPRAZolam (XANAX) tablet 0.5 mg, BID PRN  atorvastatin (LIPITOR) tablet 10 mg, Nightly  [Held by provider] furosemide (LASIX) tablet 40 mg, BID  metoprolol tartrate (LOPRESSOR) tablet 25 mg, BID  pantoprazole (PROTONIX) tablet 40 mg, QAM AC  insulin lispro (HUMALOG,ADMELOG) injection vial 0-4 Units, 4x Daily AC & HS  dextrose bolus 10% 125 mL, PRN   Or  dextrose bolus 10% 250 mL, PRN  glucagon injection 1 mg, PRN  dextrose 10 % infusion, Continuous PRN  sodium chloride flush 0.9 % injection 5-40 mL, 2 times per day  sodium chloride flush 0.9 % injection 5-40 mL, PRN  0.9 % sodium chloride infusion, PRN  polyethylene glycol (GLYCOLAX) packet 17 g, Daily PRN  acetaminophen (TYLENOL) tablet 650 mg, Q6H PRN        UA:  Recent Labs     12/24/24  0306   COLORU DARK YELLOW*   CLARITYU Clear   GLUCOSEU Negative   BILIRUBINUR Negative   KETUA Negative   BLOODU Negative   PHUR 5.0   PROTEINU 100*   UROBILINOGEN 0.2   NITRU Negative   LEUKOCYTESUR MODERATE*   URINETYPE Voided      Urine Microscopic:   Recent Labs     12/24/24  0215   BACTERIA None Seen   HYALCAST 2   WBCUA 49*   RBCUA 6*     Urine Culture: No results for input(s): \"LABURIN\" in the last 72 hours.  Urine Chemistry: No results for input(s): \"CLUR\", \"LABCREA\", \"PROTEINUR\", \"NAUR\" in the last 72 hours.      IMAGING:  CT HEAD WO CONTRAST   Final Result   1. No acute intracranial abnormality.   2. Old left occipital infarct.   3. Tiny old lacunar infarct, right thalamus.   4. Moderate diffuse cerebral atrophy and mild chronic white matter ischemic   change.         XR CHEST PORTABLE   Final Result   Right internal jugular central venous catheter tip at the lower SVC. No   pneumothorax.         XR CHEST PORTABLE   Final Result   No acute pulmonary findings.               Medical Decision Making:  The following items were considered in medical decision making:  Discussion of patient care with other providers  Reviewed clinical lab tests  Reviewed radiology tests  Reviewed other diagnostic tests/interventions    Will be discussed w/  Primary team     Thank you for allowing us to participate in care of Pao Wahl   Feel free to contact me,     Westley Vazquez MD   [facility]  Office : [phone] or through [employer]  Fax :[phone]

## 2024-12-24 NOTE — PROGRESS NOTES
Pt requested purse from cabinet. Rn provided purse and patient rummaging through purse while rn at bedside charting. Pt found nicotine lozenge and placed in mouth and refused to spit out when nurse requested. Pt stated they didn't care about rules and nicotine isn't a cigarrete so it's fine. Rn advised concern for ingesting nicotine at this time given recent cardiac episode of pulseless vtach and elevated bp. Pt stated this is a hospital and not a retirement.

## 2024-12-24 NOTE — CARE COORDINATION
Case Management Assessment  Initial Evaluation    Date/Time of Evaluation: 12/24/2024 9:24 AM  Assessment Completed by: Cecille Gonzalez RN    If patient is discharged prior to next notation, then this note serves as note for discharge by case management.    Patient Name: Pao Wahl                   YOB: 1945  Diagnosis: Closed right hip fracture, initial encounter ([facility]) [S72.001A]                   Date / Time: 12/21/2024  9:39 PM    Patient Admission Status: Inpatient   Readmission Risk (Low < 19, Mod (19-27), High > 27): Readmission Risk Score: 24    Current PCP: Juliette Ramirez MD  PCP verified by CM? Yes    Chart Reviewed: Yes      History Provided by: Patient  Patient Orientation: Alert and Oriented    Patient Cognition: Alert    Hospitalization in the last 30 days (Readmission):  No    If yes, Readmission Assessment in CM Navigator will be completed.    Advance Directives:      Code Status: Full Code   Patient's Primary Decision Maker is: Legal Next of Kin    Primary Decision Maker: Herb Wahl - Child - [phone]    Secondary Decision Maker: Efe Wahl - Child - [phone]    Discharge Planning:    Patient lives with: Alone Type of Home: Apartment  Primary Care Giver: Self  Patient Support Systems include: Children, Friends/Neighbors   Current Financial resources: Medicare  Current community resources: None  Current services prior to admission: None            Current DME:              Type of Home Care services:  None    ADLS  Prior functional level: Independent in ADLs/IADLs, Bathing, Dressing, Toileting, Feeding, Cooking, Housework, Shopping, Mobility  Current functional level: Assistance with the following:, Bathing, Dressing, Toileting, Cooking, Housework, Shopping, Mobility    PT AM-PAC:   /24  OT AM-PAC:   /24    Family can provide assistance at DC: Yes  Would you like Case Management to discuss the discharge plan with any other family members/significant others, and if so, who?  Yes (Sons if they ask)  Plans to Return to Present Housing: Yes  Other Identified Issues/Barriers to RETURNING to current housing: Medical Condition and lives alone with no help at home.   Potential Assistance needed at discharge: N/A            Potential DME:    Patient expects to discharge to: Apartment  Plan for transportation at discharge:      Financial    Payor: MEDICARE / Plan: MEDICARE PART A AND B / Product Type: *No Product type* /     Does insurance require precert for SNF: Yes    Potential assistance Purchasing Medications: No  Meds-to-Beds request: No      [id number] - [place], [place] - [address] [facility] - P [phone] - F [phone] [address]  [facility] [address]  Phone: [phone] Fax: [phone]      Notes:    Factors facilitating achievement of predicted outcomes: Motivated    Barriers to discharge: Medical complications    Additional Case Management Notes: Spoke with pt at bedside. Pt states she lives in an apartment alone and that she does not have much of a support system. Pt is open to discuss SNF and HHC when she is evaluated and a recommendation is made by therapy. Pt states that she does not have any needs or questions at this time. CM will continue to follow pt through DC.     The Plan for Transition of Care is related to the following treatment goals of Closed right hip fracture, initial encounter (HCC) [S72.001A]    IF APPLICABLE: The Patient and/or patient representative Pao and her family were provided with a choice of provider and agrees with the discharge plan. Freedom of choice list with basic dialogue that supports the patient's individualized plan of care/goals and shares the quality data associated with the providers was provided to:     Patient Representative Name:       The Patient and/or Patient Representative Agree with the Discharge Plan?      Cecille Gonzalez RN  Case Management Department  Ph: [phone]     89 year old Female with PMHx of HTN, HLD, DM2, and TIA brought in by family for AMS. Found to febrile with leukocytosis and elevated lactate. CT chest with evidence of opacities. MICU called for sepsis and elevated lactate.    - Patient seems to be closer to her mental status now than when she first arrived, AAOX2  - Not requiring pressors  - Does not meet ICU level of care at this time, please re consult if clinical status changes       Kelly Mcknight   PGY-2

## 2024-12-24 NOTE — PROGRESS NOTES
[facility], [facility]   Electrophysiology   Date: 12/24/2024  Reason for Follow up: PAF, VT arrest    Consult Requesting Physician: Julius Abreu MD     No chief complaint on file.    CC: Fall   HPI: Pao Wahl is a 79 y.o. female with past medical history significant for DM, Hypertension, Valvular heart disease, Atrial Fibrillation, Hyperlipidemia, Sinus node dysfunction s/p PPM, CKD stage 3, diabetes follicular lymphoma that is in remission and COPD.    She has been followed by Dr. Low for many years. She has a history of a mitral valve repair in the 1970s with a redo mechanical AVR/MVR in 2000 by Dr. Vega. She also has atrial fibrillation as well as sinus node dysfunction with pacemaker placement, stage III chronic kidney disease, diabetes follicular lymphoma that is in remission and COPD. 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 She was recently admitted to the [facility] with shortness of breath and congestive heart failure. She was seen by CT surgery, Dr. Nolen, and felt that she was not a candidate for redo surgery. During that admission she did have some dialysis for acute on chronic renal failure.     Patient reports episodes of dizziness and sudden on set of nausea and vomiting for the past couple of months. She had an episode of dizziness in the afternoon of December 21st and fell. She Presented to hospital with hip pain, found to have fracture. Dizziness occurred again while inpatient and was found to be in pulseless VT. She was cardioverted, received CPR and ROSC was achieved.      Assessment:   VT arrest  - Pulseless polymorphic VT 12/21/24  - Noted to have prolonged QT. Not on prolonging QT mediations   - Potassium was 3.1 at time of event- potential cause.   -Device check shows VT initiated by PVC. VT detection at 150 bpm. She's had short runs of NSVT but not recent episodes to account for the fall. N/V resulted in low K which is the potential  trigger for the VT. Ddx include ischemia.   Plan: Seen by interventional- not planning for C at this time.  She would need ischemic cause ruled out before consideration of device upgrade to AICD. Not sure she is a candidate for any procedures given how agitated she is.    NSVT  - short episodes on telemetry ~ 6 beats   - Denies symptoms   - Need ischemic workup when appropriate   Plan: Continue Metoprolol, monitor electrolytes closely, keep WNL     PAF  -Telemetry today shows paced rhythm   - [place] on today's device interrogation shows < 0.1%  -[id number] Score is at least 6 for age, female, HTN, Valve disease and DM.  She is high risk for thromboembolic event. Anticoagulation is recommended  Plan: Continue heparin gtt, lopressor 25 BID. She was on Warfarin PTA     Sinus Node Dysfunction   - S/P Medtronic dual chamber PPM(follows at )  - Per interrogation today: 4 yrs battery remaining, AF burden < 0.1%, A paced  24.0%, V paced < 0.1%, 10 episodes of VT > 4 beats (since 24), longest 76 seconds 24  Plan: Device is functioning properly, no change      AVR/MVR  -Mechanical  -She is felt to have severe prostatic aortic valve stenosis.  She is not a surgical candidate.  Plan: Recommend updating echocardiogram (she is refusing), Continue heparin drip for now, if not plans for invasive procedures are made across all services, could resume Warfarin.      Relevant available labs, and cardiovascular diagnostics, documents reviewed.   ECG: Sinus rhythm, Paced, RBBB  Telemetry : Paced rhythm, short episodes of NSVT   Telemetry:  @ 22:40: Polymorphic VT     NA: 133  K: 4.1  Ma.08  BUN: 24  Cr: 2.1     Trop: 58     TSH: 2.51   ALT:19  AST: 18     Hgb: 9.3  Platelets: 187  Wbc:  12.7     Echo 24    Patient was combative and wanted test to end. Ended exam early.    Left Ventricle: Normal left ventricular systolic function with a visually estimated EF of 55 - 60%. Left ventricle size is normal.  results of kidney function study 01/25/2012    Osteoarthritis     Renal cysts, right 01/02/2014    RLS (restless legs syndrome) 10/19/2011    Sciatica     Screening colonoscopy 2010;6/19/13    Nml. Next 10yrs:6/2023:Dr. Gamaliel Waddell. 9/22/16(Dr. Waddell):nml EGD & colonoscopy except mild diverticulosis    Therapeutic drug monitoring 04/10/2015    OARRS report consistent on 4/10/15;7/6/15;10/23/15;2/7/16;5/16/16;8/19/16;11/4/16;3/6/17;6/26/17;9/20/17;12/18/17; 12/18/17;3/12/18;5/29/18    Valvular heart disease     s/p aortic and mitral valve repair. Under care of cards:Dr. Low.    Visit for screening mammogram 04/10/2017    negative:see scanned report.        Past Surgical History:   Procedure Laterality Date    AORTIC VALVE REPLACEMENT      CATARACT REMOVAL  12/11/13;1/8/14    Right;left respectively    COLONOSCOPY      \"twilight sleep didnt work\"    HYSTERECTOMY (CERVIX STATUS UNKNOWN)      Fibroids    LAPAROSCOPY  2/3/15    SBO:converted to open for lysis of adhesions    LAPAROSCOPY N/A 8/22/2024    DIAGNOSTIC LAPAROSCOPY performed by Darron Mirza MD at [facility] OR    LAPAROTOMY N/A 8/22/2024    . performed by Darron Mirza MD at [facility] OR    MITRAL VALVE REPLACEMENT      PACEMAKER PLACEMENT  8/07    for bradycardia, sympony    TUNNELED VENOUS PORT PLACEMENT Right 10/3/2014    ATTEMPTED RIGHT SUBCLAVIEN VEIN, PLACED RIGHT INTERNAL       Allergies   Allergen Reactions    Nsaids Other (See Comments)     CANNOT TAKE D/T GI BLEEDING AND USE OF COUMADIN    Hydrocodone-Acetaminophen Anxiety        reports that she quit smoking about 17 years ago. Her smoking use included cigarettes. She started smoking about 57 years ago. She has a 40 pack-year smoking history. She has never used smokeless tobacco. She reports that she does not currently use alcohol after a past usage of about 1.0 standard drink of alcohol per week. She reports that she does not use drugs.     All pertinent information and plan of care discussed with

## 2024-12-24 NOTE — PROGRESS NOTES
Pulmonary and Critical Care Medicine    CC: f/u VT   Date: 2024  Admit Date:  2024    HPI     This is a 78 y/o F w/ a hx of COPD, Pulmonary nodules, mechanical valve MVR/AVR, Afib who presented with fall and right hip fx, however course complicated by polymorphic VT.    Overnight:  This AM she is A0x3 calm   EP evaluated thought to be short VT caused by PVC   Cr is worse   She is on 3L NC     ROS: negative except as stated above     OBJECTIVE DATA       PHYSICAL EXAM:   Temp  Av.5 °F (36.4 °C)  Min: 97.1 °F (36.2 °C)  Max: 97.8 °F (36.6 °C)  Pulse  Av  Min: 60  Max: 77  BP  Min: 90/52  Max: 172/48  SpO2  Av.4 %  Min: 90 %  Max: 100 %    General: NAD  Neuro: A0x3 calm  Lung: Clear, no wheezing, equal non labored  Heart: murmur, regular rate       MEDICATIONS: Reviewed  Scheduled Meds:   acetaminophen  500 mg Oral TID    cefTRIAXone (ROCEPHIN) 1,000 mg in sterile water 10 mL IV syringe  1,000 mg IntraVENous Q24H    albuterol  2.5 mg Nebulization BID RT    atorvastatin  10 mg Oral Nightly    [Held by provider] furosemide  40 mg Oral BID    metoprolol tartrate  25 mg Oral BID    pantoprazole  40 mg Oral QAM AC    insulin lispro  0-4 Units SubCUTAneous 4x Daily AC & HS    sodium chloride flush  5-40 mL IntraVENous 2 times per day    melatonin  3 mg Oral Nightly    amLODIPine  5 mg Oral Daily     Continuous Infusions:   sodium chloride      dextrose      sodium chloride      heparin (PORCINE) Infusion 18 Units/kg/hr (24)     PRN Meds:.prochlorperazine, albuterol, sodium chloride, hydrALAZINE, ALPRAZolam, dextrose bolus **OR** dextrose bolus, glucagon (rDNA), dextrose, sodium chloride flush, sodium chloride, polyethylene glycol, acetaminophen **OR** acetaminophen, morphine, magnesium sulfate, heparin (porcine), heparin (porcine)     LABS: Reviewed.   Recent Labs     24  0550 24  0215 24  0429    133* 133*   K 4.2 4.0 4.1   CL 99 95* 96*   CO2 27 26 26   BUN

## 2024-12-24 NOTE — PROGRESS NOTES
Hospitalist Progress Note    Name:  Pao Wahl    /Age/Sex: 1945  (79 y.o. female)  MRN & CSN:  [id number] & [id number]    PCP: Juliette Ramirez MD    Date of Admission: 2024    Patient Status:  Inpatient     Chief Complaint: No chief complaint on file.      Hospital Course: Pao Wahl is a 79 y.o. female with pmh of HTN, HLD, COPD, mechanical aortic and mitral valves, CKD, anemia, A-fib, Follicular lymphoma in remission, pacemaker, Type II DM   who presents with fall and right hip pain.  Patient was found to have right hip fracture.  Patient was felt to have high cardiac risk, so was transferred to [facility] for cardiac anesthesia for orthopedic surgery.  During course of hospitalization patient was found to have episode of cardiac arrest which was responded to DC shock and IV epinephrine.  Patient is currently alert, awake and oriented.     Subjective:  Today is:  Hospital Day: 4.  Patient seen and examined in ICU-3905/3905-.     She is doing well.       Medications:  Reviewed    Infusion Medications    sodium chloride      dextrose      sodium chloride      heparin (PORCINE) Infusion 18 Units/kg/hr (24)     Scheduled Medications    acetaminophen  500 mg Oral TID    cefTRIAXone (ROCEPHIN) 1,000 mg in sterile water 10 mL IV syringe  1,000 mg IntraVENous Q24H    albuterol  2.5 mg Nebulization BID RT    atorvastatin  10 mg Oral Nightly    [Held by provider] furosemide  40 mg Oral BID    metoprolol tartrate  25 mg Oral BID    pantoprazole  40 mg Oral QAM AC    insulin lispro  0-4 Units SubCUTAneous 4x Daily AC & HS    sodium chloride flush  5-40 mL IntraVENous 2 times per day    melatonin  3 mg Oral Nightly    amLODIPine  5 mg Oral Daily     PRN Meds: prochlorperazine, albuterol, sodium chloride, hydrALAZINE, ALPRAZolam, dextrose bolus **OR** dextrose bolus, glucagon (rDNA), dextrose, sodium chloride flush, sodium chloride, polyethylene glycol, acetaminophen **OR**  Lipitor, metoprolol  Current meds reviewed, adjusted.   Abx for possible UTI   OFF OF COUMADIN        Discussed management of the case with [facility] who recommended hip repair when able from cardiac standpoint.    Controlled substances: Will Continue PRN Morphine for Pain    Drugs that require monitoring for toxicity include: Heparin and the method of monitoring was/is CBC for platelet abnormality    DVT ppx: Heparin  GI ppx: PPI  Diet: ADULT DIET; Regular; No Added Salt (3-4 gm)  ADULT ORAL NUTRITION SUPPLEMENT; Breakfast, Lunch, Dinner; Diabetic Oral Supplement  Code Status: Full Code    PT/OT Eval Status: eval and treat     Disposition:  Can transfer out of ICU        Julius Abreu MD  12/24/2024  3:33 PM

## 2024-12-25 PROBLEM — I49.5 SSS (SICK SINUS SYNDROME) (HCC): Status: ACTIVE | Noted: 2024-12-25

## 2024-12-25 PROBLEM — I48.0 PAF (PAROXYSMAL ATRIAL FIBRILLATION) (HCC): Status: ACTIVE | Noted: 2024-12-25

## 2024-12-25 LAB
ANION GAP SERPL CALCULATED.3IONS-SCNC: 10 MMOL/L (ref 3–16)
ANTI-XA UNFRAC HEPARIN: 0.13 IU/ML (ref 0.3–0.7)
ANTI-XA UNFRAC HEPARIN: 0.16 IU/ML (ref 0.3–0.7)
ANTI-XA UNFRAC HEPARIN: 0.31 IU/ML (ref 0.3–0.7)
ANTI-XA UNFRAC HEPARIN: 0.46 IU/ML (ref 0.3–0.7)
BUN SERPL-MCNC: 40 MG/DL (ref 7–20)
CALCIUM SERPL-MCNC: 7.9 MG/DL (ref 8.3–10.6)
CHLORIDE SERPL-SCNC: 98 MMOL/L (ref 99–110)
CO2 SERPL-SCNC: 27 MMOL/L (ref 21–32)
CREAT SERPL-MCNC: 2.2 MG/DL (ref 0.6–1.2)
DEPRECATED RDW RBC AUTO: 19.3 % (ref 12.4–15.4)
GFR SERPLBLD CREATININE-BSD FMLA CKD-EPI: 22 ML/MIN/{1.73_M2}
GLUCOSE BLD-MCNC: 138 MG/DL (ref 70–99)
GLUCOSE BLD-MCNC: 180 MG/DL (ref 70–99)
GLUCOSE BLD-MCNC: 185 MG/DL (ref 70–99)
GLUCOSE BLD-MCNC: 218 MG/DL (ref 70–99)
GLUCOSE SERPL-MCNC: 120 MG/DL (ref 70–99)
HCT VFR BLD AUTO: 23.3 % (ref 36–48)
HGB BLD-MCNC: 8.1 G/DL (ref 12–16)
INR PPP: 1.56 (ref 0.85–1.15)
MAGNESIUM SERPL-MCNC: 2.17 MG/DL (ref 1.8–2.4)
MCH RBC QN AUTO: 29.4 PG (ref 26–34)
MCHC RBC AUTO-ENTMCNC: 34.6 G/DL (ref 31–36)
MCV RBC AUTO: 84.8 FL (ref 80–100)
PERFORMED ON: ABNORMAL
PLATELET # BLD AUTO: 156 K/UL (ref 135–450)
PMV BLD AUTO: 10 FL (ref 5–10.5)
POTASSIUM SERPL-SCNC: 3.6 MMOL/L (ref 3.5–5.1)
PROTHROMBIN TIME: 18.8 SEC (ref 11.9–14.9)
RBC # BLD AUTO: 2.75 M/UL (ref 4–5.2)
SODIUM SERPL-SCNC: 135 MMOL/L (ref 136–145)
WBC # BLD AUTO: 8.1 K/UL (ref 4–11)

## 2024-12-25 PROCEDURE — 2500000003 HC RX 250 WO HCPCS: Performed by: STUDENT IN AN ORGANIZED HEALTH CARE EDUCATION/TRAINING PROGRAM

## 2024-12-25 PROCEDURE — 6360000002 HC RX W HCPCS: Performed by: NURSE PRACTITIONER

## 2024-12-25 PROCEDURE — 6370000000 HC RX 637 (ALT 250 FOR IP): Performed by: NURSE PRACTITIONER

## 2024-12-25 PROCEDURE — 6360000002 HC RX W HCPCS: Performed by: STUDENT IN AN ORGANIZED HEALTH CARE EDUCATION/TRAINING PROGRAM

## 2024-12-25 PROCEDURE — 94640 AIRWAY INHALATION TREATMENT: CPT

## 2024-12-25 PROCEDURE — 36415 COLL VENOUS BLD VENIPUNCTURE: CPT

## 2024-12-25 PROCEDURE — 99233 SBSQ HOSP IP/OBS HIGH 50: CPT | Performed by: INTERNAL MEDICINE

## 2024-12-25 PROCEDURE — 6360000002 HC RX W HCPCS: Performed by: HOSPITALIST

## 2024-12-25 PROCEDURE — 80048 BASIC METABOLIC PNL TOTAL CA: CPT

## 2024-12-25 PROCEDURE — 2500000003 HC RX 250 WO HCPCS: Performed by: NURSE PRACTITIONER

## 2024-12-25 PROCEDURE — 85520 HEPARIN ASSAY: CPT

## 2024-12-25 PROCEDURE — 2700000000 HC OXYGEN THERAPY PER DAY

## 2024-12-25 PROCEDURE — 99231 SBSQ HOSP IP/OBS SF/LOW 25: CPT | Performed by: ORTHOPAEDIC SURGERY

## 2024-12-25 PROCEDURE — 85610 PROTHROMBIN TIME: CPT

## 2024-12-25 PROCEDURE — 83735 ASSAY OF MAGNESIUM: CPT

## 2024-12-25 PROCEDURE — 94761 N-INVAS EAR/PLS OXIMETRY MLT: CPT

## 2024-12-25 PROCEDURE — 85027 COMPLETE CBC AUTOMATED: CPT

## 2024-12-25 PROCEDURE — 2000000000 HC ICU R&B

## 2024-12-25 RX ORDER — AMLODIPINE BESYLATE 5 MG/1
10 TABLET ORAL DAILY
Status: DISCONTINUED | OUTPATIENT
Start: 2024-12-26 | End: 2024-12-31 | Stop reason: HOSPADM

## 2024-12-25 RX ADMIN — ACETAMINOPHEN 500 MG: 500 TABLET ORAL at 20:56

## 2024-12-25 RX ADMIN — ATORVASTATIN CALCIUM 10 MG: 10 TABLET, FILM COATED ORAL at 20:56

## 2024-12-25 RX ADMIN — ACETAMINOPHEN 500 MG: 500 TABLET ORAL at 16:35

## 2024-12-25 RX ADMIN — ALPRAZOLAM 0.5 MG: 0.5 TABLET ORAL at 10:15

## 2024-12-25 RX ADMIN — HEPARIN SODIUM 3600 UNITS: 1000 INJECTION INTRAVENOUS; SUBCUTANEOUS at 10:15

## 2024-12-25 RX ADMIN — ALBUTEROL SULFATE 2.5 MG: 2.5 SOLUTION RESPIRATORY (INHALATION) at 20:10

## 2024-12-25 RX ADMIN — MORPHINE SULFATE 2 MG: 2 INJECTION, SOLUTION INTRAMUSCULAR; INTRAVENOUS at 12:35

## 2024-12-25 RX ADMIN — INSULIN LISPRO 1 UNITS: 100 INJECTION, SOLUTION INTRAVENOUS; SUBCUTANEOUS at 16:35

## 2024-12-25 RX ADMIN — MELATONIN TAB 3 MG 3 MG: 3 TAB at 20:56

## 2024-12-25 RX ADMIN — MORPHINE SULFATE 2 MG: 2 INJECTION, SOLUTION INTRAMUSCULAR; INTRAVENOUS at 23:49

## 2024-12-25 RX ADMIN — ACETAMINOPHEN 500 MG: 500 TABLET ORAL at 08:06

## 2024-12-25 RX ADMIN — METOPROLOL TARTRATE 25 MG: 25 TABLET, FILM COATED ORAL at 20:56

## 2024-12-25 RX ADMIN — Medication 10 ML: at 20:56

## 2024-12-25 RX ADMIN — Medication 10 ML: at 08:08

## 2024-12-25 RX ADMIN — PANTOPRAZOLE SODIUM 40 MG: 40 TABLET, DELAYED RELEASE ORAL at 08:09

## 2024-12-25 RX ADMIN — MORPHINE SULFATE 2 MG: 2 INJECTION, SOLUTION INTRAMUSCULAR; INTRAVENOUS at 08:07

## 2024-12-25 RX ADMIN — WATER 1000 MG: 1 INJECTION INTRAMUSCULAR; INTRAVENOUS; SUBCUTANEOUS at 10:14

## 2024-12-25 RX ADMIN — ALPRAZOLAM 0.5 MG: 0.5 TABLET ORAL at 20:56

## 2024-12-25 RX ADMIN — METOPROLOL TARTRATE 25 MG: 25 TABLET, FILM COATED ORAL at 08:07

## 2024-12-25 RX ADMIN — INSULIN LISPRO 1 UNITS: 100 INJECTION, SOLUTION INTRAVENOUS; SUBCUTANEOUS at 12:35

## 2024-12-25 RX ADMIN — MORPHINE SULFATE 2 MG: 2 INJECTION, SOLUTION INTRAMUSCULAR; INTRAVENOUS at 20:58

## 2024-12-25 RX ADMIN — AMLODIPINE BESYLATE 5 MG: 5 TABLET ORAL at 08:07

## 2024-12-25 RX ADMIN — HEPARIN SODIUM 18 UNITS/KG/HR: 10000 INJECTION, SOLUTION INTRAVENOUS at 02:08

## 2024-12-25 ASSESSMENT — PAIN SCALES - GENERAL
PAINLEVEL_OUTOF10: 4
PAINLEVEL_OUTOF10: 6
PAINLEVEL_OUTOF10: 8
PAINLEVEL_OUTOF10: 9
PAINLEVEL_OUTOF10: 0
PAINLEVEL_OUTOF10: 9
PAINLEVEL_OUTOF10: 8
PAINLEVEL_OUTOF10: 0
PAINLEVEL_OUTOF10: 6
PAINLEVEL_OUTOF10: 4
PAINLEVEL_OUTOF10: 0
PAINLEVEL_OUTOF10: 10

## 2024-12-25 ASSESSMENT — PAIN DESCRIPTION - LOCATION
LOCATION: HIP

## 2024-12-25 ASSESSMENT — PAIN DESCRIPTION - ORIENTATION
ORIENTATION: RIGHT

## 2024-12-25 ASSESSMENT — PAIN DESCRIPTION - DESCRIPTORS
DESCRIPTORS: ACHING;DULL
DESCRIPTORS: ACHING;DULL
DESCRIPTORS: SHARP
DESCRIPTORS: ACHING;DULL;DISCOMFORT
DESCRIPTORS: DULL;ACHING

## 2024-12-25 ASSESSMENT — PAIN DESCRIPTION - PAIN TYPE: TYPE: ACUTE PAIN

## 2024-12-25 NOTE — RT PROTOCOL NOTE
RT Inhaler-Nebulizer Bronchodilator Protocol Note    There is a bronchodilator order in the chart from a provider indicating to follow the RT Bronchodilator Protocol and there is an “Initiate RT Inhaler-Nebulizer Bronchodilator Protocol” order as well (see protocol at bottom of note).    CXR Findings:  No results found.    The findings from the last RT Protocol Assessment were as follows:   History Pulmonary Disease: Chronic pulmonary disease  Respiratory Pattern: Regular pattern and RR 12-20 bpm  Breath Sounds: Slightly diminished and/or crackles  Cough: Weak, productive  Indication for Bronchodilator Therapy: Decreased or absent breath sounds  Bronchodilator Assessment Score: 6    Aerosolized bronchodilator medication orders have been revised according to the RT Inhaler-Nebulizer Bronchodilator Protocol below.    Respiratory Therapist to perform RT Therapy Protocol Assessment initially then follow the protocol.  Repeat RT Therapy Protocol Assessment PRN for score 0-3 or on second treatment, BID, and PRN for scores above 3.    No Indications - adjust the frequency to every 6 hours PRN wheezing or bronchospasm, if no treatments needed after 48 hours then discontinue using Per Protocol order mode.     If indication present, adjust the RT bronchodilator orders based on the Bronchodilator Assessment Score as indicated below.  Use Inhaler orders unless patient has one or more of the following: on home nebulizer, not able to hold breath for 10 seconds, is not alert and oriented, cannot activate and use MDI correctly, or respiratory rate 25 breaths per minute or more, then use the equivalent nebulizer order(s) with same Frequency and PRN reasons based on the score.  If a patient is on this medication at home then do not decrease Frequency below that used at home.    0-3 - enter or revise RT bronchodilator order(s) to equivalent RT Bronchodilator order with Frequency of every 4 hours PRN for wheezing or increased work of

## 2024-12-25 NOTE — PROGRESS NOTES
for input(s): \"CHOL\", \"TRIG\", \"HDL\" in the last 72 hours.    Invalid input(s): \"LDLCALC\", \"LABVLDL\"  ABGs: No results for input(s): \"PHART\", \"PO2ART\", \"VNT5LQY\" in the last 72 hours.  INR:   Recent Labs     12/24/24  0429 12/25/24  0440   INR 2.08* 1.56*     UA:  Recent Labs     12/24/24  0306   COLORU DARK YELLOW*   CLARITYU Clear   GLUCOSEU Negative   BILIRUBINUR Negative   KETUA Negative   BLOODU Negative   PHUR 5.0   PROTEINU 100*   UROBILINOGEN 0.2   NITRU Negative   LEUKOCYTESUR MODERATE*   URINETYPE Voided      Urine Microscopic:   Recent Labs     12/24/24 0215   BACTERIA None Seen   HYALCAST 2   WBCUA 49*   RBCUA 6*     Urine Culture:   Recent Labs     12/24/24 0215   LABURIN <50,000 CFU/ml Mixed pathogens  Multiple organisms isolated, no predominance. Culture  indicates probable contamination. Please review colony count  and clinical indications to determine if a repeat culture is  necessary. No further workup to be done.       Urine Chemistry: No results for input(s): \"CLUR\", \"LABCREA\", \"PROTEINUR\", \"NAUR\" in the last 72 hours.      IMAGING:  CT HEAD WO CONTRAST   Final Result   1. No acute intracranial abnormality.   2. Old left occipital infarct.   3. Tiny old lacunar infarct, right thalamus.   4. Moderate diffuse cerebral atrophy and mild chronic white matter ischemic   change.         XR CHEST PORTABLE   Final Result   Right internal jugular central venous catheter tip at the lower SVC. No   pneumothorax.         XR CHEST PORTABLE   Final Result   No acute pulmonary findings.               Medical Decision Making:  The following items were considered in medical decision making:  Discussion of patient care with other providers  Reviewed clinical lab tests  Reviewed radiology tests  Reviewed other diagnostic tests/interventions    Will be discussed w/  Primary team     Thank you for allowing us to participate in care of Pao Thurmanrd   Feel free to contact me,     Anjum Flower MD   Nephrology  [facility]  Office : [phone] or through [employer]  Fax :[phone]

## 2024-12-25 NOTE — PROGRESS NOTES
[facility] Orthopedic Surgery   Progress Note      SUBJECTIVE: Patient complains of right hip pain.      OBJECTIVE:  BP (!) 181/37   Pulse 73   Temp 96.9 °F (36.1 °C) (Temporal)   Resp 16   Ht 1.499 m (4' 11\")   Wt 46.4 kg (102 lb 4.7 oz)   SpO2 100%   BMI 20.66 kg/m²   Patient is awake, alert, and in no acute distress.  She is A&O x 3.  She answers questions appropriately and asked appropriate questions.  Right hip tenderness  Sensation is intact to light touch throughout the Right foot.  Wiggles toes  The Right leg is warm and well perfused.    CBC:   Lab Results   Component Value Date/Time    WBC 8.1 12/25/2024 04:40 AM    RBC 2.75 12/25/2024 04:40 AM    HGB 8.1 12/25/2024 04:40 AM    HCT 23.3 12/25/2024 04:40 AM    MCV 84.8 12/25/2024 04:40 AM    MCH 29.4 12/25/2024 04:40 AM    MCHC 34.6 12/25/2024 04:40 AM    RDW 19.3 12/25/2024 04:40 AM     12/25/2024 04:40 AM    MPV 10.0 12/25/2024 04:40 AM       PT/INR:    Lab Results   Component Value Date/Time    PROTIME 18.8 12/25/2024 04:40 AM    PROTIME 32.5 08/22/2013 12:14 PM    INR 1.56 12/25/2024 04:40 AM       Chem Basic:   Lab Results   Component Value Date/Time     12/25/2024 04:50 AM    K 3.6 12/25/2024 04:50 AM    K 4.2 12/22/2024 05:50 AM    CL 98 12/25/2024 04:50 AM    CO2 27 12/25/2024 04:50 AM    GLUCOSE 120 12/25/2024 04:50 AM    GLUCOSE 193 04/24/2012 09:07 AM    BUN 40 12/25/2024 04:50 AM    CREATININE 2.2 12/25/2024 04:50 AM       Hemoglobin A1C   Date Value Ref Range Status   08/23/2024 5.9 See comment % Final     Comment:     Comment:  Diagnosis of Diabetes: > or = 6.5%  Increased risk of diabetes (Prediabetes): 5.7-6.4%  Glycemic Control: Nonpregnant Adults: <7.0%                    Pregnant: <6.0%                ASSESSMENT:  Right mildly displaced femoral neck fracture  Status post V. tach arrest  Acute kidney injury on CKD 3/4  AVR/MVR  PAF  Sinus node dysfunction  Diabetes  Chronic anemia      PLAN:    I had an extensive discussion  with the patient today regarding her right hip fracture.  We discussed that typically we recommend operative treatment for hip fractures in order to avoid complications secondary to prolonged bedrest/immobility.  Discussed that literature has demonstrated as high as 30% mortality with nonoperative treated hip fractures.  However, the patient underwent V. tach arrest just 3 days ago, which necessitated ACLS resuscitation and shock.  She has no known extensive cardiac history.  Discussed with the patient that she is not yet medically \"cleared\" / optimized / assessed for surgery by cardiology.  Cardiology was in the room at the same time as when I saw her.  We both discussed that we need to provide her with the best information possible in regards to risk of surgery which could be either similar or even greater than a 30% risk of mortality with nonoperative treatment.  Once we have an idea of her cardiac risk, then we can all discuss with her and her family how she would like to proceed.  At this time, her creatinine is also steadily rising.  She would need to be medically optimized prior to surgery if that were the option she chooses.    No plans for surgery at this time until further medical evaluation and optimization.    Pain control.    DVT prophylaxis.  She is on heparin drip.    Incentive spirometry since she is on bedrest for now.  Can try to sit her head up as high as she can tolerate.    Provide the role if tolerated to avoid pressure ulcers.    Will continue to follow.        Electronically signed by Flaco Love MD on 12/25/2024 at 8:47 AM

## 2024-12-25 NOTE — PROGRESS NOTES
[facility]   Electrophysiology Progress Note     Admit Date: 2024     Reason for follow up: Cardiac arrest    HPI and Interval History:   Patient seen and examined. Clinical notes reviewed.   Telemetry reviewed. No new complaint today.   No major events overnight.   Denies having chest pain, shortness of breath, dyspnea on exertion, Orthopnea, PND at the time of this visit.  She has some pain in her leg.    Review of System:  All other systems reviewed and are negative except for that noted above. Pertinent negatives are:     General: negative for fever, chills   Ophthalmic ROS: negative for - eye pain or loss of vision  ENT ROS: negative for - headaches, sore throat   Respiratory: negative for - cough, sputum  Cardiovascular: Reviewed in HPI  Gastrointestinal: negative for - abdominal pain, diarrhea, N/V  Hematology: negative for - bleeding, blood clots, bruising or jaundice  Genito-Urinary:  negative for - Dysuria or incontinence  Musculoskeletal: negative for - Joint swelling, muscle pain  Neurological: negative for - confusion, dizziness, headaches   Psychiatric: No anxiety, no depression.  Dermatological: negative for - rash      Physical Examination:  Vitals:    24 0800   BP: (!) 181/37   Pulse: 73   Resp: 16   Temp: 96.9 °F (36.1 °C)   SpO2: 100%      In: 312.2 [I.V.:312.2]  Out: 200    Wt Readings from Last 3 Encounters:   24 46.4 kg (102 lb 4.7 oz)   24 45.4 kg (100 lb)   24 44.3 kg (97 lb 9.6 oz)     Temp  Av.6 °F (36.4 °C)  Min: 96.9 °F (36.1 °C)  Max: 98.4 °F (36.9 °C)  Pulse  Av.1  Min: 60  Max: 74  BP  Min: 90/52  Max: 181/37  SpO2  Av %  Min: 92 %  Max: 100 %    Intake/Output Summary (Last 24 hours) at 2024 0929  Last data filed at 2024 0608  Gross per 24 hour   Intake 312.22 ml   Output 200 ml   Net 112.22 ml       Telemetry: Sinus rhythm   Constitutional: Oriented. No distress.   Head: Normocephalic and atraumatic.   Mouth/Throat:  2.5 mg Nebulization BID RT    atorvastatin  10 mg Oral Nightly    [Held by provider] furosemide  40 mg Oral BID    metoprolol tartrate  25 mg Oral BID    pantoprazole  40 mg Oral QAM AC    insulin lispro  0-4 Units SubCUTAneous 4x Daily AC & HS    sodium chloride flush  5-40 mL IntraVENous 2 times per day    melatonin  3 mg Oral Nightly    amLODIPine  5 mg Oral Daily     Continuous Infusions:   sodium chloride      dextrose      sodium chloride      heparin (PORCINE) Infusion 18 Units/kg/hr (12/25/24 0608)     PRN Meds:prochlorperazine, albuterol, sodium chloride, hydrALAZINE, ALPRAZolam, dextrose bolus **OR** dextrose bolus, glucagon (rDNA), dextrose, sodium chloride flush, sodium chloride, polyethylene glycol, acetaminophen **OR** acetaminophen, morphine, magnesium sulfate, heparin (porcine), heparin (porcine)     Patient Active Problem List    Diagnosis Date Noted    Protein calorie malnutrition 01/09/2023    Opioid dependence with current use ([facility]) 01/09/2023    CHF (congestive heart failure), NYHA class II, acute on chronic, diastolic ([facility]) 01/05/2023    Hypomagnesemia 12/05/2022    Hyponatremia 12/05/2022    Acute kidney injury superimposed on CKD ([facility]) 12/04/2022    Disorders of fluid, electrolyte, and acid-base balance 12/23/2024    Nonrheumatic aortic valve stenosis 12/22/2024    Ventricular tachycardia ([facility]) 12/22/2024    Cardiac arrest 12/22/2024    Closed displaced fracture of right femoral neck ([facility]) 12/21/2024    Closed fracture of right hip ([facility]) 12/21/2024    Chronic congestive heart failure ([facility]) 08/26/2024    Pacemaker 08/24/2024    H/O mechanical aortic valve replacement 08/24/2024    H/O mitral valve replacement with mechanical valve 08/24/2024    Metabolic acidosis 08/23/2024    Sepsis ([facility]) 08/23/2024    Neck muscle spasm 08/22/2024    Pneumobilia 08/21/2024    COPD, mild ([facility]) 03/03/2022    Chronic diastolic heart failure ([facility]) 07/29/2021    Primary insomnia 04/05/2017    Controlled type 2

## 2024-12-25 NOTE — PROGRESS NOTES
Hospitalist Progress Note    Name:  Pao Wahl    /Age/Sex: 1945  (79 y.o. female)  MRN & CSN:  [id number] & [id number]    PCP: Juliette Ramirez MD    Date of Admission: 2024    Patient Status:  Inpatient     Chief Complaint: No chief complaint on file.      Hospital Course: Pao Wahl is a 79 y.o. female with pmh of HTN, HLD, COPD, mechanical aortic and mitral valves, CKD, anemia, A-fib, Follicular lymphoma in remission, pacemaker, Type II DM   who presents with fall and right hip pain.  Patient was found to have right hip fracture.  Patient was felt to have high cardiac risk, so was transferred to [facility] for cardiac anesthesia for orthopedic surgery.  During course of hospitalization patient was found to have episode of cardiac arrest which was responded to DC shock and IV epinephrine.  Patient is currently alert, awake and oriented.     Subjective:  Today is:  Hospital Day: 5.  Patient seen and examined in [id number].     She is doing well.       Medications:  Reviewed    Infusion Medications    sodium chloride      dextrose      sodium chloride      heparin (PORCINE) Infusion 20 Units/kg/hr (24 1017)     Scheduled Medications    acetaminophen  500 mg Oral TID    cefTRIAXone (ROCEPHIN) 1,000 mg in sterile water 10 mL IV syringe  1,000 mg IntraVENous Q24H    albuterol  2.5 mg Nebulization BID RT    atorvastatin  10 mg Oral Nightly    [Held by provider] furosemide  40 mg Oral BID    metoprolol tartrate  25 mg Oral BID    pantoprazole  40 mg Oral QAM AC    insulin lispro  0-4 Units SubCUTAneous 4x Daily AC & HS    sodium chloride flush  5-40 mL IntraVENous 2 times per day    melatonin  3 mg Oral Nightly    amLODIPine  5 mg Oral Daily     PRN Meds: prochlorperazine, albuterol, sodium chloride, hydrALAZINE, ALPRAZolam, dextrose bolus **OR** dextrose bolus, glucagon (rDNA), dextrose, sodium chloride flush, sodium chloride, polyethylene glycol, acetaminophen **OR**    3. Tiny old lacunar infarct, right thalamus.   4. Moderate diffuse cerebral atrophy and mild chronic white matter ischemic   change.         XR CHEST PORTABLE   Final Result   Right internal jugular central venous catheter tip at the lower SVC. No   pneumothorax.         XR CHEST PORTABLE   Final Result   No acute pulmonary findings.                 Assessment/Plan:    Active Hospital Problems    Diagnosis     FOX (acute kidney injury) ([facility]) [N17.9]      Priority: Medium    PAF (paroxysmal atrial fibrillation) ([facility]) [I48.0]     SSS (sick sinus syndrome) ([facility]) [I49.5]     Disorders of fluid, electrolyte, and acid-base balance [E87.8]     Nonrheumatic aortic valve stenosis [I35.0]     VT (ventricular tachycardia) ([facility]) [I47.20]     Cardiac arrest [I46.9]     Closed fracture of right hip ([facility]) [S72.001A]          Hospital Day: 5    This is a 79 y.o. female who presented to [facility] on 12/21/2024 and is being treated for:  S/P cardiac arrest suspected d/t pulseless polymorphic V-Tach: ROSC after DC shock and IV epinephrine with ACLS.   Right femoral neck fracture due to fall: Needs orthopedic surgery  AMS/confusion likely delirium, multifactorial- seems to be resolved.   PAF: on IV heparin gtt  FOX over CKD  Chronic CHF  History of mechanical aortic and mitral valves  S/p pacemaker  DM 2 with hyperglycemia  Chronic anemia, normocytic  Possible uti     Plan:   Off IV lidocaine drip, cont heparin drip. F/u EP cardiology team v-tach appears to have been triggered by PVC,discussion to change to PM to AICD   Telemonitoring, echocardiogram.  Appreciate CCM team, noted. Avoid nephrotoxic meds, obtain nephrology team input, cr continues to rise slowly   Hold orthopedic surgery until stabilization of cardiac status.  Orthopedic surgery team noted  Delirium precautions, frequent reorientation, avoid polypharmacy, avoid sleep deprivation, constipation and dehydration. Avoid benzodiazepams/anticholinergics.  Restrains/Sitter for safety as needed.   Continue Norvasc, Lipitor, metoprolol  Current meds reviewed, adjusted.   Abx for possible UTI   OFF OF COUMADIN- on heparin gtt         Discussed management of the case with [facility] who recommended hip repair when able from cardiac standpoint.    Controlled substances: Will Continue PRN Morphine for Pain    Drugs that require monitoring for toxicity include: Heparin and the method of monitoring was/is CBC for platelet abnormality    DVT ppx: Heparin  GI ppx: PPI  Diet: ADULT DIET; Regular; No Added Salt (3-4 gm)  ADULT ORAL NUTRITION SUPPLEMENT; Breakfast, Lunch, Dinner; Diabetic Oral Supplement  Code Status: Full Code    PT/OT Eval Status: eval and treat     Disposition:  Can transfer out of ICU  She has hip fracture which requires surgery.  At this point her risk for any surgical procedure is high.  Further risk assessment after interventional cardiology completes their assessment.           Julius Abreu MD  12/25/2024  3:23 PM

## 2024-12-26 PROBLEM — I47.29 NSVT (NONSUSTAINED VENTRICULAR TACHYCARDIA) (HCC): Status: ACTIVE | Noted: 2024-12-22

## 2024-12-26 LAB
ANION GAP SERPL CALCULATED.3IONS-SCNC: 11 MMOL/L (ref 3–16)
ANTI-XA UNFRAC HEPARIN: 0.21 IU/ML (ref 0.3–0.7)
ANTI-XA UNFRAC HEPARIN: 0.39 IU/ML (ref 0.3–0.7)
BUN SERPL-MCNC: 33 MG/DL (ref 7–20)
CALCIUM SERPL-MCNC: 8.1 MG/DL (ref 8.3–10.6)
CHLORIDE SERPL-SCNC: 100 MMOL/L (ref 99–110)
CO2 SERPL-SCNC: 26 MMOL/L (ref 21–32)
CREAT SERPL-MCNC: 1.6 MG/DL (ref 0.6–1.2)
DEPRECATED RDW RBC AUTO: 19.2 % (ref 12.4–15.4)
GFR SERPLBLD CREATININE-BSD FMLA CKD-EPI: 33 ML/MIN/{1.73_M2}
GLUCOSE BLD-MCNC: 159 MG/DL (ref 70–99)
GLUCOSE BLD-MCNC: 161 MG/DL (ref 70–99)
GLUCOSE BLD-MCNC: 161 MG/DL (ref 70–99)
GLUCOSE BLD-MCNC: 170 MG/DL (ref 70–99)
GLUCOSE SERPL-MCNC: 166 MG/DL (ref 70–99)
HCT VFR BLD AUTO: 27 % (ref 36–48)
HGB BLD-MCNC: 9 G/DL (ref 12–16)
INR PPP: 1.16 (ref 0.85–1.15)
INR PPP: 1.26 (ref 0.85–1.15)
MCH RBC QN AUTO: 28.7 PG (ref 26–34)
MCHC RBC AUTO-ENTMCNC: 33.1 G/DL (ref 31–36)
MCV RBC AUTO: 86.7 FL (ref 80–100)
PERFORMED ON: ABNORMAL
PLATELET # BLD AUTO: 210 K/UL (ref 135–450)
PMV BLD AUTO: 10.1 FL (ref 5–10.5)
POTASSIUM SERPL-SCNC: 3.8 MMOL/L (ref 3.5–5.1)
PROTHROMBIN TIME: 15 SEC (ref 11.9–14.9)
PROTHROMBIN TIME: 16 SEC (ref 11.9–14.9)
RBC # BLD AUTO: 3.12 M/UL (ref 4–5.2)
SODIUM SERPL-SCNC: 137 MMOL/L (ref 136–145)
WBC # BLD AUTO: 13.9 K/UL (ref 4–11)

## 2024-12-26 PROCEDURE — 6370000000 HC RX 637 (ALT 250 FOR IP): Performed by: NURSE PRACTITIONER

## 2024-12-26 PROCEDURE — 97166 OT EVAL MOD COMPLEX 45 MIN: CPT

## 2024-12-26 PROCEDURE — 85027 COMPLETE CBC AUTOMATED: CPT

## 2024-12-26 PROCEDURE — 6360000002 HC RX W HCPCS: Performed by: NURSE PRACTITIONER

## 2024-12-26 PROCEDURE — 94761 N-INVAS EAR/PLS OXIMETRY MLT: CPT

## 2024-12-26 PROCEDURE — 85520 HEPARIN ASSAY: CPT

## 2024-12-26 PROCEDURE — 6360000002 HC RX W HCPCS: Performed by: HOSPITALIST

## 2024-12-26 PROCEDURE — 99232 SBSQ HOSP IP/OBS MODERATE 35: CPT

## 2024-12-26 PROCEDURE — 99232 SBSQ HOSP IP/OBS MODERATE 35: CPT | Performed by: STUDENT IN AN ORGANIZED HEALTH CARE EDUCATION/TRAINING PROGRAM

## 2024-12-26 PROCEDURE — 6370000000 HC RX 637 (ALT 250 FOR IP): Performed by: INTERNAL MEDICINE

## 2024-12-26 PROCEDURE — 2000000000 HC ICU R&B

## 2024-12-26 PROCEDURE — 85610 PROTHROMBIN TIME: CPT

## 2024-12-26 PROCEDURE — 97530 THERAPEUTIC ACTIVITIES: CPT

## 2024-12-26 PROCEDURE — 6370000000 HC RX 637 (ALT 250 FOR IP)

## 2024-12-26 PROCEDURE — 99231 SBSQ HOSP IP/OBS SF/LOW 25: CPT | Performed by: ORTHOPAEDIC SURGERY

## 2024-12-26 PROCEDURE — 99232 SBSQ HOSP IP/OBS MODERATE 35: CPT | Performed by: HOSPITALIST

## 2024-12-26 PROCEDURE — 2700000000 HC OXYGEN THERAPY PER DAY

## 2024-12-26 PROCEDURE — 2500000003 HC RX 250 WO HCPCS: Performed by: STUDENT IN AN ORGANIZED HEALTH CARE EDUCATION/TRAINING PROGRAM

## 2024-12-26 PROCEDURE — 6360000002 HC RX W HCPCS: Performed by: STUDENT IN AN ORGANIZED HEALTH CARE EDUCATION/TRAINING PROGRAM

## 2024-12-26 PROCEDURE — 80048 BASIC METABOLIC PNL TOTAL CA: CPT

## 2024-12-26 PROCEDURE — 94640 AIRWAY INHALATION TREATMENT: CPT

## 2024-12-26 PROCEDURE — 2500000003 HC RX 250 WO HCPCS: Performed by: NURSE PRACTITIONER

## 2024-12-26 PROCEDURE — 97162 PT EVAL MOD COMPLEX 30 MIN: CPT

## 2024-12-26 RX ORDER — OXYCODONE HYDROCHLORIDE 5 MG/1
5 TABLET ORAL EVERY 4 HOURS PRN
Status: DISCONTINUED | OUTPATIENT
Start: 2024-12-26 | End: 2024-12-26

## 2024-12-26 RX ORDER — OXYCODONE HYDROCHLORIDE 5 MG/1
5-10 TABLET ORAL EVERY 4 HOURS PRN
Qty: 42 TABLET | Refills: 0 | Status: SHIPPED | OUTPATIENT
Start: 2024-12-26 | End: 2024-12-31

## 2024-12-26 RX ORDER — OXYCODONE HYDROCHLORIDE 5 MG/1
5 TABLET ORAL EVERY 4 HOURS PRN
Status: DISCONTINUED | OUTPATIENT
Start: 2024-12-26 | End: 2024-12-31 | Stop reason: HOSPADM

## 2024-12-26 RX ORDER — OXYCODONE HYDROCHLORIDE 5 MG/1
10 TABLET ORAL EVERY 4 HOURS PRN
Status: DISCONTINUED | OUTPATIENT
Start: 2024-12-26 | End: 2024-12-31 | Stop reason: HOSPADM

## 2024-12-26 RX ORDER — MEXILETINE HYDROCHLORIDE 200 MG/1
200 CAPSULE ORAL EVERY 8 HOURS SCHEDULED
Status: DISCONTINUED | OUTPATIENT
Start: 2024-12-26 | End: 2024-12-31 | Stop reason: HOSPADM

## 2024-12-26 RX ADMIN — HEPARIN SODIUM 20 UNITS/KG/HR: 10000 INJECTION, SOLUTION INTRAVENOUS at 05:32

## 2024-12-26 RX ADMIN — MEXILETINE HYDROCHLORIDE 200 MG: 200 CAPSULE ORAL at 15:50

## 2024-12-26 RX ADMIN — MELATONIN TAB 3 MG 3 MG: 3 TAB at 20:37

## 2024-12-26 RX ADMIN — METOPROLOL TARTRATE 25 MG: 25 TABLET, FILM COATED ORAL at 20:37

## 2024-12-26 RX ADMIN — ACETAMINOPHEN 500 MG: 500 TABLET ORAL at 09:19

## 2024-12-26 RX ADMIN — ACETAMINOPHEN 500 MG: 500 TABLET ORAL at 15:49

## 2024-12-26 RX ADMIN — ACETAMINOPHEN 500 MG: 500 TABLET ORAL at 20:37

## 2024-12-26 RX ADMIN — ALPRAZOLAM 0.5 MG: 0.5 TABLET ORAL at 09:30

## 2024-12-26 RX ADMIN — HEPARIN SODIUM 1800 UNITS: 1000 INJECTION INTRAVENOUS; SUBCUTANEOUS at 06:36

## 2024-12-26 RX ADMIN — ALBUTEROL SULFATE 2.5 MG: 2.5 SOLUTION RESPIRATORY (INHALATION) at 20:04

## 2024-12-26 RX ADMIN — Medication 10 ML: at 09:30

## 2024-12-26 RX ADMIN — ATORVASTATIN CALCIUM 10 MG: 10 TABLET, FILM COATED ORAL at 20:37

## 2024-12-26 RX ADMIN — PANTOPRAZOLE SODIUM 40 MG: 40 TABLET, DELAYED RELEASE ORAL at 09:19

## 2024-12-26 RX ADMIN — OXYCODONE 5 MG: 5 TABLET ORAL at 18:50

## 2024-12-26 RX ADMIN — OXYCODONE 5 MG: 5 TABLET ORAL at 12:39

## 2024-12-26 RX ADMIN — METOPROLOL TARTRATE 25 MG: 25 TABLET, FILM COATED ORAL at 09:18

## 2024-12-26 RX ADMIN — AMLODIPINE BESYLATE 10 MG: 5 TABLET ORAL at 09:20

## 2024-12-26 RX ADMIN — ALBUTEROL SULFATE 2.5 MG: 2.5 SOLUTION RESPIRATORY (INHALATION) at 12:04

## 2024-12-26 RX ADMIN — MORPHINE SULFATE 2 MG: 2 INJECTION, SOLUTION INTRAMUSCULAR; INTRAVENOUS at 03:19

## 2024-12-26 RX ADMIN — ALPRAZOLAM 0.5 MG: 0.5 TABLET ORAL at 20:37

## 2024-12-26 RX ADMIN — MORPHINE SULFATE 2 MG: 2 INJECTION, SOLUTION INTRAMUSCULAR; INTRAVENOUS at 09:39

## 2024-12-26 RX ADMIN — WATER 1000 MG: 1 INJECTION INTRAMUSCULAR; INTRAVENOUS; SUBCUTANEOUS at 09:29

## 2024-12-26 RX ADMIN — MEXILETINE HYDROCHLORIDE 200 MG: 200 CAPSULE ORAL at 23:07

## 2024-12-26 ASSESSMENT — PAIN DESCRIPTION - LOCATION
LOCATION: HIP

## 2024-12-26 ASSESSMENT — PAIN SCALES - GENERAL
PAINLEVEL_OUTOF10: 8
PAINLEVEL_OUTOF10: 10
PAINLEVEL_OUTOF10: 7
PAINLEVEL_OUTOF10: 8
PAINLEVEL_OUTOF10: 4
PAINLEVEL_OUTOF10: 7
PAINLEVEL_OUTOF10: 8
PAINLEVEL_OUTOF10: 8
PAINLEVEL_OUTOF10: 10

## 2024-12-26 ASSESSMENT — PAIN - FUNCTIONAL ASSESSMENT: PAIN_FUNCTIONAL_ASSESSMENT: ACTIVITIES ARE NOT PREVENTED

## 2024-12-26 ASSESSMENT — PAIN DESCRIPTION - DESCRIPTORS
DESCRIPTORS: ACHING;DULL
DESCRIPTORS: ACHING;DULL
DESCRIPTORS: DISCOMFORT
DESCRIPTORS: DISCOMFORT
DESCRIPTORS: ACHING;DULL

## 2024-12-26 ASSESSMENT — PAIN DESCRIPTION - ORIENTATION
ORIENTATION: RIGHT
ORIENTATION: RIGHT

## 2024-12-26 ASSESSMENT — PAIN DESCRIPTION - PAIN TYPE: TYPE: ACUTE PAIN

## 2024-12-26 NOTE — PROGRESS NOTES
[facility] [place]   Electrophysiology   Date: 12/26/2024  Reason for Follow up: PAF, VT arrest    Consult Requesting Physician: Julius Abreu MD     No chief complaint on file.    CC: Fall   HPI: Pao Wahl is a 79 y.o. female with past medical history significant for DM, Hypertension, Valvular heart disease, Atrial Fibrillation, Hyperlipidemia, Sinus node dysfunction s/p PPM, CKD stage 3, diabetes follicular lymphoma that is in remission and COPD.    Patient being seen today for follow up. Nursing notes, telemetry and ECG reviewed. No acute overnight. 21 beat run of VT noted on telemetry around 0830 this morning. Patient denies palpitations, SOB or chest pain during episode. She is confused but cooperative this morning.      Assessment:   VT arrest  - Pulseless polymorphic VT 12/21/24  - Noted to have prolonged QT. Not on prolonging QT mediations   - Potassium was 3.1 at time of event- potential cause.   -Device check shows VT initiated by PVC. VT detection at 150 bpm. She's had short runs of NSVT but not recent episodes to account for the fall. N/V resulted in low K which is the potential trigger for the VT. Ddx include ischemia.   Plan: Seen by interventional- not planning for LHC at this time.  She would need ischemic cause ruled out before consideration of device upgrade to AICD.     NSVT  - 21 beat run this morning  - Denies symptoms   - Needs ischemic workup when appropriate   Plan: Continue Metoprolol, will start Mexiletine 200 TID     PAF  -Telemetry today shows paced rhythm   - [place] on today's device interrogation shows < 0.1%  -[id number] Score is at least 6 for age, female, HTN, Valve disease and DM.  She is high risk for thromboembolic event. Anticoagulation is recommended  Plan: Continue heparin gtt, lopressor 25 BID. She was on Warfarin PTA     Sinus Node Dysfunction   - S/P [employer]onic dual chamber PPM(follows at )  - Per interrogation today: 4 yrs battery remaining, AF  3/27/24  Yes Juliette Ramirez MD   Handicap [place] MISC by Does not apply route 3/27/24  Yes Juliette Ramirez MD   atorvastatin (LIPITOR) 10 MG tablet TAKE 1 TABLET BY MOUTH DAILY IN THE EVENING FOR CHOLESTEROL 3/21/24  Yes Juliette Ramirez MD   Lancets MISC 1 each by Does not apply route 2 times daily 10/16/23  Yes Juliette Ramirez MD   vitamin B-12 (CYANOCOBALAMIN) 100 MCG tablet Take 1 tablet by mouth daily   Yes Kassidy Adler MD   metoprolol tartrate (LOPRESSOR) 25 MG tablet Take 1 tablet by mouth 2 times daily   Yes Kassidy Adler MD   acetaminophen-codeine (TYLENOL #3) 300-30 MG per tablet Take 1 tablet by mouth at bedtime for 5 days. Max Daily Amount: 1 tablet 24  Alexander De Anda MD   Cholecalciferol (VITAMIN D) 50 MCG (2000 UT) CAPS capsule Take by mouth  Patient not taking: Reported on 2024    Kassidy Adler MD     Physical Examination:  Vitals:    24 1000   BP: (!) 183/41   Pulse: 80   Resp: 19   Temp: 97.2 °F (36.2 °C)   SpO2: 100%      In: 216.6 [I.V.:216.6]  Out: 800    Wt Readings from Last 3 Encounters:   24 47 kg (103 lb 9.9 oz)   24 45.4 kg (100 lb)   24 44.3 kg (97 lb 9.6 oz)     Temp  Av.3 °F (36.3 °C)  Min: 96.9 °F (36.1 °C)  Max: 97.5 °F (36.4 °C)  Pulse  Av  Min: 60  Max: 80  BP  Min: 140/97  Max: 183/41  SpO2  Av.8 %  Min: 98 %  Max: 100 %    Intake/Output Summary (Last 24 hours) at 2024 1123  Last data filed at 2024 0930  Gross per 24 hour   Intake 216.6 ml   Output 800 ml   Net -583.4 ml     Constitutional: Oriented. Mildly agitated   Cardiovascular: Normal rate, regular rhythm,+ valve click    Pulmonary/Chest: Bilateral respiratory sounds. No rhonchi.    Abdominal: Soft. No tenderness   Musculoskeletal: No edema    Neurological: Alert , confused. Follows commands intermittently     Active Hospital Problems    Diagnosis Date Noted    FOX (acute kidney injury) ([facility]) [N17.9] 2022     Priority:  4/10/15;7/6/15;10/23/15;2/7/16;5/16/16;8/19/16;11/4/16;3/6/17;6/26/17;9/20/17;12/18/17; 12/18/17;3/12/18;5/29/18    Valvular heart disease     s/p aortic and mitral valve repair. Under care of cards:Dr. Low.    Visit for screening mammogram 04/10/2017    negative:see scanned report.        Past Surgical History:   Procedure Laterality Date    AORTIC VALVE REPLACEMENT      CATARACT REMOVAL  12/11/13;1/8/14    Right;left respectively    COLONOSCOPY      \"twilight sleep didnt work\"    HYSTERECTOMY (CERVIX STATUS UNKNOWN)      Fibroids    LAPAROSCOPY  2/3/15    SBO:converted to open for lysis of adhesions    LAPAROSCOPY N/A 8/22/2024    DIAGNOSTIC LAPAROSCOPY performed by Darron Mirza MD at [facility] OR    LAPAROTOMY N/A 8/22/2024    . performed by Darron Mirza MD at [facility] OR    MITRAL VALVE REPLACEMENT      PACEMAKER PLACEMENT  8/07    for bradycardia, sympony    TUNNELED VENOUS PORT PLACEMENT Right 10/3/2014    ATTEMPTED RIGHT SUBCLAVIEN VEIN, PLACED RIGHT INTERNAL       Allergies   Allergen Reactions    Nsaids Other (See Comments)     CANNOT TAKE D/T GI BLEEDING AND USE OF COUMADIN    Hydrocodone-Acetaminophen Anxiety        reports that she quit smoking about 17 years ago. Her smoking use included cigarettes. She started smoking about 57 years ago. She has a 40 pack-year smoking history. She has never used smokeless tobacco. She reports that she does not currently use alcohol after a past usage of about 1.0 standard drink of alcohol per week. She reports that she does not use drugs.     All pertinent information and plan of care discussed with the EP physician.    Isabel Draper,JEFF  [facility]  Office: [phone]

## 2024-12-26 NOTE — PROGRESS NOTES
Palliative Care:        Call to xiomara Estevez to review over current status and follow up discussions with Cardiology team. Currently patient is not cleared from cardiology for surgery. She remains a high risk for cardiac complications noted. LM as of 0850. Nurse Rere notified.     Palliative team will continue to follow as needed. Contact information provided on patient communication board. CM and Nurse updated of these discussions.     Electronically signed by Reina Mejia RN, BSN, PN (Palliative Care) [phone]

## 2024-12-26 NOTE — DISCHARGE INSTR - COC
Continuity of Care Form    Patient Name: Pao Wahl   :  1945  MRN:  [id number]    Admit date:  2024  Discharge date:  ***    Code Status Order: Full Code   Advance Directives:   Advance Care Flowsheet Documentation             Admitting Physician:  Ross Holloway MD  PCP: Juliette Ramirez MD    Discharging Nurse: ***  Discharging Hospital Unit/Room#: [id number]/3905-01  Discharging Unit Phone Number: ***    Emergency Contact:   Extended Emergency Contact Information  Primary Emergency Contact: Herb Wahl  Mobile Phone: [phone]  Relation: Child  Secondary Emergency Contact: Efe Wahl  Mobile Phone: [phone]  Relation: Child    Past Surgical History:  Past Surgical History:   Procedure Laterality Date    AORTIC VALVE REPLACEMENT      CATARACT REMOVAL  13;14    Right;left respectively    COLONOSCOPY      \"twilight sleep didnt work\"    HYSTERECTOMY (CERVIX STATUS UNKNOWN)      Fibroids    LAPAROSCOPY  2/3/15    SBO:converted to open for lysis of adhesions    LAPAROSCOPY N/A 2024    DIAGNOSTIC LAPAROSCOPY performed by Darron Mirza MD at [facility] OR    LAPAROTOMY N/A 2024    . performed by Darron Mirza MD at [facility] OR    MITRAL VALVE REPLACEMENT      PACEMAKER PLACEMENT      for bradycardia, sympony    TUNNELED VENOUS PORT PLACEMENT Right 10/3/2014    ATTEMPTED RIGHT SUBCLAVIEN VEIN, PLACED RIGHT INTERNAL       Immunization History:   Immunization History   Administered Date(s) Administered    COVID-19, PFIZER GRAY top, DO NOT Dilute, (age 12 y+), IM, 30 mcg/0.3 mL 2022    COVID-19, PFIZER PURPLE top, DILUTE for use, (age 12 y+), 30mcg/0.3mL 2021, 2021, 2022    Influenza A ([id number]) Vaccine PF IM 2009    Influenza Vaccine, unspecified formulation 2015    Influenza Virus Vaccine 11/10/2007, 2009, 2010, 2011, 09/10/2013, 10/18/2014, 10/18/2014, 10/11/2016, 2017, 10/16/2017, 2017, 10/10/2018, 10/06/2020

## 2024-12-26 NOTE — PROGRESS NOTES
[facility] - Inpatient Rehabilitation Department   Phone: [phone]    Occupational Therapy    [x] Initial Evaluation            [] Daily Treatment Note         [] Discharge Summary      Patient: Pao Wahl   : 1945   MRN: [id number]   Date of Service:  2024    Admitting Diagnosis:  Closed fracture of right hip (HCC)  Current Admission Summary: 79 y.o. female with pmh of HTN, HLD, COPD, mechanical aortic and mitral valves, CKD, anemia, A-fib, Follicular lymphoma in remission, pacemaker, Type II DM who presents with fall and right hip pain. Patient was found to have right hip fracture. Patient was felt to have high cardiac risk, so was transferred to [facility] for cardiac anesthesia for orthopedic surgery. During course of hospitalization patient was found to have episode of cardiac arrest which was responded to DC shock and IV epinephrine.    - Planning non-operative mgmt of (R) hip fx  Past Medical History:  has a past medical history of A-fib (HCC), Anemia, Anemia:multifactorial, Anxiety, Cataract, CHF (congestive heart failure) (HCC), Chronic back pain, CKD (chronic kidney disease) stage 3, GFR 30-59 ml/min (HCC), Colitis, ischemic (HCC), COPD, Diabetes, Diabetic eye exam (HCC), GERD (gastroesophageal reflux disease), H/O heart valve replacement with mechanical valve, Hx of mammogram, Hyperlipidaemia LDL goal <100, Hypertension, Insomnia, Long-term (current) use of anticoagulants, INR goal 2.5-3.5, Lymphoma:monoclonal B cell, Mammogram abnormal, Nonspecific abnormal results of kidney function study, Osteoarthritis, PAF (paroxysmal atrial fibrillation) (HCC), Renal cysts, right, RLS (restless legs syndrome), Sciatica, Screening colonoscopy, Therapeutic drug monitoring, Valvular heart disease, and Visit for screening mammogram.  Past Surgical History:  has a past surgical history that includes Aortic valve replacement; Mitral valve replacement; Hysterectomy; Cataract

## 2024-12-26 NOTE — ANESTHESIA PRE-OP
Chart reviewed . Pt is extremely high risk for general anesthesia because of  1) CAD and pt refusing cath and cardiologist mentioning he can not do much after cath  2)severe AS. With mod AR  3) mod MS  4) recent V-TACH   5)recent cardiac arrest  Regional can not be given becuae of A.S    In view of above as with cardiologist input general anesthesia is extremely high risk for her and would be offered if emergency or live saving or pt agrees with the extremely high risk

## 2024-12-26 NOTE — PROGRESS NOTES
[facility] [place] Daily Progress Note      Admit Date:  12/21/2024    Chief Complaint:  fall, confusion, in hospital VT arrest    Subjective:  Ms. Wahl complains of hip pain. No CP. She is alert to person, place, situation, year. We discussed her clinical course, which she does admit to some episodes of memory gaps, but recalls her fall episode. She is also quite alert and informed on recent course at . She is understanding that she has been turned down for valve replacement and that no other percutaneous option exists.     Objective:   BP (!) 151/71   Pulse 74   Temp 97.4 °F (36.3 °C) (Temporal)   Resp 13   Ht 1.499 m (4' 11\")   Wt 47 kg (103 lb 9.9 oz)   SpO2 100%   BMI 20.93 kg/m²     Intake/Output Summary (Last 24 hours) at 12/26/2024 0814  Last data filed at 12/26/2024 0532  Gross per 24 hour   Intake 206.6 ml   Output 800 ml   Net -593.4 ml       Physical Exam:  General:  Awake, alert, NAD  Skin:  Warm and dry; +bruising  Neck:  JVD not visualized  Chest:  CTAB, Comfortable on room air  Cardiovascular:  RRR S1S2, no S3, 4/6 murmur  Abdomen:  Soft, ND, NT, No HSM  Extremities:  No edema; rotated in bed on side    Medications:    amLODIPine  10 mg Oral Daily    acetaminophen  500 mg Oral TID    cefTRIAXone (ROCEPHIN) 1,000 mg in sterile water 10 mL IV syringe  1,000 mg IntraVENous Q24H    albuterol  2.5 mg Nebulization BID RT    atorvastatin  10 mg Oral Nightly    [Held by provider] furosemide  40 mg Oral BID    metoprolol tartrate  25 mg Oral BID    pantoprazole  40 mg Oral QAM AC    insulin lispro  0-4 Units SubCUTAneous 4x Daily AC & HS    sodium chloride flush  5-40 mL IntraVENous 2 times per day    melatonin  3 mg Oral Nightly      sodium chloride      dextrose      sodium chloride      heparin (PORCINE) Infusion 22 Units/kg/hr (12/26/24 0637)     prochlorperazine, albuterol, sodium chloride, hydrALAZINE, ALPRAZolam, dextrose bolus **OR** dextrose bolus, glucagon (rDNA), dextrose, sodium  mean diastolic gradient is 3mm     Hg.   - Left atrium: The atrium is moderately dilated.   - Right ventricle: The cavity size is normal. Pacer wire or catheter noted     in right ventricle. Systolic function is low normal.   - Atrial septum: Agitated saline contrast study at baseline shows no     right-to-left atrial level shunt.   - Pulmonary arteries: Systolic pressure could not be accurately estimated,     but it is at least 36 mm Hg + the RA pressure.   - Inferior vena cava: The IVC is normal-sized. Respirophasic diameter     changes are in the normal range (> 50%).   - Regional wall motion: There is hypokinesis of the apical septal and apical     lateral walls and the apex.   Impressions:  Consider MARTINEZ for better evaluation of the aortic valve if   clinically indicated.     ECHO 12/23/2024    Patient was combative and wanted test to end. Ended exam early.    Left Ventricle: Normal left ventricular systolic function with a visually estimated EF of 55 - 60%. Left ventricle size is normal. Normal wall thickness. Unable to assess wall motion.    Right Ventricle: Not well visualized.    Aortic Valve: Mechanical valve that is well-seated. Moderately calcified cusps with leaflet restriction. Moderate regurgitation with a centrally directed jet. Unable to assess gradient due to pt combativeness. There are multiple small mobile densities that could be thrombus or pannus. Similar to images 8/2024.    Mitral Valve: Mechanical valve that is well-seated. Mild regurgitation.    Tricuspid Valve: Mild to moderate regurgitation.    Image quality is technically difficult. Technically difficult study and limited study due to patient's ability to tolerate test.    Assessment/Plan:  Severe Prosthetic Valve AS  Deemed not surgical candidate, recently evaluated by Dr. Nolen with CT Surgery at .   Question if echodensity on A/V - has been present previously. Unclear if Pannus vs. Thrombus. Nonetheless, not repeat surgical candidate  and will be maintained on A/C.  Pulseless polymorphic VT Arrest  Qtc prolonged  Ischemic etiology has not been ruled out  EP following  Right femoral neck fracture  As mentioned from both Dr. Vivar and Dr. Lanza - she is high surgical risk given her known severe prosthetic AS without repeat surgical option.   FOX 2/2 above (baseline Cr 1.4, near baseline)  Nephro consulted  HTN  HLD  A fib  Warfarin on hold, on heparin drip. INR 1.1 today.   DDD Pacemaker  Anemia    All in all challenging situation. Her VT arrest certainly warrants invasive LHC. Coronary CTA is not worthwhile given presence of PPM, Mechanical AVR and mechanical MVR. Contrast burden also with CTA is much greater than with LHC, which is high risk for further CHARLES with recent FOX. She is well informed regarding her current medical status. We discussed LHC including risks/benefits. I offered to proceed with this today to define coronaries. She respectfully declines, stating she is exhausted, has been through enough, does not feel she can lay flat, and is not willing to undergo quoted risks associated with LHC. Nonetheless even if obstructed CAD and PCI is undertaken, her severe AS alone will still make general anesthesia high risk. PCI will also unlikely change her long term mortality in the presence of severe AS with no operative option (turned down by CT surgery already, Dr. Nolen @ , agreed by her primary long standing cardiologist). From an AS standpoint alone hospice/palliative can be considered. Will defer to ortho/anesthesia for alternative option to general anesthesia or to proceed at elevated cardiac risk. I will remain available to LHC +/- PCI if she willing, pending Ortho is also okay with operation on DAPT    Julius Villa MD, MD 12/26/2024 8:14 AM

## 2024-12-26 NOTE — PROGRESS NOTES
[facility] - Inpatient Rehabilitation Department   Phone: [phone]    Physical Therapy    [x] Initial Evaluation            [] Daily Treatment Note         [] Discharge Summary      Patient: Pao Wahl   : 1945   MRN: [id number]   Date of Service:  2024  Admitting Diagnosis: Closed fracture of right hip (HCC)  Current Admission Summary: Pao Wahl is a 79 y.o. female  who presents with Closed right hip fracture, initial encounter ([facility])   Past Medical History:  has a past medical history of A-fib ([facility]), Anemia, Anemia:multifactorial, Anxiety, Cataract, CHF (congestive heart failure) ([facility]), Chronic back pain, CKD (chronic kidney disease) stage 3, GFR 30-59 ml/min ([facility]), Colitis, ischemic ([facility]), COPD, Diabetes, Diabetic eye exam ([facility]), GERD (gastroesophageal reflux disease), H/O heart valve replacement with mechanical valve, Hx of mammogram, Hyperlipidaemia LDL goal <100, Hypertension, Insomnia, Long-term (current) use of anticoagulants, INR goal 2.5-3.5, Lymphoma:monoclonal B cell, Mammogram abnormal, Nonspecific abnormal results of kidney function study, Osteoarthritis, PAF (paroxysmal atrial fibrillation) ([facility]), Renal cysts, right, RLS (restless legs syndrome), Sciatica, Screening colonoscopy, Therapeutic drug monitoring, Valvular heart disease, and Visit for screening mammogram.  Past Surgical History:  has a past surgical history that includes Aortic valve replacement; Mitral valve replacement; Hysterectomy; Cataract removal (13;14); Colonoscopy; pacemaker placement (); Tunneled venous port placement (Right, 10/3/2014); laparoscopy (2/3/15); laparoscopy (N/A, 2024); and laparotomy (N/A, 2024).  Discharge Recommendations: Pao Wahl scored a 6/24 on the AM-PAC short mobility form. Current research shows that an AM-PAC score of 17 or less is typically not associated with a discharge to the patient's home setting. Based on the patient's AM-PAC score and their  Corrective Device: wears glasses for reading,  Hearing:   WFL  Observation:   General Observation:  On RA, telemetry, external urinary catheter  Posture:   Forward head, rounded shoulders   Sensation:   denies numbness and tingling  Proprioception:    Unable to formally test secondary to cognitive deficits and decreased activity tolerance   Tone:   Normotonic  Coordination Testing:   Unable to formally test secondary to cognitive deficits and decreased activity tolerance      ROM:   L LE AROM WFL, R LE AROM not fully assessed due to pain and cognitive deficits   Strength:   Formal MMT held secondary to cognitive deficits and pain   Therapist Clinical Decision Making (Complexity): medium complexity  Clinical Presentation: evolving      Subjective  General: Patient presents supine with HOB elevated upon arrival. She is accompanied by her good friend, Dian. She is agreeable to PT/OT evaluation.   Pain: 9/10.  Location: R LE  Pain Interventions: pain medication in place prior to arrival     Functional Mobility  Bed Mobility:  Supine to Sit: 2 person assistance with Max A    Scootin person assistance with Max A   Comments:HOB elevated, decreased movement tolerance of R LE (pain)  Transfers:  Stand pivot transfer: 2 person assistance with Max   Comments: Stand pivot to L from EOB to recliner, able to maintain NWB precautions   Ambulation:  Ambulation not tested on this date secondary to pain within R LE .  Distance:   Gait Mechanics:   Comments:    Stair Mobility:  Stair mobility not completed on this date.  Comments:  Wheelchair Mobility:  No w/c mobility completed on this date.  Comments:  Balance:  Static Sitting Balance: fair: maintains balance at CGA without use of UE support  Dynamic Sitting Balance: fair (-): maintains balance at CGA with use of UE support  Comments:    Other Therapeutic Interventions  See OT note for assistance with ADLs    Functional Outcomes  AM-PAC Inpatient Mobility Raw Score : 6           2 for bed mobility and transfers, ambulation was not performed due to pain. Patient would benefit from additional skilled PT upon discharge to promote independence and safety with functional mobility.     Safety Interventions: patient left in chair, chair alarm in place, call light within reach, gait belt, patient at risk for falls, nurse notified, and family/caregiver present    Plan  Frequency: 3-5 x/per week  Current Treatment Recommendations: strengthening, ROM, balance training, functional mobility training, transfer training, gait training, stair training, endurance training, patient/caregiver education, cognitive/perceptual training, cognitive reorientation, pain management, safety education, and equipment evaluation/education    Goals  Patient Goals: None stated this date    Short Term Goals:  Time Frame: by discharge  Patient will complete bed mobility at maximum assistance   Patient will complete transfers at maximum assistance   Patient will ambulate 10 ft with use of LRAD at maximum assistance    Above goals reviewed on 12/26/2024.  All goals are ongoing at this time unless indicated above.      Therapy Session Time      Individual Group Co-treatment   Time In     1255   Time Out     1351   Minutes     56     Timed Code Treatment Minutes:  41 Minutes  Total Treatment Minutes:  56 Minutes        Electronically Signed By: NATAN Leong  This PT present providing guidance and supervision as needed throughout session  Jennifer Garrison PT, DPT, [id number]

## 2024-12-26 NOTE — PLAN OF CARE
[facility] Orthopedic Surgery  Plan of Care Note        Seen sitting up in bed. Calm ; reports pain only with movement. Family at bedside.    We have lengthy discussion.     Plan:  - Plan is to proceed with non-operative management of the right hip fracture given her perioperative risk.  - NWB RLE  - DVT prophylaxis; on hep gtt  - OK to work with therapy from our end.  - Pain control; palliative care already on board  - Dispo: per primary team    Eduard Rodriguez, ALONSO - CNP 12/26/2024 12:05 PM                                                     Orthopaedic Surgery Attending Note      I have personally seen and examined Pao Wahl.      Patient seen sitting up in bedside recliner. Does not appear to to be in any acute pain.  She is attempting to pull out her central line.    Patient seen by cardiology today and refused left heart cath.  I discussed patient with Dr. Quintero.  She is extremely high risk.  Risk of mortality from surgery would be greater than risk of nonoperative treatment for her hip fracture.    Her femoral neck fracture is minimally displaced.  We can try to get this to heal nonoperatively and if she avoids any of the complications of prolonged immobility, then she may still be able to ambulate, though possibly not normally.  She appears to be pain-free while just sitting in the chair today which is a good sign.  Pain management as needed.  She is on baseline anticoagulation which will help with DVT prophylaxis.  If she can sit up and continue to use the incentive spirometer that can help reduce respiratory risk.  If she keeps moving then decrease risk of decubitus ulcers.    She can follow-up with me in the office in about 6 weeks with repeat right hip x-rays.  Can consider advancing her weightbearing at that time.    Electronically signed by Flaco Love MD on 12/26/2024 at 3:09 PM

## 2024-12-27 PROBLEM — E44.0 MODERATE MALNUTRITION (HCC): Status: ACTIVE | Noted: 2023-01-09

## 2024-12-27 PROBLEM — E44.0 MODERATE MALNUTRITION (HCC): Chronic | Status: ACTIVE | Noted: 2023-01-09

## 2024-12-27 LAB
ANTI-XA UNFRAC HEPARIN: 0.47 IU/ML (ref 0.3–0.7)
ANTI-XA UNFRAC HEPARIN: 1.09 IU/ML (ref 0.3–0.7)
ANTI-XA UNFRAC HEPARIN: >1.1 IU/ML (ref 0.3–0.7)
GLUCOSE BLD-MCNC: 140 MG/DL (ref 70–99)
GLUCOSE BLD-MCNC: 157 MG/DL (ref 70–99)
GLUCOSE BLD-MCNC: 160 MG/DL (ref 70–99)
GLUCOSE BLD-MCNC: 164 MG/DL (ref 70–99)
INR PPP: 1.19 (ref 0.85–1.15)
PERFORMED ON: ABNORMAL
PROTHROMBIN TIME: 15.3 SEC (ref 11.9–14.9)
REASON FOR REJECTION: NORMAL
REJECTED TEST: NORMAL

## 2024-12-27 PROCEDURE — 2500000003 HC RX 250 WO HCPCS: Performed by: NURSE PRACTITIONER

## 2024-12-27 PROCEDURE — 99232 SBSQ HOSP IP/OBS MODERATE 35: CPT | Performed by: HOSPITALIST

## 2024-12-27 PROCEDURE — 1200000000 HC SEMI PRIVATE

## 2024-12-27 PROCEDURE — 97530 THERAPEUTIC ACTIVITIES: CPT

## 2024-12-27 PROCEDURE — 85610 PROTHROMBIN TIME: CPT

## 2024-12-27 PROCEDURE — 2700000000 HC OXYGEN THERAPY PER DAY

## 2024-12-27 PROCEDURE — 94640 AIRWAY INHALATION TREATMENT: CPT

## 2024-12-27 PROCEDURE — 6360000002 HC RX W HCPCS: Performed by: HOSPITALIST

## 2024-12-27 PROCEDURE — 6370000000 HC RX 637 (ALT 250 FOR IP): Performed by: NURSE PRACTITIONER

## 2024-12-27 PROCEDURE — 6360000002 HC RX W HCPCS: Performed by: NURSE PRACTITIONER

## 2024-12-27 PROCEDURE — 6370000000 HC RX 637 (ALT 250 FOR IP): Performed by: INTERNAL MEDICINE

## 2024-12-27 PROCEDURE — 94761 N-INVAS EAR/PLS OXIMETRY MLT: CPT

## 2024-12-27 PROCEDURE — 36415 COLL VENOUS BLD VENIPUNCTURE: CPT

## 2024-12-27 PROCEDURE — 6370000000 HC RX 637 (ALT 250 FOR IP)

## 2024-12-27 PROCEDURE — 36591 DRAW BLOOD OFF VENOUS DEVICE: CPT

## 2024-12-27 PROCEDURE — 85520 HEPARIN ASSAY: CPT

## 2024-12-27 RX ORDER — WARFARIN SODIUM 1 MG/1
4 TABLET ORAL
Status: COMPLETED | OUTPATIENT
Start: 2024-12-27 | End: 2024-12-27

## 2024-12-27 RX ADMIN — HEPARIN SODIUM 19 UNITS/KG/HR: 10000 INJECTION, SOLUTION INTRAVENOUS at 06:16

## 2024-12-27 RX ADMIN — METOPROLOL TARTRATE 25 MG: 25 TABLET, FILM COATED ORAL at 21:46

## 2024-12-27 RX ADMIN — AMLODIPINE BESYLATE 10 MG: 5 TABLET ORAL at 09:24

## 2024-12-27 RX ADMIN — ACETAMINOPHEN 500 MG: 500 TABLET ORAL at 09:22

## 2024-12-27 RX ADMIN — MEXILETINE HYDROCHLORIDE 200 MG: 200 CAPSULE ORAL at 06:17

## 2024-12-27 RX ADMIN — ALBUTEROL SULFATE 2.5 MG: 2.5 SOLUTION RESPIRATORY (INHALATION) at 19:43

## 2024-12-27 RX ADMIN — OXYCODONE 5 MG: 5 TABLET ORAL at 21:46

## 2024-12-27 RX ADMIN — MEXILETINE HYDROCHLORIDE 200 MG: 200 CAPSULE ORAL at 21:46

## 2024-12-27 RX ADMIN — MEXILETINE HYDROCHLORIDE 200 MG: 200 CAPSULE ORAL at 15:43

## 2024-12-27 RX ADMIN — ATORVASTATIN CALCIUM 10 MG: 10 TABLET, FILM COATED ORAL at 21:46

## 2024-12-27 RX ADMIN — MELATONIN TAB 3 MG 3 MG: 3 TAB at 21:46

## 2024-12-27 RX ADMIN — METOPROLOL TARTRATE 25 MG: 25 TABLET, FILM COATED ORAL at 09:23

## 2024-12-27 RX ADMIN — MORPHINE SULFATE 2 MG: 2 INJECTION, SOLUTION INTRAMUSCULAR; INTRAVENOUS at 18:14

## 2024-12-27 RX ADMIN — OXYCODONE 5 MG: 5 TABLET ORAL at 15:42

## 2024-12-27 RX ADMIN — Medication 10 ML: at 09:29

## 2024-12-27 RX ADMIN — Medication 10 ML: at 21:56

## 2024-12-27 RX ADMIN — WARFARIN SODIUM 4 MG: 1 TABLET ORAL at 18:15

## 2024-12-27 RX ADMIN — PANTOPRAZOLE SODIUM 40 MG: 40 TABLET, DELAYED RELEASE ORAL at 06:17

## 2024-12-27 ASSESSMENT — PAIN SCALES - GENERAL
PAINLEVEL_OUTOF10: 10
PAINLEVEL_OUTOF10: 2
PAINLEVEL_OUTOF10: 10
PAINLEVEL_OUTOF10: 2
PAINLEVEL_OUTOF10: 9
PAINLEVEL_OUTOF10: 2

## 2024-12-27 ASSESSMENT — PAIN DESCRIPTION - LOCATION
LOCATION: HIP

## 2024-12-27 ASSESSMENT — PAIN DESCRIPTION - ORIENTATION
ORIENTATION: RIGHT
ORIENTATION: RIGHT

## 2024-12-27 ASSESSMENT — PAIN DESCRIPTION - FREQUENCY: FREQUENCY: INTERMITTENT

## 2024-12-27 ASSESSMENT — PAIN DESCRIPTION - DESCRIPTORS
DESCRIPTORS: ACHING;DULL
DESCRIPTORS: ACHING;DULL
DESCRIPTORS: ACHING
DESCRIPTORS: ACHING;DULL
DESCRIPTORS: ACHING;DULL
DESCRIPTORS: ACHING

## 2024-12-27 ASSESSMENT — PAIN - FUNCTIONAL ASSESSMENT: PAIN_FUNCTIONAL_ASSESSMENT: ACTIVITIES ARE NOT PREVENTED

## 2024-12-27 ASSESSMENT — PAIN DESCRIPTION - PAIN TYPE: TYPE: ACUTE PAIN

## 2024-12-27 NOTE — CONSULTS
Pharmacy to Dose Warfarin    Pharmacy consulted to dose warfarin for AVR.    INR Goal: 2.5-3.5    INR today: 1.19    Home dose:  1 mg on Thur/Sat and sun and 2 mg rest of days    Assessment/Plan:  - resuming warfarin today since no procedures planned  - will give warfarin 4mg and see how she responds  - bridging with heparin, if ready for discharge patient can be bridged with Lovenox 1 mg/k daily  - Possible concomitant drug-drug interactions include: heparin,      Pharmacy will continue to follow.    Alicia Guajardo, PharmD, BCPS  [id number]  12/27/2024 2:06 PM

## 2024-12-27 NOTE — PROGRESS NOTES
[facility] - Inpatient Rehabilitation Department   Phone: [phone]    Physical Therapy    [] Initial Evaluation            [x] Daily Treatment Note         [] Discharge Summary      Patient: Pao Wahl   : 1945   MRN: [id number]   Date of Service:  2024  Admitting Diagnosis: Closed fracture of right hip (HCC)  Current Admission Summary: Pao Wahl is a 79 y.o. female  who presents with Closed right hip fracture, initial encounter ([facility])   Past Medical History:  has a past medical history of A-fib ([facility]), Anemia, Anemia:multifactorial, Anxiety, Cataract, CHF (congestive heart failure) ([facility]), Chronic back pain, CKD (chronic kidney disease) stage 3, GFR 30-59 ml/min ([facility]), Colitis, ischemic ([facility]), COPD, Diabetes, Diabetic eye exam ([facility]), GERD (gastroesophageal reflux disease), H/O heart valve replacement with mechanical valve, Hx of mammogram, Hyperlipidaemia LDL goal <100, Hypertension, Insomnia, Long-term (current) use of anticoagulants, INR goal 2.5-3.5, Lymphoma:monoclonal B cell, Mammogram abnormal, Nonspecific abnormal results of kidney function study, Osteoarthritis, PAF (paroxysmal atrial fibrillation) ([facility]), Renal cysts, right, RLS (restless legs syndrome), Sciatica, Screening colonoscopy, Therapeutic drug monitoring, Valvular heart disease, and Visit for screening mammogram.  Past Surgical History:  has a past surgical history that includes Aortic valve replacement; Mitral valve replacement; Hysterectomy; Cataract removal (13;14); Colonoscopy; pacemaker placement (); Tunneled venous port placement (Right, 10/3/2014); laparoscopy (2/3/15); laparoscopy (N/A, 2024); and laparotomy (N/A, 2024).  Discharge Recommendations: Pao Wahl scored a 6/24 on the AM-PAC short mobility form. Current research shows that an AM-PAC score of 17 or less is typically not associated with a discharge to the patient's home setting. Based on the patient's AM-PAC score and their

## 2024-12-27 NOTE — PLAN OF CARE
Problem: Chronic Conditions and Co-morbidities  Goal: Patient's chronic conditions and co-morbidity symptoms are monitored and maintained or improved  Outcome: Progressing  Flowsheets (Taken 12/27/2024 0800)  Care Plan - Patient's Chronic Conditions and Co-Morbidity Symptoms are Monitored and Maintained or Improved: Monitor and assess patient's chronic conditions and comorbid symptoms for stability, deterioration, or improvement     Problem: Pain  Goal: Verbalizes/displays adequate comfort level or baseline comfort level  Outcome: Progressing

## 2024-12-27 NOTE — PROGRESS NOTES
Hospitalist Progress Note    Name:  Pao Wahl    /Age/Sex: 1945  (79 y.o. female)  MRN & CSN:  [id number] & [id number]    PCP: Juliette Ramirez MD    Date of Admission: 2024    Patient Status:  Inpatient     Chief Complaint: No chief complaint on file.      Hospital Course: Pao Wahl is a 79 y.o. female with pmh of HTN, HLD, COPD, mechanical aortic and mitral valves, CKD, anemia, A-fib, Follicular lymphoma in remission, pacemaker, Type II DM   who presents with fall and right hip pain.  Patient was found to have right hip fracture.  Patient was felt to have high cardiac risk, so was transferred to [facility] for cardiac anesthesia for orthopedic surgery.  During course of hospitalization patient was found to have episode of cardiac arrest which was responded to DC shock and IV epinephrine.  Patient is currently alert, awake and oriented.     Subjective:  Today is:  Hospital Day: 7.  Patient seen and examined in ICU-3905/3905-01.     She is doing well.       Medications:  Reviewed    Infusion Medications    sodium chloride      dextrose      sodium chloride      heparin (PORCINE) Infusion 16 Units/kg/hr (24 4141)     Scheduled Medications    warfarin  4 mg Oral Once    warfarin placeholder: dosing by pharmacy   Other RX Placeholder    mexiletine  200 mg Oral 3 times per day    amLODIPine  10 mg Oral Daily    acetaminophen  500 mg Oral TID    albuterol  2.5 mg Nebulization BID RT    atorvastatin  10 mg Oral Nightly    [Held by provider] furosemide  40 mg Oral BID    metoprolol tartrate  25 mg Oral BID    pantoprazole  40 mg Oral QAM AC    insulin lispro  0-4 Units SubCUTAneous 4x Daily AC & HS    sodium chloride flush  5-40 mL IntraVENous 2 times per day    melatonin  3 mg Oral Nightly     PRN Meds: oxyCODONE **OR** oxyCODONE, prochlorperazine, albuterol, sodium chloride, hydrALAZINE, ALPRAZolam, dextrose bolus **OR** dextrose bolus, glucagon (rDNA), dextrose, sodium  Old left occipital infarct.   3. Tiny old lacunar infarct, right thalamus.   4. Moderate diffuse cerebral atrophy and mild chronic white matter ischemic   change.         XR CHEST PORTABLE   Final Result   Right internal jugular central venous catheter tip at the lower SVC. No   pneumothorax.         XR CHEST PORTABLE   Final Result   No acute pulmonary findings.                 Assessment/Plan:    Active Hospital Problems    Diagnosis     Moderate malnutrition ([facility]) [E44.0]      Priority: Medium    Acute kidney injury superimposed on CKD ([facility]) [N17.9, N18.9]      Priority: Medium    PAF (paroxysmal atrial fibrillation) ([facility]) [I48.0]     SSS (sick sinus syndrome) ([facility]) [I49.5]     Disorders of fluid, electrolyte, and acid-base balance [E87.8]     Nonrheumatic aortic valve stenosis [I35.0]     Ventricular tachycardia ([facility]) [I47.20]     Cardiac arrest [I46.9]     Closed fracture of right hip ([facility]) [S72.001A]          Hospital Day: 7    This is a 79 y.o. female who presented to [facility] on 12/21/2024 and is being treated for:  S/P cardiac arrest suspected d/t pulseless polymorphic V-Tach: ROSC after DC shock and IV epinephrine with ACLS.   Right femoral neck fracture due to fall: Needs orthopedic surgery  AMS/confusion likely delirium, multifactorial- seems to be resolved.   PAF: on IV heparin gtt  FXO over CKD  Chronic CHF  History of mechanical aortic and mitral valves  S/p pacemaker  DM 2 with hyperglycemia  Chronic anemia, normocytic  Possible uti     Plan:   Off IV lidocaine drip, cont heparin drip. F/u EP cardiology team v-tach appears to have been triggered by PVC,discussion to change to PM to AICD   Telemonitoring, echocardiogram.  Appreciate CCM team, noted. Avoid nephrotoxic meds, obtain nephrology team input, cr continues to rise slowly   Hold orthopedic surgery until stabilization of cardiac status.  Orthopedic surgery team noted  Delirium precautions, frequent reorientation, avoid polypharmacy,  avoid sleep deprivation, constipation and dehydration. Avoid benzodiazepams/anticholinergics. Restrains/Sitter for safety as needed.   Continue Norvasc, Lipitor, metoprolol  Current meds reviewed, adjusted.   Abx for possible UTI   Resume coumadin with lovenox bridge    She is too high risk for surgery, non operative management     Discussed with ortho who believe that she may be able to heal without surgery as this is not displaced.   Working on placement for SNF    Controlled substances: Will Continue PRN Morphine for Pain    Drugs that require monitoring for toxicity include: Heparin and the method of monitoring was/is CBC for platelet abnormality    DVT ppx: Heparin  GI ppx: PPI  Diet: ADULT DIET; Regular; No Added Salt (3-4 gm)  ADULT ORAL NUTRITION SUPPLEMENT; Breakfast, Dinner; Clear Liquid Oral Supplement  Code Status: Full Code    PT/OT Eval Status: eval and treat     Disposition:  Can transfer out of ICU  She has hip fracture which ris too high risk for surgery given her cardiac issues..  At this point her risk for any surgical procedure is high.  Further risk assessment after interventional cardiology completes their assessment.  Plan for non operative managaement of the fracture.           Julius Abreu MD  12/27/2024  3:59 PM

## 2024-12-27 NOTE — PROGRESS NOTES
Hospitalist Progress Note    Name:  Pao Wahl    /Age/Sex: 1945  (79 y.o. female)  MRN & CSN:  [id number] & [id number]    PCP: Juliette Ramirez MD    Date of Admission: 2024    Patient Status:  Inpatient     Chief Complaint: No chief complaint on file.      Hospital Course: Pao Wahl is a 79 y.o. female with pmh of HTN, HLD, COPD, mechanical aortic and mitral valves, CKD, anemia, A-fib, Follicular lymphoma in remission, pacemaker, Type II DM   who presents with fall and right hip pain.  Patient was found to have right hip fracture.  Patient was felt to have high cardiac risk, so was transferred to [facility] for cardiac anesthesia for orthopedic surgery.  During course of hospitalization patient was found to have episode of cardiac arrest which was responded to DC shock and IV epinephrine.  Patient is currently alert, awake and oriented.     Subjective:  Today is:  Hospital Day: 6.  Patient seen and examined in ICU-3905/3905-01.     She is doing well.       Medications:  Reviewed    Infusion Medications    sodium chloride      dextrose      sodium chloride      heparin (PORCINE) Infusion 22 Units/kg/hr (24 3855)     Scheduled Medications    mexiletine  200 mg Oral 3 times per day    amLODIPine  10 mg Oral Daily    acetaminophen  500 mg Oral TID    albuterol  2.5 mg Nebulization BID RT    atorvastatin  10 mg Oral Nightly    [Held by provider] furosemide  40 mg Oral BID    metoprolol tartrate  25 mg Oral BID    pantoprazole  40 mg Oral QAM AC    insulin lispro  0-4 Units SubCUTAneous 4x Daily AC & HS    sodium chloride flush  5-40 mL IntraVENous 2 times per day    melatonin  3 mg Oral Nightly     PRN Meds: oxyCODONE **OR** oxyCODONE, prochlorperazine, albuterol, sodium chloride, hydrALAZINE, ALPRAZolam, dextrose bolus **OR** dextrose bolus, glucagon (rDNA), dextrose, sodium chloride flush, sodium chloride, polyethylene glycol, acetaminophen **OR** acetaminophen, morphine,  magnesium sulfate, heparin (porcine), heparin (porcine)      Intake/Output Summary (Last 24 hours) at 12/26/2024 1917  Last data filed at 12/26/2024 1800  Gross per 24 hour   Intake 221.68 ml   Output 675 ml   Net -453.32 ml       Physical Exam Performed:    BP (!) 141/39   Pulse 69   Temp 97.1 °F (36.2 °C) (Temporal)   Resp 13   Ht 1.499 m (4' 11\")   Wt 47 kg (103 lb 9.9 oz)   SpO2 96%   BMI 20.93 kg/m²     General: Alert, Awake, oriented x 2, cooperative. No distress.   HEENT: Oral mucosa moist. No JVD   Respiratory: Breath sounds clear bilaterally. No rales/wheezes.  Cardiovascular: S1 S2, RR-R  Gastrointestinal: BS+. Soft, No distention, No tenderness  Musculoskeletal/ Extermities: No edema legs bilaterally. Good color of foot bilaterally.   Neurologic: Face symmetrical, Speech clear, limited  Psych: no psychomotor agitation. Poor attention span and concentration      Labs:   Recent Labs     12/24/24 0429 12/25/24  0440 12/26/24  0530   WBC 12.7* 8.1 13.9*   HGB 8.7* 8.1* 9.0*   HCT 25.9* 23.3* 27.0*    156 210     Recent Labs     12/24/24 0429 12/25/24  0450 12/26/24  0530   * 135* 137   K 4.1 3.6 3.8   CL 96* 98* 100   CO2 26 27 26   BUN 31* 40* 33*   CREATININE 2.1* 2.2* 1.6*   CALCIUM 7.5* 7.9* 8.1*     No results for input(s): \"AST\", \"ALT\", \"BILIDIR\", \"BILITOT\", \"ALKPHOS\" in the last 72 hours.    Recent Labs     12/24/24 0429 12/25/24  0440 12/26/24  0530   INR 2.08* 1.56* 1.16*     No results for input(s): \"CKTOTAL\", \"TROPONINI\" in the last 72 hours.    Urinalysis:      Lab Results   Component Value Date/Time    NITRU Negative 12/24/2024 03:06 AM    WBCUA 49 12/24/2024 02:15 AM    BACTERIA None Seen 12/24/2024 02:15 AM    RBCUA 6 12/24/2024 02:15 AM    BLOODU Negative 12/24/2024 03:06 AM    GLUCOSEU Negative 12/24/2024 03:06 AM    GLUCOSEU TRACE 01/30/2012 11:29 AM       Radiology:  CT HEAD WO CONTRAST   Final Result   1. No acute intracranial abnormality.   2. Old left occipital

## 2024-12-27 NOTE — PLAN OF CARE
Problem: Chronic Conditions and Co-morbidities  Goal: Patient's chronic conditions and co-morbidity symptoms are monitored and maintained or improved  Outcome: Progressing     Problem: Discharge Planning  Goal: Discharge to home or other facility with appropriate resources  Outcome: Progressing     Problem: Pain  Goal: Verbalizes/displays adequate comfort level or baseline comfort level  Outcome: Progressing     Problem: Safety - Adult  Goal: Free from fall injury  Outcome: Progressing     Problem: ABCDS Injury Assessment  Goal: Absence of physical injury  Outcome: Progressing     Problem: Skin/Tissue Integrity  Goal: Absence of new skin breakdown  Outcome: Progressing     Problem: Nutrition Deficit:  Goal: Optimize nutritional status  Outcome: Progressing

## 2024-12-27 NOTE — PROGRESS NOTES
[] No  Hand Dominance: [] Left                 [] Right  Current Employment: retired.  Occupation:    Hobbies: Anabaptism and bibe study      Available Assistance at Discharge: available PRN     Recent Falls: 7 falls in the past 6 months     Examination   Vision:   Vision Gross Assessment: Impaired and Vision Corrective Device: wears glasses for reading  Hearing:   WFL  Sensation:   denies numbness and tingling  ROM:   (B) UE AROM WFL  Strength:   (B) UE strength grossly WFL    Therapist Clinical Decision Making (Complexity): medium complexity  Clinical Presentation: evolving      Subjective  General: Pt supine in bed upon arrival, pleasantly confused, agreeable to tx with encouragement. Son present at beginning of session  Pain: 10/10.  Location: RLE  Pain Interventions: RN delivered pain medication within therapy session per patient request, repositioned , and therapy activities modified        Activities of Daily Living  Basic Activities of Daily Living  Lower Extremity Dressing: dependent  Dressing Comments: donning (B) socks  Toileting: dependent.    Toileting Comments: depends change, purewick change  Instrumental Activities of Daily Living  No IADL completed on this date.    Functional Mobility  Bed Mobility:  Supine to Sit: 2 person assistance with max x2   Scooting: maximum assistance  Comments:  Transfers:  Sit to stand transfer:maximum assistance  Stand to sit transfer: moderate assistance, maximum assistance  Stand pivot transfer: 2 person assistance with max x2 from EOB > recliner, RLE positioned anteriorly to pt to reduce potential for WB. Completes second STS from/to recliner for pericare/depends change d/t urinary incontinence, purewick leak   Comments:  Functional Mobility  No functional mobility completed on this date secondary to pain with RLE.  Balance:  Static Sitting Balance: fair (-): maintains balance at CGA with use of UE support  Dynamic Sitting Balance: poor (+): requires min (A) to  deficits, associated with Closed fracture of right hip (HCC). Typically, pt is IND. Currently, pt is requiring max x1-2 persons for bed mobility and transfers. Continued OT indicated in order to promote return to PLOF    Safety Interventions: patient left in chair, chair alarm in place, call light within reach, gait belt, and nurse notified    Plan  Frequency: 7 x/week  Current Treatment Recommendations: strengthening, balance training, functional mobility training, transfer training, endurance training, patient/caregiver education, ADL/self-care training, IADL training, and equipment evaluation/education    Goals    Short Term Goals:  Time Frame: by dc  Patient will complete upper body ADL at minimal assistance   Patient will complete lower body ADL at moderate assistance   Patient will complete toileting at moderate assistance   Patient will complete grooming at stand by assistance   Patient will complete functional transfers at minimal assistance     Above goals reviewed on 12/27/2024.  All goals are ongoing at this time unless indicated above.       Therapy Session Time     Individual Group Co-treatment   Time In    0917   Time Out    1013   Minutes    56        Timed Code Treatment Minutes:   56  Total Treatment Minutes:  56       Electronically Signed By: ZAN Denson, MOT, OTR/L, CNS ([id number])

## 2024-12-27 NOTE — PROGRESS NOTES
Nutrition Note    RECOMMENDATIONS  PO Diet: Continue current diet   ONS: Offer Ensure Clear BID  Nutrition Support: None      ASSESSMENT   Pt seen for follow up assessment. Pt seen over breakfast tray having broken Mongolian toast into a bunch of pieces which were piled up on her plate; does not appear pt ate anything. Per RN, pt did not eat anything yesterday. Pt reports she does not typically have a good appetite. Pt with chocolate Glucerna on her tray, will drink this if she \"has to.\" Pt makes smoothies at home with protein powder and likes fruit flavors. Agreeable to trial Ensure Clear BID.       Malnutrition Status: Moderate malnutrition  Chronic Illness  Findings of the 6 clinical characteristics of malnutrition:  Energy Intake:  Unable to assess  Weight Loss:  Mild weight loss (Wt hx in EMR indicates 22 lb (18.3%) wt loss over the last year)     Body Fat Loss:  Mild body fat loss Orbital   Muscle Mass Loss:  Mild muscle mass loss Clavicles (pectoralis & deltoids), Temples (temporalis)  Fluid Accumulation:  No fluid accumulation     Strength:  Not Performed      NUTRITION DIAGNOSIS   Inadequate protein-energy intake related to inadequate protein-energy intake as evidenced by intake 0-25%    Goals: PO intake 50% or greater, by next RD assessment     NUTRITION RELATED FINDINGS  Objective: LBM 12/26. No edema noted.  Wounds: None    CURRENT NUTRITION THERAPIES  ADULT DIET; Regular; No Added Salt (3-4 gm)  ADULT ORAL NUTRITION SUPPLEMENT; Breakfast, Lunch, Dinner; Diabetic Oral Supplement       PO Intake: 0%   PO Supplement Intake:26-50%    ANTHROPOMETRICS  Current Height: 149.9 cm (4' 11.02\")  Current Weight - Scale: 47 kg (103 lb 10 oz)    Admission weight: 44.8 kg (98 lb 12.3 oz)  Ideal Body Weight (IBW): 95 lbs  (43 kg)        BMI: 20.9      COMPARATIVE STANDARDS  Total Energy Requirements (kcals/day): 1168     Protein (g):  54-68 grams       Fluid (mL/day):  1410 mL    EDUCATION  Education/Counseling not  indicated     The patient will be monitored per nutrition standards of care. Consult dietitian if additional nutrition interventions are needed prior to RD reassessment.     Tyra Sam MS, RD, LD    Contact: [phone]

## 2024-12-27 NOTE — PROGRESS NOTES
Nephrology Note                                                                                                                                                                                                                                                                                                                                                               Office : [phone]     Fax :[phone]    Patient's Name: Pao Wahl  3:42 PM  12/27/2024    Reason for Consult:  FOX  Requesting Physician:  Juliette Ramirez MD  Chief Complaint:  No chief complaint on file.      Assessment/Plan     # FOX on CKD 3/4  - FOX likely 2/2 cardiac arrest  - Creatinine is now back to baseline, baseline Creatinine ~ 1.4  - Severe proteinuric disease   - Monitor kidney functions closely  - control BP   - strict I/O    # CHF  - Echo 12/23 with EF 55-60%  - We will monitor fluid status closely  - diuretics per cardiology     # Fall  - R femoral neck fracture  - Ortho following    # HTN  - on amlodipine (recently increased dose) and Metoprolol  - monitor    # Anemia of chronic disease  - s/p PRBC transfusion  - Monitor    #Hyponatremia  - Monitor     #DM 2  - management per primary    History of Present Ilness:    Pao Wahl is a 79 y.o. female with pmh of HTN, HLD, COPD, mechanical aortic and mitral valves, CKD, anemia, A-fib, Follicular lymphoma in remission, pacemaker, Type II DM who presents with fall and right hip pain. Patient was found to have right hip fracture. Patient was felt to have high cardiac risk, so was transferred to [facility] for cardiac anesthesia for orthopedic surgery. During course of hospitalization patient was found to have episode of cardiac arrest which was responded to DC shock and IV epinephrine. We are consulted for FOX on CKD 3/4.       Interval hx   No SOA.  Feeling well.     Past Medical History:   Diagnosis Date    A-fib (HCC)     Anemia     multifactorial:under care of  for lysis of adhesions    LAPAROSCOPY N/A 8/22/2024    DIAGNOSTIC LAPAROSCOPY performed by Darron Mirza MD at [facility] OR    LAPAROTOMY N/A 8/22/2024    . performed by Darron Mirza MD at [facility] OR    MITRAL VALVE REPLACEMENT      PACEMAKER PLACEMENT  8/07    for bradycardia, sympony    TUNNELED VENOUS PORT PLACEMENT Right 10/3/2014    ATTEMPTED RIGHT SUBCLAVIEN VEIN, PLACED RIGHT INTERNAL       Family History   Problem Relation Age of Onset    Diabetes Brother     Lung Cancer Mother         Smoker        reports that she quit smoking about 17 years ago. Her smoking use included cigarettes. She started smoking about 57 years ago. She has a 40 pack-year smoking history. She has never used smokeless tobacco. She reports that she does not currently use alcohol after a past usage of about 1.0 standard drink of alcohol per week. She reports that she does not use drugs.    Allergies:  Nsaids and Hydrocodone-acetaminophen    Current Medications:    warfarin (COUMADIN) tablet 4 mg, Once  warfarin placeholder: dosing by pharmacy, RX Placeholder  mexiletine (MEXITIL) capsule 200 mg, 3 times per day  oxyCODONE (ROXICODONE) immediate release tablet 5 mg, Q4H PRN   Or  oxyCODONE (ROXICODONE) immediate release tablet 10 mg, Q4H PRN  amLODIPine (NORVASC) tablet 10 mg, Daily  acetaminophen (TYLENOL) tablet 500 mg, TID  prochlorperazine (COMPAZINE) injection 10 mg, Q6H PRN  albuterol (PROVENTIL) (2.5 MG/3ML) 0.083% nebulizer solution 2.5 mg, BID RT  albuterol (PROVENTIL) (2.5 MG/3ML) 0.083% nebulizer solution 2.5 mg, Q4H PRN  0.9 % sodium chloride infusion, PRN  hydrALAZINE (APRESOLINE) injection 10 mg, Q6H PRN  ALPRAZolam (XANAX) tablet 0.5 mg, BID PRN  atorvastatin (LIPITOR) tablet 10 mg, Nightly  [Held by provider] furosemide (LASIX) tablet 40 mg, BID  metoprolol tartrate (LOPRESSOR) tablet 25 mg, BID  pantoprazole (PROTONIX) tablet 40 mg, QAM AC  insulin lispro (HUMALOG,ADMELOG) injection vial 0-4 Units, 4x Daily AC &

## 2024-12-28 LAB
ANTI-XA UNFRAC HEPARIN: >1.1 IU/ML (ref 0.3–0.7)
ANTI-XA UNFRAC HEPARIN: >1.1 IU/ML (ref 0.3–0.7)
GLUCOSE BLD-MCNC: 138 MG/DL (ref 70–99)
GLUCOSE BLD-MCNC: 160 MG/DL (ref 70–99)
GLUCOSE BLD-MCNC: 182 MG/DL (ref 70–99)
INR PPP: 1.35 (ref 0.85–1.15)
PERFORMED ON: ABNORMAL
PROTHROMBIN TIME: 16.8 SEC (ref 11.9–14.9)

## 2024-12-28 PROCEDURE — 99233 SBSQ HOSP IP/OBS HIGH 50: CPT | Performed by: INTERNAL MEDICINE

## 2024-12-28 PROCEDURE — 1200000000 HC SEMI PRIVATE

## 2024-12-28 PROCEDURE — 6370000000 HC RX 637 (ALT 250 FOR IP)

## 2024-12-28 PROCEDURE — 36591 DRAW BLOOD OFF VENOUS DEVICE: CPT

## 2024-12-28 PROCEDURE — 85610 PROTHROMBIN TIME: CPT

## 2024-12-28 PROCEDURE — 2700000000 HC OXYGEN THERAPY PER DAY

## 2024-12-28 PROCEDURE — 94761 N-INVAS EAR/PLS OXIMETRY MLT: CPT

## 2024-12-28 PROCEDURE — 2500000003 HC RX 250 WO HCPCS: Performed by: NURSE PRACTITIONER

## 2024-12-28 PROCEDURE — 6360000002 HC RX W HCPCS: Performed by: INTERNAL MEDICINE

## 2024-12-28 PROCEDURE — 6370000000 HC RX 637 (ALT 250 FOR IP): Performed by: INTERNAL MEDICINE

## 2024-12-28 PROCEDURE — 6360000002 HC RX W HCPCS: Performed by: STUDENT IN AN ORGANIZED HEALTH CARE EDUCATION/TRAINING PROGRAM

## 2024-12-28 PROCEDURE — 85520 HEPARIN ASSAY: CPT

## 2024-12-28 PROCEDURE — 6360000002 HC RX W HCPCS: Performed by: NURSE PRACTITIONER

## 2024-12-28 PROCEDURE — 97530 THERAPEUTIC ACTIVITIES: CPT

## 2024-12-28 PROCEDURE — 6370000000 HC RX 637 (ALT 250 FOR IP): Performed by: NURSE PRACTITIONER

## 2024-12-28 PROCEDURE — 6360000002 HC RX W HCPCS: Performed by: HOSPITALIST

## 2024-12-28 PROCEDURE — 94640 AIRWAY INHALATION TREATMENT: CPT

## 2024-12-28 RX ORDER — WARFARIN SODIUM 1 MG/1
1 TABLET ORAL
Status: COMPLETED | OUTPATIENT
Start: 2024-12-28 | End: 2024-12-28

## 2024-12-28 RX ORDER — ENOXAPARIN SODIUM 100 MG/ML
1 INJECTION SUBCUTANEOUS DAILY
Status: DISCONTINUED | OUTPATIENT
Start: 2024-12-28 | End: 2024-12-31 | Stop reason: ALTCHOICE

## 2024-12-28 RX ORDER — ALBUTEROL SULFATE 0.83 MG/ML
2.5 SOLUTION RESPIRATORY (INHALATION) EVERY 4 HOURS PRN
Status: DISCONTINUED | OUTPATIENT
Start: 2024-12-28 | End: 2024-12-31 | Stop reason: HOSPADM

## 2024-12-28 RX ADMIN — Medication 10 ML: at 21:36

## 2024-12-28 RX ADMIN — MEXILETINE HYDROCHLORIDE 200 MG: 200 CAPSULE ORAL at 14:48

## 2024-12-28 RX ADMIN — ALBUTEROL SULFATE 2.5 MG: 2.5 SOLUTION RESPIRATORY (INHALATION) at 09:31

## 2024-12-28 RX ADMIN — WARFARIN SODIUM 1 MG: 1 TABLET ORAL at 17:48

## 2024-12-28 RX ADMIN — AMLODIPINE BESYLATE 10 MG: 5 TABLET ORAL at 09:50

## 2024-12-28 RX ADMIN — METOPROLOL TARTRATE 25 MG: 25 TABLET, FILM COATED ORAL at 09:49

## 2024-12-28 RX ADMIN — MEXILETINE HYDROCHLORIDE 200 MG: 200 CAPSULE ORAL at 21:43

## 2024-12-28 RX ADMIN — PROCHLORPERAZINE EDISYLATE 10 MG: 5 INJECTION INTRAMUSCULAR; INTRAVENOUS at 13:00

## 2024-12-28 RX ADMIN — ACETAMINOPHEN 500 MG: 500 TABLET ORAL at 21:33

## 2024-12-28 RX ADMIN — MEXILETINE HYDROCHLORIDE 200 MG: 200 CAPSULE ORAL at 09:50

## 2024-12-28 RX ADMIN — MORPHINE SULFATE 2 MG: 2 INJECTION, SOLUTION INTRAMUSCULAR; INTRAVENOUS at 09:53

## 2024-12-28 RX ADMIN — ENOXAPARIN SODIUM 50 MG: 100 INJECTION SUBCUTANEOUS at 21:41

## 2024-12-28 RX ADMIN — METOPROLOL TARTRATE 25 MG: 25 TABLET, FILM COATED ORAL at 21:37

## 2024-12-28 RX ADMIN — PANTOPRAZOLE SODIUM 40 MG: 40 TABLET, DELAYED RELEASE ORAL at 09:53

## 2024-12-28 RX ADMIN — ATORVASTATIN CALCIUM 10 MG: 10 TABLET, FILM COATED ORAL at 21:33

## 2024-12-28 RX ADMIN — ACETAMINOPHEN 500 MG: 500 TABLET ORAL at 09:49

## 2024-12-28 RX ADMIN — MELATONIN TAB 3 MG 3 MG: 3 TAB at 21:33

## 2024-12-28 RX ADMIN — ACETAMINOPHEN 500 MG: 500 TABLET ORAL at 14:48

## 2024-12-28 ASSESSMENT — PAIN SCALES - GENERAL
PAINLEVEL_OUTOF10: 3
PAINLEVEL_OUTOF10: 10
PAINLEVEL_OUTOF10: 4
PAINLEVEL_OUTOF10: 0

## 2024-12-28 ASSESSMENT — PAIN DESCRIPTION - PAIN TYPE: TYPE: SURGICAL PAIN

## 2024-12-28 ASSESSMENT — PAIN SCALES - WONG BAKER: WONGBAKER_NUMERICALRESPONSE: NO HURT

## 2024-12-28 ASSESSMENT — PAIN DESCRIPTION - LOCATION: LOCATION: HIP

## 2024-12-28 ASSESSMENT — PAIN DESCRIPTION - FREQUENCY: FREQUENCY: INTERMITTENT

## 2024-12-28 ASSESSMENT — PAIN - FUNCTIONAL ASSESSMENT: PAIN_FUNCTIONAL_ASSESSMENT: ACTIVITIES ARE NOT PREVENTED

## 2024-12-28 ASSESSMENT — PAIN DESCRIPTION - ONSET: ONSET: ON-GOING

## 2024-12-28 ASSESSMENT — PAIN DESCRIPTION - ORIENTATION: ORIENTATION: RIGHT

## 2024-12-28 ASSESSMENT — PAIN DESCRIPTION - DESCRIPTORS: DESCRIPTORS: ACHING

## 2024-12-28 NOTE — PROGRESS NOTES
Shift assessment completed. Neuro WNL. Reports pain 10/10 at this time. AM meds administered per MAR. The care plan and education has been reviewed and mutually agreed upon with the patient. Standard safety measures in place.

## 2024-12-28 NOTE — PROGRESS NOTES
Nephrology Note                                                                                                                                                                                                                                                                                                                                                               Office : [phone]     Fax :[phone]    Patient's Name: Pao Wahl  4:53 PM  12/28/2024    Reason for Consult:  FOX  Requesting Physician:  Juliette Ramirez MD  Chief Complaint:  No chief complaint on file.      Assessment/Plan     # FOX on CKD 3/4  - FOX likely 2/2 cardiac arrest  - Creatinine is now back to baseline, baseline Creatinine ~ 1.4  - Severe proteinuric disease   - Monitor kidney functions closely  - control BP   - strict I/O    # CHF  - Echo 12/23 with EF 55-60%  - We will monitor fluid status closely  - diuretics per cardiology     # Fall  - R femoral neck fracture  - Ortho following    # HTN  - on amlodipine (recently increased dose) and Metoprolol  - monitor    # Anemia of chronic disease  - s/p PRBC transfusion  - Monitor    #Hyponatremia  - Monitor     #DM 2  - management per primary    History of Present Ilness:    Pao Wahl is a 79 y.o. female with pmh of HTN, HLD, COPD, mechanical aortic and mitral valves, CKD, anemia, A-fib, Follicular lymphoma in remission, pacemaker, Type II DM who presents with fall and right hip pain. Patient was found to have right hip fracture. Patient was felt to have high cardiac risk, so was transferred to [facility] for cardiac anesthesia for orthopedic surgery. During course of hospitalization patient was found to have episode of cardiac arrest which was responded to DC shock and IV epinephrine. We are consulted for FOX on CKD 3/4.       Interval hx   No SOA.  Feeling fair   No N/V/D      Past Medical History:   Diagnosis Date    A-fib (HCC)     Anemia     multifactorial:under care  open for lysis of adhesions    LAPAROSCOPY N/A 8/22/2024    DIAGNOSTIC LAPAROSCOPY performed by Darron Mirza MD at [facility] OR    LAPAROTOMY N/A 8/22/2024    . performed by Darron Mirza MD at [facility] OR    MITRAL VALVE REPLACEMENT      PACEMAKER PLACEMENT  8/07    for bradycardia, sympony    TUNNELED VENOUS PORT PLACEMENT Right 10/3/2014    ATTEMPTED RIGHT SUBCLAVIEN VEIN, PLACED RIGHT INTERNAL       Family History   Problem Relation Age of Onset    Diabetes Brother     Lung Cancer Mother         Smoker        reports that she quit smoking about 17 years ago. Her smoking use included cigarettes. She started smoking about 57 years ago. She has a 40 pack-year smoking history. She has never used smokeless tobacco. She reports that she does not currently use alcohol after a past usage of about 1.0 standard drink of alcohol per week. She reports that she does not use drugs.    Allergies:  Nsaids and Hydrocodone-acetaminophen    Current Medications:    albuterol (PROVENTIL) (2.5 MG/3ML) 0.083% nebulizer solution 2.5 mg, Q4H PRN  warfarin (COUMADIN) tablet 1 mg, Once  enoxaparin (LOVENOX) injection 50 mg, Daily  warfarin placeholder: dosing by pharmacy, RX Placeholder  mexiletine (MEXITIL) capsule 200 mg, 3 times per day  oxyCODONE (ROXICODONE) immediate release tablet 5 mg, Q4H PRN   Or  oxyCODONE (ROXICODONE) immediate release tablet 10 mg, Q4H PRN  amLODIPine (NORVASC) tablet 10 mg, Daily  acetaminophen (TYLENOL) tablet 500 mg, TID  prochlorperazine (COMPAZINE) injection 10 mg, Q6H PRN  albuterol (PROVENTIL) (2.5 MG/3ML) 0.083% nebulizer solution 2.5 mg, Q4H PRN  0.9 % sodium chloride infusion, PRN  hydrALAZINE (APRESOLINE) injection 10 mg, Q6H PRN  ALPRAZolam (XANAX) tablet 0.5 mg, BID PRN  atorvastatin (LIPITOR) tablet 10 mg, Nightly  [Held by provider] furosemide (LASIX) tablet 40 mg, BID  metoprolol tartrate (LOPRESSOR) tablet 25 mg, BID  pantoprazole (PROTONIX) tablet 40 mg, QAM AC  insulin lispro (HUMALOG,ADMELOG)

## 2024-12-28 NOTE — PROGRESS NOTES
[facility], [place] - Inpatient Rehabilitation Department   Phone: [phone]    Occupational Therapy    [] Initial Evaluation            [x] Daily Treatment Note         [] Discharge Summary      Patient: Pao Wahl   : 1945   MRN: [id number]   Date of Service:  2024    Admitting Diagnosis:  Closed fracture of right hip (HCC)  Current Admission Summary: 79 y.o. female with pmh of HTN, HLD, COPD, mechanical aortic and mitral valves, CKD, anemia, A-fib, Follicular lymphoma in remission, pacemaker, Type II DM who presents with fall and right hip pain. Patient was found to have right hip fracture. Patient was felt to have high cardiac risk, so was transferred to [facility] for cardiac anesthesia for orthopedic surgery. During course of hospitalization patient was found to have episode of cardiac arrest which was responded to DC shock and IV epinephrine.    - Planning non-operative mgmt of (R) hip fx  Past Medical History:  has a past medical history of A-fib (HCC), Anemia, Anemia:multifactorial, Anxiety, Cataract, CHF (congestive heart failure) (HCC), Chronic back pain, CKD (chronic kidney disease) stage 3, GFR 30-59 ml/min (HCC), Colitis, ischemic (HCC), COPD, Diabetes, Diabetic eye exam (HCC), GERD (gastroesophageal reflux disease), H/O heart valve replacement with mechanical valve, Hx of mammogram, Hyperlipidaemia LDL goal <100, Hypertension, Insomnia, Long-term (current) use of anticoagulants, INR goal 2.5-3.5, Lymphoma:monoclonal B cell, Mammogram abnormal, Nonspecific abnormal results of kidney function study, Osteoarthritis, PAF (paroxysmal atrial fibrillation) (HCC), Renal cysts, right, RLS (restless legs syndrome), Sciatica, Screening colonoscopy, Therapeutic drug monitoring, Valvular heart disease, and Visit for screening mammogram.  Past Surgical History:  has a past surgical history that includes Aortic valve replacement; Mitral valve replacement; Hysterectomy; Cataract  situation  Command Following:   accurately follows one step commands     Education  Barriers To Learning: cognition  Patient Education: patient educated on goals, OT role and benefits, plan of care, precautions, transfer training, discharge recommendations  Learning Assessment:  patient verbalizes understanding, would benefit from continued reinforcement    Assessment  Activity Tolerance: Pt tolerated fair, limited by pain  Impairments Requiring Therapeutic Intervention: decreased functional mobility, decreased ADL status, decreased strength, decreased safety awareness, decreased cognition, decreased endurance, decreased balance, decreased IADL, increased pain  Prognosis: good  Clinical Assessment: The patient is a 79 y.o. female who presents below their baseline level of function due to above deficits, associated with Closed fracture of right hip (HCC). Typically, pt is IND. Currently, pt is requiring max x1-2 persons for bed mobility and transfers. Continued OT indicated in order to promote return to PLOF    Safety Interventions: patient left in bed, bed alarm in place, call light within reach, gait belt, and nurse notified    Plan  Frequency: 7 x/week  Current Treatment Recommendations: strengthening, balance training, functional mobility training, transfer training, endurance training, patient/caregiver education, ADL/self-care training, IADL training, and equipment evaluation/education    Goals    Short Term Goals:  Time Frame: by dc  Patient will complete upper body ADL at minimal assistance   Patient will complete lower body ADL at moderate assistance   Patient will complete toileting at moderate assistance   Patient will complete grooming at stand by assistance   Patient will complete functional transfers at minimal assistance     Above goals reviewed on 12/28/2024.  All goals are ongoing at this time unless indicated above.       Therapy Session Time     Individual Group Co-treatment   Time In    0945

## 2024-12-28 NOTE — PROGRESS NOTES
4 Eyes Skin Assessment     The patient is being assess for  Transfer to New Unit    I agree that 2 RN's have performed a thorough Head to Toe Skin Assessment on the patient. ALL assessment sites listed below have been assessed.       Areas assessed by both nurses:   [x]   Head, Face, and Ears   [x]   Shoulders, Back, and Chest  [x]   Arms, Elbows, and Hands   [x]   Coccyx, Sacrum, and Ischum  [x]   Legs, Feet, and Heels        Does the Patient have Skin Breakdown?  No         Vinayak Prevention initiated:  Yes   Wound Care Orders initiated:  No      [facility] nurse consulted for Pressure Injury (Stage 3,4, Unstageable, DTI, NWPT, and Complex wounds):  NA      Nurse 1 eSignature: Electronically signed by Mindy Rosado RN on 12/28/24 at 1:45 AM EST    **SHARE this note so that the co-signing nurse is able to place an eSignature**    Nurse 2 eSignature: {Esignature:[id number]}

## 2024-12-28 NOTE — RT PROTOCOL NOTE
RT Inhaler-Nebulizer Bronchodilator Protocol Note    There is a bronchodilator order in the chart from a provider indicating to follow the RT Bronchodilator Protocol and there is an “Initiate RT Inhaler-Nebulizer Bronchodilator Protocol” order as well (see protocol at bottom of note).    CXR Findings:  No results found.    The findings from the last RT Protocol Assessment were as follows:   History Pulmonary Disease: Chronic pulmonary disease  Respiratory Pattern: Regular pattern and RR 12-20 bpm  Breath Sounds: Clear breath sounds  Cough: Strong, productive  Indication for Bronchodilator Therapy: Decreased or absent breath sounds  Bronchodilator Assessment Score: 3    Aerosolized bronchodilator medication orders have been revised according to the RT Inhaler-Nebulizer Bronchodilator Protocol below.    Respiratory Therapist to perform RT Therapy Protocol Assessment initially then follow the protocol.  Repeat RT Therapy Protocol Assessment PRN for score 0-3 or on second treatment, BID, and PRN for scores above 3.    No Indications - adjust the frequency to every 6 hours PRN wheezing or bronchospasm, if no treatments needed after 48 hours then discontinue using Per Protocol order mode.     If indication present, adjust the RT bronchodilator orders based on the Bronchodilator Assessment Score as indicated below.  Use Inhaler orders unless patient has one or more of the following: on home nebulizer, not able to hold breath for 10 seconds, is not alert and oriented, cannot activate and use MDI correctly, or respiratory rate 25 breaths per minute or more, then use the equivalent nebulizer order(s) with same Frequency and PRN reasons based on the score.  If a patient is on this medication at home then do not decrease Frequency below that used at home.    0-3 - enter or revise RT bronchodilator order(s) to equivalent RT Bronchodilator order with Frequency of every 4 hours PRN for wheezing or increased work of breathing using  Per Protocol order mode.        4-6 - enter or revise RT Bronchodilator order(s) to two equivalent RT bronchodilator orders with one order with BID Frequency and one order with Frequency of every 4 hours PRN wheezing or increased work of breathing using Per Protocol order mode.        7-10 - enter or revise RT Bronchodilator order(s) to two equivalent RT bronchodilator orders with one order with TID Frequency and one order with Frequency of every 4 hours PRN wheezing or increased work of breathing using Per Protocol order mode.       11-13 - enter or revise RT Bronchodilator order(s) to one equivalent RT bronchodilator order with QID Frequency and an Albuterol order with Frequency of every 4 hours PRN wheezing or increased work of breathing using Per Protocol order mode.      Greater than 13 - enter or revise RT Bronchodilator order(s) to one equivalent RT bronchodilator order with every 4 hours Frequency and an Albuterol order with Frequency of every 2 hours PRN wheezing or increased work of breathing using Per Protocol order mode.     RT to enter RT Home Evaluation for COPD & MDI Assessment order using Per Protocol order mode.    Electronically signed by So Love RCP on 12/28/2024 at 1:34 PM

## 2024-12-28 NOTE — PROGRESS NOTES
[facility] - Inpatient Rehabilitation Department   Phone: [phone]    Physical Therapy    [] Initial Evaluation            [x] Daily Treatment Note         [] Discharge Summary      Patient: Pao Wahl   : 1945   MRN: [id number]   Date of Service:  2024  Admitting Diagnosis: Closed fracture of right hip (HCC)  Current Admission Summary:  : Pao Wahl is a 79 y.o. female with pmh of HTN, HLD, COPD, mechanical aortic and mitral valves, CKD, anemia, A-fib, Follicular lymphoma in remission, pacemaker, Type II DM   who presents with fall and right hip pain.  Patient was found to have right hip fracture.  Patient was felt to have high cardiac risk, so was transferred to [facility] for cardiac anesthesia for orthopedic surgery.  During course of hospitalization patient was found to have episode of cardiac arrest which was responde   - Plan is to proceed with non-operative management of the right hip fracture given her perioperative risk.  - NWB RLE    Past Medical History:  has a past medical history of A-fib (HCC), Anemia, Anemia:multifactorial, Anxiety, Cataract, CHF (congestive heart failure) (HCC), Chronic back pain, CKD (chronic kidney disease) stage 3, GFR 30-59 ml/min (HCC), Colitis, ischemic (HCC), COPD, Diabetes, Diabetic eye exam (HCC), GERD (gastroesophageal reflux disease), H/O heart valve replacement with mechanical valve, Hx of mammogram, Hyperlipidaemia LDL goal <100, Hypertension, Insomnia, Long-term (current) use of anticoagulants, INR goal 2.5-3.5, Lymphoma:monoclonal B cell, Mammogram abnormal, Nonspecific abnormal results of kidney function study, Osteoarthritis, PAF (paroxysmal atrial fibrillation) (HCC), Renal cysts, right, RLS (restless legs syndrome), Sciatica, Screening colonoscopy, Therapeutic drug monitoring, Valvular heart disease, and Visit for screening mammogram.  Past Surgical History:  has a past surgical history that includes Aortic valve

## 2024-12-28 NOTE — PROGRESS NOTES
12/28/24 1334   RT Protocol   History Pulmonary Disease 2   Respiratory pattern 0   Breath sounds 0   Cough 1   Bronchodilator Assessment Score 3

## 2024-12-28 NOTE — PROGRESS NOTES
[facility], [facility] [place]   Electrophysiology   Date: 12/28/2024  Reason for Follow up: PAF, VT arrest    Consult Requesting Physician: Julius Abreu MD     CC: Fall   HPI: Pao Wahl is a 79 y.o. female with past medical history significant for DM, Hypertension, Valvular heart disease, Atrial Fibrillation, Hyperlipidemia, Sinus node dysfunction s/p PPM, CKD stage 3, diabetes follicular lymphoma that is in remission and COPD.    Patient being seen today for follow up. Nursing notes, telemetry and ECG reviewed. No acute overnight. 21 beat run of VT noted on telemetry around 0830 this morning. Patient denies palpitations, SOB or chest pain during episode. She is confused but cooperative this morning.     Interval history:   Patient seen and examined. Clinical notes reviewed.   Telemetry reviewed. No new complaint today.   No major events overnight.   Denies having chest pain, shortness of breath, dyspnea on exertion, Orthopnea, PND at the time of this visit.     Assessment:   VT arrest   NSVT   PAF   Sinus node dysfunction   AVR/MVR   Right femoral neck fracture     Plan:   Had VT arrest, polymorphic 12/21/2024 with prolonged QT and potassium of 3.1   No further EP.   Seen by interventional cardiology, no planning for LHC. Needs ischemic workup at some time.   No further VT on telemetry.   Continue Metoprolol, and Mexiletine 200 TID  No Afib, telemetry with paced atrial rhythm.   [id number] Score is at least 6 for age, female, HTN, Valve disease and DM.  She is high risk for thromboembolic event. On heparin drip.   Needs transition to coumadin if no plan for intervention.    She also has severe aortic valve stenosis.  Question if echodensity on A/V - has been present previously. Unclear if Pannus vs. Thrombus. Nonetheless, appears to be not a candidate for redo surgery.   Continue heparin drip  Keep electrolytes within normal range.   K ~ 4 and Mg ~ 2   Ortho for femoral neck fracture. She is high  prosthetic gradient. MV mean gradient is 3 mmHg.    Tricuspid Valve: Mild regurgitation.    Left Atrium: Left atrium is dilated. Left atrial volume index is moderately increased (42-48 mL/m2).    Right Atrium: Lead present in the right atrium. Right atrium is mildly dilated.    Image quality is suboptimal. Contrast used: Definity. Technically difficult study due to patient's body habitus and limited study due to patient's ability to tolerate test.    Scheduled Meds:   warfarin placeholder: dosing by pharmacy   Other RX Placeholder    mexiletine  200 mg Oral 3 times per day    amLODIPine  10 mg Oral Daily    acetaminophen  500 mg Oral TID    albuterol  2.5 mg Nebulization BID RT    atorvastatin  10 mg Oral Nightly    [Held by provider] furosemide  40 mg Oral BID    metoprolol tartrate  25 mg Oral BID    pantoprazole  40 mg Oral QAM AC    insulin lispro  0-4 Units SubCUTAneous 4x Daily AC & HS    sodium chloride flush  5-40 mL IntraVENous 2 times per day    melatonin  3 mg Oral Nightly     Continuous Infusions:   sodium chloride      dextrose      sodium chloride      heparin (PORCINE) Infusion 13 Units/kg/hr (12/28/24 0656)     PRN Meds:.oxyCODONE **OR** oxyCODONE, prochlorperazine, albuterol, sodium chloride, hydrALAZINE, ALPRAZolam, dextrose bolus **OR** dextrose bolus, glucagon (rDNA), dextrose, sodium chloride flush, sodium chloride, polyethylene glycol, acetaminophen **OR** acetaminophen, morphine, magnesium sulfate, heparin (porcine), heparin (porcine)     Prior to Admission medications    Medication Sig Start Date End Date Taking? Authorizing Provider   oxyCODONE (ROXICODONE) 5 MG immediate release tablet Take 1-2 tablets by mouth every 4 hours as needed for Pain for up to 5 days. Max Daily Amount: 60 mg 12/26/24 12/31/24 Yes Eduard Rodriguez, APRN - CNP   furosemide (LASIX) 40 MG tablet Take 1 tablet by mouth 2 times daily 10/30/24  Yes Provider, MD Kassidy   tiZANidine (ZANAFLEX) 2 MG tablet Take 1  negative:see scanned report.        Past Surgical History:   Procedure Laterality Date    AORTIC VALVE REPLACEMENT      CATARACT REMOVAL  12/11/13;1/8/14    Right;left respectively    COLONOSCOPY      \"twilight sleep didnt work\"    HYSTERECTOMY (CERVIX STATUS UNKNOWN)      Fibroids    LAPAROSCOPY  2/3/15    SBO:converted to open for lysis of adhesions    LAPAROSCOPY N/A 8/22/2024    DIAGNOSTIC LAPAROSCOPY performed by Darron Mirza MD at [facility] OR    LAPAROTOMY N/A 8/22/2024    . performed by Darron Mirza MD at [facility] OR    MITRAL VALVE REPLACEMENT      PACEMAKER PLACEMENT  8/07    for bradycardia, sympony    TUNNELED VENOUS PORT PLACEMENT Right 10/3/2014    ATTEMPTED RIGHT SUBCLAVIEN VEIN, PLACED RIGHT INTERNAL       Allergies   Allergen Reactions    Nsaids Other (See Comments)     CANNOT TAKE D/T GI BLEEDING AND USE OF COUMADIN    Hydrocodone-Acetaminophen Anxiety        reports that she quit smoking about 17 years ago. Her smoking use included cigarettes. She started smoking about 57 years ago. She has a 40 pack-year smoking history. She has never used smokeless tobacco. She reports that she does not currently use alcohol after a past usage of about 1.0 standard drink of alcohol per week. She reports that she does not use drugs.     All pertinent information and plan of care discussed with the EP physician.    Ángel Reis MD, MPH  [facility]   Office: [phone]  Fax: [phone]

## 2024-12-28 NOTE — PLAN OF CARE
Problem: Chronic Conditions and Co-morbidities  Goal: Patient's chronic conditions and co-morbidity symptoms are monitored and maintained or improved  12/27/2024 2022 by Irene Figueroa, RN  Outcome: Progressing  Flowsheets (Taken 12/27/2024 0800 by Danica Nolen, RN)  Care Plan - Patient's Chronic Conditions and Co-Morbidity Symptoms are Monitored and Maintained or Improved: Monitor and assess patient's chronic conditions and comorbid symptoms for stability, deterioration, or improvement     Problem: Pain  Goal: Verbalizes/displays adequate comfort level or baseline comfort level  12/27/2024 2022 by Irene Figueroa, RN  Outcome: Progressing  Flowsheets (Taken 12/27/2024 2022)  Verbalizes/displays adequate comfort level or baseline comfort level:   Encourage patient to monitor pain and request assistance   Assess pain using appropriate pain scale   Administer analgesics based on type and severity of pain and evaluate response   Implement non-pharmacological measures as appropriate and evaluate response     Problem: Safety - Adult  Goal: Free from fall injury  Outcome: Progressing     Problem: Skin/Tissue Integrity  Goal: Absence of new skin breakdown  Description: 1.  Monitor for areas of redness and/or skin breakdown  2.  Assess vascular access sites hourly  3.  Every 4-6 hours minimum:  Change oxygen saturation probe site  4.  Every 4-6 hours:  If on nasal continuous positive airway pressure, respiratory therapy assess nares and determine need for appliance change or resting period.  Outcome: Progressing

## 2024-12-28 NOTE — PROGRESS NOTES
Patient transported to T room 485 per bed on cardiac monitor without incident. Met receiving RN in room and updated on patient. Patient oriented to room. Belongings including clothes, purse, phone with , Ipad, gifts, cards and flowers brought with patient.

## 2024-12-28 NOTE — PLAN OF CARE
Problem: Chronic Conditions and Co-morbidities  Goal: Patient's chronic conditions and co-morbidity symptoms are monitored and maintained or improved  12/28/2024 0302 by Mindy Rosado RN  Outcome: Progressing     Problem: Discharge Planning  Goal: Discharge to home or other facility with appropriate resources  Outcome: Progressing     Problem: Pain  Goal: Verbalizes/displays adequate comfort level or baseline comfort level  12/28/2024 0302 by Mindy Rosado, RN  Outcome: Progressing     Problem: Safety - Adult  Goal: Free from fall injury  12/28/2024 0302 by Mindy Rosado, RN  Outcome: Progressing  Flowsheets (Taken 12/28/2024 0301)  Free From Fall Injury: Instruct family/caregiver on patient safety     Problem: Skin/Tissue Integrity  Goal: Absence of new skin breakdown  Description: 1.  Monitor for areas of redness and/or skin breakdown  2.  Assess vascular access sites hourly  3.  Every 4-6 hours minimum:  Change oxygen saturation probe site  4.  Every 4-6 hours:  If on nasal continuous positive airway pressure, respiratory therapy assess nares and determine need for appliance change or resting period.  12/28/2024 0302 by Mindy Rosado, RN  Outcome: Progressing     Problem: Nutrition Deficit:  Goal: Optimize nutritional status  Outcome: Progressing     Continue with plan of care.

## 2024-12-28 NOTE — PLAN OF CARE
Problem: Chronic Conditions and Co-morbidities  Goal: Patient's chronic conditions and co-morbidity symptoms are monitored and maintained or improved  12/28/2024 0302 by Mindy Rosado RN  Outcome: Progressing     Problem: Discharge Planning  Goal: Discharge to home or other facility with appropriate resources  12/28/2024 0302 by Mindy Rosado RN  Outcome: Progressing     Problem: Pain  Goal: Verbalizes/displays adequate comfort level or baseline comfort level  Recent Flowsheet Documentation  Taken 12/28/2024 0935 by Tereza Corral RN  Verbalizes/displays adequate comfort level or baseline comfort level:   Encourage patient to monitor pain and request assistance   Assess pain using appropriate pain scale   Administer analgesics based on type and severity of pain and evaluate response  12/28/2024 0302 by Mindy Rosado RN  Outcome: Progressing     Problem: Safety - Adult  Goal: Free from fall injury  12/28/2024 0302 by Mindy Rosado RN  Outcome: Progressing  Flowsheets (Taken 12/28/2024 0301)  Free From Fall Injury: Instruct family/caregiver on patient safety     Problem: ABCDS Injury Assessment  Goal: Absence of physical injury  12/28/2024 0302 by Mindy Rosado RN  Outcome: Progressing  Flowsheets (Taken 12/28/2024 0301)  Absence of Physical Injury: Implement safety measures based on patient assessment     Problem: Skin/Tissue Integrity  Goal: Absence of new skin breakdown  Description: 1.  Monitor for areas of redness and/or skin breakdown  2.  Assess vascular access sites hourly  3.  Every 4-6 hours minimum:  Change oxygen saturation probe site  4.  Every 4-6 hours:  If on nasal continuous positive airway pressure, respiratory therapy assess nares and determine need for appliance change or resting period.  12/28/2024 0302 by Mindy Rosado RN  Outcome: Progressing     Problem: Nutrition Deficit:  Goal: Optimize nutritional status  12/28/2024 0302 by Mindy Rosado RN  Outcome: Progressing     Problem:

## 2024-12-28 NOTE — PROGRESS NOTES
Hospitalist Progress Note    Name:  Pao Wahl    /Age/Sex: 1945  (79 y.o. female)  MRN & CSN:  [id number] & [id number]    PCP: Juliette Ramirez MD    Date of Admission: 2024    Patient Status:  Inpatient     Chief Complaint: No chief complaint on file.      Hospital Course: Pao Wahl is a 79 y.o. female with pmh of HTN, HLD, COPD, mechanical aortic and mitral valves, CKD, anemia, A-fib, Follicular lymphoma in remission, pacemaker, Type II DM   who presents with fall and right hip pain.  Patient was found to have right hip fracture.  Patient was felt to have high cardiac risk, so was transferred to [facility] for cardiac anesthesia for orthopedic surgery.  During course of hospitalization patient was found to have episode of cardiac arrest which was responded to DC shock and IV epinephrine.  Patient is currently alert, awake and oriented.     Subjective:  Today is:  Hospital Day: 8.  Patient seen and examined in [id number].     She is doing well.       Medications:  Reviewed    Infusion Medications    sodium chloride      dextrose      sodium chloride       Scheduled Medications    warfarin  1 mg Oral Once    enoxaparin  1 mg/kg SubCUTAneous Daily    warfarin placeholder: dosing by pharmacy   Other RX Placeholder    mexiletine  200 mg Oral 3 times per day    amLODIPine  10 mg Oral Daily    acetaminophen  500 mg Oral TID    atorvastatin  10 mg Oral Nightly    [Held by provider] furosemide  40 mg Oral BID    metoprolol tartrate  25 mg Oral BID    pantoprazole  40 mg Oral QAM AC    insulin lispro  0-4 Units SubCUTAneous 4x Daily AC & HS    sodium chloride flush  5-40 mL IntraVENous 2 times per day    melatonin  3 mg Oral Nightly     PRN Meds: albuterol, oxyCODONE **OR** oxyCODONE, prochlorperazine, albuterol, sodium chloride, hydrALAZINE, ALPRAZolam, dextrose bolus **OR** dextrose bolus, glucagon (rDNA), dextrose, sodium chloride flush, sodium chloride, polyethylene glycol,  without surgery as this is not displaced.   Working on placement for SNF    Controlled substances: Will Continue PRN Morphine for Pain    Drugs that require monitoring for toxicity include: Heparin and the method of monitoring was/is CBC for platelet abnormality    DVT ppx: Heparin  GI ppx: PPI  Diet: ADULT DIET; Regular; No Added Salt (3-4 gm)  ADULT ORAL NUTRITION SUPPLEMENT; Breakfast, Dinner; Clear Liquid Oral Supplement  Code Status: Full Code    PT/OT Eval Status: eval and treat     Disposition:  Can transfer out of ICU  She has hip fracture which ris too high risk for surgery given her cardiac issues..  At this point her risk for any surgical procedure is high.  Further risk assessment after interventional cardiology completes their assessment. Which hwas that she is too high risk for cath.  Plan for non operative managaement of the fracture.   needs placement         Julius Abreu MD  12/28/2024  3:21 PM

## 2024-12-28 NOTE — PROGRESS NOTES
Pharmacy to Dose Warfarin    Pharmacy consulted to dose warfarin for AVR.    INR Goal: 2.5-3.5    INR today: 1.35    Home dose: 1 mg on Thur/Sat and sun and 2 mg rest of days    Assessment/Plan:  - INR subtherapeutic but trending towards goal  - Given low TWD, will be cautious boosting consecutively   - Will resume home regimen 1 mg tonight  - If INR less than 1.6 tomorrow, will boost again   - Cont bridging with heparin, if ready for discharge patient can be bridged with Lovenox 1 mg/k daily  - Possible concomitant drug-drug interactions include: heparin,      Pharmacy will continue to follow.    Tommy Caldwell, PharmD  PGY-1 Pharmacy Resident   [facility]  [id number]

## 2024-12-29 VITALS
SYSTOLIC BLOOD PRESSURE: 160 MMHG | DIASTOLIC BLOOD PRESSURE: 52 MMHG | HEIGHT: 59 IN | RESPIRATION RATE: 18 BRPM | WEIGHT: 108.3 LBS | TEMPERATURE: 98.1 F | OXYGEN SATURATION: 96 % | HEART RATE: 65 BPM | BODY MASS INDEX: 21.83 KG/M2

## 2024-12-29 LAB
ALBUMIN SERPL-MCNC: 2.7 G/DL (ref 3.4–5)
ANION GAP SERPL CALCULATED.3IONS-SCNC: 16 MMOL/L (ref 3–16)
BUN SERPL-MCNC: 45 MG/DL (ref 7–20)
CALCIUM SERPL-MCNC: 8.3 MG/DL (ref 8.3–10.6)
CHLORIDE SERPL-SCNC: 96 MMOL/L (ref 99–110)
CO2 SERPL-SCNC: 21 MMOL/L (ref 21–32)
CREAT SERPL-MCNC: 2.2 MG/DL (ref 0.6–1.2)
GFR SERPLBLD CREATININE-BSD FMLA CKD-EPI: 22 ML/MIN/{1.73_M2}
GLUCOSE BLD-MCNC: 142 MG/DL (ref 70–99)
GLUCOSE BLD-MCNC: 162 MG/DL (ref 70–99)
GLUCOSE BLD-MCNC: 167 MG/DL (ref 70–99)
GLUCOSE BLD-MCNC: 176 MG/DL (ref 70–99)
GLUCOSE SERPL-MCNC: 142 MG/DL (ref 70–99)
INR PPP: 1.63 (ref 0.85–1.15)
PERFORMED ON: ABNORMAL
PHOSPHATE SERPL-MCNC: 5.3 MG/DL (ref 2.5–4.9)
POTASSIUM SERPL-SCNC: 4.4 MMOL/L (ref 3.5–5.1)
PROTHROMBIN TIME: 19.5 SEC (ref 11.9–14.9)
SODIUM SERPL-SCNC: 133 MMOL/L (ref 136–145)

## 2024-12-29 PROCEDURE — 6370000000 HC RX 637 (ALT 250 FOR IP): Performed by: NURSE PRACTITIONER

## 2024-12-29 PROCEDURE — 85610 PROTHROMBIN TIME: CPT

## 2024-12-29 PROCEDURE — 6370000000 HC RX 637 (ALT 250 FOR IP)

## 2024-12-29 PROCEDURE — 99232 SBSQ HOSP IP/OBS MODERATE 35: CPT | Performed by: INTERNAL MEDICINE

## 2024-12-29 PROCEDURE — 1200000000 HC SEMI PRIVATE

## 2024-12-29 PROCEDURE — 2580000003 HC RX 258: Performed by: INTERNAL MEDICINE

## 2024-12-29 PROCEDURE — 2500000003 HC RX 250 WO HCPCS: Performed by: NURSE PRACTITIONER

## 2024-12-29 PROCEDURE — 2700000000 HC OXYGEN THERAPY PER DAY

## 2024-12-29 PROCEDURE — 6360000002 HC RX W HCPCS: Performed by: NURSE PRACTITIONER

## 2024-12-29 PROCEDURE — 6360000002 HC RX W HCPCS: Performed by: INTERNAL MEDICINE

## 2024-12-29 PROCEDURE — 80069 RENAL FUNCTION PANEL: CPT

## 2024-12-29 PROCEDURE — 94761 N-INVAS EAR/PLS OXIMETRY MLT: CPT

## 2024-12-29 PROCEDURE — 6370000000 HC RX 637 (ALT 250 FOR IP): Performed by: INTERNAL MEDICINE

## 2024-12-29 PROCEDURE — 97530 THERAPEUTIC ACTIVITIES: CPT

## 2024-12-29 RX ORDER — WARFARIN SODIUM 2 MG/1
2 TABLET ORAL
Status: COMPLETED | OUTPATIENT
Start: 2024-12-29 | End: 2024-12-29

## 2024-12-29 RX ORDER — 0.9 % SODIUM CHLORIDE 0.9 %
500 INTRAVENOUS SOLUTION INTRAVENOUS ONCE
Status: COMPLETED | OUTPATIENT
Start: 2024-12-29 | End: 2024-12-29

## 2024-12-29 RX ADMIN — AMLODIPINE BESYLATE 10 MG: 5 TABLET ORAL at 10:09

## 2024-12-29 RX ADMIN — MEXILETINE HYDROCHLORIDE 200 MG: 200 CAPSULE ORAL at 05:33

## 2024-12-29 RX ADMIN — WARFARIN SODIUM 2 MG: 2 TABLET ORAL at 17:29

## 2024-12-29 RX ADMIN — SODIUM CHLORIDE 500 ML: 9 INJECTION, SOLUTION INTRAVENOUS at 12:34

## 2024-12-29 RX ADMIN — OXYCODONE 10 MG: 5 TABLET ORAL at 05:33

## 2024-12-29 RX ADMIN — METOPROLOL TARTRATE 25 MG: 25 TABLET, FILM COATED ORAL at 21:31

## 2024-12-29 RX ADMIN — PANTOPRAZOLE SODIUM 40 MG: 40 TABLET, DELAYED RELEASE ORAL at 05:33

## 2024-12-29 RX ADMIN — MEXILETINE HYDROCHLORIDE 200 MG: 200 CAPSULE ORAL at 21:34

## 2024-12-29 RX ADMIN — ATORVASTATIN CALCIUM 10 MG: 10 TABLET, FILM COATED ORAL at 21:31

## 2024-12-29 RX ADMIN — MORPHINE SULFATE 2 MG: 2 INJECTION, SOLUTION INTRAMUSCULAR; INTRAVENOUS at 00:49

## 2024-12-29 RX ADMIN — ACETAMINOPHEN 500 MG: 500 TABLET ORAL at 10:12

## 2024-12-29 RX ADMIN — ACETAMINOPHEN 500 MG: 500 TABLET ORAL at 21:31

## 2024-12-29 RX ADMIN — ACETAMINOPHEN 500 MG: 500 TABLET ORAL at 17:29

## 2024-12-29 RX ADMIN — Medication 10 ML: at 21:32

## 2024-12-29 RX ADMIN — OXYCODONE 10 MG: 5 TABLET ORAL at 10:10

## 2024-12-29 RX ADMIN — MELATONIN TAB 3 MG 3 MG: 3 TAB at 21:31

## 2024-12-29 RX ADMIN — Medication 10 ML: at 10:11

## 2024-12-29 RX ADMIN — MEXILETINE HYDROCHLORIDE 200 MG: 200 CAPSULE ORAL at 17:29

## 2024-12-29 RX ADMIN — METOPROLOL TARTRATE 25 MG: 25 TABLET, FILM COATED ORAL at 10:09

## 2024-12-29 RX ADMIN — ENOXAPARIN SODIUM 50 MG: 100 INJECTION SUBCUTANEOUS at 10:10

## 2024-12-29 ASSESSMENT — PAIN DESCRIPTION - PAIN TYPE: TYPE: ACUTE PAIN

## 2024-12-29 ASSESSMENT — PAIN DESCRIPTION - ORIENTATION
ORIENTATION: RIGHT

## 2024-12-29 ASSESSMENT — PAIN SCALES - GENERAL
PAINLEVEL_OUTOF10: 4
PAINLEVEL_OUTOF10: 8
PAINLEVEL_OUTOF10: 8
PAINLEVEL_OUTOF10: 10
PAINLEVEL_OUTOF10: 4
PAINLEVEL_OUTOF10: 4

## 2024-12-29 ASSESSMENT — PAIN - FUNCTIONAL ASSESSMENT
PAIN_FUNCTIONAL_ASSESSMENT: PREVENTS OR INTERFERES SOME ACTIVE ACTIVITIES AND ADLS
PAIN_FUNCTIONAL_ASSESSMENT: PREVENTS OR INTERFERES SOME ACTIVE ACTIVITIES AND ADLS

## 2024-12-29 ASSESSMENT — PAIN DESCRIPTION - DESCRIPTORS
DESCRIPTORS: SHARP
DESCRIPTORS: ACHING;SORE
DESCRIPTORS: ACHING;SORE

## 2024-12-29 ASSESSMENT — PAIN SCALES - WONG BAKER: WONGBAKER_NUMERICALRESPONSE: NO HURT

## 2024-12-29 ASSESSMENT — PAIN DESCRIPTION - LOCATION
LOCATION: HIP
LOCATION: HIP

## 2024-12-29 NOTE — PROGRESS NOTES
Pharmacy to Dose Warfarin     Pharmacy consulted to dose warfarin for AVR.     INR Goal: 2.5-3.5     INR today: 1.63     Home dose: 1 mg on Thur/Sat and sun and 2 mg rest of days     Assessment/Plan:  - INR subtherapeutic but trending towards goal, 20% increase from yesterday  - Given low TWD, will be cautious boosting consecutively   - Will boost with 2 mg tonight to target an INR of 2 tomorrow  - Cont bridging with Lovenox 1 mg/k daily  - Possible concomitant drug-drug interactions include: heparin,       Pharmacy will continue to follow.     Tommy Caldwell, PharmD  PGY-1 Pharmacy Resident   [facility]  [id number]

## 2024-12-29 NOTE — PROGRESS NOTES
Nephrology Note                                                                                                                                                                                                                                                                                                                                                               Office : [phone]     Fax :[phone]    Patient's Name: Pao Wahl  9:14 AM  12/29/2024    Reason for Consult:  FOX  Requesting Physician:  Juliette Ramirez MD  Chief Complaint:  No chief complaint on file.      Assessment/Plan     # FOX on CKD 3/4  - FOX likely 2/2 cardiac arrest  - Creatinine worse again, baseline Creatinine ~ 1.6  - Severe proteinuric disease   - Hold diuretics   - saline bolus x 1   - Monitor kidney functions closely  - control BP   - strict I/O    # CHF  - Echo 12/23 with EF 55-60%  - We will monitor fluid status closely  - diuretics per cardiology     # Fall  - R femoral neck fracture  - Ortho following    # HTN  - on amlodipine  and Metoprolol  - monitor    # Anemia of chronic disease  - s/p PRBC transfusion  - Monitor    #Hyponatremia  - Monitor     #DM 2  - management per primary    History of Present Ilness:    Pao Wahl is a 79 y.o. female with pmh of HTN, HLD, COPD, mechanical aortic and mitral valves, CKD, anemia, A-fib, Follicular lymphoma in remission, pacemaker, Type II DM who presents with fall and right hip pain. Patient was found to have right hip fracture. Patient was felt to have high cardiac risk, so was transferred to [facility] for cardiac anesthesia for orthopedic surgery. During course of hospitalization patient was found to have episode of cardiac arrest which was responded to DC shock and IV epinephrine. We are consulted for FOX on CKD 3/4.       Interval hx     Cr worse   BP in good range   Oral intake low     Past Medical History:   Diagnosis Date    A-fib (HCC)     Anemia

## 2024-12-29 NOTE — PROGRESS NOTES
replacement; Mitral valve replacement; Hysterectomy; Cataract removal (12/11/13;1/8/14); Colonoscopy; pacemaker placement (8/07); Tunneled venous port placement (Right, 10/3/2014); laparoscopy (2/3/15); laparoscopy (N/A, 8/22/2024); and laparotomy (N/A, 8/22/2024).  Discharge Recommendations: Pao Wahl scored a 6/24 on the AM-PAC short mobility form. Current research shows that an AM-PAC score of 17 or less is typically not associated with a discharge to the patient's home setting. Based on the patient's AM-PAC score and their current functional mobility deficits, it is recommended that the patient have 3-5 sessions per week of Physical Therapy at d/c to increase the patient's independence.  Please see assessment section for further patient specific details.    If patient discharges prior to next session this note will serve as a discharge summary.  Please see below for the latest assessment towards goals.     DME Required For Discharge: DME to be determined at next level of care, DME to be determined pending patient progress  Precautions/Restrictions: high fall risk  Weight Bearing Restrictions: non weight bearing  [] Right Upper Extremity  [] Left Upper Extremity [x] Right Lower Extremity  [] Left Lower Extremity     Required Braces/Orthotics: no braces required   [] Right  [] Left  Positional Restrictions:no positional restrictions    Pre-Admission Information   Lives With: alone    Type of Home: house  Home Layout: two level, laundry in basement, sleeps on 1st floor, shower in basement with B HR   Home Access:  2 step to enter with handrail.  Handrails are located on B side.  Bathroom Layout: walk in shower  Bathroom Equipment: grab bars in shower, grab bars around toilet, shower chair, hand held shower head  Toilet Height: standard height  Home Equipment: rollator - 4 wheeled walker, manual wheelchair, reacher, sock aide, alert button  Transfer Assistance: Independent without use of device  Ambulation  some commands    Education  Barriers To Learning: cognition  Patient Education: patient educated on PT role and benefits, precautions, weight-bearing education, general safety, functional mobility training, transfer training  Learning Assessment: patient will require reinforcement due to cognitive deficits    Assessment  Activity Tolerance: Pt limited by R hip pain, cognition, and drowsiness. SpO2 monitored throughout session - 93-95% on 2L O2 via NC  Impairments Requiring Therapeutic Intervention: decreased functional mobility, decreased ADL status, decreased ROM, decreased strength, decreased safety awareness, decreased cognition, decreased endurance, decreased balance, decreased IADL, increased pain, decreased posture  Prognosis: fair  Clinical Assessment: Patient is a 79 year old female admitted for closed right hip fracture. Plan to treat non-operatively. Pt continues to present below her baseline level of function and required up to Total A x 2 for performance of functional mobility tasks. However, demonstrating improvements in mobility as seen by ability to tolerate transfer from bed to chair. Pt will benefit from continued skilled PT to facilitate return to PLOF and to promote independence.  Safety Interventions: patient left in chair, chair alarm in place, call light within reach, gait belt, patient at risk for falls, and nurse notified    Plan  Frequency: 7 x/week  Current Treatment Recommendations: strengthening, ROM, balance training, functional mobility training, transfer training, gait training, stair training, endurance training, patient/caregiver education, cognitive/perceptual training, cognitive reorientation, pain management, safety education, and equipment evaluation/education    Goals  Patient Goals: None stated this date    Short Term Goals:  Time Frame: by discharge  Patient will complete bed mobility at maximum assistance   Patient will complete transfers at maximum assistance   Patient will

## 2024-12-29 NOTE — PROGRESS NOTES
min A with unilateral support of (L) UE on bed rail, initially max A due to posterior lean/poor awareness of body positioning EOB, progressing to min A. Pt tolerated sitting EOB ~5-7 minutes in preparation for transfer     Other Therapeutic Interventions    Functional Outcomes  AM-PAC Inpatient Daily Activity Raw Score: 12        Cognition  Overall Cognitive Status: Impaired  Arousal/Alertness: inconsistent responses to stimuli  Following Commands: follows one step commands with repetition, follows one step commands with increased time  Attention Span: attends with cues to redirect, difficulty attending to directions  Memory: decreased recall of recent events, decreased short term memory, decreased long term memory  Safety Judgement: decreased awareness of need for assistance, decreased awareness of need for safety  Problem Solving: assistance required to generate solutions, assistance required to implement solutions, assistance required to identify errors made, assistance required to correct errors made  Insights: decreased awareness of deficits  Initiation: requires cues for some  Sequencing: requires cues for some  Orientation:    oriented to person, oriented to place, oriented to situation, and disoriented to time , given choices, pt able to state December/2024  Command Following:   accurately follows one step commands     Education  Barriers To Learning: cognition  Patient Education: patient educated on goals, OT role and benefits, plan of care, precautions, ADL adaptive strategies, weight-bearing education, orientation, disease specific education, pressure relief, transfer training, discharge recommendations  Learning Assessment:  patient will require reinforcement due to cognitive deficits, patient is not an independent learner    Assessment  Activity Tolerance: Pt tolerated fair, limited by pain and initial lethargy-mentation/arousal improving with upright positioning  Impairments Requiring Therapeutic  Intervention: decreased functional mobility, decreased ADL status, decreased strength, decreased safety awareness, decreased cognition, decreased endurance, decreased balance, decreased IADL, increased pain  Prognosis: good  Clinical Assessment: The patient is a 79 y.o. female who presents below their baseline level of function due to above deficits, associated with Closed fracture of right hip (HCC). Typically, pt is IND. Currently, pt is requiring max x1-2 persons for bed mobility and transfers. Continued OT indicated in order to promote return to PLOF    Safety Interventions: patient left in chair, chair alarm in place, call light within reach, gait belt, patient at risk for falls, and nurse notified    Plan  Frequency: 7 x/week  Current Treatment Recommendations: strengthening, balance training, functional mobility training, transfer training, endurance training, patient/caregiver education, ADL/self-care training, IADL training, and equipment evaluation/education    Goals    Short Term Goals:  Time Frame: by dc  Patient will complete upper body ADL at minimal assistance   Patient will complete lower body ADL at moderate assistance   Patient will complete toileting at moderate assistance   Patient will complete grooming at stand by assistance   Patient will complete functional transfers at minimal assistance     Above goals reviewed on 12/29/2024.  All goals are ongoing at this time unless indicated above.       Therapy Session Time     Individual Group Co-treatment   Time In    0948   Time Out    1029   Minutes    41        Timed Code Treatment Minutes:   41  Total Treatment Minutes:  41     Electronically Signed By: RAÚL Wilcox/ZAN, OTR/L- [id number]

## 2024-12-29 NOTE — PLAN OF CARE
Problem: Chronic Conditions and Co-morbidities  Goal: Patient's chronic conditions and co-morbidity symptoms are monitored and maintained or improved  Outcome: Progressing     Problem: Discharge Planning  Goal: Discharge to home or other facility with appropriate resources  Outcome: Progressing     Problem: Pain  Goal: Verbalizes/displays adequate comfort level or baseline comfort level  Outcome: Progressing     Problem: Safety - Adult  Goal: Free from fall injury  Outcome: Progressing     Problem: ABCDS Injury Assessment  Goal: Absence of physical injury  Outcome: Progressing     Problem: Skin/Tissue Integrity  Goal: Absence of new skin breakdown  Description: 1.  Monitor for areas of redness and/or skin breakdown  2.  Assess vascular access sites hourly  3.  Every 4-6 hours minimum:  Change oxygen saturation probe site  4.  Every 4-6 hours:  If on nasal continuous positive airway pressure, respiratory therapy assess nares and determine need for appliance change or resting period.  Outcome: Progressing     Problem: Nutrition Deficit:  Goal: Optimize nutritional status  Outcome: Progressing     Problem: Neurosensory - Adult  Goal: Achieves stable or improved neurological status  Outcome: Progressing     Problem: Respiratory - Adult  Goal: Achieves optimal ventilation and oxygenation  Outcome: Progressing     Problem: Cardiovascular - Adult  Goal: Maintains optimal cardiac output and hemodynamic stability  Outcome: Progressing     Problem: Skin/Tissue Integrity - Adult  Goal: Skin integrity remains intact  Outcome: Progressing     Problem: Musculoskeletal - Adult  Goal: Return mobility to safest level of function  Outcome: Progressing  Goal: Maintain proper alignment of affected body part  Outcome: Progressing     Problem: Musculoskeletal - Adult  Goal: Maintain proper alignment of affected body part  Outcome: Progressing     Problem: Genitourinary - Adult  Goal: Absence of urinary retention  Outcome: Progressing      Problem: Metabolic/Fluid and Electrolytes - Adult  Goal: Electrolytes maintained within normal limits  Outcome: Progressing  Goal: Hemodynamic stability and optimal renal function maintained  Outcome: Progressing     Problem: Metabolic/Fluid and Electrolytes - Adult  Goal: Hemodynamic stability and optimal renal function maintained  Outcome: Progressing

## 2024-12-29 NOTE — PROGRESS NOTES
Alert and oriented, VSS, neurovascular WNL, c/o pain on RLE, repositioned adjusted with pillows, all scheduled medications given, external catheter replaced, all safety precautions applied, call light within reach, bed in low position, denied for other needs at this time,   @0049 morphine given for pain.   Sarai Velasquez RN

## 2024-12-29 NOTE — PROGRESS NOTES
Hospitalist Progress Note    Name:  Pao Wahl    /Age/Sex: 1945  (79 y.o. female)  MRN & CSN:  [id number] & [id number]    PCP: Juliette Ramirez MD    Date of Admission: 2024    Patient Status:  Inpatient     Chief Complaint: No chief complaint on file.      Hospital Course: Pao Wahl is a 79 y.o. female with pmh of HTN, HLD, COPD, mechanical aortic and mitral valves, CKD, anemia, A-fib, Follicular lymphoma in remission, pacemaker, Type II DM   who presents with fall and right hip pain.  Patient was found to have right hip fracture.  Patient was felt to have high cardiac risk, so was transferred to [facility] for cardiac anesthesia for orthopedic surgery.  During course of hospitalization patient was found to have episode of cardiac arrest which was responded to DC shock and IV epinephrine.  Patient is currently alert, awake and oriented.     Subjective:  Today is:  Hospital Day: 9.  Patient seen and examined in [id number].     She is doing well.   Doesn't want to go to SNF but not sure how she would be able to go home.      Medications:  Reviewed    Infusion Medications    sodium chloride      dextrose      sodium chloride       Scheduled Medications    enoxaparin  1 mg/kg SubCUTAneous Daily    warfarin placeholder: dosing by pharmacy   Other RX Placeholder    mexiletine  200 mg Oral 3 times per day    amLODIPine  10 mg Oral Daily    acetaminophen  500 mg Oral TID    atorvastatin  10 mg Oral Nightly    [Held by provider] furosemide  40 mg Oral BID    metoprolol tartrate  25 mg Oral BID    pantoprazole  40 mg Oral QAM AC    insulin lispro  0-4 Units SubCUTAneous 4x Daily AC & HS    sodium chloride flush  5-40 mL IntraVENous 2 times per day    melatonin  3 mg Oral Nightly     PRN Meds: albuterol, oxyCODONE **OR** oxyCODONE, prochlorperazine, albuterol, sodium chloride, hydrALAZINE, ALPRAZolam, dextrose bolus **OR** dextrose bolus, glucagon (rDNA), dextrose, sodium chloride  that she may be able to heal without surgery as this is not displaced.   Working on placement for SNF    Controlled substances: Will Continue PRN Morphine for Pain    Drugs that require monitoring for toxicity include: Heparin and the method of monitoring was/is CBC for platelet abnormality    DVT ppx: Heparin  GI ppx: PPI  Diet: ADULT DIET; Regular; No Added Salt (3-4 gm)  ADULT ORAL NUTRITION SUPPLEMENT; Breakfast, Dinner; Clear Liquid Oral Supplement  Code Status: Full Code    PT/OT Eval Status: eval and treat     Disposition:    She has hip fracture which ris too high risk for surgery given her cardiac issues..  At this point her risk for any surgical procedure is high.  Further risk assessment after interventional cardiology completed their assessment,Which was that she is too high risk for cath.  Plan for non operative managaement of the fracture.   needs placement         Julius Abreu MD  12/29/2024  6:01 PM

## 2024-12-29 NOTE — PROGRESS NOTES
[facility], [facility]   Electrophysiology   Date: 12/29/2024  Reason for Follow up: PAF, VT arrest    Consult Requesting Physician: Julius Abreu MD     CC: Fall   HPI: Pao Wahl is a 79 y.o. female with past medical history significant for DM, Hypertension, Valvular heart disease, Atrial Fibrillation, Hyperlipidemia, Sinus node dysfunction s/p PPM, CKD stage 3, diabetes follicular lymphoma that is in remission and COPD.    Patient being seen today for follow up. Nursing notes, telemetry and ECG reviewed. No acute overnight. 21 beat run of VT noted on telemetry around 0830 this morning. Patient denies palpitations, SOB or chest pain during episode. She is confused but cooperative this morning.     Interval history:   Patient seen and examined. Clinical notes reviewed.   Telemetry reviewed.   No major events overnight.   Sitting on chair having breakfast.   Denies having chest pain, shortness of breath, dyspnea on exertion, Orthopnea, PND at the time of this visit.     Assessment:   VT arrest   NSVT   PAF   Sinus node dysfunction   AVR/MVR   Right femoral neck fracture     Plan:   High risk for surgery. Decision has been made to proceed with non-operative management.   Started on coumadin with bridging.    Had VT arrest, polymorphic 12/21/2024 with prolonged QT and potassium of 3.1   Seen by interventional cardiology, no planning for LHC. Needs ischemic workup at some time per interventional cardiology.   No further VT.   Continue Metoprolol, and Mexiletine   Atrial paced rhythm.  She has severe aortic valve stenosis.  Question if echodensity on A/V - has been present previously. Unclear if Pannus vs. Thrombus. Nonetheless, appears to be not a candidate for redo surgery.   Keep electrolytes within normal range.   K ~ 4 and Mg ~ 2   Code status discussion per primary team.   Relevant available labs, and cardiovascular diagnostics, documents reviewed.   ECG: Sinus rhythm, Paced, RBBB      NA:

## 2024-12-30 ENCOUNTER — APPOINTMENT (OUTPATIENT)
Dept: GENERAL RADIOLOGY | Age: 79
DRG: 308 | End: 2024-12-30
Attending: HOSPITALIST
Payer: MEDICARE

## 2024-12-30 LAB
ALBUMIN SERPL-MCNC: 2.5 G/DL (ref 3.4–5)
ANION GAP SERPL CALCULATED.3IONS-SCNC: 10 MMOL/L (ref 3–16)
BUN SERPL-MCNC: 43 MG/DL (ref 7–20)
CALCIUM SERPL-MCNC: 7.1 MG/DL (ref 8.3–10.6)
CHLORIDE SERPL-SCNC: 108 MMOL/L (ref 99–110)
CO2 SERPL-SCNC: 20 MMOL/L (ref 21–32)
CREAT SERPL-MCNC: 1.7 MG/DL (ref 0.6–1.2)
GFR SERPLBLD CREATININE-BSD FMLA CKD-EPI: 30 ML/MIN/{1.73_M2}
GLUCOSE BLD-MCNC: 111 MG/DL (ref 70–99)
GLUCOSE BLD-MCNC: 117 MG/DL (ref 70–99)
GLUCOSE BLD-MCNC: 155 MG/DL (ref 70–99)
GLUCOSE BLD-MCNC: 204 MG/DL (ref 70–99)
GLUCOSE SERPL-MCNC: 218 MG/DL (ref 70–99)
INR PPP: 2.49 (ref 0.85–1.15)
NT-PROBNP SERPL-MCNC: ABNORMAL PG/ML (ref 0–449)
PERFORMED ON: ABNORMAL
PHOSPHATE SERPL-MCNC: 3.9 MG/DL (ref 2.5–4.9)
POTASSIUM SERPL-SCNC: 3.4 MMOL/L (ref 3.5–5.1)
PROTHROMBIN TIME: 26.9 SEC (ref 11.9–14.9)
SODIUM SERPL-SCNC: 138 MMOL/L (ref 136–145)

## 2024-12-30 PROCEDURE — 2700000000 HC OXYGEN THERAPY PER DAY

## 2024-12-30 PROCEDURE — 97530 THERAPEUTIC ACTIVITIES: CPT

## 2024-12-30 PROCEDURE — 94761 N-INVAS EAR/PLS OXIMETRY MLT: CPT

## 2024-12-30 PROCEDURE — 6370000000 HC RX 637 (ALT 250 FOR IP): Performed by: INTERNAL MEDICINE

## 2024-12-30 PROCEDURE — 71045 X-RAY EXAM CHEST 1 VIEW: CPT

## 2024-12-30 PROCEDURE — 6370000000 HC RX 637 (ALT 250 FOR IP): Performed by: NURSE PRACTITIONER

## 2024-12-30 PROCEDURE — 6360000002 HC RX W HCPCS: Performed by: INTERNAL MEDICINE

## 2024-12-30 PROCEDURE — 2500000003 HC RX 250 WO HCPCS: Performed by: NURSE PRACTITIONER

## 2024-12-30 PROCEDURE — 83880 ASSAY OF NATRIURETIC PEPTIDE: CPT

## 2024-12-30 PROCEDURE — 97535 SELF CARE MNGMENT TRAINING: CPT

## 2024-12-30 PROCEDURE — 94640 AIRWAY INHALATION TREATMENT: CPT

## 2024-12-30 PROCEDURE — 80069 RENAL FUNCTION PANEL: CPT

## 2024-12-30 PROCEDURE — 36591 DRAW BLOOD OFF VENOUS DEVICE: CPT

## 2024-12-30 PROCEDURE — 6370000000 HC RX 637 (ALT 250 FOR IP)

## 2024-12-30 PROCEDURE — 85610 PROTHROMBIN TIME: CPT

## 2024-12-30 PROCEDURE — 99232 SBSQ HOSP IP/OBS MODERATE 35: CPT | Performed by: NURSE PRACTITIONER

## 2024-12-30 PROCEDURE — 99233 SBSQ HOSP IP/OBS HIGH 50: CPT | Performed by: INTERNAL MEDICINE

## 2024-12-30 PROCEDURE — 1200000000 HC SEMI PRIVATE

## 2024-12-30 RX ORDER — WARFARIN SODIUM 1 MG/1
1 TABLET ORAL
Status: COMPLETED | OUTPATIENT
Start: 2024-12-30 | End: 2024-12-30

## 2024-12-30 RX ORDER — WARFARIN SODIUM 2 MG/1
2 TABLET ORAL
Status: DISCONTINUED | OUTPATIENT
Start: 2024-12-30 | End: 2024-12-30 | Stop reason: ALTCHOICE

## 2024-12-30 RX ORDER — WARFARIN SODIUM 1 MG/1
1 TABLET ORAL
Status: DISCONTINUED | OUTPATIENT
Start: 2025-01-02 | End: 2024-12-30

## 2024-12-30 RX ADMIN — ATORVASTATIN CALCIUM 10 MG: 10 TABLET, FILM COATED ORAL at 21:43

## 2024-12-30 RX ADMIN — METOPROLOL TARTRATE 25 MG: 25 TABLET, FILM COATED ORAL at 21:44

## 2024-12-30 RX ADMIN — ALBUTEROL SULFATE 2.5 MG: 2.5 SOLUTION RESPIRATORY (INHALATION) at 06:20

## 2024-12-30 RX ADMIN — MEXILETINE HYDROCHLORIDE 200 MG: 200 CAPSULE ORAL at 15:54

## 2024-12-30 RX ADMIN — WARFARIN SODIUM 1 MG: 1 TABLET ORAL at 17:07

## 2024-12-30 RX ADMIN — INSULIN LISPRO 1 UNITS: 100 INJECTION, SOLUTION INTRAVENOUS; SUBCUTANEOUS at 12:11

## 2024-12-30 RX ADMIN — MEXILETINE HYDROCHLORIDE 200 MG: 200 CAPSULE ORAL at 21:44

## 2024-12-30 RX ADMIN — ACETAMINOPHEN 500 MG: 500 TABLET ORAL at 09:01

## 2024-12-30 RX ADMIN — MELATONIN TAB 3 MG 3 MG: 3 TAB at 21:51

## 2024-12-30 RX ADMIN — PANTOPRAZOLE SODIUM 40 MG: 40 TABLET, DELAYED RELEASE ORAL at 05:12

## 2024-12-30 RX ADMIN — METOPROLOL TARTRATE 25 MG: 25 TABLET, FILM COATED ORAL at 09:02

## 2024-12-30 RX ADMIN — MEXILETINE HYDROCHLORIDE 200 MG: 200 CAPSULE ORAL at 05:13

## 2024-12-30 RX ADMIN — ACETAMINOPHEN 500 MG: 500 TABLET ORAL at 15:54

## 2024-12-30 RX ADMIN — Medication 10 ML: at 09:02

## 2024-12-30 RX ADMIN — ACETAMINOPHEN 500 MG: 500 TABLET ORAL at 21:50

## 2024-12-30 RX ADMIN — ENOXAPARIN SODIUM 50 MG: 100 INJECTION SUBCUTANEOUS at 09:01

## 2024-12-30 RX ADMIN — AMLODIPINE BESYLATE 10 MG: 5 TABLET ORAL at 09:01

## 2024-12-30 RX ADMIN — OXYCODONE 10 MG: 5 TABLET ORAL at 09:38

## 2024-12-30 ASSESSMENT — PAIN DESCRIPTION - ONSET: ONSET: ON-GOING

## 2024-12-30 ASSESSMENT — PAIN DESCRIPTION - LOCATION
LOCATION: BACK
LOCATION: HIP

## 2024-12-30 ASSESSMENT — PAIN SCALES - GENERAL
PAINLEVEL_OUTOF10: 8
PAINLEVEL_OUTOF10: 2
PAINLEVEL_OUTOF10: 8
PAINLEVEL_OUTOF10: 8

## 2024-12-30 ASSESSMENT — PAIN DESCRIPTION - FREQUENCY: FREQUENCY: INTERMITTENT

## 2024-12-30 ASSESSMENT — PAIN DESCRIPTION - ORIENTATION: ORIENTATION: RIGHT

## 2024-12-30 ASSESSMENT — PAIN DESCRIPTION - PAIN TYPE: TYPE: ACUTE PAIN

## 2024-12-30 ASSESSMENT — PAIN DESCRIPTION - DESCRIPTORS: DESCRIPTORS: ACHING;SHARP

## 2024-12-30 ASSESSMENT — PAIN - FUNCTIONAL ASSESSMENT: PAIN_FUNCTIONAL_ASSESSMENT: PREVENTS OR INTERFERES SOME ACTIVE ACTIVITIES AND ADLS

## 2024-12-30 NOTE — PROGRESS NOTES
Pharmacy to Dose Warfarin    Pharmacy consulted to dose warfarin for AVR.    INR Goal: 2.5-3.5    INR today: 2.49    Assessment/Plan:  - INR near goal with 2 mg boost yesterday.  Will give 1 mg tonight to target INR between 2-3 tomorrow.  - Cont bridging with Lovenox 1mg/kg daily  - Possible concomitant drug-drug interactions include: Lovenox     Pharmacy will continue to follow.    Jeannine Medel Rph, BCPPS  [phone]

## 2024-12-30 NOTE — PROGRESS NOTES
Shift assessment completed. Neuro WNL. Reports pain 8/10 at this time. AM meds administered per MAR. The care plan and education has been reviewed and mutually agreed upon with the patient. Standard safety measures in place.

## 2024-12-30 NOTE — PROGRESS NOTES
requires max (A) to maintain balance  Dynamic Sitting Balance: poor (-): requires max (A) to maintain balance  Comments: varied in assist levels EOB, periods of min A with unilateral support of (L) UE on bed rail, initially max A due to posterior lean/poor awareness of body positioning EOB, progressing to CGA/min A. Pt tolerated sitting EOB ~5-7 minutes for grooming task and patient called son while sitting EOB.      Other Therapeutic Interventions    Functional Outcomes  AM-PAC Inpatient Daily Activity Raw Score: 12        Cognition  Overall Cognitive Status: Impaired  Arousal/Alertness: inconsistent responses to stimuli  Following Commands: follows one step commands with repetition, follows one step commands with increased time  Attention Span: attends with cues to redirect, difficulty attending to directions  Memory: decreased recall of recent events, decreased short term memory, decreased long term memory  Safety Judgement: decreased awareness of need for assistance, decreased awareness of need for safety  Problem Solving: assistance required to generate solutions, assistance required to implement solutions, assistance required to identify errors made, assistance required to correct errors made  Insights: decreased awareness of deficits  Initiation: requires cues for some  Sequencing: requires cues for some  Difficulty understanding importance to mobilize, patient minimizes concerns.       Orientation:    oriented to person, oriented to situation, disoriented to place, and disoriented to time . Patient kept stating did not believe she was in the hospital, oriented several times.   Command Following:   accurately follows one step commands     Education  Barriers To Learning: cognition  Patient Education: patient educated on goals, OT role and benefits, plan of care, precautions, ADL adaptive strategies, weight-bearing education, orientation, disease specific education, pressure relief, transfer training,

## 2024-12-30 NOTE — CARE COORDINATION
Discharge Planning Note Re: Skilled Nursing     CM/SW noted consult for discharge planning. Chart reviewed. Noted recommendations for SNF.  CM met with patient and son, Efe. Efe asked CM to call the primary decision maker, Herb Vish, [phone].  Introduced self and explained role of CM and discharge planning.     Patient and son, Eric, are agreeable to SNF on dc.     [place] of choice list was provided with basic dialogue that supports the patient's individualized plan of care/goals, treatment preferences and shares the quality data associated with the providers. [x] Yes [] No.  Herb have reviewed the provided list and made selections.    Referral made to:    [facility]  [address] [place] [address]  Phone: [phone]  Fax: [phone]     [address] [address]   [phone]    CM called Ruddy Calvo, with [facility] at [phone].  [facility] referral was sent through [facility].      [facility] admissions department is reviewing the referral.    CM/SW will follow-up on referrals and provide any additional documentation necessary to facilitate placement.       Electronically signed by RANI Vasquez on 12/30/2024 at 3:13 PM

## 2024-12-30 NOTE — PROGRESS NOTES
(A) (based on SCr of 1.7 mg/dL (H)).   CBC: No results for input(s): \"WBC\", \"HGB\", \"HCT\", \"MCV\", \"PLT\" in the last 72 hours.  Thyroid:   Lab Results   Component Value Date/Time    TSH 2.51 2024 04:50 AM     Lipids:   Lab Results   Component Value Date/Time    CHOL 144 10/11/2021 09:15 AM    HDL 38 10/11/2021 09:15 AM    HDL 33 2010 09:41 AM    TRIG 241 10/11/2021 09:15 AM     LFTS:   Lab Results   Component Value Date/Time    ALT 19 2024 02:15 AM    AST 44 2024 02:15 AM    ALKPHOS 80 2024 02:15 AM    AGRATIO 1.3 2024 02:15 AM    BILITOT 0.8 2024 02:15 AM     Cardiac Enzymes:   Lab Results   Component Value Date/Time    CKTOTAL 78 2022 09:57 AM    TROPONINI <0.01 2023 05:17 PM    TROPONINI <0.01 2022 09:13 PM    TROPONINI <0.01 2021 11:16 AM     Coags:   Lab Results   Component Value Date/Time    PROTIME 26.9 2024 05:05 AM    PROTIME 32.5 2013 12:14 PM    INR 2.49 2024 05:05 AM       EC24  Atrial paced rhythm with RBBB    ECHO:      Left Ventricle: Normal left ventricular systolic function with a visually estimated EF of 55 - 60%. Left ventricle size is normal. Normal wall thickness. Unable to assess wall motion.    Right Ventricle: Not well visualized.    Aortic Valve: Mechanical valve that is well-seated. Moderately calcified cusps with leaflet restriction. Moderate regurgitation with a centrally directed jet. Unable to assess gradient due to pt combativeness. There are multiple small mobile densities that could be thrombus or pannus. Similar to images 2024.    Mitral Valve: Mechanical valve that is well-seated. Mild regurgitation.    Tricuspid Valve: Mild to moderate regurgitation.    Stress Test:     Cath:    Problem List:   Patient Active Problem List    Diagnosis Date Noted    Moderate malnutrition (HCC) 2023    Opioid dependence with current use ([facility]) 2023    CHF (congestive heart failure), NYHA class II,  Occurred in setting of hypokalemia with prolonged QT interval  - No plans for cath per interventional cardiology  - Continue BB, mexiletine  - Keep electrolytes normalized (K+ >4 and mag >2)    2. SND  - S/p PPM    3. Valvular Insufficiency   - Hx of mechanical AVR/MVR   - Continue coumadin. Appreciate pharmacy assistance with dosing    4.Paroxysmal Atrial Fibrillation  - Currently in NSR  - Continue BB  - [id number] score:high; [id number] Vasc score and anticoagulation discussed. High risk for stroke and thromboembolism. Anticoagulation is recommended. Risk of bleeding was discussed.  ~ Continue coumadin (takes for mechanical valves); tolerating well. Monitor for s/s of bleeding    5. FOX on CKD   - Improving; down to 1.7  - Nephrology following    6. Chronic Anemia   - S/p PRBC  - Stable, near baseline    7. Fall   - S/p femoral fx   - Continue PT/OT    No further EP recommendations. Follow up with her primary cardiologist at     All pertinent information and plan of care discussed with the EP physician. Case discussed with hospitalist provider    All questions and concerns were addressed to the patient. Alternatives to my treatment were discussed. I have discussed the above stated plan with patient and the nurse. The patient verbalized understanding and agreed with the plan.    Thank you for allowing to us to participate in the care of ALONSO Strauss-CNP  [facility]   Office: [phone]

## 2024-12-30 NOTE — PROGRESS NOTES
[facility] - Inpatient Rehabilitation Department   Phone: [phone]    Physical Therapy    [] Initial Evaluation            [x] Daily Treatment Note         [] Discharge Summary      Patient: Pao Wahl   : 1945   MRN: [id number]   Date of Service:  2024  Admitting Diagnosis: Closed fracture of right hip (HCC)  Current Admission Summary:  : Pao Wahl is a 79 y.o. female with pmh of HTN, HLD, COPD, mechanical aortic and mitral valves, CKD, anemia, A-fib, Follicular lymphoma in remission, pacemaker, Type II DM   who presents with fall and right hip pain.  Patient was found to have right hip fracture.  Patient was felt to have high cardiac risk, so was transferred to [facility] for cardiac anesthesia for orthopedic surgery.  During course of hospitalization patient was found to have episode of cardiac arrest which was responde   - Plan is to proceed with non-operative management of the right hip fracture given her perioperative risk.  - NWB RLE    Past Medical History:  has a past medical history of A-fib (HCC), Anemia, Anemia:multifactorial, Anxiety, Cataract, CHF (congestive heart failure) (HCC), Chronic back pain, CKD (chronic kidney disease) stage 3, GFR 30-59 ml/min (HCC), Colitis, ischemic (HCC), COPD, Diabetes, Diabetic eye exam (HCC), GERD (gastroesophageal reflux disease), H/O heart valve replacement with mechanical valve, Hx of mammogram, Hyperlipidaemia LDL goal <100, Hypertension, Insomnia, Long-term (current) use of anticoagulants, INR goal 2.5-3.5, Lymphoma:monoclonal B cell, Mammogram abnormal, Nonspecific abnormal results of kidney function study, Osteoarthritis, PAF (paroxysmal atrial fibrillation) (HCC), Renal cysts, right, RLS (restless legs syndrome), Sciatica, Screening colonoscopy, Therapeutic drug monitoring, Valvular heart disease, and Visit for screening mammogram.  Past Surgical History:  has a past surgical history that includes Aortic valve  factor. The patient does well with distraction to assist with tolerating mobility. Pt will benefit from continued skilled PT to facilitate return to PLOF and to promote independence.  Safety Interventions: patient left in bed, bed alarm in place, call light within reach, gait belt, patient at risk for falls, and nurse notified    Plan  Frequency: 7 x/week  Current Treatment Recommendations: strengthening, ROM, balance training, functional mobility training, transfer training, gait training, stair training, endurance training, manual therapy - soft tissue massage, modalities, patient/caregiver education, cognitive/perceptual training, cognitive reorientation, pain management, home exercise program, safety education, equipment evaluation/education, and positioning    Goals  Patient Goals: None stated this date    Short Term Goals:  Time Frame: by discharge  Patient will complete bed mobility at maximum assistance   Patient will complete transfers at maximum assistance   Patient will ambulate 10 ft with use of LRAD at maximum assistance    *No goals met 12/30/24    Above goals reviewed on 12/30/2024.  All goals are ongoing at this time unless indicated above.      Therapy Session Time      Individual Group Co-treatment   Time In     0927   Time Out     1012   Minutes     45     Timed Code Treatment Minutes: 45 Minutes  Total Treatment Minutes: 45 Minutes        Electronically Signed By: Mary Hayes, PT      Mary Hayes PT, DPT, CNS #[id number]

## 2024-12-31 VITALS
HEART RATE: 62 BPM | DIASTOLIC BLOOD PRESSURE: 44 MMHG | HEIGHT: 59 IN | WEIGHT: 111 LBS | TEMPERATURE: 98.3 F | OXYGEN SATURATION: 91 % | BODY MASS INDEX: 22.38 KG/M2 | SYSTOLIC BLOOD PRESSURE: 159 MMHG | RESPIRATION RATE: 16 BRPM

## 2024-12-31 LAB
ALBUMIN SERPL-MCNC: 3 G/DL (ref 3.4–5)
ANION GAP SERPL CALCULATED.3IONS-SCNC: 14 MMOL/L (ref 3–16)
BASOPHILS # BLD: 0.1 K/UL (ref 0–0.2)
BASOPHILS NFR BLD: 0.7 %
BUN SERPL-MCNC: 49 MG/DL (ref 7–20)
CALCIUM SERPL-MCNC: 8.6 MG/DL (ref 8.3–10.6)
CHLORIDE SERPL-SCNC: 102 MMOL/L (ref 99–110)
CO2 SERPL-SCNC: 23 MMOL/L (ref 21–32)
CREAT SERPL-MCNC: 1.9 MG/DL (ref 0.6–1.2)
DEPRECATED RDW RBC AUTO: 19.3 % (ref 12.4–15.4)
EOSINOPHIL # BLD: 0.1 K/UL (ref 0–0.6)
EOSINOPHIL NFR BLD: 0.9 %
GFR SERPLBLD CREATININE-BSD FMLA CKD-EPI: 27 ML/MIN/{1.73_M2}
GLUCOSE BLD-MCNC: 150 MG/DL (ref 70–99)
GLUCOSE BLD-MCNC: 156 MG/DL (ref 70–99)
GLUCOSE SERPL-MCNC: 121 MG/DL (ref 70–99)
HCT VFR BLD AUTO: 23.8 % (ref 36–48)
HGB BLD-MCNC: 8 G/DL (ref 12–16)
INR PPP: 3.44 (ref 0.85–1.15)
LYMPHOCYTES # BLD: 1.2 K/UL (ref 1–5.1)
LYMPHOCYTES NFR BLD: 9.8 %
MCH RBC QN AUTO: 29.7 PG (ref 26–34)
MCHC RBC AUTO-ENTMCNC: 33.6 G/DL (ref 31–36)
MCV RBC AUTO: 88.2 FL (ref 80–100)
MONOCYTES # BLD: 1.3 K/UL (ref 0–1.3)
MONOCYTES NFR BLD: 10.2 %
NEUTROPHILS # BLD: 10 K/UL (ref 1.7–7.7)
NEUTROPHILS NFR BLD: 78.4 %
PERFORMED ON: ABNORMAL
PERFORMED ON: ABNORMAL
PHOSPHATE SERPL-MCNC: 4.5 MG/DL (ref 2.5–4.9)
PLATELET # BLD AUTO: 297 K/UL (ref 135–450)
PMV BLD AUTO: 9.4 FL (ref 5–10.5)
POTASSIUM SERPL-SCNC: 4.1 MMOL/L (ref 3.5–5.1)
POTASSIUM SERPL-SCNC: 4.1 MMOL/L (ref 3.5–5.1)
PROTHROMBIN TIME: 34.4 SEC (ref 11.9–14.9)
RBC # BLD AUTO: 2.7 M/UL (ref 4–5.2)
SODIUM SERPL-SCNC: 139 MMOL/L (ref 136–145)
WBC # BLD AUTO: 12.7 K/UL (ref 4–11)

## 2024-12-31 PROCEDURE — 6360000002 HC RX W HCPCS: Performed by: INTERNAL MEDICINE

## 2024-12-31 PROCEDURE — 85025 COMPLETE CBC W/AUTO DIFF WBC: CPT

## 2024-12-31 PROCEDURE — 6370000000 HC RX 637 (ALT 250 FOR IP): Performed by: NURSE PRACTITIONER

## 2024-12-31 PROCEDURE — 99233 SBSQ HOSP IP/OBS HIGH 50: CPT | Performed by: INTERNAL MEDICINE

## 2024-12-31 PROCEDURE — 6370000000 HC RX 637 (ALT 250 FOR IP): Performed by: INTERNAL MEDICINE

## 2024-12-31 PROCEDURE — 97530 THERAPEUTIC ACTIVITIES: CPT

## 2024-12-31 PROCEDURE — 85610 PROTHROMBIN TIME: CPT

## 2024-12-31 PROCEDURE — 2500000003 HC RX 250 WO HCPCS: Performed by: NURSE PRACTITIONER

## 2024-12-31 PROCEDURE — 80069 RENAL FUNCTION PANEL: CPT

## 2024-12-31 PROCEDURE — 6370000000 HC RX 637 (ALT 250 FOR IP)

## 2024-12-31 RX ORDER — ACETAMINOPHEN 500 MG
500 TABLET ORAL 3 TIMES DAILY
Qty: 42 TABLET | Refills: 0 | Status: SHIPPED | OUTPATIENT
Start: 2024-12-31 | End: 2025-01-14

## 2024-12-31 RX ORDER — AMLODIPINE BESYLATE 10 MG/1
10 TABLET ORAL DAILY
Qty: 30 TABLET | Refills: 3 | Status: SHIPPED | OUTPATIENT
Start: 2025-01-01

## 2024-12-31 RX ORDER — MEXILETINE HYDROCHLORIDE 200 MG/1
200 CAPSULE ORAL EVERY 8 HOURS SCHEDULED
Qty: 90 CAPSULE | Refills: 3 | Status: SHIPPED | OUTPATIENT
Start: 2024-12-31

## 2024-12-31 RX ADMIN — Medication 10 ML: at 09:15

## 2024-12-31 RX ADMIN — METOPROLOL TARTRATE 25 MG: 25 TABLET, FILM COATED ORAL at 09:14

## 2024-12-31 RX ADMIN — AMLODIPINE BESYLATE 10 MG: 5 TABLET ORAL at 09:15

## 2024-12-31 RX ADMIN — MEXILETINE HYDROCHLORIDE 200 MG: 200 CAPSULE ORAL at 05:52

## 2024-12-31 RX ADMIN — OXYCODONE 10 MG: 5 TABLET ORAL at 18:46

## 2024-12-31 RX ADMIN — ENOXAPARIN SODIUM 50 MG: 100 INJECTION SUBCUTANEOUS at 09:15

## 2024-12-31 RX ADMIN — PANTOPRAZOLE SODIUM 40 MG: 40 TABLET, DELAYED RELEASE ORAL at 05:52

## 2024-12-31 RX ADMIN — Medication 10 ML: at 04:32

## 2024-12-31 RX ADMIN — ACETAMINOPHEN 500 MG: 500 TABLET ORAL at 09:14

## 2024-12-31 RX ADMIN — OXYCODONE 5 MG: 5 TABLET ORAL at 05:51

## 2024-12-31 RX ADMIN — OXYCODONE 10 MG: 5 TABLET ORAL at 09:16

## 2024-12-31 ASSESSMENT — PAIN DESCRIPTION - FREQUENCY: FREQUENCY: INTERMITTENT

## 2024-12-31 ASSESSMENT — PAIN DESCRIPTION - DESCRIPTORS
DESCRIPTORS: ACHING
DESCRIPTORS: ACHING

## 2024-12-31 ASSESSMENT — PAIN SCALES - GENERAL
PAINLEVEL_OUTOF10: 3
PAINLEVEL_OUTOF10: 2
PAINLEVEL_OUTOF10: 2
PAINLEVEL_OUTOF10: 10
PAINLEVEL_OUTOF10: 6
PAINLEVEL_OUTOF10: 7

## 2024-12-31 ASSESSMENT — PAIN DESCRIPTION - ORIENTATION
ORIENTATION: MID
ORIENTATION: RIGHT

## 2024-12-31 ASSESSMENT — PAIN DESCRIPTION - LOCATION
LOCATION: BACK;HIP
LOCATION: HIP
LOCATION: BACK

## 2024-12-31 ASSESSMENT — PAIN DESCRIPTION - PAIN TYPE: TYPE: ACUTE PAIN

## 2024-12-31 ASSESSMENT — PAIN - FUNCTIONAL ASSESSMENT: PAIN_FUNCTIONAL_ASSESSMENT: PREVENTS OR INTERFERES WITH MANY ACTIVE NOT PASSIVE ACTIVITIES

## 2024-12-31 ASSESSMENT — PAIN DESCRIPTION - ONSET: ONSET: ON-GOING

## 2024-12-31 NOTE — PROGRESS NOTES
Hospitalist Progress Note    Name:  Pao Wahl    /Age/Sex: 1945  (79 y.o. female)  MRN & CSN:  [id number] & [id number]    PCP: Juliette Ramirez MD    Date of Admission: 2024    Patient Status:  Inpatient     Chief Complaint: No chief complaint on file.      Hospital Course: Pao Wahl is a 79 y.o. female with pmh of HTN, HLD, COPD, mechanical aortic and mitral valves, CKD, anemia, A-fib, Follicular lymphoma in remission, pacemaker, Type II DM   who presents with fall and right hip pain.  Patient was found to have right hip fracture.  Patient was felt to have high cardiac risk, so was transferred to [facility] for cardiac anesthesia for orthopedic surgery.  During course of hospitalization patient was found to have episode of cardiac arrest which was responded to DC shock and IV epinephrine.  Patient is currently alert, awake and oriented.     Subjective:  Today is:  Hospital Day: 10.  Patient seen and examined in [id number]/4485-01.     She is doing well.   Doesn't want to go to SNF but not sure how she would be able to go home.        Medications:  Reviewed    Infusion Medications    sodium chloride      dextrose      sodium chloride       Scheduled Medications    enoxaparin  1 mg/kg SubCUTAneous Daily    warfarin placeholder: dosing by pharmacy   Other RX Placeholder    mexiletine  200 mg Oral 3 times per day    amLODIPine  10 mg Oral Daily    acetaminophen  500 mg Oral TID    atorvastatin  10 mg Oral Nightly    [Held by provider] furosemide  40 mg Oral BID    metoprolol tartrate  25 mg Oral BID    pantoprazole  40 mg Oral QAM AC    insulin lispro  0-4 Units SubCUTAneous 4x Daily AC & HS    sodium chloride flush  5-40 mL IntraVENous 2 times per day    melatonin  3 mg Oral Nightly     PRN Meds: albuterol, oxyCODONE **OR** oxyCODONE, prochlorperazine, albuterol, sodium chloride, hydrALAZINE, ALPRAZolam, dextrose bolus **OR** dextrose bolus, glucagon (rDNA), dextrose, sodium  pacemaker  -  DM 2 with hyperglycemia  -FSBS  - Monitor for hypoglycemia    Chronic anemia, normocytic    Possible uti  - ruled out.    She is too high risk for surgery, non operative management     Discussed with ortho who believe that she may be able to heal without surgery as this is not displaced.   Working on placement for SNF    Controlled substances: Will Continue PRN Morphine for Pain    Drugs that require monitoring for toxicity include: Heparin and the method of monitoring was/is CBC for platelet abnormality    DVT ppx: Heparin  GI ppx: PPI  Diet: ADULT DIET; Regular; No Added Salt (3-4 gm)  ADULT ORAL NUTRITION SUPPLEMENT; Breakfast, Dinner; Clear Liquid Oral Supplement  Code Status: Full Code    PT/OT Eval Status: eval and treat     Disposition:    She has hip fracture which ris too high risk for surgery given her cardiac issues..  At this point her risk for any surgical procedure is high.  Further risk assessment after interventional cardiology completed their assessment,Which was that she is too high risk for cath.  Plan for non operative managaement of the fracture.   needs placement   Spoke to son via phone and updated on plan for snf        Julius Abreu MD  12/30/2024  8:17 PM

## 2024-12-31 NOTE — PROGRESS NOTES
Pharmacy to Dose Warfarin    Pharmacy consulted to dose warfarin for AVR.    INR Goal: 2.5-3.5    INR today: 3.44    Assessment/Plan:  - Lovenox bridging completed this AM per Dr. Holloway.  Dose of lovenox was given, so will not give Warfarin today in light of large jump in INR with 1 mg dose given last evening  - If INR remains therapeutic tomorrow, consider resuming home regimen as patient will be leaving for SNF in the next day or two    Pharmacy will continue to follow.    Jeannine Medel [facility], B[facility]  [phone]

## 2024-12-31 NOTE — PROGRESS NOTES
Nephrology Note                                                                                                                                                                                                                                                                                                                                                               Office : [phone]     Fax :[phone]    Patient's Name: Pao Wahl  8:50 AM  12/31/2024    Reason for Consult:  FOX  Requesting Physician:  Juliette Ramirez MD  Chief Complaint:  No chief complaint on file.      Assessment/Plan     # FOX on CKD 3/4  - FOX likely 2/2 cardiac arrest  - Creatinine fluctuating, baseline Creatinine ~ 1.6  - Severe proteinuric disease   - Hold diuretics   - s/p saline bolus x 1   - Monitor kidney functions closely  - control BP   - strict I/O    # CHF  - Echo 12/23 with EF 55-60%  - BNP elevated ~ 34 k   - We will monitor fluid status closely  - diuretics per cardiology     # Fall  - R femoral neck fracture  - Ortho following    # HTN  - on amlodipine  and Metoprolol  - monitor    # Anemia of chronic disease  - s/p PRBC transfusion  - Monitor    #Hyponatremia-resolved  - Monitor     #DM 2  - management per primary    History of Present Ilness:    Pao Wahl is a 79 y.o. female with pmh of HTN, HLD, COPD, mechanical aortic and mitral valves, CKD, anemia, A-fib, Follicular lymphoma in remission, pacemaker, Type II DM who presents with fall and right hip pain. Patient was found to have right hip fracture. Patient was felt to have high cardiac risk, so was transferred to [facility] for cardiac anesthesia for orthopedic surgery. During course of hospitalization patient was found to have episode of cardiac arrest which was responded to DC shock and IV epinephrine. We are consulted for FOX on CKD 3/4.       Interval hx     Cr fluctuating  Bps elevated but stable  Good uOP  Patient sleepy- states she is  input(s): \"LDLCALC\", \"LABVLDL\"  ABGs: No results for input(s): \"PHART\", \"PO2ART\", \"[id number]\" in the last 72 hours.  INR:   Recent Labs     12/29/24  0500 12/30/24  0505 12/31/24  0547   INR 1.63* 2.49* 3.44*     UA:  No results for input(s): \"COLORU\", \"CLARITYU\", \"GLUCOSEU\", \"BILIRUBINUR\", \"KETUA\", \"SPECGRAV\", \"BLOODU\", \"PHUR\", \"PROTEINU\", \"UROBILINOGEN\", \"NITRU\", \"LEUKOCYTESUR\", \"URINETYPE\" in the last 72 hours.    Invalid input(s): \"LABMICR\"     Urine Microscopic:   No results for input(s): \"LABCAST\", \"BACTERIA\", \"COMU\", \"HYALCAST\", \"WBCUA\", \"RBCUA\" in the last 72 hours.    Invalid input(s): \"EPIU\"    Urine Culture:   No results for input(s): \"LABURIN\" in the last 72 hours.    Urine Chemistry: No results for input(s): \"CLUR\", \"LABCREA\", \"PROTEINUR\", \"NAUR\" in the last 72 hours.      IMAGING:  XR CHEST PORTABLE   Final Result   Bibasilar airspace opacities and small right pleural effusion.  This could   represent a combination of pneumonia and atelectasis         CT HEAD WO CONTRAST   Final Result   1. No acute intracranial abnormality.   2. Old left occipital infarct.   3. Tiny old lacunar infarct, right thalamus.   4. Moderate diffuse cerebral atrophy and mild chronic white matter ischemic   change.         XR CHEST PORTABLE   Final Result   Right internal jugular central venous catheter tip at the lower SVC. No   pneumothorax.         XR CHEST PORTABLE   Final Result   No acute pulmonary findings.               Medical Decision Making:  The following items were considered in medical decision making:  Discussion of patient care with other providers  Reviewed clinical lab tests  Reviewed radiology tests  Reviewed other diagnostic tests/interventions    Will be discussed w/  Primary team     Thank you for allowing us to participate in care of Pao Thurmanrd   Feel free to contact me,     Katiana Childs, APRN - CNP       Agree with plan above     Rigoberto Gay MD     [facility]

## 2024-12-31 NOTE — PROGRESS NOTES
Shift assessment completed. Neuro WNL. Reports pain 7/10 at this time. AM meds administered per MAR. The care plan and education has been reviewed and mutually agreed upon with the patient. Standard safety measures in place.     4:30 PM  Called report at this time. Talked to Gina; all questions answered to my knowledge. She verbalized understanding. Advised to call back directly if there are further questions.

## 2024-12-31 NOTE — PROGRESS NOTES
[facility] - Inpatient Rehabilitation Department   Phone: [phone]    Physical Therapy    [] Initial Evaluation            [x] Daily Treatment Note         [] Discharge Summary      Patient: Pao Wahl   : 1945   MRN: [id number]   Date of Service:  2024  Admitting Diagnosis: Closed fracture of right hip (HCC)  Current Admission Summary:  : Pao Wahl is a 79 y.o. female with pmh of HTN, HLD, COPD, mechanical aortic and mitral valves, CKD, anemia, A-fib, Follicular lymphoma in remission, pacemaker, Type II DM   who presents with fall and right hip pain.  Patient was found to have right hip fracture.  Patient was felt to have high cardiac risk, so was transferred to [facility] for cardiac anesthesia for orthopedic surgery.  During course of hospitalization patient was found to have episode of cardiac arrest which was responde   - Plan is to proceed with non-operative management of the right hip fracture given her perioperative risk.  - NWB RLE    Past Medical History:  has a past medical history of A-fib (HCC), Anemia, Anemia:multifactorial, Anxiety, Cataract, CHF (congestive heart failure) (HCC), Chronic back pain, CKD (chronic kidney disease) stage 3, GFR 30-59 ml/min (HCC), Colitis, ischemic (HCC), COPD, Diabetes, Diabetic eye exam (HCC), GERD (gastroesophageal reflux disease), H/O heart valve replacement with mechanical valve, Hx of mammogram, Hyperlipidaemia LDL goal <100, Hypertension, Insomnia, Long-term (current) use of anticoagulants, INR goal 2.5-3.5, Lymphoma:monoclonal B cell, Mammogram abnormal, Nonspecific abnormal results of kidney function study, Osteoarthritis, PAF (paroxysmal atrial fibrillation) (HCC), Renal cysts, right, RLS (restless legs syndrome), Sciatica, Screening colonoscopy, Therapeutic drug monitoring, Valvular heart disease, and Visit for screening mammogram.  Past Surgical History:  has a past surgical history that includes Aortic valve

## 2024-12-31 NOTE — PLAN OF CARE
Problem: Chronic Conditions and Co-morbidities  Goal: Patient's chronic conditions and co-morbidity symptoms are monitored and maintained or improved  12/31/2024 0001 by Rodriguez Hernandez RN  Outcome: Progressing     Problem: Discharge Planning  Goal: Discharge to home or other facility with appropriate resources  12/31/2024 0001 by Rodriguez Hernandez RN  Outcome: Progressing     Problem: Pain  Goal: Verbalizes/displays adequate comfort level or baseline comfort level  Recent Flowsheet Documentation  Taken 12/31/2024 0900 by Tereza Corral RN  Verbalizes/displays adequate comfort level or baseline comfort level:   Encourage patient to monitor pain and request assistance   Assess pain using appropriate pain scale   Administer analgesics based on type and severity of pain and evaluate response  12/31/2024 0001 by Rodriguez Hernandez RN  Outcome: Progressing     Problem: Safety - Adult  Goal: Free from fall injury  12/31/2024 0001 by Rodriguez Hernandez RN  Outcome: Progressing     Problem: ABCDS Injury Assessment  Goal: Absence of physical injury  12/31/2024 0001 by Rodriguez Hernandez RN  Outcome: Progressing     Problem: Skin/Tissue Integrity  Goal: Absence of new skin breakdown  Description: 1.  Monitor for areas of redness and/or skin breakdown  2.  Assess vascular access sites hourly  3.  Every 4-6 hours minimum:  Change oxygen saturation probe site  4.  Every 4-6 hours:  If on nasal continuous positive airway pressure, respiratory therapy assess nares and determine need for appliance change or resting period.  12/31/2024 0001 by Rodriguez Hernandez RN  Outcome: Progressing     Problem: Nutrition Deficit:  Goal: Optimize nutritional status  12/31/2024 0001 by Rodriguez Hernandez RN  Outcome: Progressing     Problem: Neurosensory - Adult  Goal: Achieves stable or improved neurological status  12/31/2024 1136 by Tereza Corral RN  Outcome: Progressing  12/31/2024 0001 by Rodriguez Hernandez RN  Outcome: Progressing     Problem: Cardiovascular -

## 2024-12-31 NOTE — CARE COORDINATION
12/31/24 1604   IMM Letter   IMM Letter given to Patient/Family/Significant other/Guardian/POA/by: IMM given by CM   IMM Letter date given: 12/31/24   IMM Letter time given: 1600  (Pt agreeable to IMM w/o 4hr. notice.)

## 2024-12-31 NOTE — CARE COORDINATION
9:53 - Received notice that [facility] call and they can accept the patient.     10:07 - LVM with admission at [facility] asking for call back regarding referral.     10:10 - LVM for Crystal with [facility] for update on referral.     [facility]  [address] [place] [address]  Phone: [phone]  Fax: [phone]      [place] [address]  [place], [place] [address] [phone]     CM called Lubna, Liaison, with [facility] at [phone].      10:40 - CM spoke to son to update on [facility]. Advised that Cm has not heard back from [facility]. The son would like to at least get a \"no\" from [facility] as his mother in law lives there and it would be close to the family. CM advised will wait to hear back from [facility].     PT IS STRAIGHT MEDICARE SO NO PRECERT WILL BE NEEDED.     Received call from [facility]. They can accept the patient.    11:28 - Spoke with Lubna. They can accept the patient today as long as transport is set up by 17:00.    11:32 - Perfect Serve sent to Dr. Holloway.     12:33 - CM updated son to advise that [facility] has accepted the patient. Son happy to hear this.

## 2024-12-31 NOTE — PROGRESS NOTES
[] No  Hand Dominance: [] Left                 [] Right  Current Employment: retired.  Occupation:    Hobbies: Denominational and bibe study      Available Assistance at Discharge: available PRN     Recent Falls: 7 falls in the past 6 months     Examination   Vision:   Vision Gross Assessment: Impaired and Vision Corrective Device: wears glasses for reading  Hearing:   WFL  Sensation:   denies numbness and tingling  ROM:   (B) UE AROM WFL  Strength:   (B) UE strength grossly WFL    Subjective  General: Pt lying supine in bed, disorientated x4. Non-coherent speech throughout.   Pain: Patient unable to rate secondary to cognition/communication deficits  Pain Interventions: RN notified     Activities of Daily Living  Basic Activities of Daily Living  General Comments: checked for incontinence, brief change not warranted  Instrumental Activities of Daily Living  No IADL completed on this date.  Functional Mobility  Bed Mobility:  Rolling Left: dependent assistance  Rolling Right: dependent assistance  Comments: unable to follow commands, screaming in pain, pt refusing to be rolled   Transfers:  No transfers completed on this date secondary to unable to sit on EOB .  Comments:  Functional Mobility  No functional mobility completed on this date secondary to see abovel.  Balance:  Pt unable to tolerate sitting/standing position during for rating of balance component.   Comments:   Other Therapeutic Interventions    Functional Outcomes  AM-PAC Inpatient Daily Activity Raw Score: 6        Cognition  Overall Cognitive Status: Impaired  Arousal/Alertness: inconsistent responses to stimuli  Following Commands: follows one step commands with repetition, follows one step commands with increased time  Attention Span: attends with cues to redirect, difficulty attending to directions  Memory: decreased recall of recent events, decreased short term memory, decreased long term memory  Safety Judgement: decreased awareness of need  for assistance, decreased awareness of need for safety  Problem Solving: assistance required to generate solutions, assistance required to implement solutions, assistance required to identify errors made, assistance required to correct errors made  Insights: decreased awareness of deficits  Initiation: requires cues for some  Sequencing: requires cues for some  Orientation:    disoriented x 4.   Command Following:   accurately follows one step commands  Education  Barriers To Learning: cognition  Patient Education: patient educated on OT role and benefits, pressure relief  Learning Assessment:  patient is not an independent learner  Assessment  Activity Tolerance: Poor, unable to follow commands d/t cognition and pain  Impairments Requiring Therapeutic Intervention: decreased functional mobility, decreased ADL status, decreased strength, decreased safety awareness, decreased cognition, decreased endurance, decreased balance, decreased IADL, increased pain  Prognosis: poor  Clinical Assessment: Pt w/ no progress towards goals as her cognition continues to decline. Pt is dependent for bed mobility this date and unable to tolerate supine<>sit on EOB. Will decreased plan of care d/t poor participation as she is significantly limited by pain, cognition, and decreased activity tolerance. Skilled OT warranted to maximize independence in ADLs in order to reduce caregiver burden.  Safety Interventions: patient left in bed, bed alarm in place, call light within reach, and nurse notified    Plan  Frequency: 3-5 x/per week (decreased POC d/t poor activity tolerance, cognition, and pain)  Current Treatment Recommendations: strengthening, balance training, functional mobility training, transfer training, endurance training, patient/caregiver education, ADL/self-care training, IADL training, and equipment evaluation/education    Goals  Short Term Goals:  Time Frame: by dc  Patient will complete upper body ADL at minimal assistance

## 2024-12-31 NOTE — CARE COORDINATION
DISCHARGE ORDER  Date/Time 2024 3:23 PM  Completed by: Teresa Boeck, MEME, Case Management    Patient Name: Pao Wahl    : 1945      Admit order Date and Status:24  Noted discharge order. (verify MD's last order for status of admission/Traditional Medicare 3 [place] Inpatient qualifying stay required for SNF)    Confirmed discharge plan with:              Patient:  Yes              When pt confirms DC plan does any support person need to be contacted by CM Yes if yes who the son                      Discharge to Facility: [facility]   Facility phone number for staff giving report: [phone]    Pre-certification completed: NA   Hospital Exemption Notification (HENS) completed: yes   Discharge orders and Continuity of Care faxed to facility:  Pull from "[facility]      Transportation:               Medical Transport explained with choice list offered to pt/family.                Choice:No(no preference)  Agency used: [facility]   time:   18:45      Pt/family/Nursing/Facility aware of  time:   Yes Names: Patient,  Herb/son, CYNTHIA/eliu, Tereza VALENTINE, [place]/[facility]  Ambulance form completed:  yes      Date Last IMM Given: 24    Comments: Pt is being d/c'd to [facility] today. Pt's O2 sats are 2L% on NC.    Discharge timeout done with Tereza VALENTINE. All discharge needs and concerns addressed.    Discharging nurse to complete RENEE, reconcile AVS, and place final copy with patient's discharge packet. Discharging RN to ensure that written prescriptions for  Level II medications are sent with patient to the facility as per protocol.

## 2024-12-31 NOTE — PLAN OF CARE
Problem: Chronic Conditions and Co-morbidities  Goal: Patient's chronic conditions and co-morbidity symptoms are monitored and maintained or improved  Outcome: Progressing     Problem: Discharge Planning  Goal: Discharge to home or other facility with appropriate resources  Outcome: Progressing     Problem: Pain  Goal: Verbalizes/displays adequate comfort level or baseline comfort level  Outcome: Progressing     Problem: Safety - Adult  Goal: Free from fall injury  Outcome: Progressing     Problem: ABCDS Injury Assessment  Goal: Absence of physical injury  Outcome: Progressing     Problem: Skin/Tissue Integrity  Goal: Absence of new skin breakdown  Description: 1.  Monitor for areas of redness and/or skin breakdown  2.  Assess vascular access sites hourly  3.  Every 4-6 hours minimum:  Change oxygen saturation probe site  4.  Every 4-6 hours:  If on nasal continuous positive airway pressure, respiratory therapy assess nares and determine need for appliance change or resting period.  Outcome: Progressing     Problem: Nutrition Deficit:  Goal: Optimize nutritional status  Outcome: Progressing     Problem: Neurosensory - Adult  Goal: Achieves stable or improved neurological status  Outcome: Progressing     Problem: Respiratory - Adult  Goal: Achieves optimal ventilation and oxygenation  Outcome: Progressing     Problem: Cardiovascular - Adult  Goal: Maintains optimal cardiac output and hemodynamic stability  Outcome: Progressing     Problem: Skin/Tissue Integrity - Adult  Goal: Skin integrity remains intact  Outcome: Progressing     Problem: Musculoskeletal - Adult  Goal: Return mobility to safest level of function  Outcome: Progressing  Goal: Maintain proper alignment of affected body part  Outcome: Progressing     Problem: Genitourinary - Adult  Goal: Absence of urinary retention  Outcome: Progressing     Problem: Metabolic/Fluid and Electrolytes - Adult  Goal: Electrolytes maintained within normal limits  Outcome:

## 2024-12-31 NOTE — PROGRESS NOTES
Nutrition Note    RECOMMENDATIONS  PO Diet: SOCO  ONS: Ensure clear & Gelatein  Consider appetite stimulant     NUTRITION ASSESSMENT   Nutrition intervention triggered for f/u.  Pt continues to eat very little, with 1-25% of meals consumed, & has not taken any Ensure clear per EMR.  Attempted to wake pt several times, but sleeping soundly.  Bkf tray was untouched.  Will offer Gelatein supplements as an alernative to Ensure clear.  If poor intake continues, consider appetite stimulant.     Nutrition Related Findings: gluc 127; no edema noted  Wounds: None  Nutrition Education:  Education/Counseling not indicated   Nutrition Goals: PO intake 50% or greater, by next RD assessment     MALNUTRITION ASSESSMENT   Chronic Illness  Malnutrition Status: Moderate malnutrition (per 12/27 assessment)    NUTRITION DIAGNOSIS   Inadequate protein-energy intake related to inadequate protein-energy intake as evidenced by intake 0-25%      CURRENT NUTRITION THERAPIES  ADULT DIET; Regular; No Added Salt (3-4 gm)  ADULT ORAL NUTRITION SUPPLEMENT; Breakfast, Dinner; Clear Liquid Oral Supplement     PO Intake: 0%, 1-25%   PO Supplement Intake:0%    ANTHROPOMETRICS  Current Height: 149.9 cm (4' 11\")  Current Weight - Scale: 50.3 kg (111 lb)    Admission weight: 44.5 kg (98 lb)  Ideal Body Weight (IBW): 95 lbs  (43 kg)        BMI: 22.4      COMPARATIVE STANDARDS  Total Energy Requirements (kcals/day): 1168     Protein (g):  54-68 grams       Fluid (mL/day):  1410 mL    The patient will be monitored per nutrition standards of care. Consult dietitian if additional nutrition interventions are needed prior to RD reassessment.     Ariana Wright RD, LD    Contact: [phone]

## 2025-01-02 ENCOUNTER — CARE COORDINATION (OUTPATIENT)
Dept: CARE COORDINATION | Age: 80
End: 2025-01-02

## 2025-01-02 RX ORDER — OXYCODONE HYDROCHLORIDE 5 MG/1
10 TABLET ORAL EVERY 4 HOURS PRN
COMMUNITY

## 2025-01-02 RX ORDER — OXYCODONE HYDROCHLORIDE 5 MG/1
5 TABLET ORAL EVERY 4 HOURS PRN
COMMUNITY

## 2025-01-02 NOTE — CARE COORDINATION
Care Transitions Post-Acute Facility Discharge Call    2025    Patient: Pao Wahl Patient : 1945   MRN: [id number]  Reason for Admission:   Discharge Date: 24 RARS: Readmission Risk Score: 24.5       Post Acute Facility Update    Care Transitions Post Acute Facility Update    Care Transitions Interventions      Post Acute Facility Update   Post Acute Facility: [facility]    ELOS: 19 days      Nursing   Prescribed diet: RCS    Reported Nursing Updates: Full code. Daily weight- Notify clinician if > 3 pounds in 1 yonatan or 5 pounds in 1 week. one time a day. PT/ OT eval and treat.        Rehab/Functional   Prior Level of Functioning: need to clarify- CC note states she lives in apartment alone, PT/ OT note indicates 2-level home- will clarify with IDT       SW/Discharge Planning   Discharge Planning / Barriers: Need to clarify with IDT at [facility]- inpatient CC note states she lives in apartment alone, inpatient PT/ OT note indicates 2-level home. DME at home- WIS, GB in shower, GB on toilet, SC, HHS, standard toilet, 4WW, manual w/c, reacher, sock aid, alert button   Care Progression on Track?: Pos  Next IDT Planned Review: 25           Next IDT Planned Review: 2025    Future Appointments   Date Time Provider Department Center   2025  2:15 PM Rigoberto Gay MD [facility]   2025  1:00 PM Juliette Ramirez MD [facility]       SN Notes:   Diet: - Oral Diet:  Carb Control 5 carbs/meal (2000kcals/day)  Wounds: none  Medications: Other: med rec complete   Other:   Post-acute CC Notes: emailed admission orders    OSCAR CHEATHAM RN

## 2025-01-06 ENCOUNTER — CARE COORDINATION (OUTPATIENT)
Dept: CARE COORDINATION | Age: 80
End: 2025-01-06

## 2025-01-06 NOTE — CARE COORDINATION
Care Transitions Post-Acute Facility Discharge Call    2025    Patient: Pao Wahl Patient : 1945   MRN: [id number]  Reason for Admission:   Discharge Date: 24 RARS: Readmission Risk Score: 24.5    Writer received notification from Viki pt is . Writer confirms by [facility] CM and Epic refresh.      Needs to be reviewed by the provider   Pt passed away    Method of communication with provider : chart routing             Discharge Facility:    Care Transitions Post Acute Facility Transition    Post Acute Facility: [facility]  Post Acute Admit Date: 24  Post Acute Discharge Date: 25  ELOS: 19 days  Do you have all of your prescriptions and are they filled?: Yes (Comment: 22-reviewed)         Do you have support at home?: Partner/Spouse/SO      Care Transitions Interventions         Future Appointments   Date Time Provider Department Center   2025  2:15 PM Rigoberto Gay MD [facility]   2025  1:00 PM Juliette Ramirez MD [facility]       OSCAR CHEATHAM RN

## 2025-07-11 NOTE — PROGRESS NOTES
Lm to call back. Also sent a Shoulder Tap message. Removed and manual pressure held until hemostasis obtained

## (undated) DEVICE — COVER,TABLE,HEAVY DUTY,77"X90",STRL: Brand: MEDLINE

## (undated) DEVICE — GLOVE SURG SZ 85 L12IN FNGR THK79MIL GRN LTX FREE

## (undated) DEVICE — GLOVE ORANGE PI 8   MSG9080

## (undated) DEVICE — DRAPE,LAP,CHOLE,W/TROUGHS,STERILE: Brand: MEDLINE

## (undated) DEVICE — TRAP FLUID

## (undated) DEVICE — NEEDLE,22GX1.5",REG,BEVEL: Brand: MEDLINE

## (undated) DEVICE — APPLICATOR MEDICATED 26 CC SOLUTION HI LT ORNG CHLORAPREP

## (undated) DEVICE — TROCAR: Brand: KII FIOS FIRST ENTRY

## (undated) DEVICE — SCISSORS ENDOSCP DIA5MM CRV MPLR CAUT W/ RATCH HNDL

## (undated) DEVICE — 3M™ IOBAN™ 2 ANTIMICROBIAL INCISE DRAPE 6648EZ: Brand: IOBAN™ 2

## (undated) DEVICE — ELECTRODE BLDE L6.5IN CAUT EXT DISP

## (undated) DEVICE — GENERAL LAPAROSCOPIC: Brand: MEDLINE INDUSTRIES, INC.

## (undated) DEVICE — CORD ES L10FT MPLR LAP

## (undated) DEVICE — TOWEL,STOP FLAG GOLD N-W: Brand: MEDLINE

## (undated) DEVICE — GENERAL: Brand: MEDLINE INDUSTRIES, INC.

## (undated) DEVICE — 40583 XL ADVANCED TRENDELENBURG POSITIONING KIT: Brand: 40583 XL ADVANCED TRENDELENBURG POSITIONING KIT

## (undated) DEVICE — SPONGE,LAP,18"X18",DLX,XR,ST,5/PK,40/PK: Brand: MEDLINE

## (undated) DEVICE — SUTURE PERMAHAND SZ 2-0 L12X18IN NONABSORBABLE BLK SILK A185H

## (undated) DEVICE — LIQUIBAND RAPID ADHESIVE 36/CS 0.8ML: Brand: MEDLINE

## (undated) DEVICE — SCANLAN® SURG-I-PAW® INSTRUMENT COVERS - BLUE, 3/10" X 5"/ 8 MMX13 CM (2 - 5" PCS /PKG): Brand: SCANLAN® SURG-I-PAW® INSTRUMENT COVERS

## (undated) DEVICE — NEPTUNE E-SEP SMOKE EVACUATION PENCIL, COATED, 70MM BLADE, PUSH BUTTON SWITCH: Brand: NEPTUNE E-SEP

## (undated) DEVICE — CLEANER,CAUTERY TIP,2X2",STERILE: Brand: MEDLINE

## (undated) DEVICE — SOLUTION ANTIFOG VIS SYS CLEARIFY LAPSCP

## (undated) DEVICE — GOWN,SIRUS,POLYRNF,BRTHSLV,XL,30/CS: Brand: MEDLINE

## (undated) DEVICE — SEALER ENDOSCP NANO COAT OPN DIV CRV L JAW LIGASURE IMPACT